# Patient Record
Sex: MALE | Race: WHITE | Employment: OTHER | URBAN - METROPOLITAN AREA
[De-identification: names, ages, dates, MRNs, and addresses within clinical notes are randomized per-mention and may not be internally consistent; named-entity substitution may affect disease eponyms.]

---

## 2017-10-17 ENCOUNTER — TELEPHONE (OUTPATIENT)
Dept: CARDIOLOGY CLINIC | Age: 82
End: 2017-10-17

## 2017-11-03 ENCOUNTER — TELEPHONE (OUTPATIENT)
Dept: CARDIOLOGY CLINIC | Age: 82
End: 2017-11-03

## 2017-11-03 NOTE — TELEPHONE ENCOUNTER
Pt daughter Jonh Pittman called and stated that Dr Delmy Elizondo office told her they faxed pt office notes and records on 11/2. Told pt daughter that I checked fax machine and [employer] and spoke with [facility] and we have not received anything at this time. She states she is calling their office again to see if they will re fax them. She is flying in from out of town and states if she comes to this appointment and we not have the records it is pointless. Told her that we will keep an eye out for them and another option they could try is to have the office print them out and bring to appointment with them, she stated that was a hassle and she would call us back.

## 2017-11-08 ENCOUNTER — NURSE ONLY (OUTPATIENT)
Dept: CARDIOLOGY CLINIC | Age: 82
End: 2017-11-08

## 2017-11-08 ENCOUNTER — INITIAL CONSULT (OUTPATIENT)
Dept: CARDIOLOGY CLINIC | Age: 82
End: 2017-11-08

## 2017-11-08 VITALS
BODY MASS INDEX: 24.77 KG/M2 | DIASTOLIC BLOOD PRESSURE: 72 MMHG | WEIGHT: 173 LBS | SYSTOLIC BLOOD PRESSURE: 172 MMHG | HEART RATE: 48 BPM | HEIGHT: 70 IN

## 2017-11-08 DIAGNOSIS — I48.0 PAF (PAROXYSMAL ATRIAL FIBRILLATION) (HCC): Primary | ICD-10-CM

## 2017-11-08 DIAGNOSIS — I48.91 ATRIAL FIBRILLATION, UNSPECIFIED TYPE (HCC): ICD-10-CM

## 2017-11-08 LAB
BASOPHILS ABSOLUTE: 0 /ΜL
BASOPHILS RELATIVE PERCENT: 0.2 %
BUN BLDV-MCNC: 21 MG/DL
CALCIUM SERPL-MCNC: 8.8 MG/DL
CHLORIDE BLD-SCNC: 104 MMOL/L
CO2: 33 MMOL/L
CREAT SERPL-MCNC: 0.84 MG/DL
EOSINOPHILS ABSOLUTE: 0.1 /ΜL
EOSINOPHILS RELATIVE PERCENT: 1.2 %
GFR CALCULATED: NORMAL
GLUCOSE BLD-MCNC: 96 MG/DL
HCT VFR BLD CALC: 44.6 % (ref 41–53)
HEMOGLOBIN: 14.7 G/DL (ref 13.5–17.5)
LYMPHOCYTES ABSOLUTE: 1.2 /ΜL
LYMPHOCYTES RELATIVE PERCENT: 13.3 %
MCH RBC QN AUTO: 28.4 PG
MCHC RBC AUTO-ENTMCNC: NORMAL G/DL
MCV RBC AUTO: 85.9 FL
MONOCYTES ABSOLUTE: 0.9 /ΜL
MONOCYTES RELATIVE PERCENT: 9.7 %
NEUTROPHILS ABSOLUTE: 6.9 /ΜL
NEUTROPHILS RELATIVE PERCENT: 75.6 %
PLATELET # BLD: 184 K/ΜL
PMV BLD AUTO: NORMAL FL
POTASSIUM SERPL-SCNC: 3.5 MMOL/L
RBC # BLD: 5.19 10^6/ΜL
SODIUM BLD-SCNC: 142 MMOL/L
TROPONIN: 0.04
WBC # BLD: 9.2 10^3/ML

## 2017-11-08 PROCEDURE — G8427 DOCREV CUR MEDS BY ELIG CLIN: HCPCS | Performed by: INTERNAL MEDICINE

## 2017-11-08 PROCEDURE — 93225 XTRNL ECG REC<48 HRS REC: CPT | Performed by: INTERNAL MEDICINE

## 2017-11-08 PROCEDURE — 93000 ELECTROCARDIOGRAM COMPLETE: CPT | Performed by: INTERNAL MEDICINE

## 2017-11-08 PROCEDURE — 99204 OFFICE O/P NEW MOD 45 MIN: CPT | Performed by: INTERNAL MEDICINE

## 2017-11-08 PROCEDURE — G8420 CALC BMI NORM PARAMETERS: HCPCS | Performed by: INTERNAL MEDICINE

## 2017-11-08 PROCEDURE — 4040F PNEUMOC VAC/ADMIN/RCVD: CPT | Performed by: INTERNAL MEDICINE

## 2017-11-08 PROCEDURE — G8484 FLU IMMUNIZE NO ADMIN: HCPCS | Performed by: INTERNAL MEDICINE

## 2017-11-08 PROCEDURE — 1036F TOBACCO NON-USER: CPT | Performed by: INTERNAL MEDICINE

## 2017-11-08 PROCEDURE — 1123F ACP DISCUSS/DSCN MKR DOCD: CPT | Performed by: INTERNAL MEDICINE

## 2017-11-08 RX ORDER — LANOLIN ALCOHOL/MO/W.PET/CERES
500 CREAM (GRAM) TOPICAL EVERY MORNING
COMMUNITY
End: 2019-05-02

## 2017-11-08 RX ORDER — BENZONATATE 100 MG/1
100 CAPSULE ORAL 3 TIMES DAILY PRN
Status: ON HOLD | COMMUNITY
End: 2018-03-29 | Stop reason: ALTCHOICE

## 2017-11-08 RX ORDER — M-VIT,TX,IRON,MINS/CALC/FOLIC 27MG-0.4MG
1 TABLET ORAL DAILY
COMMUNITY

## 2017-11-08 RX ORDER — VITAMIN E 268 MG
400 CAPSULE ORAL DAILY
COMMUNITY
End: 2018-04-18 | Stop reason: ALTCHOICE

## 2017-11-08 RX ORDER — CHLORAL HYDRATE 500 MG
1000 CAPSULE ORAL DAILY
COMMUNITY

## 2017-11-08 RX ORDER — POTASSIUM CHLORIDE 1500 MG/1
20 TABLET, FILM COATED, EXTENDED RELEASE ORAL 2 TIMES DAILY WITH MEALS
Status: ON HOLD | COMMUNITY
End: 2019-05-29 | Stop reason: HOSPADM

## 2017-11-08 NOTE — PROGRESS NOTES
Electrophysiology Consult Note      Reason for consultation:  Need for pacemaker    Chief complaint : Palpitations    Referring physician:  Self referral       Primary care physician: Clarice US      History of Present Illness:     Chief Complaint   Patient presents with    Atrial Fibrillation     Self referral, would like to get second opinion on a PPM. Patient denies CP, he does report palpitations along with SOB at times but states it has been better since stopping amlodipine in the beginning of October. PCP stopped amlodipine due to low HR and dizziness, which also has improved since stopping. He was treated in ED at [facility] in February and was told he had Afib and started on eliquis. Past medical history:   Past Medical History:   Diagnosis Date    ACTA2-related familial thoracic aortic aneurysm (HCC)     Arrhythmia          Bradycardia     Essential hypertension, malignant     History of chest x-ray 02/27/2017    Enlargement of the aorta knob which may represent a thoracic aortic aneurysm. Further evaluation w/ a contrast enhanced CT of the chest recommended. no evidence of acute pulmonary process. 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 Recommend thyroid US.  History of echocardiogram 03/27/2017    TTE EF 55%, LV Normal Size, borderline LVH concentric, Normal LV systolic function, transmittal doppler flow pttern suggest impaired LV relaxation Mild aortic stenosis, mild aortic regurgitation.      History of EKG 02/27/2017    Time 12:03 AM, HR 75, A fib, Axis normal, Intervals normal, P waves normal, St-T Segments: nonspecific ST segment changes to the anterior lateral leads, QRS RBBB,  No significant Q waves, no ectopy. A-fib w/RBBB w/nonspecific ST segment change EKG.  History of EKG 02/27/2017    Time 2:58AM: HR 59, NSR, Axis normal, Intervals normal, P waves normal, ST-T seg: nonspecific ST segment changes, QRS RBBB, no significant Q waves, no ectopy. Sinus bradycardia w/ RBBB nonspecific ST Segment changes EKG    History of stress test 03/27/2017    NM Stress test: EF 54%, Abnormal study, evidence of mild ischemia in mild inferior regions. post stress LV is enlarged in size. Post-stress LV function is normal.     Hyperlipemia     Hyperlipidemia     Hypertension     Nonspecific abnormal electrocardiogram (ECG) (EKG)     Persistent atrial fibrillation (HCC)     Sick sinus syndrome (HCC)     Vertigo        Surgical history : No past surgical history on file. Family history:no sudden cardiac death    Social history :  reports that he has never smoked. He does not have any smokeless tobacco history on file. He reports that he does not drink alcohol or use drugs. Allergies   Allergen Reactions    Biaxin [Clarithromycin]     Cephalexin     Viagra [Sildenafil Citrate] Nausea Only and Other (See Comments)     Dizziness and BP dropped       Current Outpatient Prescriptions on File Prior to Visit   Medication Sig Dispense Refill    atorvastatin (LIPITOR) 20 MG tablet Take 20 mg by mouth daily      ondansetron (ZOFRAN ODT) 4 MG disintegrating tablet Take 1 tablet by mouth every 8 hours as needed for Nausea 15 tablet 0     No current facility-administered medications on file prior to visit. Review of Systems:   Review of Systems   Constitutional: Positive for fatigue. Negative for activity change, chills and fever. HENT: Negative for congestion, ear pain and tinnitus. Eyes: Negative for photophobia, pain and visual disturbance. Respiratory: Positive for shortness of breath. Negative for cough, chest tightness and wheezing.

## 2017-11-19 VITALS
OXYGEN SATURATION: 96 % | BODY MASS INDEX: 25.24 KG/M2 | RESPIRATION RATE: 14 BRPM | DIASTOLIC BLOOD PRESSURE: 84 MMHG | HEART RATE: 56 BPM | HEIGHT: 69 IN | WEIGHT: 170.4 LBS | TEMPERATURE: 98 F | SYSTOLIC BLOOD PRESSURE: 142 MMHG

## 2017-11-19 ASSESSMENT — ENCOUNTER SYMPTOMS
BACK PAIN: 0
CHEST TIGHTNESS: 0
EYE PAIN: 0
VOMITING: 0
ABDOMINAL PAIN: 0
NAUSEA: 0
DIARRHEA: 0
CONSTIPATION: 0
PHOTOPHOBIA: 0
SHORTNESS OF BREATH: 1
BLOOD IN STOOL: 0
COLOR CHANGE: 0
COUGH: 0
WHEEZING: 0

## 2017-11-26 PROCEDURE — 93227 XTRNL ECG REC<48 HR R&I: CPT | Performed by: INTERNAL MEDICINE

## 2017-11-27 ENCOUNTER — TELEPHONE (OUTPATIENT)
Dept: CARDIOLOGY CLINIC | Age: 82
End: 2017-11-27

## 2017-11-27 NOTE — TELEPHONE ENCOUNTER
Patient daughter called states she wants results of patients holter monitor. I gave her results, she asked if patient can have copy mailed, I advised her that patient is coming in on Friday and I will give him a copy at that time. She voiced understanding, also wanted to make sure we knew that PCP for her father had tried him on a BP medication in the past and he has a lot of dizziness with it. She does not know that name of medication at this time.

## 2017-12-01 ENCOUNTER — OFFICE VISIT (OUTPATIENT)
Dept: CARDIOLOGY CLINIC | Age: 82
End: 2017-12-01

## 2017-12-01 VITALS
BODY MASS INDEX: 24.65 KG/M2 | WEIGHT: 166.4 LBS | SYSTOLIC BLOOD PRESSURE: 148 MMHG | DIASTOLIC BLOOD PRESSURE: 80 MMHG | HEART RATE: 62 BPM | HEIGHT: 69 IN

## 2017-12-01 DIAGNOSIS — I48.0 PAF (PAROXYSMAL ATRIAL FIBRILLATION) (HCC): Primary | ICD-10-CM

## 2017-12-01 PROCEDURE — G8427 DOCREV CUR MEDS BY ELIG CLIN: HCPCS | Performed by: INTERNAL MEDICINE

## 2017-12-01 PROCEDURE — G8420 CALC BMI NORM PARAMETERS: HCPCS | Performed by: INTERNAL MEDICINE

## 2017-12-01 PROCEDURE — 1036F TOBACCO NON-USER: CPT | Performed by: INTERNAL MEDICINE

## 2017-12-01 PROCEDURE — 4040F PNEUMOC VAC/ADMIN/RCVD: CPT | Performed by: INTERNAL MEDICINE

## 2017-12-01 PROCEDURE — 1123F ACP DISCUSS/DSCN MKR DOCD: CPT | Performed by: INTERNAL MEDICINE

## 2017-12-01 PROCEDURE — G8484 FLU IMMUNIZE NO ADMIN: HCPCS | Performed by: INTERNAL MEDICINE

## 2017-12-01 PROCEDURE — 99212 OFFICE O/P EST SF 10 MIN: CPT | Performed by: INTERNAL MEDICINE

## 2017-12-01 NOTE — PROGRESS NOTES
Electrophysiology FU Note      Reason for consultation:  Need for pacemaker    Chief complaint : Palpitations    Referring physician:  Self referral       Primary care physician: Suzi US      History of Present Illness: This visit (12/1/2017)      Chief Complaint   Patient presents with    Atrial Fibrillation     Pt is here for holter results. Pt states he does get short of breath sometimes. Pt denies chest pain, dizziness, palpitations, and edmea. Previous visit:    Chief Complaint   Patient presents with    Atrial Fibrillation     Self referral, would like to get second opinion on a PPM. Patient denies CP, he does report palpitations along with SOB at times but states it has been better since stopping amlodipine in the beginning of October. PCP stopped amlodipine due to low HR and dizziness, which also has improved since stopping. He was treated in ED at [facility] in February and was told he had Afib and started on eliquis. Past medical history:   Past Medical History:   Diagnosis Date    ACTA2-related familial thoracic aortic aneurysm (HCC)     Arrhythmia          Bradycardia     Essential hypertension, malignant     History of chest x-ray 02/27/2017    Enlargement of the aorta knob which may represent a thoracic aortic aneurysm. Further evaluation w/ a contrast enhanced CT of the chest recommended. no evidence of acute pulmonary process. 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 Recommend thyroid US.      History of echocardiogram 03/27/2017    TTE EF 55%, LV Normal Size, borderline LVH concentric, Normal LV systolic function, transmittal doppler flow pttern suggest impaired LV relaxation Mild aortic stenosis, mild aortic regurgitation.  History of EKG 02/27/2017    Time 12:03 AM, HR 75, A fib, Axis normal, Intervals normal, P waves normal, St-T Segments: nonspecific ST segment changes to the anterior lateral leads, QRS RBBB,  No significant Q waves, no ectopy. A-fib w/RBBB w/nonspecific ST segment change EKG.  History of EKG 02/27/2017    Time 2:58AM: HR 59, NSR, Axis normal, Intervals normal, P waves normal, ST-T seg: nonspecific ST segment changes, QRS RBBB, no significant Q waves, no ectopy. Sinus bradycardia w/ RBBB nonspecific ST Segment changes EKG    History of stress test 03/27/2017    NM Stress test: EF 54%, Abnormal study, evidence of mild ischemia in mild inferior regions. post stress LV is enlarged in size. Post-stress LV function is normal.     Hyperlipemia     Hyperlipidemia     Hypertension     Nonspecific abnormal electrocardiogram (ECG) (EKG)     Persistent atrial fibrillation (HCC)     Sick sinus syndrome (HCC)     Vertigo        Surgical history : History reviewed. No pertinent surgical history. Family history:no sudden cardiac death    Social history :  reports that he has never smoked. He has never used smokeless tobacco. He reports that he does not drink alcohol or use drugs.     Allergies   Allergen Reactions    Biaxin [Clarithromycin]     Cephalexin     Viagra [Sildenafil Citrate] Nausea Only and Other (See Comments)     Dizziness and BP dropped       Current Outpatient Prescriptions on File Prior to Visit   Medication Sig Dispense Refill    apixaban (ELIQUIS) 5 MG TABS tablet Take 2.5 mg by mouth 2 times daily       Omega-3 Fatty Acids (FISH OIL) 1000 MG CAPS Take 1,200 mg by mouth daily       Pseudoephedrine-DM-GG (CAPMIST DM PO) Take by mouth      benzonatate (TESSALON) 100 MG capsule Take 100 mg by mouth 3 times daily as needed for Cough      potassium chloride (KLOR-CON M) 20 MEQ TBCR extended release tablet Take 20 mEq by mouth 2 times daily (with meals)      niacin (SLO-NIACIN) 500 MG extended release tablet Take 500 mg by mouth every morning      Specialty Vitamins Products (PROSTATE PO) Take by mouth 2 times daily      CALCIUM-VITAMIN D PO Take by mouth      vitamin E 400 UNIT capsule Take 400 Units by mouth daily      Multiple Vitamins-Minerals (THERAPEUTIC MULTIVITAMIN-MINERALS) tablet Take 1 tablet by mouth daily      MECLIZINE HCL PO Take by mouth      atorvastatin (LIPITOR) 20 MG tablet Take 20 mg by mouth daily      ondansetron (ZOFRAN ODT) 4 MG disintegrating tablet Take 1 tablet by mouth every 8 hours as needed for Nausea 15 tablet 0     No current facility-administered medications on file prior to visit. Review of Systems:   Review of Systems   Constitutional: Positive for fatigue. Negative for activity change, chills and fever. HENT: Negative for congestion, ear pain and tinnitus. Eyes: Negative for photophobia, pain and visual disturbance. Respiratory: Positive for shortness of breath. Negative for cough, chest tightness and wheezing. Cardiovascular: Positive for palpitations. Negative for chest pain and leg swelling. Gastrointestinal: Negative for abdominal pain, blood in stool, constipation, diarrhea, nausea and vomiting. Endocrine: Negative for cold intolerance and heat intolerance. Genitourinary: Negative for dysuria, flank pain and hematuria. Musculoskeletal: Positive for arthralgias. Negative for back pain, myalgias and neck stiffness. Skin: Negative for color change and rash. Allergic/Immunologic: Negative for food allergies. Neurological: Negative for dizziness, light-headedness, numbness and headaches. Hematological: Does not bruise/bleed easily. Psychiatric/Behavioral: Negative for agitation, behavioral problems and confusion.            Physical Examination:    BP (!) 148/80   Pulse 62   Ht 5' 9\" (1.753 m)   Wt 166 lb 6.4 oz (75.5 kg)

## 2017-12-01 NOTE — PATIENT INSTRUCTIONS
Please remember to bring all medication bottles or a medication list with you to your appointment. If you have any questions, please call our office at [phone].

## 2017-12-04 ENCOUNTER — TELEPHONE (OUTPATIENT)
Dept: CARDIOLOGY CLINIC | Age: 82
End: 2017-12-04

## 2018-01-03 ENCOUNTER — TELEPHONE (OUTPATIENT)
Dept: CARDIOLOGY CLINIC | Age: 83
End: 2018-01-03

## 2018-02-12 ENCOUNTER — TELEPHONE (OUTPATIENT)
Dept: CARDIOLOGY CLINIC | Age: 83
End: 2018-02-12

## 2018-02-13 ENCOUNTER — TELEPHONE (OUTPATIENT)
Dept: CARDIOLOGY CLINIC | Age: 83
End: 2018-02-13

## 2018-02-13 DIAGNOSIS — I48.0 PAF (PAROXYSMAL ATRIAL FIBRILLATION) (HCC): Primary | ICD-10-CM

## 2018-02-13 NOTE — TELEPHONE ENCOUNTER
Pt daughter Ubaldo Arambula called and stated her father would like to come in and discuss his readings of his BP readings and his HR readings. BP monitor is registering arythmia. Pt states he is feeling okay but the family would like to get it checked out just in case. They would like to come in this week if possible.

## 2018-02-21 ENCOUNTER — OFFICE VISIT (OUTPATIENT)
Dept: CARDIOLOGY CLINIC | Age: 83
End: 2018-02-21

## 2018-02-21 VITALS
SYSTOLIC BLOOD PRESSURE: 120 MMHG | HEIGHT: 70 IN | OXYGEN SATURATION: 98 % | DIASTOLIC BLOOD PRESSURE: 70 MMHG | HEART RATE: 84 BPM | BODY MASS INDEX: 24.05 KG/M2 | WEIGHT: 168 LBS

## 2018-02-21 DIAGNOSIS — I48.91 ATRIAL FIBRILLATION, UNSPECIFIED TYPE (HCC): ICD-10-CM

## 2018-02-21 DIAGNOSIS — R00.1 SYMPTOMATIC BRADYCARDIA: Primary | ICD-10-CM

## 2018-02-21 PROCEDURE — 4040F PNEUMOC VAC/ADMIN/RCVD: CPT | Performed by: INTERNAL MEDICINE

## 2018-02-21 PROCEDURE — G8420 CALC BMI NORM PARAMETERS: HCPCS | Performed by: INTERNAL MEDICINE

## 2018-02-21 PROCEDURE — G8427 DOCREV CUR MEDS BY ELIG CLIN: HCPCS | Performed by: INTERNAL MEDICINE

## 2018-02-21 PROCEDURE — 99213 OFFICE O/P EST LOW 20 MIN: CPT | Performed by: INTERNAL MEDICINE

## 2018-02-21 PROCEDURE — 93000 ELECTROCARDIOGRAM COMPLETE: CPT | Performed by: INTERNAL MEDICINE

## 2018-02-21 PROCEDURE — 1123F ACP DISCUSS/DSCN MKR DOCD: CPT | Performed by: INTERNAL MEDICINE

## 2018-02-21 PROCEDURE — G8484 FLU IMMUNIZE NO ADMIN: HCPCS | Performed by: INTERNAL MEDICINE

## 2018-02-21 PROCEDURE — 1036F TOBACCO NON-USER: CPT | Performed by: INTERNAL MEDICINE

## 2018-02-21 RX ORDER — AMLODIPINE BESYLATE 2.5 MG/1
2.5 TABLET ORAL DAILY
Status: ON HOLD | COMMUNITY
End: 2018-03-30 | Stop reason: HOSPADM

## 2018-02-21 NOTE — PROGRESS NOTES
contrast enhanced CT of the chest recommended. no evidence of acute pulmonary process. 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 Recommend thyroid US.  History of echocardiogram 03/27/2017    TTE EF 55%, LV Normal Size, borderline LVH concentric, Normal LV systolic function, transmittal doppler flow pttern suggest impaired LV relaxation Mild aortic stenosis, mild aortic regurgitation.  History of EKG 02/27/2017    Time 12:03 AM, HR 75, A fib, Axis normal, Intervals normal, P waves normal, St-T Segments: nonspecific ST segment changes to the anterior lateral leads, QRS RBBB,  No significant Q waves, no ectopy. A-fib w/RBBB w/nonspecific ST segment change EKG.  History of EKG 02/27/2017    Time 2:58AM: HR 59, NSR, Axis normal, Intervals normal, P waves normal, ST-T seg: nonspecific ST segment changes, QRS RBBB, no significant Q waves, no ectopy. Sinus bradycardia w/ RBBB nonspecific ST Segment changes EKG    History of stress test 03/27/2017    NM Stress test: EF 54%, Abnormal study, evidence of mild ischemia in mild inferior regions. post stress LV is enlarged in size. Post-stress LV function is normal.     Hyperlipemia     Hyperlipidemia     Hypertension     Nonspecific abnormal electrocardiogram (ECG) (EKG)     Persistent atrial fibrillation (HCC)     Sick sinus syndrome (HCC)     Vertigo        Surgical history : No past surgical history on file. Family history:no sudden cardiac death    Social history :  reports that he has never smoked. He has never used smokeless tobacco. He reports that he does not drink alcohol or use drugs.     Allergies   Allergen Reactions    Biaxin [Clarithromycin]     Cephalexin     Viagra [Sildenafil Citrate] Nausea Only and Other (See Comments)     Dizziness and BP dropped       Current Outpatient Prescriptions on File Prior to Visit   Medication Sig Dispense Refill    metoprolol tartrate (LOPRESSOR) 25 MG tablet Take 1 tablet by mouth 2 times daily 60 tablet 3    apixaban (ELIQUIS) 5 MG TABS tablet Take 2.5 mg by mouth 2 times daily       Omega-3 Fatty Acids (FISH OIL) 1000 MG CAPS Take 1,200 mg by mouth daily       potassium chloride (KLOR-CON M) 20 MEQ TBCR extended release tablet Take 20 mEq by mouth 2 times daily (with meals)      niacin (SLO-NIACIN) 500 MG extended release tablet Take 500 mg by mouth every morning      Specialty Vitamins Products (PROSTATE PO) Take by mouth 2 times daily      vitamin E 400 UNIT capsule Take 400 Units by mouth daily      Multiple Vitamins-Minerals (THERAPEUTIC MULTIVITAMIN-MINERALS) tablet Take 1 tablet by mouth daily      atorvastatin (LIPITOR) 20 MG tablet Take 20 mg by mouth daily      Pseudoephedrine-DM-GG (CAPMIST DM PO) Take by mouth      benzonatate (TESSALON) 100 MG capsule Take 100 mg by mouth 3 times daily as needed for Cough      CALCIUM-VITAMIN D PO Take by mouth      MECLIZINE HCL PO Take by mouth      ondansetron (ZOFRAN ODT) 4 MG disintegrating tablet Take 1 tablet by mouth every 8 hours as needed for Nausea 15 tablet 0     No current facility-administered medications on file prior to visit. Review of Systems:   Review of Systems   Constitutional: Positive for fatigue. Negative for activity change, chills and fever. HENT: Negative for congestion, ear pain and tinnitus. Eyes: Negative for photophobia, pain and visual disturbance. Respiratory: Positive for shortness of breath. Negative for cough, chest tightness and wheezing. Cardiovascular: Positive for palpitations. Negative for chest pain and leg swelling.    Gastrointestinal: Negative for abdominal pain, blood in stool, constipation,

## 2018-02-22 ENCOUNTER — TELEPHONE (OUTPATIENT)
Dept: CARDIOLOGY CLINIC | Age: 83
End: 2018-02-22

## 2018-03-14 ENCOUNTER — TELEPHONE (OUTPATIENT)
Dept: CARDIOLOGY CLINIC | Age: 83
End: 2018-03-14

## 2018-03-16 ENCOUNTER — TELEPHONE (OUTPATIENT)
Dept: CARDIOLOGY CLINIC | Age: 83
End: 2018-03-16

## 2018-03-20 ENCOUNTER — TELEPHONE (OUTPATIENT)
Dept: CARDIOLOGY CLINIC | Age: 83
End: 2018-03-20

## 2018-03-20 NOTE — TELEPHONE ENCOUNTER
Spoke with patient and he was not able to discuss pre procedure instructions at this time but states he will call back in just a little while to discuss.

## 2018-03-20 NOTE — TELEPHONE ENCOUNTER
Patient here in office and educated on Dual Chamber Pacemaker Implant, schedule for 3/19/18 @ 1:00 pm, with arrival @ 11:00 am, @ [facility]; risk explained; and consents signed. Also copy of orders given for labs and CXR due 3/27/18 at [facility]. Instruction given to patient to :  NPO after midnight the night before procedure; call hospital at [phone] to pre-register. May take rest of morning meds of procedure except Eliquis, hold evening dose night prior to procedure and morning dose day of procedure. Patient voiced understanding. Copies of consent & info sheet given to [facility] for scanning.

## 2018-03-22 ENCOUNTER — TELEPHONE (OUTPATIENT)
Dept: CARDIOLOGY CLINIC | Age: 83
End: 2018-03-22

## 2018-03-23 ENCOUNTER — TELEPHONE (OUTPATIENT)
Dept: CARDIOLOGY CLINIC | Age: 83
End: 2018-03-23

## 2018-03-23 NOTE — TELEPHONE ENCOUNTER
Patients daughter calling to verify that patients procedure is still Thursday 3/29 and that patient needs to pre-register 2 days prior. Also to have labs and chest xray done 2 days prior. Advised yes, also returned her email this morning with same information.  She voiced understanding, and verified appointment on Wednesday @ 1 pm with Dr Rajwinder Raygoza

## 2018-03-27 ENCOUNTER — HOSPITAL ENCOUNTER (OUTPATIENT)
Dept: LAB | Age: 83
Discharge: HOME | End: 2018-03-27
Attending: INTERNAL MEDICINE | Admitting: INTERNAL MEDICINE

## 2018-03-27 DIAGNOSIS — Z01.818 PRE-OP EXAM: ICD-10-CM

## 2018-03-27 LAB
ANION GAP SERPL CALCULATED.3IONS-SCNC: 9 MMOL/L (ref 4–16)
APTT: 31.5 SECONDS (ref 21.2–33)
BUN BLDV-MCNC: 18 MG/DL (ref 6–23)
CALCIUM SERPL-MCNC: 9.6 MG/DL (ref 8.3–10.6)
CHLORIDE BLD-SCNC: 103 MMOL/L (ref 99–110)
CO2: 28 MMOL/L (ref 21–32)
CREAT SERPL-MCNC: 0.9 MG/DL (ref 0.9–1.3)
GFR AFRICAN AMERICAN: >60 ML/MIN/1.73M2
GFR NON-AFRICAN AMERICAN: >60 ML/MIN/1.73M2
GLUCOSE BLD-MCNC: 81 MG/DL (ref 70–99)
HCT VFR BLD CALC: 46.3 % (ref 42–52)
HEMOGLOBIN: 14.5 GM/DL (ref 13.5–18)
INR BLD: 1.26 INDEX
MAGNESIUM: 2.5 MG/DL (ref 1.8–2.4)
MCH RBC QN AUTO: 28.4 PG (ref 27–31)
MCHC RBC AUTO-ENTMCNC: 31.3 % (ref 32–36)
MCV RBC AUTO: 90.6 FL (ref 78–100)
PDW BLD-RTO: 13.9 % (ref 11.7–14.9)
PHOSPHORUS: 3.4 MG/DL (ref 2.5–4.9)
PLATELET # BLD: 194 K/CU MM (ref 140–440)
PMV BLD AUTO: 11.5 FL (ref 7.5–11.1)
POTASSIUM SERPL-SCNC: 4.5 MMOL/L (ref 3.5–5.1)
PROTHROMBIN TIME: 14.3 SECONDS (ref 9.12–12.5)
RBC # BLD: 5.11 M/CU MM (ref 4.6–6.2)
SODIUM BLD-SCNC: 140 MMOL/L (ref 135–145)
WBC # BLD: 8.6 K/CU MM (ref 4–10.5)

## 2018-03-28 ENCOUNTER — OFFICE VISIT (OUTPATIENT)
Dept: CARDIOLOGY CLINIC | Age: 83
End: 2018-03-28

## 2018-03-28 VITALS
OXYGEN SATURATION: 98 % | HEIGHT: 69 IN | HEART RATE: 71 BPM | DIASTOLIC BLOOD PRESSURE: 72 MMHG | SYSTOLIC BLOOD PRESSURE: 124 MMHG | WEIGHT: 171 LBS | BODY MASS INDEX: 25.33 KG/M2

## 2018-03-28 DIAGNOSIS — I49.5 SICK SINUS SYNDROME (HCC): Primary | ICD-10-CM

## 2018-03-28 PROCEDURE — 4040F PNEUMOC VAC/ADMIN/RCVD: CPT | Performed by: INTERNAL MEDICINE

## 2018-03-28 PROCEDURE — G8482 FLU IMMUNIZE ORDER/ADMIN: HCPCS | Performed by: INTERNAL MEDICINE

## 2018-03-28 PROCEDURE — 99212 OFFICE O/P EST SF 10 MIN: CPT | Performed by: INTERNAL MEDICINE

## 2018-03-28 PROCEDURE — G8427 DOCREV CUR MEDS BY ELIG CLIN: HCPCS | Performed by: INTERNAL MEDICINE

## 2018-03-28 PROCEDURE — 1123F ACP DISCUSS/DSCN MKR DOCD: CPT | Performed by: INTERNAL MEDICINE

## 2018-03-28 PROCEDURE — G8419 CALC BMI OUT NRM PARAM NOF/U: HCPCS | Performed by: INTERNAL MEDICINE

## 2018-03-28 PROCEDURE — 1036F TOBACCO NON-USER: CPT | Performed by: INTERNAL MEDICINE

## 2018-03-29 PROBLEM — Z95.0 S/P PLACEMENT OF CARDIAC PACEMAKER: Status: ACTIVE | Noted: 2018-03-29

## 2018-04-02 ENCOUNTER — TELEPHONE (OUTPATIENT)
Dept: CARDIOLOGY CLINIC | Age: 83
End: 2018-04-02

## 2018-04-03 ENCOUNTER — TELEPHONE (OUTPATIENT)
Dept: CARDIOLOGY CLINIC | Age: 83
End: 2018-04-03

## 2018-04-06 ENCOUNTER — TELEPHONE (OUTPATIENT)
Dept: CARDIOLOGY CLINIC | Age: 83
End: 2018-04-06

## 2018-04-06 ENCOUNTER — NURSE ONLY (OUTPATIENT)
Dept: CARDIOLOGY CLINIC | Age: 83
End: 2018-04-06

## 2018-04-06 VITALS — TEMPERATURE: 96.6 F

## 2018-04-06 DIAGNOSIS — Z95.0 STATUS POST PLACEMENT OF CARDIAC PACEMAKER: Primary | ICD-10-CM

## 2018-04-10 ENCOUNTER — TELEPHONE (OUTPATIENT)
Dept: CARDIOLOGY CLINIC | Age: 83
End: 2018-04-10

## 2018-04-17 ENCOUNTER — TELEPHONE (OUTPATIENT)
Dept: CARDIOLOGY CLINIC | Age: 83
End: 2018-04-17

## 2018-04-18 ENCOUNTER — OFFICE VISIT (OUTPATIENT)
Dept: CARDIOLOGY CLINIC | Age: 83
End: 2018-04-18

## 2018-04-18 ENCOUNTER — TELEPHONE (OUTPATIENT)
Dept: CARDIOLOGY CLINIC | Age: 83
End: 2018-04-18

## 2018-04-18 VITALS
HEART RATE: 80 BPM | DIASTOLIC BLOOD PRESSURE: 80 MMHG | BODY MASS INDEX: 25.46 KG/M2 | HEIGHT: 68 IN | WEIGHT: 168 LBS | SYSTOLIC BLOOD PRESSURE: 110 MMHG

## 2018-04-18 DIAGNOSIS — I71.21 ASCENDING AORTIC ANEURYSM: ICD-10-CM

## 2018-04-18 DIAGNOSIS — I48.19 PERSISTENT ATRIAL FIBRILLATION (HCC): Primary | ICD-10-CM

## 2018-04-18 PROCEDURE — 99204 OFFICE O/P NEW MOD 45 MIN: CPT | Performed by: INTERNAL MEDICINE

## 2018-04-18 PROCEDURE — G8427 DOCREV CUR MEDS BY ELIG CLIN: HCPCS | Performed by: INTERNAL MEDICINE

## 2018-04-18 PROCEDURE — 4040F PNEUMOC VAC/ADMIN/RCVD: CPT | Performed by: INTERNAL MEDICINE

## 2018-04-18 PROCEDURE — 1036F TOBACCO NON-USER: CPT | Performed by: INTERNAL MEDICINE

## 2018-04-18 PROCEDURE — 1123F ACP DISCUSS/DSCN MKR DOCD: CPT | Performed by: INTERNAL MEDICINE

## 2018-04-18 PROCEDURE — G8419 CALC BMI OUT NRM PARAM NOF/U: HCPCS | Performed by: INTERNAL MEDICINE

## 2018-04-18 RX ORDER — BENZONATATE 100 MG/1
100 CAPSULE ORAL PRN
COMMUNITY
End: 2019-02-04

## 2018-04-18 RX ORDER — MECLIZINE HYDROCHLORIDE 25 MG/1
25 TABLET ORAL 3 TIMES DAILY PRN
COMMUNITY
End: 2019-02-04

## 2018-04-18 RX ORDER — ONDANSETRON 4 MG/1
4 TABLET, FILM COATED ORAL EVERY 8 HOURS PRN
COMMUNITY
End: 2019-02-04

## 2018-04-18 NOTE — TELEPHONE ENCOUNTER
Called Central Scheduling spoke to [place] and patient was scheduled for 4/19/18 at 1:30 and needs to arrive at 1:15 pm. Patient was told to be NPO 4 hours prior. Patient stated he did not have an allergy to Iodine. Patient voiced understanding of instructions.

## 2018-04-19 ENCOUNTER — HOSPITAL ENCOUNTER (OUTPATIENT)
Dept: CT IMAGING | Age: 83
Discharge: HOME | End: 2018-04-19
Attending: INTERNAL MEDICINE | Admitting: INTERNAL MEDICINE

## 2018-04-19 ENCOUNTER — TELEPHONE (OUTPATIENT)
Dept: CARDIOLOGY CLINIC | Age: 83
End: 2018-04-19

## 2018-04-19 DIAGNOSIS — I71.20 THORACIC AORTIC ANEURYSM WITHOUT RUPTURE: ICD-10-CM

## 2018-04-19 DIAGNOSIS — I48.19 PERSISTENT ATRIAL FIBRILLATION (HCC): ICD-10-CM

## 2018-04-19 DIAGNOSIS — I71.21 ASCENDING AORTIC ANEURYSM: ICD-10-CM

## 2018-04-24 ENCOUNTER — TELEPHONE (OUTPATIENT)
Dept: CARDIOLOGY CLINIC | Age: 83
End: 2018-04-24

## 2018-04-25 ENCOUNTER — NURSE ONLY (OUTPATIENT)
Dept: CARDIOLOGY CLINIC | Age: 83
End: 2018-04-25

## 2018-04-25 VITALS
HEART RATE: 78 BPM | SYSTOLIC BLOOD PRESSURE: 132 MMHG | OXYGEN SATURATION: 97 % | BODY MASS INDEX: 25.74 KG/M2 | DIASTOLIC BLOOD PRESSURE: 76 MMHG | HEIGHT: 67 IN | WEIGHT: 164 LBS

## 2018-04-25 DIAGNOSIS — I49.5 SSS (SICK SINUS SYNDROME) (HCC): ICD-10-CM

## 2018-04-25 DIAGNOSIS — I49.5 BRADY-TACHY SYNDROME (HCC): ICD-10-CM

## 2018-04-25 DIAGNOSIS — I35.0 AORTIC VALVE STENOSIS, ETIOLOGY OF CARDIAC VALVE DISEASE UNSPECIFIED: ICD-10-CM

## 2018-04-25 DIAGNOSIS — Z95.0 STATUS POST PLACEMENT OF CARDIAC PACEMAKER: Primary | ICD-10-CM

## 2018-04-25 PROCEDURE — 93280 PM DEVICE PROGR EVAL DUAL: CPT | Performed by: INTERNAL MEDICINE

## 2018-04-25 PROCEDURE — 99999 PR OFFICE/OUTPT VISIT,PROCEDURE ONLY: CPT | Performed by: INTERNAL MEDICINE

## 2018-04-26 ENCOUNTER — TELEPHONE (OUTPATIENT)
Dept: CARDIOLOGY CLINIC | Age: 83
End: 2018-04-26

## 2018-04-27 ENCOUNTER — TELEPHONE (OUTPATIENT)
Dept: CARDIOLOGY CLINIC | Age: 83
End: 2018-04-27

## 2018-05-15 ENCOUNTER — TELEPHONE (OUTPATIENT)
Dept: CARDIOLOGY CLINIC | Age: 83
End: 2018-05-15

## 2018-05-17 ENCOUNTER — TELEPHONE (OUTPATIENT)
Dept: CARDIOLOGY CLINIC | Age: 83
End: 2018-05-17

## 2018-06-28 RX ORDER — APIXABAN 5 MG/1
TABLET, FILM COATED ORAL
Qty: 60 TABLET | Refills: 3 | Status: SHIPPED | OUTPATIENT
Start: 2018-06-28 | End: 2018-10-25 | Stop reason: SDUPTHER

## 2018-07-24 RX ORDER — DILTIAZEM HYDROCHLORIDE 120 MG/1
CAPSULE, EXTENDED RELEASE ORAL
Qty: 30 CAPSULE | Refills: 3 | Status: SHIPPED | OUTPATIENT
Start: 2018-07-24 | End: 2018-11-23 | Stop reason: SDUPTHER

## 2018-07-30 ENCOUNTER — PROCEDURE VISIT (OUTPATIENT)
Dept: CARDIOLOGY CLINIC | Age: 83
End: 2018-07-30

## 2018-07-30 DIAGNOSIS — I49.5 SINUS NODE DYSFUNCTION (HCC): ICD-10-CM

## 2018-07-30 DIAGNOSIS — Z95.0 CARDIAC PACEMAKER IN SITU: Primary | ICD-10-CM

## 2018-07-30 DIAGNOSIS — I44.0 AV BLOCK, 1ST DEGREE: ICD-10-CM

## 2018-07-30 PROCEDURE — 93296 REM INTERROG EVL PM/IDS: CPT | Performed by: INTERNAL MEDICINE

## 2018-07-30 PROCEDURE — 93294 REM INTERROG EVL PM/LDLS PM: CPT | Performed by: INTERNAL MEDICINE

## 2018-08-24 ENCOUNTER — TELEPHONE (OUTPATIENT)
Dept: CARDIOLOGY CLINIC | Age: 83
End: 2018-08-24

## 2018-08-24 NOTE — TELEPHONE ENCOUNTER
Spoke with patient and he wanted to know how to return old monitor. He has return box. I advised him to call the 800 number on the enclosed paper that came with monitor or [facility]. He voiced understanding.

## 2018-08-29 ENCOUNTER — TELEPHONE (OUTPATIENT)
Dept: CARDIOLOGY CLINIC | Age: 83
End: 2018-08-29

## 2018-08-29 NOTE — TELEPHONE ENCOUNTER
Patient started Eliquis about a month ago past few weeks has noticed that his urine is orange   He took decided to adjust his dose to 1/2 tab and stated urine is looking more normal   Please call daughter Alia Siegelme

## 2018-10-25 RX ORDER — APIXABAN 5 MG/1
TABLET, FILM COATED ORAL
Qty: 60 TABLET | Refills: 2 | Status: SHIPPED | OUTPATIENT
Start: 2018-10-25 | End: 2019-03-07 | Stop reason: SDUPTHER

## 2018-11-06 ENCOUNTER — PROCEDURE VISIT (OUTPATIENT)
Dept: CARDIOLOGY CLINIC | Age: 83
End: 2018-11-06
Payer: MEDICARE

## 2018-11-06 DIAGNOSIS — Z95.0 CARDIAC PACEMAKER IN SITU: Primary | ICD-10-CM

## 2018-11-06 DIAGNOSIS — I49.5 SINUS NODE DYSFUNCTION (HCC): ICD-10-CM

## 2018-11-06 DIAGNOSIS — I44.0 AV BLOCK, 1ST DEGREE: ICD-10-CM

## 2018-11-06 PROCEDURE — 93296 REM INTERROG EVL PM/IDS: CPT | Performed by: INTERNAL MEDICINE

## 2018-11-06 PROCEDURE — 93294 REM INTERROG EVL PM/LDLS PM: CPT | Performed by: INTERNAL MEDICINE

## 2018-11-27 RX ORDER — DILTIAZEM HYDROCHLORIDE 120 MG/1
CAPSULE, EXTENDED RELEASE ORAL
Qty: 30 CAPSULE | Refills: 2 | Status: SHIPPED | OUTPATIENT
Start: 2018-11-27 | End: 2019-05-02 | Stop reason: SDUPTHER

## 2019-01-07 ENCOUNTER — TELEPHONE (OUTPATIENT)
Dept: CARDIOLOGY CLINIC | Age: 84
End: 2019-01-07

## 2019-01-22 ENCOUNTER — TELEPHONE (OUTPATIENT)
Dept: CARDIOLOGY CLINIC | Age: 84
End: 2019-01-22

## 2019-01-28 ENCOUNTER — TELEPHONE (OUTPATIENT)
Dept: CARDIOLOGY CLINIC | Age: 84
End: 2019-01-28

## 2019-02-04 ENCOUNTER — OFFICE VISIT (OUTPATIENT)
Dept: CARDIOLOGY CLINIC | Age: 84
End: 2019-02-04
Payer: MEDICARE

## 2019-02-04 VITALS
DIASTOLIC BLOOD PRESSURE: 64 MMHG | HEART RATE: 62 BPM | WEIGHT: 173.4 LBS | HEIGHT: 67 IN | BODY MASS INDEX: 27.21 KG/M2 | SYSTOLIC BLOOD PRESSURE: 138 MMHG

## 2019-02-04 DIAGNOSIS — I48.19 PERSISTENT ATRIAL FIBRILLATION (HCC): Primary | ICD-10-CM

## 2019-02-04 DIAGNOSIS — I49.3 PVC (PREMATURE VENTRICULAR CONTRACTION): ICD-10-CM

## 2019-02-04 PROCEDURE — 99213 OFFICE O/P EST LOW 20 MIN: CPT | Performed by: NURSE PRACTITIONER

## 2019-02-04 PROCEDURE — 4040F PNEUMOC VAC/ADMIN/RCVD: CPT | Performed by: NURSE PRACTITIONER

## 2019-02-04 PROCEDURE — 93000 ELECTROCARDIOGRAM COMPLETE: CPT | Performed by: NURSE PRACTITIONER

## 2019-02-04 PROCEDURE — 1036F TOBACCO NON-USER: CPT | Performed by: NURSE PRACTITIONER

## 2019-02-04 PROCEDURE — 1101F PT FALLS ASSESS-DOCD LE1/YR: CPT | Performed by: NURSE PRACTITIONER

## 2019-02-04 PROCEDURE — G8484 FLU IMMUNIZE NO ADMIN: HCPCS | Performed by: NURSE PRACTITIONER

## 2019-02-04 PROCEDURE — G8419 CALC BMI OUT NRM PARAM NOF/U: HCPCS | Performed by: NURSE PRACTITIONER

## 2019-02-04 PROCEDURE — 1123F ACP DISCUSS/DSCN MKR DOCD: CPT | Performed by: NURSE PRACTITIONER

## 2019-02-04 PROCEDURE — G8427 DOCREV CUR MEDS BY ELIG CLIN: HCPCS | Performed by: NURSE PRACTITIONER

## 2019-02-04 RX ORDER — AMIODARONE HYDROCHLORIDE 200 MG/1
200 TABLET ORAL DAILY
Qty: 30 TABLET | Refills: 3 | Status: SHIPPED | OUTPATIENT
Start: 2019-02-04 | End: 2019-05-23 | Stop reason: SDUPTHER

## 2019-02-04 ASSESSMENT — ENCOUNTER SYMPTOMS
DIARRHEA: 0
BACK PAIN: 0
NAUSEA: 0
COUGH: 0
VOMITING: 0
EYE ITCHING: 0
SINUS PAIN: 0
COLOR CHANGE: 0
SINUS PRESSURE: 0
SHORTNESS OF BREATH: 0
CONSTIPATION: 0
EYE REDNESS: 0

## 2019-02-08 ENCOUNTER — TELEPHONE (OUTPATIENT)
Dept: CARDIOLOGY CLINIC | Age: 84
End: 2019-02-08

## 2019-02-08 NOTE — TELEPHONE ENCOUNTER
Daughter called Dad was prescribed Amiodarone    Dad read side effects to this medication and is scared   To take it please call Daughter would like to know why   This was prescribed

## 2019-02-11 ENCOUNTER — PROCEDURE VISIT (OUTPATIENT)
Dept: CARDIOLOGY CLINIC | Age: 84
End: 2019-02-11
Payer: MEDICARE

## 2019-02-11 DIAGNOSIS — I44.0 AV BLOCK, 1ST DEGREE: ICD-10-CM

## 2019-02-11 DIAGNOSIS — Z95.0 CARDIAC PACEMAKER IN SITU: Primary | ICD-10-CM

## 2019-02-11 DIAGNOSIS — I49.5 SINUS NODE DYSFUNCTION (HCC): ICD-10-CM

## 2019-02-11 PROCEDURE — 93294 REM INTERROG EVL PM/LDLS PM: CPT | Performed by: INTERNAL MEDICINE

## 2019-02-11 PROCEDURE — 93296 REM INTERROG EVL PM/IDS: CPT | Performed by: INTERNAL MEDICINE

## 2019-02-20 ENCOUNTER — OFFICE VISIT (OUTPATIENT)
Dept: CARDIOLOGY CLINIC | Age: 84
End: 2019-02-20
Payer: MEDICARE

## 2019-02-20 VITALS
DIASTOLIC BLOOD PRESSURE: 84 MMHG | OXYGEN SATURATION: 97 % | SYSTOLIC BLOOD PRESSURE: 156 MMHG | BODY MASS INDEX: 27.62 KG/M2 | HEIGHT: 67 IN | WEIGHT: 176 LBS | HEART RATE: 74 BPM

## 2019-02-20 DIAGNOSIS — I49.3 PVC (PREMATURE VENTRICULAR CONTRACTION): Primary | ICD-10-CM

## 2019-02-20 PROCEDURE — 1101F PT FALLS ASSESS-DOCD LE1/YR: CPT | Performed by: INTERNAL MEDICINE

## 2019-02-20 PROCEDURE — 99213 OFFICE O/P EST LOW 20 MIN: CPT | Performed by: INTERNAL MEDICINE

## 2019-02-20 PROCEDURE — 4040F PNEUMOC VAC/ADMIN/RCVD: CPT | Performed by: INTERNAL MEDICINE

## 2019-02-20 PROCEDURE — G8427 DOCREV CUR MEDS BY ELIG CLIN: HCPCS | Performed by: INTERNAL MEDICINE

## 2019-02-20 PROCEDURE — 1036F TOBACCO NON-USER: CPT | Performed by: INTERNAL MEDICINE

## 2019-02-20 PROCEDURE — 1123F ACP DISCUSS/DSCN MKR DOCD: CPT | Performed by: INTERNAL MEDICINE

## 2019-02-20 PROCEDURE — G8484 FLU IMMUNIZE NO ADMIN: HCPCS | Performed by: INTERNAL MEDICINE

## 2019-02-20 PROCEDURE — G8419 CALC BMI OUT NRM PARAM NOF/U: HCPCS | Performed by: INTERNAL MEDICINE

## 2019-03-08 RX ORDER — APIXABAN 5 MG/1
TABLET, FILM COATED ORAL
Qty: 60 TABLET | Refills: 1 | Status: SHIPPED | OUTPATIENT
Start: 2019-03-08 | End: 2019-04-10 | Stop reason: DRUGHIGH

## 2019-03-30 ENCOUNTER — HOSPITAL ENCOUNTER (EMERGENCY)
Age: 84
Discharge: HOME | End: 2019-03-31
Attending: EMERGENCY MEDICINE
Payer: MEDICARE

## 2019-03-30 PROCEDURE — 4500000027

## 2019-03-30 PROCEDURE — 99284 EMERGENCY DEPT VISIT MOD MDM: CPT

## 2019-03-31 VITALS
RESPIRATION RATE: 18 BRPM | HEIGHT: 67 IN | OXYGEN SATURATION: 97 % | WEIGHT: 176 LBS | DIASTOLIC BLOOD PRESSURE: 77 MMHG | SYSTOLIC BLOOD PRESSURE: 150 MMHG | TEMPERATURE: 98.2 F | HEART RATE: 60 BPM | BODY MASS INDEX: 27.62 KG/M2

## 2019-03-31 LAB
ALBUMIN SERPL-MCNC: 3.9 GM/DL (ref 3.4–5)
ALP BLD-CCNC: 77 IU/L (ref 40–129)
ALT SERPL-CCNC: 19 U/L (ref 10–40)
ANION GAP SERPL CALCULATED.3IONS-SCNC: 11 MMOL/L (ref 4–16)
APTT: 39.8 SECONDS (ref 21.2–33)
AST SERPL-CCNC: 27 IU/L (ref 15–37)
BACTERIA: NEGATIVE /HPF
BASOPHILS ABSOLUTE: 0 K/CU MM
BASOPHILS RELATIVE PERCENT: 0.2 % (ref 0–1)
BILIRUB SERPL-MCNC: 0.6 MG/DL (ref 0–1)
BILIRUBIN URINE: NEGATIVE MG/DL
BLOOD, URINE: ABNORMAL
BUN BLDV-MCNC: 23 MG/DL (ref 6–23)
CALCIUM SERPL-MCNC: 8.6 MG/DL (ref 8.3–10.6)
CHLORIDE BLD-SCNC: 101 MMOL/L (ref 99–110)
CLARITY: ABNORMAL
CO2: 28 MMOL/L (ref 21–32)
COLOR: ABNORMAL
CREAT SERPL-MCNC: 1.2 MG/DL (ref 0.9–1.3)
DIFFERENTIAL TYPE: ABNORMAL
EOSINOPHILS ABSOLUTE: 0.1 K/CU MM
EOSINOPHILS RELATIVE PERCENT: 0.8 % (ref 0–3)
GFR AFRICAN AMERICAN: >60 ML/MIN/1.73M2
GFR NON-AFRICAN AMERICAN: 58 ML/MIN/1.73M2
GLUCOSE BLD-MCNC: 108 MG/DL (ref 70–99)
GLUCOSE, URINE: NEGATIVE MG/DL
HCT VFR BLD CALC: 34.1 % (ref 42–52)
HEMOGLOBIN: 10.6 GM/DL (ref 13.5–18)
IMMATURE NEUTROPHIL %: 0.3 % (ref 0–0.43)
INR BLD: 1.35 INDEX
KETONES, URINE: NEGATIVE MG/DL
LEUKOCYTE ESTERASE, URINE: ABNORMAL
LYMPHOCYTES ABSOLUTE: 0.9 K/CU MM
LYMPHOCYTES RELATIVE PERCENT: 9.6 % (ref 24–44)
MCH RBC QN AUTO: 27.2 PG (ref 27–31)
MCHC RBC AUTO-ENTMCNC: 31.1 % (ref 32–36)
MCV RBC AUTO: 87.7 FL (ref 78–100)
MONOCYTES ABSOLUTE: 0.8 K/CU MM
MONOCYTES RELATIVE PERCENT: 8.8 % (ref 0–4)
MUCUS: ABNORMAL HPF
NITRITE URINE, QUANTITATIVE: NEGATIVE
NUCLEATED RBC %: 0 %
PDW BLD-RTO: 13.2 % (ref 11.7–14.9)
PH, URINE: 7 (ref 5–8)
PLATELET # BLD: 243 K/CU MM (ref 140–440)
PMV BLD AUTO: 11.1 FL (ref 7.5–11.1)
POTASSIUM SERPL-SCNC: 4.5 MMOL/L (ref 3.5–5.1)
PROTEIN UA: 100 MG/DL
PROTHROMBIN TIME: 15.3 SECONDS (ref 9.12–12.5)
RBC # BLD: 3.89 M/CU MM (ref 4.6–6.2)
RBC URINE: ABNORMAL /HPF (ref 0–3)
SEGMENTED NEUTROPHILS ABSOLUTE COUNT: 7.4 K/CU MM
SEGMENTED NEUTROPHILS RELATIVE PERCENT: 80.3 % (ref 36–66)
SODIUM BLD-SCNC: 140 MMOL/L (ref 135–145)
SPECIFIC GRAVITY UA: 1.01 (ref 1–1.03)
TOTAL IMMATURE NEUTOROPHIL: 0.03 K/CU MM
TOTAL NUCLEATED RBC: 0 K/CU MM
TOTAL PROTEIN: 6.6 GM/DL (ref 6.4–8.2)
TRICHOMONAS: ABNORMAL /HPF
UROBILINOGEN, URINE: NORMAL MG/DL (ref 0.2–1)
WBC # BLD: 9.2 K/CU MM (ref 4–10.5)
WBC UA: 92 /HPF (ref 0–2)

## 2019-03-31 PROCEDURE — 87086 URINE CULTURE/COLONY COUNT: CPT

## 2019-03-31 PROCEDURE — 85610 PROTHROMBIN TIME: CPT

## 2019-03-31 PROCEDURE — 6370000000 HC RX 637 (ALT 250 FOR IP): Performed by: EMERGENCY MEDICINE

## 2019-03-31 PROCEDURE — 80053 COMPREHEN METABOLIC PANEL: CPT

## 2019-03-31 PROCEDURE — 81001 URINALYSIS AUTO W/SCOPE: CPT

## 2019-03-31 PROCEDURE — 85025 COMPLETE CBC W/AUTO DIFF WBC: CPT

## 2019-03-31 PROCEDURE — 85730 THROMBOPLASTIN TIME PARTIAL: CPT

## 2019-03-31 RX ORDER — CIPROFLOXACIN 500 MG/1
500 TABLET, FILM COATED ORAL 2 TIMES DAILY
Qty: 14 TABLET | Refills: 0 | Status: SHIPPED | OUTPATIENT
Start: 2019-03-31 | End: 2019-04-07

## 2019-03-31 RX ORDER — CIPROFLOXACIN 500 MG/1
500 TABLET, FILM COATED ORAL ONCE
Status: COMPLETED | OUTPATIENT
Start: 2019-03-31 | End: 2019-03-31

## 2019-03-31 RX ADMIN — CIPROFLOXACIN HYDROCHLORIDE 500 MG: 500 TABLET, FILM COATED ORAL at 03:37

## 2019-03-31 NOTE — ED NOTES
Repeat bladder scan completed showing 186 ml. Dr. Alana Mensah notified, no new orders given.       Michelle Rene, RN  03/31/19 [id number]

## 2019-03-31 NOTE — ED PROVIDER NOTES
Triage Chief Complaint:   Hematuria (reports being incont when laying down but not standing. )      [place]:  Swapnil Claros is a 80 y.o. male that presents to the emergency department stating he's had some urinary incontinence. He denies any difficulty with bowel movements. Denies any pain. No pain in his back, legs. No fevers or chills. Does not have any known history of prostate issues. Does have a history of stones in his kidneys in the past but has not passed on. He is on eliquisfor history of A. fib. He states he noticed a little bit of hematuria and has had some issues with urinary incontinence when laying flat and inability to control his bladder. States this is new over the last 2 weeks. Denies any dysuria. No nausea, vomiting, abdominal pain, back pain, chest pain or shortness of breath. .  Patient denies any saddle anesthesia. Past Medical History:   Diagnosis Date    ACTA2-related familial thoracic aortic aneurysm     Arrhythmia     BBBB (bilateral bundle branch block)     Bradycardia     Essential hypertension, malignant     History of cardioversion 04/17/2018    History of chest x-ray 02/27/2017    Enlargement of the aorta knob which may represent a thoracic aortic aneurysm. Further evaluation w/ a contrast enhanced CT of the chest recommended. no evidence of acute pulmonary process. 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 Recommend thyroid US.  History of echocardiogram 03/27/2017    TTE EF 55%, LV Normal Size, borderline LVH concentric, Normal LV systolic function, transmittal doppler flow pttern suggest impaired LV relaxation Mild aortic stenosis, mild aortic regurgitation.      History of EKG 02/27/2017    Time 12:03 AM, HR 75, A fib, Axis normal, Intervals normal, P waves normal, St-T Segments: nonspecific ST segment changes to the anterior lateral leads, QRS RBBB,  No significant Q waves, no ectopy. A-fib w/RBBB w/nonspecific ST segment change EKG.  History of EKG 02/27/2017    Time 2:58AM: HR 59, NSR, Axis normal, Intervals normal, P waves normal, ST-T seg: nonspecific ST segment changes, QRS RBBB, no significant Q waves, no ectopy. Sinus bradycardia w/ RBBB nonspecific ST Segment changes EKG    History of Holter monitoring 11/08/2017    monitored 48 hours: Patient noted to have multiple PAC and PVCs. Risk of atrial fibrillation present given previous episode Recommend antiarrhythmic therapy.  History of stress test 03/27/2017    NM Stress test: EF 54%, Abnormal study, evidence of mild ischemia in mild inferior regions. post stress LV is enlarged in size. Post-stress LV function is normal.     Hx of transesophageal echocardiography (SIMONE) for monitoring 04/17/2018    EF45-50% mild TR, mild-mod MR, mod-severe AS    Hyperlipemia     Hyperlipidemia     Hypertension     Nonspecific abnormal electrocardiogram (ECG) (EKG)     Persistent atrial fibrillation (HCC)     Sick sinus syndrome (HCC)     Vertigo      Past Surgical History:   Procedure Laterality Date    PACEMAKER INSERTION Left 03/29/2018    Dual Chamber Medtronic Aura XT DR AMI Kincaid     History reviewed. No pertinent family history.   Social History     Socioeconomic History    Marital status:      Spouse name: Not on file    Number of children: Not on file    Years of education: Not on file    Highest education level: Not on file   Occupational History    Not on file   Social Needs    Financial resource strain: Not on file    Food insecurity:     Worry: Not on file     Inability: Not on file    Transportation needs:     Medical: Not on file     Non-medical: Not on file   Tobacco Use    Smoking status: Never Smoker    Smokeless tobacco: Never Used   Substance and Sexual Activity    Alcohol use:  Yes     Alcohol/week: 0.6 oz     Types: 1 Cans of beer per week     Comment: daily with dinner     Drug use: No    Sexual activity: Never   Lifestyle    Physical activity:     Days per week: Not on file     Minutes per session: Not on file    Stress: Not on file   Relationships    Social connections:     Talks on phone: Not on file     Gets together: Not on file     Attends Bahai service: Not on file     Active member of club or organization: Not on file     Attends meetings of clubs or organizations: Not on file     Relationship status: Not on file    Intimate partner violence:     Fear of current or ex partner: Not on file     Emotionally abused: Not on file     Physically abused: Not on file     Forced sexual activity: Not on file   Other Topics Concern    Not on file   Social History Narrative    Not on file     Current Facility-Administered Medications   Medication Dose Route Frequency Provider Last Rate Last Dose    ciprofloxacin (CIPRO) tablet 500 mg  500 mg Oral Once Vicki Batista MD         Current Outpatient Medications   Medication Sig Dispense Refill    ciprofloxacin (CIPRO) 500 MG tablet Take 1 tablet by mouth 2 times daily for 7 days 14 tablet 0    ELIQUIS 5 MG TABS tablet TAKE ONE TABLET BY MOUTH TWICE A DAY 60 tablet 1    metoprolol tartrate (LOPRESSOR) 25 MG tablet Take 25 mg by mouth 2 times daily      amiodarone (CORDARONE) 200 MG tablet Take 1 tablet by mouth daily 30 tablet 3    diltiazem (TIAZAC) 120 MG extended release capsule TAKE ONE CAPSULE BY MOUTH DAILY 30 capsule 2    VITAMIN E PO Take by mouth      Omega-3 Fatty Acids (FISH OIL) 1000 MG CAPS Take 1,200 mg by mouth daily       potassium chloride (KLOR-CON M) 20 MEQ TBCR extended release tablet Take 20 mEq by mouth 2 times daily (with meals)      niacin (SLO-NIACIN) 500 MG extended release tablet Take 500 mg by mouth every morning      Specialty Vitamins Products (PROSTATE PO) Take by mouth 2 times daily      CALCIUM-VITAMIN D PO Take by mouth      Multiple Vitamins-Minerals (THERAPEUTIC MULTIVITAMIN-MINERALS) tablet Take 1 tablet by mouth daily      atorvastatin (LIPITOR) 20 MG tablet Take 20 mg by mouth daily       Allergies   Allergen Reactions    Biaxin [Clarithromycin]     Cephalexin     Viagra [Sildenafil Citrate] Nausea Only and Other (See Comments)     Dizziness and BP dropped     Nursing Notes Reviewed    ROS:  At least 10 systems reviewed and otherwise negative except as in the [place]. Physical Exam:  ED Triage Vitals [03/30/19 2216]   Enc Vitals Group      BP (!) 148/86      Pulse 80      Resp 18      Temp 98.2 °F (36.8 °C)      Temp Source Oral      SpO2 98 %      Weight 176 lb (79.8 kg)      Height 5' 7\" (1.702 m)      Head Circumference       Peak Flow       Pain Score       Pain Loc       Pain Edu? Excl. in [address]? My pulse oximetry interpretation is which is within the normal range    GENERAL APPEARANCE: Awake and alert. Cooperative. No acute distress. HEAD: Normocephalic. Atraumatic. EYES: EOM's grossly intact. Sclera anicteric. ENT: Mucous membranes are moist. Tolerates saliva. No trismus. NECK: Supple. No meningismus. Trachea midline. HEART: RRR. Radial pulses 2+. LUNGS: Respirations unlabored. CTAB  ABDOMEN: Soft. Non-tender. No guarding or rebound. Normal bowel sounds. BACK: No midline back pain. No bruising. No step-offs. No CVA tenderness. EXTREMITIES: No acute deformities. No leg weakness. Strong pulses. Sensation intact. Normal reflexes. SKIN: Warm and dry. NEUROLOGICAL: No gross facial drooping. Moves all 4 extremities spontaneously. Normal gait. PSYCHIATRIC: Normal mood.     I have reviewed and interpreted all of the currently available lab results from this visit (if applicable):  Results for orders placed or performed during the hospital encounter of 03/30/19 NEGATIVE    RBC, UA 11,515 (H) 0 - 3 /HPF    WBC, UA 92 (H) 0 - 2 /HPF    Bacteria, UA NEGATIVE NEGATIVE /HPF    Mucus, UA MANY (A) NEGATIVE HPF    Trichomonas, UA NONE SEEN NONE SEEN /HPF   Protime/INR & PTT   Result Value Ref Range    Protime 15.3 (H) 9.12 - 12.5 SECONDS    INR 1.35 INDEX    aPTT 39.8 (H) 21.2 - 33.0 SECONDS        Radiographs:  [] Radiologist's Wet Read Report Reviewed:     [] Discussed with Radiologist:   We are on down time when imaging was received. Have not received the fax of this CAT scan despite multiple attempts. The [facility] radiology did read the report to me which included incidental 4.2 cm incidental infrarenal aneurysm without rupture. Cholelithiasis. Left renal pelvis stone with mild Hydro. Right renal pelvis stone 16 mm. No signs of obstruction bilaterally. Patient does have a normal appendix. Does have some layering of calcified bladder stones. [] The following radiograph was interpreted by myself in the absence of a radiologist:     EKG: (All EKG's are interpreted by myself in the absence of a cardiologist)      MDM:  Patient's vital signs are stable. He is denying any pain. CBC is within normal limits. No elevation in white count. Does have a mild left shift. INR is 1.35. CMP normal.. UA showed red bloods cells, leuk esterase, WBC. . CT abdomen shows bilateral renal pelvic stones without signs of obstruction. Some layering calcified bladder stones. .  Patient was up and able to urinate without difficulty after being here. Did discuss that he has some RBCs and the PVCs in his urine and do feel he needs to be placed on antibiotics. We'll start Cipro. Does have some incidental findings on CT including AAA and gallbladder stones. We'll refer to CT surgery. Patient's repeat bladder scan after post void was within normal limits. We'll refer him to urology locally for further evaluation and treatment. Return ED if worsens. Clinical Impression:  1. Acute cystitis with hematuria    2. Gallstones    3. Abdominal aortic aneurysm (AAA) without rupture ([facility] [address]    4. Calculus of renal pelvis        Disposition Vitals:  [unfilled], [unfilled], [unfilled], [unfilled]    Disposition referral (if applicable):  Vicenta Goode, [facility]  [phone]    Schedule an appointment as soon as possible for a visit   Make appt with urology. [address] [place] [address] [place] [address]  [phone]    Schedule an appointment as soon as possible for a visit   Follow up for incidental CT scan finding of 4.2cm AAA.       Disposition medications (if applicable):  New Prescriptions    CIPROFLOXACIN (CIPRO) 500 MG TABLET    Take 1 tablet by mouth 2 times daily for 7 days         (Please note that portions of this note may have been completed with a voice recognition program. Efforts were made to edit the dictations but occasionally words are mis-transcribed.)    MD Daniela Davidson MD  03/31/19 [id number]

## 2019-04-01 LAB
CULTURE: NORMAL
Lab: NORMAL
SPECIMEN: NORMAL

## 2019-04-09 ENCOUNTER — TELEPHONE (OUTPATIENT)
Dept: CARDIOLOGY CLINIC | Age: 84
End: 2019-04-09

## 2019-04-09 NOTE — TELEPHONE ENCOUNTER
Left message for patients daughter advised her that Megan John states it's ok to change Eliquis 5 mg to 2.5 mg daily. If they can be broken in half he may do that or if they would like a new Rx for the 2.5 mg of Eliquis. Please call the office and we send Rx into pharmacy.  (medication was changed per Patient and daughters request)

## 2019-04-09 NOTE — TELEPHONE ENCOUNTER
Daughter called has some concerns with his Eliquis   He has had some issues with hematuria  He bumped his leg and had a bleeding   Issue , Daughter is asking if we can   Lower the dose

## 2019-04-10 NOTE — TELEPHONE ENCOUNTER
Daughter called asking if we will call in 2.5 mg Eliquis unable to cut them in half   [facility] [address] asking if we can call in this morning

## 2019-04-11 ENCOUNTER — TELEPHONE (OUTPATIENT)
Dept: CARDIOLOGY CLINIC | Age: 84
End: 2019-04-11

## 2019-04-11 NOTE — TELEPHONE ENCOUNTER
Returned call to patients daughter advised her that Man Varela would prefer they discussed referral with patients PCP. However, I did give her name and number of [facility] .

## 2019-04-16 ENCOUNTER — TELEPHONE (OUTPATIENT)
Dept: CARDIOLOGY CLINIC | Age: 84
End: 2019-04-16

## 2019-04-16 NOTE — TELEPHONE ENCOUNTER
[facility] called asking if we have filled out form for   Patient to have MRI he has a pace maker Edwin Johnson stated that she is waiting for a  signature

## 2019-04-17 ENCOUNTER — TELEPHONE (OUTPATIENT)
Dept: CARDIOLOGY CLINIC | Age: 84
End: 2019-04-17

## 2019-04-17 NOTE — TELEPHONE ENCOUNTER
[facility] and stated that they need the MRI paper signed by Silvana Bass that was sent over two days ago. I spoke with TITO Fontanez MA for Silvana Bass and she stated as soon as Althea Gonzalez gets in office they will get the paper signed and faxed . I explained that to [employer] at [facility]. And she stated patient will be there at 130pm for the MRI.    [phone]

## 2019-05-02 ENCOUNTER — APPOINTMENT (OUTPATIENT)
Dept: CT IMAGING | Age: 84
DRG: 853 | End: 2019-05-02
Payer: MEDICARE

## 2019-05-02 ENCOUNTER — HOSPITAL ENCOUNTER (EMERGENCY)
Age: 84
Discharge: HOME | DRG: 853 | End: 2019-05-02
Attending: EMERGENCY MEDICINE
Payer: MEDICARE

## 2019-05-02 VITALS
DIASTOLIC BLOOD PRESSURE: 79 MMHG | HEART RATE: 64 BPM | SYSTOLIC BLOOD PRESSURE: 157 MMHG | OXYGEN SATURATION: 95 % | TEMPERATURE: 98.8 F
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
## 2019-05-02 DIAGNOSIS — R07.9 CHEST PAIN, UNSPECIFIED TYPE: ICD-10-CM

## 2019-05-02 DIAGNOSIS — N20.1 URETERIC STONE: ICD-10-CM

## 2019-05-02 LAB
ALBUMIN SERPL-MCNC: 3.7 GM/DL (ref 3.4–5)
ALP BLD-CCNC: 92 IU/L (ref 40–128)
ALT SERPL-CCNC: 48 U/L (ref 10–40)
ANION GAP SERPL CALCULATED.3IONS-SCNC: 11 MMOL/L (ref 4–16)
AST SERPL-CCNC: 44 IU/L (ref 15–37)
BASOPHILS ABSOLUTE: 0 K/CU MM
BASOPHILS RELATIVE PERCENT: 0.3 % (ref 0–1)
BILIRUB SERPL-MCNC: 0.6 MG/DL (ref 0–1)
BUN BLDV-MCNC: 27 MG/DL (ref 6–23)
CALCIUM SERPL-MCNC: 9.1 MG/DL (ref 8.3–10.6)
CHLORIDE BLD-SCNC: 99 MMOL/L (ref 99–110)
CO2: 29 MMOL/L (ref 21–32)
CREAT SERPL-MCNC: 1.1 MG/DL (ref 0.9–1.3)
DIFFERENTIAL TYPE: ABNORMAL
EOSINOPHILS ABSOLUTE: 0 K/CU MM
EOSINOPHILS RELATIVE PERCENT: 0.3 % (ref 0–3)
GFR AFRICAN AMERICAN: >60 ML/MIN/1.73M2
GFR NON-AFRICAN AMERICAN: >60 ML/MIN/1.73M2
GLUCOSE BLD-MCNC: 95 MG/DL (ref 70–99)
HCT VFR BLD CALC: 36.1 % (ref 42–52)
HEMOGLOBIN: 10.7 GM/DL (ref 13.5–18)
IMMATURE NEUTROPHIL %: 0.6 % (ref 0–0.43)
LIPASE: 19 IU/L (ref 13–60)
LYMPHOCYTES ABSOLUTE: 0.8 K/CU MM
LYMPHOCYTES RELATIVE PERCENT: 6.9 % (ref 24–44)
MCH RBC QN AUTO: 25.1 PG (ref 27–31)
MCHC RBC AUTO-ENTMCNC: 29.6 % (ref 32–36)
MCV RBC AUTO: 84.5 FL (ref 78–100)
MONOCYTES ABSOLUTE: 1.1 K/CU MM
MONOCYTES RELATIVE PERCENT: 10.2 % (ref 0–4)
NUCLEATED RBC %: 0 %
PDW BLD-RTO: 14.4 % (ref 11.7–14.9)
PLATELET # BLD: 269 K/CU MM (ref 140–440)
PMV BLD AUTO: 10.9 FL (ref 7.5–11.1)
POTASSIUM SERPL-SCNC: 4.2 MMOL/L (ref 3.5–5.1)
RBC # BLD: 4.27 M/CU MM (ref 4.6–6.2)
SEGMENTED NEUTROPHILS ABSOLUTE COUNT: 9 K/CU MM
SEGMENTED NEUTROPHILS RELATIVE PERCENT: 81.7 % (ref 36–66)
SODIUM BLD-SCNC: 139 MMOL/L (ref 135–145)
TOTAL IMMATURE NEUTOROPHIL: 0.07 K/CU MM
TOTAL NUCLEATED RBC: 0 K/CU MM
TOTAL PROTEIN: 6.3 GM/DL (ref 6.4–8.2)
TROPONIN T: <0.01 NG/ML
WBC # BLD: 11 K/CU MM (ref 4–10.5)

## 2019-05-02 PROCEDURE — 72125 CT NECK SPINE W/O DYE: CPT

## 2019-05-02 PROCEDURE — 70450 CT HEAD/BRAIN W/O DYE: CPT

## 2019-05-02 PROCEDURE — 99284 EMERGENCY DEPT VISIT MOD MDM: CPT

## 2019-05-02 PROCEDURE — 74176 CT ABD & PELVIS W/O CONTRAST: CPT

## 2019-05-02 PROCEDURE — 85025 COMPLETE CBC W/AUTO DIFF WBC: CPT

## 2019-05-02 PROCEDURE — 84484 ASSAY OF TROPONIN QUANT: CPT

## 2019-05-02 PROCEDURE — 71250 CT THORAX DX C-: CPT

## 2019-05-02 PROCEDURE — 83690 ASSAY OF LIPASE: CPT

## 2019-05-02 PROCEDURE — 93005 ELECTROCARDIOGRAM TRACING: CPT | Performed by: EMERGENCY MEDICINE

## 2019-05-02 PROCEDURE — 80053 COMPREHEN METABOLIC PANEL: CPT

## 2019-05-02 RX ORDER — RIVASTIGMINE 4.6 MG/24H
1 PATCH, EXTENDED RELEASE TRANSDERMAL DAILY
COMMUNITY
Start: 2019-03-28

## 2019-05-02 RX ORDER — DILTIAZEM HYDROCHLORIDE 120 MG/1
120 CAPSULE, EXTENDED RELEASE ORAL DAILY
Status: ON HOLD | COMMUNITY
Start: 2019-03-28 | End: 2019-05-29 | Stop reason: HOSPADM
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
## 2019-05-03 LAB
EKG ATRIAL RATE: 73 BPM
EKG DIAGNOSIS: NORMAL
EKG P AXIS: -11 DEGREES
EKG P-R INTERVAL: 344 MS
EKG Q-T INTERVAL: 472 MS
EKG QRS DURATION: 156 MS
EKG QTC CALCULATION (BAZETT): 519 MS
EKG R AXIS: 76 DEGREES
EKG T AXIS: -33 DEGREES
EKG VENTRICULAR RATE: 73 BPM

## 2019-05-03 PROCEDURE — 93010 ELECTROCARDIOGRAM REPORT: CPT | Performed by: INTERNAL MEDICINE

## 2019-05-04 ENCOUNTER — APPOINTMENT (OUTPATIENT)
Dept: CT IMAGING | Age: 84
DRG: 853 | End: 2019-05-04
Payer: MEDICARE

## 2019-05-04 ENCOUNTER — APPOINTMENT (OUTPATIENT)
Dept: GENERAL RADIOLOGY | Age: 84
DRG: 853 | End: 2019-05-04
Payer: MEDICARE

## 2019-05-04 ENCOUNTER — HOSPITAL ENCOUNTER (INPATIENT)
Age: 84
LOS: 3 days | Discharge: HOME HEALTH | DRG: 853 | End: 2019-05-07
Attending: EMERGENCY MEDICINE | Admitting: FAMILY MEDICINE
Payer: MEDICARE

## 2019-05-04 DIAGNOSIS — N17.9 AKI (ACUTE KIDNEY INJURY) (HCC): ICD-10-CM

## 2019-05-04 DIAGNOSIS — E87.5 HYPERKALEMIA: ICD-10-CM

## 2019-05-04 DIAGNOSIS — I50.9 ACUTE ON CHRONIC CONGESTIVE HEART FAILURE, UNSPECIFIED HEART FAILURE TYPE (HCC): Primary | ICD-10-CM

## 2019-05-04 DIAGNOSIS — J18.1 LUNG CONSOLIDATION (HCC): ICD-10-CM

## 2019-05-04 PROBLEM — E87.70 HYPERVOLEMIA: Status: ACTIVE | Noted: 2019-05-04

## 2019-05-04 LAB
ALBUMIN SERPL-MCNC: 3.9 GM/DL (ref 3.4–5)
ALP BLD-CCNC: 110 IU/L (ref 40–129)
ALT SERPL-CCNC: 60 U/L (ref 10–40)
AMMONIA: 28 UMOL/L (ref 16–60)
ANION GAP SERPL CALCULATED.3IONS-SCNC: 11 MMOL/L (ref 4–16)
ANION GAP SERPL CALCULATED.3IONS-SCNC: 12 MMOL/L (ref 4–16)
AST SERPL-CCNC: 50 IU/L (ref 15–37)
BACTERIA: ABNORMAL /HPF
BASOPHILS ABSOLUTE: 0 K/CU MM
BASOPHILS RELATIVE PERCENT: 0.2 % (ref 0–1)
BILIRUB SERPL-MCNC: 1 MG/DL (ref 0–1)
BILIRUBIN URINE: NEGATIVE MG/DL
BLOOD, URINE: ABNORMAL
BUN BLDV-MCNC: 46 MG/DL (ref 6–23)
BUN BLDV-MCNC: 48 MG/DL (ref 6–23)
CALCIUM SERPL-MCNC: 9.4 MG/DL (ref 8.3–10.6)
CALCIUM SERPL-MCNC: 9.9 MG/DL (ref 8.3–10.6)
CHLORIDE BLD-SCNC: 100 MMOL/L (ref 99–110)
CHLORIDE BLD-SCNC: 101 MMOL/L (ref 99–110)
CLARITY: ABNORMAL
CO2: 27 MMOL/L (ref 21–32)
CO2: 30 MMOL/L (ref 21–32)
COLOR: ABNORMAL
CREAT SERPL-MCNC: 1.6 MG/DL (ref 0.9–1.3)
CREAT SERPL-MCNC: 1.7 MG/DL (ref 0.9–1.3)
DIFFERENTIAL TYPE: ABNORMAL
EOSINOPHILS ABSOLUTE: 0 K/CU MM
EOSINOPHILS RELATIVE PERCENT: 0 % (ref 0–3)
GFR AFRICAN AMERICAN: 47 ML/MIN/1.73M2
GFR AFRICAN AMERICAN: 50 ML/MIN/1.73M2
GFR NON-AFRICAN AMERICAN: 39 ML/MIN/1.73M2
GFR NON-AFRICAN AMERICAN: 41 ML/MIN/1.73M2
GLUCOSE BLD-MCNC: 108 MG/DL (ref 70–99)
GLUCOSE BLD-MCNC: 115 MG/DL (ref 70–99)
GLUCOSE BLD-MCNC: 115 MG/DL (ref 70–99)
GLUCOSE BLD-MCNC: 126 MG/DL (ref 70–99)
GLUCOSE, URINE: NEGATIVE MG/DL
HCT VFR BLD CALC: 38.6 % (ref 42–52)
HEMOGLOBIN: 11.3 GM/DL (ref 13.5–18)
HYALINE CASTS: 10 /LPF
IMMATURE NEUTROPHIL %: 0.8 % (ref 0–0.43)
INR BLD: 2.25 INDEX
KETONES, URINE: NEGATIVE MG/DL
LACTATE: 1.5 MMOL/L (ref 0.4–2)
LACTATE: ABNORMAL MMOL/L (ref 0.4–2)
LEUKOCYTE ESTERASE, URINE: ABNORMAL
LYMPHOCYTES ABSOLUTE: 0.7 K/CU MM
LYMPHOCYTES RELATIVE PERCENT: 5.7 % (ref 24–44)
MCH RBC QN AUTO: 24.6 PG (ref 27–31)
MCHC RBC AUTO-ENTMCNC: 29.3 % (ref 32–36)
MCV RBC AUTO: 83.9 FL (ref 78–100)
MONOCYTES ABSOLUTE: 1.3 K/CU MM
MONOCYTES RELATIVE PERCENT: 11 % (ref 0–4)
MUCUS: ABNORMAL HPF
NITRITE URINE, QUANTITATIVE: NEGATIVE
NUCLEATED RBC %: 0 %
PDW BLD-RTO: 14.6 % (ref 11.7–14.9)
PH, URINE: 6 (ref 5–8)
PLATELET # BLD: 335 K/CU MM (ref 140–440)
PMV BLD AUTO: 11.4 FL (ref 7.5–11.1)
POTASSIUM SERPL-SCNC: 4.7 MMOL/L (ref 3.5–5.1)
POTASSIUM SERPL-SCNC: ABNORMAL MMOL/L (ref 3.5–5.1)
PRO-BNP: ABNORMAL PG/ML
PROCALCITONIN: 0.11
PROTEIN UA: 100 MG/DL
PROTHROMBIN TIME: 25.5 SECONDS (ref 9.12–12.5)
RBC # BLD: 4.6 M/CU MM (ref 4.6–6.2)
RBC URINE: 2022 /HPF (ref 0–3)
SEGMENTED NEUTROPHILS ABSOLUTE COUNT: 9.8 K/CU MM
SEGMENTED NEUTROPHILS RELATIVE PERCENT: 82.3 % (ref 36–66)
SODIUM BLD-SCNC: 138 MMOL/L (ref 135–145)
SODIUM BLD-SCNC: 143 MMOL/L (ref 135–145)
SPECIFIC GRAVITY UA: 1.01 (ref 1–1.03)
T4 FREE: 1.76 NG/DL (ref 0.9–1.8)
TOTAL IMMATURE NEUTOROPHIL: 0.09 K/CU MM
TOTAL NUCLEATED RBC: 0 K/CU MM
TOTAL PROTEIN: 7.1 GM/DL (ref 6.4–8.2)
TRANSITIONAL EPITHELIAL: 1 /HPF
TRICHOMONAS: ABNORMAL /HPF
TROPONIN T: 0.01 NG/ML
TROPONIN T: 0.02 NG/ML
TROPONIN T: <0.01 NG/ML
TSH HIGH SENSITIVITY: 3.63 UIU/ML (ref 0.27–4.2)
UROBILINOGEN, URINE: NORMAL MG/DL (ref 0.2–1)
WBC # BLD: 11.9 K/CU MM (ref 4–10.5)
WBC UA: 110 /HPF (ref 0–2)
YEAST: ABNORMAL /HPF

## 2019-05-04 PROCEDURE — 74176 CT ABD & PELVIS W/O CONTRAST: CPT

## 2019-05-04 PROCEDURE — 80053 COMPREHEN METABOLIC PANEL: CPT

## 2019-05-04 PROCEDURE — 83605 ASSAY OF LACTIC ACID: CPT

## 2019-05-04 PROCEDURE — 84145 PROCALCITONIN (PCT): CPT

## 2019-05-04 PROCEDURE — 96365 THER/PROPH/DIAG IV INF INIT: CPT

## 2019-05-04 PROCEDURE — 93005 ELECTROCARDIOGRAM TRACING: CPT | Performed by: EMERGENCY MEDICINE

## 2019-05-04 PROCEDURE — 71250 CT THORAX DX C-: CPT

## 2019-05-04 PROCEDURE — 85025 COMPLETE CBC W/AUTO DIFF WBC: CPT

## 2019-05-04 PROCEDURE — 84484 ASSAY OF TROPONIN QUANT: CPT

## 2019-05-04 PROCEDURE — 36415 COLL VENOUS BLD VENIPUNCTURE: CPT

## 2019-05-04 PROCEDURE — 2580000003 HC RX 258: Performed by: FAMILY MEDICINE

## 2019-05-04 PROCEDURE — 6370000000 HC RX 637 (ALT 250 FOR IP): Performed by: EMERGENCY MEDICINE

## 2019-05-04 PROCEDURE — 70450 CT HEAD/BRAIN W/O DYE: CPT

## 2019-05-04 PROCEDURE — 96366 THER/PROPH/DIAG IV INF ADDON: CPT

## 2019-05-04 PROCEDURE — 2580000003 HC RX 258: Performed by: NURSE PRACTITIONER

## 2019-05-04 PROCEDURE — 82140 ASSAY OF AMMONIA: CPT

## 2019-05-04 PROCEDURE — 2500000003 HC RX 250 WO HCPCS: Performed by: EMERGENCY MEDICINE

## 2019-05-04 PROCEDURE — 2060000000 HC ICU INTERMEDIATE R&B

## 2019-05-04 PROCEDURE — 84439 ASSAY OF FREE THYROXINE: CPT

## 2019-05-04 PROCEDURE — 71045 X-RAY EXAM CHEST 1 VIEW: CPT

## 2019-05-04 PROCEDURE — 6360000002 HC RX W HCPCS: Performed by: FAMILY MEDICINE

## 2019-05-04 PROCEDURE — 82962 GLUCOSE BLOOD TEST: CPT

## 2019-05-04 PROCEDURE — 99291 CRITICAL CARE FIRST HOUR: CPT

## 2019-05-04 PROCEDURE — 94640 AIRWAY INHALATION TREATMENT: CPT

## 2019-05-04 PROCEDURE — 80048 BASIC METABOLIC PNL TOTAL CA: CPT

## 2019-05-04 PROCEDURE — 87086 URINE CULTURE/COLONY COUNT: CPT

## 2019-05-04 PROCEDURE — 96375 TX/PRO/DX INJ NEW DRUG ADDON: CPT

## 2019-05-04 PROCEDURE — 6360000002 HC RX W HCPCS: Performed by: EMERGENCY MEDICINE

## 2019-05-04 PROCEDURE — 2580000003 HC RX 258: Performed by: EMERGENCY MEDICINE

## 2019-05-04 PROCEDURE — 81001 URINALYSIS AUTO W/SCOPE: CPT

## 2019-05-04 PROCEDURE — 87040 BLOOD CULTURE FOR BACTERIA: CPT

## 2019-05-04 PROCEDURE — 83880 ASSAY OF NATRIURETIC PEPTIDE: CPT

## 2019-05-04 PROCEDURE — 6370000000 HC RX 637 (ALT 250 FOR IP): Performed by: NURSE PRACTITIONER

## 2019-05-04 PROCEDURE — 85610 PROTHROMBIN TIME: CPT

## 2019-05-04 PROCEDURE — 6360000002 HC RX W HCPCS: Performed by: NURSE PRACTITIONER

## 2019-05-04 PROCEDURE — 84443 ASSAY THYROID STIM HORMONE: CPT

## 2019-05-04 RX ORDER — POTASSIUM CHLORIDE 1.5 G/1.77G
40 POWDER, FOR SOLUTION ORAL PRN
Status: DISCONTINUED | OUTPATIENT
Start: 2019-05-04 | End: 2019-05-07 | Stop reason: HOSPADM

## 2019-05-04 RX ORDER — ACETAMINOPHEN 325 MG/1
650 TABLET ORAL EVERY 4 HOURS PRN
Status: DISCONTINUED | OUTPATIENT
Start: 2019-05-04 | End: 2019-05-07 | Stop reason: HOSPADM

## 2019-05-04 RX ORDER — SODIUM CHLORIDE 0.9 % (FLUSH) 0.9 %
10 SYRINGE (ML) INJECTION PRN
Status: DISCONTINUED | OUTPATIENT
Start: 2019-05-04 | End: 2019-05-07 | Stop reason: HOSPADM

## 2019-05-04 RX ORDER — IPRATROPIUM BROMIDE AND ALBUTEROL SULFATE 2.5; .5 MG/3ML; MG/3ML
1 SOLUTION RESPIRATORY (INHALATION)
Status: DISCONTINUED | OUTPATIENT
Start: 2019-05-04 | End: 2019-05-07 | Stop reason: HOSPADM

## 2019-05-04 RX ORDER — POTASSIUM CHLORIDE 20 MEQ/1
40 TABLET, EXTENDED RELEASE ORAL PRN
Status: DISCONTINUED | OUTPATIENT
Start: 2019-05-04 | End: 2019-05-07 | Stop reason: HOSPADM

## 2019-05-04 RX ORDER — POTASSIUM CHLORIDE 750 MG/1
20 TABLET, FILM COATED, EXTENDED RELEASE ORAL 2 TIMES DAILY WITH MEALS
Status: DISCONTINUED | OUTPATIENT
Start: 2019-05-04 | End: 2019-05-07 | Stop reason: HOSPADM

## 2019-05-04 RX ORDER — DILTIAZEM HYDROCHLORIDE 120 MG/1
120 CAPSULE, COATED, EXTENDED RELEASE ORAL DAILY
Status: DISCONTINUED | OUTPATIENT
Start: 2019-05-04 | End: 2019-05-07 | Stop reason: HOSPADM

## 2019-05-04 RX ORDER — SODIUM CHLORIDE 0.9 % (FLUSH) 0.9 %
10 SYRINGE (ML) INJECTION EVERY 12 HOURS SCHEDULED
Status: DISCONTINUED | OUTPATIENT
Start: 2019-05-04 | End: 2019-05-07 | Stop reason: HOSPADM

## 2019-05-04 RX ORDER — SODIUM POLYSTYRENE SULFONATE 4.1 MEQ/G
15 POWDER, FOR SUSPENSION ORAL; RECTAL ONCE
Status: COMPLETED | OUTPATIENT
Start: 2019-05-04 | End: 2019-05-04

## 2019-05-04 RX ORDER — AMIODARONE HYDROCHLORIDE 200 MG/1
200 TABLET ORAL NIGHTLY
Status: DISCONTINUED | OUTPATIENT
Start: 2019-05-04 | End: 2019-05-07 | Stop reason: HOSPADM

## 2019-05-04 RX ORDER — FUROSEMIDE 10 MG/ML
40 INJECTION INTRAMUSCULAR; INTRAVENOUS ONCE
Status: COMPLETED | OUTPATIENT
Start: 2019-05-04 | End: 2019-05-04

## 2019-05-04 RX ORDER — ATORVASTATIN CALCIUM 20 MG/1
20 TABLET, FILM COATED ORAL NIGHTLY
Status: DISCONTINUED | OUTPATIENT
Start: 2019-05-04 | End: 2019-05-07 | Stop reason: HOSPADM

## 2019-05-04 RX ORDER — RIVASTIGMINE 4.6 MG/24H
1 PATCH, EXTENDED RELEASE TRANSDERMAL EVERY MORNING
Status: DISCONTINUED | OUTPATIENT
Start: 2019-05-05 | End: 2019-05-07 | Stop reason: HOSPADM

## 2019-05-04 RX ORDER — DEXTROSE MONOHYDRATE 25 G/50ML
12.5 INJECTION, SOLUTION INTRAVENOUS PRN
Status: DISCONTINUED | OUTPATIENT
Start: 2019-05-04 | End: 2019-05-07 | Stop reason: HOSPADM

## 2019-05-04 RX ORDER — FUROSEMIDE 10 MG/ML
40 INJECTION INTRAMUSCULAR; INTRAVENOUS 2 TIMES DAILY
Status: DISCONTINUED | OUTPATIENT
Start: 2019-05-04 | End: 2019-05-07

## 2019-05-04 RX ORDER — POTASSIUM CHLORIDE 7.45 MG/ML
10 INJECTION INTRAVENOUS PRN
Status: DISCONTINUED | OUTPATIENT
Start: 2019-05-04 | End: 2019-05-07 | Stop reason: HOSPADM

## 2019-05-04 RX ORDER — NICOTINE POLACRILEX 4 MG
15 LOZENGE BUCCAL PRN
Status: DISCONTINUED | OUTPATIENT
Start: 2019-05-04 | End: 2019-05-07 | Stop reason: HOSPADM

## 2019-05-04 RX ORDER — DEXTROSE MONOHYDRATE 50 MG/ML
100 INJECTION, SOLUTION INTRAVENOUS PRN
Status: DISCONTINUED | OUTPATIENT
Start: 2019-05-04 | End: 2019-05-07 | Stop reason: HOSPADM

## 2019-05-04 RX ORDER — ONDANSETRON 2 MG/ML
4 INJECTION INTRAMUSCULAR; INTRAVENOUS EVERY 6 HOURS PRN
Status: DISCONTINUED | OUTPATIENT
Start: 2019-05-04 | End: 2019-05-07 | Stop reason: HOSPADM

## 2019-05-04 RX ORDER — SODIUM CHLORIDE 9 MG/ML
INJECTION, SOLUTION INTRAVENOUS CONTINUOUS
Status: DISCONTINUED | OUTPATIENT
Start: 2019-05-04 | End: 2019-05-04

## 2019-05-04 RX ORDER — DEXTROSE MONOHYDRATE 25 G/50ML
25 INJECTION, SOLUTION INTRAVENOUS ONCE
Status: COMPLETED | OUTPATIENT
Start: 2019-05-04 | End: 2019-05-04

## 2019-05-04 RX ORDER — MAGNESIUM SULFATE 1 G/100ML
1 INJECTION INTRAVENOUS PRN
Status: DISCONTINUED | OUTPATIENT
Start: 2019-05-04 | End: 2019-05-07 | Stop reason: HOSPADM

## 2019-05-04 RX ADMIN — Medication 50 MEQ: at 16:04

## 2019-05-04 RX ADMIN — VANCOMYCIN 1500 MG: 1 INJECTION, SOLUTION INTRAVENOUS at 20:39

## 2019-05-04 RX ADMIN — DEXTROSE 50 % IN WATER (D50W) INTRAVENOUS SYRINGE 25 G: at 16:04

## 2019-05-04 RX ADMIN — FUROSEMIDE 40 MG: 10 INJECTION, SOLUTION INTRAVENOUS at 16:04

## 2019-05-04 RX ADMIN — TAZOBACTAM SODIUM AND PIPERACILLIN SODIUM 3.38 G: 375; 3 INJECTION, SOLUTION INTRAVENOUS at 16:59

## 2019-05-04 RX ADMIN — SODIUM POLYSTYRENE SULFONATE 15 G: 1 POWDER ORAL; RECTAL at 16:04

## 2019-05-04 RX ADMIN — IPRATROPIUM BROMIDE AND ALBUTEROL SULFATE 1 AMPULE: .5; 3 SOLUTION RESPIRATORY (INHALATION) at 19:13

## 2019-05-04 RX ADMIN — INSULIN HUMAN 10 UNITS: 100 INJECTION, SOLUTION PARENTERAL at 16:05

## 2019-05-04 RX ADMIN — ATORVASTATIN CALCIUM 20 MG: 20 TABLET, FILM COATED ORAL at 20:39

## 2019-05-04 RX ADMIN — AMIODARONE HYDROCHLORIDE 200 MG: 200 TABLET ORAL at 20:38

## 2019-05-04 RX ADMIN — SODIUM CHLORIDE, PRESERVATIVE FREE 10 ML: 5 INJECTION INTRAVENOUS at 20:39

## 2019-05-04 RX ADMIN — APIXABAN 2.5 MG: 2.5 TABLET, FILM COATED ORAL at 20:39

## 2019-05-04 RX ADMIN — FUROSEMIDE 40 MG: 10 INJECTION, SOLUTION INTRAMUSCULAR; INTRAVENOUS at 20:39

## 2019-05-04 ASSESSMENT — PAIN SCALES - GENERAL: PAINLEVEL_OUTOF10: 0
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
## 2019-05-04 NOTE — PROGRESS NOTES
DOSE ADJUSTMENT MADE PER PHARMACY DOSING PROTOCOL    PREVIOUS ORDER:  Vancomycin 2000 mg x 1 dose    CrCl cannot be calculated (Unknown ideal weight. ).   Recent Labs     05/02/19  1610 05/04/19  1436   BUN 27* 46*   CREATININE 1.1 1.7*    335   INR  --  2.25     NEW RENALLY ADJUSTED ORDER:  Vancomycin 1500 mg (20 mg/kg) x 1 dose    ARASH Saravia [facility]  5/4/2019 5:10 PM  __________________________________________________

## 2019-05-04 NOTE — ED PROVIDER NOTES
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 Medical/Surgical History: none FINDINGS: BONES/ALIGNMENT: There is no evidence of an acute cervical spine fracture. There is normal alignment of the cervical spine. There is question ankylosing spondylitis. DEGENERATIVE CHANGES: There is degenerative disc disease with endplate changes and endplate osteophyte formation. SOFT TISSUES: There is no prevertebral soft tissue swelling. There is a 1.6 cm partially calcified left thyroid nodule. There is mild pulmonary vascular congestion at the lung apices. No acute abnormality of the cervical spine. Degenerative changes. 1.6 cm partially calcified left thyroid nodule. Follow-up recommendation is listed below. Mild pulmonary vascular congestion. RECOMMENDATIONS: Managing Incidental Thyroid Nodule Detected at CT or MRI or US1. Further evaluation by thyroid Ultrasound recommended for these incidental nodules: ~Age 35 years or more - Nodule 1.5 cm in size or greater.  Follow up thyroid ultrasound also recommend in these scenarios: ~Solitary nodule with high risk imaging features (locally invasive nodule or suspicious lymph BUN and he does have some mild hyperkalemia at 6.0. I have ordered the hyperkalemia focused orders set for this. Possible gallbladder thickening on CT- he has no tenderness to palpation, no vomiting. Will continue to monitor. Plan for admission. Discussed with patient and family. Hospitalist team has accepted him. Total critical care time today provided was 41 minutes. This excludes seperately billable procedure. Critical care time provided for shortness of breath, acute heart failure, SABIHA, multiorgan involvement that required close evaluation and/or intervention with concern for patient decompensation. Clinical Impression:  1. Acute on chronic congestive heart failure, unspecified heart failure type ([facility] [address]    2. SABIHA (acute kidney injury) ([facility] [address]    3. Hyperkalemia    4. Lung consolidation [facility])      Disposition referral (if applicable):  Van Velasco  [address] [place] [address] Rd  [phone]          Disposition medications (if applicable):  Current Discharge Medication List          Comment: Please note this report has been produced using speech recognition software and may contain errors related to that system including errors in grammar, punctuation, and spelling, as well as words and phrases that may be inappropriate. If there are any questions or concerns please feel free to contact the dictating provider for clarification.         Choco Tena MD  05/04/19 [id number]

## 2019-05-04 NOTE — PROGRESS NOTES
PHARMACY TO DOSE VANCOMYCIN PER DR Raheel Ocampo    INFECTION BEING TREATED = r/o Pneumonia, r/o sepsis    V Tr goal = 15-20. CULTURES = Pending  OTHER ABT'S = Zosyn iv 1x today. AGE = 80 y.o.     SEX = male  HEIGHT = 5' 6.93\" (1.7 m)  Wt Readings from Last 3 Encounters:   05/04/19 175 lb (79.4 kg)   03/30/19 176 lb (79.8 kg)   02/20/19 176 lb (79.8 kg)     Estimated Creatinine Clearance: 32 mL/min (A) (based on SCr of 1.7 mg/dL (H)). Recent Labs     05/02/19  1610 05/04/19  1436   WBC 11.0* 11.9*   BUN 27* 46*   CREATININE 1.1 1.7*   LACTATE  --  2.5  LACTI CALLED TO DR HERNÁNDEZ @ [id number] [facility]  RESULTS READ BACK  *     DOSING PLAN COMMENTS:   -85yom, 79kg  -renal, scr= 1.7 [up from 2 days ago], crcl= 32. THUS:   Give [as ordered approx. 2 hrs ago] 1x VANCOMYCIN 1500mg iv, asap.    -note: a vanco \"placeholder is on the profile\".       VANCO Random SCHEDULED FOR 5/5 @ 14:00    Joy Salguero  5/4/2019   6:44 PM  _______________________________________

## 2019-05-04 NOTE — ED TRIAGE NOTES
Patient to ED with altered mental status. Loss of bowel and bladder control also reported. Slurred speech noted. Patient's son stated this began approx 4 hours ago.         ACCU Check 115 @0024

## 2019-05-04 NOTE — ED NOTES
Decherd,lactic acid,venous ph,ammonia,type & screen and blood cultures were drawn on patient     Lois Lakeem  05/04/19 [id number]

## 2019-05-04 NOTE — PROGRESS NOTES
Pt arrived to room 2011 at 1730 and placed in bed with call light in reach. Pt oriented to room. Skin assessment with Margaret Benton RN completed. Small abrasion noted to right knee and small scratch/abrasion in center of coccyx. No other skin issues noted. VSS.

## 2019-05-05 LAB
ALBUMIN SERPL-MCNC: 3.5 GM/DL (ref 3.4–5)
ALP BLD-CCNC: 97 IU/L (ref 40–128)
ALT SERPL-CCNC: 49 U/L (ref 10–40)
ANION GAP SERPL CALCULATED.3IONS-SCNC: 12 MMOL/L (ref 4–16)
AST SERPL-CCNC: 35 IU/L (ref 15–37)
BILIRUB SERPL-MCNC: 0.9 MG/DL (ref 0–1)
BUN BLDV-MCNC: 44 MG/DL (ref 6–23)
CALCIUM SERPL-MCNC: 9.1 MG/DL (ref 8.3–10.6)
CHLORIDE BLD-SCNC: 99 MMOL/L (ref 99–110)
CO2: 32 MMOL/L (ref 21–32)
CREAT SERPL-MCNC: 1.6 MG/DL (ref 0.9–1.3)
DOSE AMOUNT: NORMAL
DOSE TIME: NORMAL
GFR AFRICAN AMERICAN: 50 ML/MIN/1.73M2
GFR NON-AFRICAN AMERICAN: 41 ML/MIN/1.73M2
GLUCOSE BLD-MCNC: 95 MG/DL (ref 70–99)
LACTATE: 1.3 MMOL/L (ref 0.4–2)
MAGNESIUM: 2.2 MG/DL (ref 1.8–2.4)
POTASSIUM SERPL-SCNC: 4.2 MMOL/L (ref 3.5–5.1)
SODIUM BLD-SCNC: 143 MMOL/L (ref 135–145)
TOTAL PROTEIN: 5.6 GM/DL (ref 6.4–8.2)
VANCOMYCIN RANDOM: 14 UG/ML

## 2019-05-05 PROCEDURE — 99221 1ST HOSP IP/OBS SF/LOW 40: CPT | Performed by: INTERNAL MEDICINE

## 2019-05-05 PROCEDURE — 6370000000 HC RX 637 (ALT 250 FOR IP): Performed by: NURSE PRACTITIONER

## 2019-05-05 PROCEDURE — 2060000000 HC ICU INTERMEDIATE R&B

## 2019-05-05 PROCEDURE — 2700000000 HC OXYGEN THERAPY PER DAY

## 2019-05-05 PROCEDURE — 2580000003 HC RX 258: Performed by: NURSE PRACTITIONER

## 2019-05-05 PROCEDURE — 6370000000 HC RX 637 (ALT 250 FOR IP): Performed by: INTERNAL MEDICINE

## 2019-05-05 PROCEDURE — 2500000003 HC RX 250 WO HCPCS: Performed by: FAMILY MEDICINE

## 2019-05-05 PROCEDURE — 80202 ASSAY OF VANCOMYCIN: CPT

## 2019-05-05 PROCEDURE — 36415 COLL VENOUS BLD VENIPUNCTURE: CPT

## 2019-05-05 PROCEDURE — 6360000002 HC RX W HCPCS: Performed by: FAMILY MEDICINE

## 2019-05-05 PROCEDURE — 94761 N-INVAS EAR/PLS OXIMETRY MLT: CPT

## 2019-05-05 PROCEDURE — 83735 ASSAY OF MAGNESIUM: CPT

## 2019-05-05 PROCEDURE — 83605 ASSAY OF LACTIC ACID: CPT

## 2019-05-05 PROCEDURE — 6360000002 HC RX W HCPCS: Performed by: NURSE PRACTITIONER

## 2019-05-05 PROCEDURE — 94640 AIRWAY INHALATION TREATMENT: CPT

## 2019-05-05 PROCEDURE — 2580000003 HC RX 258: Performed by: FAMILY MEDICINE

## 2019-05-05 PROCEDURE — 80053 COMPREHEN METABOLIC PANEL: CPT

## 2019-05-05 RX ORDER — DIPHENHYDRAMINE HCL 25 MG
25 TABLET ORAL ONCE
Status: COMPLETED | OUTPATIENT
Start: 2019-05-05 | End: 2019-05-05

## 2019-05-05 RX ORDER — VANCOMYCIN HYDROCHLORIDE 1 G/200ML
1000 INJECTION, SOLUTION INTRAVENOUS EVERY 12 HOURS
Status: DISCONTINUED | OUTPATIENT
Start: 2019-05-05 | End: 2019-05-05

## 2019-05-05 RX ADMIN — VANCOMYCIN HYDROCHLORIDE 1500 MG: 5 INJECTION, POWDER, LYOPHILIZED, FOR SOLUTION INTRAVENOUS at 17:25

## 2019-05-05 RX ADMIN — SODIUM CHLORIDE, PRESERVATIVE FREE 10 ML: 5 INJECTION INTRAVENOUS at 10:43

## 2019-05-05 RX ADMIN — DIPHENHYDRAMINE HCL 25 MG: 25 TABLET ORAL at 04:32

## 2019-05-05 RX ADMIN — SODIUM CHLORIDE, PRESERVATIVE FREE 10 ML: 5 INJECTION INTRAVENOUS at 21:48

## 2019-05-05 RX ADMIN — METRONIDAZOLE 500 MG: 500 INJECTION, SOLUTION INTRAVENOUS at 21:47

## 2019-05-05 RX ADMIN — METRONIDAZOLE 500 MG: 500 INJECTION, SOLUTION INTRAVENOUS at 14:19

## 2019-05-05 RX ADMIN — FUROSEMIDE 40 MG: 10 INJECTION, SOLUTION INTRAMUSCULAR; INTRAVENOUS at 10:42

## 2019-05-05 RX ADMIN — CEFEPIME HYDROCHLORIDE 2 G: 2 INJECTION, POWDER, FOR SOLUTION INTRAVENOUS at 15:26

## 2019-05-05 RX ADMIN — IPRATROPIUM BROMIDE AND ALBUTEROL SULFATE 1 AMPULE: .5; 3 SOLUTION RESPIRATORY (INHALATION) at 07:09

## 2019-05-05 RX ADMIN — IPRATROPIUM BROMIDE AND ALBUTEROL SULFATE 1 AMPULE: .5; 3 SOLUTION RESPIRATORY (INHALATION) at 11:10

## 2019-05-05 RX ADMIN — IPRATROPIUM BROMIDE AND ALBUTEROL SULFATE 1 AMPULE: .5; 3 SOLUTION RESPIRATORY (INHALATION) at 16:42

## 2019-05-05 RX ADMIN — FUROSEMIDE 40 MG: 10 INJECTION, SOLUTION INTRAMUSCULAR; INTRAVENOUS at 21:47
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
## 2019-05-05 NOTE — PROGRESS NOTES
Pt had a restless night per pt's son. Pt resting with eyes closed, rambling incoherently. Son asked to let pt sleep for now.

## 2019-05-05 NOTE — PROGRESS NOTES
[address] [facility]  consulted by Dr. Margie Louis for monitoring and adjustment. Indication for treatment: Pneumonia and UTI  Goal trough: 15-20 mcg/mL     Pertinent Laboratory Values:   Temp Readings from Last 3 Encounters:   05/05/19 98.1 °F (36.7 °C) (Oral)   05/02/19 98.8 °F (37.1 °C) (Oral)   03/30/19 98.2 °F (36.8 °C) (Oral)     Recent Labs     05/02/19  1610 05/04/19  1436 05/04/19  1835 05/05/19  0209   WBC 11.0* 11.9*  --   --    LACTATE  --  2.5  LACTI CALLED TO DR HERNÁNDEZ @ [phone] [id number] [facility]  RESULTS READ BACK  * 1.5 1.3     Recent Labs     05/04/19  1436 05/04/19  2110 05/05/19  0200   BUN 46* 48* 44*   CREATININE 1.7* 1.6* 1.6*     Estimated Creatinine Clearance: 31 mL/min (A) (based on SCr of 1.6 mg/dL (H)). Intake/Output Summary (Last 24 hours) at 5/5/2019 1404  Last data filed at 5/5/2019 0630  Gross per 24 hour   Intake --   Output 3450 ml   Net -3450 ml     Vancomycin level:   TROUGH:  No results for input(s): VANCOTROUGH in the last 72 hours. RANDOM:    Recent Labs     05/05/19  0200   VANCORANDOM 14.0       Assessment:  · WBC and temperature: elevated  · SCr, BUN, and urine output: elevated  · Day(s) of therapy: 2   · Vancomycin level: ok to re-dose    Plan:  · Mr. Pearson received vancomycin 1500 mg IVPB x 1   · Random concentration, ok to re-dose   · Due to SABIHA, will continue pulse-dosing based on levels   · Will give an additional 1500 mg IVPB x 1 and repeat level tomorrow AM  · Pharmacy will continue to monitor patient and adjust therapy as indicated    Thank you for the consult.   [place] Aparicio, PharmD, BCPS   5/5/2019 2:04 PM
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
## 2019-05-06 ENCOUNTER — APPOINTMENT (OUTPATIENT)
Dept: GENERAL RADIOLOGY | Age: 84
DRG: 853 | End: 2019-05-06
Payer: MEDICARE

## 2019-05-06 ENCOUNTER — ANESTHESIA (OUTPATIENT)
Dept: OPERATING ROOM | Age: 84
DRG: 853 | End: 2019-05-06
Payer: MEDICARE

## 2019-05-06 ENCOUNTER — ANESTHESIA EVENT (OUTPATIENT)
Dept: OPERATING ROOM | Age: 84
DRG: 853 | End: 2019-05-06
Payer: MEDICARE

## 2019-05-06 VITALS
RESPIRATION RATE: 18 BRPM | DIASTOLIC BLOOD PRESSURE: 70 MMHG | SYSTOLIC BLOOD PRESSURE: 132 MMHG | OXYGEN SATURATION: 100 %

## 2019-05-06 LAB
ANION GAP SERPL CALCULATED.3IONS-SCNC: 10 MMOL/L (ref 4–16)
BUN BLDV-MCNC: 27 MG/DL (ref 6–23)
CALCIUM SERPL-MCNC: 8.5 MG/DL (ref 8.3–10.6)
CHLORIDE BLD-SCNC: 101 MMOL/L (ref 99–110)
CO2: 40 MMOL/L (ref 21–32)
CREAT SERPL-MCNC: 1.1 MG/DL (ref 0.9–1.3)
CULTURE: NORMAL
DOSE AMOUNT: NORMAL
DOSE TIME: NORMAL
GFR AFRICAN AMERICAN: >60 ML/MIN/1.73M2
GFR NON-AFRICAN AMERICAN: >60 ML/MIN/1.73M2
GLUCOSE BLD-MCNC: 89 MG/DL (ref 70–99)
GLUCOSE BLD-MCNC: 89 MG/DL (ref 70–99)
HCT VFR BLD CALC: 38.4 % (ref 42–52)
HEMOGLOBIN: 11.6 GM/DL (ref 13.5–18)
LV EF: 53 %
LVEF MODALITY: NORMAL
Lab: NORMAL
MAGNESIUM: 1.9 MG/DL (ref 1.8–2.4)
MCH RBC QN AUTO: 24.9 PG (ref 27–31)
MCHC RBC AUTO-ENTMCNC: 30.2 % (ref 32–36)
MCV RBC AUTO: 82.6 FL (ref 78–100)
PDW BLD-RTO: 14.8 % (ref 11.7–14.9)
PLATELET # BLD: 255 K/CU MM (ref 140–440)
PMV BLD AUTO: 11.2 FL (ref 7.5–11.1)
POTASSIUM SERPL-SCNC: 3.4 MMOL/L (ref 3.5–5.1)
POTASSIUM SERPL-SCNC: ABNORMAL MMOL/L (ref 3.5–5.1)
POTASSIUM SERPL-SCNC: ABNORMAL MMOL/L (ref 3.5–5.1)
PRO-BNP: ABNORMAL PG/ML
RBC # BLD: 4.65 M/CU MM (ref 4.6–6.2)
SODIUM BLD-SCNC: 151 MMOL/L (ref 135–145)
SPECIMEN: NORMAL
VANCOMYCIN RANDOM: 15.8 UG/ML
WBC # BLD: 11.6 K/CU MM (ref 4–10.5)

## 2019-05-06 PROCEDURE — 3700000000 HC ANESTHESIA ATTENDED CARE: Performed by: UROLOGY

## 2019-05-06 PROCEDURE — 2700000000 HC OXYGEN THERAPY PER DAY

## 2019-05-06 PROCEDURE — 6370000000 HC RX 637 (ALT 250 FOR IP): Performed by: NURSE PRACTITIONER

## 2019-05-06 PROCEDURE — 6360000002 HC RX W HCPCS: Performed by: FAMILY MEDICINE

## 2019-05-06 PROCEDURE — 99232 SBSQ HOSP IP/OBS MODERATE 35: CPT | Performed by: INTERNAL MEDICINE

## 2019-05-06 PROCEDURE — C2617 STENT, NON-COR, TEM W/O DEL: HCPCS | Performed by: UROLOGY

## 2019-05-06 PROCEDURE — 2500000003 HC RX 250 WO HCPCS: Performed by: UROLOGY

## 2019-05-06 PROCEDURE — 85027 COMPLETE CBC AUTOMATED: CPT

## 2019-05-06 PROCEDURE — 3600000003 HC SURGERY LEVEL 3 BASE: Performed by: UROLOGY

## 2019-05-06 PROCEDURE — 6360000002 HC RX W HCPCS: Performed by: NURSE PRACTITIONER

## 2019-05-06 PROCEDURE — 7100000000 HC PACU RECOVERY - FIRST 15 MIN: Performed by: UROLOGY

## 2019-05-06 PROCEDURE — C1769 GUIDE WIRE: HCPCS | Performed by: UROLOGY

## 2019-05-06 PROCEDURE — 6370000000 HC RX 637 (ALT 250 FOR IP): Performed by: UROLOGY

## 2019-05-06 PROCEDURE — 80048 BASIC METABOLIC PNL TOTAL CA: CPT

## 2019-05-06 PROCEDURE — 2580000003 HC RX 258: Performed by: UROLOGY

## 2019-05-06 PROCEDURE — 3700000001 HC ADD 15 MINUTES (ANESTHESIA): Performed by: UROLOGY

## 2019-05-06 PROCEDURE — 76000 FLUOROSCOPY <1 HR PHYS/QHP: CPT

## 2019-05-06 PROCEDURE — 83735 ASSAY OF MAGNESIUM: CPT

## 2019-05-06 PROCEDURE — 83880 ASSAY OF NATRIURETIC PEPTIDE: CPT

## 2019-05-06 PROCEDURE — 36415 COLL VENOUS BLD VENIPUNCTURE: CPT

## 2019-05-06 PROCEDURE — 94640 AIRWAY INHALATION TREATMENT: CPT

## 2019-05-06 PROCEDURE — 80202 ASSAY OF VANCOMYCIN: CPT

## 2019-05-06 PROCEDURE — 84132 ASSAY OF SERUM POTASSIUM: CPT

## 2019-05-06 PROCEDURE — 82962 GLUCOSE BLOOD TEST: CPT

## 2019-05-06 PROCEDURE — 94761 N-INVAS EAR/PLS OXIMETRY MLT: CPT

## 2019-05-06 PROCEDURE — 2580000003 HC RX 258

## 2019-05-06 PROCEDURE — 2060000000 HC ICU INTERMEDIATE R&B

## 2019-05-06 PROCEDURE — 6360000002 HC RX W HCPCS: Performed by: UROLOGY

## 2019-05-06 PROCEDURE — C1758 CATHETER, URETERAL: HCPCS | Performed by: UROLOGY

## 2019-05-06 PROCEDURE — 6360000002 HC RX W HCPCS: Performed by: NURSE ANESTHETIST, CERTIFIED REGISTERED

## 2019-05-06 PROCEDURE — 2500000003 HC RX 250 WO HCPCS: Performed by: FAMILY MEDICINE

## 2019-05-06 PROCEDURE — 93306 TTE W/DOPPLER COMPLETE: CPT

## 2019-05-06 PROCEDURE — 93010 ELECTROCARDIOGRAM REPORT: CPT | Performed by: INTERNAL MEDICINE

## 2019-05-06 PROCEDURE — 92610 EVALUATE SWALLOWING FUNCTION: CPT

## 2019-05-06 PROCEDURE — 7100000001 HC PACU RECOVERY - ADDTL 15 MIN: Performed by: UROLOGY

## 2019-05-06 PROCEDURE — 6360000004 HC RX CONTRAST MEDICATION: Performed by: UROLOGY

## 2019-05-06 PROCEDURE — 0T778DZ DILATION OF LEFT URETER WITH INTRALUMINAL DEVICE, VIA NATURAL OR ARTIFICIAL OPENING ENDOSCOPIC: ICD-10-PCS | Performed by: UROLOGY

## 2019-05-06 PROCEDURE — 2709999900 HC NON-CHARGEABLE SUPPLY: Performed by: UROLOGY

## 2019-05-06 PROCEDURE — 3600000013 HC SURGERY LEVEL 3 ADDTL 15MIN: Performed by: UROLOGY

## 2019-05-06 PROCEDURE — 2500000003 HC RX 250 WO HCPCS: Performed by: NURSE ANESTHETIST, CERTIFIED REGISTERED

## 2019-05-06 PROCEDURE — BT1F1ZZ FLUOROSCOPY OF LEFT KIDNEY, URETER AND BLADDER USING LOW OSMOLAR CONTRAST: ICD-10-PCS | Performed by: UROLOGY

## 2019-05-06 PROCEDURE — 2580000003 HC RX 258: Performed by: FAMILY MEDICINE

## 2019-05-06 DEVICE — VARIABLE LENGTH URETERAL STENT
Type: IMPLANTABLE DEVICE | Site: URETER | Status: FUNCTIONAL
Brand: CONTOUR VL™

## 2019-05-06 RX ORDER — METOPROLOL TARTRATE 5 MG/5ML
2.5 INJECTION INTRAVENOUS ONCE
Status: COMPLETED | OUTPATIENT
Start: 2019-05-06 | End: 2019-05-06

## 2019-05-06 RX ORDER — LIDOCAINE HYDROCHLORIDE 20 MG/ML
INJECTION, SOLUTION EPIDURAL; INFILTRATION; INTRACAUDAL; PERINEURAL PRN
Status: DISCONTINUED | OUTPATIENT
Start: 2019-05-06 | End: 2019-05-06 | Stop reason: SDUPTHER

## 2019-05-06 RX ORDER — SODIUM CHLORIDE, SODIUM LACTATE, POTASSIUM CHLORIDE, CALCIUM CHLORIDE 600; 310; 30; 20 MG/100ML; MG/100ML; MG/100ML; MG/100ML
1000 INJECTION, SOLUTION INTRAVENOUS CONTINUOUS
Status: DISCONTINUED | OUTPATIENT
Start: 2019-05-06 | End: 2019-05-06

## 2019-05-06 RX ORDER — SODIUM CHLORIDE, SODIUM LACTATE, POTASSIUM CHLORIDE, CALCIUM CHLORIDE 600; 310; 30; 20 MG/100ML; MG/100ML; MG/100ML; MG/100ML
INJECTION, SOLUTION INTRAVENOUS
Status: COMPLETED
Start: 2019-05-06 | End: 2019-05-06

## 2019-05-06 RX ORDER — DEXAMETHASONE SODIUM PHOSPHATE 4 MG/ML
INJECTION, SOLUTION INTRA-ARTICULAR; INTRALESIONAL; INTRAMUSCULAR; INTRAVENOUS; SOFT TISSUE PRN
Status: DISCONTINUED | OUTPATIENT
Start: 2019-05-06 | End: 2019-05-06 | Stop reason: SDUPTHER

## 2019-05-06 RX ORDER — PROPOFOL 10 MG/ML
INJECTION, EMULSION INTRAVENOUS PRN
Status: DISCONTINUED | OUTPATIENT
Start: 2019-05-06 | End: 2019-05-06 | Stop reason: SDUPTHER

## 2019-05-06 RX ORDER — ONDANSETRON 2 MG/ML
INJECTION INTRAMUSCULAR; INTRAVENOUS PRN
Status: DISCONTINUED | OUTPATIENT
Start: 2019-05-06 | End: 2019-05-06 | Stop reason: SDUPTHER

## 2019-05-06 RX ADMIN — AMIODARONE HYDROCHLORIDE 200 MG: 200 TABLET ORAL at 21:26

## 2019-05-06 RX ADMIN — DILTIAZEM HYDROCHLORIDE 120 MG: 120 CAPSULE, COATED, EXTENDED RELEASE ORAL at 12:46

## 2019-05-06 RX ADMIN — METRONIDAZOLE 500 MG: 500 INJECTION, SOLUTION INTRAVENOUS at 21:26

## 2019-05-06 RX ADMIN — IPRATROPIUM BROMIDE AND ALBUTEROL SULFATE 1 AMPULE: .5; 3 SOLUTION RESPIRATORY (INHALATION) at 20:25

## 2019-05-06 RX ADMIN — LIDOCAINE HYDROCHLORIDE 40 MG: 20 INJECTION, SOLUTION EPIDURAL; INFILTRATION; INTRACAUDAL; PERINEURAL at 11:10

## 2019-05-06 RX ADMIN — CEFEPIME HYDROCHLORIDE 2 G: 2 INJECTION, POWDER, FOR SOLUTION INTRAVENOUS at 14:20

## 2019-05-06 RX ADMIN — IPRATROPIUM BROMIDE AND ALBUTEROL SULFATE 1 AMPULE: .5; 3 SOLUTION RESPIRATORY (INHALATION) at 07:04

## 2019-05-06 RX ADMIN — POTASSIUM CHLORIDE 40 MEQ: 1.5 POWDER, FOR SOLUTION ORAL at 17:33

## 2019-05-06 RX ADMIN — FUROSEMIDE 40 MG: 10 INJECTION, SOLUTION INTRAMUSCULAR; INTRAVENOUS at 08:12

## 2019-05-06 RX ADMIN — DEXAMETHASONE SODIUM PHOSPHATE 8 MG: 4 INJECTION, SOLUTION INTRAMUSCULAR; INTRAVENOUS at 11:10

## 2019-05-06 RX ADMIN — IPRATROPIUM BROMIDE AND ALBUTEROL SULFATE 1 AMPULE: .5; 3 SOLUTION RESPIRATORY (INHALATION) at 12:11

## 2019-05-06 RX ADMIN — ATORVASTATIN CALCIUM 20 MG: 20 TABLET, FILM COATED ORAL at 21:26

## 2019-05-06 RX ADMIN — SODIUM CHLORIDE, PRESERVATIVE FREE 10 ML: 5 INJECTION INTRAVENOUS at 21:29

## 2019-05-06 RX ADMIN — POTASSIUM CHLORIDE 20 MEQ: 750 TABLET, FILM COATED, EXTENDED RELEASE ORAL at 17:33

## 2019-05-06 RX ADMIN — POTASSIUM CHLORIDE 10 MEQ: 10 INJECTION, SOLUTION INTRAVENOUS at 04:43

## 2019-05-06 RX ADMIN — POTASSIUM CHLORIDE 10 MEQ: 10 INJECTION, SOLUTION INTRAVENOUS at 09:13

## 2019-05-06 RX ADMIN — FUROSEMIDE 40 MG: 10 INJECTION, SOLUTION INTRAMUSCULAR; INTRAVENOUS at 22:48

## 2019-05-06 RX ADMIN — METOPROLOL TARTRATE 25 MG: 25 TABLET ORAL at 12:47

## 2019-05-06 RX ADMIN — SODIUM CHLORIDE, SODIUM LACTATE, POTASSIUM CHLORIDE, CALCIUM CHLORIDE 1000 ML: 600; 310; 30; 20 INJECTION, SOLUTION INTRAVENOUS at 10:35

## 2019-05-06 RX ADMIN — ONDANSETRON 4 MG: 2 INJECTION INTRAMUSCULAR; INTRAVENOUS at 11:10

## 2019-05-06 RX ADMIN — POTASSIUM CHLORIDE 10 MEQ: 10 INJECTION, SOLUTION INTRAVENOUS at 05:43

## 2019-05-06 RX ADMIN — POTASSIUM CHLORIDE 10 MEQ: 10 INJECTION, SOLUTION INTRAVENOUS at 10:33

## 2019-05-06 RX ADMIN — CEFEPIME HYDROCHLORIDE 2 G: 2 INJECTION, POWDER, FOR SOLUTION INTRAVENOUS at 00:59

## 2019-05-06 RX ADMIN — PROPOFOL 140 MG: 10 INJECTION, EMULSION INTRAVENOUS at 11:02

## 2019-05-06 RX ADMIN — SODIUM CHLORIDE, POTASSIUM CHLORIDE, SODIUM LACTATE AND CALCIUM CHLORIDE 1000 ML: 600; 310; 30; 20 INJECTION, SOLUTION INTRAVENOUS at 10:35

## 2019-05-06 RX ADMIN — METOPROLOL TARTRATE 2.5 MG: 5 INJECTION, SOLUTION INTRAVENOUS at 14:20

## 2019-05-06 RX ADMIN — POTASSIUM CHLORIDE 10 MEQ: 10 INJECTION, SOLUTION INTRAVENOUS at 06:53

## 2019-05-06 RX ADMIN — POTASSIUM CHLORIDE 20 MEQ: 750 TABLET, FILM COATED, EXTENDED RELEASE ORAL at 12:46

## 2019-05-06 RX ADMIN — METRONIDAZOLE 500 MG: 500 INJECTION, SOLUTION INTRAVENOUS at 05:43

## 2019-05-06 RX ADMIN — IPRATROPIUM BROMIDE AND ALBUTEROL SULFATE 1 AMPULE: .5; 3 SOLUTION RESPIRATORY (INHALATION) at 15:46

## 2019-05-06 RX ADMIN — VANCOMYCIN HYDROCHLORIDE 1250 MG: 5 INJECTION, POWDER, LYOPHILIZED, FOR SOLUTION INTRAVENOUS at 18:12

## 2019-05-06 RX ADMIN — POTASSIUM CHLORIDE 10 MEQ: 10 INJECTION, SOLUTION INTRAVENOUS at 08:12

## 2019-05-06 ASSESSMENT — PULMONARY FUNCTION TESTS
PIF_VALUE: 1
PIF_VALUE: 9
PIF_VALUE: 0
PIF_VALUE: 2
PIF_VALUE: 13
PIF_VALUE: 1
PIF_VALUE: 0
PIF_VALUE: 11
PIF_VALUE: 7
PIF_VALUE: 8
PIF_VALUE: 1
PIF_VALUE: 0
PIF_VALUE: 11
PIF_VALUE: 10
PIF_VALUE: 0
PIF_VALUE: 22
PIF_VALUE: 12
PIF_VALUE: 24
PIF_VALUE: 0
PIF_VALUE: 0
PIF_VALUE: 10
PIF_VALUE: 17
PIF_VALUE: 0
PIF_VALUE: 12
PIF_VALUE: 11
PIF_VALUE: 12
PIF_VALUE: 11
PIF_VALUE: 10
PIF_VALUE: 12
PIF_VALUE: 1
PIF_VALUE: 15
PIF_VALUE: 11
PIF_VALUE: 9
PIF_VALUE: 4
PIF_VALUE: 11
PIF_VALUE: 11
PIF_VALUE: 7
PIF_VALUE: 0
PIF_VALUE: 12

## 2019-05-06 ASSESSMENT — PAIN SCALES - GENERAL
PAINLEVEL_OUTOF10: 0

## 2019-05-06 NOTE — ANESTHESIA PRE PROCEDURE
Department of Anesthesiology  Preprocedure Note       Name:  Sony Power   Age:  80 y.o.  :  10/5/1933                                          MRN:  [id number]         Date:  2019      Surgeon: Jose Manuel Douglass):  Gali Eddy MD    Procedure: CYSTOSCOPY RETROGRADE PYELOGRAM STENT INSERTION (Left )    Medications prior to admission:   Prior to Admission medications    Medication Sig Start Date End Date Taking?  Authorizing Provider   rivastigmine (EXELON) 4.6 MG/24HR Place 1 patch onto the skin every morning 3/28/19   Historical Provider, MD   CARTIA  MG extended release capsule Take 120 mg by mouth daily 3/28/19   Historical Provider, MD   Cholecalciferol (VITAMIN D3) 5000 units TABS Take 5,000 Units by mouth every morning    Historical Provider, MD   apixaban (ELIQUIS) 2.5 MG TABS tablet Take 1 tablet by mouth 2 times daily 4/10/19   Asya Jules MD   metoprolol tartrate (LOPRESSOR) 25 MG tablet Take 25 mg by mouth 2 times daily    Historical Provider, MD   amiodarone (CORDARONE) 200 MG tablet Take 1 tablet by mouth daily  Patient taking differently: Take 200 mg by mouth nightly  19   Alvaro Thakur APRN - CNP   Omega-3 Fatty Acids (FISH OIL) 1200 MG CAPS Take 1,200 mg by mouth daily     Historical Provider, MD   potassium chloride (KLOR-CON M) 20 MEQ TBCR extended release tablet Take 20 mEq by mouth 2 times daily (with meals)    Historical Provider, MD   CALCIUM-VITAMIN D PO Take by mouth    Historical Provider, MD   Multiple Vitamins-Minerals (THERAPEUTIC MULTIVITAMIN-MINERALS) tablet Take 1 tablet by mouth daily    Historical Provider, MD   atorvastatin (LIPITOR) 20 MG tablet Take 20 mg by mouth nightly     Historical Provider, MD       Current medications:    Current Facility-Administered Medications   Medication Dose Route Frequency Provider Last Rate Last Dose    cefepime (MAXIPIME) 2 g in dextrose 5 % 50 mL IVPB  2 g Intravenous Q12H Freedom Morris MD   Stopped at 19 0129    metronidazole (FLAGYL) 500 mg in NaCl 100 mL IVPB premix  500 mg Intravenous Q8H Steven Vaughn MD   Stopped at 05/06/19 0643    glucose (GLUTOSE) 40 % oral gel 15 g  15 g Oral PRN Ofelia Mays MD        dextrose 50 % solution 12.5 g  12.5 g Intravenous PRN Ofelia Mays MD        glucagon (rDNA) injection 1 mg  1 mg Intramuscular PRN Ofelia Mays MD        dextrose 5 % solution  100 mL/hr Intravenous PRN Ofelia Mays MD        sodium chloride flush 0.9 % injection 10 mL  10 mL Intravenous 2 times per day CARI Walker CNP   10 mL at 05/05/19 2148    sodium chloride flush 0.9 % injection 10 mL  10 mL Intravenous PRN CARI Walker CNP        magnesium hydroxide (MILK OF MAGNESIA) 400 MG/5ML suspension 30 mL  30 mL Oral Daily PRN CARI Walker CNP        [facility]dansetron [facility]) injection 4 mg  4 mg Intravenous Q6H PRN CARI Walker CNP        amiodarone (CORDARONE) tablet 200 mg  200 mg Oral Nightly CARI Walker CNP   200 mg at 05/04/19 2038    atorvastatin (LIPITOR) tablet 20 mg  20 mg Oral Nightly CARI Walker CNP   20 mg at 05/04/19 2039    diltiazem (CARDIZEM CD) extended release capsule 120 mg  120 mg Oral Daily CARI Walker CNP   Stopped at 05/04/19 1837    metoprolol tartrate (LOPRESSOR) tablet 25 mg  25 mg Oral BID CARI Walker CNP        rivastigmine (EXELON) 4.6 MG/24HR 1 patch  1 patch Transdermal QAM CARI Walker CNP   1 patch at 05/06/19 3294    potassium chloride (KLOR-CON) extended release tablet 20 mEq  20 mEq Oral BID WC CARI Walker CNP   Stopped at 05/04/19 1836    potassium chloride (KLOR-CON M) extended release tablet 40 mEq  40 mEq Oral PRN CARI Walker CNP        Or    potassium chloride (KLOR-CON) packet 40 mEq  40 mEq Oral PRN Troy Adonis, APRN - CNP        Or    potassium chloride 10 mEq/100 mL IVPB (Peripheral Line)  10 mEq Intravenous PRN CARI Walker -  mL/hr at 05/06/19 3141 10 mEq at 05/06/19 0812    magnesium sulfate 1 g in dextrose 5% 100 mL IVPB  1 g Intravenous PRN Laura Glow, APRN - CNP        acetaminophen (TYLENOL) tablet 650 mg  650 mg Oral Q4H PRN Laura Glow, APRN - CNP        ipratropium-albuterol (DUONEB) nebulizer solution 1 ampule  1 ampule Inhalation Q4H WA Laura Glow, APRN - CNP   1 ampule at 05/06/19 0704    furosemide (LASIX) injection 40 mg  40 mg Intravenous BID Laura Glow, APRN - CNP   40 mg at 05/06/19 4450    vancomycin (VANCOCIN) intermittent dosing (placeholder)   Other RX Placeholder Shira Caicedo MD           Allergies: Allergies   Allergen Reactions    Biaxin [Clarithromycin]     Cephalexin     Viagra [Sildenafil Citrate] Nausea Only and Other (See Comments)     Dizziness and BP dropped       Problem List:    Patient Active Problem List   Diagnosis Code    S/P placement of cardiac pacemaker Z95.0    Persistent atrial fibrillation (HCC) I48.1    Ascending aortic aneurysm (HCC) I71.2    Hypervolemia E87.70       Past Medical History:        Diagnosis Date    ACTA2-related familial thoracic aortic aneurysm     Arrhythmia     BBBB (bilateral bundle branch block)     Bradycardia     Essential hypertension, malignant     History of cardioversion 04/17/2018    History of chest x-ray 02/27/2017    Enlargement of the aorta knob which may represent a thoracic aortic aneurysm. Further evaluation w/ a contrast enhanced CT of the chest recommended. no evidence of acute pulmonary process. 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 Recommend thyroid US.      History of echocardiogram 03/27/2017    TTE EF 55%, LV Normal Size, borderline LVH concentric, Normal LV systolic function, transmittal doppler flow pttern suggest impaired LV relaxation Mild aortic stenosis, mild aortic regurgitation.  History of EKG 02/27/2017    Time 12:03 AM, HR 75, A fib, Axis normal, Intervals normal, P waves normal, St-T Segments: nonspecific ST segment changes to the anterior lateral leads, QRS RBBB,  No significant Q waves, no ectopy. A-fib w/RBBB w/nonspecific ST segment change EKG.  History of EKG 02/27/2017    Time 2:58AM: HR 59, NSR, Axis normal, Intervals normal, P waves normal, ST-T seg: nonspecific ST segment changes, QRS RBBB, no significant Q waves, no ectopy. Sinus bradycardia w/ RBBB nonspecific ST Segment changes EKG    History of Holter monitoring 11/08/2017    monitored 48 hours: Patient noted to have multiple PAC and PVCs. Risk of atrial fibrillation present given previous episode Recommend antiarrhythmic therapy.  History of stress test 03/27/2017    NM Stress test: EF 54%, Abnormal study, evidence of mild ischemia in mild inferior regions. post stress LV is enlarged in size. Post-stress LV function is normal.     Hx of transesophageal echocardiography (SIMONE) for monitoring 04/17/2018    EF45-50% mild TR, mild-mod MR, mod-severe AS    Hyperlipemia     Hyperlipidemia     Hypertension     Nonspecific abnormal electrocardiogram (ECG) (EKG)     Persistent atrial fibrillation (HCC)     Sick sinus syndrome (HCC)     Vertigo        Past Surgical History:        Procedure Laterality Date    PACEMAKER INSERTION Left 03/29/2018    Dual Chamber [employer] Aura XT DR AMI Kincaid       Social History:    Social History     Tobacco Use    Smoking status: Never Smoker    Smokeless tobacco: Never Used   Substance Use Topics    Alcohol use:  Yes     Alcohol/week: 0.6 oz     Types: 1 Cans of beer per week     Comment: daily with dinner                                 Counseling given: Not Answered      Vital Signs (Current):   Vitals:    05/06/19 [address]    Anesthesia Evaluation  Patient summary reviewed and Nursing notes reviewed no history of anesthetic complications:   Airway: Mallampati: III  TM distance: >3 FB   Neck ROM: full  Mouth opening: > = 3 FB Dental: normal exam         Pulmonary:Negative Pulmonary ROS                              Cardiovascular:  Exercise tolerance: poor (<4 METS),   (+) hypertension:, pacemaker: pacemaker, dysrhythmias: atrial fibrillation, hyperlipidemia            Echocardiogram reviewed  Stress test reviewed       Beta Blocker:  Order written         Neuro/Psych:               GI/Hepatic/Renal:   (+) renal disease: kidney stones,           Endo/Other: Negative Endo/Other ROS                    Abdominal:           Vascular:   + PVD, aortic or cerebral, . Anesthesia Plan      MAC     ASA 3       Induction: intravenous. Anesthetic plan and risks discussed with patient. Plan discussed with CRNA.                 Yoselyn Medeiros DO   5/6/2019

## 2019-05-06 NOTE — PROGRESS NOTES
[address] [facility]  consulted by Dr. Josephine Alvares for monitoring and adjustment. Indication for treatment: Pneumonia and UTI  Goal trough: 15-20 mcg/mL     Pertinent Laboratory Values:   Temp Readings from Last 3 Encounters:   05/06/19 97.8 °F (36.6 °C) (Oral)   05/02/19 98.8 °F (37.1 °C) (Oral)   03/30/19 98.2 °F (36.8 °C) (Oral)     Recent Labs     05/04/19  1436 05/04/19  1835 05/05/19  0209 05/06/19  0248   WBC 11.9*  --   --  11.6*   LACTATE 2.5  LACTI CALLED TO DR HERNÁNDEZ @ 0126 [phone] [facility] MLT  RESULTS READ BACK  * 1.5 1.3  --      Recent Labs     05/04/19  2110 05/05/19  0200 05/06/19  0248   BUN 48* 44* 27*   CREATININE 1.6* 1.6* 1.1     Estimated Creatinine Clearance: 46 mL/min (based on SCr of 1.1 mg/dL). Intake/Output Summary (Last 24 hours) at 5/6/2019 1318  Last data filed at 5/6/2019 1147  Gross per 24 hour   Intake 900 ml   Output 5500 ml   Net -4600 ml     Vancomycin level:   TROUGH:  No results for input(s): VANCOTROUGH in the last 72 hours. RANDOM:    Recent Labs     05/05/19  0200 05/06/19  0248   VANCORANDOM 14.0 15.8       Assessment:  WBC and temperature: WBC stable. Patient is afebrile. SCr, BUN, and urine output: Significantly improved. Day(s) of therapy: #3  Vancomycin level: 15.8 mcg/mL, ~9.5 hours post-dose of vancomycin    Plan:  Renal labs are improving. Random level obtained this AM = 15.8 mcg/mL, drawn ~9.5 hours after previous vancomycin dose. It is appropriate to re-dose vancomycin based on level result. Given renal trends, will schedule patient on vancomycin 1250 mg IVPB every 18 hours. Plan to check next trough level: 05-08-19 @ 0130. Pharmacy will continue to monitor patient and adjust therapy as indicated.     Thank you for the consult,    Augie Pritchard, PharmD, [facility]

## 2019-05-06 NOTE — PROGRESS NOTES
D/w pt's daughter Fredy Zarate [facility] AND [facility]  [phone]  Verbal consent obtained to proceed with intervention from his daughter after discussion of the risks/benefits which include but are not limited to pain, infection, bleeding, MI/CVA/death, and need for further procedures.

## 2019-05-06 NOTE — PROGRESS NOTES
Nutrition Assessment    Type and Reason for Visit: Initial, Positive Nutrition Screen(wt loss, poor appetite)    Nutrition Recommendations:   · Continue current diet  · Begin standard high danette oral nutrition supplement bid, between meals as needed      Nutrition Assessment: Moderate nutrition risk with wt loss and poor intake PTA. Pt in OR for cysto/stent during room visit. Diet recently advanced post op. Malnutrition Assessment:  · Malnutrition Status: Insufficient data    Nutrition Risk Level: Moderate    Nutrient Needs:  · Estimated Daily Total Kcal: 6046-9184 (25-30 kcal/kg)  · Estimated Daily Protein (g):  (1-1.5 g/kg)  · Estimated Daily Total Fluid (ml/day): 2966-5665 (1 ml/kcal)    Nutrition Diagnosis:   · Problem: Unintended weight loss  · Etiology: related to Insufficient energy/nutrient consumption     Signs and symptoms:  as evidenced by Diet history of poor intake, Weight loss greater than or equal to 7.5% in 3 months    Objective Information:  · Wound Type: None(scratch)  · Current Nutrition Therapies:  · Oral Diet Orders: Cardiac, Fluid Restriction   · Oral Diet intake: Unable to assess  · Oral Nutrition Supplement (ONS) Orders: None  · Anthropometric Measures:  · Ht: 5' 6.93\" (170 cm)   · Current Body Wt: 158 lb 1.1 oz (71.7 kg)  · Admission Body Wt: 168 lb 14 oz (76.6 kg)  · Usual Body Wt: 173 lb 6.4 oz (78.7 kg)(2/4/19)  · % Weight Change:   -9% in 3 months  · Ideal Body Wt: 148 lb (67.1 kg), % Ideal Body 107  · BMI Classification: BMI 18.5 - 24.9 Normal Weight(24.9)    Nutrition Interventions:   Continue current diet, Start ONS  Continued Inpatient Monitoring, Education not appropriate at this time, Coordination of Care    Nutrition Evaluation:   · Evaluation: Goals set   · Goals:   Patient will meet at least 75% of estimated nutrient needs during los with meals and supplements provided.     · Monitoring: Meal Intake, Supplement Intake, Diet Tolerance, Skin Integrity, Mental Status/Confusion, Weight, Pertinent Labs      Electronically signed by Candida Penn RD, LD on 5/6/19 at 1:28 PM    Contact Number: [id number]

## 2019-05-06 NOTE — PROGRESS NOTES
vancomycin (VANCOCIN) intermittent dosing (placeholder)   Other RX Placeholder      Infusions:    dextrose       PRN Meds:   glucose 15 g PRN   dextrose 12.5 g PRN   glucagon (rDNA) 1 mg PRN   dextrose 100 mL/hr PRN   sodium chloride flush 10 mL PRN   magnesium hydroxide 30 mL Daily PRN   ondansetron 4 mg Q6H PRN   potassium chloride 40 mEq PRN   Or     potassium alternative oral replacement 40 mEq PRN   Or     potassium chloride 10 mEq PRN   magnesium sulfate 1 g PRN   acetaminophen 650 mg Q4H PRN     Subjective:     Feels better today  Objective: Intake/Output Summary (Last 24 hours) at 5/6/2019 1339  Last data filed at 5/6/2019 1147  Gross per 24 hour   Intake 900 ml   Output 5500 ml   Net -4600 ml      Vitals:   Vitals:    05/06/19 1239   BP: (!) 146/68   Pulse: 60   Resp: 20   Temp: 97.8 °F (36.6 °C)   SpO2: 100%     Physical Exam:   Gen:  awake, alert, cooperative, no apparent distress  Head/Eyes:  Normocephalic atraumatic, EOMI   NECK:   symmetrical, trachea midline  LUNGS: Normal Effort   CARDIOVASCULAR:  Normal rate  ABDOMEN: Non tender, non distended, no HSM noted. MUSCULOSKELETAL:  ROM limited  NEUROLOGIC: Alert and Oriented,  Cranial nerves II-XII are grossly intact.    SKIN:  no bruising or bleeding, normal skin color,  no redness      Data:       CBC   Recent Labs     05/04/19  1436 05/06/19  0248   WBC 11.9* 11.6*   HGB 11.3* 11.6*   HCT 38.6* 38.4*    255      BMP Recent Labs     05/04/19  2110 05/05/19  0200 05/06/19  0248 05/06/19  0948    143 151*  --    K 4.7 4.2 2.5  CALLED TO ICUSKASIA FERNÁNDEZ ON [id number] AT 0427 JSTOUT MLS  RESULTS READ BACK  * 3.0  K CALLED TO DR MEADE @ 1011 [id number]  RESULTS READ BACK  *    99 101  --    CO2 30 32 40*  --    BUN 48* 44* 27*  --    CREATININE 1.6* 1.6* 1.1  --          Electronically signed by Hattie Salamanca MD on 5/6/2019 at 1:39 PM

## 2019-05-06 NOTE — PLAN OF CARE
Nutrition Problem: Unintended weight loss  Intervention: Food and/or Nutrient Delivery: Continue current diet, Start ONS  Nutritional Goals: Patient will meet at least 75% of estimated nutrient needs during los with meals and supplements provided.
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
## 2019-05-06 NOTE — PROGRESS NOTES
Speech Language Pathology  Facility/Department: [facility] ICU STEPDOWN   BEDSIDE SWALLOW EVALUATION    NAME: Bryce Banuelos  : 10/5/1933  MRN: [id number]    IMPRESSIONS: Bryce Banuelos was referred for a bedside swallow evaluation after being admitted to [facility] with AMS, acute on chronic CHF, SABIHA, hyperkalemia, lung consolidation. No known history of dysphagia prior to admission. RN reports concerns for pt's tolerance of thin liquids. Pt's son present at time of evaluation and reports pt has occasional coughing episodes when eating/drinking but denies that this is a concern. He reports pt's speech has been inconsistently unintelligible for several weeks. Pt was seen for evaluation seated upright in bed, alert, cooperative given cues. He produced short rushes of mostly unintelligible speech, some of which were irrelevant to clinician questions and comments. Oral mechanism examination revealed generally decreased strength and coordination without focal deficit. He was presented with PO trials of thin liquids by cup/straw, soft solids, and regular solids. Pt was noted to be impulsive with intake and required clinician to directly intervene to prevent taking multiple bites prior to a single swallow. Oral stage is prolonged as a result with disorganized mastication and slow AP transit with adequate oral clearance. Possible premature posterior loss with thin liquids. Suspect mild-moderate pharyngeal dysphagia characterized by delayed swallow initiation, intact laryngeal elevation, no overt s/s aspiration across all trials. Impulsivity and large bolus sizes impact pt's safety with intake. Recommend dysphagia III solid/thin liquid diet with supervision/cuing during meals to reduce pt's impulsivity with intake. Deliver pills one a time. SLP will follow.  Results/recommendations communicated to pt/son and RN.    ADMISSION DATE: 2019  ADMITTING DIAGNOSIS: has S/P placement of cardiac pacemaker; Persistent atrial fibrillation ([address] Ascending aortic aneurysm ([address] and Hypervolemia on their problem list.  ONSET DATE: this admission    Recent Chest Xray/CT of Chest: see chart    Date of Eval: 5/6/2019  Evaluating Therapist: Georgie Jackson    Current Diet level:  Current Diet : Regular  Current Liquid Diet : Thin      Primary Complaint  Patient Complaint: occasional coughing when drinking    Pain:  Pain Assessment  Pain Assessment: 0-10  Pain Level: 0    Reason for Referral  Jae Resendiz was referred for a bedside swallow evaluation to assess the efficiency of his swallow function, identify signs and symptoms of aspiration and make recommendations regarding safe dietary consistencies, effective compensatory strategies, and safe eating environment. Impression  Dysphagia Diagnosis: Mild oral stage dysphagia;Mild to moderate pharyngeal stage dysphagia  Dysphagia Outcome Severity Scale: Level 4: Mild moderate dysphagia- Intermittent supervision/cueing. One - two diet consistencies restricted     Treatment Plan  Requires SLP Intervention: Yes  Duration/Frequency of Treatment: 2-3x/week for LOS  D/C Recommendations: To be determined       Recommended Diet and Intervention  Diet Solids Recommendation: Dysphagia III Advanced  Liquid Consistency Recommendation: Thin  Recommended Form of Meds: PO     Therapeutic Interventions: Diet tolerance monitoring;Patient/Family education; Therapeutic PO trials with SLP    Compensatory Swallowing Strategies  Compensatory Swallowing Strategies: Small bites/sips;Upright as possible for all oral intake;Eat/Feed slowly; Assist feed    Treatment/Goals  Short-term Goals  Timeframe for Short-term Goals: length of admission  Goal 1: Pt will tolerate dysphagia III/thin liquid diet with adequate oral manipulation and no s/s aspiration. Goal 2: Pt will implement compensatory strategies 9/10 trial given moderate verbal cues.   Goal 3: Pt/caregivers will indicate understanding of all recommendations. General  Chart Reviewed: Yes  Behavior/Cognition: Alert; Cooperative; Requires cueing  Communication Observation: (confused, rushed speech)  Follows Directions: Simple(with cues)  Dentition: Adequate  Patient Positioning: Upright in bed  Baseline Vocal Quality: Weak  Prior Dysphagia History: none known  Consistencies Administered: Reg solid; Soft solid; Thin - cup; Thin - straw           Vision/Hearing  Vision  Vision: Within Functional Limits  Hearing  Hearing: Within functional limits    Oral Motor Deficits  Oral/Motor  Oral Motor: Within functional limits    Oral Phase Dysfunction  Oral Phase  Oral Phase: Exceptions     Indicators of Pharyngeal Phase Dysfunction   Pharyngeal Phase  Pharyngeal Phase: Exceptions    Prognosis  Prognosis  Prognosis for safe diet advancement: fair  Individuals consulted  Consulted and agree with results and recommendations: Patient; Family member  Family member consulted: son    Education  Patient Education: recommendations/plan  Patient Education Response: Needs reinforcement  Safety Devices in place: Yes  Type of devices:  All fall risk precautions in place           Therapy Time  SLP Individual Minutes  Time In: 1320  Time Out: [phone]  Minutes: 703 [address]  5/6/2019 3:35 PM

## 2019-05-06 NOTE — ANESTHESIA POSTPROCEDURE EVALUATION
Department of Anesthesiology  Postprocedure Note    Patient: Linda Shown  MRN: [id number]  YOB: 1933  Date of evaluation: 5/6/2019  Time:  11:56 AM     Procedure Summary     Date:  05/06/19 Room / Location:  [place] [address] 01 / [place] [address]    Anesthesia Start:  9720 Anesthesia Stop:      Procedure:  [address], DIAGNOSTIC CYSTOSCOPY (Left ) Diagnosis:  (left ureteral calculi)    Surgeon:  Marti Ware MD Responsible Provider:  CARI Andres CRNA    Anesthesia Type:  MAC ASA Status:  3          Anesthesia Type: MAC    Mir Phase I: Mir Score: 7    Mir Phase II:      Last vitals: Reviewed and per EMR flowsheets.        Anesthesia Post Evaluation    Patient location during evaluation: PACU  Pain score: 0  Airway patency: patent  Nausea & Vomiting: no nausea and no vomiting  Complications: no  Cardiovascular status: blood pressure returned to baseline and hemodynamically stable  Respiratory status: acceptable and spontaneous ventilation  Hydration status: euvolemic

## 2019-05-06 NOTE — PROGRESS NOTES
[facility]   [facility]  Progress Note 0 1 2      Date: 2019   Patient: Taylor Hernandez   : 10/5/1933   DOA: 2019   MRN: [id number]   ROOM#: -A     Subjective     I feel well     Objective     AF  164/65    CBC:   Lab Results   Component Value Date    WBC 11.6 2019    RBC 4.65 2019    HGB 11.6 2019    HCT 38.4 2019    MCV 82.6 2019    MCH 24.9 2019    MCHC 30.2 2019    RDW 14.8 2019     2019    MPV 11.2 2019     BMP:    Lab Results   Component Value Date     2019    K  2019     3.0  K CALLED TO DR MEADE @ 1011 [id number]  RESULTS READ BACK       2019    CO2 40 2019    BUN 27 2019    LABALBU 3.5 2019    CREATININE 1.1 2019    CALCIUM 8.5 2019    GFRAA >60 2019    LABGLOM >60 2019    GLUCOSE 89 2019     Potassium:    Lab Results   Component Value Date    K  2019     3.0  K CALLED TO DR MEADE @ 1011 [id number] [id number]  RESULTS READ BACK         Imaging Studies:     CT a/p w/o IV contrast 19    1. Findings are consistent with mild interstitial pulmonary edema and small   to moderate bilateral pleural effusions, slightly increased since the recent   prior exam.   2. Several new small areas of ground-glass consolidation in the left lung,   most consistent with an infectious or inflammatory process. 3. New subcutaneous stranding within the left axilla of uncertain etiology. Left upper extremity venous Doppler may be useful to exclude DVT as a source   of the stranding. 4. A left proximal ureteral calcification is re-identified with   mild-to-moderate obstruction. 5. Mild gallbladder distention and gallbladder wall thickening. Cholelithiasis.  Consider right upper quadrant ultrasound if there is concern   for acute cholecystitis.    6. Extensive atherosclerosis and coronary arterial calcification. Haylee Harris is a   4.2 cm fusiform abdominal aortic aneurysm.  Please see below for management   recommendations allowing for life expectancy and other risk factors. 7. A large urinary bladder diverticulum is re-identified with dependent   calcifications. 8. Thick-walled urinary bladder, consider cystitis. Physical Exam:     NAD  Nt/nd  No CVAT    Assessment/Plan    Taylor Hernandez is a 80y.o. year old male admitted 5/4/2019 for AMS with CHF exacerbation, chronically on Eliquis with a 6 mm left proximal ureteral stone with mild associated left HN.    NPO for cystoscopy, left retrograde pyelogram/stent insertion, possible left ureteroscopy/stone manipulation    D/w pt's daughter Dora Segovia  [phone]  Verbal consent obtained to proceed with intervention from his daughter after discussion of the risks/benefits which include but are not limited to pain, infection, bleeding, MI/CVA/death, and need for further procedures. His repeat K+ was 3 with which anesthesia is ok to proceed with. Contact the pt's daughter, Ursula Durant, regarding the K+ level as well. She gives permission to proceed. Will proceed. Patient seen and examined, chart reviewed.      Carie Gordon MD     Electronically signed at 5/6/2019
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
## 2019-05-07 ENCOUNTER — APPOINTMENT (OUTPATIENT)
Dept: INTERVENTIONAL RADIOLOGY/VASCULAR | Age: 84
DRG: 853 | End: 2019-05-07
Payer: MEDICARE

## 2019-05-07 ENCOUNTER — APPOINTMENT (OUTPATIENT)
Dept: GENERAL RADIOLOGY | Age: 84
DRG: 853 | End: 2019-05-07
Payer: MEDICARE

## 2019-05-07 VITALS
DIASTOLIC BLOOD PRESSURE: 74 MMHG | SYSTOLIC BLOOD PRESSURE: 129 MMHG | BODY MASS INDEX: 24.6 KG/M2 | RESPIRATION RATE: 18 BRPM | OXYGEN SATURATION: 100 % | HEART RATE: 58 BPM | HEIGHT: 67 IN | WEIGHT: 156.75 LBS | TEMPERATURE: 98.1 F

## 2019-05-07 LAB
ANION GAP SERPL CALCULATED.3IONS-SCNC: 8 MMOL/L (ref 4–16)
APTT: 35.3 SECONDS (ref 21.2–33)
BUN BLDV-MCNC: 39 MG/DL (ref 6–23)
CALCIUM SERPL-MCNC: 8.7 MG/DL (ref 8.3–10.6)
CHLORIDE BLD-SCNC: 100 MMOL/L (ref 99–110)
CO2: 40 MMOL/L (ref 21–32)
CREAT SERPL-MCNC: 1.2 MG/DL (ref 0.9–1.3)
FLUID TYPE: NORMAL INDEX
GFR AFRICAN AMERICAN: >60 ML/MIN/1.73M2
GFR NON-AFRICAN AMERICAN: 58 ML/MIN/1.73M2
GLUCOSE BLD-MCNC: 120 MG/DL (ref 70–99)
GLUCOSE, FLUID: 131 MG/DL
HCT VFR BLD CALC: 40.9 % (ref 42–52)
HEMOGLOBIN: 11.8 GM/DL (ref 13.5–18)
INR BLD: 1.41 INDEX
LACTATE DEHYDROGENASE, FLUID: 160 IU/L
LYMPHOCYTES, BODY FLUID: 35 %
MAGNESIUM: 2 MG/DL (ref 1.8–2.4)
MCH RBC QN AUTO: 24.7 PG (ref 27–31)
MCHC RBC AUTO-ENTMCNC: 28.9 % (ref 32–36)
MCV RBC AUTO: 85.7 FL (ref 78–100)
MESOTHELIAL FLUID: 8 /100 WBC
MONOCYTE, FLUID: NORMAL %
NEUTROPHIL, FLUID: 19 %
PDW BLD-RTO: 14.6 % (ref 11.7–14.9)
PH FLUID: 8.5
PLATELET # BLD: 279 K/CU MM (ref 140–440)
PMV BLD AUTO: 11.3 FL (ref 7.5–11.1)
POTASSIUM SERPL-SCNC: 3.6 MMOL/L (ref 3.5–5.1)
PROTEIN FLUID: 2.5 GM/DL
PROTHROMBIN TIME: 16.3 SECONDS (ref 9.12–12.5)
RBC # BLD: 4.77 M/CU MM (ref 4.6–6.2)
RBC FLUID: NORMAL /CU MM
SODIUM BLD-SCNC: 148 MMOL/L (ref 135–145)
WBC # BLD: 8.9 K/CU MM (ref 4–10.5)
WBC FLUID: 940 /CU MM

## 2019-05-07 PROCEDURE — 88342 IMHCHEM/IMCYTCHM 1ST ANTB: CPT

## 2019-05-07 PROCEDURE — 97116 GAIT TRAINING THERAPY: CPT

## 2019-05-07 PROCEDURE — 2709999900 HC NON-CHARGEABLE SUPPLY

## 2019-05-07 PROCEDURE — 6360000002 HC RX W HCPCS: Performed by: FAMILY MEDICINE

## 2019-05-07 PROCEDURE — 88305 TISSUE EXAM BY PATHOLOGIST: CPT

## 2019-05-07 PROCEDURE — 32555 ASPIRATE PLEURA W/ IMAGING: CPT

## 2019-05-07 PROCEDURE — 6370000000 HC RX 637 (ALT 250 FOR IP): Performed by: UROLOGY

## 2019-05-07 PROCEDURE — 83735 ASSAY OF MAGNESIUM: CPT

## 2019-05-07 PROCEDURE — 97162 PT EVAL MOD COMPLEX 30 MIN: CPT

## 2019-05-07 PROCEDURE — 6370000000 HC RX 637 (ALT 250 FOR IP): Performed by: FAMILY MEDICINE

## 2019-05-07 PROCEDURE — 87073 CULTURE BACTERIA ANAEROBIC: CPT

## 2019-05-07 PROCEDURE — 85730 THROMBOPLASTIN TIME PARTIAL: CPT

## 2019-05-07 PROCEDURE — 2580000003 HC RX 258: Performed by: FAMILY MEDICINE

## 2019-05-07 PROCEDURE — 36415 COLL VENOUS BLD VENIPUNCTURE: CPT

## 2019-05-07 PROCEDURE — 97166 OT EVAL MOD COMPLEX 45 MIN: CPT

## 2019-05-07 PROCEDURE — 80048 BASIC METABOLIC PNL TOTAL CA: CPT

## 2019-05-07 PROCEDURE — 97530 THERAPEUTIC ACTIVITIES: CPT

## 2019-05-07 PROCEDURE — 89051 BODY FLUID CELL COUNT: CPT

## 2019-05-07 PROCEDURE — 71045 X-RAY EXAM CHEST 1 VIEW: CPT

## 2019-05-07 PROCEDURE — 99232 SBSQ HOSP IP/OBS MODERATE 35: CPT | Performed by: INTERNAL MEDICINE

## 2019-05-07 PROCEDURE — 83615 LACTATE (LD) (LDH) ENZYME: CPT

## 2019-05-07 PROCEDURE — 85027 COMPLETE CBC AUTOMATED: CPT

## 2019-05-07 PROCEDURE — 88341 IMHCHEM/IMCYTCHM EA ADD ANTB: CPT

## 2019-05-07 PROCEDURE — 87071 CULTURE AEROBIC QUANT OTHER: CPT

## 2019-05-07 PROCEDURE — 6360000002 HC RX W HCPCS: Performed by: UROLOGY

## 2019-05-07 PROCEDURE — 2500000003 HC RX 250 WO HCPCS: Performed by: UROLOGY

## 2019-05-07 PROCEDURE — 88108 CYTOPATH CONCENTRATE TECH: CPT

## 2019-05-07 PROCEDURE — 0W993ZZ DRAINAGE OF RIGHT PLEURAL CAVITY, PERCUTANEOUS APPROACH: ICD-10-PCS | Performed by: RADIOLOGY

## 2019-05-07 PROCEDURE — 87205 SMEAR GRAM STAIN: CPT

## 2019-05-07 PROCEDURE — C1729 CATH, DRAINAGE: HCPCS

## 2019-05-07 PROCEDURE — 87116 MYCOBACTERIA CULTURE: CPT

## 2019-05-07 PROCEDURE — 87102 FUNGUS ISOLATION CULTURE: CPT

## 2019-05-07 PROCEDURE — 82945 GLUCOSE OTHER FLUID: CPT

## 2019-05-07 PROCEDURE — 2580000003 HC RX 258: Performed by: UROLOGY

## 2019-05-07 PROCEDURE — 85610 PROTHROMBIN TIME: CPT

## 2019-05-07 PROCEDURE — 83986 ASSAY PH BODY FLUID NOS: CPT

## 2019-05-07 PROCEDURE — 84157 ASSAY OF PROTEIN OTHER: CPT

## 2019-05-07 RX ORDER — AMOXICILLIN AND CLAVULANATE POTASSIUM 875; 125 MG/1; MG/1
1 TABLET, FILM COATED ORAL EVERY 12 HOURS SCHEDULED
Status: DISCONTINUED | OUTPATIENT
Start: 2019-05-07 | End: 2019-05-07 | Stop reason: HOSPADM

## 2019-05-07 RX ORDER — FUROSEMIDE 40 MG/1
40 TABLET ORAL DAILY
Qty: 60 TABLET | Refills: 3 | Status: ON HOLD | OUTPATIENT
Start: 2019-05-08 | End: 2019-05-29 | Stop reason: HOSPADM

## 2019-05-07 RX ORDER — AMOXICILLIN AND CLAVULANATE POTASSIUM 875; 125 MG/1; MG/1
1 TABLET, FILM COATED ORAL EVERY 12 HOURS SCHEDULED
Qty: 14 TABLET | Refills: 0 | Status: SHIPPED | OUTPATIENT
Start: 2019-05-07 | End: 2019-05-14

## 2019-05-07 RX ORDER — FUROSEMIDE 40 MG/1
40 TABLET ORAL DAILY
Status: DISCONTINUED | OUTPATIENT
Start: 2019-05-08 | End: 2019-05-07 | Stop reason: HOSPADM

## 2019-05-07 RX ORDER — FUROSEMIDE 10 MG/ML
40 INJECTION INTRAMUSCULAR; INTRAVENOUS DAILY
Status: DISCONTINUED | OUTPATIENT
Start: 2019-05-08 | End: 2019-05-07

## 2019-05-07 RX ADMIN — METRONIDAZOLE 500 MG: 500 INJECTION, SOLUTION INTRAVENOUS at 05:45

## 2019-05-07 RX ADMIN — METOPROLOL TARTRATE 25 MG: 25 TABLET ORAL at 10:12

## 2019-05-07 RX ADMIN — DILTIAZEM HYDROCHLORIDE 120 MG: 120 CAPSULE, COATED, EXTENDED RELEASE ORAL at 10:12

## 2019-05-07 RX ADMIN — SODIUM CHLORIDE, PRESERVATIVE FREE 10 ML: 5 INJECTION INTRAVENOUS at 10:25

## 2019-05-07 RX ADMIN — MAGNESIUM HYDROXIDE 30 ML: 400 SUSPENSION ORAL at 05:41

## 2019-05-07 RX ADMIN — POTASSIUM CHLORIDE 20 MEQ: 750 TABLET, FILM COATED, EXTENDED RELEASE ORAL at 10:12

## 2019-05-07 RX ADMIN — FUROSEMIDE 40 MG: 10 INJECTION, SOLUTION INTRAMUSCULAR; INTRAVENOUS at 10:12

## 2019-05-07 RX ADMIN — APIXABAN 2.5 MG: 2.5 TABLET, FILM COATED ORAL at 13:17

## 2019-05-07 RX ADMIN — VANCOMYCIN HYDROCHLORIDE 1250 MG: 5 INJECTION, POWDER, LYOPHILIZED, FOR SOLUTION INTRAVENOUS at 10:12

## 2019-05-07 ASSESSMENT — PAIN SCALES - GENERAL: PAINLEVEL_OUTOF10: 0
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
## 2019-05-07 NOTE — DISCHARGE SUMMARY
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 05/07/2019    RBC 4.77 05/07/2019    HGB 11.8 05/07/2019    HCT 40.9 05/07/2019    MCV 85.7 05/07/2019    MCH 24.7 05/07/2019    MCHC 28.9 05/07/2019    RDW 14.6 05/07/2019     05/07/2019    MPV 11.3 05/07/2019       Patient Instructions:     Medication List      START taking these medications    amoxicillin-clavulanate 875-125 MG per tablet  Commonly known as:  AUGMENTIN  Take 1 tablet by mouth every 12 hours for 7 days     furosemide 40 MG tablet  Commonly known as:  LASIX  Take 1 tablet by mouth daily  Start taking on:  5/8/2019        CHANGE how you take these medications    amiodarone 200 MG tablet  Commonly known as:  CORDARONE  Take 1 tablet by mouth daily  What changed:  when to take this        CONTINUE taking these medications    apixaban 2.5 MG Tabs tablet  Commonly known as:  ELIQUIS  Take 1 tablet by mouth 2 times daily     atorvastatin 20 MG tablet  Commonly known as:  LIPITOR     CALCIUM-VITAMIN D PO     CARTIA  MG extended release capsule  Generic drug:  diltiazem     Fish Oil 1200 MG Caps     metoprolol tartrate 25 MG tablet  Commonly known as:  LOPRESSOR     potassium chloride 20 MEQ Tbcr extended release tablet  Commonly known as: KLOR-CON M     rivastigmine 4.6 MG/24HR  Commonly known as:  EXELON     therapeutic multivitamin-minerals tablet     Vitamin D3 5000 units Tabs           Where to Get Your Medications      You can get these medications from any pharmacy    Bring a paper prescription for each of these medications  · amoxicillin-clavulanate 875-125 MG per tablet  · furosemide 40 MG tablet            Code Status: Full Code     Consults:   IP CONSULT TO HOSPITALIST  IP CONSULT TO CARDIOLOGY  IP CONSULT TO UROLOGY  IP CONSULT TO INTERVENTIONAL RADIOLOGY    Diet: low sodium    Activity: activity as tolerated   Work:    Discharged Condition: good    Prognosis: Fair - Good    Disposition: home      Follow-up with   1. PCP within   5-7    Days    Follow up labs: BMP in 1 week       Discharge Physician Signed: Kg Ambrose M.D. The patient was seen and examined on day of discharge and this discharge summary is in conjunction with any daily progress note from day of discharge.   Time spent on discharge in the examination, evaluation, counseling and review of medications and discharge plan: 34 minutes

## 2019-05-07 NOTE — PROGRESS NOTES
Occupational Therapy  [facility] OCCUPATIONAL THERAPY EVALUATION    History  [place]:  The primary encounter diagnosis was Acute on chronic congestive heart failure, unspecified heart failure type ([address] Diagnoses of SABIHA (acute kidney injury) ([address] Hyperkalemia, and Lung consolidation ([address] were also pertinent to this visit. Patient  has a past medical history of ACTA2-related familial thoracic aortic aneurysm, Arrhythmia, BBBB (bilateral bundle branch block), Bradycardia, Essential hypertension, malignant, History of cardioversion, History of chest x-ray, History of CT scan, History of echocardiogram, History of EKG, History of EKG, History of Holter monitoring, History of stress test, Hx of transesophageal echocardiography (SIMONE) for monitoring, Hyperlipemia, Hyperlipidemia, Hypertension, Nonspecific abnormal electrocardiogram (ECG) (EKG), Persistent atrial fibrillation (HCC), Sick sinus syndrome ([address] and Vertigo. Patient  has a past surgical history that includes Pacemaker insertion (Left, 03/29/2018) and CYSTOSCOPY INSERTION / REMOVAL STENT / STONE (Left, 5/6/2019).       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\"  Pain:  none  Patient goal:  Home asap    Occupational profile (relevant social history and personal factors):    Social/Functional History  Lives With: Spouse  Type of Home: House  Home Layout: One level, Laundry in basement(12 steps with a handrail to access basement)  Home Access: Stairs to enter with rails  Entrance Stairs - Number of Steps: 2  ADL Assistance: Independent  Homemaking Assistance: Independent  Ambulation Assistance: Independent  Transfer Assistance: Independent    Examination of body systems (includes body structures/functions, activity/participation limitations):  · Orientation: impaired except to self  · Cognition: Impaired safety and judgement. Follows basic commands appropriately. · Observation:  Received pt in bed. Alert and cooperative. Frail. · Vision:  [facility]   · Hearing:  Hard of hearing  · ROM:  WFL BUE  · Strength: WFL BUE  · Sensation: not tested    AM-PAC 6 click short form for inpatient daily activity:   How much help from another person does the patient currently need. .. Unable  Dep A Lot  Max A A Lot   Mod A A Little  Min A A Little   CGA  Sup None   Mod I  Indep   1. Putting on and taking off regular lower body clothing? [] 1    [] 2   [] 2   [x] 3   [] 3   [] 4      2. Bathing (including washing, rinsing, drying)? [] 1   [] 2   [] 2 [x] 3 [] 3 [] 4   3. Toileting, which includes using toilet, bedpan, or urinal? [] 1    [] 2   [] 2   [x] 3   [] 3   [] 4     4. Putting on and taking off regular upper body clothing? [] 1   [] 2   [] 2   [] 3   [x] 3    [] 4      5. Taking care of personal grooming such as brushing teeth? [] 1   [] 2    [] 2 [] 3    [] 3   [x] 4      6. Eating meals? [] 1   [] 2   [] 2   [] 3   [] 3   [x] 4      Raw Score:  20   24/24 = unimpaired  23/24 = 1-20% impaired   20/24-22/24 = 21-40% impaired  15/24-19/24 = 41-59% impaired   10/24-14/24 = 60%-79% impaired  7/24-9/24 = 80%-99% impaired  6/24 = 100% impaired    ADLs  Feeding: indep     Grooming: indep     Dressing: UB SBA in seated LB predict Laura    Bathing: UB SBA in seated LB predict Laura    Toileting: predict CGA to Laura    *Some ADL determined per observation of actual ADL performance, functional mobility, balance, activity tolerance, and cognition.      Functional Mobility  Bed mobility:   Supine to sit: modified indep     Transfers:   Sit to stand: CGA  Stand to sit: CGA but c safety cues when approaching seat with walker    Ambulation:  CGA- Laura s AD, SBA c RW    Activity tolerance  Good    Assessment:  Assessment  Performance deficits / Impairments: Decreased safe awareness, Decreased balance, Decreased high-level IADLs  Treatment Diagnosis: CAP, SABIHA  Prognosis: Good  Decision Making: Medium Complexity  REQUIRES OT FOLLOW UP: Yes  Discharge Recommendations: S Level 3, 24 hour supervision or assist, Home with Home health OT    Pt is an  year old 80 admitted from home for treatment of confusion, SABIHA, and CAP. He lives with his with his wife in a multi story home but could live exclusively on the main level if needed. He typically uses no DME but now is requiring a walker and steadying assistance with high level ADL. He would benefit from OT at home, assistance from children with IADLs, and increased overall supervision due to suspected dementia. Goals:  By d/c or goals met:      Pt will perform all functional transfers with SBA and appropriate use of LRD to bed, toilet, chair in prep for increased functional independence. Pt will perform UB ADLs with SBA to increase functional independence. Pt will perform LB ADLs with SBA to increase functional independence. Pt will perform all aspects of toileting with SBA to increase functional independence. Pt will participate in therex/therax c emphasis on strength, activity tolerance,  safety, OSVALDO tasks. Plan:  Plan  Times per week: 2x    Treatment today:    Therapeutic Activity Training:   Therapeutic activity training was instructed today. Cues were given for safety, sequence, UE/LE placement, visual cues, and balance. Activities performed today included bed mobility training, sup-sit, sit-stand, SPT. Education: Role of OT, OT POC, d/c needs, home safety    Safety: Left in chair with all needs in reach. Gait belt used for transfer and mobility. Time in:  1141  Time out:  1215  Timed treatment minutes:  15  Total treatment time:  34    Electronically signed by:    410Kaushik Saunders, OTR/L, [facility]   [id number]   2:17 PM, 5/7/2019

## 2019-05-07 NOTE — PROGRESS NOTES
Physical Therapy    Facility/Department: [facility] ICU STEPDOWN  Initial Assessment    NAME: Ab Hernandez  : 10/5/1933  MRN: [id number]    Date of Service: 2019    Discharge Recommendations:  24 hour supervision or assist, Home with Home health PT, S Level 1       Functional Outcome Measure:    Acute Care Index of Function (ACIF):    Score: 0.82 (0.71 or greater = appropriate for home with home PT and 24 hour assist)      Assessment   Assessment: Pt is an 80 y.o. Male with medical history, surgical history, co-morbidities, and personal factors including ACTA2-related familial thoracic aortic aneurysm, Arrhythmia, BBBB (bilateral bundle branch block), Bradycardia, Essential hypertension, malignant, History of cardioversion, History of chest x-ray, History of CT scan, History of echocardiogram, History of EKG, History of EKG, History of Holter monitoring, History of stress test, Hx of transesophageal echocardiography (SIMONE) for monitoring, Hyperlipemia, Hyperlipidemia, Hypertension, Nonspecific abnormal electrocardiogram (ECG) (EKG), Persistent atrial fibrillation, Sick sinus syndrome, Vertigo, and Pacemaker insertion (Left, 2018) with admission for Acute on chronic congestive heart failure, SABIHA (acute kidney injury), Hyperkalemia, and Lung consolidation and s/p CYSTOSCOPY INSERTION / REMOVAL STENT / STONE (Left, 2019). Prior to admission, pt was independent with functional mobility and ADLs. Examination of body systems reveals decreased endurance, decreased balance, and impaired cognition which all limit his overall functional mobility.         Prognosis: Good  Decision Making: Medium Complexity  Clinical Presentation: evolving  REQUIRES PT FOLLOW UP: Yes  Activity Tolerance  Activity Tolerance: Patient Tolerated treatment well       Restrictions  Restrictions/Precautions  Restrictions/Precautions: General Precautions  Vision/Hearing  Vision: Within Functional Limits  Hearing: Exceptions to

## 2019-05-07 NOTE — PROGRESS NOTES
[facility] Liaison aware of discharge & will initiate [facility] as pt has already dc'd.
added or changed (ex. new medication, dosage change, interval change, formulation change):  Exelon patch (added)  Vitamin D (added)  Diltiazem ER medication clarified    Medications removed from list (include reason, ex. noncompliance, medication cost, therapy complete etc.):   Niacin Not on list provided by family  Prostate PO Not on list provided by family  Vitamin E Not on list provided by family    Other Comments:  Medication list provided by family and insurance claims verified. Patient states he took medications today.     To my knowledge the above medication history is accurate as of 5/2/2019 5:41 PM.   Cortez Mcgee CPhT   5/2/2019 5:41 PM

## 2019-05-07 NOTE — CARE COORDINATION
Luis Daniel Guillaume requires the assistance of a wheeled walker to successfully complete daily living tasks such as: bathing, toileting, dressing and grooming. A wheeled walker is necessary due to the patient's unsteady gait, upper body weakness, inability to  an ambulation device, and can ambulate only by pushing a walker instead of a lesser assistive device such as a cane, crutch, or standard walker.
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
## 2019-05-07 NOTE — PROGRESS NOTES
Hospitalist Progress Note      Name:  Eri Nicole /Age/Sex: 10/5/1933  (80 y.o. male)   MRN & CSN:  [id number] & [id number] Admission Date/Time: 2019  2:30 PM   Location:  -A PCP: Vic Duane is a 80 y.o.  male  who presents with Altered Mental Status; Other (loss of bowel and bladder control); Hallucinations; and Shortness of Breath      Assessment and Plan:   Acute on Chronic CHF  - neg 8.5L  - check echo: ef 50-55%  - IV lasix, change to oral  - strict I and O  - cardio consulted  - Consult IR for thoracentesis: 400 cc drained     Sepsis likely from CAP possible Aspiration, possible UTI  - improved clinically  - IV Vanc + Cefepime + Flagyl  Day #2, de-escalate to oral augmentin  - blood cx NTD  - urine cx NTD  - SLP eval: dysphagia 3 recommended     SABIHA  - resolved  - monitor for now  - avoid nephrotoxic agents     Hypokalemia  - replace per protocol     Acute Metabolic Encephalopathy  - resolved  - likely from above combination  - CT head: non acute     PAF  - eliquis  - metoprolol     Hematuria, left ureteral mild-mod obstruction noted  - resolved, eliquis resumed now  - s/p cysto, left RGPG, left stent insertion  - consult Urology     4.2 cm AAA  - outpt CTS f/u in 1 year    Hypernatremia  - improved and stable     DVT ppx: SCD's     Pt/ot                Diet Dietary Nutrition Supplements: Standard High Calorie Oral Supplement  DIET CARDIAC; Low Sodium (2 GM);  Dysphagia III Advanced; Daily Fluid Restriction: 1800 ml   Code Status Full Code     Medications:   Medications:    [START ON 2019] furosemide  40 mg Intravenous Daily    amoxicillin-clavulanate  1 tablet Oral 2 times per day    sodium chloride flush  10 mL Intravenous 2 times per day    amiodarone  200 mg Oral Nightly    atorvastatin  20 mg Oral Nightly    diltiazem  120 mg Oral Daily    metoprolol tartrate  25 mg Oral BID    rivastigmine  1 patch Transdermal QAM    potassium chloride  20 mEq Oral BID WC    ipratropium-albuterol  1 ampule Inhalation Q4H WA      Infusions:    dextrose       PRN Meds:   glucose 15 g PRN   dextrose 12.5 g PRN   glucagon (rDNA) 1 mg PRN   dextrose 100 mL/hr PRN   sodium chloride flush 10 mL PRN   magnesium hydroxide 30 mL Daily PRN   ondansetron 4 mg Q6H PRN   potassium chloride 40 mEq PRN   Or     potassium alternative oral replacement 40 mEq PRN   Or     potassium chloride 10 mEq PRN   magnesium sulfate 1 g PRN   acetaminophen 650 mg Q4H PRN     Subjective:   Doing well    Objective: Intake/Output Summary (Last 24 hours) at 5/7/2019 1141  Last data filed at 5/7/2019 0826  Gross per 24 hour   Intake 190 ml   Output 700 ml   Net -510 ml      Vitals:   Vitals:    05/07/19 1058   BP: (!) 136/50   Pulse:    Resp:    Temp:    SpO2:      Physical Exam:   Gen:  awake, alert, cooperative, no apparent distress  Head/Eyes:  Normocephalic atraumatic, EOMI   NECK:   symmetrical, trachea midline  LUNGS: Normal Effort   CARDIOVASCULAR:  Normal rate  ABDOMEN: Non tender, non distended, no HSM noted. MUSCULOSKELETAL:  ROM WNL  NEUROLOGIC: Alert and Oriented,  Cranial nerves II-XII are grossly intact.    SKIN:  no bruising or bleeding, normal skin color,  no redness      Data:       CBC   Recent Labs     05/04/19  1436 05/06/19  0248 05/07/19  0243   WBC 11.9* 11.6* 8.9   HGB 11.3* 11.6* 11.8*   HCT 38.6* 38.4* 40.9*    255 279      BMP Recent Labs     05/05/19  0200 05/06/19  0248 05/06/19  0948 05/06/19  1418 05/07/19  0243    151*  --   --  148*   K 4.2 2.5  CALLED TO ICUSKASIA FERNÁNDEZ ON [id number] AT 0427 JSTOUT MLS  RESULTS READ BACK  * 3.0  K CALLED TO DR MEADE @ 1011 [id number] [id number]  RESULTS READ BACK  * 3.4* 3.6   CL 99 101  --   --  100   CO2 32 40*  --   --  40*   BUN 44* 27*  --   --  39*   CREATININE 1.6* 1.1  --   --  1.2         Electronically signed by Allie Jhaveri MD on 5/7/2019 at 11:41 AM

## 2019-05-08 ENCOUNTER — APPOINTMENT (OUTPATIENT)
Dept: CT IMAGING | Age: 84
End: 2019-05-08
Payer: MEDICARE

## 2019-05-08 ENCOUNTER — TELEPHONE (OUTPATIENT)
Dept: CARDIOLOGY CLINIC | Age: 84
End: 2019-05-08

## 2019-05-08 ENCOUNTER — HOSPITAL ENCOUNTER (EMERGENCY)
Age: 84
Discharge: HOME | End: 2019-05-08
Attending: EMERGENCY MEDICINE
Payer: MEDICARE

## 2019-05-08 VITALS
DIASTOLIC BLOOD PRESSURE: 60 MMHG | OXYGEN SATURATION: 97 % | RESPIRATION RATE: 17 BRPM | TEMPERATURE: 97.5 F | SYSTOLIC BLOOD PRESSURE: 137 MMHG | HEIGHT: 65 IN | BODY MASS INDEX: 25.99 KG/M2 | WEIGHT: 156 LBS | HEART RATE: 62 BPM

## 2019-05-08 DIAGNOSIS — R31.9 HEMATURIA, UNSPECIFIED TYPE: Primary | ICD-10-CM

## 2019-05-08 DIAGNOSIS — N12 PYELONEPHRITIS: ICD-10-CM

## 2019-05-08 LAB
ALBUMIN SERPL-MCNC: 3.5 GM/DL (ref 3.4–5)
ALP BLD-CCNC: 82 IU/L (ref 40–129)
ALT SERPL-CCNC: 46 U/L (ref 10–40)
ANION GAP SERPL CALCULATED.3IONS-SCNC: 7 MMOL/L (ref 4–16)
APTT: 33.5 SECONDS (ref 21.2–33)
AST SERPL-CCNC: 32 IU/L (ref 15–37)
BACTERIA: NEGATIVE /HPF
BASOPHILS ABSOLUTE: 0 K/CU MM
BASOPHILS RELATIVE PERCENT: 0.1 % (ref 0–1)
BILIRUB SERPL-MCNC: 0.7 MG/DL (ref 0–1)
BILIRUBIN URINE: NEGATIVE MG/DL
BLOOD, URINE: ABNORMAL
BUN BLDV-MCNC: 50 MG/DL (ref 6–23)
CALCIUM SERPL-MCNC: 9.4 MG/DL (ref 8.3–10.6)
CHLORIDE BLD-SCNC: 94 MMOL/L (ref 99–110)
CLARITY: ABNORMAL
CO2: 38 MMOL/L (ref 21–32)
COLOR: ABNORMAL
CREAT SERPL-MCNC: 1.3 MG/DL (ref 0.9–1.3)
DIFFERENTIAL TYPE: ABNORMAL
EKG ATRIAL RATE: 61 BPM
EKG DIAGNOSIS: NORMAL
EKG P AXIS: -18 DEGREES
EKG P-R INTERVAL: 174 MS
EKG Q-T INTERVAL: 602 MS
EKG QRS DURATION: 248 MS
EKG QTC CALCULATION (BAZETT): 606 MS
EKG R AXIS: -57 DEGREES
EKG T AXIS: 116 DEGREES
EKG VENTRICULAR RATE: 61 BPM
EOSINOPHILS ABSOLUTE: 0.1 K/CU MM
EOSINOPHILS RELATIVE PERCENT: 0.4 % (ref 0–3)
GFR AFRICAN AMERICAN: >60 ML/MIN/1.73M2
GFR NON-AFRICAN AMERICAN: 52 ML/MIN/1.73M2
GLUCOSE BLD-MCNC: 87 MG/DL (ref 70–99)
GLUCOSE, URINE: 50 MG/DL
HCT VFR BLD CALC: 41.3 % (ref 42–52)
HEMOGLOBIN: 11.9 GM/DL (ref 13.5–18)
IMMATURE NEUTROPHIL %: 0.8 % (ref 0–0.43)
INR BLD: 1.43 INDEX
KETONES, URINE: NEGATIVE MG/DL
LEUKOCYTE ESTERASE, URINE: ABNORMAL
LYMPHOCYTES ABSOLUTE: 0.8 K/CU MM
LYMPHOCYTES RELATIVE PERCENT: 5.6 % (ref 24–44)
MCH RBC QN AUTO: 24.8 PG (ref 27–31)
MCHC RBC AUTO-ENTMCNC: 28.8 % (ref 32–36)
MCV RBC AUTO: 86 FL (ref 78–100)
MONOCYTES ABSOLUTE: 1.2 K/CU MM
MONOCYTES RELATIVE PERCENT: 8.6 % (ref 0–4)
NITRITE URINE, QUANTITATIVE: NEGATIVE
NUCLEATED RBC %: 0 %
PDW BLD-RTO: 14.9 % (ref 11.7–14.9)
PH, URINE: 6 (ref 5–8)
PLATELET # BLD: 318 K/CU MM (ref 140–440)
PMV BLD AUTO: 11.1 FL (ref 7.5–11.1)
POTASSIUM SERPL-SCNC: 4.5 MMOL/L (ref 3.5–5.1)
PROTEIN UA: 100 MG/DL
PROTHROMBIN TIME: 16.2 SECONDS (ref 9.12–12.5)
RBC # BLD: 4.8 M/CU MM (ref 4.6–6.2)
RBC URINE: 1433 /HPF (ref 0–3)
SEGMENTED NEUTROPHILS ABSOLUTE COUNT: 12.1 K/CU MM
SEGMENTED NEUTROPHILS RELATIVE PERCENT: 84.5 % (ref 36–66)
SODIUM BLD-SCNC: 139 MMOL/L (ref 135–145)
SPECIFIC GRAVITY UA: 1.01 (ref 1–1.03)
TOTAL IMMATURE NEUTOROPHIL: 0.12 K/CU MM
TOTAL NUCLEATED RBC: 0 K/CU MM
TOTAL PROTEIN: 6.5 GM/DL (ref 6.4–8.2)
TRICHOMONAS: ABNORMAL /HPF
UROBILINOGEN, URINE: NORMAL MG/DL (ref 0.2–1)
WBC # BLD: 14.3 K/CU MM (ref 4–10.5)
WBC UA: 712 /HPF (ref 0–2)

## 2019-05-08 PROCEDURE — 85730 THROMBOPLASTIN TIME PARTIAL: CPT

## 2019-05-08 PROCEDURE — 96365 THER/PROPH/DIAG IV INF INIT: CPT

## 2019-05-08 PROCEDURE — 74176 CT ABD & PELVIS W/O CONTRAST: CPT

## 2019-05-08 PROCEDURE — 81001 URINALYSIS AUTO W/SCOPE: CPT

## 2019-05-08 PROCEDURE — 99284 EMERGENCY DEPT VISIT MOD MDM: CPT

## 2019-05-08 PROCEDURE — 85025 COMPLETE CBC W/AUTO DIFF WBC: CPT

## 2019-05-08 PROCEDURE — 80053 COMPREHEN METABOLIC PANEL: CPT

## 2019-05-08 PROCEDURE — 6360000002 HC RX W HCPCS: Performed by: EMERGENCY MEDICINE

## 2019-05-08 PROCEDURE — 85610 PROTHROMBIN TIME: CPT

## 2019-05-08 PROCEDURE — 87086 URINE CULTURE/COLONY COUNT: CPT

## 2019-05-08 RX ORDER — CIPROFLOXACIN 500 MG/1
500 TABLET, FILM COATED ORAL 2 TIMES DAILY
Qty: 20 TABLET | Refills: 0 | Status: SHIPPED | OUTPATIENT
Start: 2019-05-08 | End: 2019-05-18

## 2019-05-08 RX ORDER — ONDANSETRON 4 MG/1
4 TABLET, ORALLY DISINTEGRATING ORAL ONCE
Status: DISCONTINUED | OUTPATIENT
Start: 2019-05-08 | End: 2019-05-09 | Stop reason: HOSPADM

## 2019-05-08 RX ORDER — CIPROFLOXACIN 2 MG/ML
400 INJECTION, SOLUTION INTRAVENOUS EVERY 12 HOURS
Status: DISCONTINUED | OUTPATIENT
Start: 2019-05-08 | End: 2019-05-09 | Stop reason: HOSPADM

## 2019-05-08 RX ORDER — ACETAMINOPHEN 500 MG
1000 TABLET ORAL ONCE
Status: DISCONTINUED | OUTPATIENT
Start: 2019-05-08 | End: 2019-05-09 | Stop reason: HOSPADM

## 2019-05-08 RX ADMIN — CIPROFLOXACIN 400 MG: 2 INJECTION, SOLUTION INTRAVENOUS at 18:28

## 2019-05-08 ASSESSMENT — ENCOUNTER SYMPTOMS
RESPIRATORY NEGATIVE: 1
GASTROINTESTINAL NEGATIVE: 1
ALLERGIC/IMMUNOLOGIC NEGATIVE: 1
EYES NEGATIVE: 1

## 2019-05-08 NOTE — ED NOTES
Son leaving to care for Mother. He requests to be contacted with news of admission or discharge.  Name Jeanne Florence [phone]     Johnny Kuo  05/08/19 2293

## 2019-05-08 NOTE — ED NOTES
Urine specimen collected by patient and sent to lab     [facility] ([place], [place]).  Adolfo  05/08/19 8188

## 2019-05-08 NOTE — ED NOTES
Sapello, type & screen and lactic acid was drawn on patient in triage     Cristhian Molina.  [facility]  05/08/19 8513

## 2019-05-08 NOTE — ED NOTES
Ct called concerning pt ordered CT, stated he is on their list and will get him as soon as they can.         Jay Schaffer RN  05/08/19 9685

## 2019-05-08 NOTE — TELEPHONE ENCOUNTER
Returned call to patients daughter advised her that per Roxanne Frias stop Eliquis for 2 days and reassess. If bleeding continues then patient needs to get back in to see Urology. She voiced understanding and thanked this CMA for me help.

## 2019-05-08 NOTE — ED NOTES
CT called again and stated they have sent someone to get him for his exam at this time.        Tamia Zaman RN  05/08/19 4339
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
## 2019-05-08 NOTE — TELEPHONE ENCOUNTER
The patient Daughter called about the patient having a procedure done to remove kidney stones and there was concerns about the patient being on his blood thinners. I spoke with TITO Fontanez about the blood thinners and she stated they weren't notified about the procedure and the daughter would have to call the urlogoist and speak with them about the blood thinner.

## 2019-05-08 NOTE — PROGRESS NOTES
[facility][1] Liaison aware of discharge on 5/7/19 & will initiated Milad Villarreal. Pt dc'd prior to Liaison speaking with pt.

## 2019-05-09 LAB
CULTURE: NORMAL
CULTURE: NORMAL
Lab: NORMAL
Lab: NORMAL
SPECIMEN: NORMAL
SPECIMEN: NORMAL

## 2019-05-09 NOTE — ED NOTES
Son Erica Davis notified of pt being discharged and will come and get pt. Dr. Branden Lynch updating Daughter Rogelio Ceja via phone after approval by pt to discuss with daughter results and information.       Kristine Estrada RN  05/08/19 5718

## 2019-05-09 NOTE — ED PROVIDER NOTES
Was called by [employer] with question about patient's prescription. Pratima Bach concerned about giving patient Cipro as he is on amiodarone and were concerned about the risk of QT prolongation. Upon review of patient's records patient was noted to have a prolonged QT on last EKG. Due to this I switched his outpatient antibiotics to Bactrim as opposed to the ciprofloxacin.       Ming Alexander MD  05/09/19 3814

## 2019-05-10 LAB
CULTURE: ABNORMAL
CULTURE: NORMAL
GRAM SMEAR: ABNORMAL
Lab: ABNORMAL
Lab: NORMAL
SPECIMEN: ABNORMAL
SPECIMEN: NORMAL

## 2019-05-11 ENCOUNTER — APPOINTMENT (OUTPATIENT)
Dept: CT IMAGING | Age: 84
End: 2019-05-11
Payer: MEDICARE

## 2019-05-11 ENCOUNTER — HOSPITAL ENCOUNTER (EMERGENCY)
Age: 84
Discharge: HOME | End: 2019-05-11
Attending: EMERGENCY MEDICINE
Payer: MEDICARE

## 2019-05-11 ENCOUNTER — APPOINTMENT (OUTPATIENT)
Dept: GENERAL RADIOLOGY | Age: 84
End: 2019-05-11
Payer: MEDICARE

## 2019-05-11 VITALS
OXYGEN SATURATION: 100 % | HEIGHT: 67 IN | BODY MASS INDEX: 24.48 KG/M2 | DIASTOLIC BLOOD PRESSURE: 78 MMHG | RESPIRATION RATE: 14 BRPM | WEIGHT: 156 LBS | HEART RATE: 61 BPM | TEMPERATURE: 97.7 F | SYSTOLIC BLOOD PRESSURE: 126 MMHG

## 2019-05-11 DIAGNOSIS — R53.1 GENERALIZED WEAKNESS: ICD-10-CM

## 2019-05-11 DIAGNOSIS — R53.83 FATIGUE, UNSPECIFIED TYPE: Primary | ICD-10-CM

## 2019-05-11 LAB
ALBUMIN SERPL-MCNC: 3.6 GM/DL (ref 3.4–5)
ALP BLD-CCNC: 93 IU/L (ref 40–129)
ALT SERPL-CCNC: 69 U/L (ref 10–40)
ANION GAP SERPL CALCULATED.3IONS-SCNC: 9 MMOL/L (ref 4–16)
AST SERPL-CCNC: 65 IU/L (ref 15–37)
BACTERIA: ABNORMAL /HPF
BASOPHILS ABSOLUTE: 0 K/CU MM
BASOPHILS RELATIVE PERCENT: 0 % (ref 0–1)
BILIRUB SERPL-MCNC: 0.6 MG/DL (ref 0–1)
BILIRUBIN URINE: NEGATIVE MG/DL
BLOOD, URINE: ABNORMAL
BUN BLDV-MCNC: 30 MG/DL (ref 6–23)
CALCIUM SERPL-MCNC: 9.1 MG/DL (ref 8.3–10.6)
CHLORIDE BLD-SCNC: 93 MMOL/L (ref 99–110)
CLARITY: CLEAR
CO2: 36 MMOL/L (ref 21–32)
COLOR: YELLOW
CREAT SERPL-MCNC: 1.3 MG/DL (ref 0.9–1.3)
DIFFERENTIAL TYPE: ABNORMAL
EOSINOPHILS ABSOLUTE: 0.1 K/CU MM
EOSINOPHILS RELATIVE PERCENT: 0.6 % (ref 0–3)
GFR AFRICAN AMERICAN: >60 ML/MIN/1.73M2
GFR NON-AFRICAN AMERICAN: 52 ML/MIN/1.73M2
GLUCOSE BLD-MCNC: 82 MG/DL (ref 70–99)
GLUCOSE, URINE: NEGATIVE MG/DL
HCT VFR BLD CALC: 40.2 % (ref 42–52)
HEMOGLOBIN: 12 GM/DL (ref 13.5–18)
IMMATURE NEUTROPHIL %: 0.5 % (ref 0–0.43)
KETONES, URINE: NEGATIVE MG/DL
LACTIC ACID, SEPSIS: 0.9 MMOL/L (ref 0.5–1.9)
LEUKOCYTE ESTERASE, URINE: ABNORMAL
LYMPHOCYTES ABSOLUTE: 0.5 K/CU MM
LYMPHOCYTES RELATIVE PERCENT: 5.8 % (ref 24–44)
MCH RBC QN AUTO: 24.5 PG (ref 27–31)
MCHC RBC AUTO-ENTMCNC: 29.9 % (ref 32–36)
MCV RBC AUTO: 82.2 FL (ref 78–100)
MONOCYTES ABSOLUTE: 0.7 K/CU MM
MONOCYTES RELATIVE PERCENT: 8.7 % (ref 0–4)
MUCUS: ABNORMAL HPF
NITRITE URINE, QUANTITATIVE: NEGATIVE
NUCLEATED RBC %: 0 %
PDW BLD-RTO: 15 % (ref 11.7–14.9)
PH, URINE: 8 (ref 5–8)
PLATELET # BLD: 272 K/CU MM (ref 140–440)
PMV BLD AUTO: 10.3 FL (ref 7.5–11.1)
POTASSIUM SERPL-SCNC: 3.8 MMOL/L (ref 3.5–5.1)
PRO-BNP: ABNORMAL PG/ML
PROTEIN UA: 30 MG/DL
RBC # BLD: 4.89 M/CU MM (ref 4.6–6.2)
RBC URINE: 58 /HPF (ref 0–3)
SEGMENTED NEUTROPHILS ABSOLUTE COUNT: 6.7 K/CU MM
SEGMENTED NEUTROPHILS RELATIVE PERCENT: 84.4 % (ref 36–66)
SODIUM BLD-SCNC: 138 MMOL/L (ref 135–145)
SPECIFIC GRAVITY UA: 1.01 (ref 1–1.03)
SQUAMOUS EPITHELIAL: <1 /HPF
TOTAL IMMATURE NEUTOROPHIL: 0.04 K/CU MM
TOTAL NUCLEATED RBC: 0 K/CU MM
TOTAL PROTEIN: 6.6 GM/DL (ref 6.4–8.2)
TRICHOMONAS: ABNORMAL /HPF
TROPONIN T: <0.01 NG/ML
UROBILINOGEN, URINE: NORMAL MG/DL (ref 0.2–1)
WBC # BLD: 7.9 K/CU MM (ref 4–10.5)
WBC UA: 3 /HPF (ref 0–2)

## 2019-05-11 PROCEDURE — 74176 CT ABD & PELVIS W/O CONTRAST: CPT

## 2019-05-11 PROCEDURE — 99284 EMERGENCY DEPT VISIT MOD MDM: CPT

## 2019-05-11 PROCEDURE — 84484 ASSAY OF TROPONIN QUANT: CPT

## 2019-05-11 PROCEDURE — 83880 ASSAY OF NATRIURETIC PEPTIDE: CPT

## 2019-05-11 PROCEDURE — 81001 URINALYSIS AUTO W/SCOPE: CPT

## 2019-05-11 PROCEDURE — 85025 COMPLETE CBC W/AUTO DIFF WBC: CPT

## 2019-05-11 PROCEDURE — 71045 X-RAY EXAM CHEST 1 VIEW: CPT

## 2019-05-11 PROCEDURE — 80053 COMPREHEN METABOLIC PANEL: CPT

## 2019-05-11 PROCEDURE — 83605 ASSAY OF LACTIC ACID: CPT

## 2019-05-11 PROCEDURE — 87040 BLOOD CULTURE FOR BACTERIA: CPT

## 2019-05-11 PROCEDURE — 93010 ELECTROCARDIOGRAM REPORT: CPT | Performed by: INTERNAL MEDICINE

## 2019-05-11 PROCEDURE — 93005 ELECTROCARDIOGRAM TRACING: CPT | Performed by: EMERGENCY MEDICINE

## 2019-05-11 NOTE — ED NOTES
Joseph Thomas is an alert and oriented 80 y.o male that presents to ED from home with his daughter due to increased fatigue, swelling and edgar appearance to both hands. Pt has bilateral radial pulses noted. Cap refill greater than 3 seconds. Pt's daughter reports he \"got down onto the floor yesterday and could not get back up due to feeling \"weak and fatigued. \" Pt denying pain currently. Pt seen here recently and admitted on 5/4/19 due to CHF and acute kidney injury. Pt has bilateral +4 pitting edema noted to both legs. Pedal pulses palpated and equal bilateral. Cap refill greater than 3 seconds. Pt denies chest pain or shortness of breath currently but does report shortness of breath on exertion. Pt currently in AV dual paced rhythm. Pt history of thoracic aortic aneurysm, arrhythmia, Bilateral bundle branch block, essential hypertension, hyperlipidemia, persistent a.fib, vertigo, and sick sinus syndrome. Pt's daughter also reports patient has history of kidney stone and ureter stent.       Percy Acuña RN  05/11/19 228 Poonam Montoya RN  05/11/19 6219

## 2019-05-11 NOTE — ED NOTES
Report received from [place], RN as this nurse was at [address] [place] [address]  05/11/19 1221
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
## 2019-05-11 NOTE — ED NOTES
Pt did not need straight cathed. Pt was able to urinate into urinal. CT tech informed this nurse that patient appeared unsteady on his feet/weak when moving from bed to CT bed. NIHSS negative.       Herbert Mack RN  05/11/19 9632

## 2019-05-11 NOTE — ED NOTES
Discharge instructions discussed along with follow up information with patient and his daughter. No further needs or questions were identified. Pt wheeled to patient's daughter's care per Doc DIEGO Herrera.       Addie Caba RN  05/11/19 9250

## 2019-05-13 ENCOUNTER — HOSPITAL ENCOUNTER (OUTPATIENT)
Age: 84
Setting detail: SPECIMEN
Discharge: HOME | End: 2019-05-13
Payer: MEDICARE

## 2019-05-13 LAB — HEMOCCULT SP1 STL QL: NEGATIVE

## 2019-05-13 PROCEDURE — G0328 FECAL BLOOD SCRN IMMUNOASSAY: HCPCS

## 2019-05-14 ENCOUNTER — TELEPHONE (OUTPATIENT)
Dept: CARDIOLOGY CLINIC | Age: 84
End: 2019-05-14

## 2019-05-14 NOTE — TELEPHONE ENCOUNTER
Attempted to call patients daughter back. No answer left message stating I was returning their call please call when available.

## 2019-05-14 NOTE — TELEPHONE ENCOUNTER
Daughter Kathy Gifford) called back states that they took patient to ED on Saturday for blood in urine again after patient restarted Eliquis. They did all sorts of tests, CT , and labs, ED doctor advised them that patient was anemic. Daughter stopped Eliquis again Monday due to patient urinated more blood. They are asking if patient can stop Eliquis until he passes stones, patient has multiple stones in gallbladder, bladder and kidneys. They would like for him to just take aspirin 81 mg daily until this passes. Advised her that we will discuss with Yue Yao and will get back with her. She voiced understanding and thanked this CMA for returning the call and helping them. Yue Yao advised.

## 2019-05-14 NOTE — TELEPHONE ENCOUNTER
Returned call to patients daughter Ursula Durant. Left message Advising her per Barbara Love that would be fine to take Aspirin and stop the Eliquis until patient clears stones. Also advised her of risk of stroke with Eliquis being stopped but patient is also having bleeding issues so just want to make them aware. Again advised that they were aware of stroke risk.  If any concerns they may call me back
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
## 2019-05-15 LAB
EKG ATRIAL RATE: 60 BPM
EKG DIAGNOSIS: NORMAL
EKG P AXIS: 91 DEGREES
EKG P-R INTERVAL: 396 MS
EKG Q-T INTERVAL: 522 MS
EKG QRS DURATION: 162 MS
EKG QTC CALCULATION (BAZETT): 522 MS
EKG R AXIS: 70 DEGREES
EKG T AXIS: -33 DEGREES
EKG VENTRICULAR RATE: 60 BPM

## 2019-05-16 ENCOUNTER — HOSPITAL ENCOUNTER (OUTPATIENT)
Age: 84
Setting detail: SPECIMEN
Discharge: HOME | End: 2019-05-16
Payer: MEDICARE

## 2019-05-16 LAB
ANION GAP SERPL CALCULATED.3IONS-SCNC: 12 MMOL/L (ref 4–16)
BASOPHILS ABSOLUTE: 0 K/CU MM
BASOPHILS RELATIVE PERCENT: 0.1 % (ref 0–1)
BUN BLDV-MCNC: 27 MG/DL (ref 6–23)
CALCIUM SERPL-MCNC: 8.3 MG/DL (ref 8.3–10.6)
CHLORIDE BLD-SCNC: 99 MMOL/L (ref 99–110)
CO2: 26 MMOL/L (ref 21–32)
CREAT SERPL-MCNC: 1.3 MG/DL (ref 0.9–1.3)
CULTURE: NORMAL
CULTURE: NORMAL
DIFFERENTIAL TYPE: ABNORMAL
EOSINOPHILS ABSOLUTE: 0.2 K/CU MM
EOSINOPHILS RELATIVE PERCENT: 2 % (ref 0–3)
GFR AFRICAN AMERICAN: >60 ML/MIN/1.73M2
GFR NON-AFRICAN AMERICAN: 52 ML/MIN/1.73M2
GLUCOSE BLD-MCNC: 77 MG/DL (ref 70–99)
HCT VFR BLD CALC: 31.9 % (ref 42–52)
HEMOGLOBIN: 9.7 GM/DL (ref 13.5–18)
IMMATURE NEUTROPHIL %: 0.5 % (ref 0–0.43)
LYMPHOCYTES ABSOLUTE: 0.7 K/CU MM
LYMPHOCYTES RELATIVE PERCENT: 9.2 % (ref 24–44)
Lab: NORMAL
Lab: NORMAL
MCH RBC QN AUTO: 25.1 PG (ref 27–31)
MCHC RBC AUTO-ENTMCNC: 30.4 % (ref 32–36)
MCV RBC AUTO: 82.6 FL (ref 78–100)
MONOCYTES ABSOLUTE: 1 K/CU MM
MONOCYTES RELATIVE PERCENT: 12.3 % (ref 0–4)
NUCLEATED RBC %: 0 %
PDW BLD-RTO: 15.9 % (ref 11.7–14.9)
PLATELET # BLD: 265 K/CU MM (ref 140–440)
PMV BLD AUTO: 11.3 FL (ref 7.5–11.1)
POTASSIUM SERPL-SCNC: 4.4 MMOL/L (ref 3.5–5.1)
RBC # BLD: 3.86 M/CU MM (ref 4.6–6.2)
SEGMENTED NEUTROPHILS ABSOLUTE COUNT: 5.9 K/CU MM
SEGMENTED NEUTROPHILS RELATIVE PERCENT: 75.9 % (ref 36–66)
SODIUM BLD-SCNC: 137 MMOL/L (ref 135–145)
SPECIMEN: NORMAL
SPECIMEN: NORMAL
TOTAL IMMATURE NEUTOROPHIL: 0.04 K/CU MM
TOTAL NUCLEATED RBC: 0 K/CU MM
WBC # BLD: 7.8 K/CU MM (ref 4–10.5)

## 2019-05-16 PROCEDURE — 80048 BASIC METABOLIC PNL TOTAL CA: CPT

## 2019-05-16 PROCEDURE — 85025 COMPLETE CBC W/AUTO DIFF WBC: CPT

## 2019-05-20 ENCOUNTER — TELEPHONE (OUTPATIENT)
Dept: CARDIOLOGY CLINIC | Age: 84
End: 2019-05-20

## 2019-05-20 ENCOUNTER — PROCEDURE VISIT (OUTPATIENT)
Dept: CARDIOLOGY CLINIC | Age: 84
End: 2019-05-20
Payer: MEDICARE

## 2019-05-20 DIAGNOSIS — I49.5 SINUS NODE DYSFUNCTION (HCC): ICD-10-CM

## 2019-05-20 DIAGNOSIS — I44.0 AV BLOCK, 1ST DEGREE: ICD-10-CM

## 2019-05-20 DIAGNOSIS — Z95.0 CARDIAC PACEMAKER IN SITU: Primary | ICD-10-CM

## 2019-05-20 PROCEDURE — 93294 REM INTERROG EVL PM/LDLS PM: CPT | Performed by: INTERNAL MEDICINE

## 2019-05-20 PROCEDURE — 93296 REM INTERROG EVL PM/IDS: CPT | Performed by: INTERNAL MEDICINE

## 2019-05-20 NOTE — LETTER
[address]  [address] [address]  Phone: [phone]  Fax: [phone]            May 20, 2019    Tama Goldmann Petrohilos  [address] [place] [id number]      Dear jT Del Valle: This is your CARELINK schedule. Please gregoria your calendar with these dates. You can do your checks anytime during the scheduled day. Since we do not do reminder calls, it will be your responsibility to perform the checks on the day it is scheduled. If you have any questions or concerns, please call and ask for Porfirio Stevens at [phone].

## 2019-05-23 ENCOUNTER — TELEPHONE (OUTPATIENT)
Dept: CARDIOLOGY CLINIC | Age: 84
End: 2019-05-23

## 2019-05-23 ENCOUNTER — APPOINTMENT (OUTPATIENT)
Dept: GENERAL RADIOLOGY | Age: 84
DRG: 292 | End: 2019-05-23
Payer: MEDICARE

## 2019-05-23 ENCOUNTER — HOSPITAL ENCOUNTER (INPATIENT)
Age: 84
LOS: 6 days | Discharge: HOME HEALTH | DRG: 292 | End: 2019-05-29
Attending: EMERGENCY MEDICINE | Admitting: INTERNAL MEDICINE
Payer: MEDICARE

## 2019-05-23 DIAGNOSIS — R79.89 ELEVATED BRAIN NATRIURETIC PEPTIDE (BNP) LEVEL: ICD-10-CM

## 2019-05-23 DIAGNOSIS — D64.9 ANEMIA, UNSPECIFIED TYPE: ICD-10-CM

## 2019-05-23 DIAGNOSIS — R06.00 DYSPNEA, UNSPECIFIED TYPE: Primary | ICD-10-CM

## 2019-05-23 DIAGNOSIS — R31.9 HEMATURIA, UNSPECIFIED TYPE: ICD-10-CM

## 2019-05-23 DIAGNOSIS — R60.9 PERIPHERAL EDEMA: ICD-10-CM

## 2019-05-23 PROBLEM — I50.43 CHF (CONGESTIVE HEART FAILURE), NYHA CLASS I, ACUTE ON CHRONIC, COMBINED (HCC): Status: ACTIVE | Noted: 2019-05-23

## 2019-05-23 LAB
ALBUMIN SERPL-MCNC: 3.7 GM/DL (ref 3.4–5)
ALP BLD-CCNC: 124 IU/L (ref 40–129)
ALT SERPL-CCNC: 69 U/L (ref 10–40)
ANION GAP SERPL CALCULATED.3IONS-SCNC: 5 MMOL/L (ref 4–16)
AST SERPL-CCNC: 54 IU/L (ref 15–37)
BACTERIA: NEGATIVE /HPF
BASOPHILS ABSOLUTE: 0 K/CU MM
BASOPHILS RELATIVE PERCENT: 0.6 % (ref 0–1)
BILIRUB SERPL-MCNC: 0.3 MG/DL (ref 0–1)
BILIRUBIN URINE: NEGATIVE MG/DL
BLOOD, URINE: ABNORMAL
BUN BLDV-MCNC: 23 MG/DL (ref 6–23)
CALCIUM SERPL-MCNC: 8.7 MG/DL (ref 8.3–10.6)
CHLORIDE BLD-SCNC: 95 MMOL/L (ref 99–110)
CLARITY: CLEAR
CO2: 36 MMOL/L (ref 21–32)
COLOR: YELLOW
CREAT SERPL-MCNC: 1 MG/DL (ref 0.9–1.3)
DIFFERENTIAL TYPE: ABNORMAL
EOSINOPHILS ABSOLUTE: 0.2 K/CU MM
EOSINOPHILS RELATIVE PERCENT: 3.9 % (ref 0–3)
GFR AFRICAN AMERICAN: >60 ML/MIN/1.73M2
GFR NON-AFRICAN AMERICAN: >60 ML/MIN/1.73M2
GLUCOSE BLD-MCNC: 93 MG/DL (ref 70–99)
GLUCOSE, URINE: NEGATIVE MG/DL
HCT VFR BLD CALC: 34.8 % (ref 42–52)
HEMOGLOBIN: 10.4 GM/DL (ref 13.5–18)
HYALINE CASTS: 5 /LPF
IMMATURE NEUTROPHIL %: 0.4 % (ref 0–0.43)
INR BLD: 1.04 INDEX
KETONES, URINE: NEGATIVE MG/DL
LEUKOCYTE ESTERASE, URINE: ABNORMAL
LYMPHOCYTES ABSOLUTE: 0.9 K/CU MM
LYMPHOCYTES RELATIVE PERCENT: 17.3 % (ref 24–44)
MCH RBC QN AUTO: 24.9 PG (ref 27–31)
MCHC RBC AUTO-ENTMCNC: 29.9 % (ref 32–36)
MCV RBC AUTO: 83.5 FL (ref 78–100)
MONOCYTES ABSOLUTE: 0.8 K/CU MM
MONOCYTES RELATIVE PERCENT: 14 % (ref 0–4)
MUCUS: ABNORMAL HPF
NITRITE URINE, QUANTITATIVE: NEGATIVE
NUCLEATED RBC %: 0 %
PDW BLD-RTO: 17.3 % (ref 11.7–14.9)
PH, URINE: 7 (ref 5–8)
PLATELET # BLD: 267 K/CU MM (ref 140–440)
PMV BLD AUTO: 9.7 FL (ref 7.5–11.1)
POTASSIUM SERPL-SCNC: 3.6 MMOL/L (ref 3.5–5.1)
PRO-BNP: ABNORMAL PG/ML
PROTEIN UA: 30 MG/DL
PROTHROMBIN TIME: 11.8 SECONDS (ref 9.12–12.5)
RBC # BLD: 4.17 M/CU MM (ref 4.6–6.2)
RBC URINE: 471 /HPF (ref 0–3)
SEGMENTED NEUTROPHILS ABSOLUTE COUNT: 3.5 K/CU MM
SEGMENTED NEUTROPHILS RELATIVE PERCENT: 63.8 % (ref 36–66)
SODIUM BLD-SCNC: 136 MMOL/L (ref 135–145)
SPECIFIC GRAVITY UA: 1 (ref 1–1.03)
TOTAL IMMATURE NEUTOROPHIL: 0.02 K/CU MM
TOTAL NUCLEATED RBC: 0 K/CU MM
TOTAL PROTEIN: 6.4 GM/DL (ref 6.4–8.2)
TRICHOMONAS: ABNORMAL /HPF
TROPONIN T: <0.01 NG/ML
UROBILINOGEN, URINE: NORMAL MG/DL (ref 0.2–1)
WBC # BLD: 5.4 K/CU MM (ref 4–10.5)
WBC UA: 17 /HPF (ref 0–2)

## 2019-05-23 PROCEDURE — 96374 THER/PROPH/DIAG INJ IV PUSH: CPT

## 2019-05-23 PROCEDURE — 87186 SC STD MICRODIL/AGAR DIL: CPT

## 2019-05-23 PROCEDURE — 85025 COMPLETE CBC W/AUTO DIFF WBC: CPT

## 2019-05-23 PROCEDURE — 99285 EMERGENCY DEPT VISIT HI MDM: CPT

## 2019-05-23 PROCEDURE — 85610 PROTHROMBIN TIME: CPT

## 2019-05-23 PROCEDURE — 83880 ASSAY OF NATRIURETIC PEPTIDE: CPT

## 2019-05-23 PROCEDURE — 81001 URINALYSIS AUTO W/SCOPE: CPT

## 2019-05-23 PROCEDURE — 87086 URINE CULTURE/COLONY COUNT: CPT

## 2019-05-23 PROCEDURE — 6360000002 HC RX W HCPCS: Performed by: PHYSICIAN ASSISTANT

## 2019-05-23 PROCEDURE — 1200000000 HC SEMI PRIVATE

## 2019-05-23 PROCEDURE — 80053 COMPREHEN METABOLIC PANEL: CPT

## 2019-05-23 PROCEDURE — 84484 ASSAY OF TROPONIN QUANT: CPT

## 2019-05-23 PROCEDURE — 87088 URINE BACTERIA CULTURE: CPT

## 2019-05-23 PROCEDURE — 93005 ELECTROCARDIOGRAM TRACING: CPT | Performed by: EMERGENCY MEDICINE

## 2019-05-23 PROCEDURE — 71045 X-RAY EXAM CHEST 1 VIEW: CPT

## 2019-05-23 RX ORDER — FUROSEMIDE 10 MG/ML
40 INJECTION INTRAMUSCULAR; INTRAVENOUS 2 TIMES DAILY
Status: DISCONTINUED | OUTPATIENT
Start: 2019-05-24 | End: 2019-05-27

## 2019-05-23 RX ORDER — OMEGA-3-ACID ETHYL ESTERS 1 G/1
1000 CAPSULE, LIQUID FILLED ORAL DAILY
Status: DISCONTINUED | OUTPATIENT
Start: 2019-05-24 | End: 2019-05-29 | Stop reason: HOSPADM

## 2019-05-23 RX ORDER — ONDANSETRON 2 MG/ML
4 INJECTION INTRAMUSCULAR; INTRAVENOUS EVERY 6 HOURS PRN
Status: DISCONTINUED | OUTPATIENT
Start: 2019-05-23 | End: 2019-05-29 | Stop reason: HOSPADM

## 2019-05-23 RX ORDER — SODIUM CHLORIDE 0.9 % (FLUSH) 0.9 %
10 SYRINGE (ML) INJECTION PRN
Status: DISCONTINUED | OUTPATIENT
Start: 2019-05-23 | End: 2019-05-29 | Stop reason: HOSPADM

## 2019-05-23 RX ORDER — POTASSIUM CHLORIDE 750 MG/1
20 TABLET, FILM COATED, EXTENDED RELEASE ORAL 2 TIMES DAILY WITH MEALS
Status: DISCONTINUED | OUTPATIENT
Start: 2019-05-24 | End: 2019-05-26 | Stop reason: SDUPTHER

## 2019-05-23 RX ORDER — FUROSEMIDE 10 MG/ML
60 INJECTION INTRAMUSCULAR; INTRAVENOUS ONCE
Status: COMPLETED | OUTPATIENT
Start: 2019-05-23 | End: 2019-05-23

## 2019-05-23 RX ORDER — SODIUM CHLORIDE 0.9 % (FLUSH) 0.9 %
10 SYRINGE (ML) INJECTION EVERY 12 HOURS SCHEDULED
Status: DISCONTINUED | OUTPATIENT
Start: 2019-05-24 | End: 2019-05-29 | Stop reason: HOSPADM

## 2019-05-23 RX ORDER — M-VIT,TX,IRON,MINS/CALC/FOLIC 27MG-0.4MG
1 TABLET ORAL DAILY
Status: DISCONTINUED | OUTPATIENT
Start: 2019-05-24 | End: 2019-05-29 | Stop reason: HOSPADM

## 2019-05-23 RX ORDER — AMIODARONE HYDROCHLORIDE 200 MG/1
200 TABLET ORAL DAILY
Status: DISCONTINUED | OUTPATIENT
Start: 2019-05-24 | End: 2019-05-29 | Stop reason: HOSPADM

## 2019-05-23 RX ORDER — RIVASTIGMINE 4.6 MG/24H
1 PATCH, EXTENDED RELEASE TRANSDERMAL EVERY MORNING
Status: DISCONTINUED | OUTPATIENT
Start: 2019-05-24 | End: 2019-05-29 | Stop reason: HOSPADM

## 2019-05-23 RX ORDER — AMIODARONE HYDROCHLORIDE 200 MG/1
200 TABLET ORAL DAILY
Qty: 30 TABLET | Refills: 3 | Status: SHIPPED | OUTPATIENT
Start: 2019-05-23

## 2019-05-23 RX ORDER — DILTIAZEM HYDROCHLORIDE 120 MG/1
120 CAPSULE, COATED, EXTENDED RELEASE ORAL DAILY
Status: DISCONTINUED | OUTPATIENT
Start: 2019-05-24 | End: 2019-05-25

## 2019-05-23 RX ORDER — ATORVASTATIN CALCIUM 20 MG/1
20 TABLET, FILM COATED ORAL NIGHTLY
Status: DISCONTINUED | OUTPATIENT
Start: 2019-05-24 | End: 2019-05-29 | Stop reason: HOSPADM

## 2019-05-23 RX ADMIN — FUROSEMIDE 60 MG: 10 INJECTION, SOLUTION INTRAVENOUS at 20:00

## 2019-05-23 NOTE — TELEPHONE ENCOUNTER
Cardiac clearance request received from Dr. Julia Nevarez for Cystoscopy, Ureteroscopy,  Retrograde pyelogram, stone manipulation with Holium Laser, Stent, Litholapaxy Pending.  FAX # [id number]

## 2019-05-23 NOTE — ED PROVIDER NOTES
I independently examined and evaluated Luis Daniel Guillaume. In brief their history revealed  a 80 y.o. male who presents with shortness of breath. Onset was several days. The duration has been fluctuating, worse with exertion. Patient was recently admitted 5/4/2019 for CHF exacerbation, SABIHA, UTI. Patient known to have multiple stones status post to have cystoscopy after cardiac clearance however this has yet to be done. Patient has been taking Lasix however followed up in cardiology office and outpatient basis this past week and did have his dosing increased and has been taking 40 mg twice daily for several days. Reports 5-6 pound weight gain over the last 24 hours has noticed increased swelling to bilateral lower extremities and the left hand over the last 24 hours. He denies any chest pain. Their focused exam revealed alert oriented male resting in bed in no distress normocephalic atraumatic sclerae clear airway normal lungs have mild rales no distress heart regular rate rhythm 2+ pulses throughout abdomen soft nontender bowel sounds present. 5 out of 5 strength throughout skin has rashes chronic. Edema to lower legs. Cranial nerves intact    ED course: Patient seen with PA please see his note. Patient here with shortness of breath has known CHF. I seen patient briefly for urinary symptoms. May have a urinary tract infection. Seen by urology recently. BNP is elevated has pleural effusions will admit to the hospital.  Otherwise patient stable no distress. 12 lead EKG per my interpretation:  Paced 60  Axis is   Normal  QTc is  504  There is specific T wave changes appreciated. T wave inversion V2 V3, V4  There is no specific ST wave changes appreciated. Prior EKG to compare with was not available       All diagnostic, treatment, and disposition decisions were made by myself in conjunction with the Advanced Practice Provider.     For all further details of the patient's emergency department visit, please see the Advanced Practice Provider's documentation.         Divya Bernardochsarkis, DO  05/23/19 2018

## 2019-05-23 NOTE — LETTER
[facility]  [facility] [place] and [address] [place] [address]. [address] [facility], [address] [facility],[address]  Phone: [phone]    Fax [phone]                  Shannon Rivera MD, Demi Sarmiento MD, Kayla Rey MD, MD Daniel Luo MD Fritz Bevels, MD Dalbert Furbish, APRN-CNP          Satya Morales, APRN-CNP  Lala Gaitan, APRN-CNP    Cardiac Risk Assessment for Surgery    5/24/2019    Surgery Date: Pending  Surgery: Cystoscopy, ureteroscopy, retrograde pyelogram, stone manipulation with holium laser, stent litholapaxy  Anesthesia Type: General  Fax Number: [phone]    To: Dr. Luna Erickson  From : Tana Casanova    Patient Name: Gricelda Meier     YOB: 1933    Gricelda Meier was evaluated from a cardiac standpoint for his procedure surgery and based on his history, diagnosis, and recent cardiac testing, he is considered a moderate risk candidate for any fela-operative cardiac complications. Antiplatelet therapy can be held prior to the surgery at the discretion of the surgeon to be resumed as soon as possible if held. Please call with any further questions.     Respectfully,     Keshav Bucio MD

## 2019-05-23 NOTE — ED PROVIDER NOTES
History of cardioversion 04/17/2018    History of chest x-ray 02/27/2017    Enlargement of the aorta knob which may represent a thoracic aortic aneurysm. Further evaluation w/ a contrast enhanced CT of the chest recommended. no evidence of acute pulmonary process. 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 Recommend thyroid US.  History of echocardiogram 03/27/2017    TTE EF 55%, LV Normal Size, borderline LVH concentric, Normal LV systolic function, transmittal doppler flow pttern suggest impaired LV relaxation Mild aortic stenosis, mild aortic regurgitation.  History of EKG 02/27/2017    Time 12:03 AM, HR 75, A fib, Axis normal, Intervals normal, P waves normal, St-T Segments: nonspecific ST segment changes to the anterior lateral leads, QRS RBBB,  No significant Q waves, no ectopy. A-fib w/RBBB w/nonspecific ST segment change EKG.  History of EKG 02/27/2017    Time 2:58AM: HR 59, NSR, Axis normal, Intervals normal, P waves normal, ST-T seg: nonspecific ST segment changes, QRS RBBB, no significant Q waves, no ectopy. Sinus bradycardia w/ RBBB nonspecific ST Segment changes EKG    History of Holter monitoring 11/08/2017    monitored 48 hours: Patient noted to have multiple PAC and PVCs. Risk of atrial fibrillation present given previous episode Recommend antiarrhythmic therapy.  History of stress test 03/27/2017    NM Stress test: EF 54%, Abnormal study, evidence of mild ischemia in mild inferior regions. post stress LV is enlarged in size.  Post-stress LV function is normal.     Hx of transesophageal echocardiography (SIMONE) for monitoring 04/17/2018    EF45-50% mild TR, mild-mod MR, mod-severe AS    Hyperlipemia     Hyperlipidemia     Hypertension     Nonspecific abnormal electrocardiogram (ECG) (EKG)     Persistent atrial fibrillation (HCC)     Sick sinus syndrome (HCC)     Vertigo      Past Surgical History:   Procedure Laterality Date    CYSTOSCOPY INSERTION / REMOVAL STENT / STONE Left 5/6/2019    CYSTOSCOPY RETROGRADE PYELOGRAM STENT INSERTION, DIAGNOSTIC CYSTOSCOPY performed by Alexis Lopez MD at [facility] Left 03/29/2018    Dual Chamber [employer]onic Aura XT DR AMI Kincaid       CURRENT MEDICATIONS    Current Outpatient Rx   Medication Sig Dispense Refill    amiodarone (CORDARONE) 200 MG tablet Take 1 tablet by mouth daily 30 tablet 3    furosemide (LASIX) 40 MG tablet Take 1 tablet by mouth daily 60 tablet 3    rivastigmine (EXELON) 4.6 MG/24HR Place 1 patch onto the skin every morning      CARTIA  MG extended release capsule Take 120 mg by mouth daily      Cholecalciferol (VITAMIN D3) 5000 units TABS Take 5,000 Units by mouth every morning      apixaban (ELIQUIS) 2.5 MG TABS tablet Take 1 tablet by mouth 2 times daily 60 tablet 3    metoprolol tartrate (LOPRESSOR) 25 MG tablet Take 25 mg by mouth 2 times daily      Omega-3 Fatty Acids (FISH OIL) 1200 MG CAPS Take 1,200 mg by mouth daily       potassium chloride (KLOR-CON M) 20 MEQ TBCR extended release tablet Take 20 mEq by mouth 2 times daily (with meals)      CALCIUM-VITAMIN D PO Take by mouth      Multiple Vitamins-Minerals (THERAPEUTIC MULTIVITAMIN-MINERALS) tablet Take 1 tablet by mouth daily      atorvastatin (LIPITOR) 20 MG tablet Take 20 mg by mouth nightly          ALLERGIES    Allergies   Allergen Reactions    Biaxin [Clarithromycin]     Cephalexin     Viagra [Sildenafil Citrate] Nausea Only and Other (See Comments)     Dizziness and BP dropped       SOCIAL & FAMILY HISTORY    Social History     Socioeconomic History    Marital status:      Spouse name: None    Number of children: None    Years of education: None    Highest education level: None   Occupational History    None   Social Needs    Financial resource strain: None    Food insecurity:     Worry: None     Inability: None    Transportation needs:     Medical: None     Non-medical: None   Tobacco Use    Smoking status: Never Smoker    Smokeless tobacco: Never Used   Substance and Sexual Activity    Alcohol use: Yes     Alcohol/week: 0.6 oz     Types: 1 Cans of beer per week     Comment: daily with dinner     Drug use: No    Sexual activity: Never   Lifestyle    Physical activity:     Days per week: None     Minutes per session: None    Stress: None   Relationships    Social connections:     Talks on phone: None     Gets together: None     Attends Alevism service: None     Active member of club or organization: None     Attends meetings of clubs or organizations: None     Relationship status: None    Intimate partner violence:     Fear of current or ex partner: None     Emotionally abused: None     Physically abused: None     Forced sexual activity: None   Other Topics Concern    None   Social History Narrative    None     History reviewed. No pertinent family history. PHYSICAL EXAM    VITAL SIGNS: BP (!) 137/56   Pulse 62   Temp 98.4 °F (36.9 °C) (Oral)   Resp 18   Ht 5' 7\" (1.702 m)   Wt 171 lb (77.6 kg)   SpO2 99%   BMI 26.78 kg/m²    Constitutional:  Well developed, well nourished, no acute distress   HENT:  Atraumatic, moist mucus membranes  Neck/Lymphatics: supple, no JVD, no swollen nodes  Respiratory:   Nonlabored breathing. No retractions and speaking full sentences. There are Rales bilateral lower lung fields  Cardiovascular:  Regular rate, 2/6 systolic murmur  GI:  Soft, nontender, normal bowel sounds  Musculoskeletal:   no acute deformities  3+ pitting bilateral pedal ankle and lower extremity edema.   1+ pitting edema to the left hand  Integument:  Skin is warm and dry, no petechiae Neurologic:  Alert & oriented, no slurred speech  Psych: Pleasant affect, no hallucinations      EKG    See attending note    LABS:     Urinalysis (Final result)   Component (Lab Inquiry)   Collection Time Result Time COLOR U CLARITY U Glucose, Urine BILI UR KETONES U SPEC GRAV BLOOD U pH, Urine Protein, UA Urobilinogen, Urine   05/23/19 19:25:00 05/23/19 19:39:13 YELLOW CLEAR NEGATIVE NEGATIVE NEGATIVE 1.005 LARGE 7.0 30 NORMAL   Previous Results   05/11/19 13:15:00 05/11/19 13:48:15 YELLOW CLEAR NEGATIVE NEGATIVE NEGATIVE 1.006 LARGE 8.0 30 NORMAL   05/08/19 14:40:00 05/08/19 15:21:04 RED HAZY 50 NEGATIVE NEGATIVE 1.014 LARGE 6.0 100 NORMAL   05/04/19 15:45:00 05/04/19 16:07:50 KAREN SLIGHTLY CLOUDY NEGATIVE NEGATIVE NEGATIVE 1.015 LARGE 6.0 100 NORMAL   03/31/19 00:46:00 03/31/19 01:05:21 RED HAZY NEGATIVE NEGATIVE NEGATIVE 1.012 LARGE 7.0 100 NORMAL          Collection Time Result Time Nitrite Urine, Quantitative LEUKOCYTES, UR RBC U WBC UA BACTERIA MUCUS Trichomonas, UA Hyaline Casts, UA Squam Epithel, UA YEAST   05/23/19 19:25:00 05/23/19 19:39:13 NEGATIVE LARGE 471 17 NEGATIVE RARE NONE SEEN 5     Previous Results   05/11/19 13:15:00 05/11/19 13:48:15 NEGATIVE MODERATE 58 3 RARE RARE NONE SEEN  <1    05/08/19 14:40:00 05/08/19 15:21:04 NEGATIVE SMALL 1,433 712 NEGATIVE  NONE SEEN      05/04/19 15:45:00 05/04/19 16:07:50 NEGATIVE MODERATE 2,022 110 FEW FEW NONE SEEN 10  MANY   03/31/19 00:46:00 03/31/19 01:05:21 NEGATIVE SMALL 11,515 92 NEGATIVE MANY NONE SEEN             Collection Time Result Time Trans Epithel, UA   05/23/19 19:25:00 05/23/19 19:39:13    Previous Results   05/11/19 13:15:00 05/11/19 13:48:15    05/08/19 14:40:00 05/08/19 15:21:04    05/04/19 15:45:00 05/04/19 16:07:50 1   03/31/19 00:46:00 03/31/19 01:05:21           Final result                 CBC auto diff (Final result)   Component (Lab Inquiry)   Collection Time Result Time WBC RBC HGB HCT MCV MCH MCHC RDW PLT MPV   05/23/19 18:16:00 05/23/19 18:31:52 5.4 4.17 10.4 34.8 83.5 24.9 29.9 17.3 267 9.7   Previous Results   05/16/19 15:25:00 05/16/19 18:13:07 7.8 3.86 9.7 31.9 82.6 25.1 30.4 15.9 265 11.3   05/11/19 12:08:00 05/11/19 12:21:07 7.9 4.89 12.0 40.2 82.2 24.5 29.9 15.0 272 10.3   05/08/19 14:50:00 05/08/19 15:11:03 14.3 4.80 11.9 41.3 86.0 24.8 28.8 14.9 318 11.1   05/07/19 02:43:00 05/07/19 03:41:12 8.9 4.77 11.8 40.9 85.7 24.7 28.9 14.6 279 11.3   05/06/19 02:48:00 05/06/19 03:50:15 11.6 4.65 11.6 38.4 82.6 24.9 30.2 14.8 255 11.2          Collection Time Result Time DIFF TYPE SEGS PCT LYMPHO PCT MONO PCT EOS BASOS PCT SEGS ABS LYMPHS ABS MONOS ABS EOS ABS   05/23/19 18:16:00 05/23/19 18:31:52 AUTOMATED DIFFERENTIAL 63.8 17.3 14.0 3.9 0.6 3.5 0.9 0.8 0.2   Previous Results   05/16/19 15:25:00 05/16/19 18:13:07 AUTOMATED DIFFERENTIAL 75.9 9.2 12.3 2.0 0.1 5.9 0.7 1.0 0.2   05/11/19 12:08:00 05/11/19 12:21:07 AUTOMATED DIFFERENTIAL 84.4 5.8 8.7 0.6 0.0 6.7 0.5 0.7 0.1   05/08/19 14:50:00 05/08/19 15:11:03 AUTOMATED DIFFERENTIAL 84.5 5.6 8.6 0.4 0.1 12.1 0.8 1.2 0.1   05/07/19 02:43:00 05/07/19 03:41:12             05/06/19 02:48:00 05/06/19 03:50:15                    Collection Time Result Time BASOS ABS Nucleated RBC % Total Nucleated RBC Total Immature Neutrophil Immature Neutrophil %   05/23/19 18:16:00 05/23/19 18:31:52 0.0 0.0 0.0 0.02 0.4   Previous Results   05/16/19 15:25:00 05/16/19 18:13:07 0.0 0.0 0.0 0.04 0.5   05/11/19 12:08:00 05/11/19 12:21:07 0.0 0.0 0.0 0.04 0.5   05/08/19 14:50:00 05/08/19 15:11:03 0.0 0.0 0.0 0.12 0.8   05/07/19 02:43:00 05/07/19 03:41:12        05/06/19 02:48:00 05/06/19 03:50:15               Final result                 CMP (Final result)   Component (Lab Inquiry)   Collection Time Result Time NA K CL CO2 BUN CREATININE GLUCOSE CALCIUM Alb PROTEIN   05/23/19 18:16:00 05/23/19 18:47:27 136 3.6 95 36 23 1.0 93 8.7 3.7 6.4   Previous Results   05/16/19 15:25:00 05/16/19 18:32:13 137 4.4 99 26 27 1.3 77 05/23/19 18:16:00 05/23/19 18:31:38 11.8  Protime seconds can va. .. 1.04  (NOTE)   Therapeutic Ra. .. Previous Results   05/08/19 14:50:00 05/08/19 15:20:50 16.2Protime seconds can va. .. 1.43(NOTE)   Therapeutic Ra. ..    05/07/19 02:43:00 05/07/19 03:55:07 16.3Protime seconds can va. .. 1.41(NOTE)   Therapeutic Ra. .. 35.3EFFECTIVE 07/07/2017   . ..   05/04/19 14:36:00 05/04/19 15:11:17 25.5Protime seconds can va. .. 2.25(NOTE)   Therapeutic Ra. ..    03/31/19 00:09:00 03/31/19 00:25:01 15.3Protime seconds can va. .. 1.35(NOTE)   Therapeutic Ra. .. 39.8EFFECTIVE 07/07/2017   . ..   04/17/18 10:49:00 04/17/18 11:30:00 19.1 1.68 38.4          Final result             RADIOLOGY/PROCEDURES    CXR:         XR CHEST PORTABLE (Final result)   Result time 05/23/19 17:53:20   Final result by Demetri Teran MD (05/23/19 17:53:20)                Impression:    No acute cardiopulmonary disease. Stable ectatic, elongated and tortuous thoracic aorta. Narrative:    EXAMINATION:  ONE XRAY VIEW OF THE CHEST    5/23/2019 5:44 pm    COMPARISON:  05/11/2019    HISTORY:  ORDERING SYSTEM PROVIDED HISTORY: shortness of breath  TECHNOLOGIST PROVIDED HISTORY:  Reason for exam:->shortness of breath  Ordering Physician Provided Reason for Exam: shortness of breath  Acuity: Unknown  Type of Exam: Initial  Additional signs and symptoms: none  Relevant Medical/Surgical History: arrythmia, bradycardia, SOB    FINDINGS:  The thoracic aorta is diffusely ectatic, elongated and markedly tortuous,  unchanged.  The heart is mildly enlarged and unchanged.  There is a stable  dual lead left subclavian cardiac pacemaker.  The lungs are clear.  No  pneumothorax or pleural fluid.  No acute bone finding.                    ED COURSE & MEDICAL DECISION MAKING       Vital signs and nursing notes reviewed during ED course.   Patient care and presentation staffed with supervising MD.  Patient seen by supervising MD today- see his/her note for details of the encounter. All pertinent Lab data and radiographic results reviewed with patient at bedside. The patient and/or the family were informed of the results of any tests/labs/imaging, the treatment plan, and time was allotted to answer questions. Vitals:    05/23/19 1711   BP: (!) 137/56   Pulse: 62   Resp: 18   Temp: 98.4 °F (36.9 °C)   TempSrc: Oral   SpO2: 99%   Weight: 171 lb (77.6 kg)   Height: 5' 7\" (1.702 m)       Differential Diagnosis: Acute Coronary Syndrome, Congestive Heart Failure, Myocardial Infarction, Pulmonary Embolus, Pneumonia, Pneumothorax, other      Clinical  IMPRESSION    1. Dyspnea, unspecified type    2. Peripheral edema    3. Elevated brain natriuretic peptide (BNP) level    4. Hematuria, unspecified type    5. Anemia, unspecified type        Patient presents as above. Workup was initiated as above out of triage. Patient's oxygen saturation holding on room air at this time however he does have Rales to lower lung fields and does have peripheral edema. BNP above 93000. Pt following with urology for known stones hematuria. Awaiting cardiac clearance which has yet to be obtained for cystoscopy. Not currently on ABX for urine and will be sent to [facility]. Pt denying UA symptoms. Discussed with pt plan to admit for fluid control and he is in agreement. Spoke to hospitalist service who graciously agreed to admit. Comment: Please note this report has been produced using speech recognition software and may contain errors related to that system including errors in grammar, punctuation, and spelling, as well as words and phrases that may be inappropriate. If there are any questions or concerns please feel free to contact the dictating provider for clarification.                         Luís Boyle PA-C  05/24/19 [id number]

## 2019-05-23 NOTE — ED TRIAGE NOTES
Pt presents to the ED today with c/o 5-6 pound weight gain, shortness of breath, and bilateral lower extremity edema. Pt was recently an inpatient for CHF.

## 2019-05-23 NOTE — TELEPHONE ENCOUNTER
The pt daughter called to have his   Corarone 200 mg refilled     [facility] in [facility] .

## 2019-05-24 LAB
ALBUMIN SERPL-MCNC: 3.2 GM/DL (ref 3.4–5)
ALP BLD-CCNC: 110 IU/L (ref 40–128)
ALT SERPL-CCNC: 58 U/L (ref 10–40)
ANION GAP SERPL CALCULATED.3IONS-SCNC: 10 MMOL/L (ref 4–16)
AST SERPL-CCNC: 43 IU/L (ref 15–37)
BILIRUB SERPL-MCNC: 0.4 MG/DL (ref 0–1)
BUN BLDV-MCNC: 19 MG/DL (ref 6–23)
CALCIUM SERPL-MCNC: 8.3 MG/DL (ref 8.3–10.6)
CHLORIDE BLD-SCNC: 100 MMOL/L (ref 99–110)
CHOLESTEROL: 137 MG/DL
CO2: 33 MMOL/L (ref 21–32)
CREAT SERPL-MCNC: 0.9 MG/DL (ref 0.9–1.3)
FERRITIN: 55 NG/ML (ref 30–400)
GFR AFRICAN AMERICAN: >60 ML/MIN/1.73M2
GFR NON-AFRICAN AMERICAN: >60 ML/MIN/1.73M2
GLUCOSE BLD-MCNC: 81 MG/DL (ref 70–99)
HCT VFR BLD CALC: 33.3 % (ref 42–52)
HDLC SERPL-MCNC: 47 MG/DL
HEMOGLOBIN: 9.9 GM/DL (ref 13.5–18)
IRON: 27 UG/DL (ref 59–158)
LDL CHOLESTEROL DIRECT: 86 MG/DL
MAGNESIUM: 2 MG/DL (ref 1.8–2.4)
MCH RBC QN AUTO: 24.3 PG (ref 27–31)
MCHC RBC AUTO-ENTMCNC: 29.7 % (ref 32–36)
MCV RBC AUTO: 81.8 FL (ref 78–100)
PCT TRANSFERRIN: 9 % (ref 10–44)
PDW BLD-RTO: 17.2 % (ref 11.7–14.9)
PHOSPHORUS: 3.3 MG/DL (ref 2.5–4.9)
PLATELET # BLD: 262 K/CU MM (ref 140–440)
PMV BLD AUTO: 9.4 FL (ref 7.5–11.1)
POTASSIUM SERPL-SCNC: 3.5 MMOL/L (ref 3.5–5.1)
POTASSIUM SERPL-SCNC: ABNORMAL MMOL/L (ref 3.5–5.1)
PROCALCITONIN: 0.13
RBC # BLD: 4.07 M/CU MM (ref 4.6–6.2)
SODIUM BLD-SCNC: 143 MMOL/L (ref 135–145)
T4 FREE: 1.22 NG/DL (ref 0.9–1.8)
TOTAL IRON BINDING CAPACITY: 301 UG/DL (ref 250–450)
TOTAL PROTEIN: 4.9 GM/DL (ref 6.4–8.2)
TRIGL SERPL-MCNC: 72 MG/DL
TROPONIN T: 0.01 NG/ML
TROPONIN T: <0.01 NG/ML
TSH HIGH SENSITIVITY: 3.61 UIU/ML (ref 0.27–4.2)
UNSATURATED IRON BINDING CAPACITY: 274 UG/DL (ref 110–370)
WBC # BLD: 5.3 K/CU MM (ref 4–10.5)

## 2019-05-24 PROCEDURE — 82728 ASSAY OF FERRITIN: CPT

## 2019-05-24 PROCEDURE — 87086 URINE CULTURE/COLONY COUNT: CPT

## 2019-05-24 PROCEDURE — 83735 ASSAY OF MAGNESIUM: CPT

## 2019-05-24 PROCEDURE — 2580000003 HC RX 258: Performed by: INTERNAL MEDICINE

## 2019-05-24 PROCEDURE — 1200000000 HC SEMI PRIVATE

## 2019-05-24 PROCEDURE — 84145 PROCALCITONIN (PCT): CPT

## 2019-05-24 PROCEDURE — 80053 COMPREHEN METABOLIC PANEL: CPT

## 2019-05-24 PROCEDURE — 6370000000 HC RX 637 (ALT 250 FOR IP): Performed by: INTERNAL MEDICINE

## 2019-05-24 PROCEDURE — 85027 COMPLETE CBC AUTOMATED: CPT

## 2019-05-24 PROCEDURE — 6360000002 HC RX W HCPCS: Performed by: INTERNAL MEDICINE

## 2019-05-24 PROCEDURE — 84484 ASSAY OF TROPONIN QUANT: CPT

## 2019-05-24 PROCEDURE — 84443 ASSAY THYROID STIM HORMONE: CPT

## 2019-05-24 PROCEDURE — 83540 ASSAY OF IRON: CPT

## 2019-05-24 PROCEDURE — 84439 ASSAY OF FREE THYROXINE: CPT

## 2019-05-24 PROCEDURE — 83721 ASSAY OF BLOOD LIPOPROTEIN: CPT

## 2019-05-24 PROCEDURE — 93010 ELECTROCARDIOGRAM REPORT: CPT | Performed by: INTERNAL MEDICINE

## 2019-05-24 PROCEDURE — 36415 COLL VENOUS BLD VENIPUNCTURE: CPT

## 2019-05-24 PROCEDURE — 99221 1ST HOSP IP/OBS SF/LOW 40: CPT | Performed by: INTERNAL MEDICINE

## 2019-05-24 PROCEDURE — 6370000000 HC RX 637 (ALT 250 FOR IP): Performed by: HOSPITALIST

## 2019-05-24 PROCEDURE — 84100 ASSAY OF PHOSPHORUS: CPT

## 2019-05-24 PROCEDURE — 84132 ASSAY OF SERUM POTASSIUM: CPT

## 2019-05-24 PROCEDURE — 80061 LIPID PANEL: CPT

## 2019-05-24 PROCEDURE — 80048 BASIC METABOLIC PNL TOTAL CA: CPT

## 2019-05-24 PROCEDURE — 83550 IRON BINDING TEST: CPT

## 2019-05-24 RX ORDER — POTASSIUM CHLORIDE 29.8 MG/ML
20 INJECTION INTRAVENOUS ONCE
Status: DISCONTINUED | OUTPATIENT
Start: 2019-05-24 | End: 2019-05-24 | Stop reason: CLARIF

## 2019-05-24 RX ORDER — POTASSIUM CHLORIDE 7.45 MG/ML
10 INJECTION INTRAVENOUS
Status: DISPENSED | OUTPATIENT
Start: 2019-05-24 | End: 2019-05-24

## 2019-05-24 RX ADMIN — POTASSIUM CHLORIDE 20 MEQ: 750 TABLET, FILM COATED, EXTENDED RELEASE ORAL at 01:45

## 2019-05-24 RX ADMIN — AMIODARONE HYDROCHLORIDE 200 MG: 200 TABLET ORAL at 01:45

## 2019-05-24 RX ADMIN — POTASSIUM CHLORIDE 20 MEQ: 750 TABLET, FILM COATED, EXTENDED RELEASE ORAL at 11:00

## 2019-05-24 RX ADMIN — FUROSEMIDE 40 MG: 10 INJECTION, SOLUTION INTRAMUSCULAR; INTRAVENOUS at 10:59

## 2019-05-24 RX ADMIN — METOPROLOL TARTRATE 25 MG: 25 TABLET ORAL at 01:45

## 2019-05-24 RX ADMIN — POTASSIUM CHLORIDE 20 MEQ: 750 TABLET, FILM COATED, EXTENDED RELEASE ORAL at 18:33

## 2019-05-24 RX ADMIN — MULTIPLE VITAMINS W/ MINERALS TAB 1 TABLET: TAB at 11:00

## 2019-05-24 RX ADMIN — FUROSEMIDE 40 MG: 10 INJECTION, SOLUTION INTRAMUSCULAR; INTRAVENOUS at 18:33

## 2019-05-24 RX ADMIN — APIXABAN 2.5 MG: 2.5 TABLET, FILM COATED ORAL at 11:00

## 2019-05-24 RX ADMIN — SODIUM CHLORIDE, PRESERVATIVE FREE 10 ML: 5 INJECTION INTRAVENOUS at 11:01

## 2019-05-24 RX ADMIN — OMEGA-3-ACID ETHYL ESTERS 1000 MG: 1 CAPSULE, LIQUID FILLED ORAL at 11:00

## 2019-05-24 RX ADMIN — METOPROLOL TARTRATE 25 MG: 25 TABLET ORAL at 11:00

## 2019-05-24 RX ADMIN — DILTIAZEM HYDROCHLORIDE 120 MG: 120 CAPSULE, COATED, EXTENDED RELEASE ORAL at 11:00

## 2019-05-24 RX ADMIN — METOPROLOL TARTRATE 25 MG: 25 TABLET ORAL at 21:26

## 2019-05-24 RX ADMIN — APIXABAN 2.5 MG: 2.5 TABLET, FILM COATED ORAL at 21:27

## 2019-05-24 RX ADMIN — ATORVASTATIN CALCIUM 20 MG: 20 TABLET, FILM COATED ORAL at 01:45

## 2019-05-24 RX ADMIN — ATORVASTATIN CALCIUM 20 MG: 20 TABLET, FILM COATED ORAL at 21:26

## 2019-05-24 RX ADMIN — Medication 5000 UNITS: at 10:59

## 2019-05-24 RX ADMIN — SODIUM CHLORIDE, PRESERVATIVE FREE 10 ML: 5 INJECTION INTRAVENOUS at 21:28

## 2019-05-24 ASSESSMENT — PAIN SCALES - GENERAL
PAINLEVEL_OUTOF10: 0
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
## 2019-05-24 NOTE — PROGRESS NOTES
Acute heart failure  -Heart failure could be due to aortic valve insufficiency  -Patient with moderate LVH and probable chronic diastolic heart failure   -Patient currently with 6 pound weight gain in 24 hours as well as significant lower extremity edema  -We'll start IV Lasix  -Consult cardiology  -He has been awaiting intervention on kidney stones. Question if possible urologic issue precipitated this episode of CHF    Aortic stenosis/aortic insufficiency  -Is currently being evaluated by Dr. Neha Saini in [place] for possible intervention    Kidney stones status post recent left ureteral stent  -He also has bladder stones  -He needs further intervention per urology and is awaiting clearance  -UA with 17 WBC put this could be the results of presence of stent  -Urine culture pending  -Noted procalcitonin level is not very impressive, will watch him off abx for now    History of urinary retention due to bladder stones per daughter - will check a bladder scans    Thoracic aortic aneurysm  -This involves the ascending thoracic aorta hour at measured 4.8 cm in April 2018.   It also involves the descending thoracic aorta  -No reports of chest pain at this time    Paroxysmal atrial fibrillation status post dual-chamber pacemaker  -He has been off Eliquis    Hypertension    Hyperlipidemia

## 2019-05-24 NOTE — ED NOTES
Pt ambulate in hallway with assistance of daughter. Pt has slow steady gate.      Maximilian Chiu RN  05/23/19 2036

## 2019-05-24 NOTE — ED NOTES
Phone report called to Antonio Jauregui RN for transfer to unit and assumption of care.      Malreni Lewis RN  05/23/19 0179

## 2019-05-24 NOTE — H&P
presents with swelling    Daughter is present at the bedside giving history. The patient is seeing a cardiologist in [place]. She called his office at the patient has been getting edema in his leg. He is also had a weight pain. The cardiology clinic was concerned about CHF and recommended an ER visit. While in the ER he had some more bradycardia  when I was seeing him. It was not severe enough to cause symptoms. The daughter indicates that the patient had 3+ edema in feet, legs, left hand. He has also    He was recently in the hospital and had a thoracentesis and saw urology. He has a history of recent sepsis was treated with IV antibiotics here. He was dismissed 5/7. His daughter indicates that he has kidney stones and they've been a major issue sometimes they called he was also in the infection). Advance Care Planning:  full code     Surrogate Decision Maker:  the daughter was here at the bedside     No fever or chills    Pain level:  0 out of 10 while I was seeing him    10-14 point ROS reviewed negative, unless as noted above  -no bleeding or vomiting reported    Objective: Intake/Output Summary (Last 24 hours) at 5/23/2019 2000  Last data filed at 5/23/2019 1900  Gross per 24 hour   Intake --   Output 650 ml   Net -650 ml      Vitals:   Vitals:    05/23/19 1900   BP: 138/68   Pulse: 60   Resp: 18   Temp:    SpO2: 100%     Physical Exam:      Physical Exam   Constitutional: He is oriented to person, place, and time. He appears well-developed. Body mass index is 24.33 kg/m². HENT:   Head: Normocephalic. Eyes: Pupils are equal, round, and reactive to light. EOM are normal.   Cardiovascular: Normal rate. Exam reveals no friction rub. Pulmonary/Chest: Effort normal. No respiratory distress. Abdominal: Soft. He exhibits no distension. There is no tenderness. Musculoskeletal: He exhibits edema. Neurological: He is alert and oriented to person, place, and time.  No cranial fibrillation present given previous episode Recommend antiarrhythmic therapy.  History of stress test 03/27/2017    NM Stress test: EF 54%, Abnormal study, evidence of mild ischemia in mild inferior regions. post stress LV is enlarged in size. Post-stress LV function is normal.     Hx of transesophageal echocardiography (SIMONE) for monitoring 04/17/2018    EF45-50% mild TR, mild-mod MR, mod-severe AS    Hyperlipemia     Hyperlipidemia     Hypertension     Nonspecific abnormal electrocardiogram (ECG) (EKG)     Persistent atrial fibrillation (HCC)     Sick sinus syndrome (HCC)     Vertigo      Patient Active Problem List    Diagnosis Date Noted    Persistent atrial fibrillation (HCC)      Priority: High    S/P placement of cardiac pacemaker 03/29/2018     Priority: High    Hypervolemia 05/04/2019    Ascending aortic aneurysm ([address] 04/18/2018       PSHx:  has a past surgical history that includes Pacemaker insertion (Left, 03/29/2018) and CYSTOSCOPY INSERTION / REMOVAL STENT / STONE (Left, 5/6/2019). Allergies:    Allergies   Allergen Reactions    Biaxin [Clarithromycin]     Cephalexin     Viagra [Sildenafil Citrate] Nausea Only and Other (See Comments)     Dizziness and BP dropped     Home Medications:      [facility]   Home Medication Instructions ERU:[id number]    Printed on:05/23/19 2000   Medication Information                      amiodarone (CORDARONE) 200 MG tablet  Take 1 tablet by mouth daily             apixaban (ELIQUIS) 2.5 MG TABS tablet  Take 1 tablet by mouth 2 times daily             atorvastatin (LIPITOR) 20 MG tablet  Take 20 mg by mouth nightly              CALCIUM-VITAMIN D PO  Take by mouth             CARTIA  MG extended release capsule  Take 120 mg by mouth daily             Cholecalciferol (VITAMIN D3) 5000 units TABS  Take 5,000 Units by mouth every morning             furosemide (LASIX) 40 MG tablet  Take 1 tablet by mouth daily             metoprolol tartrate (LOPRESSOR) 25 MG tablet  Take 25 mg by mouth 2 times daily             Multiple Vitamins-Minerals (THERAPEUTIC MULTIVITAMIN-MINERALS) tablet  Take 1 tablet by mouth daily             Omega-3 Fatty Acids (FISH OIL) 1200 MG CAPS  Take 1,200 mg by mouth daily              potassium chloride (KLOR-CON M) 20 MEQ TBCR extended release tablet  Take 20 mEq by mouth 2 times daily (with meals)             rivastigmine (EXELON) 4.6 MG/24HR  Place 1 patch onto the skin every morning               FHx: lung cancer  SHx:   Social History     Socioeconomic History    Marital status:      Spouse name: None    Number of children: None    Years of education: None    Highest education level: None   Occupational History    None   Social Needs    Financial resource strain: None    Food insecurity:     Worry: None     Inability: None    Transportation needs:     Medical: None     Non-medical: None   Tobacco Use    Smoking status: Never Smoker    Smokeless tobacco: Never Used   Substance and Sexual Activity    Alcohol use:  Yes     Alcohol/week: 0.6 oz     Types: 1 Cans of beer per week     Comment: daily with dinner     Drug use: No    Sexual activity: Never   Lifestyle    Physical activity:     Days per week: None     Minutes per session: None    Stress: None   Relationships    Social connections:     Talks on phone: None     Gets together: None     Attends Protestant service: None     Active member of club or organization: None     Attends meetings of clubs or organizations: None     Relationship status: None    Intimate partner violence:     Fear of current or ex partner: None     Emotionally abused: None     Physically abused: None     Forced sexual activity: None   Other Topics Concern    None   Social History Narrative    None       I spoke with the ER provider, and we decided to admit the patient    Hemoglobin is 10.4    Electronically signed by Yocasta Upton MD on 5/23/2019 at 8:00 PM

## 2019-05-24 NOTE — TELEPHONE ENCOUNTER
Patient is cleared for surgery/procedure at a Moderate risk per Dr. Alena Bullock.   Letter completed and faxed

## 2019-05-24 NOTE — ED NOTES
[id number] assigned/clean @ [facility] notified.      Fransisca Edward  05/23/19 4255

## 2019-05-25 LAB
ALBUMIN SERPL-MCNC: 3.3 GM/DL (ref 3.4–5)
ALP BLD-CCNC: 116 IU/L (ref 40–128)
ALT SERPL-CCNC: 56 U/L (ref 10–40)
ANION GAP SERPL CALCULATED.3IONS-SCNC: 9 MMOL/L (ref 4–16)
AST SERPL-CCNC: 43 IU/L (ref 15–37)
BILIRUB SERPL-MCNC: 0.4 MG/DL (ref 0–1)
BUN BLDV-MCNC: 17 MG/DL (ref 6–23)
CALCIUM SERPL-MCNC: 8.6 MG/DL (ref 8.3–10.6)
CHLORIDE BLD-SCNC: 97 MMOL/L (ref 99–110)
CO2: 36 MMOL/L (ref 21–32)
CREAT SERPL-MCNC: 0.9 MG/DL (ref 0.9–1.3)
GFR AFRICAN AMERICAN: >60 ML/MIN/1.73M2
GFR NON-AFRICAN AMERICAN: >60 ML/MIN/1.73M2
GLUCOSE BLD-MCNC: 85 MG/DL (ref 70–99)
HCT VFR BLD CALC: 34.8 % (ref 42–52)
HEMOGLOBIN: 10.3 GM/DL (ref 13.5–18)
MAGNESIUM: 1.9 MG/DL (ref 1.8–2.4)
MCH RBC QN AUTO: 24.4 PG (ref 27–31)
MCHC RBC AUTO-ENTMCNC: 29.6 % (ref 32–36)
MCV RBC AUTO: 82.5 FL (ref 78–100)
PDW BLD-RTO: 17.2 % (ref 11.7–14.9)
PHOSPHORUS: 3 MG/DL (ref 2.5–4.9)
PLATELET # BLD: 291 K/CU MM (ref 140–440)
PMV BLD AUTO: 9.7 FL (ref 7.5–11.1)
POTASSIUM SERPL-SCNC: 3.3 MMOL/L (ref 3.5–5.1)
PRO-BNP: 9818 PG/ML
RBC # BLD: 4.22 M/CU MM (ref 4.6–6.2)
SODIUM BLD-SCNC: 142 MMOL/L (ref 135–145)
TOTAL PROTEIN: 5.3 GM/DL (ref 6.4–8.2)
TROPONIN T: 0.01 NG/ML
WBC # BLD: 5.8 K/CU MM (ref 4–10.5)

## 2019-05-25 PROCEDURE — 6370000000 HC RX 637 (ALT 250 FOR IP): Performed by: INTERNAL MEDICINE

## 2019-05-25 PROCEDURE — 83880 ASSAY OF NATRIURETIC PEPTIDE: CPT

## 2019-05-25 PROCEDURE — 97116 GAIT TRAINING THERAPY: CPT

## 2019-05-25 PROCEDURE — 97161 PT EVAL LOW COMPLEX 20 MIN: CPT

## 2019-05-25 PROCEDURE — 2580000003 HC RX 258: Performed by: INTERNAL MEDICINE

## 2019-05-25 PROCEDURE — 85027 COMPLETE CBC AUTOMATED: CPT

## 2019-05-25 PROCEDURE — 6360000002 HC RX W HCPCS: Performed by: INTERNAL MEDICINE

## 2019-05-25 PROCEDURE — 36415 COLL VENOUS BLD VENIPUNCTURE: CPT

## 2019-05-25 PROCEDURE — 84484 ASSAY OF TROPONIN QUANT: CPT

## 2019-05-25 PROCEDURE — 83735 ASSAY OF MAGNESIUM: CPT

## 2019-05-25 PROCEDURE — 80053 COMPREHEN METABOLIC PANEL: CPT

## 2019-05-25 PROCEDURE — 99233 SBSQ HOSP IP/OBS HIGH 50: CPT | Performed by: INTERNAL MEDICINE

## 2019-05-25 PROCEDURE — 1200000000 HC SEMI PRIVATE

## 2019-05-25 PROCEDURE — 80048 BASIC METABOLIC PNL TOTAL CA: CPT

## 2019-05-25 PROCEDURE — 84100 ASSAY OF PHOSPHORUS: CPT

## 2019-05-25 PROCEDURE — 51798 US URINE CAPACITY MEASURE: CPT

## 2019-05-25 RX ORDER — METOLAZONE 2.5 MG/1
2.5 TABLET ORAL DAILY
Status: DISCONTINUED | OUTPATIENT
Start: 2019-05-25 | End: 2019-05-28

## 2019-05-25 RX ORDER — METOPROLOL TARTRATE 50 MG/1
50 TABLET, FILM COATED ORAL 2 TIMES DAILY
Status: DISCONTINUED | OUTPATIENT
Start: 2019-05-25 | End: 2019-05-29 | Stop reason: HOSPADM

## 2019-05-25 RX ORDER — POTASSIUM CHLORIDE 20 MEQ/1
40 TABLET, EXTENDED RELEASE ORAL ONCE
Status: COMPLETED | OUTPATIENT
Start: 2019-05-25 | End: 2019-05-25

## 2019-05-25 RX ADMIN — MULTIPLE VITAMINS W/ MINERALS TAB 1 TABLET: TAB at 09:16

## 2019-05-25 RX ADMIN — POTASSIUM CHLORIDE 20 MEQ: 750 TABLET, FILM COATED, EXTENDED RELEASE ORAL at 09:16

## 2019-05-25 RX ADMIN — SODIUM CHLORIDE, PRESERVATIVE FREE 10 ML: 5 INJECTION INTRAVENOUS at 18:09

## 2019-05-25 RX ADMIN — FUROSEMIDE 40 MG: 10 INJECTION, SOLUTION INTRAMUSCULAR; INTRAVENOUS at 09:35

## 2019-05-25 RX ADMIN — FUROSEMIDE 40 MG: 10 INJECTION, SOLUTION INTRAMUSCULAR; INTRAVENOUS at 18:09

## 2019-05-25 RX ADMIN — SODIUM CHLORIDE, PRESERVATIVE FREE 10 ML: 5 INJECTION INTRAVENOUS at 09:35

## 2019-05-25 RX ADMIN — AMIODARONE HYDROCHLORIDE 200 MG: 200 TABLET ORAL at 09:16

## 2019-05-25 RX ADMIN — POTASSIUM CHLORIDE 40 MEQ: 20 TABLET, EXTENDED RELEASE ORAL at 16:44

## 2019-05-25 RX ADMIN — SODIUM CHLORIDE, PRESERVATIVE FREE 10 ML: 5 INJECTION INTRAVENOUS at 21:08

## 2019-05-25 RX ADMIN — ATORVASTATIN CALCIUM 20 MG: 20 TABLET, FILM COATED ORAL at 21:05

## 2019-05-25 RX ADMIN — DILTIAZEM HYDROCHLORIDE 120 MG: 120 CAPSULE, COATED, EXTENDED RELEASE ORAL at 09:16

## 2019-05-25 RX ADMIN — METOPROLOL TARTRATE 25 MG: 25 TABLET ORAL at 09:16

## 2019-05-25 RX ADMIN — POTASSIUM CHLORIDE 20 MEQ: 750 TABLET, FILM COATED, EXTENDED RELEASE ORAL at 18:08

## 2019-05-25 RX ADMIN — Medication 5000 UNITS: at 09:16

## 2019-05-25 RX ADMIN — OMEGA-3-ACID ETHYL ESTERS 1000 MG: 1 CAPSULE, LIQUID FILLED ORAL at 09:16

## 2019-05-25 RX ADMIN — METOLAZONE 2.5 MG: 2.5 TABLET ORAL at 16:44

## 2019-05-25 RX ADMIN — METOPROLOL TARTRATE 50 MG: 50 TABLET ORAL at 21:05

## 2019-05-25 ASSESSMENT — PAIN SCALES - GENERAL
PAINLEVEL_OUTOF10: 0
PAINLEVEL_OUTOF10: 0

## 2019-05-25 NOTE — PROGRESS NOTES
Pt's daughter alerted me to increased hematuria. She mentioned that the hematuria seems to increase when pt is given Eliquis and that he had previously been taken off Eliquis.

## 2019-05-25 NOTE — PLAN OF CARE
Problem: Falls - Risk of:  Goal: Will remain free from falls  Description  Will remain free from falls  5/25/2019 0039 by Ijeoma Montano RN  Outcome: Ongoing  5/24/2019 1628 by Juan Miner RN  Outcome: Ongoing  Goal: Absence of physical injury  Description  Absence of physical injury  5/25/2019 0039 by Ijeoma Montano RN  Outcome: Ongoing  5/24/2019 1628 by Juan Miner RN  Outcome: Ongoing     Problem: Risk for Impaired Skin Integrity  Goal: Tissue integrity - skin and mucous membranes  Description  Structural intactness and normal physiological function of skin and  mucous membranes.   5/25/2019 0039 by Ijeoma Montano RN  Outcome: Ongoing  5/24/2019 1628 by Juan Miner RN  Outcome: Ongoing

## 2019-05-25 NOTE — PROGRESS NOTES
diltiazem  120 mg Oral Daily    vitamin D  5,000 Units Oral QAM    metoprolol tartrate  25 mg Oral BID    therapeutic multivitamin-minerals  1 tablet Oral Daily    omega-3 acid ethyl esters  1,000 mg Oral Daily    potassium chloride  20 mEq Oral BID WC    rivastigmine  1 patch Transdermal QAM    sodium chloride flush  10 mL Intravenous 2 times per day    furosemide  40 mg Intravenous BID       sodium chloride flush, magnesium hydroxide, ondansetron    Lab Data:  CBC:   Recent Labs     05/23/19 1816 05/24/19  0632 05/25/19  0345   WBC 5.4 5.3 5.8   HGB 10.4* 9.9* 10.3*   HCT 34.8* 33.3* 34.8*   MCV 83.5 81.8 82.5    262 291     BMP:   Recent Labs     05/23/19 1816 05/24/19 0632 05/24/19 1424 05/25/19  0345    143  --  142   K 3.6 2.9  K CALLED TO TENNILLE LOPEZ @ [phone] [facility] MLT  RESULTS READ BACK  * 3.5 3.3*   CL 95* 100  --  97*   CO2 36* 33*  --  36*   PHOS  --  3.3  --  3.0   BUN 23 19  --  17   CREATININE 1.0 0.9  --  0.9     PT/INR:   Recent Labs     05/23/19 1816   PROTIME 11.8   INR 1.04     BNP:    Recent Labs     05/23/19 1816 05/25/19  0345   PROBNP 12,385* 9,818*     TROPONIN:   Recent Labs     05/24/19 0632 05/24/19 1424 05/25/19  0345   TROPONINT 0.013* <0.010 0.011*        ECHO :   echocardiogram    Assessment:  80 y. o.year old who is admitted for  Chief Complaint   Patient presents with    Shortness of Breath     recently an inpatient for CHF; 5-6 pound weight gain since yesterday    Leg Swelling     bilateral    , active issues as noted below:  Impression:  Active Problems:    CHF (congestive heart failure), NYHA class I, acute on chronic, combined ([facility] [address]    Dyspnea  Resolved Problems:    * No resolved hospital problems. *            All labs, medications and tests reviewed by myself , continue all other medications of all above medical condition listed as is except for changes mentioned above.     Thank you very much for consult , please call with questions.     Gómez Bryson MD 5/25/2019 2:09 PM

## 2019-05-25 NOTE — PROGRESS NOTES
Physical Therapy    Facility/Department: [address] [place] [address]  Initial Assessment    NAME: He Humphreys  : 10/5/1933  MRN: [id number]    Date of Service: 2019    Discharge Recommendations:  Home with Outpatient PT. PT Equipment Recommendations  Equipment Needed: No    Assessment   Body structures, Functions, Activity limitations: Decreased balance  Prognosis: Good  Decision Making: Low Complexity  No Skilled PT: At baseline function  REQUIRES PT FOLLOW UP: No  Activity Tolerance  Activity Tolerance: Patient Tolerated treatment well       Patient Diagnosis(es): The primary encounter diagnosis was Dyspnea, unspecified type. Diagnoses of Peripheral edema, Elevated brain natriuretic peptide (BNP) level, Hematuria, unspecified type, and Anemia, unspecified type were also pertinent to this visit. has a past medical history of ACTA2-related familial thoracic aortic aneurysm, Arrhythmia, Atrial fibrillation (HCC), BBBB (bilateral bundle branch block), Bradycardia, CHF (congestive heart failure) ([address] Essential hypertension, malignant, History of cardioversion, History of chest x-ray, History of CT scan, History of echocardiogram, History of EKG, History of EKG, History of Holter monitoring, History of stress test, Hx of transesophageal echocardiography (SIMONE) for monitoring, Hyperlipemia, Hyperlipidemia, Hypertension, Nonspecific abnormal electrocardiogram (ECG) (EKG), Persistent atrial fibrillation (HCC), Sick sinus syndrome ([address] and Vertigo. has a past surgical history that includes Pacemaker insertion (Left, 2018) and CYSTOSCOPY INSERTION / REMOVAL STENT / STONE (Left, 2019). Restrictions   None.   Vision/Hearing  Vision: Impaired  Vision Exceptions: Wears glasses at all times  Hearing: Within functional limits     Subjective  General  Chart Reviewed: Yes  Patient assessed for rehabilitation services?: Yes  Family / Caregiver Present: No  Diagnosis: falls  Follows Commands: Within Functional Limits  Pain Screening  Patient Currently in Pain: No  Pain Assessment  Pain Level: 0  Vital Signs  Patient Currently in Pain: No       Orientation  Orientation  Overall Orientation Status: Within Normal Limits  Social/Functional History  Social/Functional History  Lives With: Spouse  Type of Home: House  Home Layout: One level  Home Access: Stairs to enter without rails(single step to enter home)  Bathroom Shower/Tub: Walk-in shower  Bathroom Toilet: Standard  Bathroom Equipment: Grab bars in shower, Built-in shower seat  Bathroom Accessibility: Accessible  Home Equipment: [employer] From: Family  ADL Assistance: Independent  Homemaking Assistance: Needs assistance  Homemaking Responsibilities: No  Ambulation Assistance: Independent  Transfer Assistance: Independent  Active : Yes  Mode of Transportation: Car  Occupation: Retired  Objective     Observation/Palpation  Posture: Good    AROM RLE (degrees)  RLE AROM: WNL  AROM LLE (degrees)  LLE AROM : WNL  Strength RLE  Strength RLE: WFL  Comment: 5/5 for all major muscles, no pain indicated  Strength LLE  Strength LLE: WFL  Comment: 5/5 for all major muscles, no pain indicated  Motor Control  Gross Motor?: WNL  Sensation  Overall Sensation Status: WNL  Bed mobility  Supine to Sit: Independent  Sit to Supine: Independent  Transfers  Sit to Stand: Independent  Stand to sit:  Independent  Ambulation  Ambulation?: Yes  WB Status: full  Ambulation 1  Surface: level tile  Device: No Device  Assistance: Independent  Gait Deviations: None     Balance  Posture: Good  Sitting - Static: Good  Sitting - Dynamic: Good  Standing - Static: Good  Standing - Dynamic: Good        Plan   Safety Devices  Type of devices: Call light within reach    Therapy Time   Individual Concurrent Group Co-treatment   Time In 0955         Time Out 1014         Minutes [address] [facility],  [id number], PT   [place], [place]

## 2019-05-25 NOTE — PROGRESS NOTES
05/25/2019    GFRAA >60 05/25/2019    LABGLOM >60 05/25/2019    GLUCOSE 85 05/25/2019    PROT 5.3 05/25/2019    LABALBU 3.3 05/25/2019    CALCIUM 8.6 05/25/2019    BILITOT 0.4 05/25/2019    ALKPHOS 116 05/25/2019    AST 43 05/25/2019    ALT 56 05/25/2019     Recent Labs     05/24/19  0632 05/24/19  1424 05/25/19  0345   TROPONINT 0.013* <0.010 0.011*     Lab Results   Component Value Date    TSHHS 3.610 05/24/2019           metoprolol tartrate  50 mg Oral BID    metolazone  2.5 mg Oral Daily    apixaban  2.5 mg Oral BID    amiodarone  200 mg Oral Daily    atorvastatin  20 mg Oral Nightly    vitamin D  5,000 Units Oral QAM    therapeutic multivitamin-minerals  1 tablet Oral Daily    omega-3 acid ethyl esters  1,000 mg Oral Daily    potassium chloride  20 mEq Oral BID WC    rivastigmine  1 patch Transdermal QAM    sodium chloride flush  10 mL Intravenous 2 times per day    furosemide  40 mg Intravenous BID         Assessment:       Patient Active Problem List    Diagnosis Date Noted    Persistent atrial fibrillation (HCC)      Priority: High    S/P placement of cardiac pacemaker 03/29/2018     Priority: High    Dyspnea     CHF (congestive heart failure), NYHA class I, acute on chronic, combined ([facility] [address] 05/23/2019    Hypervolemia 05/04/2019    Ascending aortic aneurysm ([facility] [address] 04/18/2018       Plan:     Problems being addressed this admission:     Acute HFpEF / AI  Kidney stone with recent ureteral stent  PAF on ac  HTN  Hypokalemia    Is being seen by cardiology and on iv lasix bid. Now in sinus and on DOAC. B/p is 105/51 and continue to monitor. Replace potassium. Needs cardiac clearance for his urology procedure. Consultants:  Cardiologist    General Orders:  Repeat basic labs again in am.  I have explained to the patient and discussed with him/her the treatment plan.   Christa Galdamez MD, [address]

## 2019-05-26 LAB
ANION GAP SERPL CALCULATED.3IONS-SCNC: 6 MMOL/L (ref 4–16)
BASE EXCESS MIXED: ABNORMAL (ref 0–1.2)
BUN BLDV-MCNC: 19 MG/DL (ref 6–23)
CALCIUM SERPL-MCNC: 8.5 MG/DL (ref 8.3–10.6)
CARBON MONOXIDE, BLOOD: 2.3 % (ref 0–5)
CHLORIDE BLD-SCNC: 92 MMOL/L (ref 99–110)
CO2 CONTENT: 48.5 MMOL/L (ref 19–24)
CO2: 43 MMOL/L (ref 21–32)
COMMENT: ABNORMAL
CREAT SERPL-MCNC: 1 MG/DL (ref 0.9–1.3)
GFR AFRICAN AMERICAN: >60 ML/MIN/1.73M2
GFR NON-AFRICAN AMERICAN: >60 ML/MIN/1.73M2
GLUCOSE BLD-MCNC: 91 MG/DL (ref 70–99)
HCO3 ARTERIAL: 46.8 MMOL/L (ref 18–23)
HCT VFR BLD CALC: 33.1 % (ref 42–52)
HEMOGLOBIN: 10 GM/DL (ref 13.5–18)
MAGNESIUM: 1.9 MG/DL (ref 1.8–2.4)
MAGNESIUM: 2 MG/DL (ref 1.8–2.4)
MCH RBC QN AUTO: 24.9 PG (ref 27–31)
MCHC RBC AUTO-ENTMCNC: 30.2 % (ref 32–36)
MCV RBC AUTO: 82.3 FL (ref 78–100)
METHEMOGLOBIN ARTERIAL: 1.3 %
O2 SATURATION: 94.2 % (ref 96–97)
PCO2 ARTERIAL: 56 MMHG (ref 32–45)
PDW BLD-RTO: 17.1 % (ref 11.7–14.9)
PH BLOOD: 7.53 (ref 7.34–7.45)
PLATELET # BLD: 274 K/CU MM (ref 140–440)
PMV BLD AUTO: 9.5 FL (ref 7.5–11.1)
PO2 ARTERIAL: 70 MMHG (ref 75–100)
POTASSIUM SERPL-SCNC: ABNORMAL MMOL/L (ref 3.5–5.1)
RBC # BLD: 4.02 M/CU MM (ref 4.6–6.2)
SODIUM BLD-SCNC: 141 MMOL/L (ref 135–145)
WBC # BLD: 5.1 K/CU MM (ref 4–10.5)

## 2019-05-26 PROCEDURE — 6370000000 HC RX 637 (ALT 250 FOR IP): Performed by: INTERNAL MEDICINE

## 2019-05-26 PROCEDURE — 94761 N-INVAS EAR/PLS OXIMETRY MLT: CPT

## 2019-05-26 PROCEDURE — 2580000003 HC RX 258: Performed by: INTERNAL MEDICINE

## 2019-05-26 PROCEDURE — 99232 SBSQ HOSP IP/OBS MODERATE 35: CPT | Performed by: INTERNAL MEDICINE

## 2019-05-26 PROCEDURE — 1200000000 HC SEMI PRIVATE

## 2019-05-26 PROCEDURE — 36415 COLL VENOUS BLD VENIPUNCTURE: CPT

## 2019-05-26 PROCEDURE — 80048 BASIC METABOLIC PNL TOTAL CA: CPT

## 2019-05-26 PROCEDURE — 6360000002 HC RX W HCPCS: Performed by: INTERNAL MEDICINE

## 2019-05-26 PROCEDURE — 82803 BLOOD GASES ANY COMBINATION: CPT

## 2019-05-26 PROCEDURE — 85027 COMPLETE CBC AUTOMATED: CPT

## 2019-05-26 PROCEDURE — 36600 WITHDRAWAL OF ARTERIAL BLOOD: CPT

## 2019-05-26 PROCEDURE — 83735 ASSAY OF MAGNESIUM: CPT

## 2019-05-26 RX ORDER — POTASSIUM CHLORIDE 7.45 MG/ML
10 INJECTION INTRAVENOUS PRN
Status: DISCONTINUED | OUTPATIENT
Start: 2019-05-26 | End: 2019-05-29 | Stop reason: HOSPADM

## 2019-05-26 RX ORDER — POTASSIUM CHLORIDE 20 MEQ/1
20 TABLET, EXTENDED RELEASE ORAL 2 TIMES DAILY WITH MEALS
Status: DISCONTINUED | OUTPATIENT
Start: 2019-05-26 | End: 2019-05-28

## 2019-05-26 RX ORDER — LIDOCAINE HYDROCHLORIDE 20 MG/ML
JELLY TOPICAL PRN
Status: DISCONTINUED | OUTPATIENT
Start: 2019-05-26 | End: 2019-05-29 | Stop reason: HOSPADM

## 2019-05-26 RX ORDER — LIDOCAINE HYDROCHLORIDE 20 MG/ML
JELLY TOPICAL PRN
Status: DISCONTINUED | OUTPATIENT
Start: 2019-05-26 | End: 2019-05-26

## 2019-05-26 RX ORDER — LIDOCAINE HYDROCHLORIDE 20 MG/ML
JELLY TOPICAL PRN
Status: DISCONTINUED | OUTPATIENT
Start: 2019-05-26 | End: 2019-05-26 | Stop reason: CLARIF

## 2019-05-26 RX ORDER — POLYETHYLENE GLYCOL 3350 17 G/17G
17 POWDER, FOR SOLUTION ORAL DAILY
Status: DISCONTINUED | OUTPATIENT
Start: 2019-05-26 | End: 2019-05-26

## 2019-05-26 RX ORDER — POLYETHYLENE GLYCOL 3350 17 G/17G
17 POWDER, FOR SOLUTION ORAL 2 TIMES DAILY
Status: DISCONTINUED | OUTPATIENT
Start: 2019-05-26 | End: 2019-05-29 | Stop reason: HOSPADM

## 2019-05-26 RX ADMIN — SODIUM CHLORIDE, PRESERVATIVE FREE 10 ML: 5 INJECTION INTRAVENOUS at 20:31

## 2019-05-26 RX ADMIN — OMEGA-3-ACID ETHYL ESTERS 1000 MG: 1 CAPSULE, LIQUID FILLED ORAL at 09:22

## 2019-05-26 RX ADMIN — METOLAZONE 2.5 MG: 2.5 TABLET ORAL at 09:22

## 2019-05-26 RX ADMIN — SODIUM CHLORIDE, PRESERVATIVE FREE 10 ML: 5 INJECTION INTRAVENOUS at 17:38

## 2019-05-26 RX ADMIN — MULTIPLE VITAMINS W/ MINERALS TAB 1 TABLET: TAB at 09:22

## 2019-05-26 RX ADMIN — FUROSEMIDE 40 MG: 10 INJECTION, SOLUTION INTRAMUSCULAR; INTRAVENOUS at 09:21

## 2019-05-26 RX ADMIN — POTASSIUM CHLORIDE 10 MEQ: 10 INJECTION, SOLUTION INTRAVENOUS at 18:45

## 2019-05-26 RX ADMIN — POTASSIUM CHLORIDE 10 MEQ: 10 INJECTION, SOLUTION INTRAVENOUS at 15:23

## 2019-05-26 RX ADMIN — POTASSIUM CHLORIDE 20 MEQ: 750 TABLET, FILM COATED, EXTENDED RELEASE ORAL at 09:22

## 2019-05-26 RX ADMIN — FUROSEMIDE 40 MG: 10 INJECTION, SOLUTION INTRAMUSCULAR; INTRAVENOUS at 17:38

## 2019-05-26 RX ADMIN — POLYETHYLENE GLYCOL (3350) 17 G: 17 POWDER, FOR SOLUTION ORAL at 22:00

## 2019-05-26 RX ADMIN — METOPROLOL TARTRATE 50 MG: 50 TABLET ORAL at 09:22

## 2019-05-26 RX ADMIN — METOPROLOL TARTRATE 50 MG: 50 TABLET ORAL at 20:29

## 2019-05-26 RX ADMIN — SODIUM CHLORIDE, PRESERVATIVE FREE 10 ML: 5 INJECTION INTRAVENOUS at 09:21

## 2019-05-26 RX ADMIN — POLYETHYLENE GLYCOL (3350) 17 G: 17 POWDER, FOR SOLUTION ORAL at 14:49

## 2019-05-26 RX ADMIN — ATORVASTATIN CALCIUM 20 MG: 20 TABLET, FILM COATED ORAL at 20:29

## 2019-05-26 RX ADMIN — AMIODARONE HYDROCHLORIDE 200 MG: 200 TABLET ORAL at 20:29

## 2019-05-26 RX ADMIN — POTASSIUM CHLORIDE 20 MEQ: 20 TABLET, EXTENDED RELEASE ORAL at 17:35

## 2019-05-26 NOTE — CONSULTS
[facility] [address]   Consult Note  [facility] 1 2 3 4 5    Date: 2019   Patient: Sima Rosales   : 10/5/1933   DOA: 2019   MRN: [id number]   ROOM#: [id number]     Reason for Consult:  Urinary residual  Requesting Physician:  Dr. Melania Mckinney:  Alyson medley    History Obtained From:  patient, electronic medical record    HISTORY OF PRESENT ILLNESS:                The patient is a 80 y.o. male with significant past medical history of left ureteral stent insertion for ureteral calculus who presented with acute heart failure. Pt was noted last night to be having bothersome frequency upon initiation of IV lasix. PVR scanned over 400 ml. Nurses were unable to place monet. Pt notes chronic slow stream, hesitancy, straining, and nocturia. Past Medical History:        Diagnosis Date    ACTA2-related familial thoracic aortic aneurysm     Arrhythmia     Atrial fibrillation (HCC)     BBBB (bilateral bundle branch block)     Bradycardia     CHF (congestive heart failure) (HCC)     Essential hypertension, malignant     History of cardioversion 2018    History of chest x-ray 2017    Enlargement of the aorta knob which may represent a thoracic aortic aneurysm. Further evaluation w/ a contrast enhanced CT of the chest recommended. no evidence of acute pulmonary process.  History of CT scan 2017    CT Angio Chest: no evidence of pulmonary embolism, ascending thoracic aorta aneurysm measuring 4.8 cm, descending thoracic aoric aneurysm 4.1 cm, bilateral nephrllthiasis, R renal cyst, cholelithiasis, cardiomegaly, low attenuated lesion is noted w/in L lobe of thyroid gland containng Calcification. Recommend thyroid US.      History of echocardiogram 2017    TTE EF 55%, LV Normal Size, borderline LVH concentric, Normal LV systolic function, transmittal doppler flow pttern suggest impaired LV relaxation Mild aortic stenosis, mild aortic regurgitation.  History of EKG 02/27/2017    Time 12:03 AM, HR 75, A fib, Axis normal, Intervals normal, P waves normal, St-T Segments: nonspecific ST segment changes to the anterior lateral leads, QRS RBBB,  No significant Q waves, no ectopy. A-fib w/RBBB w/nonspecific ST segment change EKG.  History of EKG 02/27/2017    Time 2:58AM: HR 59, NSR, Axis normal, Intervals normal, P waves normal, ST-T seg: nonspecific ST segment changes, QRS RBBB, no significant Q waves, no ectopy. Sinus bradycardia w/ RBBB nonspecific ST Segment changes EKG    History of Holter monitoring 11/08/2017    monitored 48 hours: Patient noted to have multiple PAC and PVCs. Risk of atrial fibrillation present given previous episode Recommend antiarrhythmic therapy.  History of stress test 03/27/2017    NM Stress test: EF 54%, Abnormal study, evidence of mild ischemia in mild inferior regions. post stress LV is enlarged in size.  Post-stress LV function is normal.     Hx of transesophageal echocardiography (SIMONE) for monitoring 04/17/2018    EF45-50% mild TR, mild-mod MR, mod-severe AS    Hyperlipemia     Hyperlipidemia     Hypertension     Nonspecific abnormal electrocardiogram (ECG) (EKG)     Persistent atrial fibrillation (HCC)     Sick sinus syndrome (HCC)     Vertigo      Past Surgical History:        Procedure Laterality Date    CYSTOSCOPY INSERTION / REMOVAL STENT / STONE Left 5/6/2019    CYSTOSCOPY RETROGRADE PYELOGRAM STENT INSERTION, DIAGNOSTIC CYSTOSCOPY performed by Ilia Livingston MD at [address] [facility] Left 03/29/2018    Dual Chamber [employer] Aura XT DR AMI Kincaid     Current Medications:   Current Facility-Administered Medications: potassium chloride 10 mEq/100 mL IVPB (Peripheral Line), 10 mEq, Intravenous, PRN  lidocaine (XYLOCAINE) 2 % jelly, , Topical, PRN  polyethylene glycol (GLYCOLAX) packet 17 g, 17 g, Oral, Daily  metoprolol tartrate (LOPRESSOR) tablet 50 mg, 50 mg, Oral, BID  metolazone (ZAROXOLYN) tablet 2.5 mg, 2.5 mg, Oral, Daily  apixaban (ELIQUIS) tablet 2.5 mg, 2.5 mg, Oral, BID  amiodarone (CORDARONE) tablet 200 mg, 200 mg, Oral, Daily  atorvastatin (LIPITOR) tablet 20 mg, 20 mg, Oral, Nightly  vitamin D CAPS 5,000 Units, 5,000 Units, Oral, QAM  therapeutic multivitamin-minerals 1 tablet, 1 tablet, Oral, Daily  omega-3 acid ethyl esters (LOVAZA) capsule 1,000 mg, 1,000 mg, Oral, Daily  potassium chloride (KLOR-CON) extended release tablet 20 mEq, 20 mEq, Oral, BID WC  rivastigmine (EXELON) 4.6 MG/24HR 1 patch, 1 patch, Transdermal, QAM  sodium chloride flush 0.9 % injection 10 mL, 10 mL, Intravenous, 2 times per day  sodium chloride flush 0.9 % injection 10 mL, 10 mL, Intravenous, PRN  magnesium hydroxide (MILK OF MAGNESIA) 400 MG/5ML suspension 30 mL, 30 mL, Oral, Daily PRN  ondansetron (ZOFRAN) injection 4 mg, 4 mg, Intravenous, Q6H PRN  furosemide (LASIX) injection 40 mg, 40 mg, Intravenous, BID    Allergies:  Biaxin [clarithromycin]; Cephalexin; and Viagra [sildenafil citrate]    Social History:   TOBACCO:   reports that he has never smoked. He has never used smokeless tobacco.  ETOH:   reports that he drinks about 0.6 oz of alcohol per week. DRUGS:   reports that he does not use drugs. Family History:   History reviewed. No pertinent family history. REVIEW OF SYSTEMS:     CONSTITUTIONAL:  negative  EYES:  negative  HEENT:  negative  RESPIRATORY:  negative  CARDIOVASCULAR:  negative  GASTROINTESTINAL:  negative  GENITOURINARY:  positive for frequency and nocturia  INTEGUMENT/BREAST:  negative  HEMATOLOGIC/LYMPHATIC:  negative  ALLERGIC/IMMUNOLOGIC:  negative  ENDOCRINE:  negative    PHYSICAL EXAM:      VITALS:  BP (!) 111/54   Pulse 60   Temp 98.3 °F (36.8 °C)   Resp 18   Ht 5' 8\" (1.727 m)   Wt 162 lb 8 oz (73.7 kg)   SpO2 97%   BMI 24.71 kg/m²      TEMPERATURE:  Current - Temp: 98.3 °F (36.8 °C);  Max - Temp  Av.2 °F (36.8 °C)  Min: 98.1 °F (36.7 °C) Max: 98.3 °F (36.8 °C)  24HR BLOOD PRESSURE RANGE:  Systolic (23SFA), IFK:188 , Min:111 , SXX:384   ; Diastolic (32RCX), YQS:76, Min:54, Max:65    8HR INTAKE OUTPUT:  In: -   Out: 700 [Urine:700]  URINARY CATHETER OUTPUT ([employer]):     DRAIN/TUBE OUTPUT:        BP (!) 111/54   Pulse 60   Temp 98.3 °F (36.8 °C)   Resp 18   Ht 5' 8\" (1.727 m)   Wt 162 lb 8 oz (73.7 kg)   SpO2 97%   BMI 24.71 kg/m²   General appearance: alert, appears stated age and cooperative  Head: Normocephalic, without obvious abnormality, atraumatic  Neck: no adenopathy, no carotid bruit, no JVD, supple, symmetrical, trachea midline and thyroid not enlarged, symmetric, no tenderness/mass/nodules  Lungs: clear to auscultation bilaterally  Chest wall: no tenderness  Heart: regular rate and rhythm, S1, S2 normal, no murmur, click, rub or gallop  Abdomen: soft, non-tender; bowel sounds normal; no masses,  no organomegaly  Male genitalia: normal  Rectal: normal tone, normal prostate, no masses or tenderness  Extremities: edema 2+ and extremities normal, atraumatic, no cyanosis or edema    DATA:    WBC:    Lab Results   Component Value Date    WBC 5.1 05/26/2019     Hemoglobin/Hematocrit:    Lab Results   Component Value Date    HGB 10.0 05/26/2019    HCT 33.1 05/26/2019     BMP:    Lab Results   Component Value Date     05/26/2019    K  05/26/2019     2.6  K CALLED TO BRENNON FRANCO RN ON 05/25/2019 @ 0819 BY BRAYAN MLS   RESULTS READ BACK      CL 92 05/26/2019    CO2 43 05/26/2019    BUN 19 05/26/2019    LABALBU 3.3 05/25/2019    CREATININE 1.0 05/26/2019    CALCIUM 8.5 05/26/2019    GFRAA >60 05/26/2019    LABGLOM >60 05/26/2019     PT/INR:    Lab Results   Component Value Date    PROTIME 11.8 05/23/2019    INR 1.04 05/23/2019       CBC:   Lab Results   Component Value Date    WBC 5.1 05/26/2019    RBC 4.02 05/26/2019    HGB 10.0 05/26/2019    HCT 33.1 05/26/2019    MCV 82.3 05/26/2019    MCH 24.9 05/26/2019    MCHC 30.2 05/26/2019    RDW 17.1 05/26/2019     05/26/2019    MPV 9.5 05/26/2019         Urine Culture: 50k staph species    Imaging:  Ct Abdomen Pelvis Wo Contrast Additional Contrast? None    Result Date: 5/11/2019  EXAMINATION: CT OF THE ABDOMEN AND PELVIS WITHOUT CONTRAST 5/11/2019 12:28 pm TECHNIQUE: CT of the abdomen and pelvis was performed without the administration of intravenous contrast. Multiplanar reformatted images are provided for review. Dose modulation, iterative reconstruction, and/or weight based adjustment of the mA/kV was utilized to reduce the radiation dose to as low as reasonably achievable. COMPARISON: 05/08/2019 HISTORY: ORDERING SYSTEM PROVIDED HISTORY: difficulty urinating/BM, edema, fatigue, recent admission TECHNOLOGIST PROVIDED HISTORY: Additional Contrast?->None Ordering Physician Provided Reason for Exam: difficulty urinating/BM, edema, fatigue, recent admission Acuity: Unknown Type of Exam: Unknown Additional signs and symptoms: difficulty urinating/BM, edema, fatigue, recent admission FINDINGS: Lower Chest: Tiny pleural effusions are present right greater than left. Atherosclerotic vascular calcifications are present. Pacer wires are in place. Organs: The liver, spleen, pancreas, adrenal glands and kidneys are without acute finding. There is a prominent cyst with dependent calcifications upper pole right kidney unchanged from 2 days earlier. There is also a stone in the mid pole calyx right kidney. Stones are also associated with the renal pelvis on the left. Left renal cysts are present. Left-sided ureteral stent is in place. Gallstones are present. There is likely some sludge in the gallbladder as well. GI/Bowel: No bowel obstruction is seen. Pelvis: Bladder diverticulum on the right containing calculi is redemonstrated. The bladder is mildly distended. The prostate gland is not significantly enlarged. Peritoneum/Retroperitoneum: Atherosclerotic vascular calcifications are present.   There is an infrarenal abdominal aortic aneurysm that has a diameter of about 4.3 cm. This is similar to two days earlier. No enlarged lymph nodes are seen. Bones/Soft Tissues: No bony destructive process or acute osseous injury is seen. Left-sided ureteral stent remains in place in a patient with urinary tract calculi. The bladder is mildly distended and there is a right-sided bladder diverticulum containing stones. Gallstones. A 4.3 cm infrarenal abdominal aortic aneurysm. RECOMMENDATIONS: Managing Abdominal Aortic Aneurysms 4.0-4.4 cm: Every 1 year. Recommend vascular consultation. Reference: J Vasc Surg. 2009 Oct;50(4 Suppl):S2-49       Assessment & Plan:      Aileen Yun is a 80y.o. year old male admitted 5/23/2019 for CHF and large post void residuals    1)BPH:  PVR over 4-500 ml and nursing was unable to place a monet. I was able to place a 18f coude tip catheter w/o difficulty. 200 ml urine drained. Recommend leaving monet in while diuretics are being administered. 2) Left ureteral calculus and stent: follow up with Dr. Rachel Sarabia for further treatment. 3) UTI: Staph species noted on c/s. Start Bactrim if ok with primary team.     Patient seen and examined, chart reviewed.      Electronically signed by Katharina Valdez MD on 5/26/2019 at 12:44 PM

## 2019-05-26 NOTE — PROGRESS NOTES
05/26/2019    GFRAA >60 05/26/2019    LABGLOM >60 05/26/2019    GLUCOSE 91 05/26/2019    PROT 5.3 05/25/2019    LABALBU 3.3 05/25/2019    CALCIUM 8.5 05/26/2019    BILITOT 0.4 05/25/2019    ALKPHOS 116 05/25/2019    AST 43 05/25/2019    ALT 56 05/25/2019     Recent Labs     05/24/19  0632 05/24/19  1424 05/25/19  0345   TROPONINT 0.013* <0.010 0.011*     Lab Results   Component Value Date    TSH 3.610 05/24/2019           metoprolol tartrate  50 mg Oral BID    metolazone  2.5 mg Oral Daily    apixaban  2.5 mg Oral BID    amiodarone  200 mg Oral Daily    atorvastatin  20 mg Oral Nightly    vitamin D  5,000 Units Oral QAM    therapeutic multivitamin-minerals  1 tablet Oral Daily    omega-3 acid ethyl esters  1,000 mg Oral Daily    potassium chloride  20 mEq Oral BID WC    rivastigmine  1 patch Transdermal QAM    sodium chloride flush  10 mL Intravenous 2 times per day    furosemide  40 mg Intravenous BID         Assessment:       Patient Active Problem List    Diagnosis Date Noted    Persistent atrial fibrillation (HCC)      Priority: High    S/P placement of cardiac pacemaker 03/29/2018     Priority: High    Dyspnea     CHF (congestive heart failure), NYHA class I, acute on chronic, combined ([facility] [address] 05/23/2019    Hypervolemia 05/04/2019    Ascending aortic aneurysm ([facility] 04/18/2018       Plan:     Problems being addressed this admission:     Acute HFpEF / moderate AR  Kidney stone with recent ureteral stent  PAF on ac  HTN  Hypokalemia     His BNP was high on admission and it has since come down. His CXR was not suggestive for CHF. He is on lasix BID and his potassium is low and being replaced. Further recommendations as per cardiology. His CO2 his high ? Cause, does he need a ABG. Is he at risk for APE. On AC already. He is to have a monet put in by urology. No change in his [facility] for his PAF.   B/p is 147/65 today.       Consultants:  Cardiologist    General Orders:  Repeat basic labs again in

## 2019-05-26 NOTE — PROGRESS NOTES
Bladder scans during the night revealed significant urinary retention (400-600mL), even immediately after a double-void. Attempted to straight cath, but resistance was met and pt appeared painful. Notified Dr. Yue Tracy (urologist).

## 2019-05-26 NOTE — PROGRESS NOTES
never smoked. He has never used smokeless tobacco. He reports that he drinks about 0.6 oz of alcohol per week. He reports that he does not use drugs. Family history:  family history is not on file. Allergies   Allergen Reactions    Biaxin [Clarithromycin]     Cephalexin     Viagra [Sildenafil Citrate] Nausea Only and Other (See Comments)     Dizziness and BP dropped       Review of Systems:    All 14 systems were reviewed and are negative  Except for the positive findings  which as documented     BP (!) 111/54   Pulse 60   Temp 98.3 °F (36.8 °C)   Resp 18   Ht 5' 8\" (1.727 m)   Wt 162 lb 8 oz (73.7 kg)   SpO2 97%   BMI 24.71 kg/m²       Intake/Output Summary (Last 24 hours) at 5/26/2019 1540  Last data filed at 5/26/2019 1446  Gross per 24 hour   Intake --   Output 2900 ml   Net -2900 ml     Physical Exam:  Constitutional:  Well developed, Well nourished, No acute distress, Non-toxic appearance. HENT:  Normocephalic, Atraumatic, Bilateral external ears normal, Oropharynx moist, No oral exudates, Nose normal. Neck- Normal range of motion, No tenderness, Supple, No stridor. Eyes:  PERRL, EOMI, Conjunctiva normal, No discharge. Respiratory:  Normal breath sounds, No respiratory distress, No wheezing, No chest tenderness. Cardiovascular:  Normal heart rate, Normal rhythm, No murmurs, No rubs, No gallops, JVP not elevated  Abdomen/GI:  Bowel sounds normal, Soft, No tenderness, No masses, No pulsatile masses. Musculoskeletal:  Intact distal pulses, No edema, No tenderness, No cyanosis, No clubbing. Good range of motion in all major joints. No tenderness to palpation or major deformities noted. Back- No tenderness. Integument:  Warm, Dry, No erythema, No rash. Lymphatic:  No lymphadenopathy noted. Neurologic:  Alert & oriented x 3, Normal motor function, Normal sensory function, No focal deficits noted.    Psychiatric:  Affect  and  Mood :no change    Medications:    polyethylene glycol  17 g Oral Daily    metoprolol tartrate  50 mg Oral BID    metolazone  2.5 mg Oral Daily    apixaban  2.5 mg Oral BID    amiodarone  200 mg Oral Daily    atorvastatin  20 mg Oral Nightly    vitamin D  5,000 Units Oral QAM    therapeutic multivitamin-minerals  1 tablet Oral Daily    omega-3 acid ethyl esters  1,000 mg Oral Daily    potassium chloride  20 mEq Oral BID WC    rivastigmine  1 patch Transdermal QAM    sodium chloride flush  10 mL Intravenous 2 times per day    furosemide  40 mg Intravenous BID       potassium chloride, lidocaine, sodium chloride flush, magnesium hydroxide, ondansetron    Lab Data:  CBC:   Recent Labs     05/24/19  0632 05/25/19  0345 05/26/19  0652   WBC 5.3 5.8 5.1   HGB 9.9* 10.3* 10.0*   HCT 33.3* 34.8* 33.1*   MCV 81.8 82.5 82.3    291 274     BMP:   Recent Labs     05/24/19  0632 05/24/19  1424 05/25/19  0345 05/26/19  0652     --  142 141   K 2.9  K CALLED TO TENNILLE RN @ 0727 [id number] MARCIE MLT  RESULTS READ BACK  * 3.5 3.3* 2.6  K CALLED TO BRENNON FRANCO RN ON 05/25/2019 @ 0819 BY BRAYAN MLS   RESULTS READ BACK  *     --  97* 92*   CO2 33*  --  36* 43*   PHOS 3.3  --  3.0  --    BUN 19  --  17 19   CREATININE 0.9  --  0.9 1.0     PT/INR:   Recent Labs     05/23/19 1816   PROTIME 11.8   INR 1.04     BNP:    Recent Labs     05/23/19  1816 05/25/19  0345   PROBNP 12,385* 9,818*     TROPONIN:   Recent Labs     05/24/19  0632 05/24/19  1424 05/25/19  0345   TROPONINT 0.013* <0.010 0.011*        ECHO :   echocardiogram    Assessment:  80 y. o.year old who is admitted for  Chief Complaint   Patient presents with    Shortness of Breath     recently an inpatient for CHF; 5-6 pound weight gain since yesterday    Leg Swelling     bilateral    , active issues as noted below:  Impression:  Active Problems:    CHF (congestive heart failure), NYHA class I, acute on chronic, combined ([facility] [address]    Dyspnea  Resolved Problems:    * No resolved hospital problems.  * All labs, medications and tests reviewed by myself , continue all other medications of all above medical condition listed as is except for changes mentioned above. Thank you very much for consult , please call with questions.     Suleman Mcfadden MD 5/26/2019 3:40 PM

## 2019-05-27 LAB
ANION GAP SERPL CALCULATED.3IONS-SCNC: 8 MMOL/L (ref 4–16)
BUN BLDV-MCNC: 26 MG/DL (ref 6–23)
CALCIUM SERPL-MCNC: 8.7 MG/DL (ref 8.3–10.6)
CHLORIDE BLD-SCNC: 88 MMOL/L (ref 99–110)
CO2: 46 MMOL/L (ref 21–32)
CREAT SERPL-MCNC: 1.3 MG/DL (ref 0.9–1.3)
CULTURE: ABNORMAL
GFR AFRICAN AMERICAN: >60 ML/MIN/1.73M2
GFR NON-AFRICAN AMERICAN: 52 ML/MIN/1.73M2
GLUCOSE BLD-MCNC: 101 MG/DL (ref 70–99)
HCT VFR BLD CALC: 33.9 % (ref 42–52)
HEMOGLOBIN: 10.2 GM/DL (ref 13.5–18)
Lab: ABNORMAL
Lab: ABNORMAL
MAGNESIUM: 1.9 MG/DL (ref 1.8–2.4)
MCH RBC QN AUTO: 24.6 PG (ref 27–31)
MCHC RBC AUTO-ENTMCNC: 30.1 % (ref 32–36)
MCV RBC AUTO: 81.9 FL (ref 78–100)
PDW BLD-RTO: 17.2 % (ref 11.7–14.9)
PLATELET # BLD: 263 K/CU MM (ref 140–440)
PMV BLD AUTO: 9.3 FL (ref 7.5–11.1)
POTASSIUM SERPL-SCNC: ABNORMAL MMOL/L (ref 3.5–5.1)
PRO-BNP: 5631 PG/ML
PRO-BNP: 6337 PG/ML
RBC # BLD: 4.14 M/CU MM (ref 4.6–6.2)
SODIUM BLD-SCNC: 142 MMOL/L (ref 135–145)
SPECIMEN: ABNORMAL
SPECIMEN: ABNORMAL
TOTAL COLONY COUNT: ABNORMAL
TOTAL COLONY COUNT: ABNORMAL
WBC # BLD: 5.7 K/CU MM (ref 4–10.5)

## 2019-05-27 PROCEDURE — 6370000000 HC RX 637 (ALT 250 FOR IP): Performed by: INTERNAL MEDICINE

## 2019-05-27 PROCEDURE — 83735 ASSAY OF MAGNESIUM: CPT

## 2019-05-27 PROCEDURE — 36415 COLL VENOUS BLD VENIPUNCTURE: CPT

## 2019-05-27 PROCEDURE — APPSS30 APP SPLIT SHARED TIME 16-30 MINUTES: Performed by: NURSE PRACTITIONER

## 2019-05-27 PROCEDURE — 80048 BASIC METABOLIC PNL TOTAL CA: CPT

## 2019-05-27 PROCEDURE — 1200000000 HC SEMI PRIVATE

## 2019-05-27 PROCEDURE — 99232 SBSQ HOSP IP/OBS MODERATE 35: CPT | Performed by: INTERNAL MEDICINE

## 2019-05-27 PROCEDURE — 6370000000 HC RX 637 (ALT 250 FOR IP): Performed by: NURSE PRACTITIONER

## 2019-05-27 PROCEDURE — 6360000002 HC RX W HCPCS: Performed by: INTERNAL MEDICINE

## 2019-05-27 PROCEDURE — 83880 ASSAY OF NATRIURETIC PEPTIDE: CPT

## 2019-05-27 PROCEDURE — 85027 COMPLETE CBC AUTOMATED: CPT

## 2019-05-27 RX ORDER — SULFAMETHOXAZOLE AND TRIMETHOPRIM 800; 160 MG/1; MG/1
1 TABLET ORAL EVERY 12 HOURS SCHEDULED
Status: DISCONTINUED | OUTPATIENT
Start: 2019-05-27 | End: 2019-05-29

## 2019-05-27 RX ORDER — FUROSEMIDE 40 MG/1
40 TABLET ORAL 2 TIMES DAILY
Status: DISCONTINUED | OUTPATIENT
Start: 2019-05-27 | End: 2019-05-29 | Stop reason: HOSPADM

## 2019-05-27 RX ORDER — MAGNESIUM SULFATE IN WATER 40 MG/ML
2 INJECTION, SOLUTION INTRAVENOUS ONCE
Status: COMPLETED | OUTPATIENT
Start: 2019-05-27 | End: 2019-05-27

## 2019-05-27 RX ADMIN — POTASSIUM CHLORIDE 10 MEQ: 10 INJECTION, SOLUTION INTRAVENOUS at 09:40

## 2019-05-27 RX ADMIN — ATORVASTATIN CALCIUM 20 MG: 20 TABLET, FILM COATED ORAL at 20:26

## 2019-05-27 RX ADMIN — Medication 5000 UNITS: at 09:41

## 2019-05-27 RX ADMIN — POTASSIUM CHLORIDE 10 MEQ: 10 INJECTION, SOLUTION INTRAVENOUS at 06:30

## 2019-05-27 RX ADMIN — POTASSIUM CHLORIDE 10 MEQ: 10 INJECTION, SOLUTION INTRAVENOUS at 18:10

## 2019-05-27 RX ADMIN — POTASSIUM CHLORIDE 20 MEQ: 20 TABLET, EXTENDED RELEASE ORAL at 18:41

## 2019-05-27 RX ADMIN — POLYETHYLENE GLYCOL (3350) 17 G: 17 POWDER, FOR SOLUTION ORAL at 09:41

## 2019-05-27 RX ADMIN — Medication 1 PACKET: at 18:42

## 2019-05-27 RX ADMIN — FUROSEMIDE 40 MG: 40 TABLET ORAL at 18:41

## 2019-05-27 RX ADMIN — METOPROLOL TARTRATE 50 MG: 50 TABLET ORAL at 20:26

## 2019-05-27 RX ADMIN — METOLAZONE 2.5 MG: 2.5 TABLET ORAL at 09:40

## 2019-05-27 RX ADMIN — AMIODARONE HYDROCHLORIDE 200 MG: 200 TABLET ORAL at 20:26

## 2019-05-27 RX ADMIN — POTASSIUM CHLORIDE 10 MEQ: 10 INJECTION, SOLUTION INTRAVENOUS at 10:48

## 2019-05-27 RX ADMIN — OMEGA-3-ACID ETHYL ESTERS 1000 MG: 1 CAPSULE, LIQUID FILLED ORAL at 09:40

## 2019-05-27 RX ADMIN — POTASSIUM CHLORIDE 10 MEQ: 10 INJECTION, SOLUTION INTRAVENOUS at 00:01

## 2019-05-27 RX ADMIN — POTASSIUM CHLORIDE 20 MEQ: 20 TABLET, EXTENDED RELEASE ORAL at 09:40

## 2019-05-27 RX ADMIN — SULFAMETHOXAZOLE AND TRIMETHOPRIM 1 TABLET: 800; 160 TABLET ORAL at 20:26

## 2019-05-27 RX ADMIN — POLYETHYLENE GLYCOL (3350) 17 G: 17 POWDER, FOR SOLUTION ORAL at 20:26

## 2019-05-27 RX ADMIN — POTASSIUM CHLORIDE 10 MEQ: 10 INJECTION, SOLUTION INTRAVENOUS at 02:05

## 2019-05-27 RX ADMIN — POTASSIUM CHLORIDE 10 MEQ: 10 INJECTION, SOLUTION INTRAVENOUS at 17:10

## 2019-05-27 RX ADMIN — POTASSIUM CHLORIDE 10 MEQ: 10 INJECTION, SOLUTION INTRAVENOUS at 11:50

## 2019-05-27 RX ADMIN — METOPROLOL TARTRATE 50 MG: 50 TABLET ORAL at 09:40

## 2019-05-27 RX ADMIN — MAGNESIUM SULFATE HEPTAHYDRATE 2 G: 40 INJECTION, SOLUTION INTRAVENOUS at 18:41

## 2019-05-27 RX ADMIN — POTASSIUM CHLORIDE 10 MEQ: 10 INJECTION, SOLUTION INTRAVENOUS at 12:00

## 2019-05-27 RX ADMIN — MULTIPLE VITAMINS W/ MINERALS TAB 1 TABLET: TAB at 09:40

## 2019-05-27 RX ADMIN — POTASSIUM CHLORIDE 10 MEQ: 10 INJECTION, SOLUTION INTRAVENOUS at 04:27

## 2019-05-27 RX ADMIN — SULFAMETHOXAZOLE AND TRIMETHOPRIM 1 TABLET: 800; 160 TABLET ORAL at 18:41

## 2019-05-27 ASSESSMENT — PAIN SCALES - GENERAL: PAINLEVEL_OUTOF10: 0

## 2019-05-27 NOTE — PROGRESS NOTES
Cardiology Progress Note     Today's Plan: change IV lasix to po    Admit Date:  5/23/2019    Consult reason/ Seen today for: CHF / AR    Subjective and  Overnight Events:  Up sitting in chair- reports that he is feeling better. His edema has improved. Potassium and mag are low   Telemetry SR     Assessment / Plan / Recommendation:     HFpEF: EF 50-55%   Currently he is na negative fluid balance- clinically he is improving:  Continue with BB and diuresis- will change lasix to po- continue with I/O and daily weights-   PAF-h/o cardioversion -GLORIA VASc 3:  rate is controlled -Rhythm is controlled-continue with metoprolol, amiodarone and eliquis    HTN- controlled - continue present management   VHD- moderate AI per echo   Hypokalemia- being replaced  Check bnp, replace lytes      History of Presenting Illness:    Chief complain on admission : 80 y. o.year old who is admitted for  Chief Complaint   Patient presents with    Shortness of Breath     recently an inpatient for CHF; 5-6 pound weight gain since yesterday    Leg Swelling     bilateral        Past medical history:    has a past medical history of ACTA2-related familial thoracic aortic aneurysm, Arrhythmia, Atrial fibrillation (HCC), BBBB (bilateral bundle branch block), Bradycardia, CHF (congestive heart failure) ([address] Essential hypertension, malignant, History of cardioversion, History of chest x-ray, History of CT scan, History of echocardiogram, History of EKG, History of EKG, History of Holter monitoring, History of stress test, Hx of transesophageal echocardiography (SIMONE) for monitoring, Hyperlipemia, Hyperlipidemia, Hypertension, Nonspecific abnormal electrocardiogram (ECG) (EKG), Persistent atrial fibrillation ([address] Sick sinus syndrome ([address] and Vertigo.   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) and  atorvastatin  20 mg Oral Nightly    vitamin D  5,000 Units Oral QAM    therapeutic multivitamin-minerals  1 tablet Oral Daily    omega-3 acid ethyl esters  1,000 mg Oral Daily    rivastigmine  1 patch Transdermal QAM    sodium chloride flush  10 mL Intravenous 2 times per day    furosemide  40 mg Intravenous BID       potassium chloride, lidocaine, sodium chloride flush, magnesium hydroxide, ondansetron    Lab Data:  CBC:   Recent Labs     05/25/19  0345 05/26/19  0652 05/27/19  0801   WBC 5.8 5.1 5.7   HGB 10.3* 10.0* 10.2*   HCT 34.8* 33.1* 33.9*   MCV 82.5 82.3 81.9    274 263     BMP:   Recent Labs     05/25/19  0345 05/26/19  0652 05/27/19  0801    141 142   K 3.3* 2.6  K CALLED TO BRENNON FRANCO RN ON 05/25/2019 @ 0819 BY [employer]   RESULTS READ BACK  * 2.6  K CALLED TO RAUL TORRES RN ON 05/27/2019 @ 0842 BY [employer]   RESULTS READ BACK  *   CL 97* 92* 88*   CO2 36* 43* 46*   PHOS 3.0  --   --    BUN 17 19 26*   CREATININE 0.9 1.0 1.3     PT/INR: No results for input(s): PROTIME, INR in the last 72 hours. BNP:    Recent Labs     05/25/19  0345 05/27/19  0801   PROBNP 9,818* 6,337*     TROPONIN:   Recent Labs     05/24/19  1424 05/25/19  0345   TROPONINT <0.010 0.011*        ECHO :       NM Myocardial Spect      Impression:  Active Problems:    CHF (congestive heart failure), NYHA class I, acute on chronic, combined ([facility] [address]    Dyspnea  Resolved Problems:    * No resolved hospital problems. *       All labs, medications and tests reviewed by myself, continue all other medications of all above medical condition listed as is except for changes mentioned above. Thank you   Please call with questions. Electronically signed by CARI Hu CNP on 5/27/2019 at 9:00 AM   I have seen ,spoken to  and examined this patient personally, independently of the nurse practitioner. I have reviewed the hospital care given to date and reviewed all pertinent labs and imaging. The plan was developed mutually at the time of the visit with the patient,  Beverly Lopez  and myself. I have spoken with patient, nursing staff and provided written and verbal instructions . The above note has been reviewed and I agree with the assessment, diagnosis, and treatment plan with changes made by me as follows     CARDIOLOGY ATTENDING ADDENDUM    HPI:  I have reviewed the above HPI  And agree with above   Sydney Waddell is a 80 y. o.year old who and presents with had concerns including Shortness of Breath (recently an inpatient for CHF; 5-6 pound weight gain since yesterday) and Leg Swelling (bilateral).   Chief Complaint   Patient presents with    Shortness of Breath     recently an inpatient for CHF; 5-6 pound weight gain since yesterday    Leg Swelling     bilateral     Interval history:  Lost  5.4 litres    Physical Exam:  General:  Awake, alert, NAD  Head:normal  Eye:normal  Neck:  No JVD   Chest:  Clear to auscultation, respiration easy  Cardiovascular:  RRR S1S2  Abdomen:   nontender  Extremities:  2 +edema  Pulses; palpable  Neuro: grossly normal      MEDICAL DECISION MAKING;    I agree with the above plan, which was planned by myself and discussed with NP.  replace kcl and mag  Check bnp        Cindy Palomino MD [facility] [place]

## 2019-05-27 NOTE — PROGRESS NOTES
Kaleb Amador MD, [address]                Internal Medicine Hospitalist             Daily Progress  Note   Subjective:     Chief Complaint   Patient presents with    Shortness of Breath     recently an inpatient for CHF; 5-6 pound weight gain since yesterday    Leg Swelling     bilateral     Mr. Pearson Complains of nil, but has bene weak and has had falls and so the daughter is concerned that he maybe high risk to bleed. Objective:    BP (!) 121/58   Pulse 60   Temp 98 °F (36.7 °C) (Oral)   Resp 16   Ht 5' 8\" (1.727 m)   Wt 158 lb 1.6 oz (71.7 kg)   SpO2 90%   BMI 24.04 kg/m²      Intake/Output Summary (Last 24 hours) at 5/27/2019 1018  Last data filed at 5/27/2019 0547  Gross per 24 hour   Intake 670 ml   Output 3550 ml   Net -2880 ml      Physical Exam:  Heart:  Distant heart sounds. Lungs: Mostly clear to auscultation, decreased breath sounds at bases. No wheezes appreciated no crackles heard. Abdomen: Soft, non distended. Bowel sounds appreciated. No obvious liver or spleen enlargement. Non tender, no rebound noted. Extremities: Non tender, +1 swelling noted, strength 5/5 both legs. CNS: Grossly intact.     Labs:  CBC with Differential:    Lab Results   Component Value Date    WBC 5.7 05/27/2019    RBC 4.14 05/27/2019    HGB 10.2 05/27/2019    HCT 33.9 05/27/2019     05/27/2019    MCV 81.9 05/27/2019    MCH 24.6 05/27/2019    MCHC 30.1 05/27/2019    RDW 17.2 05/27/2019    SEGSPCT 63.8 05/23/2019    BANDSPCT 2 01/29/2017    LYMPHOPCT 17.3 05/23/2019    MONOPCT 14.0 05/23/2019    EOSPCT 1.2 02/28/2017    BASOPCT 0.6 05/23/2019    MONOSABS 0.8 05/23/2019    LYMPHSABS 0.9 05/23/2019    EOSABS 0.2 05/23/2019    BASOSABS 0.0 05/23/2019    DIFFTYPE AUTOMATED DIFFERENTIAL 05/23/2019     CMP:    Lab Results   Component Value Date     05/27/2019    K  05/27/2019     2.6  K CALLED TO RAUL TORRES RN ON 05/27/2019 @ 0842 BY BRAYAN HUERTA   RESULTS READ BACK      CL 88 05/27/2019    CO2 46 DOAC.    Kidney stone with recent ureteral stent / Urinary retention  5/26/19-He is to have a monet put in by urology. 5/27/19- now has a monet in and urology wrote \" BPH:  PVR over 4-500 ml and nursing was unable to place a monet. I was able to place a 18f coude tip catheter w/o difficulty. 200 ml urine drained. Recommend leaving monet in while diuretics are being administered. Left ureteral calculus and stent: follow up with Dr. Karen Gallagher for further treatment. UTI: Staph species noted on c/s. Start Bactrim if ok with primary team.'. PAF on ac  5/26/19-No change in his [facility] for his PAF.  5/27/19- daughter is unsure about his Eliquis usage but will go along with recommendations. HTN  5/26/19-B/p is 147/65 today. Hypokalemia   5/27/19-replace potassium per protocol. Consultants:  Cardiologist  urology    General Orders:  Repeat basic labs again in am.  I have explained to the patient and discussed with him/her the treatment plan.   Peewee Espinoza MD, [address]

## 2019-05-28 LAB
ANION GAP SERPL CALCULATED.3IONS-SCNC: 8 MMOL/L (ref 4–16)
BACTERIA: NEGATIVE /HPF
BILIRUBIN URINE: NEGATIVE MG/DL
BLOOD, URINE: ABNORMAL
BUN BLDV-MCNC: 30 MG/DL (ref 6–23)
CALCIUM SERPL-MCNC: 8.5 MG/DL (ref 8.3–10.6)
CHLORIDE BLD-SCNC: 89 MMOL/L (ref 99–110)
CHLORIDE URINE RANDOM: 71 MMOL/L (ref 43–210)
CLARITY: CLEAR
CO2: 42 MMOL/L (ref 21–32)
COLOR: YELLOW
CREAT SERPL-MCNC: 1.4 MG/DL (ref 0.9–1.3)
CREATININE URINE: 15.5 MG/DL (ref 39–259)
GFR AFRICAN AMERICAN: 58 ML/MIN/1.73M2
GFR NON-AFRICAN AMERICAN: 48 ML/MIN/1.73M2
GLUCOSE BLD-MCNC: 90 MG/DL (ref 70–99)
GLUCOSE, URINE: NEGATIVE MG/DL
HCT VFR BLD CALC: 32.2 % (ref 42–52)
HEMOGLOBIN: 9.7 GM/DL (ref 13.5–18)
KETONES, URINE: NEGATIVE MG/DL
LEUKOCYTE ESTERASE, URINE: ABNORMAL
MAGNESIUM: 2.2 MG/DL (ref 1.8–2.4)
MCH RBC QN AUTO: 24.6 PG (ref 27–31)
MCHC RBC AUTO-ENTMCNC: 30.1 % (ref 32–36)
MCV RBC AUTO: 81.7 FL (ref 78–100)
MUCUS: ABNORMAL HPF
NITRITE URINE, QUANTITATIVE: NEGATIVE
PDW BLD-RTO: 17.2 % (ref 11.7–14.9)
PH, URINE: 7 (ref 5–8)
PLATELET # BLD: 237 K/CU MM (ref 140–440)
PMV BLD AUTO: 9.4 FL (ref 7.5–11.1)
POTASSIUM SERPL-SCNC: ABNORMAL MMOL/L (ref 3.5–5.1)
POTASSIUM, UR: 41.3 MMOL/L (ref 22–119)
PROT/CREAT RATIO, UR: ABNORMAL
PROTEIN UA: NEGATIVE MG/DL
RBC # BLD: 3.94 M/CU MM (ref 4.6–6.2)
RBC URINE: 345 /HPF (ref 0–3)
SODIUM BLD-SCNC: 139 MMOL/L (ref 135–145)
SODIUM URINE: 49 MMOL/L (ref 35–167)
SPECIFIC GRAVITY UA: 1 (ref 1–1.03)
SQUAMOUS EPITHELIAL: <1 /HPF
TRICHOMONAS: ABNORMAL /HPF
URINE TOTAL PROTEIN: 10.1 MG/DL
UROBILINOGEN, URINE: NORMAL MG/DL (ref 0.2–1)
WBC # BLD: 6.5 K/CU MM (ref 4–10.5)
WBC UA: 2 /HPF (ref 0–2)

## 2019-05-28 PROCEDURE — 84133 ASSAY OF URINE POTASSIUM: CPT

## 2019-05-28 PROCEDURE — 81001 URINALYSIS AUTO W/SCOPE: CPT

## 2019-05-28 PROCEDURE — 2580000003 HC RX 258: Performed by: INTERNAL MEDICINE

## 2019-05-28 PROCEDURE — 6360000002 HC RX W HCPCS: Performed by: INTERNAL MEDICINE

## 2019-05-28 PROCEDURE — 80048 BASIC METABOLIC PNL TOTAL CA: CPT

## 2019-05-28 PROCEDURE — 36415 COLL VENOUS BLD VENIPUNCTURE: CPT

## 2019-05-28 PROCEDURE — 99233 SBSQ HOSP IP/OBS HIGH 50: CPT | Performed by: INTERNAL MEDICINE

## 2019-05-28 PROCEDURE — APPSS30 APP SPLIT SHARED TIME 16-30 MINUTES: Performed by: NURSE PRACTITIONER

## 2019-05-28 PROCEDURE — 6370000000 HC RX 637 (ALT 250 FOR IP): Performed by: HOSPITALIST

## 2019-05-28 PROCEDURE — 84156 ASSAY OF PROTEIN URINE: CPT

## 2019-05-28 PROCEDURE — 85027 COMPLETE CBC AUTOMATED: CPT

## 2019-05-28 PROCEDURE — 6370000000 HC RX 637 (ALT 250 FOR IP): Performed by: INTERNAL MEDICINE

## 2019-05-28 PROCEDURE — 84300 ASSAY OF URINE SODIUM: CPT

## 2019-05-28 PROCEDURE — 6370000000 HC RX 637 (ALT 250 FOR IP): Performed by: NURSE PRACTITIONER

## 2019-05-28 PROCEDURE — 83735 ASSAY OF MAGNESIUM: CPT

## 2019-05-28 PROCEDURE — 82436 ASSAY OF URINE CHLORIDE: CPT

## 2019-05-28 PROCEDURE — 51798 US URINE CAPACITY MEASURE: CPT

## 2019-05-28 PROCEDURE — 82570 ASSAY OF URINE CREATININE: CPT

## 2019-05-28 PROCEDURE — 1200000000 HC SEMI PRIVATE

## 2019-05-28 RX ORDER — POTASSIUM CHLORIDE 20 MEQ/1
40 TABLET, EXTENDED RELEASE ORAL
Status: COMPLETED | OUTPATIENT
Start: 2019-05-28 | End: 2019-05-29

## 2019-05-28 RX ORDER — SPIRONOLACTONE 50 MG/1
100 TABLET, FILM COATED ORAL 2 TIMES DAILY
Status: DISCONTINUED | OUTPATIENT
Start: 2019-05-28 | End: 2019-05-29 | Stop reason: HOSPADM

## 2019-05-28 RX ORDER — POTASSIUM CHLORIDE 20 MEQ/1
20 TABLET, EXTENDED RELEASE ORAL 2 TIMES DAILY WITH MEALS
Status: DISCONTINUED | OUTPATIENT
Start: 2019-05-29 | End: 2019-05-29 | Stop reason: HOSPADM

## 2019-05-28 RX ADMIN — POTASSIUM CHLORIDE 20 MEQ: 20 TABLET, EXTENDED RELEASE ORAL at 08:44

## 2019-05-28 RX ADMIN — METOPROLOL TARTRATE 50 MG: 50 TABLET ORAL at 08:44

## 2019-05-28 RX ADMIN — POTASSIUM CHLORIDE 10 MEQ: 10 INJECTION, SOLUTION INTRAVENOUS at 19:04

## 2019-05-28 RX ADMIN — Medication 1 PACKET: at 08:44

## 2019-05-28 RX ADMIN — SPIRONOLACTONE 100 MG: 50 TABLET ORAL at 16:50

## 2019-05-28 RX ADMIN — POTASSIUM CHLORIDE 40 MEQ: 20 TABLET, EXTENDED RELEASE ORAL at 16:51

## 2019-05-28 RX ADMIN — POTASSIUM CHLORIDE 10 MEQ: 10 INJECTION, SOLUTION INTRAVENOUS at 11:12

## 2019-05-28 RX ADMIN — FUROSEMIDE 40 MG: 40 TABLET ORAL at 08:44

## 2019-05-28 RX ADMIN — AMIODARONE HYDROCHLORIDE 200 MG: 200 TABLET ORAL at 19:56

## 2019-05-28 RX ADMIN — SULFAMETHOXAZOLE AND TRIMETHOPRIM 1 TABLET: 800; 160 TABLET ORAL at 08:44

## 2019-05-28 RX ADMIN — FUROSEMIDE 40 MG: 40 TABLET ORAL at 16:51

## 2019-05-28 RX ADMIN — MULTIPLE VITAMINS W/ MINERALS TAB 1 TABLET: TAB at 08:44

## 2019-05-28 RX ADMIN — Medication 5000 UNITS: at 08:44

## 2019-05-28 RX ADMIN — SULFAMETHOXAZOLE AND TRIMETHOPRIM 1 TABLET: 800; 160 TABLET ORAL at 19:56

## 2019-05-28 RX ADMIN — ATORVASTATIN CALCIUM 20 MG: 20 TABLET, FILM COATED ORAL at 19:56

## 2019-05-28 RX ADMIN — POTASSIUM CHLORIDE 10 MEQ: 10 INJECTION, SOLUTION INTRAVENOUS at 08:43

## 2019-05-28 RX ADMIN — POLYETHYLENE GLYCOL (3350) 17 G: 17 POWDER, FOR SOLUTION ORAL at 08:44

## 2019-05-28 RX ADMIN — POTASSIUM CHLORIDE 10 MEQ: 10 INJECTION, SOLUTION INTRAVENOUS at 13:41

## 2019-05-28 RX ADMIN — SODIUM CHLORIDE, PRESERVATIVE FREE 10 ML: 5 INJECTION INTRAVENOUS at 09:08

## 2019-05-28 RX ADMIN — POLYETHYLENE GLYCOL (3350) 17 G: 17 POWDER, FOR SOLUTION ORAL at 19:56

## 2019-05-28 RX ADMIN — METOLAZONE 2.5 MG: 2.5 TABLET ORAL at 08:44

## 2019-05-28 RX ADMIN — POTASSIUM CHLORIDE 40 MEQ: 20 TABLET, EXTENDED RELEASE ORAL at 13:20

## 2019-05-28 RX ADMIN — OMEGA-3-ACID ETHYL ESTERS 1000 MG: 1 CAPSULE, LIQUID FILLED ORAL at 08:44

## 2019-05-28 RX ADMIN — POTASSIUM CHLORIDE 10 MEQ: 10 INJECTION, SOLUTION INTRAVENOUS at 21:02

## 2019-05-28 ASSESSMENT — PAIN SCALES - GENERAL
PAINLEVEL_OUTOF10: 0

## 2019-05-28 NOTE — PROGRESS NOTES
[address] [place] [address]  HOSPITALIST PROGRESS NOTE                       Name:  Katey Brown /Age/Sex: 10/5/1933  (80 y.o. male)   MRN & CSN:  [id number] & [id number] Admission Date/Time: 2019  5:50 PM   Location:  [address] [place] [address] Attending:  Barbara Garcia MD                                                  HPI  Katey Brown is a 80 y.o. male who was admitted on  with acute CHF    SUBJECTIVE  -denies any shortness of breath/chest pain, no fever/chills. No nausea/vomiting    10 point review of systems reviewed and negative unless noted above. ALLERGIES:   Allergies   Allergen Reactions    Biaxin [Clarithromycin]     Cephalexin     Viagra [Sildenafil Citrate] Nausea Only and Other (See Comments)     Dizziness and BP dropped       PCP: [facility] [address] [place] [address], SURGICAL HISTORY, SOCIAL HISTORY and  HOME MEDICATIONS all reviewed. OBJECTIVE  Vitals:    19 2244 19 0530 19 0843 19 0847   BP: (!) 119/58 (!) 118/56 (!) 127/59 (!) 127/59   Pulse: 60 65 60 80   Resp: 16 16  16   Temp: 97.9 °F (36.6 °C) 98 °F (36.7 °C)  98.2 °F (36.8 °C)   TempSrc: Oral Oral  Oral   SpO2: 92% 93%  95%   Weight: 161 lb (73 kg) 158 lb (71.7 kg)     Height:           PHYSICAL EXAM   GEN Awake male, sitting upright in bed in no apparent distress. EYES Pupils are equally round. No scleral erythema, discharge, or conjunctivitis. HENT Mucous membranes are moist. Oral pharynx without exudates, no evidence of thrush. NECK Supple, no apparent thyromegaly or masses. RESP Clear to auscultation, no wheezes, rales or rhonchi. Symmetric chest movement while on room air. CARDIO/VASC S1/S2 auscultated. Regular rate without appreciable murmurs, rubs, or gallops. No JVD or carotid bruits. Peripheral pulses equal bilaterally and palpable. Positive peripheral edema. GI Abdomen is soft without significant tenderness, masses, or guarding. Bowel sounds are normoactive.  Rectal exam deferred.  No costovertebral angle tenderness. Normal appearing external genitalia. HEME/LYMPH No palpable cervical lymphadenopathy and no hepatosplenomegaly. No petechiae or ecchymoses. MSK Spontaneous movement of all extremities. No gross joint deformities. SKIN Normal coloration, warm, dry. NEURO Cranial nerves appear grossly intact, normal speech, no lateralizing weakness. PSYCH Awake, alert, oriented x 4. Affect appropriate. INTAKE: In: -   Out: 2425   OUTPUT: In: -   Out: 2425 [Urine:2425]    LABS  Recent Labs     05/26/19  0652 05/27/19  0801 05/28/19  0610   WBC 5.1 5.7 6.5   HGB 10.0* 10.2* 9.7*   HCT 33.1* 33.9* 32.2*    263 237      Recent Labs     05/26/19  0652 05/27/19  0801 05/28/19  0610    142 139   K 2.6  K CALLED TO BRENNON FRANCO RN ON 05/25/2019 @ 0819 BY [employer]   RESULTS READ BACK  * 2.6  K CALLED TO Beatriz Patel RN ON 05/27/2019 @ 0842 BY [employer]   RESULTS READ BACK  * 2.5  K CALLED TO ADAN EUCEDA RN ON [id number] AT 0732 BY [facility]  RESULTS READ BACK  *   CL 92* 88* 89*   CO2 43* 46* 42*   BUN 19 26* 30*   CREATININE 1.0 1.3 1.4*     No results for input(s): AST, ALT, ALB, BILIDIR, BILITOT, ALKPHOS in the last 72 hours. No results for input(s): INR in the last 72 hours. No results for input(s): CKTOTAL, CKMB, CKMBINDEX, TROPONINT in the last 72 hours.        Abnormal labs for today noted      Imaging:     ECHO: 5/4/19-Summary   Left ventricular function is normal, EF is estimated at 50-55%.   Moderate left ventricular hypertrophy.   Infero-posterior wall segments are hypokinetic.   Moderate aortic regurgitation is noted ( ms).   No evidence of pericardial effusion.       Microbiology:  Blood culture:    Urine culture:    Sputum culture:    Procedures done this admission:    MEDS  Scheduled Meds:   potassium chloride  40 mEq Oral TID WC    [START ON 5/29/2019] potassium chloride  20 mEq Oral BID WC    psyllium  1 Package Oral Daily    furosemide 40 mg Oral BID    sulfamethoxazole-trimethoprim  1 tablet Oral 2 times per day    polyethylene glycol  17 g Oral BID    metoprolol tartrate  50 mg Oral BID    metolazone  2.5 mg Oral Daily    apixaban  2.5 mg Oral BID    amiodarone  200 mg Oral Daily    atorvastatin  20 mg Oral Nightly    vitamin D  5,000 Units Oral QAM    therapeutic multivitamin-minerals  1 tablet Oral Daily    omega-3 acid ethyl esters  1,000 mg Oral Daily    rivastigmine  1 patch Transdermal QAM    sodium chloride flush  10 mL Intravenous 2 times per day     Continuous Infusions:  PRN Meds:potassium chloride, lidocaine, sodium chloride flush, magnesium hydroxide, ondansetron        ASSESSMENT and [facility]: 6    1-Probable acute diastolic HF with underling VHD- moderate AR- has been switched to oral lasix- respiratory status okay abut with bilateral lower extremity edema- continue oral lasix for now  2-PAF- rate controlled, on eliquis  3-Renal stones with recent ureteral stent/urinary retention- urology placed monet- also noted urine cx growing staph spp-stap epi- resistant to bactrim- ?colonization- start doxy  4-Hypokalemia- persistent last two days in 2s- also on lasix- ordered scheduled supplements- consulted nephro given severity      -will discontinue monet, replace potassium- hold discharge for now.  Reassess in am        :     Disp:     Diet DIET CARDIAC;   DVT Prophylaxis [] Lovenox, []  Heparin, [] SCDs, [] Ambulation- eliquis   GI Prophylaxis [] PPI,  [] H2 Blocker,  [] Carafate,  [] Diet/Tube Feeds   Code Status Full Code   Disposition Patient requires continued admission due to hypokalemia   CMS Level of Risk [] Low, [] Moderate,[x]  High  Patient's risk as above due to hypokalemia     VICKY JAIN MD 5/28/2019 12:30 PM

## 2019-05-28 NOTE — CONSULTS
Nephrology Service Consultation    Patient:  Briana Beckford  MRN: [id number]  Consulting physician:  Natanael Banerjee MD  Reason for Consult:  SABIHA / hypokalemia     History Obtained From:  patient  PCP: Michele US    HISTORY OF PRESENT ILLNESS:   The patient is a 80 y.o. male who presented to the ED on 5/23  With SOB which had emerged a couple days prior to his ED visit which   Was worse with exertion. Patient states that he has also   Recently been been contending with dizziness before   Presenting to the ED. Patient had observed a 5 - 6 lb weight gain   During the 24 hours preceding his visit to the ED and had  Observed increased edema to the lower extremities     ED course: Patient did not demonstrate hypotension   Upon presentation to the ED. He was given a single dose  Of IV Lasix 60 mg when he presented to the ED. His initial creatinine was 0.9 and K: 3.3  Portable CXR demonstrated no acute cardiopulmonary process. In regard to patient's hypokalemia; patient demonstrates a pattern   Of chronic episodic hypokalemia based on serum K results within  Epic. He denies any history of bowel removal or chronic diarrhea. CO2 results do not demonstrate a pattern of chronic metabolic acidosis    Patient denies any recent chest pain / abdominal pain or nausea. He denies any recent diarrhea. REVIEW OF SYSTEMS:  The ten point review of systems   are negative except as mentioned above. Medical History:  HTN (circa 1980) / Atrial Fib / HFpEF  Dementia (circa 2017) /  Nephrolithiasis    Surgical History: Cardioversion (circa 2018) / pacemaker placement  Left ureteral stent placement. Renal History:  Baseline creatinine: 0.9 - 1.0.  / no history of RRT. -history of nephrolithiasis with recent left ureteral calculus with stent  Placement and patient has been followed by Dr. Zach Rodriguez.             Review of pertinent lab work and diagnostics available thus far:   Echo from 5/6/2019 demonstrated: EF: 50 - 55%  Moderate LVH  / Moderate aortic regurgitation      Medications:   Scheduled Meds:   potassium chloride  40 mEq Oral TID WC    [START ON 5/29/2019] potassium chloride  20 mEq Oral BID WC    psyllium  1 Package Oral Daily    furosemide  40 mg Oral BID    sulfamethoxazole-trimethoprim  1 tablet Oral 2 times per day    polyethylene glycol  17 g Oral BID    metoprolol tartrate  50 mg Oral BID    metolazone  2.5 mg Oral Daily    apixaban  2.5 mg Oral BID    amiodarone  200 mg Oral Daily    atorvastatin  20 mg Oral Nightly    vitamin D  5,000 Units Oral QAM    therapeutic multivitamin-minerals  1 tablet Oral Daily    omega-3 acid ethyl esters  1,000 mg Oral Daily    rivastigmine  1 patch Transdermal QAM    sodium chloride flush  10 mL Intravenous 2 times per day     Continuous Infusions:  PRN Meds:.potassium chloride, lidocaine, sodium chloride flush, magnesium hydroxide, ondansetron    Allergies:  Biaxin [clarithromycin]; Cephalexin; and Viagra [sildenafil citrate]    SOCIAL HISTORY:   Tobacco: denies use     Alcohol: denies use     Recreational Drug Use: denies use     Demographic History: resides at home with wife     Family History:   History reviewed. No pertinent family history. Physical Exam:    Vitals: BP (!) 127/59   Pulse 80   Temp 98.2 °F (36.8 °C) (Oral)   Resp 16   Ht 5' 8\" (1.727 m)   Wt 158 lb (71.7 kg)   SpO2 95%   BMI 24.02 kg/m²     General appearance:  awake and answering questions appropriately,   But does have trouble with memory recall during our visit. HEENT: Head: Normal, normocephalic, atraumatic.   Neck: supple, symmetrical, trachea midline  Cardiovascular: S1 and S2: normal / no rub  Pulmonary: diminished lung sounds bilaterally  Abdomen:  soft / non-tender   Extremities:  Trace edema to bilateral lower legs     CBC:   Recent Labs     05/26/19  0652 05/27/19  0801 05/28/19  0610   WBC 5.1 5.7 6.5   HGB 10.0* 10.2* 9.7*   PLT 274 263 237     BMP:    Recent Labs     05/26/19  0652 05/27/19  0801 05/28/19  0610    142 139   K 2.6  K CALLED TO BRENNON FRANCO RN ON 05/25/2019 @ 0819 BY [x+1] MLS   RESULTS READ BACK  * 2.6  K CALLED TO Hilda Olea RN ON 05/27/2019 @ 0842 BY [x+1] MLS   RESULTS READ BACK  * 2.5  K CALLED TO ADAN EUCEDA RN ON [id number] AT 0732 BY [facility]  RESULTS READ BACK  *   CL 92* 88* 89*   CO2 43* 46* 42*   BUN 19 26* 30*   CREATININE 1.0 1.3 1.4*   GLUCOSE 91 101* 90     Hepatic: No results for input(s): AST, ALT, ALB, BILITOT, ALKPHOS in the last 72 hours. Troponin: No results for input(s): TROPONINI in the last 72 hours. Mg, Phos:   Recent Labs     05/26/19  1618 05/27/19  0801 05/28/19  0610   MG 2.0 1.9 2.2       ABGs:   Lab Results   Component Value Date    PO2ART 70 05/26/2019    UWO1YZW 56.0 05/26/2019     INR: No results for input(s): INR in the last 72 hours. -----------------------------------------------------------------      Assessment and Recommendations     Impression   1. SABIHA   -likely etiology: ADHF-SABIHA / CRS and Bactrim may also   Be contributing to elevated creatinine as well     2. Left ureteral calculus and stent / Urinary Retention   3. HFpEF  4. Hypokalemia   5. Atrial Fib   6. UTI   7. Dementia   8.   HTN     PLAN   1.   -labs ordered: UA with micro / UPC / Urine electrolytes / BMP q am   -monet in place with art urine: uop: 2.22 liters / 24 hours   2.   -followed by Urology and monet placed by urology for urinary retention   -follow-up with Dr. Deb Renteria to address Nephrolithiasis and stent  3.   -maintain negative fluid balance   -currently on Lasix and Metolazone   4.   -latest serum K: 2.5; receiving IV repletion and PO repletion   -recheck serum K this afternoon and in am.    -on potassium replacement protocol    5.    -recent pulse rate: 65 - 80 bpm   -on Amiodarone / Metoprolol / Eliquis   6.   -currently on Bactrim DS BID   -urine culture demonstrated staph coagulase negative: moderate growth   7.   -on Exelon: monitor mood and affect   8.   -recent systolics: 872 - 426  -currently on Metoprolol     Electronically signed by CARI Carson - CNP    Patient Active Problem List   Diagnosis Code    S/P placement of cardiac pacemaker Z95.0    Persistent atrial fibrillation (HCC) I48.1    Ascending aortic aneurysm (HCC) I71.2    Hypervolemia E87.70    CHF (congestive heart failure), NYHA class I, acute on chronic, combined ([facility] [address] I50.43    Dyspnea R06.00     Pt seen , examined and chart reviewed    SABIHA from CRS type 1 / and perhaps bactrim raise cr a biot   He lost wt and diuresis  pretty well  He does have indwelling Lt ureteral stent for stone and will need cystoscopy- also had ac bladder obstruction necessisating monet per urology   Low k from diuretics and poor total body store   He is on loop   Add MRA for now  and replete K  daily wt  Low salt diet     His etiology of edema - likely RHF- but r/o other process such as significant proteinuria / clott/ venous obstruction / liver dz / varicose vein etc  ( if not done yet )

## 2019-05-28 NOTE — CARE COORDINATION
Simulation Cart Utilized:    CHF Book provided (Learning to Live with Heart failure):  [x] Yes  [] No     CHF zones/weighing daily:  [x] Yes  [] No     Type of Heart Failure/EF review:  [x] Yes  [] No     Contributing factor review:  [x] Yes  [] No     Medication review:  [] ACE/ARB/ARNI  [x] BB  [x] Diuretic  [] Vasodilators  [x] Aldosterone blockers  [] Hydralazine  [x] Anticougulant  [x] Other: Amiodarone    Low sodium diet:  [x] Why should I limit sodium  [x] Low sodium food list:  [x] Foods to avoid  [x] Hidden sources of sodium  [x] Reading a food label   [x] Low sodium cookbook provided    Proper use of digital scale:  [x] Yes  [] No  I provided the patient with a scale:  [x] Yes  [] No (Patient has a functioning scale at home)    Exercise/Cardiac Rehab review:  [x] Yes  [] No     Interactive Notebook review: (www. [email or web address].org)  [x] Yes  [] No     Day 1 Initial Assessment:  Date: 5/25/19            Time: 4:15  CN visit done. Introduced myself and explained my role as CN. Educational information and my contact information provided. 1. Why did you come to the hospital:  Staying up all night. My numbers were low on bloodwork. 2. History:    Who is your PCP? [facility]    Who is your Cardiologist?  [] Dr Kam Galicia  [x] Dr Melva Tovar  [] Dr Rubens Ospina  [] Dr Deuce Sahu  [] Dr Meka Aparicio  [] Dr Ajith De La Cruz  [] Dr Karla Evangelista  [] Dr Izzy Borges do you use? [facility]    Are you interested in Meds to bed? [x] Yes  [] No     Do you have all medications as prescribed at home? [x] Yes  [] No     How do you care for yourself at home? Independent  Immunizations:  [x] Influenza vaccine   [x] Prevnar 13 vaccine  [x] Pneumococcal 23 vaccine    Do you have home care? [x] Yes                                                [] No     Patient current with [facility] for RN, PT/OT services. Skilled facility? [] Yes  [x] No     LTC?   [] Yes  [x] No also made with Dr Myke Mckeon. AVS complete.     Obi Pierce RN, CHF Care Navigator

## 2019-05-28 NOTE — PROGRESS NOTES
Cardiology Progress Note     Today's Plan: stop metolazone     Admit Date:  5/23/2019    Consult reason/ Seen today for: CHF / AR    Subjective and  Overnight Events:  Resting in bed- notes his SOB has improved. Notes edema to the lower legs has improved. Telemetry SR     Assessment / Plan / Recommendation:     HFpEF: EF 50-55%   Currently he is in a negative fluid balance- clinically he is improving:  Continue with BB and diuresis- lasix is po- will stop metolazone  continue with I/O and daily weights-   PAF-h/o cardioversion -GLORIA VASc 3:  rate is controlled -Rhythm is controlled-continue with metoprolol, amiodarone and eliquis - Cardizem stopped as it may be contributing to edema    HTN- controlled - continue present management   VHD- moderate AI per echo   Hypokalemia- being replaced- will stop metolazone as this may be cause for ongoing hypokalemia         History of Presenting Illness:    Chief complain on admission : 80 y. o.year old who is admitted for  Chief Complaint   Patient presents with    Shortness of Breath     recently an inpatient for CHF; 5-6 pound weight gain since yesterday    Leg Swelling     bilateral        Past medical history:    has a past medical history of ACTA2-related familial thoracic aortic aneurysm, Arrhythmia, Atrial fibrillation (HCC), BBBB (bilateral bundle branch block), Bradycardia, CHF (congestive heart failure) ([address] Essential hypertension, malignant, History of cardioversion, History of chest x-ray, History of CT scan, History of echocardiogram, History of EKG, History of EKG, History of Holter monitoring, History of stress test, Hx of transesophageal echocardiography (SIMONE) for monitoring, Hyperlipemia, Hyperlipidemia, Hypertension, Nonspecific abnormal electrocardiogram (ECG) (EKG), Persistent atrial fibrillation ([address] Sick sinus syndrome ([address] and Vertigo.   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) and CYSTOSCOPY INSERTION / REMOVAL STENT / STONE (Left, 5/6/2019). Social History:   reports that he has never smoked. He has never used smokeless tobacco. He reports that he drinks about 0.6 oz of alcohol per week. He reports that he does not use drugs. Family history:  family history is not on file. Allergies   Allergen Reactions    Biaxin [Clarithromycin]     Cephalexin     Viagra [Sildenafil Citrate] Nausea Only and Other (See Comments)     Dizziness and BP dropped       Review of Systems:   All 14 systems were reviewed and are negative  Except for the positive findings  which as documented     BP (!) 127/59   Pulse 80   Temp 98.2 °F (36.8 °C) (Oral)   Resp 16   Ht 5' 8\" (1.727 m)   Wt 158 lb (71.7 kg)   SpO2 95%   BMI 24.02 kg/m²       Intake/Output Summary (Last 24 hours) at 5/28/2019 1336  Last data filed at 5/28/2019 2282  Gross per 24 hour   Intake --   Output 2425 ml   Net -2425 ml       Physical Exam:  Constitutional:  Well developed, Well nourished, No acute distress  HENT:  Normocephalic, Atraumatic, Bilateral external ears normal, Oropharynx moist, Nose normal.   Neck- No tenderness, Supple, No stridor. Eyes:  PERRL, Conjunctiva normal, No discharge. Respiratory:  Normal breath sounds, No respiratory distress  Cardiovascular:  Normal heart rate, Normal rhythm, 2/6 murmur appreciated, No rubs appreciated, No gallops appreciated, JVP not elevated  Abdomen/GI:  Bowel sounds normal, Soft, No tenderness Musculoskeletal:  Intact distal pulses, +1 edema to lower legs, No tenderness,   Integument:  Warm, Dry  Lymphatic:  No lymphadenopathy noted.    Neurologic:  Alert & oriented x 3  Psychiatric:  Affect and Mood :pleasant     Medications:    potassium chloride  40 mEq Oral TID WC    [START ON 5/29/2019] potassium chloride  20 mEq Oral BID WC    psyllium  1 Package Oral Daily    furosemide  40 mg Oral BID    examined this patient personally, independently of the nurse practitioner. I have reviewed the hospital care given to date and reviewed all pertinent labs and imaging. The plan was developed mutually at the time of the visit with the patient,  Jasiel Rapp  and myself. I have spoken with patient, nursing staff and provided written and verbal instructions . The above note has been reviewed and I agree with the assessment, diagnosis, and treatment plan with changes made by me as follows     CARDIOLOGY ATTENDING ADDENDUM    HPI:  I have reviewed the above HPI  And agree with above   Salazar Blue is a 80 y. o.year old who and presents with had concerns including Shortness of Breath (recently an inpatient for CHF; 5-6 pound weight gain since yesterday) and Leg Swelling (bilateral). Chief Complaint   Patient presents with    Shortness of Breath     recently an inpatient for CHF; 5-6 pound weight gain since yesterday    Leg Swelling     bilateral     Interval history: edema improved    Physical Exam:  General:  Awake, alert, NAD  Head:normal  Eye:normal  Neck:  No JVD   Chest:  Clear to auscultation, respiration easy  Cardiovascular:  RRR S1S2  Abdomen:   nontender  Extremities:  1 +edema  Pulses; palpable  Neuro: grossly normal      MEDICAL DECISION MAKING;    I agree with the above plan, which was planned by myself and discussed with NP. Continue diuresis  D/w daughter in detail , she is very worried about k being low  Volume status is definitely improved  Cardiology point.  I think he can proceed with urology procedure        Suze Spivey MD [facility] [place]

## 2019-05-28 NOTE — PROGRESS NOTES
Removed patient's catheter as ordered. Patient tolerated well, no c/o pain or S/Sx of distress. Blood tinge urine noted in Novak tubing and collection bag.

## 2019-05-28 NOTE — PLAN OF CARE
Problem: Falls - Risk of:  Goal: Will remain free from falls  Description  Will remain free from falls  5/27/2019 2354 by Brandie Connor RN  Outcome: Ongoing  5/27/2019 1747 by Clarita Gitelman, RN  Outcome: Ongoing  Goal: Absence of physical injury  Description  Absence of physical injury  5/27/2019 2354 by Brandie Connor RN  Outcome: Ongoing  5/27/2019 1747 by Clarita Gitelman, RN  Outcome: Ongoing

## 2019-05-28 NOTE — PROGRESS NOTES
Received N.O. Per Dr. Carrie Altman to remove patient's Novak catheter and begin bladder training. Hugo's daughter wanted this nurse to confirm with Dr. Mary Borden. She then stated that since the patient was in a lot of pain with insertion that she wanted Dr. Heidi Baez to remove it. Contacted Dr. Mary Borden and confirmed catheter removal, he stated that he would not be able to get to facility until after 5:00PM and to ask the hospitalist. After speaking again with the patient's daughter, she agreed that this nurse could remove Novak as long as I felt comfortable doing so. Informed her that removal was much easier than insertion and that I had done so many times. She agreed to let the catheter be removed after lunch.

## 2019-05-29 VITALS
OXYGEN SATURATION: 96 % | TEMPERATURE: 97.8 F | DIASTOLIC BLOOD PRESSURE: 56 MMHG | SYSTOLIC BLOOD PRESSURE: 113 MMHG | WEIGHT: 158 LBS | BODY MASS INDEX: 23.95 KG/M2 | HEIGHT: 68 IN | HEART RATE: 61 BPM | RESPIRATION RATE: 16 BRPM

## 2019-05-29 LAB
ANION GAP SERPL CALCULATED.3IONS-SCNC: 7 MMOL/L (ref 4–16)
BUN BLDV-MCNC: 25 MG/DL (ref 6–23)
CALCIUM SERPL-MCNC: 8.8 MG/DL (ref 8.3–10.6)
CHLORIDE BLD-SCNC: 93 MMOL/L (ref 99–110)
CO2: 40 MMOL/L (ref 21–32)
CREAT SERPL-MCNC: 1.5 MG/DL (ref 0.9–1.3)
GFR AFRICAN AMERICAN: 54 ML/MIN/1.73M2
GFR NON-AFRICAN AMERICAN: 44 ML/MIN/1.73M2
GLUCOSE BLD-MCNC: 89 MG/DL (ref 70–99)
HCT VFR BLD CALC: 32.9 % (ref 42–52)
HEMOGLOBIN: 10 GM/DL (ref 13.5–18)
MCH RBC QN AUTO: 24.8 PG (ref 27–31)
MCHC RBC AUTO-ENTMCNC: 30.4 % (ref 32–36)
MCV RBC AUTO: 81.6 FL (ref 78–100)
PDW BLD-RTO: 17.4 % (ref 11.7–14.9)
PLATELET # BLD: 240 K/CU MM (ref 140–440)
PMV BLD AUTO: 9.6 FL (ref 7.5–11.1)
POTASSIUM SERPL-SCNC: 3.7 MMOL/L (ref 3.5–5.1)
POTASSIUM SERPL-SCNC: 3.7 MMOL/L (ref 3.5–5.1)
PRO-BNP: 4896 PG/ML
RBC # BLD: 4.03 M/CU MM (ref 4.6–6.2)
SODIUM BLD-SCNC: 140 MMOL/L (ref 135–145)
WBC # BLD: 4.9 K/CU MM (ref 4–10.5)

## 2019-05-29 PROCEDURE — 6370000000 HC RX 637 (ALT 250 FOR IP): Performed by: INTERNAL MEDICINE

## 2019-05-29 PROCEDURE — 6370000000 HC RX 637 (ALT 250 FOR IP): Performed by: NURSE PRACTITIONER

## 2019-05-29 PROCEDURE — 51798 US URINE CAPACITY MEASURE: CPT

## 2019-05-29 PROCEDURE — 6370000000 HC RX 637 (ALT 250 FOR IP): Performed by: HOSPITALIST

## 2019-05-29 PROCEDURE — 36415 COLL VENOUS BLD VENIPUNCTURE: CPT

## 2019-05-29 PROCEDURE — 85027 COMPLETE CBC AUTOMATED: CPT

## 2019-05-29 PROCEDURE — 2580000003 HC RX 258: Performed by: INTERNAL MEDICINE

## 2019-05-29 PROCEDURE — 84132 ASSAY OF SERUM POTASSIUM: CPT

## 2019-05-29 PROCEDURE — 80048 BASIC METABOLIC PNL TOTAL CA: CPT

## 2019-05-29 PROCEDURE — 83880 ASSAY OF NATRIURETIC PEPTIDE: CPT

## 2019-05-29 RX ORDER — SPIRONOLACTONE 50 MG/1
TABLET, FILM COATED ORAL
Qty: 90 TABLET | Refills: 0 | Status: SHIPPED | OUTPATIENT
Start: 2019-05-29 | End: 2019-06-03 | Stop reason: ALTCHOICE

## 2019-05-29 RX ORDER — POLYETHYLENE GLYCOL 3350 17 G/17G
17 POWDER, FOR SOLUTION ORAL 2 TIMES DAILY
Qty: 527 G | Refills: 1 | Status: SHIPPED | OUTPATIENT
Start: 2019-05-29 | End: 2019-06-28

## 2019-05-29 RX ORDER — DOXYCYCLINE HYCLATE 100 MG
100 TABLET ORAL EVERY 12 HOURS SCHEDULED
Status: DISCONTINUED | OUTPATIENT
Start: 2019-05-29 | End: 2019-05-29 | Stop reason: HOSPADM

## 2019-05-29 RX ORDER — METOPROLOL TARTRATE 50 MG/1
50 TABLET, FILM COATED ORAL 2 TIMES DAILY
Qty: 60 TABLET | Refills: 3 | Status: ON HOLD | OUTPATIENT
Start: 2019-05-29 | End: 2019-08-28 | Stop reason: SDUPTHER

## 2019-05-29 RX ORDER — FUROSEMIDE 40 MG/1
40 TABLET ORAL 2 TIMES DAILY
Qty: 60 TABLET | Refills: 3 | Status: SHIPPED | OUTPATIENT
Start: 2019-05-29 | End: 2019-08-13 | Stop reason: SDUPTHER

## 2019-05-29 RX ORDER — DOXYCYCLINE HYCLATE 100 MG/1
100 CAPSULE ORAL EVERY 12 HOURS SCHEDULED
Qty: 14 CAPSULE | Refills: 0 | Status: SHIPPED | OUTPATIENT
Start: 2019-05-29 | End: 2019-06-05

## 2019-05-29 RX ADMIN — Medication 5000 UNITS: at 09:12

## 2019-05-29 RX ADMIN — Medication 1 PACKET: at 09:12

## 2019-05-29 RX ADMIN — SULFAMETHOXAZOLE AND TRIMETHOPRIM 1 TABLET: 800; 160 TABLET ORAL at 09:13

## 2019-05-29 RX ADMIN — POTASSIUM CHLORIDE 40 MEQ: 20 TABLET, EXTENDED RELEASE ORAL at 09:12

## 2019-05-29 RX ADMIN — FUROSEMIDE 40 MG: 40 TABLET ORAL at 09:13

## 2019-05-29 RX ADMIN — SODIUM CHLORIDE, PRESERVATIVE FREE 10 ML: 5 INJECTION INTRAVENOUS at 09:12

## 2019-05-29 RX ADMIN — SPIRONOLACTONE 100 MG: 50 TABLET ORAL at 09:13

## 2019-05-29 RX ADMIN — APIXABAN 2.5 MG: 2.5 TABLET, FILM COATED ORAL at 09:13

## 2019-05-29 RX ADMIN — OMEGA-3-ACID ETHYL ESTERS 1000 MG: 1 CAPSULE, LIQUID FILLED ORAL at 09:13

## 2019-05-29 RX ADMIN — POLYETHYLENE GLYCOL (3350) 17 G: 17 POWDER, FOR SOLUTION ORAL at 09:12

## 2019-05-29 RX ADMIN — MULTIPLE VITAMINS W/ MINERALS TAB 1 TABLET: TAB at 09:13

## 2019-05-29 RX ADMIN — METOPROLOL TARTRATE 50 MG: 50 TABLET ORAL at 09:13

## 2019-05-29 ASSESSMENT — PAIN SCALES - GENERAL: PAINLEVEL_OUTOF10: 0

## 2019-05-29 NOTE — DISCHARGE SUMMARY
Discharge Summary    Name:  Eri Nicole /Age/Sex: 10/5/1933  (80 y.o. male)   MRN & CSN:  [id number] & [id number] Admission Date/Time: 2019  5:50 PM   Attending:  Blaine Valerio MD Discharging Physician: Juliette Hanna MD     HPI and Hospital Course:   Eri Nicole is a 80 y.o.  male  who presents with Shortness of Breath (recently an inpatient for CHF; 5-6 pound weight gain since yesterday) and Leg Swelling (bilateral)    HPI- as per H and [address]  1-Probable acute diastolic HF with underling VHD- moderate AR- has been switched to oral lasix- respiratory status okay on room air abut with bilateral lower extremity edema- continue oral lasix for now and aldactone added  2-PAF- rate controlled, on eliquis  3-Renal stones with recent ureteral stent/urinary retention- urology placed monet- also noted urine cx growing staph spp-stap epi- resistant to bactrim- discussed with urology and switching to doxy  4-Hypokalemia- resolved with replacement. 5-Urinary retention- monet discontinued yesterday, had intermittent episodes of retention but appears much improved with last bladder scan recording 75ml, family do not want monet re-inserted, does well when he ambulates and then tries to empty bladder.    -noted 's recommendation, orders placed in [place] [id number] and discussed with daughter              The patient expressed appropriate understanding of and agreement with the discharge recommendations, medications, and plan.      Consults this admission:  IP CONSULT TO HOSPITALIST  IP CONSULT TO HEART FAILURE NURSE/COORDINATOR  IP CONSULT TO DIETITIAN  IP CONSULT TO CARDIOLOGY  IP CONSULT TO HEART FAILURE NURSE/COORDINATOR  IP CONSULT TO NEPHROLOGY  IP CONSULT TO Aiden Simeon Dr    Discharge Instruction:   Follow up appointments:   Primary care physician:  within 2 weeks    Diet:  General/cardiac/ADA/as tolerated  Activity: {discharge activity: as tolerated  Disposition: Discharged to: distended. Rectal exam deferred.  Novak catheter is not present. HEME/LYMPH No petechiae or ecchymoses. MSK Spontaneous movement of BL upper extremities  SKIN Normal coloration, warm, dry. NEURO Cranial nerves appear grossly intact  PSYCH Awake, alert.     BMP/CBC  Recent Labs     05/27/19  0801 05/28/19  0610 05/29/19  0118 05/29/19  0729    139  --  140   K 2.6  K CALLED TO RAUL TORRES RN ON 05/27/2019 @ 0842 BY [facility]   RESULTS READ BACK  * 2.5  K CALLED TO ADAN EUCEDA RN ON [id number] AT 0732 BY [facility]  RESULTS READ BACK  * 3.7 3.7   CL 88* 89*  --  93*   CO2 46* 42*  --  40*   BUN 26* 30*  --  25*   CREATININE 1.3 1.4*  --  1.5*   WBC 5.7 6.5  --  4.9   HCT 33.9* 32.2*  --  32.9*    237  --  240     SIGNIFICANT IMAGING AND LABS:      Discharge Time of 32 minutes    Electronically signed by Linn Bunch MD on 5/29/2019 at 12:02 PM

## 2019-05-29 NOTE — DISCHARGE INSTR - COC
Continuity of Care Form    Patient Name: Andi Salazar   :  10/5/1933  MRN:  [id number]    Admit date:  2019  Discharge date:  ***    Code Status Order: Full Code   Advance Directives:   Advance Care Flowsheet Documentation     Date/Time Healthcare Directive Type of Healthcare Directive Copy in [address] Agent's Name Healthcare Agent's Phone Number    [phone]  Yes, patient has an advance directive for healthcare treatment  Living will;Durable power of  for health care  Yes, copy in chart poa guillaume   --  --  --          Admitting Physician:  Aixa Galindo MD  PCP: Jorge US    Discharging Nurse: [facility] Unit/Room#: 4108/4108-A  Discharging Unit Phone Number: ***    Emergency Contact:   Extended Emergency Contact Information  Primary Emergency Contact: [address] Phone: [phone]  Mobile Phone: [phone]  Relation: Child  Secondary Emergency Contact: [address] [place] [address] Phone: [phone]  Relation: Other    Past Surgical History:  Past Surgical History:   Procedure Laterality Date    CYSTOSCOPY INSERTION / REMOVAL STENT / STONE Left 2019    CYSTOSCOPY RETROGRADE PYELOGRAM STENT INSERTION, DIAGNOSTIC CYSTOSCOPY performed by Grazyna Melara MD at [facility] Left 2018    Dual Chamber [employer] [place] XT DR AMI Kincaid       Immunization History:   Immunization History   Administered Date(s) Administered    Influenza, High Dose (Fluzone 65 yrs and older) 2018    Influenza, Kermitt Plaster, 3 yrs and older, IM, PF (Fluzone 3 yrs and older or Afluria 5 yrs and older) 2018    Pneumococcal 13-valent Conjugate ([id number]) 2015    Pneumococcal Polysaccharide ([id number]) 2011       Active Problems:  Patient Active Problem List   Diagnosis Code    S/P placement of cardiac pacemaker Z95.0    Persistent atrial fibrillation ([address] I48.1    Ascending aortic aneurysm (HCC) I71.2    Hypervolemia E87.70    CHF (congestive heart failure), NYHA class I, acute on chronic, combined ([facility]) I50.43    Dyspnea R06.00       Isolation/Infection:   Isolation          No Isolation            Nurse Assessment:  Last Vital Signs: BP (!) 113/56   Pulse 61   Temp 97.8 °F (36.6 °C) (Oral)   Resp 16   Ht 5' 8\" (1.727 m)   Wt 158 lb (71.7 kg)   SpO2 96%   BMI 24.02 kg/m²     Last documented pain score (0-10 scale): Pain Level: 0  Last Weight:   Wt Readings from Last 1 Encounters:   19 158 lb (71.7 kg)     Mental Status:  {IP PT MENTAL STATUS:}    IV Access:  { ALEXANDRA IV ACCESS:782631567}    Nursing Mobility/ADLs:  Walking   {CHP DME ANJK:539666654}  Transfer  {CHP DME MLU}  Bathing  {CHP DME PXCZ:364562606}  Dressing  {CHP DME NMZW:256059333}  Toileting  {CHP DME GBIT:500812502}  Feeding  {P DME DDDC:752832562}  Med Admin  {P DME YBNT:700872893}  Med Delivery   { ALEXANDRA MED Delivery:192948724}    Wound Care Documentation and Therapy:        Elimination:  Continence:   · Bowel: {YES / KV:55704}  · Bladder: {YES / HQ:88715}  Urinary Catheter: {Urinary Catheter:359793717}   Colostomy/Ileostomy/Ileal Conduit: {YES / CK:17285}       Date of Last BM: ***    Intake/Output Summary (Last 24 hours) at 2019 1208  Last data filed at 2019 0911  Gross per 24 hour   Intake 370 ml   Output 1650 ml   Net -1280 ml     I/O last 3 completed shifts:   In: 5 [P.O.:720]  Out: 1950 [Urine:1950]    Safety Concerns:     [address] Concerns:737718799}    Impairments/Disabilities:      [address] Impairments/Disabilities:571313037}    Nutrition Therapy:  Current Nutrition Therapy:   [address] Diet List:504119320}    Routes of Feeding: {CHP DME Other Feedings:536694491}  Liquids: {Slp liquid thickness:83620}  Daily Fluid Restriction: {CHP DME Yes amt example:373918137}  Last Modified Barium Swallow with Video (Video Swallowing Test): {Done Not Done YBQC:797022354}    Treatments at the Time of Hospital Discharge:   Respiratory Treatments: ***  Oxygen Therapy:  {Therapy; copd oxygen:63501}  Ventilator:    {[facility] Vent ABRR:354121499}    Rehab Therapies: {THERAPEUTIC INTERVENTION:1343469395}  Weight Bearing Status/Restrictions: {[facility] Weight Bearin}  Other Medical Equipment (for information only, NOT a DME order):  {EQUIPMENT:060775814}  Other Treatments: ***    Patient's personal belongings (please select all that are sent with patient):  {[facility] DME Belongings:445875338}    RN SIGNATURE:  {Esignature:455621394}    CASE MANAGEMENT/SOCIAL WORK SECTION    Inpatient Status Date: ***    Readmission Risk Assessment Score:  Readmission Risk              Risk of Unplanned Readmission:        29           Discharging to Facility/ Agency   · Name:   · Address:  · Phone:  · Fax:    Dialysis Facility (if applicable)   · Name:  · Address:  · Dialysis Schedule:  · Phone:  · Fax:    / signature: {Esignature:977373159}    PHYSICIAN SECTION    Prognosis: {Prognosis:5389412321}    Condition at Discharge: [address] [place] [address] Patient Condition:599270125}    Rehab Potential (if transferring to Rehab): {Prognosis:019347}    Recommended Labs or Other Treatments After Discharge: ***    Physician Certification: I certify the above information and transfer of Adela Kern  is necessary for the continuing treatment of the diagnosis listed and that he requires {Admit to Appropriate Level of Care:03167} for {GREATER/LESS:240354323} 30 days.      Update Admission H&P: {[facility]ME [facility]670[facility]7801}    PHYSICIAN SIGNATURE:  {Esignature:283355640}

## 2019-05-29 NOTE — PROGRESS NOTES
Spoke with pt's daughter. She stated she spoke with Dr. Linh Lara  regarding cystoscopy, and was told it could not be scheduled until pt's urine was cleared from UTI. She stated that she does not want another catheter placed in patient.

## 2019-05-29 NOTE — PROGRESS NOTES
Pt voided, bladder scan showed 220ml still in bladder. Pt ambulated around room and voided again. Bladder scanned again, 75ml left in bladder.

## 2019-05-29 NOTE — PROGRESS NOTES
2330 - bladder scanned patient showed 262 ml - Xochitl Garcia NP notified. New orders for one time straight cath received. Patient and daughter refusing straight cath stating nursing staff had \"too much trouble\" last time and would like for his urologist to decide next actions in morning. Patients daughter states that she is hopeful that patient will be able to have cystoscopy procedure while inpatient. Patient instructed to try and void, patient voided 100 ml clear yellow urine. Bladder scanned patient and showed >392 ml. Patient then instructed to ambulate around room and try to void a second time. 200 ml clear yellow urine voided a second time. Bladder scanned patient and showed 200 ml. Xochitl Garcia NP notified of above events. Will cont to monitor.

## 2019-05-29 NOTE — DISCHARGE INSTR - OTHER ORDERS
Follow up at the [facility] Failure Clinic on Monday Danelle 3 at 3:00    Follow up with Dr Rose Rodriguez on Monday June 10th at 1:45. They are faxing orders for home care to draw blood work in 5 days.

## 2019-05-30 LAB
EKG ATRIAL RATE: 60 BPM
EKG DIAGNOSIS: NORMAL
EKG P AXIS: 17 DEGREES
EKG P-R INTERVAL: 354 MS
EKG Q-T INTERVAL: 504 MS
EKG QRS DURATION: 166 MS
EKG QTC CALCULATION (BAZETT): 504 MS
EKG R AXIS: -64 DEGREES
EKG T AXIS: 79 DEGREES
EKG VENTRICULAR RATE: 60 BPM

## 2019-06-03 ENCOUNTER — INITIAL CONSULT (OUTPATIENT)
Dept: CARDIOLOGY CLINIC | Age: 84
End: 2019-06-03
Payer: MEDICARE

## 2019-06-03 ENCOUNTER — HOSPITAL ENCOUNTER (OUTPATIENT)
Age: 84
Setting detail: SPECIMEN
Discharge: HOME | End: 2019-06-03
Payer: MEDICARE

## 2019-06-03 VITALS
HEIGHT: 67 IN | RESPIRATION RATE: 12 BRPM | SYSTOLIC BLOOD PRESSURE: 112 MMHG | OXYGEN SATURATION: 98 % | HEART RATE: 61 BPM | DIASTOLIC BLOOD PRESSURE: 64 MMHG | BODY MASS INDEX: 24.48 KG/M2 | WEIGHT: 156 LBS

## 2019-06-03 DIAGNOSIS — I50.32 CHRONIC DIASTOLIC CONGESTIVE HEART FAILURE (HCC): ICD-10-CM

## 2019-06-03 LAB
ANION GAP SERPL CALCULATED.3IONS-SCNC: 9 MMOL/L (ref 4–16)
BUN BLDV-MCNC: 53 MG/DL (ref 6–23)
CALCIUM SERPL-MCNC: 9.6 MG/DL (ref 8.3–10.6)
CHLORIDE BLD-SCNC: 94 MMOL/L (ref 99–110)
CO2: 33 MMOL/L (ref 21–32)
CREAT SERPL-MCNC: 2 MG/DL (ref 0.9–1.3)
GFR AFRICAN AMERICAN: 39 ML/MIN/1.73M2
GFR NON-AFRICAN AMERICAN: 32 ML/MIN/1.73M2
GLUCOSE BLD-MCNC: 101 MG/DL (ref 70–99)
MAGNESIUM: 2.3 MG/DL (ref 1.8–2.4)
POTASSIUM SERPL-SCNC: 4.8 MMOL/L (ref 3.5–5.1)
SODIUM BLD-SCNC: 136 MMOL/L (ref 135–145)

## 2019-06-03 PROCEDURE — 99202 OFFICE O/P NEW SF 15 MIN: CPT | Performed by: NURSE PRACTITIONER

## 2019-06-03 PROCEDURE — G8427 DOCREV CUR MEDS BY ELIG CLIN: HCPCS | Performed by: NURSE PRACTITIONER

## 2019-06-03 PROCEDURE — 83735 ASSAY OF MAGNESIUM: CPT

## 2019-06-03 PROCEDURE — 80048 BASIC METABOLIC PNL TOTAL CA: CPT

## 2019-06-03 PROCEDURE — G8420 CALC BMI NORM PARAMETERS: HCPCS | Performed by: NURSE PRACTITIONER

## 2019-06-03 RX ORDER — SPIRONOLACTONE 25 MG/1
25 TABLET ORAL 2 TIMES DAILY
Qty: 60 TABLET | Refills: 5 | Status: SHIPPED | OUTPATIENT
Start: 2019-06-03 | End: 2020-01-31 | Stop reason: SDUPTHER

## 2019-06-03 NOTE — PROGRESS NOTES
[address]      Visit Date: 6/3/2019  Cardiologist:  Dr. Rolo Oneill  Nephrologist: Dr. Rodriguez Parent   Primary Care Physician: Dr. Miguel Prabhakar is a 80 y.o. male who presents today for:  Chief Complaint   Patient presents with    Congestive Heart Failure       HPI:   Katey Brown is a 80 y.o. male who presents to the office for a new patient visit in the heart failure clinic. He has a history of persistent atrial fib, HTN, HLD, HFpEF, VHD with moderate AS, dementia, pacemaker, left ureteral stent and a left ureteral calculus. He was recently admitted to [facility] for increase in SOB iwt exertion, a weight gain of 5-6 lbs over 24 hour period and increased edema to the lower legs. His daughter Destiny Harrell is with him today. He does note some fatigue and dizziness today with getting out of the car. Patient has: HFpEF  Last hospital admission related to Heart Failure:  05/23/2019  Chest Pain: no  Worsening SOB/orthopnea/PND: no  Edema: +ankle edema- has improved since hospitalization  Any extra diuretic use: no  Weight gain: no  Compliant checking home weight: yes  Fatigue: yes  Abdominal bloating: no  Appetite: good  Difficulty sleeping: no  CARMEN: no  Cough: no  Compliant checking blood pressure: yes  Compliant with medication regimen: yes  Any refills on CHF medications needed at this time: no  Vaccinations:  flu Yes  Vaccinations : pneumonia Yes    Past Medical History:   Diagnosis Date    ACTA2-related familial thoracic aortic aneurysm     Arrhythmia     Atrial fibrillation (HCC)     BBBB (bilateral bundle branch block)     Bradycardia     [facility] (congestive [facility] failure) [facility]     Essential hypertension, malignant     History of cardioversion 04/17/2018    History of chest x-ray 02/27/2017    Enlargement of the aorta knob which may represent a thoracic aortic aneurysm. Further evaluation w/ a contrast enhanced CT of the chest recommended.  no evidence of acute pulmonary process. 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 Recommend thyroid US.  History of echocardiogram 03/27/2017    TTE EF 55%, LV Normal Size, borderline LVH concentric, Normal LV systolic function, transmittal doppler flow pttern suggest impaired LV relaxation Mild aortic stenosis, mild aortic regurgitation.  History of EKG 02/27/2017    Time 12:03 AM, HR 75, A fib, Axis normal, Intervals normal, P waves normal, St-T Segments: nonspecific ST segment changes to the anterior lateral leads, QRS RBBB,  No significant Q waves, no ectopy. A-fib w/RBBB w/nonspecific ST segment change EKG.  History of EKG 02/27/2017    Time 2:58AM: HR 59, NSR, Axis normal, Intervals normal, P waves normal, ST-T seg: nonspecific ST segment changes, QRS RBBB, no significant Q waves, no ectopy. Sinus bradycardia w/ RBBB nonspecific ST Segment changes EKG    History of Holter monitoring 11/08/2017    monitored 48 hours: Patient noted to have multiple PAC and PVCs. Risk of atrial fibrillation present given previous episode Recommend antiarrhythmic therapy.  History of stress test 03/27/2017    NM Stress test: EF 54%, Abnormal study, evidence of mild ischemia in mild inferior regions. post stress LV is enlarged in size.  Post-stress LV function is normal.     Hx of transesophageal echocardiography (SIMONE) for monitoring 04/17/2018    EF45-50% mild TR, mild-mod MR, mod-severe AS    Hyperlipemia     Hyperlipidemia     Hypertension     Nonspecific abnormal electrocardiogram (ECG) (EKG)     Persistent atrial fibrillation (HCC)     Sick sinus syndrome (HCC)     Vertigo      Past Surgical History:   Procedure Laterality Date    CYSTOSCOPY INSERTION / REMOVAL STENT / STONE Left 5/6/2019    CYSTOSCOPY RETROGRADE PYELOGRAM STENT INSERTION, DIAGNOSTIC CYSTOSCOPY performed by Vincent Manzanares MD at [address] [facility] Left 03/29/2018    Dual Chamber [employer]onic [place] XT DR AMI Kincaid     History reviewed. No pertinent family history. Social History     Tobacco Use    Smoking status: Never Smoker    Smokeless tobacco: Never Used   Substance Use Topics    Alcohol use: Yes     Alcohol/week: 0.6 oz     Types: 1 Cans of beer per week     Comment: daily with dinner      Current Outpatient Medications   Medication Sig Dispense Refill    spironolactone (ALDACTONE) 25 MG tablet Take 1 tablet by mouth 2 times daily 60 tablet 5    furosemide (LASIX) 40 MG tablet Take 1 tablet by mouth 2 times daily 60 tablet 3    metoprolol tartrate (LOPRESSOR) 50 MG tablet Take 1 tablet by mouth 2 times daily 60 tablet 3    doxycycline hyclate (VIBRAMYCIN) 100 MG capsule Take 1 capsule by mouth every 12 hours for 7 days 14 capsule 0    polyethylene glycol (GLYCOLAX) packet Take 17 g by mouth 2 times daily 527 g 1    psyllium (KONSYL) 28.3 % PACK Take 1 packet by mouth daily      amiodarone (CORDARONE) 200 MG tablet Take 1 tablet by mouth daily 30 tablet 3    rivastigmine (EXELON) 4.6 MG/24HR Place 1 patch onto the skin every morning      Cholecalciferol (VITAMIN D3) 5000 units TABS Take 5,000 Units by mouth every morning      apixaban (ELIQUIS) 2.5 MG TABS tablet Take 1 tablet by mouth 2 times daily 60 tablet 3    Omega-3 Fatty Acids (FISH OIL) 1200 MG CAPS Take 1,200 mg by mouth daily       Multiple Vitamins-Minerals (THERAPEUTIC MULTIVITAMIN-MINERALS) tablet Take 1 tablet by mouth daily      atorvastatin (LIPITOR) 20 MG tablet Take 20 mg by mouth nightly        No current facility-administered medications for this visit.       Allergies   Allergen Reactions    Biaxin [Clarithromycin]     Cephalexin     Viagra [Sildenafil Citrate] Nausea Only and Other (See Comments)     Dizziness and BP dropped       SUBJECTIVE:   Review of Systems:   · Constitutional: No Fever,no unintentional weight Loss   · Eyes: No change in Vision:     · ENT: No Headaches, Hearing Loss or Vertigo. No tinnitus   · Cardiovascular: as per note above   · Respiratory: No cough or wheezing and as per note above. · Gastrointestinal: no abdominal pain, no appetite loss, no blood in stools, constipation, or diarrhea, No heartburn  · Genitourinary: No dysuria, trouble voiding, or hematuria  · Musculoskeletal: back pain- No: myalgia No,  Arthralgia- Yes  · Integumentary: No rash or pruritis  · Neurological: No TIA or stroke symptoms  · Psychiatric: Anxiety- No: depression-  No   · Endocrine: No malaise-No, fatigue Yes,- no temperature intolerance  · Hematologic/Lymphatic: No bleeding problems, blood clots or swollen lymph nodes  · Allergic/Immunologic: No nasal congestion or hives    OBJECTIVE:   Today's Vitals:  /64   Pulse 61   Resp 12   Ht 5' 7\" (1.702 m)   Wt 156 lb (70.8 kg)   SpO2 98%   BMI 24.43 kg/m²     Wt Readings from Last 3 Encounters:   06/03/19 156 lb (70.8 kg)   05/28/19 158 lb (71.7 kg)   05/11/19 156 lb (70.8 kg)     BP Readings from Last 3 Encounters:   06/03/19 112/64   05/29/19 (!) 113/56   05/11/19 126/78     Pulse Readings from Last 3 Encounters:   06/03/19 61   05/29/19 61   05/11/19 61     Body mass index is 24.43 kg/m². GENERAL - Alert, oriented, pleasant, in no apparent distress. Head -unremarkable  Eyes - pupils equal and reactive to light - bilateral conjunctiva are pink: sclera are white   ENT - external ears intact, nose is intact:  tongue is midline pink and moist  Neck - Supple. Jugular venous distention noted No: carotid bruits appreciated No.   Cardiovascular - Normal S1 and S2: systolic murmur appreciated Yes, No gallop. Regular rate- Yes,  rhythm regular-Yes.    Extremities - No cyanosis, clubbing, + edema to ankles    Pulmonary - No respiratory distress. No wheezes or rales. Chest is clear  Pulses: Bilateral radial and pedal pulses normal  Abdomen -  Soft no tenderness, non distended   Musculoskeletal - Normal movement of all extremities   Neurologic - alert and oriented: There are no gross focal neurologic abnormalities. Skin-  No rash: No echymosis   Affect- normal mood and pleasant     6 minute walk test:  Time walked unable to walk today- did not bring walker      Most recent ECHO: 05/06/2019  Left ventricular function is normal, EF is estimated at 50-55%.   Moderate left ventricular hypertrophy.   Infero-posterior wall segments are hypokinetic.   Moderate aortic regurgitation is noted ( ms).   No evidence of pericardial effusion. Results reviewed:  BNP:   Lab Results   Component Value Date    PROBNP 4,896 (H) 05/29/2019     CBC:   Lab Results   Component Value Date    WBC 4.9 05/29/2019    RBC 4.03 05/29/2019    HGB 10.0 05/29/2019    HCT 32.9 05/29/2019     05/29/2019     CMP:    Lab Results   Component Value Date     06/03/2019    K 4.8 06/03/2019    CL 94 06/03/2019    CO2 33 06/03/2019    BUN 53 06/03/2019    CREATININE 2.0 06/03/2019    GFRAA 39 06/03/2019    LABGLOM 32 06/03/2019    GLUCOSE 101 06/03/2019    CALCIUM 9.6 06/03/2019     Hepatic Function Panel:    Lab Results   Component Value Date    ALKPHOS 116 05/25/2019    ALT 56 05/25/2019    AST 43 05/25/2019    PROT 5.3 05/25/2019    BILITOT 0.4 05/25/2019    LABALBU 3.3 05/25/2019     Magnesium:    Lab Results   Component Value Date    MG 2.3 06/03/2019     PT/INR:    Lab Results   Component Value Date    PROTIME 11.8 05/23/2019    INR 1.04 05/23/2019     Lipids:    Lab Results   Component Value Date    TRIG 72 05/24/2019    HDL 47 05/24/2019    LDLDIRECT 86 05/24/2019       ASSESSMENT AND PLAN:   The patient's condition/symptoms are Stable: No clinical evidence of fluid overload today.  Continue current medical regimen without changes at present time.    ACE/ ARB No - SABIHA-      Persistently symptomatic AA with NYHA class III-IV  NA  In addition to ACE/ ARB/ARNI:   hydralazine + Nitrate _________    Loop Diuretic Yes  Lasix 40 mg bid     eGFR >30/mil/min/173.m2 and K <5.0 mEq/l   mineralcorctcoid receptor antagonist (spiroalacotne ( aldactone)/ eplerone)  Blocker :  Aldactone 50 mg bid       B-blocker Yes  metoprolol 50 mg bid    HR greater than 70 with patient with LVEF  < 35 NA  Able to use Ivabradine NA      Diagnosis Orders   1. Chronic diastolic congestive heart failure ([facility] [address]         Plan:  · all  HF Zones reviewed. · Daily weights  · Fluid restriction of 2 Liters per day  · Limit sodium in diet to around 1445-9987 mg/day  · Monitor BP  · Activity as tolerated   · Continue present medical management:   · Decreased aldactone to 25 mg bid   · To track daily weight/BP and HR and bring in list on next visit. Patient was instructed to call the [employer] for changes in the following symptoms:    Weight gain of 3 pounds in 1 day or 5 pounds in 1 week   Increased shortness of breath   Shortness of breath while laying down   Cough   Chest pain   Swelling in feet, ankles or legs   Tenderness or bloating in the abdomen   Fatigue    Decreased appetite or nausea    Confusion      follow up in 1 month or sooner if needed    Patient given educational materials - see patient instructions. We discussed the importance of weighing oneself and recording daily. We also discussed the importance of a lowsodium diet, higher sodium foods to avoid and better low sodium food options. Discussed use, benefit, and side effects of prescribed medications. All patient questions answered. Patient verbalizes understanding of plan of care using teach back method, and is agreeable to the treatment plan.        Electronicallysigned by CARI VAUGHN CNP on 6/3/2019 at 4:15 PM

## 2019-06-08 LAB
FINAL RESULT: NORMAL
FINAL RESULT: NORMAL
PRELIMINARY: NORMAL
PRELIMINARY: NORMAL

## 2019-06-14 PROBLEM — N20.0 KIDNEY STONE: Status: ACTIVE | Noted: 2019-06-14

## 2019-06-14 PROBLEM — I95.9 HYPOTENSION: Status: ACTIVE | Noted: 2019-06-14

## 2019-06-14 PROBLEM — N17.9 ACUTE KIDNEY INJURY SUPERIMPOSED ON CHRONIC KIDNEY DISEASE (HCC): Status: ACTIVE | Noted: 2019-06-14

## 2019-06-14 PROBLEM — N18.9 ACUTE KIDNEY INJURY SUPERIMPOSED ON CHRONIC KIDNEY DISEASE (HCC): Status: ACTIVE | Noted: 2019-06-14

## 2019-06-14 PROBLEM — N18.31 CHRONIC KIDNEY DISEASE (CKD) STAGE G3A/A3, MODERATELY DECREASED GLOMERULAR FILTRATION RATE (GFR) BETWEEN 45-59 ML/MIN/1.73 SQUARE METER AND ALBUMINURIA CREATININE RATIO GREATER THAN 300 MG/G (HCC): Status: ACTIVE | Noted: 2019-06-14

## 2019-06-14 PROBLEM — I48.91 ATRIAL FIBRILLATION (HCC): Status: ACTIVE | Noted: 2019-06-14

## 2019-06-28 ENCOUNTER — HOSPITAL ENCOUNTER (OUTPATIENT)
Age: 84
Setting detail: SPECIMEN
Discharge: HOME | End: 2019-06-28
Payer: MEDICARE

## 2019-06-28 LAB
ANION GAP SERPL CALCULATED.3IONS-SCNC: 10 MMOL/L (ref 4–16)
BACTERIA: ABNORMAL /HPF
BILIRUBIN URINE: NEGATIVE MG/DL
BLOOD, URINE: ABNORMAL
BUN BLDV-MCNC: 39 MG/DL (ref 6–23)
CALCIUM SERPL-MCNC: 9.3 MG/DL (ref 8.3–10.6)
CHLORIDE BLD-SCNC: 98 MMOL/L (ref 99–110)
CLARITY: ABNORMAL
CO2: 32 MMOL/L (ref 21–32)
COLOR: YELLOW
CREAT SERPL-MCNC: 1.7 MG/DL (ref 0.9–1.3)
GFR AFRICAN AMERICAN: 47 ML/MIN/1.73M2
GFR NON-AFRICAN AMERICAN: 39 ML/MIN/1.73M2
GLUCOSE BLD-MCNC: 68 MG/DL (ref 70–99)
GLUCOSE, URINE: NEGATIVE MG/DL
KETONES, URINE: NEGATIVE MG/DL
LEUKOCYTE ESTERASE, URINE: ABNORMAL
MAGNESIUM: 2.5 MG/DL (ref 1.8–2.4)
MUCUS: ABNORMAL HPF
NITRITE URINE, QUANTITATIVE: NEGATIVE
PH, URINE: 6 (ref 5–8)
POTASSIUM SERPL-SCNC: 4.5 MMOL/L (ref 3.5–5.1)
PROTEIN UA: 30 MG/DL
RBC URINE: 1304 /HPF (ref 0–3)
SODIUM BLD-SCNC: 140 MMOL/L (ref 135–145)
SPECIFIC GRAVITY UA: 1.01 (ref 1–1.03)
SQUAMOUS EPITHELIAL: <1 /HPF
TRICHOMONAS: ABNORMAL /HPF
UROBILINOGEN, URINE: NORMAL MG/DL (ref 0.2–1)
WBC UA: 9 /HPF (ref 0–2)

## 2019-06-28 PROCEDURE — 81001 URINALYSIS AUTO W/SCOPE: CPT

## 2019-06-28 PROCEDURE — 87186 SC STD MICRODIL/AGAR DIL: CPT

## 2019-06-28 PROCEDURE — 83735 ASSAY OF MAGNESIUM: CPT

## 2019-06-28 PROCEDURE — 87088 URINE BACTERIA CULTURE: CPT

## 2019-06-28 PROCEDURE — 87086 URINE CULTURE/COLONY COUNT: CPT

## 2019-06-28 PROCEDURE — 80048 BASIC METABOLIC PNL TOTAL CA: CPT

## 2019-06-30 LAB
CULTURE: ABNORMAL
CULTURE: ABNORMAL
Lab: ABNORMAL
SPECIMEN: ABNORMAL

## 2019-07-01 ENCOUNTER — OFFICE VISIT (OUTPATIENT)
Dept: CARDIOLOGY CLINIC | Age: 84
End: 2019-07-01

## 2019-07-01 VITALS
WEIGHT: 157.4 LBS | HEIGHT: 67 IN | SYSTOLIC BLOOD PRESSURE: 114 MMHG | BODY MASS INDEX: 24.71 KG/M2 | OXYGEN SATURATION: 97 % | DIASTOLIC BLOOD PRESSURE: 62 MMHG | RESPIRATION RATE: 16 BRPM | HEART RATE: 102 BPM

## 2019-07-01 DIAGNOSIS — I50.43 CHF (CONGESTIVE HEART FAILURE), NYHA CLASS I, ACUTE ON CHRONIC, COMBINED (HCC): Primary | ICD-10-CM

## 2019-07-01 PROCEDURE — 99999 PR OFFICE/OUTPT VISIT,PROCEDURE ONLY: CPT | Performed by: NURSE PRACTITIONER

## 2019-07-03 LAB
ACID FAST SMEAR: NORMAL
ACID FAST SMEAR: NORMAL
FINAL RESULT: NORMAL
FINAL RESULT: NORMAL
PRELIMINARY: NORMAL
PRELIMINARY: NORMAL

## 2019-07-11 ENCOUNTER — TELEPHONE (OUTPATIENT)
Dept: CARDIOLOGY CLINIC | Age: 84
End: 2019-07-11

## 2019-07-11 NOTE — TELEPHONE ENCOUNTER
Cardiac clearance request received from Dr. Christiano Dover for Cystosclpy, uteroscopy, retrograde pyelogram, stone manipulation with Holium laser, litholapaxy scheduled  ASAP.  FAX # [id number]

## 2019-07-18 ENCOUNTER — HOSPITAL ENCOUNTER (OUTPATIENT)
Age: 84
Setting detail: SPECIMEN
Discharge: HOME | End: 2019-07-18
Payer: MEDICARE

## 2019-07-18 PROCEDURE — 87086 URINE CULTURE/COLONY COUNT: CPT

## 2019-07-19 LAB
CULTURE: NORMAL
Lab: NORMAL
SPECIMEN: NORMAL

## 2019-07-24 ENCOUNTER — TELEPHONE (OUTPATIENT)
Dept: CARDIOLOGY CLINIC | Age: 84
End: 2019-07-24

## 2019-08-12 ENCOUNTER — TELEPHONE (OUTPATIENT)
Dept: CARDIOLOGY CLINIC | Age: 84
End: 2019-08-12

## 2019-08-13 ENCOUNTER — OFFICE VISIT (OUTPATIENT)
Dept: CARDIOLOGY CLINIC | Age: 84
End: 2019-08-13
Payer: MEDICARE

## 2019-08-13 ENCOUNTER — TELEPHONE (OUTPATIENT)
Dept: CARDIOLOGY CLINIC | Age: 84
End: 2019-08-13

## 2019-08-13 VITALS
DIASTOLIC BLOOD PRESSURE: 60 MMHG | WEIGHT: 150.6 LBS | BODY MASS INDEX: 21.56 KG/M2 | HEART RATE: 65 BPM | SYSTOLIC BLOOD PRESSURE: 110 MMHG | HEIGHT: 70 IN

## 2019-08-13 DIAGNOSIS — I48.19 PERSISTENT ATRIAL FIBRILLATION (HCC): Primary | ICD-10-CM

## 2019-08-13 DIAGNOSIS — I95.1 ORTHOSTATIC HYPOTENSION: ICD-10-CM

## 2019-08-13 DIAGNOSIS — I50.43 CHF (CONGESTIVE HEART FAILURE), NYHA CLASS I, ACUTE ON CHRONIC, COMBINED (HCC): ICD-10-CM

## 2019-08-13 PROCEDURE — 4040F PNEUMOC VAC/ADMIN/RCVD: CPT | Performed by: NURSE PRACTITIONER

## 2019-08-13 PROCEDURE — 1036F TOBACCO NON-USER: CPT | Performed by: NURSE PRACTITIONER

## 2019-08-13 PROCEDURE — 1123F ACP DISCUSS/DSCN MKR DOCD: CPT | Performed by: NURSE PRACTITIONER

## 2019-08-13 PROCEDURE — G8427 DOCREV CUR MEDS BY ELIG CLIN: HCPCS | Performed by: NURSE PRACTITIONER

## 2019-08-13 PROCEDURE — G8420 CALC BMI NORM PARAMETERS: HCPCS | Performed by: NURSE PRACTITIONER

## 2019-08-13 PROCEDURE — 99213 OFFICE O/P EST LOW 20 MIN: CPT | Performed by: NURSE PRACTITIONER

## 2019-08-13 RX ORDER — FUROSEMIDE 40 MG/1
20 TABLET ORAL 2 TIMES DAILY
Qty: 60 TABLET | Refills: 3
Start: 2019-08-13 | End: 2019-08-15

## 2019-08-13 NOTE — PROGRESS NOTES
Marty, APRN - NP        acetaminophen (TYLENOL) tablet 650 mg  650 mg Oral Q4H PRN Mozell Pitch, APRN - NP        amiodarone (CORDARONE) tablet 200 mg  200 mg Oral Nightly Mozell Pitch, APRN - NP   200 mg at 08/18/19 2249    apixaban (ELIQUIS) tablet 2.5 mg  2.5 mg Oral BID Mozell Pitch, APRN - NP   2.5 mg at 08/19/19 1507    atorvastatin (LIPITOR) tablet 20 mg  20 mg Oral Nightly Mozell Pitch, APRN - NP   20 mg at 08/18/19 2249    metoprolol tartrate (LOPRESSOR) tablet 50 mg  50 mg Oral BID Mozell Pitch, APRN - NP   Stopped at 08/19/19 0919    rivastigmine (EXELON) 4.6 MG/24HR 1 patch  1 patch Transdermal QAM [facility] Pitch, APRN - NP   1 patch at 08/19/19 5325     Allergies: Biaxin [clarithromycin]; Cephalexin; and Viagra [sildenafil citrate]  Past Medical History:   Diagnosis Date    ACTA2-related familial thoracic aortic aneurysm     Arrhythmia     Atrial fibrillation (HCC)     BBBB (bilateral bundle branch block)     Bradycardia     CHF (congestive heart failure) (HCC)     Essential hypertension, malignant     History of cardioversion 04/17/2018    History of chest x-ray 02/27/2017    Enlargement of the aorta knob which may represent a thoracic aortic aneurysm. Further evaluation w/ a contrast enhanced CT of the chest recommended. no evidence of acute pulmonary process. 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 Recommend thyroid US.  History of echocardiogram 03/27/2017    TTE EF 55%, LV Normal Size, borderline LVH concentric, Normal LV systolic function, transmittal doppler flow pttern suggest impaired LV relaxation Mild aortic stenosis, mild aortic regurgitation. Testing was reviewed. Patient is encouraged to exercise even a brisk walk for 30 minutes at least 3 to 4 times a week. Lifestyle and risk factor modificatons discussed. Various goals are discussed and questions answered. Continue current medications. Appropriate prescriptions are addressed. Questions answered and patient verbalizes understanding. Call for any problems, questions, or concerns.     Pt is to follow up in 2 weeks for Cardiac management    Electronically signed by CARI Raya CNP on 8/13/2019 at 12:35 PM

## 2019-08-13 NOTE — TELEPHONE ENCOUNTER
Daughter called patient was written for Compression stockings , He has a catheter in for a bladder infection , they would like to wait till the infection has cleared up asking [facility]   Fax [phone]

## 2019-08-15 RX ORDER — FUROSEMIDE 20 MG/1
20 TABLET ORAL 2 TIMES DAILY
Qty: 90 TABLET | Refills: 1 | Status: SHIPPED | OUTPATIENT
Start: 2019-08-15 | End: 2019-10-04 | Stop reason: SDUPTHER

## 2019-08-18 ENCOUNTER — APPOINTMENT (OUTPATIENT)
Dept: GENERAL RADIOLOGY | Age: 84
DRG: 682 | End: 2019-08-18
Payer: MEDICARE

## 2019-08-18 ENCOUNTER — APPOINTMENT (OUTPATIENT)
Dept: CT IMAGING | Age: 84
DRG: 682 | End: 2019-08-18
Payer: MEDICARE

## 2019-08-18 ENCOUNTER — HOSPITAL ENCOUNTER (INPATIENT)
Age: 84
LOS: 2 days | Discharge: HOME | DRG: 682 | End: 2019-08-20
Attending: EMERGENCY MEDICINE | Admitting: INTERNAL MEDICINE
Payer: MEDICARE

## 2019-08-18 PROBLEM — N17.9 AKI (ACUTE KIDNEY INJURY) (HCC): Status: ACTIVE | Noted: 2019-08-18

## 2019-08-18 LAB
ALBUMIN SERPL-MCNC: 3.3 GM/DL (ref 3.4–5)
ALP BLD-CCNC: 84 IU/L (ref 40–129)
ALT SERPL-CCNC: 214 U/L (ref 10–40)
AMMONIA: 19 UMOL/L (ref 16–60)
ANION GAP SERPL CALCULATED.3IONS-SCNC: 11 MMOL/L (ref 4–16)
APTT: 42.9 SECONDS (ref 21.2–33)
AST SERPL-CCNC: 116 IU/L (ref 15–37)
BACTERIA: ABNORMAL /HPF
BACTERIA: ABNORMAL /HPF
BASOPHILS ABSOLUTE: 0 K/CU MM
BASOPHILS RELATIVE PERCENT: 0.1 % (ref 0–1)
BILIRUB SERPL-MCNC: 0.4 MG/DL (ref 0–1)
BILIRUBIN URINE: NEGATIVE MG/DL
BILIRUBIN URINE: NEGATIVE MG/DL
BLOOD, URINE: ABNORMAL
BLOOD, URINE: ABNORMAL
BUN BLDV-MCNC: 47 MG/DL (ref 6–23)
CALCIUM SERPL-MCNC: 9.1 MG/DL (ref 8.3–10.6)
CHLORIDE BLD-SCNC: 88 MMOL/L (ref 99–110)
CHP ED QC CHECK: YES
CLARITY: CLEAR
CLARITY: CLEAR
CO2: 24 MMOL/L (ref 21–32)
COLOR: ABNORMAL
COLOR: YELLOW
CREAT SERPL-MCNC: 2.9 MG/DL (ref 0.9–1.3)
DIFFERENTIAL TYPE: ABNORMAL
EOSINOPHILS ABSOLUTE: 0 K/CU MM
EOSINOPHILS RELATIVE PERCENT: 0.1 % (ref 0–3)
GFR AFRICAN AMERICAN: 25 ML/MIN/1.73M2
GFR NON-AFRICAN AMERICAN: 21 ML/MIN/1.73M2
GLUCOSE BLD-MCNC: 102 MG/DL (ref 70–99)
GLUCOSE BLD-MCNC: 123 MG/DL
GLUCOSE, URINE: NEGATIVE MG/DL
GLUCOSE, URINE: NEGATIVE MG/DL
HCT VFR BLD CALC: 30.9 % (ref 42–52)
HEMOGLOBIN: 9.7 GM/DL (ref 13.5–18)
HYALINE CASTS: 2 /LPF
IMMATURE NEUTROPHIL %: 0.5 % (ref 0–0.43)
INR BLD: 1.47 INDEX
KETONES, URINE: NEGATIVE MG/DL
KETONES, URINE: NEGATIVE MG/DL
LEUKOCYTE ESTERASE, URINE: ABNORMAL
LEUKOCYTE ESTERASE, URINE: ABNORMAL
LYMPHOCYTES ABSOLUTE: 1 K/CU MM
LYMPHOCYTES RELATIVE PERCENT: 12.2 % (ref 24–44)
MAGNESIUM: 2.1 MG/DL (ref 1.8–2.4)
MCH RBC QN AUTO: 26.2 PG (ref 27–31)
MCHC RBC AUTO-ENTMCNC: 31.4 % (ref 32–36)
MCV RBC AUTO: 83.5 FL (ref 78–100)
MONOCYTES ABSOLUTE: 0.6 K/CU MM
MONOCYTES RELATIVE PERCENT: 7.1 % (ref 0–4)
MUCUS: ABNORMAL HPF
NITRITE URINE, QUANTITATIVE: NEGATIVE
NITRITE URINE, QUANTITATIVE: NEGATIVE
NUCLEATED RBC %: 0 %
PDW BLD-RTO: 15.9 % (ref 11.7–14.9)
PH, URINE: 6 (ref 5–8)
PH, URINE: 7 (ref 5–8)
PLATELET # BLD: 358 K/CU MM (ref 140–440)
PMV BLD AUTO: 9.5 FL (ref 7.5–11.1)
POTASSIUM SERPL-SCNC: 4.9 MMOL/L (ref 3.5–5.1)
PRO-BNP: 7638 PG/ML
PROTEIN UA: NEGATIVE MG/DL
PROTEIN UA: NEGATIVE MG/DL
PROTHROMBIN TIME: 16.7 SECONDS (ref 9.12–12.5)
RBC # BLD: 3.7 M/CU MM (ref 4.6–6.2)
RBC URINE: 146 /HPF (ref 0–3)
RBC URINE: 7 /HPF (ref 0–3)
SEGMENTED NEUTROPHILS ABSOLUTE COUNT: 6.4 K/CU MM
SEGMENTED NEUTROPHILS RELATIVE PERCENT: 80 % (ref 36–66)
SODIUM BLD-SCNC: 123 MMOL/L (ref 135–145)
SODIUM BLD-SCNC: 124 MMOL/L (ref 135–145)
SODIUM URINE: 34 MMOL/L (ref 35–167)
SPECIFIC GRAVITY UA: 1 (ref 1–1.03)
SPECIFIC GRAVITY UA: 1 (ref 1–1.03)
TOTAL CK: 35 IU/L (ref 38–174)
TOTAL IMMATURE NEUTOROPHIL: 0.04 K/CU MM
TOTAL NUCLEATED RBC: 0 K/CU MM
TOTAL PROTEIN: 6.3 GM/DL (ref 6.4–8.2)
TRICHOMONAS: ABNORMAL /HPF
TRICHOMONAS: ABNORMAL /HPF
TROPONIN T: <0.01 NG/ML
TSH HIGH SENSITIVITY: 1.77 UIU/ML (ref 0.27–4.2)
UROBILINOGEN, URINE: NORMAL MG/DL (ref 0.2–1)
UROBILINOGEN, URINE: NORMAL MG/DL (ref 0.2–1)
WBC # BLD: 8 K/CU MM (ref 4–10.5)
WBC UA: 4 /HPF (ref 0–2)
WBC UA: 7 /HPF (ref 0–2)

## 2019-08-18 PROCEDURE — 96376 TX/PRO/DX INJ SAME DRUG ADON: CPT

## 2019-08-18 PROCEDURE — 82550 ASSAY OF CK (CPK): CPT

## 2019-08-18 PROCEDURE — 87086 URINE CULTURE/COLONY COUNT: CPT

## 2019-08-18 PROCEDURE — 81001 URINALYSIS AUTO W/SCOPE: CPT

## 2019-08-18 PROCEDURE — 85610 PROTHROMBIN TIME: CPT

## 2019-08-18 PROCEDURE — 2580000003 HC RX 258: Performed by: NURSE PRACTITIONER

## 2019-08-18 PROCEDURE — 99285 EMERGENCY DEPT VISIT HI MDM: CPT

## 2019-08-18 PROCEDURE — 83935 ASSAY OF URINE OSMOLALITY: CPT

## 2019-08-18 PROCEDURE — 83735 ASSAY OF MAGNESIUM: CPT

## 2019-08-18 PROCEDURE — 87186 SC STD MICRODIL/AGAR DIL: CPT

## 2019-08-18 PROCEDURE — 83930 ASSAY OF BLOOD OSMOLALITY: CPT

## 2019-08-18 PROCEDURE — 93005 ELECTROCARDIOGRAM TRACING: CPT | Performed by: EMERGENCY MEDICINE

## 2019-08-18 PROCEDURE — 96374 THER/PROPH/DIAG INJ IV PUSH: CPT

## 2019-08-18 PROCEDURE — 94761 N-INVAS EAR/PLS OXIMETRY MLT: CPT

## 2019-08-18 PROCEDURE — 71046 X-RAY EXAM CHEST 2 VIEWS: CPT

## 2019-08-18 PROCEDURE — 87077 CULTURE AEROBIC IDENTIFY: CPT

## 2019-08-18 PROCEDURE — 85730 THROMBOPLASTIN TIME PARTIAL: CPT

## 2019-08-18 PROCEDURE — 83880 ASSAY OF NATRIURETIC PEPTIDE: CPT

## 2019-08-18 PROCEDURE — 6370000000 HC RX 637 (ALT 250 FOR IP): Performed by: NURSE PRACTITIONER

## 2019-08-18 PROCEDURE — 84484 ASSAY OF TROPONIN QUANT: CPT

## 2019-08-18 PROCEDURE — 80053 COMPREHEN METABOLIC PANEL: CPT

## 2019-08-18 PROCEDURE — 82140 ASSAY OF AMMONIA: CPT

## 2019-08-18 PROCEDURE — 2580000003 HC RX 258: Performed by: EMERGENCY MEDICINE

## 2019-08-18 PROCEDURE — 4500000027

## 2019-08-18 PROCEDURE — 84443 ASSAY THYROID STIM HORMONE: CPT

## 2019-08-18 PROCEDURE — 84300 ASSAY OF URINE SODIUM: CPT

## 2019-08-18 PROCEDURE — 85025 COMPLETE CBC W/AUTO DIFF WBC: CPT

## 2019-08-18 PROCEDURE — 6370000000 HC RX 637 (ALT 250 FOR IP): Performed by: EMERGENCY MEDICINE

## 2019-08-18 PROCEDURE — 84295 ASSAY OF SERUM SODIUM: CPT

## 2019-08-18 PROCEDURE — G0378 HOSPITAL OBSERVATION PER HR: HCPCS

## 2019-08-18 PROCEDURE — 96361 HYDRATE IV INFUSION ADD-ON: CPT

## 2019-08-18 PROCEDURE — 70450 CT HEAD/BRAIN W/O DYE: CPT

## 2019-08-18 PROCEDURE — 1200000000 HC SEMI PRIVATE

## 2019-08-18 PROCEDURE — 36415 COLL VENOUS BLD VENIPUNCTURE: CPT

## 2019-08-18 RX ORDER — SODIUM CHLORIDE 0.9 % (FLUSH) 0.9 %
10 SYRINGE (ML) INJECTION PRN
Status: DISCONTINUED | OUTPATIENT
Start: 2019-08-18 | End: 2019-08-20 | Stop reason: HOSPADM

## 2019-08-18 RX ORDER — NITROFURANTOIN 25; 75 MG/1; MG/1
100 CAPSULE ORAL EVERY 12 HOURS SCHEDULED
Status: DISCONTINUED | OUTPATIENT
Start: 2019-08-18 | End: 2019-08-18

## 2019-08-18 RX ORDER — 0.9 % SODIUM CHLORIDE 0.9 %
1000 INTRAVENOUS SOLUTION INTRAVENOUS ONCE
Status: COMPLETED | OUTPATIENT
Start: 2019-08-18 | End: 2019-08-18

## 2019-08-18 RX ORDER — RIVASTIGMINE 4.6 MG/24H
1 PATCH, EXTENDED RELEASE TRANSDERMAL EVERY MORNING
Status: DISCONTINUED | OUTPATIENT
Start: 2019-08-19 | End: 2019-08-20 | Stop reason: HOSPADM

## 2019-08-18 RX ORDER — ATORVASTATIN CALCIUM 20 MG/1
20 TABLET, FILM COATED ORAL NIGHTLY
Status: DISCONTINUED | OUTPATIENT
Start: 2019-08-18 | End: 2019-08-20 | Stop reason: HOSPADM

## 2019-08-18 RX ORDER — SULFAMETHOXAZOLE AND TRIMETHOPRIM 800; 160 MG/1; MG/1
1 TABLET ORAL 2 TIMES DAILY
Status: ON HOLD | COMMUNITY
End: 2019-08-28 | Stop reason: HOSPADM

## 2019-08-18 RX ORDER — METOPROLOL TARTRATE 50 MG/1
50 TABLET, FILM COATED ORAL 2 TIMES DAILY
Status: DISCONTINUED | OUTPATIENT
Start: 2019-08-18 | End: 2019-08-20 | Stop reason: HOSPADM

## 2019-08-18 RX ORDER — MECLIZINE HYDROCHLORIDE 25 MG/1
25 TABLET ORAL ONCE
Status: COMPLETED | OUTPATIENT
Start: 2019-08-18 | End: 2019-08-18

## 2019-08-18 RX ORDER — AMOXICILLIN 500 MG/1
500 CAPSULE ORAL 2 TIMES DAILY
Status: DISCONTINUED | OUTPATIENT
Start: 2019-08-19 | End: 2019-08-19

## 2019-08-18 RX ORDER — SODIUM CHLORIDE 9 MG/ML
INJECTION, SOLUTION INTRAVENOUS CONTINUOUS
Status: DISCONTINUED | OUTPATIENT
Start: 2019-08-18 | End: 2019-08-19

## 2019-08-18 RX ORDER — SODIUM CHLORIDE 0.9 % (FLUSH) 0.9 %
10 SYRINGE (ML) INJECTION EVERY 12 HOURS SCHEDULED
Status: DISCONTINUED | OUTPATIENT
Start: 2019-08-18 | End: 2019-08-20 | Stop reason: HOSPADM

## 2019-08-18 RX ORDER — ACETAMINOPHEN 325 MG/1
650 TABLET ORAL EVERY 4 HOURS PRN
Status: DISCONTINUED | OUTPATIENT
Start: 2019-08-18 | End: 2019-08-20 | Stop reason: HOSPADM

## 2019-08-18 RX ORDER — ONDANSETRON 2 MG/ML
4 INJECTION INTRAMUSCULAR; INTRAVENOUS EVERY 6 HOURS PRN
Status: DISCONTINUED | OUTPATIENT
Start: 2019-08-18 | End: 2019-08-20 | Stop reason: HOSPADM

## 2019-08-18 RX ORDER — AMIODARONE HYDROCHLORIDE 200 MG/1
200 TABLET ORAL NIGHTLY
Status: DISCONTINUED | OUTPATIENT
Start: 2019-08-18 | End: 2019-08-20 | Stop reason: HOSPADM

## 2019-08-18 RX ADMIN — AMIODARONE HYDROCHLORIDE 200 MG: 200 TABLET ORAL at 22:49

## 2019-08-18 RX ADMIN — NITROFURANTOIN MONOHYDRATE/MACROCRYSTALLINE 100 MG: 25; 75 CAPSULE ORAL at 22:49

## 2019-08-18 RX ADMIN — SODIUM CHLORIDE: 9 INJECTION, SOLUTION INTRAVENOUS at 22:49

## 2019-08-18 RX ADMIN — MECLIZINE HYDROCHLORIDE 25 MG: 25 TABLET ORAL at 18:37

## 2019-08-18 RX ADMIN — ATORVASTATIN CALCIUM 20 MG: 20 TABLET, FILM COATED ORAL at 22:49

## 2019-08-18 RX ADMIN — SODIUM CHLORIDE 1000 ML: 9 INJECTION, SOLUTION INTRAVENOUS at 18:36

## 2019-08-18 ASSESSMENT — ENCOUNTER SYMPTOMS
CONSTIPATION: 1
EYES NEGATIVE: 1
ALLERGIC/IMMUNOLOGIC NEGATIVE: 1
RESPIRATORY NEGATIVE: 1

## 2019-08-18 ASSESSMENT — PAIN SCALES - GENERAL: PAINLEVEL_OUTOF10: 0

## 2019-08-18 NOTE — ED NOTES
Bed: ED-26  Expected date:   Expected time:   Means of arrival:   Comments:  [facility]     Whitney Patel RN  08/18/19 6198

## 2019-08-18 NOTE — PROGRESS NOTES
mouth nightly    Yes Historical Provider, MD       Medications added or changed (ex. new medication, dosage change, interval change, formulation change):   Bactrim DS new therapy    Medications removed from list (include reason, ex. noncompliance, medication cost, therapy complete etc.):   Psyllium not on list provided by family   Compression stocking list clean up    Other Comments:  Reviewed and updated med list per patient family and verified with claims    To my knowledge the above medication history is accurate as of 8/18/2019 7:28 PM.   Erin Dancer, CPhT   8/18/2019 7:28 PM

## 2019-08-18 NOTE — ED TRIAGE NOTES
Pt presents to ED via EMS from home for dizziness that started a couple days ago. Pt also c/o generalized weakness. Pt's son is on his way in. Pt recently diagnosed with dementia.

## 2019-08-18 NOTE — ED PROVIDER NOTES
thoracic aoric aneurysm 4.1 cm, bilateral nephrllthiasis, R renal cyst, cholelithiasis, cardiomegaly, low attenuated lesion is noted w/in L lobe of thyroid gland containng Calcification. Recommend thyroid US.  History of echocardiogram 03/27/2017    TTE EF 55%, LV Normal Size, borderline LVH concentric, Normal LV systolic function, transmittal doppler flow pttern suggest impaired LV relaxation Mild aortic stenosis, mild aortic regurgitation.  History of EKG 02/27/2017    Time 12:03 AM, HR 75, A fib, Axis normal, Intervals normal, P waves normal, St-T Segments: nonspecific ST segment changes to the anterior lateral leads, QRS RBBB,  No significant Q waves, no ectopy. A-fib w/RBBB w/nonspecific ST segment change EKG.  History of EKG 02/27/2017    Time 2:58AM: HR 59, NSR, Axis normal, Intervals normal, P waves normal, ST-T seg: nonspecific ST segment changes, QRS RBBB, no significant Q waves, no ectopy. Sinus bradycardia w/ RBBB nonspecific ST Segment changes EKG    History of Holter monitoring 11/08/2017    monitored 48 hours: Patient noted to have multiple PAC and PVCs. Risk of atrial fibrillation present given previous episode Recommend antiarrhythmic therapy.  History of stress test 03/27/2017    NM Stress test: EF 54%, Abnormal study, evidence of mild ischemia in mild inferior regions. post stress LV is enlarged in size.  Post-stress LV function is normal.     Hx of transesophageal echocardiography (SIMONE) for monitoring 04/17/2018    EF45-50% mild TR, mild-mod MR, mod-severe AS    Hyperlipemia     Hyperlipidemia     Hypertension     Nonspecific abnormal electrocardiogram (ECG) (EKG)     Persistent atrial fibrillation (HCC)     Sick sinus syndrome (HCC)     Vertigo      Past Surgical History:   Procedure Laterality Date    CYSTOSCOPY INSERTION / REMOVAL STENT / STONE Left 5/6/2019    CYSTOSCOPY RETROGRADE PYELOGRAM STENT INSERTION, DIAGNOSTIC CYSTOSCOPY performed by Adonis Courtney [facility] at [facility] OR    PACEMAKER INSERTION Left 03/29/2018    Dual Chamber [employer]onic Aura XT DR AMI Kincaid     History reviewed. No pertinent family history. Social History     Socioeconomic History    Marital status:      Spouse name: Not on file    Number of children: Not on file    Years of education: Not on file    Highest education level: Not on file   Occupational History    Not on file   Social Needs    Financial resource strain: Not on file    Food insecurity:     Worry: Not on file     Inability: Not on file    Transportation needs:     Medical: Not on file     Non-medical: Not on file   Tobacco Use    Smoking status: Never Smoker    Smokeless tobacco: Never Used   Substance and Sexual Activity    Alcohol use:  Yes     Alcohol/week: 1.0 standard drinks     Types: 1 Cans of beer per week     Comment: occasional beer    Drug use: No    Sexual activity: Never   Lifestyle    Physical activity:     Days per week: Not on file     Minutes per session: Not on file    Stress: Not on file   Relationships    Social connections:     Talks on phone: Not on file     Gets together: Not on file     Attends Christianity service: Not on file     Active member of club or organization: Not on file     Attends meetings of clubs or organizations: Not on file     Relationship status: Not on file    Intimate partner violence:     Fear of current or ex partner: Not on file     Emotionally abused: Not on file     Physically abused: Not on file     Forced sexual activity: Not on file   Other Topics Concern    Not on file   Social History Narrative    Not on file     Current Facility-Administered Medications   Medication Dose Route Frequency Provider Last Rate Last Dose    0.9 % sodium chloride bolus  1,000 mL Intravenous Once Alek Howard DO 1,000 mL/hr at 08/18/19 1836 1,000 mL at 08/18/19 1836     Current Outpatient Medications   Medication Sig Dispense Refill    furosemide (LASIX) 20 MG tablet Take 1 tablet by mouth 2 times daily 90 tablet 1    Compression Stockings MISC by Does not apply route 20 - 30 mmh wear daily and take off at night  Thigh High 1 each 1    spironolactone (ALDACTONE) 25 MG tablet Take 1 tablet by mouth 2 times daily 60 tablet 5    metoprolol tartrate (LOPRESSOR) 50 MG tablet Take 1 tablet by mouth 2 times daily 60 tablet 3    psyllium (KONSYL) 28.3 % PACK Take 1 packet by mouth daily      amiodarone (CORDARONE) 200 MG tablet Take 1 tablet by mouth daily 30 tablet 3    rivastigmine (EXELON) 4.6 MG/24HR Place 1 patch onto the skin every morning      Cholecalciferol (VITAMIN D3) 5000 units TABS Take 5,000 Units by mouth every morning      apixaban (ELIQUIS) 2.5 MG TABS tablet Take 1 tablet by mouth 2 times daily 60 tablet 3    Omega-3 Fatty Acids (FISH OIL) 1200 MG CAPS Take 1,200 mg by mouth daily       Multiple Vitamins-Minerals (THERAPEUTIC MULTIVITAMIN-MINERALS) tablet Take 1 tablet by mouth daily      atorvastatin (LIPITOR) 20 MG tablet Take 20 mg by mouth nightly         Allergies   Allergen Reactions    Biaxin [Clarithromycin]     Cephalexin     Viagra [Sildenafil Citrate] Nausea Only and Other (See Comments)     Dizziness and BP dropped     Current Facility-Administered Medications   Medication Dose Route Frequency Provider Last Rate Last Dose    0.9 % sodium chloride bolus  1,000 mL Intravenous Once Sensing Elect[employer] Plus, DO 1,000 mL/hr at 08/18/19 1836 1,000 mL at 08/18/19 1836     Current Outpatient Medications   Medication Sig Dispense Refill    furosemide (LASIX) 20 MG tablet Take 1 tablet by mouth 2 times daily 90 tablet 1    Compression Stockings MISC by Does not apply route 20 - 30 mmh wear daily and take off at night  Thigh High 1 each 1    spironolactone (ALDACTONE) 25 MG tablet Take 1 tablet by mouth 2 times daily 60 tablet 5    metoprolol tartrate (LOPRESSOR) 50 MG tablet Take 1 tablet by mouth 2 times daily 60 tablet 3    psyllium (KONSYL) 28.3 % PACK Take 1 packet by mouth daily      amiodarone (CORDARONE) 200 MG tablet Take 1 tablet by mouth daily 30 tablet 3    rivastigmine (EXELON) 4.6 MG/24HR Place 1 patch onto the skin every morning      Cholecalciferol (VITAMIN D3) 5000 units TABS Take 5,000 Units by mouth every morning      apixaban (ELIQUIS) 2.5 MG TABS tablet Take 1 tablet by mouth 2 times daily 60 tablet 3    Omega-3 Fatty Acids (FISH OIL) 1200 MG CAPS Take 1,200 mg by mouth daily       Multiple Vitamins-Minerals (THERAPEUTIC MULTIVITAMIN-MINERALS) tablet Take 1 tablet by mouth daily      atorvastatin (LIPITOR) 20 MG tablet Take 20 mg by mouth nightly          Nursing Notes Reviewed    VITAL SIGNS:  ED Triage Vitals   Enc Vitals Group      BP 08/18/19 1751 124/87      Pulse 08/18/19 1751 62      Resp 08/18/19 1751 15      Temp 08/18/19 1751 98.7 °F (37.1 °C)      Temp Source 08/18/19 1751 Oral      SpO2 08/18/19 1751 97 %      Weight 08/18/19 1744 150 lb (68 kg)      Height 08/18/19 1744 5' 10\" (1.778 m)      Head Circumference --       Peak Flow --       Pain Score --       Pain Loc --       Pain Edu? --       Excl. in GC? --        PHYSICAL EXAM:  Physical Exam   Constitutional: He is oriented to person, place, and time. He appears well-developed and well-nourished. He is active and cooperative. Non-toxic appearance. He does not have a sickly appearance. He does not appear ill. No distress. HENT:   Head: Normocephalic and atraumatic. Right Ear: External ear normal.   Left Ear: External ear normal.   Mouth/Throat: No oropharyngeal exudate. Eyes: Pupils are equal, round, and reactive to light. Conjunctivae and EOM are normal. Right eye exhibits no discharge. Left eye exhibits no discharge. No scleral icterus. Neck: Normal range of motion, full passive range of motion without pain and phonation normal. No JVD present. No neck rigidity. No edema, no erythema and normal range of motion present.    Cardiovascular: Normal rate, regular rhythm, normal heart # 0.6 K/CU MM    Eosinophils # 0.0 K/CU MM    Basophils # 0.0 K/CU MM    Nucleated RBC % 0.0 %    Total Nucleated RBC 0.0 K/CU MM    Total Immature Neutrophil 0.04 K/CU MM    Immature Neutrophil % 0.5 (H) 0 - 0.43 %   CMP   Result Value Ref Range    Sodium 123 (L) 135 - 145 MMOL/L    Potassium 4.9 3.5 - 5.1 MMOL/L    Chloride 88 (L) 99 - 110 mMol/L    CO2 24 21 - 32 MMOL/L    BUN 47 (H) 6 - 23 MG/DL    CREATININE 2.9 (H) 0.9 - 1.3 MG/DL    Glucose 102 (H) 70 - 99 MG/DL    Calcium 9.1 8.3 - 10.6 MG/DL    Alb 3.3 (L) 3.4 - 5.0 GM/DL    Total Protein 6.3 (L) 6.4 - 8.2 GM/DL    Total Bilirubin 0.4 0.0 - 1.0 MG/DL     (H) 10 - 40 U/L     (H) 15 - 37 IU/L    Alkaline Phosphatase 84 40 - 129 IU/L    GFR Non- 21 (L) >60 mL/min/1.73m2    GFR  25 (L) >60 mL/min/1.73m2    Anion Gap 11 4 - 16   Troponin   Result Value Ref Range    Troponin T <0.010 <0.01 NG/ML   CK   Result Value Ref Range    Total CK 35 (L) 38 - 174 IU/L   Brain Natriuretic Peptide   Result Value Ref Range    Pro-BNP 7,638 (H) <300 PG/ML   Magnesium   Result Value Ref Range    Magnesium 2.1 1.8 - 2.4 mg/dl   PT - INR   Result Value Ref Range    Protime 16.7 (H) 9.12 - 12.5 SECONDS    INR 1.47 INDEX   PTT   Result Value Ref Range    aPTT 42.9 (H) 21.2 - 33.0 SECONDS   POC Blood Glucose   Result Value Ref Range    Glucose 123 mg/dL    QC OK?  yes    EKG 12 Lead   Result Value Ref Range    Ventricular Rate 63 BPM    Atrial Rate 61 BPM    QRS Duration 212 ms    Q-T Interval 556 ms    QTc Calculation (Bazett) 568 ms    P Axis 9 degrees    R Axis -57 degrees    T Axis 108 degrees    Diagnosis       AV dual-paced rhythm  Abnormal ECG  When compared with ECG of 23-MAY-2019 17:20,  Electronic ventricular pacemaker has replaced Electronic atrial pacemaker          Radiographs (if obtained):  [] The following radiograph was interpreted by myself in the absence of a radiologist:  [x] Radiologist's Report Reviewed:    CT Brain, CXR    EKG (if obtained): (All EKG's are interpreted by myself in the absence of a cardiologist)    12 lead EKG per my interpretation:  Paced 63  Axis is   Left axis deviation  QTc is  568  There is specific T wave changes appreciated. Inverted T wave AVL  There is specific ST wave changes appreciated. Paced, ST elevation V3, V4    Prior EKG to compare with was not available       MDM:    Here with general fatigue, malaise, general weakness, appetite change, dehydration, constipation but resolved with laxatives. He is in no distress vital signs are stable patient does appear pale he has no unilateral weakness he has no complaints currently. Work-up performed he does have a new hyponatremia given IV fluids does have new acute renal insufficiency creatinine 2.3 which is new. Due to this I will admit him to the hospital for observation hydration otherwise patient stable. Was taking Bactrim recently for urinary tract infections/kidney stone. Did talk to hospital medicine patient admitted. Stable. CLINICAL IMPRESSION:  Final diagnoses:   Dizziness   Hyponatremia   Serum creatinine raised       (Please note that portions of this note may have been completed with a voice recognition program. Efforts were made to edit the dictations but occasionally words aremis-transcribed.)    DISPOSITION REFERRAL (if applicable):  No follow-up provider specified.     DISPOSITION MEDICATIONS (if applicable):  New Prescriptions    No medications on file          Viry Green, [facility],   08/18/19 1924

## 2019-08-18 NOTE — H&P
Moderate,[]  High  Patient's risk as above due to      [] One or more chronic illnesses with exacerbation progression      [x] Two or more stable chronic illnesses      [] Undiagnosed new problem with uncertain prognosis      [] Elective major surgery      []Prescription drug management     History of Present Illness:     Chief Complaint: confusion     Ricky Rapp is a 80 y.o.  male  who presents from home with generalized weakness, mild confusion. Patient's son and Elsie Davison is at bedside and helps with hx. Context is, patient had procedure at [facility] to place ureteral stent for ureterolithiasis about 10 days ago. He had difficulty voiding post-op, so a Novak cath was placed and he was prescribed flomax. He has an o/p f/u meeting with Urology at [facility] for 8/27. At that time, he was also placed on Bactrim and metoprolol. Since that time, he and family report he has been feeling generally weak. Today, family noted that he was confused and is usually aaox3 at baseline. He denies chest pain/pressure, peripheral edema, sob, cough, fever/chills, abdominal pain, n/v, diarrhea. He has PMH of CKD, CHF, Afib with pacemaker, HTN, HLD. He lives independently with help from children. He does not require ambulatory assist devices at home. Full code status confirmed. Ten point ROS reviewed negative, unless as noted above    Objective:   No intake or output data in the 24 hours ending 08/18/19 1924   Vitals:   Vitals:    08/18/19 1831   BP: 120/69   Pulse: 68   Resp: 15   Temp: 98.7   SpO2: 100%     Physical Exam:   GEN Awake male, sitting upright in bed in no apparent distress. Appears given age. EYES Pupils are 3mm and PERRLA bilat. EOMs intact. No scleral erythema, discharge, or conjunctivitis. HENT Mucous membranes are moist. Oral pharynx without exudates, no evidence of thrush. NECK Supple, no apparent thyromegaly or masses. RESP Clear to auscultation, no wheezes, rales or rhonchi.   Symmetric chest movement while on room air. CARDIO/VASC S1/S2 auscultated. Regular rate without appreciable murmurs, rubs, or gallops. No JVD or carotid bruits. Peripheral pulses equal bilaterally and palpable. No peripheral edema. GI Abdomen is soft without significant tenderness, masses, or guarding. Bowel sounds are normoactive. Rectal exam deferred.  No costovertebral angle tenderness. Novak catheter is present. HEME/LYMPH No palpable cervical lymphadenopathy and no hepatosplenomegaly. No petechiae or ecchymoses. MSK No gross joint deformities. 5/5 Strength bilaterally to upper and lower extremities  SKIN Normal coloration, warm, dry. NEURO Cranial nerves appear grossly intact, normal speech, no lateralizing weakness. Sensation intact and equal bilaterally  PSYCH Awake, alert, oriented x 4. Affect appropriate. Past Medical History:      Past Medical History:   Diagnosis Date    ACTA2-related familial thoracic aortic aneurysm     Arrhythmia     Atrial fibrillation (HCC)     BBBB (bilateral bundle branch block)     Bradycardia     CHF (congestive heart failure) (HCC)     Essential hypertension, malignant     History of cardioversion 04/17/2018    History of chest x-ray 02/27/2017    Enlargement of the aorta knob which may represent a thoracic aortic aneurysm. Further evaluation w/ a contrast enhanced CT of the chest recommended. no evidence of acute pulmonary process. 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 Recommend thyroid US.      History of echocardiogram 03/27/2017    TTE EF 55%, LV Normal Size, borderline LVH concentric, Normal LV systolic function, transmittal doppler flow pttern suggest impaired LV relaxation Mild aortic stenosis, mild aortic regurgitation.  History of EKG 02/27/2017    Time 12:03 AM, HR 75, A fib, Axis normal, Intervals normal, P waves normal, St-T Segments: nonspecific ST segment changes to the anterior lateral leads, QRS RBBB,  No significant Q waves, no ectopy. A-fib w/RBBB w/nonspecific ST segment change EKG.  History of EKG 02/27/2017    Time 2:58AM: HR 59, NSR, Axis normal, Intervals normal, P waves normal, ST-T seg: nonspecific ST segment changes, QRS RBBB, no significant Q waves, no ectopy. Sinus bradycardia w/ RBBB nonspecific ST Segment changes EKG    History of Holter monitoring 11/08/2017    monitored 48 hours: Patient noted to have multiple PAC and PVCs. Risk of atrial fibrillation present given previous episode Recommend antiarrhythmic therapy.  History of stress test 03/27/2017    NM Stress test: EF 54%, Abnormal study, evidence of mild ischemia in mild inferior regions. post stress LV is enlarged in size. Post-stress LV function is normal.     Hx of transesophageal echocardiography (SIMONE) for monitoring 04/17/2018    EF45-50% mild TR, mild-mod MR, mod-severe AS    Hyperlipemia     Hyperlipidemia     Hypertension     Nonspecific abnormal electrocardiogram (ECG) (EKG)     Persistent atrial fibrillation (HCC)     Sick sinus syndrome (HCC)     Vertigo      PSHX:  has a past surgical history that includes Pacemaker insertion (Left, 03/29/2018) and CYSTOSCOPY INSERTION / REMOVAL STENT / STONE (Left, 5/6/2019). Allergies: Allergies   Allergen Reactions    Biaxin [Clarithromycin]     Cephalexin     Viagra [Sildenafil Citrate] Nausea Only and Other (See Comments)     Dizziness and BP dropped       FAM HX: family history is not on file.   Soc HX:   Social History     Socioeconomic History    Marital status:      Spouse name: None    Number of children: None    Years of education: None    Highest education level: None   Occupational History    None   Social Needs acute abnormality. SINUSES: The visualized paranasal sinuses and mastoid air cells demonstrate  no acute abnormality. SOFT TISSUES/SKULL:  No acute abnormality of the visualized skull or soft  tissues.     Impression:       No acute intracranial abnormality. Diffuse atrophic changes with findings suggesting chronic microvascular  ischemia     XR CHEST STANDARD (2 VW) [[id number]] Collected: 08/18/19 1820     Order Status: Completed Specimen: Chest Updated: 08/18/19 1824     Narrative:       EXAMINATION:  TWO XRAY VIEWS OF THE CHEST    8/18/2019 6:06 pm    COMPARISON:  05/23/2019    HISTORY:  ORDERING SYSTEM PROVIDED HISTORY: Chest pain  TECHNOLOGIST PROVIDED HISTORY:  Reason for exam:->Chest pain  Reason for Exam: felling sob;weak;fatigued;unable to stand for long  Acuity: Chronic  Type of Exam: Initial  Additional signs and symptoms: symptoms ongoing for days    FINDINGS:  There is a left pacemaker present.  The thoracic aorta is markedly tortuous,  unchanged in appearance.  The mediastinal and cardiac contours are stable.   There is no focal consolidation, pleural effusion or evidence of edema.     Impression:       Stable appearance of the chest without acute cardiopulmonary process  identified.         AV dual-paced rhythm   Abnormal ECG   When compared with ECG of 23-MAY-2019 17:20,   Electronic ventricular pacemaker has replaced Electronic atrial pacemaker     Electronically signed by CARI Mandujano NP on 8/18/2019 at 7:24 PM

## 2019-08-18 NOTE — ED NOTES
19:25 Josefa Yi level Hospitalist returned call -- parked call @ [phone] -- notified Dr Dede Louie [address]  08/18/19 1926

## 2019-08-18 NOTE — ED NOTES
Son states pt has had increased weakness, dizziness, worse with standing. Son states pt has been seen by PCP for orthostatic Hypotension. Pt a/o, able to answer questions.       Demetra Mckeon RN  08/18/19 1700

## 2019-08-19 ENCOUNTER — APPOINTMENT (OUTPATIENT)
Dept: ULTRASOUND IMAGING | Age: 84
DRG: 682 | End: 2019-08-19
Payer: MEDICARE

## 2019-08-19 PROBLEM — E44.0 MODERATE MALNUTRITION (HCC): Chronic | Status: ACTIVE | Noted: 2019-08-19

## 2019-08-19 LAB
ALBUMIN SERPL-MCNC: 3.4 GM/DL (ref 3.4–5)
ALP BLD-CCNC: 79 IU/L (ref 40–128)
ALT SERPL-CCNC: 192 U/L (ref 10–40)
ANION GAP SERPL CALCULATED.3IONS-SCNC: 9 MMOL/L (ref 4–16)
AST SERPL-CCNC: 97 IU/L (ref 15–37)
BASOPHILS ABSOLUTE: 0 K/CU MM
BASOPHILS RELATIVE PERCENT: 0.1 % (ref 0–1)
BILIRUB SERPL-MCNC: 0.5 MG/DL (ref 0–1)
BUN BLDV-MCNC: 43 MG/DL (ref 6–23)
CALCIUM SERPL-MCNC: 9.1 MG/DL (ref 8.3–10.6)
CHLORIDE BLD-SCNC: 94 MMOL/L (ref 99–110)
CO2: 26 MMOL/L (ref 21–32)
CREAT SERPL-MCNC: 2.7 MG/DL (ref 0.9–1.3)
DIFFERENTIAL TYPE: ABNORMAL
EOSINOPHILS ABSOLUTE: 0 K/CU MM
EOSINOPHILS RELATIVE PERCENT: 0.6 % (ref 0–3)
GFR AFRICAN AMERICAN: 27 ML/MIN/1.73M2
GFR NON-AFRICAN AMERICAN: 23 ML/MIN/1.73M2
GLUCOSE BLD-MCNC: 83 MG/DL (ref 70–99)
HCT VFR BLD CALC: 29.7 % (ref 42–52)
HEMOGLOBIN: 9.3 GM/DL (ref 13.5–18)
IMMATURE NEUTROPHIL %: 0.3 % (ref 0–0.43)
LYMPHOCYTES ABSOLUTE: 1.4 K/CU MM
LYMPHOCYTES RELATIVE PERCENT: 19.6 % (ref 24–44)
MCH RBC QN AUTO: 26.1 PG (ref 27–31)
MCHC RBC AUTO-ENTMCNC: 31.3 % (ref 32–36)
MCV RBC AUTO: 83.2 FL (ref 78–100)
MONOCYTES ABSOLUTE: 0.8 K/CU MM
MONOCYTES RELATIVE PERCENT: 10.9 % (ref 0–4)
NUCLEATED RBC %: 0 %
PDW BLD-RTO: 15.8 % (ref 11.7–14.9)
PLATELET # BLD: 335 K/CU MM (ref 140–440)
PMV BLD AUTO: 9.6 FL (ref 7.5–11.1)
POTASSIUM SERPL-SCNC: 4.7 MMOL/L (ref 3.5–5.1)
RBC # BLD: 3.57 M/CU MM (ref 4.6–6.2)
SEGMENTED NEUTROPHILS ABSOLUTE COUNT: 4.9 K/CU MM
SEGMENTED NEUTROPHILS RELATIVE PERCENT: 68.5 % (ref 36–66)
SODIUM BLD-SCNC: 125 MMOL/L (ref 135–145)
SODIUM BLD-SCNC: 127 MMOL/L (ref 135–145)
SODIUM BLD-SCNC: 129 MMOL/L (ref 135–145)
TOTAL IMMATURE NEUTOROPHIL: 0.02 K/CU MM
TOTAL NUCLEATED RBC: 0 K/CU MM
TOTAL PROTEIN: 5.6 GM/DL (ref 6.4–8.2)
WBC # BLD: 7.1 K/CU MM (ref 4–10.5)

## 2019-08-19 PROCEDURE — G0378 HOSPITAL OBSERVATION PER HR: HCPCS

## 2019-08-19 PROCEDURE — 6370000000 HC RX 637 (ALT 250 FOR IP): Performed by: HOSPITALIST

## 2019-08-19 PROCEDURE — 96376 TX/PRO/DX INJ SAME DRUG ADON: CPT

## 2019-08-19 PROCEDURE — 1200000000 HC SEMI PRIVATE

## 2019-08-19 PROCEDURE — 93010 ELECTROCARDIOGRAM REPORT: CPT | Performed by: INTERNAL MEDICINE

## 2019-08-19 PROCEDURE — 85025 COMPLETE CBC W/AUTO DIFF WBC: CPT

## 2019-08-19 PROCEDURE — 76775 US EXAM ABDO BACK WALL LIM: CPT

## 2019-08-19 PROCEDURE — 76705 ECHO EXAM OF ABDOMEN: CPT

## 2019-08-19 PROCEDURE — 6370000000 HC RX 637 (ALT 250 FOR IP): Performed by: NURSE PRACTITIONER

## 2019-08-19 PROCEDURE — 80053 COMPREHEN METABOLIC PANEL: CPT

## 2019-08-19 PROCEDURE — 2580000003 HC RX 258: Performed by: NURSE PRACTITIONER

## 2019-08-19 PROCEDURE — 84295 ASSAY OF SERUM SODIUM: CPT

## 2019-08-19 PROCEDURE — 36415 COLL VENOUS BLD VENIPUNCTURE: CPT

## 2019-08-19 PROCEDURE — 94761 N-INVAS EAR/PLS OXIMETRY MLT: CPT

## 2019-08-19 RX ADMIN — APIXABAN 2.5 MG: 2.5 TABLET, FILM COATED ORAL at 21:19

## 2019-08-19 RX ADMIN — APIXABAN 2.5 MG: 2.5 TABLET, FILM COATED ORAL at 09:18

## 2019-08-19 RX ADMIN — AMOXICILLIN 500 MG: 500 CAPSULE ORAL at 09:18

## 2019-08-19 RX ADMIN — Medication 10 ML: at 21:20

## 2019-08-19 RX ADMIN — ATORVASTATIN CALCIUM 20 MG: 20 TABLET, FILM COATED ORAL at 21:20

## 2019-08-19 RX ADMIN — Medication 10 ML: at 09:19

## 2019-08-19 RX ADMIN — METOPROLOL TARTRATE 50 MG: 50 TABLET ORAL at 21:19

## 2019-08-19 RX ADMIN — AMIODARONE HYDROCHLORIDE 200 MG: 200 TABLET ORAL at 21:19

## 2019-08-19 RX ADMIN — Medication 15 G: at 13:44

## 2019-08-19 ASSESSMENT — PAIN SCALES - GENERAL
PAINLEVEL_OUTOF10: 0

## 2019-08-19 NOTE — PROGRESS NOTES
Nutrition Assessment    Type and Reason for Visit: Initial, Positive Nutrition Screen(wt loss, poor intake)    Nutrition Recommendations:   Continue current diet  Begin standard high danette oral nutrition supplement bid, between meals    Nutrition Assessment: Moderate nutrition risk with moderate malnutrition. Significant recent wt loss noted per Epic history, -17% in 6 months. Adequate intake so far here, consuming 76% to all. Malnutrition Assessment:  · Malnutrition Status: Meets the criteria for moderate malnutrition  · Context: Chronic illness  · Findings of the 6 clinical characteristics of malnutrition (Minimum of 2 out of 6 clinical characteristics is required to make the diagnosis of moderate or severe Protein Calorie Malnutrition based on AND/ASPEN Guidelines):  1. Energy Intake-Greater than 75% of estimated energy requirement, Greater than or equal to 1 month    2. Weight Loss-10% loss or greater, in 6 months  3. Fat Loss-Mild subcutaneous fat loss, Orbital  4. Muscle Loss-Mild muscle mass loss, Temples (temporalis muscle)  5. Fluid Accumulation-No significant fluid accumulation,    6.  Strength-Not measured    Nutrition Risk Level: Moderate    Nutrient Needs:  · Estimated Daily Total Kcal: 6076-0574 (25-30 kcal/kg)  · Estimated Daily Protein (g): 67-87 (1-1.3 g/kg)  · Estimated Daily Total Fluid (ml/day): 2642-6721 (1 ml/kcal)    Nutrition Diagnosis:   · Problem:  Moderate malnutrition, In context of chronic illness  · Etiology: related to Insufficient energy/nutrient consumption     Signs and symptoms:  as evidenced by Diet history of poor intake, Weight loss greater than or equal to 10% in 6 months, Mild loss of subcutaneous fat, Mild muscle loss    Objective Information:  · Wound Type: None  · Current Nutrition Therapies:  · Oral Diet Orders: Fluid Restriction, 2gm Sodium   · Oral Diet intake: %  · Oral Nutrition Supplement (ONS) Orders: None  · Anthropometric Measures:  · Ht: 5' 10\" (177.8 cm)   · Current Body Wt: 147 lb 1.6 oz (66.7 kg)  · Admission Body Wt: 143 lb 1.6 oz (64.9 kg)  · Usual Body Wt: 173 lb 6.4 oz (78.7 kg)(2/4/19)  · % Weight Change:    -17% in 6 months  · Ideal Body Wt: 166 lb (75.3 kg), % [place] Body 88  · BMI Classification: BMI 18.5 - 24.9 Normal Weight(21.2)    Nutrition Interventions:   Continue current diet, Start ONS  Continued Inpatient Monitoring, Education not appropriate at this time, Coordination of Care    Nutrition Evaluation:   · Evaluation: Goals set   · Goals:   Patient will meet at least 75% of estimated nutrient needs during los with meals and supplements provided    · Monitoring: Meal Intake, Supplement Intake, Diet Tolerance, Weight, Pertinent Labs      Electronically signed by Julia Bullard RD, LD on 8/19/19 at 12:46 PM    Contact Number: [phone]

## 2019-08-19 NOTE — ASSESSMENT & PLAN NOTE
· Patient is having dizziness with position changes. · Encouraged to wear compression stockings. · Patient wants to decrease or stop metoprolol, do not recommend as he has atrial fib and rate is controlled.    · Decrease lasix   · Follow up in two weeks for orthostatic BP check

## 2019-08-19 NOTE — CARE COORDINATION
Chart reviewed, in to see pt for dc planning. Introduced self and board updated. Pt was admitted for SABIHA. States he lives at home with his wife/Didi of 61 years who has dementia and he is completely independent with all needs. States they have 4 children (2 in [place], 1 in [place] and 1 in [place]) and they are supportive. Pt states he and his wife live on a [place] farm and are considering moving into assisted living. Explained he follows with PCP, has insurance with affordable RX co-pays and reliable transportation per himself or his children. Discussed HHC and pt declined [facility] need nor any other discharge needs. CM remains available if needs arise.

## 2019-08-19 NOTE — FLOWSHEET NOTE
Pt having some short and long term memory issues at this time. Unable to answer majority of admission questions. No family here with Pt at this time.

## 2019-08-19 NOTE — CONSULTS
Inpatient consult to Nephrology  Consult performed by: Vivian Lombardi MD  Consult ordered by: CARI Boles - NP  Assessment/Recommendations: Pt seen ,examined,interviewed and chart reviewed. Please see the dictated consult for details     Imp :   1. SABIHA/CKD stage 3  A3 - non oliguric- available data suggest relative (  Transient)  IVV depeltion - also bactrim may have inecarsed cr a bit  by blocking  tubular cr secretion urina does show rbc and wbc but  Has  monet and recent stent  And varsha r/o  Ureteral  obstrcution - AIN is ala ways a possibility   2. Low na likely from Na  loss  With  loop and associated volume depletion- na correcting too fast   3. High risk  For  ADHF/ underlying a.fib/ dementia /HTn etc   4.  Anemia NCNC -     Plan:  Renal US  Stop IVF  Check  next na level  Restrict  na rise  to 8 meq /l/24 h   Po food / fluid  follow clinically        Thanks for the consult    #[id number]

## 2019-08-19 NOTE — CONSULTS
[address], [address] [facility]                                  CONSULTATION    PATIENT NAME: Merlin Jose                 :        10/05/1933  MED REC NO:   [id number]                          ROOM:       3017  ACCOUNT NO:   [de-identified]                           ADMIT DATE: 2019  PROVIDER:     Michelle Fairchild MD    CONSULT DATE:  2019    BRIEF HISTORY:  The patient is an 80-year-old white male with multiple  medical conditions which include, but not limited to recent acute kidney  injury with underlying CKD and recent acute decompensated heart failure,  was brought to the emergency room via squad after having four days of  constipation followed by dizziness, lightheadedness, fatigue, tiredness,  etc.  In the emergency room, the patient was hemodynamically stable,  although blood pressure was a little bit in the lower side and underwent  several diagnostic tests, which include imaging and biochemical.   Imaging study, mainly CT of the head and chest x-ray was unremarkable. Biochemical testing though shows significantly increased BUN and  creatinine of 47 and 2.9 respectively, which is much higher than his  most recent creatinine of 1.7 and sodium of 123. He was also anemic  with a hemoglobin of 9.7 with normal white count and platelet count. He  did undergo urinalysis, by the way it is a Novak specimen. There are  few wbc and rbc, but no other active sediment. He still has an ureteral  stent and the Novak catheter that he had for stone removal and was seen  by urologist not too long ago. He was placed on Bactrim, apparently  took for four days, for presumable urinary tract infection. I am of course very familiar with him. I did see him during his last  admission in 2019. At that time, he was admitted with acute  decompensated heart failure. He also sustained acute kidney injury.   He  was successfully diuresed. At that time, his creatinine peaked around  2.0 or so and his baseline creatinine about 1.3 or so. I did see him  after his discharge in my office on 06/14/2019. At that time, his  creatinine was about 1.7. He had some peripheral edema, but no  pulmonary edema and I opted to continue his diuretics and spironolactone  he was on. PAST MEDICAL HISTORY:  1. Recent acute kidney injury. 2.  CKD stage 3A3. His proteinuria about 700 mg or so, although it  could be tubular proteinuria. 3.  Hypertension since 1980.  4.  Chronic atrial fibrillation. He does have a pacemaker placement. 5.  Acute decompensated heart failure history with preserved left  ventricular ejection fraction. 6.  Dementia was diagnosed in 2017. 7.  Kidney stone necessitating stent placement. He still has it with  Novak catheter now. PAST SURGICAL HISTORY:  1. He had a cardioversion in 2018. 2.  Pacemaker placement. 3.  Left ureteral stent placement of course. FAMILY MEDICAL HISTORY:  Apparently no significant family history by the  way. HABITS:  No history of alcohol or recreational drug abuse and he is not  smoking at this time. SOCIAL HISTORY:  The patient is  and lives with wife. He does  have a son who keeps an eye on him. HOME MEDICATIONS:  According to EPIC, which I cannot verify the  authenticity, but he was on Bactrim double strength twice a day along  with furosemide 20 mg twice a day and spironolactone 25 mg twice a day. He is also on Lopressor 50 mg twice a day, amiodarone 200 mg daily. Exelon is 4.6 mg per 24-hour patch, he changes it every morning as well  as vitamin D3 5000 units as well as Eliquis 2.5 mg twice a day, omega-3  fatty acids 1000 mg daily, multivitamin, atorvastatin 20 mg at bedtime,  and p.r.n. Constella for constipation. CURRENT MEDICATIONS:  Here in the hospital include, he is on normal  saline at 30 mL an hour.   He is also at the same home dose of Exelon,  metoprolol 50 mg b.i.d., meclizine p.r.n., atorvastatin 20 mg at  bedtime, Eliquis 2.5 mg b.i.d. He is placed on amoxicillin 500 mg twice  a day and amiodarone 200 mg daily. REVIEW OF SYSTEMS:  This all started about 7 or 8 days ago. He was  constipated. He took some laxative with minimal success then followed  by poor oral intake as well as lightheadedness, dizzy, a little bit  lower blood pressure. Denied any shortness of breath or chest pain. No  specific urinary symptoms. He has a Novak catheter and a stent. Rest  of the review of systems is negative other than previous paragraph. PHYSICAL EXAMINATION:  VITAL SIGNS:  At the time of examination reveal temperature, his T-max  was 98.7, blood pressure about 110-120/60s, according to EPIC by the  way. It is all automated blood pressure. Pulse 60s, respiratory rate  14, and saturation 97%. GENERAL:  The patient is without any acute distress. He is alert and  oriented. His son is in his room. HEENT:  Head and Neck:  Normocephalic and atraumatic. Eyes:  Mild  conjunctival pallor. Ears, Mouth, and Throat:  Normal oral mucosa. CARDIOVASCULAR:  Irregularly irregular rhythm. RESPIRATORY:  I did not hear any crackles. ABDOMEN:  Soft. EXTREMITIES:  I did not see any much edema in the peripheral extremity. LABORATORY VALUES AND ANCILLARY SERVICES:  His sodium was 123, but  increased to 129 within 8 hours or so. He is anemic as I mentioned  earlier. BUN and creatinine was up, but did go down a little bit with  hydration. His albumin is 3.3. Urinalysis which is a Novak specimen  showed 7 wbc and 146 rbc. IMPRESSION:  An 70-year-old male with acute kidney injury. 1.  Acute kidney injury on top of CKD stage 3A3 (nonoliguric). Available data suggests relative volume depletion, i.e., intravascular  volume depletion, also Bactrim may have increased creatinine by blocking  tubular creatinine secretion.   Urine has some rbc and wbc but

## 2019-08-19 NOTE — PROGRESS NOTES
[facility] Hospitalist Progress Note     Admit Date: 8/18/2019      Hospital Day: 2      Subjective:   Pt seen and examined. Birtha Dance is an 79yo man who came in with fatigue, dizziness, confusion for the past several days, progressively worsening. He feels better since he got admitted. He has monet cath. Denies fever, chills, flank or suprapubic pain. General ROS: No fever, chills. Cardiovascular ROS: no chest pain. Respiratory ROS: no cough, shortness of breath. Gastrointestinal ROS: no abdominal pain, diarrhea, N/V. Objective:   BP (!) 119/58   Pulse 67   Temp 97.8 °F (36.6 °C) (Oral)   Resp 20   Ht 5' 10\" (1.778 m)   Wt 147 lb 1.6 oz (66.7 kg)   SpO2 98%   BMI 21.11 kg/m²    General: The patient appears as stated age. Well appearing, and in no distress. Mental status: Alert, Oriented x3. Coherent. No agitation. Eyes: NERI. Normal conjunctiva. ENT/Mouth: normal appearing jaw and neck, no neck nodes or sinus tenderness. Clear oropharynx with moist mucous membrane. Cardiovascular:  normal rate, regular rhythm, normal S1, S2, no murmurs, rubs, clicks or gallops. No peripheral edema. Dorsal pedis pulses 2+ bilaterally. Respiratory: clear to auscultation, no wheezes, rales or rhonchi, symmetric air entry. Gastrointestinal: soft, nontender, nondistended, no masses or organomegaly. Genitourinary:  No CVA tenderness. +monet catheter. Normal appearance of urethra and penis. Musculoskletal:  no clubbing or cyanosis. No joint swelling, warmth, or tenderness. Skin:  normal coloration and turgor, no rashes, no suspicious skin lesions noted. Neurologic: Normal speech, no focal findings or movement disorder noted. Data Review  Labs were reviewed.     Lab Results   Component Value Date    WBC 7.1 08/19/2019    HGB 9.3 (L) 08/19/2019    HCT 29.7 (L) 08/19/2019    MCV 83.2 08/19/2019     08/19/2019     Lab Results   Component Value Date     (L) 08/19/2019 K 4.7 08/19/2019    CL 94 (L) 08/19/2019    CO2 26 08/19/2019    BUN 43 (H) 08/19/2019    CREATININE 2.7 (H) 08/19/2019    GLUCOSE 83 08/19/2019    CALCIUM 9.1 08/19/2019    PROT 5.6 (L) 08/19/2019    LABALBU 3.4 08/19/2019    BILITOT 0.5 08/19/2019    ALKPHOS 79 08/19/2019    AST 97 (H) 08/19/2019     (H) 08/19/2019    LABGLOM 23 (L) 08/19/2019    GFRAA 27 (L) 08/19/2019         Imaging films was personally reviewed with finding below. Xr Chest Standard (2 Vw)    Result Date: 8/18/2019  EXAMINATION: TWO XRAY VIEWS OF THE CHEST 8/18/2019 6:06 pm COMPARISON: 05/23/2019 HISTORY: ORDERING SYSTEM PROVIDED HISTORY: Chest pain TECHNOLOGIST PROVIDED HISTORY: Reason for exam:->Chest pain Reason for Exam: felling sob;weak;fatigued;unable to stand for long Acuity: Chronic Type of Exam: Initial Additional signs and symptoms: symptoms ongoing for days FINDINGS: There is a left pacemaker present. The thoracic aorta is markedly tortuous, unchanged in appearance. The mediastinal and cardiac contours are stable. There is no focal consolidation, pleural effusion or evidence of edema. Stable appearance of the chest without acute cardiopulmonary process identified. Ct Head Wo Contrast    Result Date: 8/18/2019  EXAMINATION: CT OF THE HEAD WITHOUT CONTRAST  8/18/2019 6:14 pm TECHNIQUE: CT of the head was performed without the administration of intravenous contrast. Dose modulation, iterative reconstruction, and/or weight based adjustment of the mA/kV was utilized to reduce the radiation dose to as low as reasonably achievable. COMPARISON: 05/04/2019 HISTORY: ORDERING SYSTEM PROVIDED HISTORY: HEAD TRAUMA, CLOSED, MILD, GCS >= 13, NO RISK FACTORS, NEURO EXAM NORMAL TECHNOLOGIST PROVIDED HISTORY: Has a \"code stroke\" or \"stroke alert\" been called? ->No Reason for Exam: DIZZY Acuity: Unknown Type of Exam: Unknown Additional signs and symptoms: NONE Relevant Medical/Surgical History: HX OF VERTIGO FINDINGS: BRAIN/VENTRICLES: The ventricles and sulci are diffusely enlarged. Low attenuation is seen in the periventricular and subcortical white matter. No acute intracranial hemorrhage or acute infarct is identified. ORBITS: The visualized portion of the orbits demonstrate no acute abnormality. SINUSES: The visualized paranasal sinuses and mastoid air cells demonstrate no acute abnormality. SOFT TISSUES/SKULL:  No acute abnormality of the visualized skull or soft tissues. No acute intracranial abnormality. Diffuse atrophic changes with findings suggesting chronic microvascular ischemia     Us Abdomen Limited    Result Date: 8/19/2019  EXAMINATION: RIGHT UPPER QUADRANT ULTRASOUND 8/19/2019 6:27 am COMPARISON: Abdomen and pelvis CT 05/11/2019 HISTORY: ORDERING SYSTEM PROVIDED HISTORY: New transaminitis TECHNOLOGIST PROVIDED HISTORY: Reason for exam:->New transaminitis Reason for Exam: Pain Acuity: Unknown Type of Exam: Ongoing FINDINGS: LIVER:  Coarsened echotexture. Normal size and echogenicity. No obvious surface nodularity nor focal lesions. BILIARY SYSTEM:  Shadowing gallstones in the dependent gallbladder lumen. No gallbladder distention, gallbladder wall thickening, nor pericholecystic fluid. No reported right upper quadrant pain during examination. No intrahepatic biliary dilation. At least mild dilation of the visualized common duct, measuring at least 1.2 cm. PANCREAS:  Completely obscured by overlying bowel gas. RIGHT KIDNEY:  Normal size, echogenicity, and parenchymal thickness. Suggestion a shadowing calculus in the right renal pelvis. No hydronephrosis. 8.1 cm x 6.0 cm x 6.2 cm cyst with layering debris, corresponding with note of calcium seen on CT (Bosniak category 2). OTHER:  No visualized free intraperitoneal fluid. 1. Cholelithiasis without additional secondary findings of cholecystitis.  Consider further evaluation with a nuclear medicine hepatobiliary scan if there are clinical findings of cholecystitis. 2. At least mild dilation of the visualized common duct, which is obscured distally. This appears new compared to the comparison CT in the be due to choledocholithiasis. Recommend further evaluation with MRCP. 3. Coarsened liver echotexture suggestive of underlying hepatocellular disease. No findings suggestive of cirrhosis or significant steatosis. 4. Right renal cyst for which no follow-up is recommended. Nonobstructing calculus suspected in the right renal pelvis. Telemetry EKG strip was personally reviewed. Assessment/Plan:     · Hyponatremia  · Generalized weakness, confusion related to above. - pt appears euvolemic on exam, in spite his BNP is elevated. - Suspect secondary to diuretics; furosemide 20mg bid and spironolactone 25mg bid, and bactrim. Holding all. - Improved by NS bolus 1L in ED, from 123 to 129, then went back down to 125 again after IV was discontinued. - Discussed with Dr. Alexx Perez. Will try Urea pack 15g tid. - continue to monitor Na q8h. · SABIHA on CKD staqe 3  Likely from bactrim. Will hold bactrim and follow Cr. Avoid nephrotoxins. · No evidence of current UTI  · Uroloithiasis without obstruction  US today did not show hydronephrosis. Hold bactrim for elevated Cr. Follow urine culture result. · Chronic diastolic CHF with normal EF in [id number]  Not in exacerbation in spite BNP is elevated. He is not edematous, not in respiratory distress, and CXR without pulm edema. Monitor symptoms and vitals closely as we hold diuretics. · AF  · S/p DDD pacemaker  Rate controlled  Cont amiodarone, eliquis, metoprolol. · Chronic anemia, at baseline. Monitor CBC. DVT prophylaxis - eliquis\  GI prophylaxis - protonix    dispo- d/c home once medically stable. The above assessment/plan has been explained to the patient, who indicated understanding.     Wesly William MD  8/19/2019 11:30 AM

## 2019-08-20 VITALS
SYSTOLIC BLOOD PRESSURE: 106 MMHG | HEART RATE: 60 BPM | WEIGHT: 147.1 LBS | RESPIRATION RATE: 16 BRPM | TEMPERATURE: 98 F | BODY MASS INDEX: 21.06 KG/M2 | OXYGEN SATURATION: 98 % | DIASTOLIC BLOOD PRESSURE: 62 MMHG | HEIGHT: 70 IN

## 2019-08-20 LAB
ANION GAP SERPL CALCULATED.3IONS-SCNC: 10 MMOL/L (ref 4–16)
ANION GAP SERPL CALCULATED.3IONS-SCNC: 10 MMOL/L (ref 4–16)
BUN BLDV-MCNC: 48 MG/DL (ref 6–23)
BUN BLDV-MCNC: 61 MG/DL (ref 6–23)
CALCIUM SERPL-MCNC: 9.1 MG/DL (ref 8.3–10.6)
CALCIUM SERPL-MCNC: 9.4 MG/DL (ref 8.3–10.6)
CHLORIDE BLD-SCNC: 96 MMOL/L (ref 99–110)
CHLORIDE BLD-SCNC: 97 MMOL/L (ref 99–110)
CO2: 25 MMOL/L (ref 21–32)
CO2: 26 MMOL/L (ref 21–32)
CREAT SERPL-MCNC: 2.1 MG/DL (ref 0.9–1.3)
CREAT SERPL-MCNC: 2.3 MG/DL (ref 0.9–1.3)
GFR AFRICAN AMERICAN: 33 ML/MIN/1.73M2
GFR AFRICAN AMERICAN: 37 ML/MIN/1.73M2
GFR NON-AFRICAN AMERICAN: 27 ML/MIN/1.73M2
GFR NON-AFRICAN AMERICAN: 30 ML/MIN/1.73M2
GLUCOSE BLD-MCNC: 138 MG/DL (ref 70–99)
GLUCOSE BLD-MCNC: 82 MG/DL (ref 70–99)
MAGNESIUM: 2.2 MG/DL (ref 1.8–2.4)
PHOSPHORUS: 3.1 MG/DL (ref 2.5–4.9)
POTASSIUM SERPL-SCNC: 5.1 MMOL/L (ref 3.5–5.1)
POTASSIUM SERPL-SCNC: 5.4 MMOL/L (ref 3.5–5.1)
SODIUM BLD-SCNC: 132 MMOL/L (ref 135–145)
SODIUM BLD-SCNC: 132 MMOL/L (ref 135–145)

## 2019-08-20 PROCEDURE — 2580000003 HC RX 258: Performed by: NURSE PRACTITIONER

## 2019-08-20 PROCEDURE — 1210000000 HC MED SURG R&B

## 2019-08-20 PROCEDURE — G0378 HOSPITAL OBSERVATION PER HR: HCPCS

## 2019-08-20 PROCEDURE — 80048 BASIC METABOLIC PNL TOTAL CA: CPT

## 2019-08-20 PROCEDURE — 83735 ASSAY OF MAGNESIUM: CPT

## 2019-08-20 PROCEDURE — 94761 N-INVAS EAR/PLS OXIMETRY MLT: CPT

## 2019-08-20 PROCEDURE — 97166 OT EVAL MOD COMPLEX 45 MIN: CPT

## 2019-08-20 PROCEDURE — 1800000000 HC LEAVE OF ABSENCE

## 2019-08-20 PROCEDURE — 6370000000 HC RX 637 (ALT 250 FOR IP): Performed by: NURSE PRACTITIONER

## 2019-08-20 PROCEDURE — 97116 GAIT TRAINING THERAPY: CPT

## 2019-08-20 PROCEDURE — 97535 SELF CARE MNGMENT TRAINING: CPT

## 2019-08-20 PROCEDURE — 6370000000 HC RX 637 (ALT 250 FOR IP): Performed by: HOSPITALIST

## 2019-08-20 PROCEDURE — 84100 ASSAY OF PHOSPHORUS: CPT

## 2019-08-20 PROCEDURE — 97162 PT EVAL MOD COMPLEX 30 MIN: CPT

## 2019-08-20 PROCEDURE — 6370000000 HC RX 637 (ALT 250 FOR IP): Performed by: INTERNAL MEDICINE

## 2019-08-20 PROCEDURE — 36415 COLL VENOUS BLD VENIPUNCTURE: CPT

## 2019-08-20 RX ADMIN — SODIUM ZIRCONIUM CYCLOSILICATE 10 G: 10 POWDER, FOR SUSPENSION ORAL at 15:20

## 2019-08-20 RX ADMIN — APIXABAN 2.5 MG: 2.5 TABLET, FILM COATED ORAL at 09:08

## 2019-08-20 RX ADMIN — Medication 10 ML: at 09:09

## 2019-08-20 RX ADMIN — Medication 15 G: at 09:08

## 2019-08-20 RX ADMIN — Medication 15 G: at 15:20

## 2019-08-20 ASSESSMENT — PAIN SCALES - GENERAL: PAINLEVEL_OUTOF10: 0

## 2019-08-20 NOTE — PLAN OF CARE
Problem: Falls - Risk of:  Goal: Will remain free from falls  Description  Will remain free from falls  Outcome: Ongoing  Goal: Absence of physical injury  Description  Absence of physical injury  Outcome: Ongoing     Problem: Risk for Impaired Skin Integrity  Goal: Tissue integrity - skin and mucous membranes  Description  Structural intactness and normal physiological function of skin and  mucous membranes.   Outcome: Ongoing     Problem: Nutrition  Goal: Optimal nutrition therapy  Outcome: Ongoing

## 2019-08-20 NOTE — PROGRESS NOTES
Assuming care for this pt at this time.  Electronically signed by Roberta Barrientos RN on 8/20/2019 at 1:03 AM

## 2019-08-20 NOTE — CARE COORDINATION
Received call from pt daughter/Torrie who states she and her siblings are assisting her parents with moving into AL. She shared they are looking at [facility] and they have a memory care bed available for her mother and should have an AL bed available soon for her father and he will return home with his current [id number]. PS to Dr. Rinku Mejia who states he will review pt for dc after his 4 pm BMP and daughter was updated about this. Informed Leslie with [facility] of pt admission.

## 2019-08-20 NOTE — PROGRESS NOTES
Nephrology Progress Note  8/20/2019 11:37 AM        Subjective:   Admit Date: 8/18/2019  PCP: Halima US    Interval History: doing much better     Diet: better    ROS:  No sob, good UOP - feels better     Data:     Current med's:    urea  15 g Oral TID    sodium chloride flush  10 mL Intravenous 2 times per day    amiodarone  200 mg Oral Nightly    apixaban  2.5 mg Oral BID    atorvastatin  20 mg Oral Nightly    metoprolol tartrate  50 mg Oral BID    rivastigmine  1 patch Transdermal QAM           I/O last 3 completed shifts: In: 1020 [P.O.:1020]  Out: 2175 [Urine:2175]    CBC:   Recent Labs     08/18/19 1816 08/19/19  0307   WBC 8.0 7.1   HGB 9.7* 9.3*    335          Recent Labs     08/18/19  1816 08/19/19  0307 08/19/19  1031 08/19/19  1818 08/20/19  0140   *   < > 129* 125* 127* 132*   K 4.9  --  4.7  --   --  5.4*   CL 88*  --  94*  --   --  97*   CO2 24  --  26  --   --  25   BUN 47*  --  43*  --   --  48*   CREATININE 2.9*  --  2.7*  --   --  2.3*   GLUCOSE 102*  --  83  --   --  82    < > = values in this interval not displayed. Lab Results   Component Value Date    CALCIUM 9.1 08/20/2019    PHOS 3.1 08/20/2019       Objective:     Vitals: /62   Pulse 60   Temp 98 °F (36.7 °C) (Oral)   Resp 16   Ht 5' 10\" (1.778 m)   Wt 147 lb 1.6 oz (66.7 kg)   SpO2 98%   BMI 21.11 kg/m²     General appearance:  No distress  HEENT:  + pallor  Neck:  supple  Lungs:  No gross crackles  Heart:  Seems irregular  Abdomen: soft  Extremities:  No overt edema       Problem List :         Impression :     1. SABIHA/CKD stage 3 a3- good UOP_ recovering - likely from relative volume depletion/ sec to bactrim etc- no construction   2. low na- getting better   3. mild high K- /?  aftermath of SABIHA/ bactrim- ? Hemolysis should go down   4. Anemia / Ch a.fib and kidney stone    Recommendation/Plan  :     1. May d/C from renal standpoint   2. Low K/ low salt diet   3. Daily wt  4.  Call me with wt gain > 3 lbs   5. BMP weekly x 2   6. F/U with me in 2-3 wk's   7. No need for urea pill as an out pt   8. High protein diet   9.  Add supplement       Katya South MD

## 2019-08-20 NOTE — DISCHARGE SUMMARY
Rehana Pearson 10/5/1933 [id number]  PCP:  Melissa US    Admit date: 8/18/2019  Admitting Physician: Heather Doshi MD    Discharge date: 8/20/2019 Discharge Physician: Amanda Morrow MD      Reason for admission:   Chief Complaint   Patient presents with    Dizziness     Present on Admission:   SABIHA (acute kidney injury) ([address]   Moderate malnutrition [facility])       Discharge Diagnoses & Hospital Course[de-identified]     79yo man who came in with fatigue, dizziness, confusion for the past several days, progressively worsening. He feels better since he got admitted. Generalized weakness, confusion Likely d/t   Hyponatremia  - pt appears euvolemic on exam, in spite his BNP is elevated. - Suspect secondary to diuretics; furosemide 20mg bid and spironolactone 25mg bid, and bactrim. Holding all. - Improved by NS bolus 1L in ED, from 123 to 129, then went back down to 125 again after IV was discontinued. - Discussed with Dr. Maikol Oseguera.  started Urea pack 15g tid. - continue to monitor Na q8h.    - sodium improved to 132    · SABIHA on CKD staqe 3  Likely from bactrim. Will hold bactrim and follow Cr. Avoid nephrotoxins.   - Cr continued to improve    · No evidence of current UTI  · Uroloithiasis without obstruction  US did not show hydronephrosis. Hold bactrim for elevated Cr. Follow urine culture result.      · Chronic diastolic CHF with normal EF in [id number]  Not in exacerbation in spite BNP is elevated. He is not edematous, not in respiratory distress, and CXR without pulm edema. Monitor symptoms and vitals closely as we hold diuretics.      · AF  · S/p DDD pacemaker  Rate controlled  Cont amiodarone, eliquis, metoprolol.       · Chronic anemia, at baseline. Monitor CBC.       DVT prophylaxis - eliquis\  GI prophylaxis - protonix  Patient was feeling and looking better on the day of discharge and wanted to go home.     Patient was discharged in a stable condition with following daily             atorvastatin (LIPITOR) 20 MG tablet  Take 20 mg by mouth nightly              Cholecalciferol (VITAMIN D3) 5000 units TABS  Take 5,000 Units by mouth every morning             furosemide (LASIX) 20 MG tablet  Take 1 tablet by mouth 2 times daily             metoprolol tartrate (LOPRESSOR) 50 MG tablet  Take 1 tablet by mouth 2 times daily             Multiple Vitamins-Minerals (THERAPEUTIC MULTIVITAMIN-MINERALS) tablet  Take 1 tablet by mouth daily             Omega-3 Fatty Acids (FISH OIL) 1000 MG CAPS  Take 1,000 mg by mouth daily              rivastigmine (EXELON) 4.6 MG/24HR  Place 1 patch onto the skin every morning             spironolactone (ALDACTONE) 25 MG tablet  Take 1 tablet by mouth 2 times daily             sulfamethoxazole-trimethoprim (BACTRIM DS;SEPTRA DS) 800-160 MG per tablet  Take 1 tablet by mouth 2 times daily                  Code Status: Full Code     Consults:   IP CONSULT TO HOSPITALIST  IP CONSULT TO NEPHROLOGY    Diet: renal diet    Activity: activity as tolerated   Work:    Discharged Condition: good    Prognosis: Fair - Good    Disposition: home      Follow-up with   1. PCP within   5-7    Days    Follow up labs: [facility]       Discharge Physician Signed:   Marybeth Edmonds. [facility], Internal medicine  8/20/2019 at 8:46 AM    The patient was seen and examined on day of discharge and this discharge summary is in conjunction with any daily progress note from day of discharge.   Time spent on discharge in the examination, evaluation, counseling and review of medications and discharge plan: <30 minutes    Please forward this discharge summary to patient's PCP

## 2019-08-20 NOTE — PROGRESS NOTES
[id number] Liaison spoke with pt and pt is agreeable to c at discharge.  Please place inpatient consult to home health needs order in [facility] [place].

## 2019-08-20 NOTE — PLAN OF CARE
Problem: Falls - Risk of:  Goal: Will remain free from falls  Description  Will remain free from falls  8/20/2019 1544 by Tino Robles RN  Outcome: Ongoing  8/20/2019 1223 by Tino Robles RN  Outcome: Ongoing  Goal: Absence of physical injury  Description  Absence of physical injury  8/20/2019 1544 by Tino Robles RN  Outcome: Ongoing  8/20/2019 1223 by Tino Robles RN  Outcome: Ongoing     Problem: Risk for Impaired Skin Integrity  Goal: Tissue integrity - skin and mucous membranes  Description  Structural intactness and normal physiological function of skin and  mucous membranes.   8/20/2019 1544 by Tino Robles RN  Outcome: Ongoing  8/20/2019 1223 by Tino Robles RN  Outcome: Ongoing     Problem: Nutrition  Goal: Optimal nutrition therapy  8/20/2019 1544 by Tino Robles RN  Outcome: Ongoing  8/20/2019 1223 by Tino Robles RN  Outcome: Ongoing

## 2019-08-20 NOTE — PROGRESS NOTES
Physical Therapy  Formerly [facility] ACUTE CARE PHYSICAL THERAPY EVALUATION  Priyanka Pearson, 10/5/1933, [id number]/[id number]-A, 8/20/2019    History  [place]:  The primary encounter diagnosis was Dizziness. Diagnoses of Hyponatremia and Serum creatinine raised were also pertinent to this visit. Patient  has a past medical history of ACTA2-related familial thoracic aortic aneurysm, Arrhythmia, Atrial fibrillation (HCC), BBBB (bilateral bundle branch block), Bradycardia, CHF (congestive heart failure) ([address] Essential hypertension, malignant, History of cardioversion, History of chest x-ray, History of CT scan, History of echocardiogram, History of EKG, History of EKG, History of Holter monitoring, History of stress test, Hx of transesophageal echocardiography (SIMONE) for monitoring, Hyperlipemia, Hyperlipidemia, Hypertension, Nonspecific abnormal electrocardiogram (ECG) (EKG), Persistent atrial fibrillation ([address] Sick sinus syndrome ([address] and Vertigo. Patient  has a past surgical history that includes Pacemaker insertion (Left, 03/29/2018) and CYSTOSCOPY INSERTION / REMOVAL STENT / STONE (Left, 5/6/2019). Subjective:  Patient states:  \"I don't do so much anymore\"    Pain:  Denies   Communication with other providers:  Handoff to RN, co-eval with OT.    Restrictions: general precautions, fall risk, monet (leg bag), portable tele, pulse ox, chair alarm     Home Setup/Prior level of function   Social/Functional History  Lives With: Spouse (has dementia-needs 24/7 supervision)  Type of Home: House  Home Layout: One level, Laundry in basement(12 steps with a handrail to access basement)  Home Access: Stairs to enter with rails  Entrance Stairs - Number of Steps: 2  Equipment: standard walker   ADL Assistance: Independent  Homemaking Assistance: Independent  Ambulation Assistance: Independent  Transfer Assistance: Independent  Additional comments: Pts wife has a caregiver present in the home during the day time hours and typically will have son stay the night. Pts son reported that pts wife will not let caregiver help with household chores so pt has to do it all and its getting to be \"too much\". Family very supportive and checks in frequently on pt and his wife. Pt has has \"near falls\" but no full falls in the last 3 months. Examination of body systems (includes body structures/functions, activity/participation limitations):  · Observation:  Sitting up in bed upon arrival. Agreeable to work with PT/OT. Son present towards end of session   · Vision:  [employer] with glasses  · Hearing:  [place]   · Cardiopulmonary:  /62, HR 60bpm. No complaints of SOB or dizziness. · Orientation: oriented to person, place, disoriented to time. Musculoskeletal  · ROM R/L:  Limited total knee extension bilat. · Strength R/L:  BLEs 5/5 within available range, good in function and endurance. Mobility/treatment:  · Rolling L/R:  NT  · Supine to sit:  SBA with HOB elevated to 60 deg. · Transfers:   · Sit to stand: SBA from EOB  · Stand to sit: SBA/CGA to recliner   · Step pivot: w/ out AD to recliner CGA/SBA  · Sitting balance:  SBA from EOB static and light dynamic   · Standing balance:  CGA/SBA for safety static and light dynamic performing self care tasks at sink with no UE support   · Gait: 150ft without AD CGA/SBA for safety. Slight deviations from path, dec step length, fair foot clearance, fwd posture, slightly flexed at knees. 80ft with RW CGA/SBA. Needed demonstration for sequencing and technique with AD to properly use. Fair carry over w/ frequent cueing though was forgetful. Pt reported fatigue/weaknes to LEs during ambulation. · Educated pt and son on POC, role of PT, DME, discharge recommendations. VCs/demonstration for sequencing, posture, weight shift, balance for inc safety and independence with mobility.      [facility] 6 Clicks Inpatient Mobility:  AM-PAC Inpatient Mobility Raw Score : 18    Safety: patient left in chair with chair alarm, call light within reach, RN notified, gait belt used. Assessment: Body structures, Functions, Activity limitations: Decreased functional mobility ; Decreased safe awareness; Decreased ROM; Decreased endurance; Decreased balance; Decreased ADL status; Decreased cognition; Decreased strength  Pt is an 80year old male admitted with SABIHA, dizziness, and fatigue. Recommend home w/ 24/7 supervision and [address] once medically stable. Prior to admission pt was indep without AD for gross mobility and ADLs. Pt is currently SBA/CGA for safety. Pt would benefit from continued therapy in home setting to address current deficits, dec potential fall risk, and restore function. Complexity: Moderate  Prognosis: Good, no significant barriers to participation at this time. Plan Times per week: 2+/week, 1 week,   Discharge Recommendations: 24 hour supervision or assist, Home with Home health PT, S Level 1  Equipment: continue to assess. Possible need for RW fro gait depending on ability to properly use and carry over safe use of walker. Goals:  Short term goals  Time Frame for Short term goals: 1 week   Short term goal 1: Pt will perform sit><supine Yenni   Short term goal 2: Pt will transition sit><stand Yenni   Short term goal 3: Pt will transfer to bed/recliner Yenni   Short term goal 4: Pt will ambulate 150ft with LRAD Yenni   Short term goal 5: Pt will ascend/descend 2 steps, single rail SBA        Treatment plan:  Strengthening; Transfer Training; Balance Training; ROM; Functional Mobility Training; ADL/Self-care Training; Endurance Training; Gait Training; IADL Training; Stair training; Neuromuscular Re-education; Home Exercise Program; Patient/Caregiver Education & Training; Modalities; Safety Education & Training; Equipment Evaluation, Education, & procurement; Positioning  Recommendations for NURSING mobility: ambulate 3x daily.  Continue to trial with RW     Time:   Time in: 0903  Time out: 0930  Timed treatment minutes: 12  Total time: 27    Electronically signed by:    Amira Austin [id number]  8/20/2019, 11:17 AM

## 2019-08-20 NOTE — PROGRESS NOTES
Occupational Therapy  [facility] OCCUPATIONAL THERAPY EVALUATION    History  [place]:  The primary encounter diagnosis was Dizziness. Diagnoses of Hyponatremia and Serum creatinine raised were also pertinent to this visit. Restrictions:  Restrictions/Precautions  Restrictions/Precautions: Fall Risk                        Communication with other providers: PT    Subjective:  Patient states:  \"my mind doesn't work anymore\"  Pain:  none  Patient goal:  Home, be able to care for himself. Wants to transition into long term soon    Occupational profile (relevant social history and personal factors):       Lives With: Spouse  Type of Home: House  Home Layout: One level, Laundry in basement(12 steps with a handrail to access basement)  Home Access: Stairs to enter with rails  Entrance Stairs - Number of Steps: 2  ADL Assistance: Independent  Homemaking Assistance: Independent  Ambulation Assistance: Independent  Transfer Assistance: Independent    Examination of body systems (includes body structures/functions, activity/participation limitations):  · Orientation: WFL   · Cognition:  WFL for command following and history  · Observation:  Received pt in bed. Alert and cooperative. · Vision:  WFL , glasses  · Hearing:  Mild hardness of hearing  · ROM:  WFL BUE  · Strength: 4/5  BUE  · Sensation: WFL BUE    ADLs  Feeding: OSVALDO    Grooming: Pt washed hands sinkside c CGA for steadying    Dressing: UB predict SBA in seated LB predict CGA for steadying    Bathing: UB predict SBA in seated LB predict CGA for steadying    Toileting: predict CGA for steadying. Pt has monet leg bag full time for urination. *Some ADL determined per observation of actual ADL performance, functional mobility, balance, activity tolerance, and cognition.      AM-PAC 6 click short form for inpatient daily activity:  Raw Score: 19  24/24 = unimpaired  23/24 = 1-20% impaired   20/24-22/24 = 21-40% impaired  15/24-19/24 = 41-59% impaired   10/24-14/24 = 60%-79% impaired  7/24-9/24 = 80%-99% impaired  6/24 = 100% impaired    Functional Mobility  Bed mobility:   Supine to sit: SBA HOB elevated    Sitting balance: Posterior lean c dynamic, but not true LOB. OSVALDO. Transfers:   Sit to stand: CGA  Stand to sit: CGA  *with safety cues     Standing balance: CGA no AD, SBA c RW    Ambulation:  CGA-Laura no AD. CGA c RW but with reduced safe use of walker. See PT evaluation. Activity tolerance  Good, near baseline    Assessment:  Assessment  Performance deficits / Impairments: Decreased strength, Decreased endurance, Decreased high-level IADLs, Decreased safe awareness, Decreased balance  Treatment Diagnosis: dizziness, hyponatremia  Prognosis: Good  Decision Making: Medium Complexity  REQUIRES OT FOLLOW UP: Yes  Discharge Recommendations: 24 hour supervision or assist, Home with assist PRN, Home with Home health OT    Goals:  By d/c or goals met:     Pt will perform all bed mobility with SBA in prep for EOB/OOB activity. Pt will perform all functional transfers with SBA and appropriate use of LRD to bed, toilet, chair in prep for increased functional independence. Pt will perform UB ADLs with SBA to increase functional independence. Pt will perform LB ADLs with SBA to increase functional independence. Pt will perform all aspects of toileting with SBA to increase functional independence. Pt will participate in therex/therax c emphasis on strength, activity tolerance,  safety, OSVALDO tasks. Plan:  Plan  Times per week: 2x      Recommendation for activity with nursing staff:  ambulate to bathroom with RW for toileting  ambulate in halls with RW    Treatment today:    Self Care Training:   Self care training was performed today. Cues were given for safety, sequence, UE/LE placement, visual cues, and balance. Activities performed today included dressing, toileting, hand hygiene, and grooming.     Education: Role of OT, OT POC, d/c needs, home safety    Safety: Left in chair with all needs in reach. Gait belt used for transfer and mobility. Time in: 0903  Time out: 0930  Timed treatment minutes: 12  Total time: 27    Electronically signed by:    [id number] Kirstie Saunders, OTR/L, [place]   [id number]   3:14 PM, 8/20/2019

## 2019-08-21 ENCOUNTER — HOSPITAL ENCOUNTER (INPATIENT)
Age: 84
LOS: 7 days | Discharge: NURSING, REHAB OR LONG-TERM CARE | DRG: 682 | End: 2019-08-28
Attending: EMERGENCY MEDICINE | Admitting: INTERNAL MEDICINE
Payer: MEDICARE

## 2019-08-21 PROBLEM — R53.1 GENERALIZED WEAKNESS: Status: ACTIVE | Noted: 2019-08-21

## 2019-08-21 PROBLEM — R31.9 HEMATURIA: Status: ACTIVE | Noted: 2019-08-21

## 2019-08-21 PROBLEM — E87.5 HYPERKALEMIA: Status: ACTIVE | Noted: 2019-08-21

## 2019-08-21 LAB
ANION GAP SERPL CALCULATED.3IONS-SCNC: 10 MMOL/L (ref 4–16)
BACTERIA: NEGATIVE /HPF
BASOPHILS ABSOLUTE: 0 K/CU MM
BASOPHILS RELATIVE PERCENT: 0.1 % (ref 0–1)
BILIRUBIN URINE: NEGATIVE MG/DL
BLOOD, URINE: ABNORMAL
BUN BLDV-MCNC: 65 MG/DL (ref 6–23)
CALCIUM SERPL-MCNC: 9.9 MG/DL (ref 8.3–10.6)
CHLORIDE BLD-SCNC: 92 MMOL/L (ref 99–110)
CLARITY: ABNORMAL
CO2: 26 MMOL/L (ref 21–32)
COLOR: ABNORMAL
CREAT SERPL-MCNC: 2.4 MG/DL (ref 0.9–1.3)
DIFFERENTIAL TYPE: ABNORMAL
EOSINOPHILS ABSOLUTE: 0 K/CU MM
EOSINOPHILS RELATIVE PERCENT: 0.1 % (ref 0–3)
GFR AFRICAN AMERICAN: 31 ML/MIN/1.73M2
GFR NON-AFRICAN AMERICAN: 26 ML/MIN/1.73M2
GLUCOSE BLD-MCNC: 130 MG/DL (ref 70–99)
GLUCOSE, URINE: 50 MG/DL
HCT VFR BLD CALC: 36 % (ref 42–52)
HEMOGLOBIN: 10.9 GM/DL (ref 13.5–18)
IMMATURE NEUTROPHIL %: 0.6 % (ref 0–0.43)
KETONES, URINE: NEGATIVE MG/DL
LEUKOCYTE ESTERASE, URINE: NEGATIVE
LYMPHOCYTES ABSOLUTE: 0.7 K/CU MM
LYMPHOCYTES RELATIVE PERCENT: 6 % (ref 24–44)
MAGNESIUM: 2.3 MG/DL (ref 1.8–2.4)
MCH RBC QN AUTO: 25.8 PG (ref 27–31)
MCHC RBC AUTO-ENTMCNC: 30.3 % (ref 32–36)
MCV RBC AUTO: 85.3 FL (ref 78–100)
MONOCYTES ABSOLUTE: 1 K/CU MM
MONOCYTES RELATIVE PERCENT: 8.8 % (ref 0–4)
MUCUS: ABNORMAL HPF
NITRITE URINE, QUANTITATIVE: NEGATIVE
NUCLEATED RBC %: 0 %
OSMOLALITY URINE: 200 MOS/L (ref 292–1090)
OSMOLALITY: 276 MOS/L (ref 280–300)
PDW BLD-RTO: 16.1 % (ref 11.7–14.9)
PH, URINE: 7 (ref 5–8)
PLATELET # BLD: 389 K/CU MM (ref 140–440)
PMV BLD AUTO: 9.4 FL (ref 7.5–11.1)
POTASSIUM SERPL-SCNC: ABNORMAL MMOL/L (ref 3.5–5.1)
PROTEIN UA: >500 MG/DL
RBC # BLD: 4.22 M/CU MM (ref 4.6–6.2)
RBC URINE: 8145 /HPF (ref 0–3)
SEGMENTED NEUTROPHILS ABSOLUTE COUNT: 9.9 K/CU MM
SEGMENTED NEUTROPHILS RELATIVE PERCENT: 84.4 % (ref 36–66)
SODIUM BLD-SCNC: 128 MMOL/L (ref 135–145)
SPECIFIC GRAVITY UA: 1.01 (ref 1–1.03)
TOTAL IMMATURE NEUTOROPHIL: 0.07 K/CU MM
TOTAL NUCLEATED RBC: 0 K/CU MM
TRICHOMONAS: ABNORMAL /HPF
UROBILINOGEN, URINE: NORMAL MG/DL (ref 0.2–1)
WBC # BLD: 11.7 K/CU MM (ref 4–10.5)
WBC UA: 57 /HPF (ref 0–2)

## 2019-08-21 PROCEDURE — 6370000000 HC RX 637 (ALT 250 FOR IP)

## 2019-08-21 PROCEDURE — 99284 EMERGENCY DEPT VISIT MOD MDM: CPT

## 2019-08-21 PROCEDURE — 6370000000 HC RX 637 (ALT 250 FOR IP): Performed by: PHYSICIAN ASSISTANT

## 2019-08-21 PROCEDURE — 6370000000 HC RX 637 (ALT 250 FOR IP): Performed by: INTERNAL MEDICINE

## 2019-08-21 PROCEDURE — 2580000003 HC RX 258: Performed by: INTERNAL MEDICINE

## 2019-08-21 PROCEDURE — 83735 ASSAY OF MAGNESIUM: CPT

## 2019-08-21 PROCEDURE — 1200000000 HC SEMI PRIVATE

## 2019-08-21 PROCEDURE — 80048 BASIC METABOLIC PNL TOTAL CA: CPT

## 2019-08-21 PROCEDURE — 81001 URINALYSIS AUTO W/SCOPE: CPT

## 2019-08-21 PROCEDURE — 85025 COMPLETE CBC W/AUTO DIFF WBC: CPT

## 2019-08-21 PROCEDURE — 36415 COLL VENOUS BLD VENIPUNCTURE: CPT

## 2019-08-21 PROCEDURE — 93005 ELECTROCARDIOGRAM TRACING: CPT | Performed by: PHYSICIAN ASSISTANT

## 2019-08-21 RX ORDER — FUROSEMIDE 20 MG/1
20 TABLET ORAL 2 TIMES DAILY
Status: DISCONTINUED | OUTPATIENT
Start: 2019-08-21 | End: 2019-08-22

## 2019-08-21 RX ORDER — M-VIT,TX,IRON,MINS/CALC/FOLIC 27MG-0.4MG
1 TABLET ORAL DAILY
Status: DISCONTINUED | OUTPATIENT
Start: 2019-08-22 | End: 2019-08-28 | Stop reason: HOSPADM

## 2019-08-21 RX ORDER — RIVASTIGMINE 4.6 MG/24H
1 PATCH, EXTENDED RELEASE TRANSDERMAL EVERY MORNING
Status: DISCONTINUED | OUTPATIENT
Start: 2019-08-22 | End: 2019-08-28 | Stop reason: HOSPADM

## 2019-08-21 RX ORDER — SODIUM POLYSTYRENE SULFONATE 15 G/60ML
15 SUSPENSION ORAL; RECTAL ONCE
Status: COMPLETED | OUTPATIENT
Start: 2019-08-21 | End: 2019-08-21

## 2019-08-21 RX ORDER — METOPROLOL TARTRATE 50 MG/1
50 TABLET, FILM COATED ORAL 2 TIMES DAILY
Status: DISCONTINUED | OUTPATIENT
Start: 2019-08-21 | End: 2019-08-25

## 2019-08-21 RX ORDER — ATORVASTATIN CALCIUM 20 MG/1
20 TABLET, FILM COATED ORAL NIGHTLY
Status: DISCONTINUED | OUTPATIENT
Start: 2019-08-21 | End: 2019-08-28 | Stop reason: HOSPADM

## 2019-08-21 RX ORDER — ONDANSETRON 2 MG/ML
4 INJECTION INTRAMUSCULAR; INTRAVENOUS EVERY 6 HOURS PRN
Status: DISCONTINUED | OUTPATIENT
Start: 2019-08-21 | End: 2019-08-28 | Stop reason: HOSPADM

## 2019-08-21 RX ORDER — LIDOCAINE HYDROCHLORIDE 20 MG/ML
JELLY TOPICAL ONCE
Status: COMPLETED | OUTPATIENT
Start: 2019-08-21 | End: 2019-08-21

## 2019-08-21 RX ORDER — LIDOCAINE HYDROCHLORIDE 20 MG/ML
JELLY TOPICAL
Status: COMPLETED
Start: 2019-08-21 | End: 2019-08-21

## 2019-08-21 RX ORDER — OMEGA-3-ACID ETHYL ESTERS 1 G/1
1000 CAPSULE, LIQUID FILLED ORAL DAILY
Status: DISCONTINUED | OUTPATIENT
Start: 2019-08-22 | End: 2019-08-28 | Stop reason: HOSPADM

## 2019-08-21 RX ORDER — SODIUM CHLORIDE 0.9 % (FLUSH) 0.9 %
10 SYRINGE (ML) INJECTION EVERY 12 HOURS SCHEDULED
Status: DISCONTINUED | OUTPATIENT
Start: 2019-08-21 | End: 2019-08-28 | Stop reason: HOSPADM

## 2019-08-21 RX ORDER — AMIODARONE HYDROCHLORIDE 200 MG/1
200 TABLET ORAL DAILY
Status: DISCONTINUED | OUTPATIENT
Start: 2019-08-22 | End: 2019-08-28 | Stop reason: HOSPADM

## 2019-08-21 RX ORDER — SODIUM CHLORIDE 0.9 % (FLUSH) 0.9 %
10 SYRINGE (ML) INJECTION PRN
Status: DISCONTINUED | OUTPATIENT
Start: 2019-08-21 | End: 2019-08-28 | Stop reason: HOSPADM

## 2019-08-21 RX ORDER — TRAMADOL HYDROCHLORIDE 50 MG/1
50 TABLET ORAL EVERY 6 HOURS PRN
Status: DISCONTINUED | OUTPATIENT
Start: 2019-08-21 | End: 2019-08-28 | Stop reason: HOSPADM

## 2019-08-21 RX ORDER — ACETAMINOPHEN 325 MG/1
650 TABLET ORAL EVERY 8 HOURS PRN
Status: DISCONTINUED | OUTPATIENT
Start: 2019-08-21 | End: 2019-08-28 | Stop reason: HOSPADM

## 2019-08-21 RX ADMIN — METOPROLOL TARTRATE 50 MG: 50 TABLET ORAL at 22:14

## 2019-08-21 RX ADMIN — SODIUM POLYSTYRENE SULFONATE 15 G: 15 SUSPENSION ORAL; RECTAL at 20:57

## 2019-08-21 RX ADMIN — Medication 10 ML: at 22:15

## 2019-08-21 RX ADMIN — LIDOCAINE HYDROCHLORIDE: 20 JELLY TOPICAL at 18:23

## 2019-08-21 RX ADMIN — FUROSEMIDE 20 MG: 20 TABLET ORAL at 22:15

## 2019-08-21 RX ADMIN — ATORVASTATIN CALCIUM 20 MG: 20 TABLET, FILM COATED ORAL at 22:14

## 2019-08-21 RX ADMIN — APIXABAN 2.5 MG: 2.5 TABLET, FILM COATED ORAL at 22:14

## 2019-08-21 NOTE — ED TRIAGE NOTES
Pt discharged from inpatient yesterday. Pt returns today with c/o no urine output in catheter bag since yesterday, and constipation. Family reports no BM x 2 days. Pt denies pain. Family reports pt is weak.

## 2019-08-21 NOTE — ED NOTES
Daughter called to check on pt, pt approved to speak with her about him. Left Stent placed in May, surgery in July, urinary retention so catheter placed.      [phone] Mario Rascon RN  08/21/19 [id number]

## 2019-08-21 NOTE — ED NOTES
@[id number] Dr Miladys Rodriguez returned call transferred to [facility]  08/21/19 [id number]

## 2019-08-21 NOTE — ED PROVIDER NOTES
Triage Chief Complaint:   Urinary Retention (Pt has a monet catheter, family reports no urine drained since yesterday) and Constipation (no BM X 2 DAYS)    [place]:  Santhosh Crawford is a 80 y.o. male that presents today to the emergency department complaining of weakness. Context is patient was recently discharged from this hospital.  Admitted for weakness and hypernatremia. Apparently he had a good appetite while he was in the hospital.  And his strength was coming back. Just discharged yesterday. Being taking care of by family members. And per family members they feel as though it is unsafe for him to go home. Because they cannot properly care for him. They state that he has been very very off balance. It has almost fell on several occasions. They are also concerned he has he has had gross hematuria through his catheter that was placed during his hospitalization. And also he has not had a bowel movement in several days. Patient denies any chest pain or shortness of breath denies abdominal pain flank pain. Denies any penile testicular pain. Does endorse being constipated and feeling very very weak and feeling off balance. ROS:  REVIEW OF SYSTEMS    At least 10 systems reviewed      All other review of systems are negative  See HPI and nursing notes for additional information       Past Medical History:   Diagnosis Date    ACTA2-related familial thoracic aortic aneurysm     Arrhythmia     Atrial fibrillation (HCC)     BBBB (bilateral bundle branch block)     Bradycardia     CHF (congestive heart failure) ([facility] [address]     Essential hypertension, malignant     History of cardioversion 04/17/2018    History of chest x-ray 02/27/2017    Enlargement of the aorta knob which may represent a thoracic aortic aneurysm. Further evaluation w/ a contrast enhanced CT of the chest recommended. no evidence of acute pulmonary process.     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 Recommend thyroid US.  History of echocardiogram 03/27/2017    TTE EF 55%, LV Normal Size, borderline LVH concentric, Normal LV systolic function, transmittal doppler flow pttern suggest impaired LV relaxation Mild aortic stenosis, mild aortic regurgitation.  History of EKG 02/27/2017    Time 12:03 AM, HR 75, A fib, Axis normal, Intervals normal, P waves normal, St-T Segments: nonspecific ST segment changes to the anterior lateral leads, QRS RBBB,  No significant Q waves, no ectopy. A-fib w/RBBB w/nonspecific ST segment change EKG.  History of EKG 02/27/2017    Time 2:58AM: HR 59, NSR, Axis normal, Intervals normal, P waves normal, ST-T seg: nonspecific ST segment changes, QRS RBBB, no significant Q waves, no ectopy. Sinus bradycardia w/ RBBB nonspecific ST Segment changes EKG    History of Holter monitoring 11/08/2017    monitored 48 hours: Patient noted to have multiple PAC and PVCs. Risk of atrial fibrillation present given previous episode Recommend antiarrhythmic therapy.  History of stress test 03/27/2017    NM Stress test: EF 54%, Abnormal study, evidence of mild ischemia in mild inferior regions. post stress LV is enlarged in size.  Post-stress LV function is normal.     Hx of transesophageal echocardiography (SIMONE) for monitoring 04/17/2018    EF45-50% mild TR, mild-mod MR, mod-severe AS    Hyperlipemia     Hyperlipidemia     Hypertension     Nonspecific abnormal electrocardiogram (ECG) (EKG)     Persistent atrial fibrillation (HCC)     Sick sinus syndrome (HCC)     Vertigo      Past Surgical History:   Procedure Laterality Date    CYSTOSCOPY INSERTION / REMOVAL STENT / STONE Left 5/6/2019    CYSTOSCOPY RETROGRADE PYELOGRAM STENT INSERTION, DIAGNOSTIC CYSTOSCOPY performed by Anmol Lucas MD at [facility] Left 03/29/2018    Dual Chamber [employer]onic Aura XT DR AMI Kincaid     History reviewed. No pertinent family history. Social History     Socioeconomic History    Marital status:      Spouse name: Not on file    Number of children: Not on file    Years of education: Not on file    Highest education level: Not on file   Occupational History    Not on file   Social Needs    Financial resource strain: Not on file    Food insecurity:     Worry: Not on file     Inability: Not on file    Transportation needs:     Medical: Not on file     Non-medical: Not on file   Tobacco Use    Smoking status: Never Smoker    Smokeless tobacco: Never Used   Substance and Sexual Activity    Alcohol use:  Yes     Alcohol/week: 1.0 standard drinks     Types: 1 Cans of beer per week     Comment: occasional beer    Drug use: No    Sexual activity: Never   Lifestyle    Physical activity:     Days per week: Not on file     Minutes per session: Not on file    Stress: Not on file   Relationships    Social connections:     Talks on phone: Not on file     Gets together: Not on file     Attends Religion service: Not on file     Active member of club or organization: Not on file     Attends meetings of clubs or organizations: Not on file     Relationship status: Not on file    Intimate partner violence:     Fear of current or ex partner: Not on file     Emotionally abused: Not on file     Physically abused: Not on file     Forced sexual activity: Not on file   Other Topics Concern    Not on file   Social History Narrative    Not on file     Current Facility-Administered Medications   Medication Dose Route Frequency Provider Last Rate Last Dose    polyethylene glycol (GLYCOLAX) packet 17 g  17 g Oral Daily Rody Looney MD   17 g at 08/27/19 1118    bisacodyl (DULCOLAX) suppository 10 mg  10 mg Rectal Daily PRN Kaylie Ellis MD   10 mg at 08/25/19 0709    lactulose (CHRONULAC) 10 GM/15ML solution 20 g  20 g Oral TID Pino Landry MD   Stopped at 08/27/19 2112    docusate sodium (COLACE) capsule 100 mg  100 mg Oral Daily Pino Landry MD   100 mg at 08/27/19 1118    finasteride (PROSCAR) tablet 5 mg  5 mg Oral Daily CARI Bailey - CNP   5 mg at 08/27/19 1118    sulfamethoxazole-trimethoprim (BACTRIM;SEPTRA) 400-80 MG per tablet 1 tablet  1 tablet Oral 2 times per day CARI Bailey - CNP   1 tablet at 08/27/19 2111    amiodarone (CORDARONE) tablet 200 mg  200 mg Oral Daily Diamond Ashton MD   200 mg at 08/27/19 1118    atorvastatin (LIPITOR) tablet 20 mg  20 mg Oral Nightly Diamond Ashton MD   20 mg at 08/27/19 2111    vitamin D (CHOLECALCIFEROL) tablet 5,000 Units  5,000 Units Oral QAM Diamond Ashton MD   5,000 Units at 08/27/19 1118    therapeutic multivitamin-minerals 1 tablet  1 tablet Oral Daily Diamond Ashton MD   1 tablet at 08/27/19 1117    omega-3 acid ethyl esters (LOVAZA) capsule 1,000 mg  1,000 mg Oral Daily Diamond Ashton MD   1,000 mg at 08/27/19 1118    rivastigmine (EXELON) 4.6 MG/24HR 1 patch  1 patch Transdermal QAM Diamond Ashton MD   1 patch at 08/27/19 1120    sodium chloride flush 0.9 % injection 10 mL  10 mL Intravenous 2 times per day Diamond Ashton MD   10 mL at 08/27/19 2112    sodium chloride flush 0.9 % injection 10 mL  10 mL Intravenous PRN Diamond Ashton MD        ondansetron [facility]) injection 4 mg  4 mg Intravenous Q6H PRN Diamond Ashton MD        traMADol [place]) tablet 50 mg  50 mg Oral Q6H PRN Diamond Ashton MD        acetaminophen (TYLENOL) tablet 650 mg  650 mg Oral Q8H PRN Diamond Ashton MD         Allergies   Allergen Reactions    Biaxin [Clarithromycin]     Cephalexin     Viagra [Sildenafil Citrate] Nausea Only and Other (See Comments)     Dizziness and BP dropped       Nursing Notes Reviewed    Physical Exam:  ED Triage Vitals [08/21/19 1517]   Enc Vitals Group      BP (!) 102/56      Pulse 69      Resp 17      Temp 97.2 °F (36.2 °C)      Temp Source Oral      SpO2 98 %      Weight 147 lb (66.7 kg)      Height 5' 10\" (1.778 m)      Head Circumference       Peak Flow       Pain Score       Pain Loc       Pain Edu? Excl. in [address]? General :Patient is awake alert oriented person place and time no acute distress nontoxic appearing thin orderly male elderly male   HEENT: Pupils are equally round and reactive to light extraocular motors are intact conjunctivae clear sclerae white there is no injection no icterus. Nose without any rhinorrhea or epistaxis. Oral mucosa is moist no exudate buccal mucosa shows no ulcerations. Uvula is midline    Neck: Neck is supple full range of motion trachea midline thyroid nonpalpable  Cardiac: Heart regular rate rhythm no murmurs rubs clicks or gallops  Lungs: Lungs are clear to auscultation there is no wheezing rhonchi or rales. There is no use of accessory muscles no nasal flaring identified. Chest wall: There is no tenderness to palpation over the chest wall or over ribs  Abdomen: Abdomen is soft nontender nondistended. There is no firm or pulsatile masses no rebound rigidity or guarding negative Stout's negative McBurney, no peritoneal signs  Suprapubic:  there is no tenderness to palpation over the external bladder   General genital exam: Indwelling catheter noted with scant blood around the catheters insertion of the urethra. Catheter bag itself does appear to have  Gross blood in it. Musculoskeletal: 5 out of 5 strength in all 4 extremities full flexion extension abduction and adduction supination pronation of all extremities and all digits. No obvious muscle atrophy is noted.  No focal muscle deficits are appreciated  Dermatology: Skin is warm and dry there is no obvious abscesses lacerations or lesions noted  Psych: Mentation is grossly normal cognition is grossly normal. American 26 (L) >60 mL/min/1.73m2    GFR  31 (L) >60 mL/min/1.73m2   Urinalysis with Microscopic   Result Value Ref Range    Color, UA RED (A) YELLOW    Clarity, UA HAZY (A) CLEAR    Glucose, Urine 50 (A) NEGATIVE MG/DL    Bilirubin Urine NEGATIVE NEGATIVE MG/DL    Ketones, Urine NEGATIVE NEGATIVE MG/DL    Specific Gravity, UA 1.013 1.001 - 1.035    Blood, Urine MODERATE (A) NEGATIVE    pH, Urine 7.0 5.0 - 8.0    Protein, UA >500 (HH) NEGATIVE MG/DL    Urobilinogen, Urine NORMAL 0.2 - 1.0 MG/DL    Nitrite Urine, Quantitative NEGATIVE NEGATIVE    Leukocyte Esterase, Urine NEGATIVE NEGATIVE    RBC, UA 8,145 (H) 0 - 3 /HPF    WBC, UA 57 (H) 0 - 2 /HPF    Bacteria, UA NEGATIVE NEGATIVE /HPF    Mucus, UA RARE (A) NEGATIVE HPF    Trichomonas, UA NONE SEEN NONE SEEN /HPF   Magnesium   Result Value Ref Range    Magnesium 2.3 1.8 - 2.4 mg/dl   Basic Metabolic Panel w/ Reflex to MG   Result Value Ref Range    Sodium 133 (L) 135 - 145 MMOL/L    Potassium 4.5 3.5 - 5.1 MMOL/L    Chloride 96 (L) 99 - 110 mMol/L    CO2 26 21 - 32 MMOL/L    Anion Gap 11 4 - 16    BUN 54 (H) 6 - 23 MG/DL    CREATININE 2.3 (H) 0.9 - 1.3 MG/DL    Glucose 83 70 - 99 MG/DL    Calcium 9.0 8.3 - 10.6 MG/DL    GFR Non- 27 (L) >60 mL/min/1.73m2    GFR  33 (L) >60 mL/min/1.73m2   Magnesium   Result Value Ref Range    Magnesium 2.1 1.8 - 2.4 mg/dl   CBC   Result Value Ref Range    WBC 8.5 4.0 - 10.5 K/CU MM    RBC 3.60 (L) 4.6 - 6.2 M/CU MM    Hemoglobin 9.3 (L) 13.5 - 18.0 GM/DL    Hematocrit 30.0 (L) 42 - 52 %    MCV 83.3 78 - 100 FL    MCH 25.8 (L) 27 - 31 PG    MCHC 31.0 (L) 32.0 - 36.0 %    RDW 16.1 (H) 11.7 - 14.9 %    Platelets 828 859 - 659 K/CU MM    MPV 9.9 7.5 - 11.1 FL   Basic Metabolic Panel   Result Value Ref Range    Sodium 136 135 - 145 MMOL/L    Potassium 3.8 3.5 - 5.1 MMOL/L    Chloride 99 99 - 110 mMol/L    CO2 27 21 - 32 MMOL/L    Anion Gap 10 4 - 16    BUN 42 (H) 6 - 23 MG/DL    CREATININE 2.2 (H) 0.9 - 1.3 MG/DL    Glucose 87 70 - 99 MG/DL    Calcium 8.9 8.3 - 10.6 MG/DL    GFR Non- 29 (L) >60 mL/min/1.73m2    GFR  35 (L) >60 mL/min/1.73m2   Phosphorus   Result Value Ref Range    Phosphorus 3.4 2.5 - 4.9 MG/DL   Magnesium   Result Value Ref Range    Magnesium 2.2 1.8 - 2.4 mg/dl   Albumin   Result Value Ref Range    Alb 3.3 (L) 3.4 - 5.0 GM/DL   Basic Metabolic Panel   Result Value Ref Range    Sodium 133 (L) 135 - 145 MMOL/L    Potassium 4.1 3.5 - 5.1 MMOL/L    Chloride 96 (L) 99 - 110 mMol/L    CO2 27 21 - 32 MMOL/L    Anion Gap 10 4 - 16    BUN 35 (H) 6 - 23 MG/DL    CREATININE 2.0 (H) 0.9 - 1.3 MG/DL    Glucose 87 70 - 99 MG/DL    Calcium 9.0 8.3 - 10.6 MG/DL    GFR Non- 32 (L) >60 mL/min/1.73m2    GFR  39 (L) >60 AM/PVG/3.36J3   Basic metabolic panel   Result Value Ref Range    Sodium 134 (L) 135 - 145 MMOL/L    Potassium 4.4 3.5 - 5.1 MMOL/L    Chloride 96 (L) 99 - 110 mMol/L    CO2 28 21 - 32 MMOL/L    Anion Gap 10 4 - 16    BUN 26 (H) 6 - 23 MG/DL    CREATININE 1.8 (H) 0.9 - 1.3 MG/DL    Glucose 88 70 - 99 MG/DL    Calcium 9.0 8.3 - 10.6 MG/DL    GFR Non- 36 (L) >60 mL/min/1.73m2    GFR  44 (L) >60 mL/min/1.73m2   EKG 12 Lead   Result Value Ref Range    Ventricular Rate 60 BPM    Atrial Rate 59 BPM    P-R Interval 188 ms    QRS Duration 258 ms    Q-T Interval 556 ms    QTc Calculation (Bazett) 556 ms    P Axis -32 degrees    R Axis -53 degrees    T Axis 102 degrees    Diagnosis       AV dual-paced rhythm  Abnormal ECG  When compared with ECG of 18-AUG-2019 17:55,  Vent.  rate has decreased BY   3 BPM  Confirmed by ASHLEY Hernandez ([id number]) on 8/22/2019 7:20:47 PM        Radiographs (if obtained):  [] The following radiograph was interpreted by myself in the absence of a radiologist:   [] Radiologist's Report Reviewed:  No orders to display       EKG (if obtained):   Please See Note of attending physician for EKG interpretation. Chart review shows recent radiograph(s):  Xr Chest Standard (2 Vw)    Result Date: 8/18/2019  EXAMINATION: TWO XRAY VIEWS OF THE CHEST 8/18/2019 6:06 pm COMPARISON: 05/23/2019 HISTORY: ORDERING SYSTEM PROVIDED HISTORY: Chest pain TECHNOLOGIST PROVIDED HISTORY: Reason for exam:->Chest pain Reason for Exam: felling sob;weak;fatigued;unable to stand for long Acuity: Chronic Type of Exam: Initial Additional signs and symptoms: symptoms ongoing for days FINDINGS: There is a left pacemaker present. The thoracic aorta is markedly tortuous, unchanged in appearance. The mediastinal and cardiac contours are stable. There is no focal consolidation, pleural effusion or evidence of edema. Stable appearance of the chest without acute cardiopulmonary process identified. Ct Head Wo Contrast    Result Date: 8/18/2019  EXAMINATION: CT OF THE HEAD WITHOUT CONTRAST  8/18/2019 6:14 pm TECHNIQUE: CT of the head was performed without the administration of intravenous contrast. Dose modulation, iterative reconstruction, and/or weight based adjustment of the mA/kV was utilized to reduce the radiation dose to as low as reasonably achievable. COMPARISON: 05/04/2019 HISTORY: ORDERING SYSTEM PROVIDED HISTORY: HEAD TRAUMA, CLOSED, MILD, GCS >= 13, NO RISK FACTORS, NEURO EXAM NORMAL TECHNOLOGIST PROVIDED HISTORY: Has a \"code stroke\" or \"stroke alert\" been called? ->No Reason for Exam: DIZZY Acuity: Unknown Type of Exam: Unknown Additional signs and symptoms: NONE Relevant Medical/Surgical History: HX OF VERTIGO FINDINGS: BRAIN/VENTRICLES: The ventricles and sulci are diffusely enlarged. Low attenuation is seen in the periventricular and subcortical white matter. No acute intracranial hemorrhage or acute infarct is identified. ORBITS: The visualized portion of the orbits demonstrate no acute abnormality.  SINUSES: The visualized paranasal sinuses and mastoid air cells demonstrate no acute abnormality. SOFT TISSUES/SKULL:  No acute abnormality of the visualized skull or soft tissues. No acute intracranial abnormality. Diffuse atrophic changes with findings suggesting chronic microvascular ischemia     Us Abdomen Limited    Result Date: 8/19/2019  EXAMINATION: RIGHT UPPER QUADRANT ULTRASOUND 8/19/2019 6:27 am COMPARISON: Abdomen and pelvis CT 05/11/2019 HISTORY: ORDERING SYSTEM PROVIDED HISTORY: New transaminitis TECHNOLOGIST PROVIDED HISTORY: Reason for exam:->New transaminitis Reason for Exam: Pain Acuity: Unknown Type of Exam: Ongoing FINDINGS: LIVER:  Coarsened echotexture. Normal size and echogenicity. No obvious surface nodularity nor focal lesions. BILIARY SYSTEM:  Shadowing gallstones in the dependent gallbladder lumen. No gallbladder distention, gallbladder wall thickening, nor pericholecystic fluid. No reported right upper quadrant pain during examination. No intrahepatic biliary dilation. At least mild dilation of the visualized common duct, measuring at least 1.2 cm. PANCREAS:  Completely obscured by overlying bowel gas. RIGHT KIDNEY:  Normal size, echogenicity, and parenchymal thickness. Suggestion a shadowing calculus in the right renal pelvis. No hydronephrosis. 8.1 cm x 6.0 cm x 6.2 cm cyst with layering debris, corresponding with note of calcium seen on CT (Bosniak category 2). OTHER:  No visualized free intraperitoneal fluid. 1. Cholelithiasis without additional secondary findings of cholecystitis. Consider further evaluation with a nuclear medicine hepatobiliary scan if there are clinical findings of cholecystitis. 2. At least mild dilation of the visualized common duct, which is obscured distally. This appears new compared to the comparison CT in the be due to choledocholithiasis. Recommend further evaluation with MRCP. 3. Coarsened liver echotexture suggestive of underlying hepatocellular disease. No findings suggestive of cirrhosis or significant steatosis.  4. Right renal cyst for which no follow-up is recommended. Nonobstructing calculus suspected in the right renal pelvis. Us Retroperitoneal Limited    Result Date: 8/19/2019  EXAMINATION: RETROPERITONEAL ULTRASOUND OF THE KIDNEYS 8/19/2019 COMPARISON: 05/11/2019 HISTORY: ORDERING SYSTEM PROVIDED HISTORY: RENAL FAILURE, ACUTE (KIDNEY INJURY) TECHNOLOGIST PROVIDED HISTORY: Acuity: Acute Type of Exam: Initial FINDINGS: Kidneys: The right kidney measures 11.9 cm in length and the left kidney measures 11.2 cm in length. Kidneys demonstrate normal cortical echogenicity. Bilateral simple appearing renal cysts, which measure up to 8.1 cm on the right and require no follow-up. No evidence of hydronephrosis. Echogenic focus along the mid/inferior right renal pelvis measuring up to 2.0 cm, consistent with a calculus. 1. No evidence of hydronephrosis. 2. Stable 2.0 cm calculus along the right renal pelvis. 3. Simple appearing bilateral renal cysts measuring up to 8.1 cm, which prior no further imaging follow-up. MDM:   She presents to the emergency department with generalized weakness. Urinary retention. Hematuria noted his labs look pretty good here today from hyperkalemia which is treated here in the ED. However he is generally weak on physical examination. Family does not feel safe taking him home they state that he is too much to care for at this time. Requests admission. Patient will likely need admission to hospital physical therapy/Occupational Therapy evaluation. And may require skilled nursing facility disposition. On-call physician  Was consulted regarding patient.   After thorough discussion regarding patient's history, physical exam.  laboratory values, radiographic evidence (if applicable  theymyself as well as my attending physician agreed Given the patient's presenting concerns, medical history and clinical findings, the patient will be admitted at this time to undergo further evaluation and disposition. . During patient's entire stay in the ED patient remained stable and comfortable. Analgesia is well-controlled. Patient will be admitted for all information regarding ongoing management and care of patient please see note of Admitting physician. I saw this patient ine conjunction with Attending Physician Dr. Heather Solo    All EKG interpretations are performed by Attending physician          BP (!) 115/52   Pulse 60   Temp 97.9 °F (36.6 °C) (Oral)   Resp 20   Ht 5' 10\" (1.778 m)   Wt 147 lb 0.8 oz (66.7 kg)   SpO2 97%   BMI 21.10 kg/m²       Clinical Impression:  1. General weakness    2. Gross hematuria    3. Hyperkalemia        Disposition referral (if applicable):  [place] [address] [address] [phone]          Disposition medications (if applicable):  Current Discharge Medication List            Comment: Please note this report has been produced using speech recognition software and may contain errors related to that system including errors in grammar, punctuation, and spelling, as well as words and phrases that may be inappropriate. If there are any questions or concerns please feel free to contact the dictating provider for clarification.       Kaylyn Dakins, [address] [place] [address]  08/28/19 7418

## 2019-08-21 NOTE — ED NOTES
Nsg supervisor notified of need for urology cart for pt catheter irrigation. Will retrieve from sterile processing.       Adelaida Gupta RN  08/21/19 [id number]

## 2019-08-21 NOTE — ED NOTES
Old catheter removed, new 26french three way catheter inserted. Pt had output of 750ml bloody bright red output. Pt has irrigation started.      Parris Lawton RN  08/21/19 [id number]

## 2019-08-21 NOTE — ED NOTES
Pt has red bloody non-cloudy drainage. Pt family report that was not present this AM and had very little output last night into this morning. Pt has leg bag catheter that has been in place for about a month by urologist. Has appointment for removal. Pt has tenderness to genitalia and leaking around catheter site. Pt has been feeling more weak, constipated and lack of appetite, was discharged from hospital last night.      Roxie Macario, RN  08/21/19 2984

## 2019-08-21 NOTE — ED PROVIDER NOTES
I independently examined and evaluated Nikolai Huddleston. In brief, she is presenting with decreased urine output has a Novak catheter in place. Focused exam revealed resting comfortably, respirations nonlabored. ED course: Labs sent he has an elevated BUN and creatinine close to his baseline potassium is 5.7 which is slightly higher than previous but not drastically which was treated, no UTI, three-way catheter will be placed that he will be irrigated, he does have hematuria if this clears up he will be discharged if not urology will be consulted. EKG shows a paced rhythm rate of 60 no old EKG available for comparison    All diagnostic, treatment, and disposition decisions were made by myself in conjunction with the advanced practice provider. For all further details of the patient's emergency department visit, please see the advanced practice provider's documentation. Comment: Please note this report has been produced using speech recognition software and may contain errors related to that system including errors in grammar, punctuation, and spelling, as well as words and phrases that may be inappropriate. If there are any questions or concerns please feel free to contact the dictating provider for clarification.         Jigna Samuels MD  08/21/19 0732

## 2019-08-22 LAB
ANION GAP SERPL CALCULATED.3IONS-SCNC: 11 MMOL/L (ref 4–16)
BUN BLDV-MCNC: 54 MG/DL (ref 6–23)
CALCIUM SERPL-MCNC: 9 MG/DL (ref 8.3–10.6)
CHLORIDE BLD-SCNC: 96 MMOL/L (ref 99–110)
CO2: 26 MMOL/L (ref 21–32)
CREAT SERPL-MCNC: 2.3 MG/DL (ref 0.9–1.3)
CULTURE: ABNORMAL
CULTURE: ABNORMAL
GFR AFRICAN AMERICAN: 33 ML/MIN/1.73M2
GFR NON-AFRICAN AMERICAN: 27 ML/MIN/1.73M2
GLUCOSE BLD-MCNC: 83 MG/DL (ref 70–99)
HCT VFR BLD CALC: 30 % (ref 42–52)
HEMOGLOBIN: 9.3 GM/DL (ref 13.5–18)
Lab: ABNORMAL
MAGNESIUM: 2.1 MG/DL (ref 1.8–2.4)
MCH RBC QN AUTO: 25.8 PG (ref 27–31)
MCHC RBC AUTO-ENTMCNC: 31 % (ref 32–36)
MCV RBC AUTO: 83.3 FL (ref 78–100)
PDW BLD-RTO: 16.1 % (ref 11.7–14.9)
PLATELET # BLD: 315 K/CU MM (ref 140–440)
PMV BLD AUTO: 9.9 FL (ref 7.5–11.1)
POTASSIUM SERPL-SCNC: 4.5 MMOL/L (ref 3.5–5.1)
RBC # BLD: 3.6 M/CU MM (ref 4.6–6.2)
SODIUM BLD-SCNC: 133 MMOL/L (ref 135–145)
SPECIMEN: ABNORMAL
TOTAL COLONY COUNT: ABNORMAL
WBC # BLD: 8.5 K/CU MM (ref 4–10.5)

## 2019-08-22 PROCEDURE — 83735 ASSAY OF MAGNESIUM: CPT

## 2019-08-22 PROCEDURE — 93010 ELECTROCARDIOGRAM REPORT: CPT | Performed by: INTERNAL MEDICINE

## 2019-08-22 PROCEDURE — 97530 THERAPEUTIC ACTIVITIES: CPT

## 2019-08-22 PROCEDURE — 36415 COLL VENOUS BLD VENIPUNCTURE: CPT

## 2019-08-22 PROCEDURE — 85027 COMPLETE CBC AUTOMATED: CPT

## 2019-08-22 PROCEDURE — 6370000000 HC RX 637 (ALT 250 FOR IP): Performed by: NURSE PRACTITIONER

## 2019-08-22 PROCEDURE — 6370000000 HC RX 637 (ALT 250 FOR IP): Performed by: INTERNAL MEDICINE

## 2019-08-22 PROCEDURE — 2580000003 HC RX 258: Performed by: INTERNAL MEDICINE

## 2019-08-22 PROCEDURE — 97162 PT EVAL MOD COMPLEX 30 MIN: CPT

## 2019-08-22 PROCEDURE — 94761 N-INVAS EAR/PLS OXIMETRY MLT: CPT

## 2019-08-22 PROCEDURE — 99222 1ST HOSP IP/OBS MODERATE 55: CPT | Performed by: INTERNAL MEDICINE

## 2019-08-22 PROCEDURE — 1200000000 HC SEMI PRIVATE

## 2019-08-22 PROCEDURE — 80048 BASIC METABOLIC PNL TOTAL CA: CPT

## 2019-08-22 RX ORDER — SULFAMETHOXAZOLE AND TRIMETHOPRIM 800; 160 MG/1; MG/1
1 TABLET ORAL EVERY 12 HOURS SCHEDULED
Status: DISCONTINUED | OUTPATIENT
Start: 2019-08-22 | End: 2019-08-22 | Stop reason: DRUGHIGH

## 2019-08-22 RX ORDER — DOCUSATE SODIUM 100 MG/1
100 CAPSULE, LIQUID FILLED ORAL DAILY
Status: DISCONTINUED | OUTPATIENT
Start: 2019-08-22 | End: 2019-08-28 | Stop reason: HOSPADM

## 2019-08-22 RX ORDER — LACTULOSE 10 G/15ML
20 SOLUTION ORAL 3 TIMES DAILY
Status: DISCONTINUED | OUTPATIENT
Start: 2019-08-22 | End: 2019-08-28 | Stop reason: HOSPADM

## 2019-08-22 RX ORDER — SULFAMETHOXAZOLE AND TRIMETHOPRIM 400; 80 MG/1; MG/1
1 TABLET ORAL EVERY 12 HOURS SCHEDULED
Status: DISCONTINUED | OUTPATIENT
Start: 2019-08-22 | End: 2019-08-28 | Stop reason: HOSPADM

## 2019-08-22 RX ORDER — FINASTERIDE 5 MG/1
5 TABLET, FILM COATED ORAL DAILY
Status: DISCONTINUED | OUTPATIENT
Start: 2019-08-22 | End: 2019-08-28 | Stop reason: HOSPADM

## 2019-08-22 RX ADMIN — MULTIPLE VITAMINS W/ MINERALS TAB 1 TABLET: TAB at 10:26

## 2019-08-22 RX ADMIN — ATORVASTATIN CALCIUM 20 MG: 20 TABLET, FILM COATED ORAL at 21:57

## 2019-08-22 RX ADMIN — LACTULOSE 20 G: 10 SOLUTION ORAL at 21:57

## 2019-08-22 RX ADMIN — DOCUSATE SODIUM 100 MG: 100 CAPSULE, LIQUID FILLED ORAL at 10:26

## 2019-08-22 RX ADMIN — SULFAMETHOXAZOLE AND TRIMETHOPRIM 1 TABLET: 400; 80 TABLET ORAL at 21:57

## 2019-08-22 RX ADMIN — VITAMIN D, TAB 1000IU (100/BT) 5000 UNITS: 25 TAB at 10:41

## 2019-08-22 RX ADMIN — LACTULOSE 20 G: 10 SOLUTION ORAL at 10:26

## 2019-08-22 RX ADMIN — OMEGA-3-ACID ETHYL ESTERS 1000 MG: 1 CAPSULE, LIQUID FILLED ORAL at 10:26

## 2019-08-22 RX ADMIN — AMIODARONE HYDROCHLORIDE 200 MG: 200 TABLET ORAL at 10:26

## 2019-08-22 RX ADMIN — SULFAMETHOXAZOLE AND TRIMETHOPRIM 1 TABLET: 400; 80 TABLET ORAL at 10:40

## 2019-08-22 RX ADMIN — Medication 10 ML: at 10:33

## 2019-08-22 RX ADMIN — LACTULOSE 20 G: 10 SOLUTION ORAL at 14:54

## 2019-08-22 RX ADMIN — Medication 10 ML: at 22:00

## 2019-08-22 RX ADMIN — FINASTERIDE 5 MG: 5 TABLET, FILM COATED ORAL at 10:26

## 2019-08-22 ASSESSMENT — PAIN SCALES - GENERAL
PAINLEVEL_OUTOF10: 0
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
## 2019-08-22 NOTE — PROGRESS NOTES
Hospitalist Progress Note      Name:  Tash Escalante /Age/Sex: 10/5/1933  (80 y.o. male)   MRN & CSN:  [id number] & [id number] Admission Date/Time: 2019  3:45 PM   Location:  6575/7619-T [facility]    Assessment and Plan:   Tash Escalante is a 80 y.o.  male  who presents with generalized weakness and fatigue    SABIHA on CKD, likely pre renal  Cr 2.4  Hold diuretics  Encourage oral hydration  Evaluated by nephrology  Repeat labs in am    Hyperkalemia  Resolved    Gross hematuria  Evaluated by urology  Bladder irrigation  Dc eliquis    MDRO UTI   Recent Urine Cx +ve Stenotrophomonas Maltophilia   sens to bactim, started by urology    A.fib on eliquis  Hold eliquis due to hematuria   Cont amiodarone    Diet DIET RENAL;   DVT Prophylaxis [] Lovenox, []  Heparin, [] SCDs, []No VTE prophylaxis, patient ambulating   GI Prophylaxis [] PPI, [] H2 Blocker, [] No GI prophylaxis, patient is receiving diet/Tube Feeds   Code Status Full Code             History of Present Illness:     Pt S&E. Still has hematuria, denies pain    Ten point ROS reviewed negative, unless as noted above    Objective: Intake/Output Summary (Last 24 hours) at 2019 1513  Last data filed at 2019 1227  Gross per 24 hour   Intake 10 ml   Output 3425 ml   Net -3415 ml      Vitals:   Vitals:    19 1445   BP: (!) 104/50   Pulse:    Resp:    Temp: 97.9 °F (36.6 °C)   SpO2:      Physical Exam:    GEN Awake male, in no apparent distress. RESP Clear to auscultation, no wheezes, rales or rhonchi. CARDIO/VASC S1/S2 auscultated. Regular rate without appreciable murmurs, rubs, or gallops. No peripheral edema. GI Abdomen is soft without significant tenderness,  masses, or guarding. Bowel sounds are normoactive.  monet in place, red urine noted in bag  MSK No gross joint deformities. Spontaneous movement of all extremities  SKIN Normal coloration, warm, dry.   NEURO Cranial nerves

## 2019-08-22 NOTE — CARE COORDINATION
CM - room 40 -late on the scene to obtain information -no family present , pt poor historian . Information was difficult to obtain for this pt . Pt was just released from the hospital yesterday , back today with complaint of urinary retention , blood in urine -has monet catheter. Some procedures were performed in [place] . See previous CM notes -placements were in the works or plans were formed , with no results or they seemed to fall thru. Pt daughter Mary Nielson indicated earlier (to nurse -PA ?) that pt could no longer take care of himself  And was unsafe to return home with wife , who also had unknown problems , it seems family was all in agreement for placement -CM didn't have a chance to inform the family that they are ultimately responsible for the long term care of pt , perhaps rehabilitation to start with -financial situation of pt is unknown -Payer for rehab would be [employer] .  Again pt admitted before CM could address the issues---[place] / Melanie Bland / [address] [facility]

## 2019-08-22 NOTE — PROGRESS NOTES
Gross hematuria (stopped Eliquis today)  Stenotrophomonas UTI on bactrim  Recent left kidney stone surgery with stent  Urinary retention w/ hx BPH- failed 2 voiding trials    Returned to hospital for weakness  Monet repositioned today, on CBI, manual irrigation today - urine is now light pink w/o clots on slow CBI  No leakage/bleeding around catheter  He denies pain  hgb relatively stable    Af,vss  Soft, nd/nt, monet in place    1. Urinary Retention:  Continue monet. Office cystoscopy and stent removal in near future with Dr. Mikki Felix. Voiding trial and if failure then evaluation for urolift vs TURP vs sp tube. Start proscar  2. Left ureter Stent:  Remove in office. S/p kidney stone surgery  3. Gross Hematuria:  Secondary to Eliquis and urinary catheter trauma. Hold Eliquis. Question to medicine and cardiology: can patient stop Eliquis permanently? Continue CBI and start to wean tomorrow. Hopefully will resolve off Eliquis and CBI  4. Stenotrophomonas UTI:  Bactrim for total of 7 days  5.   Urology team will follow

## 2019-08-22 NOTE — CONSULTS
[facility]   Consult Note  [facility] 1 2 3 4 5    Date: 2019   Patient: Birtha Dance   : 10/5/1933   DOA: 2019   MRN: [id number]   ROOM#: [id number]     Reason for Consult:    Requesting Physician:  Dr. Apple Batista   Collaborating Urologist on Call at time of admission: Dr Calvert Kras: generalized weakness and gross hematuria     History Obtained From:  patient, electronic medical record    HISTORY OF PRESENT ILLNESS:                The patient is a 80 y.o. male with significant past medical history of ACTA2-related familial thoracic aortic aneurysm, Arrhythmia, Atrial fibrillation ([address] BBBB (bilateral bundle branch block), Bradycardia, CHF (congestive heart failure) ([address] Essential hypertension, Hyperlipemia, Hyperlipidemia, Hypertension, Nonspecific abnormal electrocardiogram (ECG) (EKG), Persistent atrial fibrillation ([address] Sick sinus syndrome ([address] and Vertigo. who presented with generalized weakness      Past Medical History:        Diagnosis Date    ACTA2-related familial thoracic aortic aneurysm     Arrhythmia     Atrial fibrillation (HCC)     BBBB (bilateral bundle branch block)     Bradycardia     CHF (congestive heart failure) (HCC)     Essential hypertension, malignant     History of cardioversion 2018    History of chest x-ray 2017    Enlargement of the aorta knob which may represent a thoracic aortic aneurysm. Further evaluation w/ a contrast enhanced CT of the chest recommended. no evidence of acute pulmonary process.  History of CT scan 2017    CT Angio Chest: no evidence of pulmonary embolism, ascending thoracic aorta aneurysm measuring 4.8 cm, descending thoracic aoric aneurysm 4.1 cm, bilateral nephrllthiasis, R renal cyst, cholelithiasis, cardiomegaly, low attenuated lesion is noted w/in L lobe of thyroid gland containng Calcification. Recommend thyroid US.      History of echocardiogram 03/27/2017    TTE EF 55%, LV Normal Size, borderline LVH concentric, Normal LV systolic function, transmittal doppler flow pttern suggest impaired LV relaxation Mild aortic stenosis, mild aortic regurgitation.  History of EKG 02/27/2017    Time 12:03 AM, HR 75, A fib, Axis normal, Intervals normal, P waves normal, St-T Segments: nonspecific ST segment changes to the anterior lateral leads, QRS RBBB,  No significant Q waves, no ectopy. A-fib w/RBBB w/nonspecific ST segment change EKG.  History of EKG 02/27/2017    Time 2:58AM: HR 59, NSR, Axis normal, Intervals normal, P waves normal, ST-T seg: nonspecific ST segment changes, QRS RBBB, no significant Q waves, no ectopy. Sinus bradycardia w/ RBBB nonspecific ST Segment changes EKG    History of Holter monitoring 11/08/2017    monitored 48 hours: Patient noted to have multiple PAC and PVCs. Risk of atrial fibrillation present given previous episode Recommend antiarrhythmic therapy.  History of stress test 03/27/2017    NM Stress test: EF 54%, Abnormal study, evidence of mild ischemia in mild inferior regions. post stress LV is enlarged in size.  Post-stress LV function is normal.     Hx of transesophageal echocardiography (SIMONE) for monitoring 04/17/2018    EF45-50% mild TR, mild-mod MR, mod-severe AS    Hyperlipemia     Hyperlipidemia     Hypertension     Nonspecific abnormal electrocardiogram (ECG) (EKG)     Persistent atrial fibrillation (HCC)     Sick sinus syndrome (HCC)     Vertigo      Past Surgical History:        Procedure Laterality Date    CYSTOSCOPY INSERTION / REMOVAL STENT / STONE Left 5/6/2019    CYSTOSCOPY RETROGRADE PYELOGRAM STENT INSERTION, DIAGNOSTIC CYSTOSCOPY performed by Fito Mitchell MD at [address] [facility] Left 03/29/2018    Dual Chamber [employer] [place] [facility] XT DR AMI Kincaid     Current Medications:   Current Facility-Administered Medications: amiodarone (CORDARONE) tablet 200 mg, 200 mg, Oral, Daily  apixaban (ELIQUIS) tablet 2.5 mg, 2.5 mg, Oral, BID  atorvastatin (LIPITOR) tablet 20 mg, 20 mg, Oral, Nightly  vitamin D (CHOLECALCIFEROL) tablet 5,000 Units, 5,000 Units, Oral, QAM  furosemide (LASIX) tablet 20 mg, 20 mg, Oral, BID  metoprolol tartrate (LOPRESSOR) tablet 50 mg, 50 mg, Oral, BID  therapeutic multivitamin-minerals 1 tablet, 1 tablet, Oral, Daily  omega-3 acid ethyl esters (LOVAZA) capsule 1,000 mg, 1,000 mg, Oral, Daily  rivastigmine (EXELON) 4.6 MG/24HR 1 patch, 1 patch, Transdermal, QAM  sodium chloride flush 0.9 % injection 10 mL, 10 mL, Intravenous, 2 times per day  sodium chloride flush 0.9 % injection 10 mL, 10 mL, Intravenous, PRN  magnesium hydroxide (MILK OF MAGNESIA) 400 MG/5ML suspension 30 mL, 30 mL, Oral, Daily PRN  ondansetron (ZOFRAN) injection 4 mg, 4 mg, Intravenous, Q6H PRN  traMADol (ULTRAM) tablet 50 mg, 50 mg, Oral, Q6H PRN  acetaminophen (TYLENOL) tablet 650 mg, 650 mg, Oral, Q8H PRN    Allergies:  Biaxin [clarithromycin]; Cephalexin; and Viagra [sildenafil citrate]    Social History:   TOBACCO:   reports that he has never smoked. He has never used smokeless tobacco.  ETOH:   reports that he drinks about 1.0 standard drinks of alcohol per week. DRUGS:   reports that he does not use drugs. Family History:   History reviewed. No pertinent family history. REVIEW OF SYSTEMS:     CONSTITUTIONAL:  negative  GENITOURINARY:  positive for hematuria  BACK: negative   ABD: negative     PHYSICAL EXAM:      VITALS:  BP (!) 95/57   Pulse 60   Temp 98.5 °F (36.9 °C) (Oral)   Resp 18   Ht 5' 10\" (1.778 m)   Wt 148 lb 5.9 oz (67.3 kg)   SpO2 95%   BMI 21.29 kg/m²      TEMPERATURE:  Current - Temp: 98.5 °F (36.9 °C);  Max - Temp  Av.8 °F (36.6 °C)  Min: 97.2 °F (36.2 °C)  Max: 98.5 °F (36.9 °C)  24HR BLOOD PRESSURE RANGE:  Systolic (45XTK), KN , Min:95 , QNB:969   ; Diastolic (14TRK), KGY:75, Min:55, Max:63    General appearance: alert, appears stated age, cooperative and no distress  Back: no CVA tenderness  Abdomen: soft non-tender  Male genitalia: normal, uncircumcised male   : 3 way monet catheter in place with Dark RED urine without clots in monet bag     DATA:    WBC:    Lab Results   Component Value Date    WBC 8.5 08/22/2019     Hemoglobin/Hematocrit:    Lab Results   Component Value Date    HGB 9.3 08/22/2019    HCT 30.0 08/22/2019     BMP:    Lab Results   Component Value Date     08/22/2019    K 4.5 08/22/2019    CL 96 08/22/2019    CO2 26 08/22/2019    BUN 54 08/22/2019    LABALBU 3.4 08/19/2019    CREATININE 2.3 08/22/2019    CALCIUM 9.0 08/22/2019    GFRAA 33 08/22/2019    LABGLOM 27 08/22/2019     PT/INR:    Lab Results   Component Value Date    PROTIME 16.7 08/18/2019    INR 1.47 08/18/2019     Blood Culture:   Urine Culture:  SETUP DATE/TIME:  08/19/2019 0846   Susceptibility     Stenotrophomonas maltophilia (1)     Antibiotic Interpretation NING Status   trimethoprim-sulfamethoxazole Sensitive <=2/38 Final         Imaging:  Us Abdomen Limited    Result Date: 8/19/2019  EXAMINATION: RIGHT UPPER QUADRANT ULTRASOUND 8/19/2019 6:27 am COMPARISON: Abdomen and pelvis CT 05/11/2019 HISTORY: ORDERING SYSTEM PROVIDED HISTORY: New transaminitis TECHNOLOGIST PROVIDED HISTORY: Reason for exam:->New transaminitis Reason for Exam: Pain Acuity: Unknown Type of Exam: Ongoing FINDINGS: LIVER:  Coarsened echotexture. Normal size and echogenicity. No obvious surface nodularity nor focal lesions. BILIARY SYSTEM:  Shadowing gallstones in the dependent gallbladder lumen. No gallbladder distention, gallbladder wall thickening, nor pericholecystic fluid. No reported right upper quadrant pain during examination. No intrahepatic biliary dilation. At least mild dilation of the visualized common duct, measuring at least 1.2 cm. PANCREAS:  Completely obscured by overlying bowel gas. RIGHT KIDNEY:  Normal size, echogenicity, and parenchymal thickness.  Suggestion a shadowing calculus in the right renal pelvis. No hydronephrosis. 8.1 cm x 6.0 cm x 6.2 cm cyst with layering debris, corresponding with note of calcium seen on CT (Bosniak category 2). OTHER:  No visualized free intraperitoneal fluid. 1. Cholelithiasis without additional secondary findings of cholecystitis. Consider further evaluation with a nuclear medicine hepatobiliary scan if there are clinical findings of cholecystitis. 2. At least mild dilation of the visualized common duct, which is obscured distally. This appears new compared to the comparison CT in the be due to choledocholithiasis. Recommend further evaluation with MRCP. 3. Coarsened liver echotexture suggestive of underlying hepatocellular disease. No findings suggestive of cirrhosis or significant steatosis. 4. Right renal cyst for which no follow-up is recommended. Nonobstructing calculus suspected in the right renal pelvis. Us Retroperitoneal Limited    Result Date: 8/19/2019  EXAMINATION: RETROPERITONEAL ULTRASOUND OF THE KIDNEYS 8/19/2019 COMPARISON: 05/11/2019 HISTORY: ORDERING SYSTEM PROVIDED HISTORY: RENAL FAILURE, ACUTE (KIDNEY INJURY) TECHNOLOGIST PROVIDED HISTORY: Acuity: Acute Type of Exam: Initial FINDINGS: Kidneys: The right kidney measures 11.9 cm in length and the left kidney measures 11.2 cm in length. Kidneys demonstrate normal cortical echogenicity. Bilateral simple appearing renal cysts, which measure up to 8.1 cm on the right and require no follow-up. No evidence of hydronephrosis. Echogenic focus along the mid/inferior right renal pelvis measuring up to 2.0 cm, consistent with a calculus. 1. No evidence of hydronephrosis. 2. Stable 2.0 cm calculus along the right renal pelvis. 3. Simple appearing bilateral renal cysts measuring up to 8.1 cm, which prior no further imaging follow-up.        Assessment & Plan:      Bela Frazier is a 80y.o. year old male admitted 8/21/2019 for hyperkalemia     1) Hematuria:    WBC 8.5 improved from

## 2019-08-22 NOTE — PROGRESS NOTES
Pt seen ,examined,interviewed and chart reviewed. Please see the dictated consult for details     Imp :   1. SABIHA/CKD stage 3 Ba3-  likely sec to relative IVV depletion with loop/ MRA and constipation with perhaps GI fluid sequestering   2. High K from SABIHA and also from bactrim and aldactone   3. No overt pulmo edema now but has off course risk  4. Debility/ frailty and likely need ECF placement   5. underlying Ch a.fib/dementia/ stone   6. ? albuminuria- his urien is cponcentarted - but need to repeat just in case   7. Hyponatremia- likely this time from hypovolemia - will follow     Plan:  1. Hold loop  2. Trial with safe laxatives ( no mg or phos containing  for now )  3. Po fluid/ food  4. He has Ch indwelling monet and has risk for UT infection  And watch for sx  5. ? ECF placement   6.  Follow clinically      Thanks for the consult    #[id number]

## 2019-08-22 NOTE — H&P
Hospitalist H&P Note:   Date of Service: 8/21/2019   ? CHIEF COMPLAINT:   Generalized weakness    HISTORY OF PRESENT ILLNESS (HPI):   Mr. Reese Rahman, a 80y.o. year old male who  has a past medical history of ACTA2-related familial thoracic aortic aneurysm, Arrhythmia, Atrial fibrillation (HCC), BBBB (bilateral bundle branch block), Bradycardia, CHF (congestive heart failure) ([address] Essential hypertension, malignant, History of cardioversion, History of chest x-ray, History of CT scan, History of echocardiogram, History of EKG, History of EKG, History of Holter monitoring, History of stress test, Hx of transesophageal echocardiography (SIMONE) for monitoring, Hyperlipemia, Hyperlipidemia, Hypertension, Nonspecific abnormal electrocardiogram (ECG) (EKG), Persistent atrial fibrillation ([address] Sick sinus syndrome ([address] and Vertigo. Presented with complaints of generalized weakness and fatigue after discharge to home yesterday. He was treated for weakness, hyponatremia, kidney injury-however he came back again with more or less similar complaints. Denies  chest pain, palpitations, orthopnea or paroxysmal nocturnal dyspnea. Average appetite. Denies nausea,vomiting, diarrhea or constipation. Denies headache,vision changes, numbness or tingling in extremities. On presentation, Blood pressure 118/60, pulse 60, temperature 97.2 °F (36.2 °C), temperature source Oral, resp. rate 17, height 5' 10\" (1.778 m), weight 147 lb (66.7 kg), SpO2 99 %. PAST MEDICAL HISTORY   Past Medical History:   Diagnosis Date    ACTA2-related familial thoracic aortic aneurysm     Arrhythmia     Atrial fibrillation (HCC)     BBBB (bilateral bundle branch block)     Bradycardia     CHF (congestive heart failure) ([address]     Essential hypertension, malignant     History of cardioversion 04/17/2018    History of chest x-ray 02/27/2017    Enlargement of the aorta knob which may represent a thoracic aortic aneurysm.  Further evaluation w/ a contrast enhanced CT of the chest recommended. no evidence of acute pulmonary process. 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 Recommend thyroid US.  History of echocardiogram 03/27/2017    TTE EF 55%, LV Normal Size, borderline LVH concentric, Normal LV systolic function, transmittal doppler flow pttern suggest impaired LV relaxation Mild aortic stenosis, mild aortic regurgitation.  History of EKG 02/27/2017    Time 12:03 AM, HR 75, A fib, Axis normal, Intervals normal, P waves normal, St-T Segments: nonspecific ST segment changes to the anterior lateral leads, QRS RBBB,  No significant Q waves, no ectopy. A-fib w/RBBB w/nonspecific ST segment change EKG.  History of EKG 02/27/2017    Time 2:58AM: HR 59, NSR, Axis normal, Intervals normal, P waves normal, ST-T seg: nonspecific ST segment changes, QRS RBBB, no significant Q waves, no ectopy. Sinus bradycardia w/ RBBB nonspecific ST Segment changes EKG    History of Holter monitoring 11/08/2017    monitored 48 hours: Patient noted to have multiple PAC and PVCs. Risk of atrial fibrillation present given previous episode Recommend antiarrhythmic therapy.  History of stress test 03/27/2017    NM Stress test: EF 54%, Abnormal study, evidence of mild ischemia in mild inferior regions. post stress LV is enlarged in size.  Post-stress LV function is normal.     Hx of transesophageal echocardiography (SIMONE) for monitoring 04/17/2018    EF45-50% mild TR, mild-mod MR, mod-severe AS    Hyperlipemia     Hyperlipidemia     Hypertension     Nonspecific abnormal electrocardiogram (ECG) (EKG)     Persistent atrial fibrillation (HCC)     Sick sinus syndrome ([address]     Vertigo           SURGICAL HISTORY:   Past Surgical History:   Procedure Laterality Date    CYSTOSCOPY INSERTION / REMOVAL STENT / STONE Left 5/6/2019    CYSTOSCOPY RETROGRADE PYELOGRAM STENT INSERTION, DIAGNOSTIC CYSTOSCOPY performed by Shahrzad Moore MD at [address] [facility] Left 03/29/2018    Dual Chamber [employer] [place] XT DR AMI Kincaid        ALLERGIES:   Biaxin [clarithromycin]; Cephalexin; and Viagra [sildenafil citrate]   ? SOCIAL HISTORY:    reports that he has never smoked. He has never used smokeless tobacco. He reports that he drinks about 1.0 standard drinks of alcohol per week. He reports that he does not use drugs. ?     FAMILY HISTORY:   History reviewed. No pertinent family history. ?     MEDICATIONS:   Current Facility-Administered Medications   Medication Dose Route Frequency Provider Last Rate Last Dose    sodium polystyrene (KAYEXALATE) 15 GM/60ML suspension 15 g  15 g Oral Once Jon Vuong PA-C         Current Outpatient Medications   Medication Sig Dispense Refill    sulfamethoxazole-trimethoprim (BACTRIM DS;SEPTRA DS) 800-160 MG per tablet Take 1 tablet by mouth 2 times daily      furosemide (LASIX) 20 MG tablet Take 1 tablet by mouth 2 times daily 90 tablet 1    spironolactone (ALDACTONE) 25 MG tablet Take 1 tablet by mouth 2 times daily 60 tablet 5    metoprolol tartrate (LOPRESSOR) 50 MG tablet Take 1 tablet by mouth 2 times daily 60 tablet 3    amiodarone (CORDARONE) 200 MG tablet Take 1 tablet by mouth daily (Patient taking differently: Take 200 mg by mouth nightly ) 30 tablet 3    rivastigmine (EXELON) 4.6 MG/24HR Place 1 patch onto the skin every morning      Cholecalciferol (VITAMIN D3) 5000 units TABS Take 5,000 Units by mouth every morning      apixaban (ELIQUIS) 2.5 MG TABS tablet Take 1 tablet by mouth 2 times daily 60 tablet 3    Omega-3 Fatty Acids (FISH OIL) 1000 MG CAPS Take 1,000 mg by mouth daily       Multiple Vitamins-Minerals (THERAPEUTIC MULTIVITAMIN-MINERALS) tablet Take 1 tablet by mouth daily      atorvastatin (LIPITOR) 20 MG tablet Take 20 mg by mouth nightly         ? ? REVIEW OF SYSTEMS:   All systems were reviewed and all were negative except for those mentioned in HPI. PHYSICAL EXAM:   Blood pressure 118/60, pulse 60, temperature 97.2 °F (36.2 °C), temperature source Oral, resp. rate 17, height 5' 10\" (1.778 m), weight 147 lb (66.7 kg), SpO2 99 %. . Body mass index is 21.09 kg/m². CONSTITUTIONAL: Not in acute distress  HENT: NC/AT Ear: normal, patent without effusion Nose: no deformities, nares patent  EYES:Conjunctiva normal. No discharge. NECK: Neck supple,No JVD /Thyromegaly/LAD   RESP:No chest wall deformities or tenderness. No wheezing or rales. B/L air entry positive+  CVS: Regular rate and rhythm. S1 and S2 normal, no murmurs, clicks, gallops or rubs  GI: Soft, ND/NT,No guarding/rebound/mass/organomegaly. Bowel sounds are normal.    MUSCULAR/EXT:  no pedal edema, no clubbing or cyanosis,Pulses 2+ B/L  CNS: Awake, alert. Cranial nerves intact, no focal neurological deficits.    MOOD/PSYCH: Normal mood and affect  SKIN: Warm and dry,No rashes    Lab results:   Results for orders placed or performed during the hospital encounter of 08/21/19   CBC Auto Differential   Result Value Ref Range    WBC 11.7 (H) 4.0 - 10.5 K/CU MM    RBC 4.22 (L) 4.6 - 6.2 M/CU MM    Hemoglobin 10.9 (L) 13.5 - 18.0 GM/DL    Hematocrit 36.0 (L) 42 - 52 %    MCV 85.3 78 - 100 FL    MCH 25.8 (L) 27 - 31 PG    MCHC 30.3 (L) 32.0 - 36.0 %    RDW 16.1 (H) 11.7 - 14.9 %    Platelets 203 474 - 228 K/CU MM    MPV 9.4 7.5 - 11.1 FL    Differential Type AUTOMATED DIFFERENTIAL     Segs Relative 84.4 (H) 36 - 66 %    Lymphocytes % 6.0 (L) 24 - 44 %    Monocytes % 8.8 (H) 0 - 4 %    Eosinophils % 0.1 0 - 3 %    Basophils % 0.1 0 - 1 %    Segs Absolute 9.9 K/CU MM    Lymphocytes # 0.7 K/CU MM    Monocytes # 1.0 K/CU MM    Eosinophils # 0.0 K/CU MM    Basophils # 0.0 K/CU MM    Nucleated RBC % 0.0 %    Total Nucleated RBC 0.0 K/CU MM    Total Immature Neutrophil 0.07 K/CU MM    Immature Neutrophil % 0.6 (H) 0 - 0.43 %   Basic Metabolic Panel   Result Value Ref Range    Sodium 128 (L) 135 - 145 MMOL/L    Potassium (HH) 3.5 - 5.1 MMOL/L     5.7  K CALLED TO Terrence YO 1610 [id number] [facility]   RESULTS READ BACK  K CALLED TO Derrell Posadas PA 1612 [id number] [facility]   RESULTS READ BACK      Chloride 92 (L) 99 - 110 mMol/L    CO2 26 21 - 32 MMOL/L    Anion Gap 10 4 - 16    BUN 65 (H) 6 - 23 MG/DL    CREATININE 2.4 (H) 0.9 - 1.3 MG/DL    Glucose 130 (H) 70 - 99 MG/DL    Calcium 9.9 8.3 - 10.6 MG/DL    GFR Non- 26 (L) >60 mL/min/1.73m2    GFR  31 (L) >60 mL/min/1.73m2   Urinalysis with Microscopic   Result Value Ref Range    Color, UA RED (A) YELLOW    Clarity, UA HAZY (A) CLEAR    Glucose, Urine 50 (A) NEGATIVE MG/DL    Bilirubin Urine NEGATIVE NEGATIVE MG/DL    Ketones, Urine NEGATIVE NEGATIVE MG/DL    Specific Gravity, UA 1.013 1.001 - 1.035    Blood, Urine MODERATE (A) NEGATIVE    pH, Urine 7.0 5.0 - 8.0    Protein, UA >500 (HH) NEGATIVE MG/DL    Urobilinogen, Urine NORMAL 0.2 - 1.0 MG/DL    Nitrite Urine, Quantitative NEGATIVE NEGATIVE    Leukocyte Esterase, Urine NEGATIVE NEGATIVE    RBC, UA 8,145 (H) 0 - 3 /HPF    WBC, UA 57 (H) 0 - 2 /HPF    Bacteria, UA NEGATIVE NEGATIVE /HPF    Mucus, UA RARE (A) NEGATIVE HPF    Trichomonas, UA NONE SEEN NONE SEEN /HPF   Magnesium   Result Value Ref Range    Magnesium 2.3 1.8 - 2.4 mg/dl   EKG 12 Lead   Result Value Ref Range    Ventricular Rate 60 BPM    Atrial Rate 59 BPM    P-R Interval 188 ms    QRS Duration 258 ms    Q-T Interval 556 ms    QTc Calculation (Bazett) 556 ms    P Axis -32 degrees    R Axis -53 degrees    T Axis 102 degrees    Diagnosis       AV dual-paced rhythm  Abnormal ECG  When compared with ECG of 18-AUG-2019 17:55,  Vent.  rate has decreased

## 2019-08-22 NOTE — CONSULTS
[address] [facility]               [address] [facility], [address] [facility]                                  CONSULTATION    PATIENT NAME: Eulalio Centeno                 :        10/05/1933  MED REC NO:   [id number]                          ROOM:       1714  ACCOUNT NO:   [de-identified]                           ADMIT DATE: 2019  PROVIDER:     Eliane Lundborg, MD    CONSULT DATE:  2019    CONSULT REQUESTED BY:  Dharmesh Hart MD    REASON FOR CONSULT:  Acute kidney injury with underlying CKD,  hyponatremia, and hyperkalemia. BRIEF HISTORY:  The patient is an 77-year-old male with multiple medical  conditions and declining general well-being, who was just discharged a  day ago or so, only to come back with constipation, unable to void as  well as hematuria, fatigue, tiredness, etc.  He was here not too long  ago and stayed for two or three days and his initial complaint was  constipation followed by feeling dizzy and lightheaded. While in the  hospital, he felt better, and he was discharged with Bactrim for  presumable cystitis along with loop and spironolactone as he has risk of  acute decompensated heart failure. I am unsure whether he was taking  them or not. In the emergency room, he was rather hypotensive and underwent several  diagnostic tests, mainly biochemical, which showed high potassium of  5.7, low sodium, slightly increased BUN and creatinine, and anemia with  hemoglobin of 9.3. Interestingly, his plain UA showed more than 500  mg/dL of albumin, although the urine was concentrated with a specific  gravity of 1.013 and significant rbc about more than 8000 and 57 wbc. This is obviously a Novak specimen as he has chronic indwelling  catheter. I am familiar with him. I have been seeing him since 2019 when I  first met him.   He was admitted with acute decompensated heart failure,  also had a cardiorenal syndrome and obviously acute kidney injury by  creatinine criteria. He was successfully diuresed at that time,  although he could not add some of the renal cardioprotective medicine,  but he was discharged home. In general, his creatinine was about 1.3 or  so, but it did increase close to 2ish during his admission in 06/2019. I did see him in my office on 06/14/2019 after his discharge. At that  time, creatinine was 1.7, and I opted to continue his diuretics. He  still had some peripheral edema in my office, but not much of pulmonary  edema by my exam.  Obviously, I tried to get him out of the hospital and  keep him decongested and paying less attention, but creatinine which  obviously will fluctuate with diuretic therapy. That was the whole  purpose. PAST MEDICAL HISTORY:  1. Recurrent acute kidney injury by creatinine criteria, but does have  underlying CKD stage 3A/BA3. He had some proteinuria before, although  it is thought to be tubular proteinuria. He is not diabetic. 2.  Hypertension since 1980, obviously blood pressure little low now. 3.  Chronic atrial fibrillation. He does have a pacemaker placement. 4.  Chronic bladder obstruction necessitating indwelling catheter that  was done by the urologist.  He has it for a month or so. 5.  Several acute decompensated heart failure with relatively preserved  left ventricular ejection fraction, probably diastolic dysfunction. 6.  Dementia, diagnosed in 2017. 7.  Kidney stone necessitating stent placement. I think he still has  the stent. PAST SURGICAL HISTORY:  1. Cardioversion in 2018. 2.  Pacemaker placement. 3.  Left ureteral stent placement. I am sure that he still has the  indwelling stent in his left ureter. FAMILY MEDICAL HISTORY:  Apparently, no significant medical history  according to him and rest of the family members that were available last  time when he was here. HABITS:  No history of alcohol or recreational drug abuse.   No history  of smoking at this time.    SOCIAL HISTORY:  The patient is  and I think his wife is still  living with him. He does have a son who keeps an eye, but it has been  increasingly difficult for him to take care or his wife to take care of  him. HOME MEDICATIONS:  According to the discharge summary, I am assuming  that he was taking it. If he did, he was on Lasix 20 mg twice a day,  Bactrim double strength one tablet twice a day, spironolactone 25 mg two  times daily, Exelon patch 4.6 mg for 24 hours daily, omega-3 1 gm daily,  multivitamin daily, metoprolol 50 mg twice a day, vitamin D3 5000 units  daily, atorvastatin 20 mg at night, Eliquis 2.5 mg twice a day, and  amiodarone 200 mg daily. CURRENT MEDICATIONS:  Here in the hospital include, he is on amiodarone  200 mg daily, atorvastatin 20 mg at bedtime, Colace 100 mg daily,  Proscar 5 mg daily, metoprolol 50 mg twice a day, omega-3 1 gm daily,  Exelon 4.6 mg for 24-hour patch daily. He was given one dose of  Kayexalate. He is on multivitamin and D3 supplementation 5000 units  every morning. REVIEW OF SYSTEMS:  He has several symptoms. In general, it is  debility, frailty, fatigue, tiredness, unable to care for himself. He  is constipated, he has low urine output, hematuria, etc.  There is no  really shortness of breath or chest pain. There is no PND or orthopnea. There is no fever, chills, rigor, dyspnea, nausea or vomiting. Obviously, constipation is the major issue. Rest of the review of  systems is negative other than previous paragraph. He does not have any  arthralgia, myalgia, or suspicious skin rash. PHYSICAL EXAMINATION:  VITAL SIGNS:  At the time of examination, his T-max was 98.2, blood  pressure barely 100/60s, although this is an electronic blood pressure. We might have to confirm with the manual and most of them are  inaccurate, saturating 95% on room air, pulse 60, respiratory rate 18. GENERAL:  The patient is without any acute distress. He is thin and  cachectic. HEENT:  Head and neck:  Normocephalic and atraumatic. Eyes:  Mild  conjunctival pallor. Ear, Mouth, and Throat:  Dry oral mucosa. CARDIOVASCULAR:  Irregular rhythm. RESPIRATORY:  Poor airflow. I did not really hear any crackles. ABDOMEN:  Soft. EXTREMITIES:  I do not see any edema. LABORATORY VALUES AND ANCILLARY SERVICES:  Potassium did normalize and  rest of the lab as I mentioned earlier. He is obviously anemic. IMPRESSION:  An 66-year-old male with acute kidney injury and  electrolyte imbalance. 1.  Acute kidney injury on top of CKD stage 3BA3. Likely secondary to  relative intravascular volume depletion with loop , _ and  constipation, perhaps GI fluid sequestration. 2.  Hyperkalemia from acute kidney injury and also from Bactrim and  Aldactone. 3.  Hyponatremia, likely this time he has hypovolemia, but it is  difficult to tell. I do not think he has any extra water on board. 4.  No overt pulmonary edema, but has risk of course for acute  decompensated heart failure, we will keep an eye. 5.  Debility, frailty, and likely needs ESF placement. 6.  Underlying AFib, dementia, kidney stone, anemia, etc.  7.  Surprisingly he has significant proteinuria. I do not know what to  make of it. I probably have to repeat it as he is not diabetic. His  urine is very concentrated. PLAN:  1. I will hold loop for now. I will trial with safe laxative, i.e., we  will avoid any laxative or enema that has magnesium and phosphorus in  it, that will be little risky for him. 2.  P.o. food and fluid. He has chronic indwelling catheter, is at risk  for urinary tract infection. We will watch for symptoms. Probably he  would need ESF placement, PT, OT. Otherwise we will follow clinically.         Milana Lomeli MD    D: 08/22/2019 8:45:03       T: 08/22/2019 12:30:36     [id number]  Job#: [id number]     Doc#: [id number]    CC:

## 2019-08-22 NOTE — ED NOTES
Report called to Kingsley Hidalgo RN for dirty bed [address], RN  08/21/19 2700

## 2019-08-23 LAB
ALBUMIN SERPL-MCNC: 3.3 GM/DL (ref 3.4–5)
ANION GAP SERPL CALCULATED.3IONS-SCNC: 10 MMOL/L (ref 4–16)
BUN BLDV-MCNC: 42 MG/DL (ref 6–23)
CALCIUM SERPL-MCNC: 8.9 MG/DL (ref 8.3–10.6)
CHLORIDE BLD-SCNC: 99 MMOL/L (ref 99–110)
CO2: 27 MMOL/L (ref 21–32)
CREAT SERPL-MCNC: 2.2 MG/DL (ref 0.9–1.3)
EKG ATRIAL RATE: 61 BPM
EKG DIAGNOSIS: NORMAL
EKG P AXIS: 9 DEGREES
EKG Q-T INTERVAL: 556 MS
EKG QRS DURATION: 212 MS
EKG QTC CALCULATION (BAZETT): 568 MS
EKG R AXIS: -57 DEGREES
EKG T AXIS: 108 DEGREES
EKG VENTRICULAR RATE: 63 BPM
GFR AFRICAN AMERICAN: 35 ML/MIN/1.73M2
GFR NON-AFRICAN AMERICAN: 29 ML/MIN/1.73M2
GLUCOSE BLD-MCNC: 87 MG/DL (ref 70–99)
MAGNESIUM: 2.2 MG/DL (ref 1.8–2.4)
PHOSPHORUS: 3.4 MG/DL (ref 2.5–4.9)
POTASSIUM SERPL-SCNC: 3.8 MMOL/L (ref 3.5–5.1)
SODIUM BLD-SCNC: 136 MMOL/L (ref 135–145)

## 2019-08-23 PROCEDURE — 83735 ASSAY OF MAGNESIUM: CPT

## 2019-08-23 PROCEDURE — 6370000000 HC RX 637 (ALT 250 FOR IP): Performed by: NURSE PRACTITIONER

## 2019-08-23 PROCEDURE — 6370000000 HC RX 637 (ALT 250 FOR IP): Performed by: INTERNAL MEDICINE

## 2019-08-23 PROCEDURE — 80048 BASIC METABOLIC PNL TOTAL CA: CPT

## 2019-08-23 PROCEDURE — 97110 THERAPEUTIC EXERCISES: CPT

## 2019-08-23 PROCEDURE — 82040 ASSAY OF SERUM ALBUMIN: CPT

## 2019-08-23 PROCEDURE — 51700 IRRIGATION OF BLADDER: CPT

## 2019-08-23 PROCEDURE — 36415 COLL VENOUS BLD VENIPUNCTURE: CPT

## 2019-08-23 PROCEDURE — 97535 SELF CARE MNGMENT TRAINING: CPT

## 2019-08-23 PROCEDURE — 97167 OT EVAL HIGH COMPLEX 60 MIN: CPT

## 2019-08-23 PROCEDURE — 1200000000 HC SEMI PRIVATE

## 2019-08-23 PROCEDURE — 84100 ASSAY OF PHOSPHORUS: CPT

## 2019-08-23 PROCEDURE — 2580000003 HC RX 258: Performed by: INTERNAL MEDICINE

## 2019-08-23 RX ADMIN — SULFAMETHOXAZOLE AND TRIMETHOPRIM 1 TABLET: 400; 80 TABLET ORAL at 21:50

## 2019-08-23 RX ADMIN — ATORVASTATIN CALCIUM 20 MG: 20 TABLET, FILM COATED ORAL at 21:50

## 2019-08-23 RX ADMIN — OMEGA-3-ACID ETHYL ESTERS 1000 MG: 1 CAPSULE, LIQUID FILLED ORAL at 08:34

## 2019-08-23 RX ADMIN — VITAMIN D, TAB 1000IU (100/BT) 5000 UNITS: 25 TAB at 09:01

## 2019-08-23 RX ADMIN — LACTULOSE 20 G: 10 SOLUTION ORAL at 15:26

## 2019-08-23 RX ADMIN — AMIODARONE HYDROCHLORIDE 200 MG: 200 TABLET ORAL at 08:34

## 2019-08-23 RX ADMIN — SULFAMETHOXAZOLE AND TRIMETHOPRIM 1 TABLET: 400; 80 TABLET ORAL at 09:01

## 2019-08-23 RX ADMIN — Medication 10 ML: at 08:41

## 2019-08-23 RX ADMIN — DOCUSATE SODIUM 100 MG: 100 CAPSULE, LIQUID FILLED ORAL at 08:34

## 2019-08-23 RX ADMIN — LACTULOSE 20 G: 10 SOLUTION ORAL at 21:49

## 2019-08-23 RX ADMIN — METOPROLOL TARTRATE 50 MG: 50 TABLET ORAL at 08:34

## 2019-08-23 RX ADMIN — FINASTERIDE 5 MG: 5 TABLET, FILM COATED ORAL at 08:34

## 2019-08-23 RX ADMIN — Medication 10 ML: at 21:50

## 2019-08-23 RX ADMIN — LACTULOSE 20 G: 10 SOLUTION ORAL at 08:35

## 2019-08-23 RX ADMIN — MULTIPLE VITAMINS W/ MINERALS TAB 1 TABLET: TAB at 08:34

## 2019-08-23 ASSESSMENT — PAIN SCALES - GENERAL
PAINLEVEL_OUTOF10: 0

## 2019-08-23 NOTE — CARE COORDINATION
Chart reviewed, in to see pt for dc planning. Introduced self and board updated. Pt was admitted for hyperkalemia and is known to this CM from visit earlier this week. Pt requested CM call his son for dc planning. Left in network SNF list at bedside. 4:28 PM  CM spoke with both pt son Nancy Hinton and his daughter/Torrie in depth about dc options and will follow up on Monday once it is known if pt has out of network benefit for SNF.

## 2019-08-23 NOTE — PROGRESS NOTES
Morning Assessment completed at 912am, pt very talkative. Spoke about him  leaving his home after 40 some years of living there,  to now be moving to assisted living facility with this wife. Pt had no other concerns while in his room.

## 2019-08-23 NOTE — PROGRESS NOTES
Physical Therapy    Physical Therapy Treatment Note  Name: Cathy Campo MRN: [id number] :   10/5/1933   Date:  2019   Admission Date: 2019 Room:  [address] [place] [address]   Restrictions/Precautions:        Orthostatic  Communication with other providers: okay to do tx per RN Merry Smiley. Per OT, pt was very orthostatic and just was returned to chair at arrival. RN notified of pt's very shallow shallow RR and <12 RR. Pt now O2 monitored. Subjective:  Patient states:  \" I am used to be being active and I am sleeping too much now. \"/ \" Thank you for explaining everything to me.'  Pain:   Location, Type, Intensity (0/10 to 10/10):  0/10   Objective:    Observation:  Seated in chair. Agreeable to tx. Treatment, including education/measures:  Vitals:  BP:  107/52 HR:  67  O2 % with drops to low 80's  RR 7-12 with spikes to 22 very shallow RR/breaths    Therapeutic Activity/Gait: Deferred as OT just got pt to chair and has orthostatic episode. Pt education on importance of activity and activity tolerance sx/sx to improve functional mobility. Diaphragmatic breathing exercises with tactile cues for abdominal expansion with inspirations x 10 and PRN to improve  RR and O2 sats with activity. Exercises:  Education:  Pt was instructed on Purpose of Exercise Program, Holly Scale and Signs and Symptoms of Exercise Intolerance  Overhead Side Stretch x 10  Ankle Pumps/ Heel Raises x 10  LAQ x 10  Marching Seated x 10  Forward Arm Raises x 10  Side Arm Raises x 10  Arm Crosses x 10  SittingTrunkTwist x 10    vc/tc  proper technique to improve ROM and strength   Comments: Pt demonstrated drop in O2 sats into the low 80's periodically and rise back up to high 90's with rest break. Rest breaks PRN    Safety  Patient left safely in the chair, with call light/phone in reach with alarm applied. Gait belt was used for transfers and gait.      Assessment / Impression:       Patient's tolerance of treatment:  fair   Adverse

## 2019-08-23 NOTE — PROGRESS NOTES
Nephrology Progress Note  8/23/2019 1:03 PM        Subjective:   Admit Date: 8/21/2019  PCP: Carlene Sensor VO    Interval History: doing better    Diet:  He reports better    ROS:  No sob/  UOP seems ok with monet- / hematuria     Data:     Current meds:    lactulose  20 g Oral TID    docusate sodium  100 mg Oral Daily    finasteride  5 mg Oral Daily    sulfamethoxazole-trimethoprim  1 tablet Oral 2 times per day    amiodarone  200 mg Oral Daily    atorvastatin  20 mg Oral Nightly    vitamin D  5,000 Units Oral QAM    metoprolol tartrate  50 mg Oral BID    therapeutic multivitamin-minerals  1 tablet Oral Daily    omega-3 acid ethyl esters  1,000 mg Oral Daily    rivastigmine  1 patch Transdermal QAM    sodium chloride flush  10 mL Intravenous 2 times per day           I/O last 3 completed shifts:   In: 10 [I.V.:10]  Out: 8950 [Urine:8950]    CBC:   Recent Labs     08/21/19  1530 08/22/19  0327   WBC 11.7* 8.5   HGB 10.9* 9.3*    315          Recent Labs     08/21/19  1530 08/22/19  0327 08/23/19  0450   * 133* 136   K 5.7  K CALLED TO FLORIDALMA YO 1610 [id number] [facility]   RESULTS READ BACK  K CALLED TO Afia YO [id number] [facility]   RESULTS READ BACK  * 4.5 3.8   CL 92* 96* 99   CO2 26 26 27   BUN 65* 54* 42*   CREATININE 2.4* 2.3* 2.2*   GLUCOSE 130* 83 87       Lab Results   Component Value Date    CALCIUM 8.9 08/23/2019    PHOS 3.4 08/23/2019       Objective:     Vitals: BP (!) 112/54   Pulse 60   Temp 98.2 °F (36.8 °C) (Oral)   Resp 19   Ht 5' 10\" (1.778 m)   Wt 152 lb 12.5 oz (69.3 kg)   SpO2 99%   BMI 21.92 kg/m²     General appearance:  No distress   HEENT:  + pallor  Neck:  supple  Lungs:  irregular  Heart:  No crackls  Abdomen: soft  Extremities:  No overt edema       Problem List :         Impression :     1. SABIHA/CKD stage 3 /B a3- stable   2. ch a.fib/ debility/ ch monet/ kidney stone with stent now  And UT infection  3. arrhythmia Recommendation/Plan  :     1. He has compensated CHF   2. Off diuretics   3. Keep an eye   4. Feeling better   5. Likely need ECF   6. On bactrim but K ok  7.  Follow clinically and periodically bio chemically       Cloteal Early MD

## 2019-08-23 NOTE — PROGRESS NOTES
[facility] [address], [address]   Progress Note  [facility] 0 1 2      Date: 2019   Patient: Steph Leach   : 10/5/1933   DOA: 2019   MRN: [id number]   ROOM#: [id number]     Admit Date: 2019     Reason for Consult:    Requesting Physician:  Dr. Orville Kocher   Collaborating Urologist on Call at time of admission: Dr Diogo Boyer: generalized weakness and gross hematuria        Subjective:     Pain: none, no nausea and no vomiting,   Bowel Movement/Flatus:   Not recorded   Voiding:  Pt light pink tinged urin danielle CBI,     Pt tolerating monet catheter well    Objective:    Pt resting in bed. Pt appears well.    Vitals:    BP (!) 99/52   Pulse 60   Temp 98.2 °F (36.8 °C) (Oral)   Resp 17   Ht 5' 10\" (1.778 m)   Wt 152 lb 12.5 oz (69.3 kg)   SpO2 97%   BMI 21.92 kg/m²    Temp  Av.8 °F (36.6 °C)  Min: 97.4 °F (36.3 °C)  Max: 98.2 °F (36.8 °C)       Intake/Output Summary (Last 24 hours) at 2019 0845  Last data filed at 2019 0753  Gross per 24 hour   Intake 10 ml   Output 9400 ml   Net -9390 ml       Physical Exam:   General appearance: alert, appears stated age, cooperative and no distress  Back: no CVA tenderness  Abdomen: soft non-tender  Male genitalia: normal, uncircumcised male   : 3 way monet catheter in place with pink tinged urine without clots in monet bag     Labs:   WBC:    Lab Results   Component Value Date    WBC 8.5 2019      Hemoglobin/Hematocrit:    Lab Results   Component Value Date    HGB 9.3 2019    HCT 30.0 2019      BMP:   Lab Results   Component Value Date     2019    K 3.8 2019    CL 99 2019    CO2 27 2019    BUN 42 2019    LABALBU 3.3 2019    CREATININE 2.2 2019    CALCIUM 8.9 2019    GFRAA 35 2019    LABGLOM 29 2019      PT/INR:    Lab Results   Component Value Date    PROTIME 16.7 2019    INR 1.47 2019      PTT:    Lab Results Component Value Date    APTT 42.9 08/18/2019      Blood Culture:     Urine Culture:   SETUP DATE/TIME:  08/19/2019 0846   Susceptibility     Stenotrophomonas maltophilia (1)     Antibiotic Interpretation NING Status   trimethoprim-sulfamethoxazole Sensitive <=2/38 Final         Imaging:   Us Abdomen Limited    Result Date: 8/19/2019  EXAMINATION: RIGHT UPPER QUADRANT ULTRASOUND 8/19/2019 6:27 am COMPARISON: Abdomen and pelvis CT 05/11/2019 HISTORY: ORDERING SYSTEM PROVIDED HISTORY: New transaminitis TECHNOLOGIST PROVIDED HISTORY: Reason for exam:->New transaminitis Reason for Exam: Pain Acuity: Unknown Type of Exam: Ongoing FINDINGS: LIVER:  Coarsened echotexture. Normal size and echogenicity. No obvious surface nodularity nor focal lesions. BILIARY SYSTEM:  Shadowing gallstones in the dependent gallbladder lumen. No gallbladder distention, gallbladder wall thickening, nor pericholecystic fluid. No reported right upper quadrant pain during examination. No intrahepatic biliary dilation. At least mild dilation of the visualized common duct, measuring at least 1.2 cm. PANCREAS:  Completely obscured by overlying bowel gas. RIGHT KIDNEY:  Normal size, echogenicity, and parenchymal thickness. Suggestion a shadowing calculus in the right renal pelvis. No hydronephrosis. 8.1 cm x 6.0 cm x 6.2 cm cyst with layering debris, corresponding with note of calcium seen on CT (Bosniak category 2). OTHER:  No visualized free intraperitoneal fluid. 1. Cholelithiasis without additional secondary findings of cholecystitis. Consider further evaluation with a nuclear medicine hepatobiliary scan if there are clinical findings of cholecystitis. 2. At least mild dilation of the visualized common duct, which is obscured distally. This appears new compared to the comparison CT in the be due to choledocholithiasis. Recommend further evaluation with MRCP.  3. Coarsened liver echotexture suggestive of underlying hepatocellular
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
## 2019-08-23 NOTE — CONSULTS
Electrophysiology Consult Note      Reason for consultation: need for eliquis    Chief complaint : Hematuria    Referring physician: Joel Wheeler      Primary care physician: Aileen US      History of Present Illness:     Minesh Chilel is an 80year old male with a past medical history of Atrial Fibrillation, S/P Pacemaker, Hypotension, CHF. He presented to the emergency room with complaints of urinary retention and constipation. He reports that he has been having issues with hematuria for the past year. He has been following with urology. He had a monet catheter in place at the time of the admission. He states that he had not had any passage of urine for 1 day. He additionally complained of constipation for 2 days. He has had abdominal pain also. He reports that he has been nauseated and having emesis. Per the RN, patient had bladder irrigation and was noted to have several large blood clots. He is receiving continuous bladder irrigation and noted to have hematuria. Electrophysiology was consulted for the permanent vs temporary discontinuation of eliquis due to the hematuria. Patient denies chest pain, palpitations, shortness of breath, edema, dizziness, or syncope. Patient has PPM. Hasn't had noted atrial fibrillation since March of 2018. Pastmedical history:   Past Medical History:   Diagnosis Date    ACTA2-related familial thoracic aortic aneurysm     Arrhythmia     Atrial fibrillation (HCC)     BBBB (bilateral bundle branch block)     Bradycardia     CHF (congestive heart failure) (HCC)     Essential hypertension, malignant     History of cardioversion 04/17/2018    History of chest x-ray 02/27/2017    Enlargement of the aorta knob which may represent a thoracic aortic aneurysm. Further evaluation w/ a contrast enhanced CT of the chest recommended. no evidence of acute pulmonary process.     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 Recommend thyroid US.  History of echocardiogram 03/27/2017    TTE EF 55%, LV Normal Size, borderline LVH concentric, Normal LV systolic function, transmittal doppler flow pttern suggest impaired LV relaxation Mild aortic stenosis, mild aortic regurgitation.  History of EKG 02/27/2017    Time 12:03 AM, HR 75, A fib, Axis normal, Intervals normal, P waves normal, St-T Segments: nonspecific ST segment changes to the anterior lateral leads, QRS RBBB,  No significant Q waves, no ectopy. A-fib w/RBBB w/nonspecific ST segment change EKG.  History of EKG 02/27/2017    Time 2:58AM: HR 59, NSR, Axis normal, Intervals normal, P waves normal, ST-T seg: nonspecific ST segment changes, QRS RBBB, no significant Q waves, no ectopy. Sinus bradycardia w/ RBBB nonspecific ST Segment changes EKG    History of Holter monitoring 11/08/2017    monitored 48 hours: Patient noted to have multiple PAC and PVCs. Risk of atrial fibrillation present given previous episode Recommend antiarrhythmic therapy.  History of stress test 03/27/2017    NM Stress test: EF 54%, Abnormal study, evidence of mild ischemia in mild inferior regions. post stress LV is enlarged in size.  Post-stress LV function is normal.     Hx of transesophageal echocardiography (SIMONE) for monitoring 04/17/2018    EF45-50% mild TR, mild-mod MR, mod-severe AS    Hyperlipemia     Hyperlipidemia     Hypertension     Nonspecific abnormal electrocardiogram (ECG) (EKG)     Persistent atrial fibrillation (HCC)     Sick sinus syndrome (HCC)     Vertigo        Surgical history :   Past Surgical History:   Procedure Laterality Date    CYSTOSCOPY INSERTION Systems:   Review of Systems   Constitutional: Positive for fatigue. Negative for activity change, chills and fever. HENT: Negative for congestion, ear pain and tinnitus. Eyes: Negative for photophobia, pain and visual disturbance. Respiratory: Negative for cough, chest tightness, shortness of breath and wheezing. Cardiovascular: Negative for chest pain, palpitations and leg swelling. Gastrointestinal: Positive for abdominal distention, constipation and nausea. Negative for abdominal pain, blood in stool and vomiting. Endocrine: Negative for cold intolerance and heat intolerance. Genitourinary: Positive for difficulty urinating and hematuria. Negative for flank pain. Musculoskeletal: Positive for arthralgias and back pain. Negative for myalgias and neck stiffness. Skin: Negative for color change and rash. Allergic/Immunologic: Negative for food allergies. Neurological: Negative for dizziness, light-headedness, numbness and headaches. Hematological: Does not bruise/bleed easily. Psychiatric/Behavioral: Negative for agitation, behavioral problems and confusion. Examination:      BP (!) 104/50   Pulse 60   Temp 97.9 °F (36.6 °C) (Oral)   Resp 18   Ht 5' 10\" (1.778 m)   Wt 148 lb 5.9 oz (67.3 kg)   SpO2 100%   BMI 21.29 kg/m²    Wt Readings from Last 3 Encounters:  08/22/19 148 lb 5.9 oz (67.3 kg)  08/19/19 147 lb 1.6 oz (66.7 kg)  08/13/19 150 lb 9.6 oz (68.3 kg)     Body mass index is 21.29 kg/m². Physical Exam   Constitutional: He is oriented to person, place, and time. He appears well-developed and well-nourished. No distress. HENT:   Head: Normocephalic and atraumatic. Eyes: Pupils are equal, round, and reactive to light. EOM are normal.   Neck: Normal range of motion. No JVD present. Cardiovascular: Normal rate and regular rhythm. Exam reveals no friction rub. No murmur heard. Pulmonary/Chest: Effort normal and breath sounds normal. No respiratory distress. He has no wheezes. He has no rales. Abdominal: Soft. Bowel sounds are normal. He exhibits no distension. There is no tenderness. Genitourinary:   Genitourinary Comments: Novak catheter with blood tinged urine noted   Musculoskeletal: He exhibits no edema or tenderness. Neurological: He is alert and oriented to person, place, and time. No cranial nerve deficit. Skin: Skin is warm and dry. Psychiatric: He has a normal mood and affect. CBC:   Lab Results   Component Value Date    WBC 8.5 08/22/2019    HGB 9.3 08/22/2019    HCT 30.0 08/22/2019     08/22/2019     Lipids:  Lab Results   Component Value Date    CHOL 137 05/24/2019    TRIG 72 05/24/2019    HDL 47 05/24/2019    LDLDIRECT 86 05/24/2019     PT/INR:   Lab Results   Component Value Date    INR 1.47 08/18/2019        CMP:   Lab Results   Component Value Date    AST 97 (H) 08/19/2019    PROT 5.6 (L) 08/19/2019    BILITOT 0.5 08/19/2019    ALKPHOS 79 08/19/2019     TSH:  No results found for: TSH    EKGINTERPRETATION - EKG Interpretation:        IMPRESSION / RECOMMENDATIONS:       Atrial Fibrillation -paroxsymal  Hematuria  S/P placement of cardiac pacemaker  SABIHA  Aortic stenosis      Pacemaker interrogated and no arrhythmia noted recently  Patient with bleeding / hematuris  Recommend to hold eliquis as bleeding risk more than stroke and discussed with patient  Patient bleeding issue needs to be addressed before restarting anticoagulation  Will continue amiodarone     Discussed with patient about risk of bleeding vs stroke and patient agreeable with holding anticoagulation for now until its diagnozed and until bleeding is resolved     Thanks again for allowing me to participate in care of this patient. Please call me if you have any questions. With best regards.       Chantal Gallegos MD

## 2019-08-23 NOTE — PROGRESS NOTES
Hospitalist Progress Note      Name:  Jimmie Magana /Age/Sex: 10/5/1933  (80 y.o. male)   MRN & CSN:  [id number] & [id number] Admission Date/Time: 2019  3:45 PM   Location:  [id number]-B [facility]    Assessment and Plan:   Jimmie Magana is a 80 y.o.  male  who presents with generalized weakness and fatigue    SABIHA on CKD, likely pre renal  Cr improving 2.2  Hold diuretics  Encourage oral hydration  Evaluated by nephrology  Repeat labs in am    Hyperkalemia  Resolved    Coagulopathy secondary to eliquis  Gross hematuria  Evaluated by urology  Bladder irrigation  Dc eliquis  Urine color is improving today, still has a pink tinge     MDRO UTI   Recent Urine Cx +ve Stenotrophomonas Maltophilia   sens to bactim, started by urology    A.fib on eliquis  Hold eliquis due to hematuria   Cont amiodarone    Diet DIET RENAL;   DVT Prophylaxis [] Lovenox, []  Heparin, [x] SCDs, []No VTE prophylaxis, patient ambulating   GI Prophylaxis [] PPI, [] H2 Blocker, [x] No GI prophylaxis, patient is receiving diet/Tube Feeds   Code Status Full Code             History of Present Illness:     Pt S&E. Urine still has a pink tinge, BP low today, patient is asymptomatic    Ten point ROS reviewed negative, unless as noted above    Objective: Intake/Output Summary (Last 24 hours) at 2019 1617  Last data filed at 2019 1613  Gross per 24 hour   Intake 6 ml   Output 57513 ml   Net -77472 ml      Vitals:   Vitals:    19 1445   BP: (!) 82/47   Pulse: 67   Resp: 14   Temp: 97.6 °F (36.4 °C)   SpO2:      Physical Exam:    GEN Awake male, in no apparent distress. RESP Clear to auscultation, no wheezes, rales or rhonchi. CARDIO/VASC S1/S2 auscultated. Regular rate without appreciable murmurs, rubs, or gallops. No peripheral edema. GI Abdomen is soft without significant tenderness,  masses, or guarding. Bowel sounds are normoactive.    monet in place, pinkish urine noted in bag  MSK No gross joint deformities. Spontaneous movement of all extremities  SKIN Normal coloration, warm, dry. NEURO Cranial nerves appear grossly intact, normal speech, no lateralizing weakness. PSYCH Awake, alert, oriented x 4. Affect appropriate.     Medications:   Medications:    lactulose  20 g Oral TID    docusate sodium  100 mg Oral Daily    finasteride  5 mg Oral Daily    sulfamethoxazole-trimethoprim  1 tablet Oral 2 times per day    amiodarone  200 mg Oral Daily    atorvastatin  20 mg Oral Nightly    vitamin D  5,000 Units Oral QAM    metoprolol tartrate  50 mg Oral BID    therapeutic multivitamin-minerals  1 tablet Oral Daily    omega-3 acid ethyl esters  1,000 mg Oral Daily    rivastigmine  1 patch Transdermal QAM    sodium chloride flush  10 mL Intravenous 2 times per day      Infusions:   PRN Meds:     sodium chloride flush 10 mL PRN   magnesium hydroxide 30 mL Daily PRN   ondansetron 4 mg Q6H PRN   traMADol 50 mg Q6H PRN   acetaminophen 650 mg Q8H PRN         Electronically signed by Monae Awad MD on 8/23/2019 at 4:17 PM

## 2019-08-24 LAB
ANION GAP SERPL CALCULATED.3IONS-SCNC: 10 MMOL/L (ref 4–16)
BUN BLDV-MCNC: 35 MG/DL (ref 6–23)
CALCIUM SERPL-MCNC: 9 MG/DL (ref 8.3–10.6)
CHLORIDE BLD-SCNC: 96 MMOL/L (ref 99–110)
CO2: 27 MMOL/L (ref 21–32)
CREAT SERPL-MCNC: 2 MG/DL (ref 0.9–1.3)
GFR AFRICAN AMERICAN: 39 ML/MIN/1.73M2
GFR NON-AFRICAN AMERICAN: 32 ML/MIN/1.73M2
GLUCOSE BLD-MCNC: 87 MG/DL (ref 70–99)
POTASSIUM SERPL-SCNC: 4.1 MMOL/L (ref 3.5–5.1)
SODIUM BLD-SCNC: 133 MMOL/L (ref 135–145)

## 2019-08-24 PROCEDURE — 99232 SBSQ HOSP IP/OBS MODERATE 35: CPT | Performed by: INTERNAL MEDICINE

## 2019-08-24 PROCEDURE — 6370000000 HC RX 637 (ALT 250 FOR IP): Performed by: INTERNAL MEDICINE

## 2019-08-24 PROCEDURE — 1200000000 HC SEMI PRIVATE

## 2019-08-24 PROCEDURE — 2580000003 HC RX 258: Performed by: INTERNAL MEDICINE

## 2019-08-24 PROCEDURE — 36415 COLL VENOUS BLD VENIPUNCTURE: CPT

## 2019-08-24 PROCEDURE — 80048 BASIC METABOLIC PNL TOTAL CA: CPT

## 2019-08-24 PROCEDURE — 6370000000 HC RX 637 (ALT 250 FOR IP): Performed by: NURSE PRACTITIONER

## 2019-08-24 RX ORDER — BISACODYL 10 MG
10 SUPPOSITORY, RECTAL RECTAL DAILY PRN
Status: DISCONTINUED | OUTPATIENT
Start: 2019-08-24 | End: 2019-08-28 | Stop reason: HOSPADM

## 2019-08-24 RX ADMIN — FINASTERIDE 5 MG: 5 TABLET, FILM COATED ORAL at 10:12

## 2019-08-24 RX ADMIN — LACTULOSE 20 G: 10 SOLUTION ORAL at 10:11

## 2019-08-24 RX ADMIN — SULFAMETHOXAZOLE AND TRIMETHOPRIM 1 TABLET: 400; 80 TABLET ORAL at 22:10

## 2019-08-24 RX ADMIN — Medication 10 ML: at 22:10

## 2019-08-24 RX ADMIN — SULFAMETHOXAZOLE AND TRIMETHOPRIM 1 TABLET: 400; 80 TABLET ORAL at 10:12

## 2019-08-24 RX ADMIN — LACTULOSE 20 G: 10 SOLUTION ORAL at 22:10

## 2019-08-24 RX ADMIN — LACTULOSE 20 G: 10 SOLUTION ORAL at 17:22

## 2019-08-24 RX ADMIN — ATORVASTATIN CALCIUM 20 MG: 20 TABLET, FILM COATED ORAL at 22:10

## 2019-08-24 RX ADMIN — Medication 10 ML: at 10:12

## 2019-08-24 RX ADMIN — OMEGA-3-ACID ETHYL ESTERS 1000 MG: 1 CAPSULE, LIQUID FILLED ORAL at 10:11

## 2019-08-24 RX ADMIN — VITAMIN D, TAB 1000IU (100/BT) 5000 UNITS: 25 TAB at 10:12

## 2019-08-24 RX ADMIN — DOCUSATE SODIUM 100 MG: 100 CAPSULE, LIQUID FILLED ORAL at 10:11

## 2019-08-24 RX ADMIN — AMIODARONE HYDROCHLORIDE 200 MG: 200 TABLET ORAL at 10:12

## 2019-08-24 RX ADMIN — MULTIPLE VITAMINS W/ MINERALS TAB 1 TABLET: TAB at 10:11

## 2019-08-24 ASSESSMENT — PAIN SCALES - GENERAL
PAINLEVEL_OUTOF10: 0
PAINLEVEL_OUTOF10: 0

## 2019-08-24 NOTE — PROGRESS NOTES
Hospitalist Progress Note      Name:  Adelia Stevens /Age/Sex: 10/5/1933  (80 y.o. male)   MRN & CSN:  [id number] & [id number] Admission Date/Time: 2019  3:45 PM   Location:  [id number] PCP: Mariola Nino [facility] Day: 4    Assessment and Plan:   Adelia Stevens is a 80 y.o.  male  who presents with generalized weakness and fatigue    SABIHA on CKD, likely pre renal  Cr improving 2.0  Hold diuretics  Encourage oral hydration  Evaluated by nephrology    Hyperkalemia  Resolved    Coagulopathy secondary to eliquis  Gross hematuria, resolved  Evaluated by urology  Bladder irrigation  Dc eliquis  Will need follow up with urology as outpatient    MDRO UTI   Recent Urine Cx +ve Stenotrophomonas Maltophilia   sens to bactim, started by urology    A.fib on eliquis  Hold eliquis due to hematuria   Cont amiodarone    Diet DIET RENAL;   DVT Prophylaxis [] Lovenox, []  Heparin, [x] SCDs, []No VTE prophylaxis, patient ambulating   GI Prophylaxis [] PPI, [] H2 Blocker, [x] No GI prophylaxis, patient is receiving diet/Tube Feeds   Code Status Full Code             History of Present Illness:     Pt S&E. Hematuria resolved  Patient is awaiting SNF placement    Ten point ROS reviewed negative, unless as noted above    Objective: Intake/Output Summary (Last 24 hours) at 2019 1624  Last data filed at 2019 1031  Gross per 24 hour   Intake 10 ml   Output 14673 ml   Net -48615 ml      Vitals:   Vitals:    19 1000   BP: (!) 104/51   Pulse: 60   Resp: 19   Temp: 97.9 °F (36.6 °C)   SpO2: 97%     Physical Exam:    GEN Awake male, in no apparent distress. RESP Clear to auscultation, no wheezes, rales or rhonchi. CARDIO/VASC S1/S2 auscultated. Regular rate without appreciable murmurs, rubs, or gallops. No peripheral edema. GI Abdomen is soft without significant tenderness,  masses, or guarding. Bowel sounds are normoactive.    monet in place, clear urine noted in bag  MSK No gross joint deformities. Spontaneous movement of all extremities  SKIN Normal coloration, warm, dry. NEURO Cranial nerves appear grossly intact, normal speech, no lateralizing weakness. PSYCH Awake, alert, oriented x 4. Affect appropriate.     Medications:   Medications:    lactulose  20 g Oral TID    docusate sodium  100 mg Oral Daily    finasteride  5 mg Oral Daily    sulfamethoxazole-trimethoprim  1 tablet Oral 2 times per day    amiodarone  200 mg Oral Daily    atorvastatin  20 mg Oral Nightly    vitamin D  5,000 Units Oral QAM    [Held by provider] metoprolol tartrate  50 mg Oral BID    therapeutic multivitamin-minerals  1 tablet Oral Daily    omega-3 acid ethyl esters  1,000 mg Oral Daily    rivastigmine  1 patch Transdermal QAM    sodium chloride flush  10 mL Intravenous 2 times per day      Infusions:   PRN Meds:     bisacodyl 10 mg Daily PRN   sodium chloride flush 10 mL PRN   ondansetron 4 mg Q6H PRN   traMADol 50 mg Q6H PRN   acetaminophen 650 mg Q8H PRN         Electronically signed by Tanner Lisa MD on 8/24/2019 at 4:24 PM

## 2019-08-24 NOTE — PROGRESS NOTES
4.3 oz (66.8 kg)   SpO2 97%   BMI 21.13 kg/m²       Intake/Output Summary (Last 24 hours) at 8/24/2019 1706  Last data filed at 8/24/2019 1031  Gross per 24 hour   Intake 10 ml   Output 66743 ml   Net -89627 ml           Physical Exam:  Constitutional:  Well developed, Well nourished, No acute distress, Non-toxic appearance. HENT:  Normocephalic, Atraumatic, Bilateral external ears normal, Oropharynx moist, No oral exudates, Nose normal. Neck- Normal range of motion, No tenderness, Supple, No stridor. Eyes:  PERRL, EOMI, Conjunctiva normal, No discharge. Respiratory:  Normal breath sounds, No respiratory distress, No wheezing, No chest tenderness. ,no use of accessory muscles, diaphragm movement is normal  Cardiovascular: (PMI) apex non displaced,no lifts no thrills, no s3,no s4, Normal heart rate, Normal rhythm, No murmurs, No rubs, No gallops. Carotid arteries pulse and amplitude are normal no bruit, no abdominal bruit noted ( normal abdominal aorta ausculation), femoral arteries pulse and amplitude are normal no bruit, pedal pulses are normal  GI:  Bowel sounds normal, Soft, No tenderness, No masses, No pulsatile masses. : External genitalia appear normal, No masses or lesions. No discharge. No CVA tenderness. Musculoskeletal:  Intact distal pulses, No edema, No tenderness, No cyanosis, No clubbing. Good range of motion in all major joints. No tenderness to palpation or major deformities noted. Back- No tenderness. Integument:  Warm, Dry, No erythema, No rash. Lymphatic:  No lymphadenopathy noted. Neurologic:  Alert & oriented x 3, Normal motor function, Normal sensory function, No focal deficits noted.    Psychiatric:  Affect normal, Judgment normal, Mood normal.     Medications:    lactulose  20 g Oral TID    docusate sodium  100 mg Oral Daily    finasteride  5 mg Oral Daily    sulfamethoxazole-trimethoprim  1 tablet Oral 2 times per day    amiodarone  200 mg Oral Daily    atorvastatin 20 mg Oral Nightly    vitamin D  5,000 Units Oral QAM    [Held by provider] metoprolol tartrate  50 mg Oral BID    therapeutic multivitamin-minerals  1 tablet Oral Daily    omega-3 acid ethyl esters  1,000 mg Oral Daily    rivastigmine  1 patch Transdermal QAM    sodium chloride flush  10 mL Intravenous 2 times per day       bisacodyl, sodium chloride flush, ondansetron, traMADol, acetaminophen    Lab Data:  CBC:   Recent Labs     08/22/19  0327   WBC 8.5   HGB 9.3*   HCT 30.0*   MCV 83.3        BMP:   Recent Labs     08/22/19  0327 08/23/19  0450 08/24/19  0208   * 136 133*   K 4.5 3.8 4.1   CL 96* 99 96*   CO2 26 27 27   PHOS  --  3.4  --    BUN 54* 42* 35*   CREATININE 2.3* 2.2* 2.0*     LIVER PROFILE: No results for input(s): AST, ALT, LIPASE, BILIDIR, BILITOT, ALKPHOS in the last 72 hours. Invalid input(s): AMYLASE,  ALB  PT/INR: No results for input(s): PROTIME, INR in the last 72 hours. APTT: No results for input(s): APTT in the last 72 hours. BNP:  No results for input(s): BNP in the last 72 hours. TROPONIN: @TROPONINI:3@      Assessment:  80 y. o.year old who is admitted for          Plan:  AS ABOVE  Health maintenance: exerise and diet  All labs, medications and tests reviewed, continue all other medications of all above medical condition listed as is.       Christina Kwon MD 8/24/2019 5:06 PM

## 2019-08-24 NOTE — PROGRESS NOTES
Nephrology Progress Note  8/24/2019 6:22 PM        Subjective:   Admit Date: 8/21/2019  PCP: Bhargavi US     This is a late entry. Pt seen earlier today    Interval History: doing much better    Diet: better    ROS:  Some BM- less than he desires    Data:     Current meds:    lactulose  20 g Oral TID    docusate sodium  100 mg Oral Daily    finasteride  5 mg Oral Daily    sulfamethoxazole-trimethoprim  1 tablet Oral 2 times per day    amiodarone  200 mg Oral Daily    atorvastatin  20 mg Oral Nightly    vitamin D  5,000 Units Oral QAM    [Held by provider] metoprolol tartrate  50 mg Oral BID    therapeutic multivitamin-minerals  1 tablet Oral Daily    omega-3 acid ethyl esters  1,000 mg Oral Daily    rivastigmine  1 patch Transdermal QAM    sodium chloride flush  10 mL Intravenous 2 times per day           I/O last 3 completed shifts: In: 10 [I.V.:10]  Out: 01289 [[id number]]    CBC:   Recent Labs     08/22/19  0327   WBC 8.5   HGB 9.3*             Recent Labs     08/22/19  0327 08/23/19  0450 08/24/19  0208   * 136 133*   K 4.5 3.8 4.1   CL 96* 99 96*   CO2 26 27 27   BUN 54* 42* 35*   CREATININE 2.3* 2.2* 2.0*   GLUCOSE 83 87 87       Lab Results   Component Value Date    CALCIUM 9.0 08/24/2019    PHOS 3.4 08/23/2019       Objective:     Vitals: BP (!) 104/56   Pulse 69   Temp 97.8 °F (36.6 °C) (Oral)   Resp 24   Ht 5' 10\" (1.778 m)   Wt 147 lb 4.3 oz (66.8 kg)   SpO2 97%   BMI 21.13 kg/m²     General appearance:  No distress  HEENT:  + pallor  Neck:  supple  Lungs:  No gross crackles  Heart:  Seems irregular  Abdomen: soft  Extremities:  No overt edema yet       Problem List :         Impression :     1. SABIHA/ CKD stage 3 b a3 - stable   2. bladder obstruction with monet- no gross hematuria now -   3. A.fib/ ureteral stone  Etc   4. constipation   5. Low na ? Lab variability     Recommendation/Plan  :     1. Keep laxatives for now  2. 'he is waiting ro ECF  3.  Off diuretics   4. he is on bactrim - so cr may go up a bit- acceptable   5.  Follow clinically       Libertad Butts MD

## 2019-08-25 PROCEDURE — 1200000000 HC SEMI PRIVATE

## 2019-08-25 PROCEDURE — 2580000003 HC RX 258: Performed by: INTERNAL MEDICINE

## 2019-08-25 PROCEDURE — 6370000000 HC RX 637 (ALT 250 FOR IP): Performed by: NURSE PRACTITIONER

## 2019-08-25 PROCEDURE — 6370000000 HC RX 637 (ALT 250 FOR IP): Performed by: INTERNAL MEDICINE

## 2019-08-25 PROCEDURE — 99232 SBSQ HOSP IP/OBS MODERATE 35: CPT | Performed by: INTERNAL MEDICINE

## 2019-08-25 PROCEDURE — 6370000000 HC RX 637 (ALT 250 FOR IP): Performed by: HOSPITALIST

## 2019-08-25 RX ORDER — POLYETHYLENE GLYCOL 3350 17 G/17G
17 POWDER, FOR SOLUTION ORAL DAILY
Status: DISCONTINUED | OUTPATIENT
Start: 2019-08-25 | End: 2019-08-28 | Stop reason: HOSPADM

## 2019-08-25 RX ADMIN — LACTULOSE 20 G: 10 SOLUTION ORAL at 23:00

## 2019-08-25 RX ADMIN — DOCUSATE SODIUM 100 MG: 100 CAPSULE, LIQUID FILLED ORAL at 11:27

## 2019-08-25 RX ADMIN — Medication 10 ML: at 23:02

## 2019-08-25 RX ADMIN — POLYETHYLENE GLYCOL (3350) 17 G: 17 POWDER, FOR SOLUTION ORAL at 18:23

## 2019-08-25 RX ADMIN — LACTULOSE 20 G: 10 SOLUTION ORAL at 18:23

## 2019-08-25 RX ADMIN — OMEGA-3-ACID ETHYL ESTERS 1000 MG: 1 CAPSULE, LIQUID FILLED ORAL at 11:27

## 2019-08-25 RX ADMIN — Medication 10 ML: at 11:28

## 2019-08-25 RX ADMIN — AMIODARONE HYDROCHLORIDE 200 MG: 200 TABLET ORAL at 11:28

## 2019-08-25 RX ADMIN — FINASTERIDE 5 MG: 5 TABLET, FILM COATED ORAL at 11:27

## 2019-08-25 RX ADMIN — MULTIPLE VITAMINS W/ MINERALS TAB 1 TABLET: TAB at 11:27

## 2019-08-25 RX ADMIN — SULFAMETHOXAZOLE AND TRIMETHOPRIM 1 TABLET: 400; 80 TABLET ORAL at 11:27

## 2019-08-25 RX ADMIN — SULFAMETHOXAZOLE AND TRIMETHOPRIM 1 TABLET: 400; 80 TABLET ORAL at 23:01

## 2019-08-25 RX ADMIN — BISACODYL 10 MG: 10 SUPPOSITORY RECTAL at 07:09

## 2019-08-25 RX ADMIN — VITAMIN D, TAB 1000IU (100/BT) 5000 UNITS: 25 TAB at 11:26

## 2019-08-25 RX ADMIN — LACTULOSE 20 G: 10 SOLUTION ORAL at 11:26

## 2019-08-25 RX ADMIN — ATORVASTATIN CALCIUM 20 MG: 20 TABLET, FILM COATED ORAL at 23:01

## 2019-08-25 ASSESSMENT — PAIN SCALES - GENERAL
PAINLEVEL_OUTOF10: 0

## 2019-08-25 NOTE — PROGRESS NOTES
Nephrology Progress Note  8/25/2019 2:15 PM        Subjective:   Admit Date: 8/21/2019  PCP: Gertrudis US    Interval History: no BM    Diet: better    ROS:  Constipated , no sob , no edema , ? Lost wt     Data:     Current meds:    lactulose  20 g Oral TID    docusate sodium  100 mg Oral Daily    finasteride  5 mg Oral Daily    sulfamethoxazole-trimethoprim  1 tablet Oral 2 times per day    amiodarone  200 mg Oral Daily    atorvastatin  20 mg Oral Nightly    vitamin D  5,000 Units Oral QAM    [Held by provider] metoprolol tartrate  50 mg Oral BID    therapeutic multivitamin-minerals  1 tablet Oral Daily    omega-3 acid ethyl esters  1,000 mg Oral Daily    rivastigmine  1 patch Transdermal QAM    sodium chloride flush  10 mL Intravenous 2 times per day           I/O last 3 completed shifts: In: 250 [P.O.:240; I.V.:10]  Out: 7475 [Urine:7475]    CBC: No results for input(s): WBC, HGB, PLT in the last 72 hours. Recent Labs     08/23/19  0450 08/24/19  0208    133*   K 3.8 4.1   CL 99 96*   CO2 27 27   BUN 42* 35*   CREATININE 2.2* 2.0*   GLUCOSE 87 87       Lab Results   Component Value Date    CALCIUM 9.0 08/24/2019    PHOS 3.4 08/23/2019       Objective:     Vitals: /66   Pulse 60   Temp 98.4 °F (36.9 °C) (Oral)   Resp 19   Ht 5' 10\" (1.778 m)   Wt 147 lb 4.3 oz (66.8 kg)   SpO2 96%   BMI 21.13 kg/m²     General appearance:  No distress  HEENT:  + pallor  Neck:  supple  Lungs:  No crackles  Heart:  irregular  Abdomen: soft  Extremities:  No overt edema yet       Problem List :         Impression :     1. SABIHA/ CKD stage 3 B a3- will check BMP in am   2. Constipation- avoid mg or [hso containing  enema   3. A,fib/ stent ( ureteral ) / low na     Recommendation/Plan  :     1. BMP in am   2. laxatives but no mg . Phos  In it   3. He is waiting for placement  4.  Follow clinically  5. so far no [pulmo edema - watch       Maggie Plascencia MD

## 2019-08-25 NOTE — PROGRESS NOTES
(66.8 kg)   SpO2 96%   BMI 21.13 kg/m²       Intake/Output Summary (Last 24 hours) at 8/25/2019 1400  Last data filed at 8/25/2019 1126  Gross per 24 hour   Intake 250 ml   Output 825 ml   Net -575 ml           Physical Exam:  Constitutional:  Well developed, Well nourished, No acute distress, Non-toxic appearance. HENT:  Normocephalic, Atraumatic, Bilateral external ears normal, Oropharynx moist, No oral exudates, Nose normal. Neck- Normal range of motion, No tenderness, Supple, No stridor. Eyes:  PERRL, EOMI, Conjunctiva normal, No discharge. Respiratory:  Normal breath sounds, No respiratory distress, No wheezing, No chest tenderness. ,no use of accessory muscles, diaphragm movement is normal  Cardiovascular: (PMI) apex non displaced,no lifts no thrills, no s3,no s4, Normal heart rate, Normal rhythm, No murmurs, No rubs, No gallops. Carotid arteries pulse and amplitude are normal no bruit, no abdominal bruit noted ( normal abdominal aorta ausculation), femoral arteries pulse and amplitude are normal no bruit, pedal pulses are normal  GI:  Bowel sounds normal, Soft, No tenderness, No masses, No pulsatile masses. : External genitalia appear normal, No masses or lesions. No discharge. No CVA tenderness. Musculoskeletal:  Intact distal pulses, No edema, No tenderness, No cyanosis, No clubbing. Good range of motion in all major joints. No tenderness to palpation or major deformities noted. Back- No tenderness. Integument:  Warm, Dry, No erythema, No rash. Lymphatic:  No lymphadenopathy noted. Neurologic:  Alert & oriented x 3, Normal motor function, Normal sensory function, No focal deficits noted.    Psychiatric:  Affect normal, Judgment normal, Mood normal.     Medications:    lactulose  20 g Oral TID    docusate sodium  100 mg Oral Daily    finasteride  5 mg Oral Daily    sulfamethoxazole-trimethoprim  1 tablet Oral 2 times per day    amiodarone  200 mg Oral Daily    atorvastatin  20 mg Oral Nightly    vitamin D  5,000 Units Oral QAM    [Held by provider] metoprolol tartrate  50 mg Oral BID    therapeutic multivitamin-minerals  1 tablet Oral Daily    omega-3 acid ethyl esters  1,000 mg Oral Daily    rivastigmine  1 patch Transdermal QAM    sodium chloride flush  10 mL Intravenous 2 times per day       bisacodyl, sodium chloride flush, ondansetron, traMADol, acetaminophen    Lab Data:  CBC:   No results for input(s): WBC, HGB, HCT, MCV, PLT in the last 72 hours. BMP:   Recent Labs     08/23/19  0450 08/24/19  0208    133*   K 3.8 4.1   CL 99 96*   CO2 27 27   PHOS 3.4  --    BUN 42* 35*   CREATININE 2.2* 2.0*     LIVER PROFILE: No results for input(s): AST, ALT, LIPASE, BILIDIR, BILITOT, ALKPHOS in the last 72 hours. Invalid input(s): AMYLASE,  ALB  PT/INR: No results for input(s): PROTIME, INR in the last 72 hours. APTT: No results for input(s): APTT in the last 72 hours. BNP:  No results for input(s): BNP in the last 72 hours. TROPONIN: @TROPONINI:3@      Assessment:  80 y. o.year old who is admitted for          Plan:  AS ABOVE  Health maintenance: exerise and diet  All labs, medications and tests reviewed, continue all other medications of all above medical condition listed as is.       Anam Pierre MD 8/25/2019 2:00 PM

## 2019-08-25 NOTE — PROGRESS NOTES
Patients daughter was concerned about his upcoming appointment with Dr. Ailyn Parisi . It is August 27th. Per Dr. Ailyn Parisi, he can reschedule that appointment and follow up in two weeks.

## 2019-08-25 NOTE — PROGRESS NOTES
Bowel sounds are normoactive.  monet in place, clear urine noted in bag  MSK No gross joint deformities. Spontaneous movement of all extremities  SKIN Normal coloration, warm, dry. NEURO Cranial nerves appear grossly intact, normal speech, no lateralizing weakness. PSYCH Awake, alert, oriented x 4. Affect appropriate.     Medications:   Medications:    polyethylene glycol  17 g Oral Daily    lactulose  20 g Oral TID    docusate sodium  100 mg Oral Daily    finasteride  5 mg Oral Daily    sulfamethoxazole-trimethoprim  1 tablet Oral 2 times per day    amiodarone  200 mg Oral Daily    atorvastatin  20 mg Oral Nightly    vitamin D  5,000 Units Oral QAM    [Held by provider] metoprolol tartrate  50 mg Oral BID    therapeutic multivitamin-minerals  1 tablet Oral Daily    omega-3 acid ethyl esters  1,000 mg Oral Daily    rivastigmine  1 patch Transdermal QAM    sodium chloride flush  10 mL Intravenous 2 times per day      Infusions:   PRN Meds:     bisacodyl 10 mg Daily PRN   sodium chloride flush 10 mL PRN   ondansetron 4 mg Q6H PRN   traMADol 50 mg Q6H PRN   acetaminophen 650 mg Q8H PRN         Electronically signed by Nickolas Mora MD on 8/25/2019 at 4:00 PM

## 2019-08-26 ENCOUNTER — TELEPHONE (OUTPATIENT)
Dept: CARDIOLOGY CLINIC | Age: 84
End: 2019-08-26

## 2019-08-26 LAB
ANION GAP SERPL CALCULATED.3IONS-SCNC: 10 MMOL/L (ref 4–16)
BUN BLDV-MCNC: 26 MG/DL (ref 6–23)
CALCIUM SERPL-MCNC: 9 MG/DL (ref 8.3–10.6)
CHLORIDE BLD-SCNC: 96 MMOL/L (ref 99–110)
CO2: 28 MMOL/L (ref 21–32)
CREAT SERPL-MCNC: 1.8 MG/DL (ref 0.9–1.3)
GFR AFRICAN AMERICAN: 44 ML/MIN/1.73M2
GFR NON-AFRICAN AMERICAN: 36 ML/MIN/1.73M2
GLUCOSE BLD-MCNC: 88 MG/DL (ref 70–99)
POTASSIUM SERPL-SCNC: 4.4 MMOL/L (ref 3.5–5.1)
SODIUM BLD-SCNC: 134 MMOL/L (ref 135–145)

## 2019-08-26 PROCEDURE — 6370000000 HC RX 637 (ALT 250 FOR IP): Performed by: NURSE PRACTITIONER

## 2019-08-26 PROCEDURE — 97530 THERAPEUTIC ACTIVITIES: CPT

## 2019-08-26 PROCEDURE — 6370000000 HC RX 637 (ALT 250 FOR IP): Performed by: INTERNAL MEDICINE

## 2019-08-26 PROCEDURE — 80048 BASIC METABOLIC PNL TOTAL CA: CPT

## 2019-08-26 PROCEDURE — 97116 GAIT TRAINING THERAPY: CPT

## 2019-08-26 PROCEDURE — 2580000003 HC RX 258: Performed by: INTERNAL MEDICINE

## 2019-08-26 PROCEDURE — 36415 COLL VENOUS BLD VENIPUNCTURE: CPT

## 2019-08-26 PROCEDURE — 1200000000 HC SEMI PRIVATE

## 2019-08-26 RX ADMIN — MULTIPLE VITAMINS W/ MINERALS TAB 1 TABLET: TAB at 10:32

## 2019-08-26 RX ADMIN — Medication 10 ML: at 10:33

## 2019-08-26 RX ADMIN — LACTULOSE 20 G: 10 SOLUTION ORAL at 22:53

## 2019-08-26 RX ADMIN — DOCUSATE SODIUM 100 MG: 100 CAPSULE, LIQUID FILLED ORAL at 10:32

## 2019-08-26 RX ADMIN — SULFAMETHOXAZOLE AND TRIMETHOPRIM 1 TABLET: 400; 80 TABLET ORAL at 10:32

## 2019-08-26 RX ADMIN — ATORVASTATIN CALCIUM 20 MG: 20 TABLET, FILM COATED ORAL at 22:53

## 2019-08-26 RX ADMIN — VITAMIN D, TAB 1000IU (100/BT) 5000 UNITS: 25 TAB at 10:32

## 2019-08-26 RX ADMIN — SULFAMETHOXAZOLE AND TRIMETHOPRIM 1 TABLET: 400; 80 TABLET ORAL at 22:53

## 2019-08-26 RX ADMIN — AMIODARONE HYDROCHLORIDE 200 MG: 200 TABLET ORAL at 10:32

## 2019-08-26 RX ADMIN — LACTULOSE 20 G: 10 SOLUTION ORAL at 14:30

## 2019-08-26 RX ADMIN — POLYETHYLENE GLYCOL (3350) 17 G: 17 POWDER, FOR SOLUTION ORAL at 10:32

## 2019-08-26 RX ADMIN — Medication 10 ML: at 22:53

## 2019-08-26 RX ADMIN — LACTULOSE 20 G: 10 SOLUTION ORAL at 10:32

## 2019-08-26 RX ADMIN — FINASTERIDE 5 MG: 5 TABLET, FILM COATED ORAL at 10:32

## 2019-08-26 RX ADMIN — OMEGA-3-ACID ETHYL ESTERS 1000 MG: 1 CAPSULE, LIQUID FILLED ORAL at 10:32

## 2019-08-26 ASSESSMENT — PAIN SCALES - GENERAL
PAINLEVEL_OUTOF10: 0

## 2019-08-26 NOTE — PROGRESS NOTES
Physical Therapy    Physical Therapy Treatment Note  Name: Silvia Callejas MRN: [id number] :   10/5/1933   Date:  2019   Admission Date: 2019 Room:  [address] [place] [address]-A   Restrictions/Precautions:        General precautions. Fall risk  Communication with other providers: okay to do tx per JESSICA Perera  Subjective:  Patient states:  \" I hope I go today. \"; \" I would like a shower if I could. \"  Pain:   Location, Type, Intensity (0/10 to 10/10):  0/10   Objective:    Observation:  Seated at EOB. Agreeable to tx. Treatment, including education/measures:  Vitals:  BP:  91/56 pre tx seated EOB; 102/53 post trx; 109/52 post short ambulation   HR:  62-65  O2 room air    Therapeutic Activity:  CGA sit<>stand from various surfaces and heights vc/tc proper technique, sequencing, WS and safety to improve functional mobility  CGA SPT vc/tc proper technique, sequencing,  WS, WB and safety to improve functional mobility  SBA dynamic sitting balance 5 min vc/tc proper technique, posture, WS and safety to improve functional mobility  CGA standing balance 1 min vc/tc proper technique, posture, WS and safety to improve functional mobility    Comments: Pt demonstrated posterior lean w dynamic sitting and loss of B LE support    Gait:  Laura amb w RW  ~ 5 ft + 18 ft vc/tc proper technique, sequencing, posture, Step H/L, brandt, lat deviation and safety to improve functional mobility  Comments: Pt demonstrated slow brandt, narrow QUIQUE and short H/L steps, stiff trunk. Pt had one LOB with 180 degree turn in with Mod A to regain midline and QUIQUE. Pt has slight decrease in safety awareness. Pt needed RW management cued.     Exercises:  Education:  Pt was instructed on Purpose of Exercise Program, Holly Scale and Signs and Symptoms of Exercise Intolerance  Ankle Pumps/ Heel Raises x 10  LAQ x 10  Marching Seated x 10    vc/tc  proper technique to improve ROM and strength     Safety  Patient left safely in the chair, with call light/phone in reach with alarm applied. Gait belt was used for transfers and gait. Assessment / Impression:  Pt's BP had slight increase with mild activity and amb. Patient's tolerance of treatment:  good   Adverse Reaction: none  Significant change in status and impact:  none  Barriers to improvement:  strength, balance and safety     Plan for Next Session:    Cont POC.  Rec SNF  Time in:  0907  Time out:  0930  Timed treatment minutes:  23  Total treatment time:  23    Previously filed items:             Electronically signed by:    JENELLE Malone  8/26/2019, 9:12 AM

## 2019-08-26 NOTE — PROGRESS NOTES
Hospitalist Progress Note      Name:  Bela Frazier /Age/Sex: 10/5/1933  (80 y.o. male)   MRN & CSN:  [id number] & [id number] Admission Date/Time: 2019  3:45 PM   Location:  2650/9782-D PCP: Yosef Garza [facility] Day: 6    Assessment and Plan:   Bela Frazier is a 80 y.o.  male  who presents with generalized weakness and fatigue    SABIHA on CKD, likely pre renal  Cr improving 1.8  Hold diuretics  Encourage oral hydration  Evaluated by nephrology    Hyperkalemia  Resolved    Coagulopathy secondary to eliquis  Gross hematuria, resolved  Evaluated by urology  Bladder irrigation  Dc eliquis  Will need follow up with urology as outpatient    MDRO UTI   Recent Urine Cx +ve Stenotrophomonas Maltophilia   sens to bactim, started by urology    A.fib on eliquis  Hold eliquis due to hematuria   Cont amiodarone  Help metoprolol due to hypotensn    Diet DIET RENAL;   DVT Prophylaxis [] Lovenox, []  Heparin, [x] SCDs, []No VTE prophylaxis, patient ambulating   GI Prophylaxis [] PPI, [] H2 Blocker, [x] No GI prophylaxis, patient is receiving diet/Tube Feeds   Code Status Full Code             History of Present Illness:   Presented for gross hematuria, dced eliquis, will need urology f/u. He was also hypotensive so his metoprolol was discontinued, if HR increases may need to restart at a smaller dose. Plan to dc to SNF with monet. Pt S&E. Feeling better today, no dizziness  Patient is awaiting SNF placement,     Ten point ROS reviewed negative, unless as noted above    Objective: Intake/Output Summary (Last 24 hours) at 2019 1525  Last data filed at 2019 1030  Gross per 24 hour   Intake 480 ml   Output 1475 ml   Net -995 ml      Vitals:   Vitals:    19 1030   BP:    Pulse: 62   Resp: 14   Temp:    SpO2:      Physical Exam:    GEN Awake male, in no apparent distress. RESP Clear to auscultation, no wheezes, rales or rhonchi. CARDIO/VASC S1/S2 auscultated.  Regular rate without appreciable murmurs, rubs, or gallops. No peripheral edema. GI Abdomen is soft without significant tenderness,  masses, or guarding. Bowel sounds are normoactive.  monet in place, clear urine noted in bag  MSK No gross joint deformities. Spontaneous movement of all extremities  SKIN Normal coloration, warm, dry. NEURO Cranial nerves appear grossly intact, normal speech, no lateralizing weakness. PSYCH Awake, alert, oriented x 4. Affect appropriate.     Medications:   Medications:    polyethylene glycol  17 g Oral Daily    lactulose  20 g Oral TID    docusate sodium  100 mg Oral Daily    finasteride  5 mg Oral Daily    sulfamethoxazole-trimethoprim  1 tablet Oral 2 times per day    amiodarone  200 mg Oral Daily    atorvastatin  20 mg Oral Nightly    vitamin D  5,000 Units Oral QAM    therapeutic multivitamin-minerals  1 tablet Oral Daily    omega-3 acid ethyl esters  1,000 mg Oral Daily    rivastigmine  1 patch Transdermal QAM    sodium chloride flush  10 mL Intravenous 2 times per day      Infusions:   PRN Meds:     bisacodyl 10 mg Daily PRN   sodium chloride flush 10 mL PRN   ondansetron 4 mg Q6H PRN   traMADol 50 mg Q6H PRN   acetaminophen 650 mg Q8H PRN         Electronically signed by Dian Meredith MD on 8/26/2019 at 3:25 PM

## 2019-08-26 NOTE — PROGRESS NOTES
Nephrology Progress Note  8/26/2019 8:24 AM        Subjective:   Admit Date: 8/21/2019  PCP: Melissa US    Interval History: had bowel movement     Diet: better    ROS:  No sob ? Wt loss     Data:     Current meds:    polyethylene glycol  17 g Oral Daily    lactulose  20 g Oral TID    docusate sodium  100 mg Oral Daily    finasteride  5 mg Oral Daily    sulfamethoxazole-trimethoprim  1 tablet Oral 2 times per day    amiodarone  200 mg Oral Daily    atorvastatin  20 mg Oral Nightly    vitamin D  5,000 Units Oral QAM    therapeutic multivitamin-minerals  1 tablet Oral Daily    omega-3 acid ethyl esters  1,000 mg Oral Daily    rivastigmine  1 patch Transdermal QAM    sodium chloride flush  10 mL Intravenous 2 times per day           I/O last 3 completed shifts: In: 10 [I.V.:10]  Out: 600 [Urine:600]    CBC: No results for input(s): WBC, HGB, PLT in the last 72 hours. Recent Labs     08/24/19  0208 08/26/19  0541   * 134*   K 4.1 4.4   CL 96* 96*   CO2 27 28   BUN 35* 26*   CREATININE 2.0* 1.8*   GLUCOSE 87 88       Lab Results   Component Value Date    CALCIUM 9.0 08/26/2019    PHOS 3.4 08/23/2019       Objective:     Vitals: /63   Pulse 61   Temp 98.1 °F (36.7 °C) (Oral)   Resp 18   Ht 5' 10\" (1.778 m)   Wt 147 lb 4.3 oz (66.8 kg)   SpO2 99%   BMI 21.13 kg/m²     General appearance:  No distress, thon  HEENT:  + conj pallor mild   Neck:  supple  Lungs:  No gross crackles  Heart:  Seems irregular  Abdomen: soft  Extremities:  No overt edema       Problem List :         Impression :     1. SABIHA/ CKD stage 3 b 13- stable   2. Had BM  3. A.fib/ ureteral stent   4. No ADHF yet     Recommendation/Plan  :     1. Ok to d/C   2. I will cancel his apt with me tomorrow  3. BMP in 1-2 wk's   4. F/u with me in 2-03 wk's  5. Call me for edema , sob R wt gain > 3 lbs   6. Low salt  7. Good diet  8. Physical activity  9.  Daily bowel movement       Anna Marie Nelson MD

## 2019-08-26 NOTE — CARE COORDINATION
Called Carolina at [facility] with voicemail left for ASAP call with patient out of network benefit information. 8:45 AM  Received call back from Carolina/michelle at [facility] and provided SS# and she states she will call back soon with benefits. 9:51 AM  Spoke with Carolina/michelle at [facility] who states per plan estimate pt will have a $1,850/deductible and then will have to pay 30% of the billable amount for skilled care. Spoke with Venecia/admissions at [facility] who confirmed they do have a semi-private bed available and without a roommate. Spoke with patient daughter/Torrie and updated her of both and stressed the [facility] benefit check is estimated. We also reviewed medicare. gov ratings. Allen Renae states she and patient family prefer [facility] for rehab. Left message for Carolina/michelle and requested she begin pre-cert today for skilled stay as patient family have chosen [facility] for rehab and they intend to move patient wife into the memory care this week. Updated Venecia/admissions at [facility] of family choosing another SNF. PLAN: [facility] once pre-cert received and patient is medically stable.

## 2019-08-26 NOTE — PROGRESS NOTES
[facility], [address]   Progress Note  [facility] 0 1 2      Date: 2019   Patient: Lisa Pond   : 10/5/1933   DOA: 2019   MRN: [id number]   ROOM#: [facility]     Admit Date: 2019     Reason for Consult:    Requesting Physician:  Dr. Jen Schlatter   Collaborating Urologist on Call at time of admission: Dr Senthil Mcgregor: generalized weakness and gross hematuria        Subjective:     Pain: none, no nausea and no vomiting,   Bowel Movement/Flatus:   Not recorded   Voiding:  Urine yellow and clear in monet     Pt tolerating monet catheter well. Objective:    Pt up to bedside chair. Pt appears well.    Vitals:    BP (!) 112/56   Pulse 60   Temp 98.5 °F (36.9 °C) (Oral)   Resp 23   Ht 5' 10\" (1.778 m)   Wt 147 lb 4.3 oz (66.8 kg)   SpO2 98%   BMI 21.13 kg/m²    Temp  Av.5 °F (36.9 °C)  Min: 98.1 °F (36.7 °C)  Max: 98.7 °F (37.1 °C)       Intake/Output Summary (Last 24 hours) at 2019 1902  Last data filed at 2019 1800  Gross per 24 hour   Intake 840 ml   Output 1275 ml   Net -435 ml       Physical Exam:   General appearance: alert, appears stated age, cooperative and no distress  Back: no CVA tenderness  Abdomen: soft non-tender  Male genitalia: normal, uncircumcised male   : 3 way monet catheter in place with clear yellow urine   Labs:   WBC:    Lab Results   Component Value Date    WBC 8.5 2019      Hemoglobin/Hematocrit:    Lab Results   Component Value Date    HGB 9.3 2019    HCT 30.0 2019      BMP:   Lab Results   Component Value Date     2019    K 4.4 2019    CL 96 2019    CO2 28 2019    BUN 26 2019    LABALBU 3.3 2019    CREATININE 1.8 2019    CALCIUM 9.0 2019    GFRAA 44 2019    LABGLOM 36 2019      PT/INR:    Lab Results   Component Value Date    PROTIME 16.7 2019    INR 1.47 2019      PTT:    Lab Results   Component Value Date APTT 42.9 08/18/2019      Blood Culture:     Urine Culture:   SETUP DATE/TIME:  08/19/2019 0846   Susceptibility     Stenotrophomonas maltophilia (1)     Antibiotic Interpretation NING Status   trimethoprim-sulfamethoxazole Sensitive <=2/38 Final         Imaging:   Us Abdomen Limited    Result Date: 8/19/2019  EXAMINATION: RIGHT UPPER QUADRANT ULTRASOUND 8/19/2019 6:27 am COMPARISON: Abdomen and pelvis CT 05/11/2019 HISTORY: ORDERING SYSTEM PROVIDED HISTORY: New transaminitis TECHNOLOGIST PROVIDED HISTORY: Reason for exam:->New transaminitis Reason for Exam: Pain Acuity: Unknown Type of Exam: Ongoing FINDINGS: LIVER:  Coarsened echotexture. Normal size and echogenicity. No obvious surface nodularity nor focal lesions. BILIARY SYSTEM:  Shadowing gallstones in the dependent gallbladder lumen. No gallbladder distention, gallbladder wall thickening, nor pericholecystic fluid. No reported right upper quadrant pain during examination. No intrahepatic biliary dilation. At least mild dilation of the visualized common duct, measuring at least 1.2 cm. PANCREAS:  Completely obscured by overlying bowel gas. RIGHT KIDNEY:  Normal size, echogenicity, and parenchymal thickness. Suggestion a shadowing calculus in the right renal pelvis. No hydronephrosis. 8.1 cm x 6.0 cm x 6.2 cm cyst with layering debris, corresponding with note of calcium seen on CT (Bosniak category 2). OTHER:  No visualized free intraperitoneal fluid. 1. Cholelithiasis without additional secondary findings of cholecystitis. Consider further evaluation with a nuclear medicine hepatobiliary scan if there are clinical findings of cholecystitis. 2. At least mild dilation of the visualized common duct, which is obscured distally. This appears new compared to the comparison CT in the be due to choledocholithiasis. Recommend further evaluation with MRCP. 3. Coarsened liver echotexture suggestive of underlying hepatocellular disease.   No findings suggestive of cirrhosis or significant steatosis. 4. Right renal cyst for which no follow-up is recommended. Nonobstructing calculus suspected in the right renal pelvis. Us Retroperitoneal Limited    Result Date: 8/23/2019  EXAMINATION: RETROPERITONEAL ULTRASOUND OF THE KIDNEYS 8/19/2019 COMPARISON: 05/11/2019 HISTORY: ORDERING SYSTEM PROVIDED HISTORY: RENAL FAILURE, ACUTE (KIDNEY INJURY) TECHNOLOGIST PROVIDED HISTORY: Acuity: Acute Type of Exam: Initial FINDINGS: Kidneys: The right kidney measures 11.9 cm in length and the left kidney measures 11.2 cm in length. Kidneys demonstrate normal cortical echogenicity. Bilateral simple appearing renal cysts, which measure up to 8.1 cm on the right and require no follow-up. No evidence of hydronephrosis. Echogenic focus along the mid/inferior right renal pelvis measuring up to 2.0 cm, consistent with a calculus. 1. No evidence of hydronephrosis. 2. Stable 2.0 cm calculus along the right renal pelvis. 3. Simple appearing bilateral renal cysts measuring up to 8.1 cm, which prior no further imaging follow-up. Assessment & Plan:      Murphy Noguera is a 80y.o. year old male admitted 8/21/2019 for hyperkalemia      1) Hematuria:               WBC 8.5 8/22/2019              CR 2.2              8/19/2019 SHA no hydronephrosis               UA neg leuk, neg bacteria, negative nitrite               Urine culture MDR suggest starting bactrim ds 500 mg po bid                8/22/2019 Pt urine dark red without clots. Monet irrigated with 2000 cc normal sterile saline. 2200 cc returned. Urine now clear with CBI. Pt urine now clear yellow    Pt ok to DC per urology with monet catheter in place until planned visit w Dr Rona Henao for cystoscopy. Would recommend holding anticoagulant 2 more days if ok w Cardio. Will sign off for now please call with questions                 Patient seen and examined, chart reviewed.  Pt seen in AM and charted on in PM Electronically signed by CARI Sandhu CNP on 8/26/2019 at 7:02 PM

## 2019-08-26 NOTE — PROGRESS NOTES
Occupational Therapy  Attempted OT tx, but pt was unavailable.     [address] Saunders, OTR/L, [place]   [id number]   3:52 PM, 8/26/2019

## 2019-08-27 PROCEDURE — 6370000000 HC RX 637 (ALT 250 FOR IP): Performed by: INTERNAL MEDICINE

## 2019-08-27 PROCEDURE — 6370000000 HC RX 637 (ALT 250 FOR IP): Performed by: NURSE PRACTITIONER

## 2019-08-27 PROCEDURE — 97530 THERAPEUTIC ACTIVITIES: CPT

## 2019-08-27 PROCEDURE — 94761 N-INVAS EAR/PLS OXIMETRY MLT: CPT

## 2019-08-27 PROCEDURE — 2580000003 HC RX 258: Performed by: INTERNAL MEDICINE

## 2019-08-27 PROCEDURE — 1200000000 HC SEMI PRIVATE

## 2019-08-27 PROCEDURE — 97535 SELF CARE MNGMENT TRAINING: CPT

## 2019-08-27 RX ADMIN — LACTULOSE 20 G: 10 SOLUTION ORAL at 11:19

## 2019-08-27 RX ADMIN — SULFAMETHOXAZOLE AND TRIMETHOPRIM 1 TABLET: 400; 80 TABLET ORAL at 11:18

## 2019-08-27 RX ADMIN — POLYETHYLENE GLYCOL (3350) 17 G: 17 POWDER, FOR SOLUTION ORAL at 11:18

## 2019-08-27 RX ADMIN — OMEGA-3-ACID ETHYL ESTERS 1000 MG: 1 CAPSULE, LIQUID FILLED ORAL at 11:18

## 2019-08-27 RX ADMIN — VITAMIN D, TAB 1000IU (100/BT) 5000 UNITS: 25 TAB at 11:18

## 2019-08-27 RX ADMIN — AMIODARONE HYDROCHLORIDE 200 MG: 200 TABLET ORAL at 11:18

## 2019-08-27 RX ADMIN — Medication 10 ML: at 11:19

## 2019-08-27 RX ADMIN — DOCUSATE SODIUM 100 MG: 100 CAPSULE, LIQUID FILLED ORAL at 11:18

## 2019-08-27 RX ADMIN — Medication 10 ML: at 21:12

## 2019-08-27 RX ADMIN — ATORVASTATIN CALCIUM 20 MG: 20 TABLET, FILM COATED ORAL at 21:11

## 2019-08-27 RX ADMIN — FINASTERIDE 5 MG: 5 TABLET, FILM COATED ORAL at 11:18

## 2019-08-27 RX ADMIN — LACTULOSE 20 G: 10 SOLUTION ORAL at 16:20

## 2019-08-27 RX ADMIN — SULFAMETHOXAZOLE AND TRIMETHOPRIM 1 TABLET: 400; 80 TABLET ORAL at 21:11

## 2019-08-27 RX ADMIN — MULTIPLE VITAMINS W/ MINERALS TAB 1 TABLET: TAB at 11:17

## 2019-08-27 ASSESSMENT — PAIN SCALES - GENERAL
PAINLEVEL_OUTOF10: 0

## 2019-08-27 NOTE — PLAN OF CARE
Problem: Falls - Risk of:  Goal: Will remain free from falls  Description  Will remain free from falls  Outcome: Ongoing     Problem: Falls - Risk of:  Goal: Absence of physical injury  Description  Absence of physical injury  Outcome: Ongoing     Problem: SAFETY  Goal: Free from accidental physical injury  Outcome: Ongoing     Problem: SAFETY  Goal: Free from intentional harm  Outcome: Ongoing     Problem: DAILY CARE  Goal: Daily care needs are met  Outcome: Ongoing     Problem: KNOWLEDGE DEFICIT  Goal: Patient/S.O. demonstrates understanding of disease process, treatment plan, medications, and discharge instructions.   Outcome: Ongoing     Problem: DISCHARGE BARRIERS  Goal: Patient's continuum of care needs are met  Outcome: Ongoing

## 2019-08-27 NOTE — PROGRESS NOTES
Nephrology Progress Note  8/27/2019 8:57 AM        Subjective:   Admit Date: 8/21/2019  PCP: Dianne US    Interval History: waiting for placement - [place]    Diet: better    ROS:  No sob, + BS     Data:     Current med's:    polyethylene glycol  17 g Oral Daily    lactulose  20 g Oral TID    docusate sodium  100 mg Oral Daily    finasteride  5 mg Oral Daily    sulfamethoxazole-trimethoprim  1 tablet Oral 2 times per day    amiodarone  200 mg Oral Daily    atorvastatin  20 mg Oral Nightly    vitamin D  5,000 Units Oral QAM    therapeutic multivitamin-minerals  1 tablet Oral Daily    omega-3 acid ethyl esters  1,000 mg Oral Daily    rivastigmine  1 patch Transdermal QAM    sodium chloride flush  10 mL Intravenous 2 times per day           I/O last 3 completed shifts: In: 840 [P.O.:840]  Out: 1675 [Urine:1675]    CBC: No results for input(s): WBC, HGB, PLT in the last 72 hours. Recent Labs     08/26/19  0541   *   K 4.4   CL 96*   CO2 28   BUN 26*   CREATININE 1.8*   GLUCOSE 88       Lab Results   Component Value Date    CALCIUM 9.0 08/26/2019    PHOS 3.4 08/23/2019       Objective:     Vitals: BP (!) 123/57   Pulse 60   Temp 97.6 °F (36.4 °C) (Oral)   Resp 20   Ht 5' 10\" (1.778 m)   Wt 147 lb 0.8 oz (66.7 kg)   SpO2 96%   BMI 21.10 kg/m²     General appearance:  No distress/ thin  HEENT:  + pallor  Neck:  supple  Lungs:  No crackles  Heart:  Seems RRR  Abdomen: soft  Extremities:  No apparent edema       Problem List :         Impression :     1. SABIHA/CKD stage 3- was atble   2. Lower cr likely from wt loas/ muscle mas loess  3. A.fib  4. Ch monet and has indwelling ureteral stent   5. No clinical evidence of pulmo edema     Recommendation/Plan  :     1. waiting for placement   2. BMP 1 wk after d/C   3. F/U with me in 2-3 wk's  4. Low salt  5. Daily wt  6. No diuretics for now   7.  Call me with wt gain/ > 2 lbs  edema / Maria Isabel Guerrero MD

## 2019-08-27 NOTE — PROGRESS NOTES
hallway CGA using no AD. Pt topographically disoriented in hallway, and required max cues for returning to appropriate room. Pt transferred stand to sit to chair CGA with min cues for reaching back with arms. Pt left positioned for comfort in chair with all lines intact, all needs within reach, and chair alarm on. RN notified.          Assessment / Impression:        Patient's tolerance of treatment: Well  Adverse Reaction: None  Significant change in status and impact: Much improved tolerance of OOB activity this date compared to initial evaluation  Barriers to improvement: Overall weakness/debility, Mild cognitive deficits      Plan for Next Session:    Continue per OT POC      Time in: 925  Time out: 949  Timed treatment minutes: 24  Total treatment time: 24      Electronically signed by:    Ruth Alexander OT/L, [facility], [id number]

## 2019-08-28 VITALS
SYSTOLIC BLOOD PRESSURE: 128 MMHG | DIASTOLIC BLOOD PRESSURE: 59 MMHG | HEIGHT: 70 IN | RESPIRATION RATE: 13 BRPM | WEIGHT: 155.2 LBS | HEART RATE: 60 BPM | TEMPERATURE: 97.8 F | BODY MASS INDEX: 22.22 KG/M2 | OXYGEN SATURATION: 98 %

## 2019-08-28 LAB
ANION GAP SERPL CALCULATED.3IONS-SCNC: 9 MMOL/L (ref 4–16)
BUN BLDV-MCNC: 30 MG/DL (ref 6–23)
CALCIUM SERPL-MCNC: 9 MG/DL (ref 8.3–10.6)
CHLORIDE BLD-SCNC: 99 MMOL/L (ref 99–110)
CO2: 28 MMOL/L (ref 21–32)
CREAT SERPL-MCNC: 2 MG/DL (ref 0.9–1.3)
GFR AFRICAN AMERICAN: 39 ML/MIN/1.73M2
GFR NON-AFRICAN AMERICAN: 32 ML/MIN/1.73M2
GLUCOSE BLD-MCNC: 87 MG/DL (ref 70–99)
MAGNESIUM: 2.5 MG/DL (ref 1.8–2.4)
PHOSPHORUS: 2.9 MG/DL (ref 2.5–4.9)
POTASSIUM SERPL-SCNC: 4.6 MMOL/L (ref 3.5–5.1)
SODIUM BLD-SCNC: 136 MMOL/L (ref 135–145)

## 2019-08-28 PROCEDURE — 80048 BASIC METABOLIC PNL TOTAL CA: CPT

## 2019-08-28 PROCEDURE — 97116 GAIT TRAINING THERAPY: CPT

## 2019-08-28 PROCEDURE — 36415 COLL VENOUS BLD VENIPUNCTURE: CPT

## 2019-08-28 PROCEDURE — 6370000000 HC RX 637 (ALT 250 FOR IP): Performed by: NURSE PRACTITIONER

## 2019-08-28 PROCEDURE — 6370000000 HC RX 637 (ALT 250 FOR IP): Performed by: INTERNAL MEDICINE

## 2019-08-28 PROCEDURE — 2580000003 HC RX 258: Performed by: INTERNAL MEDICINE

## 2019-08-28 PROCEDURE — 83735 ASSAY OF MAGNESIUM: CPT

## 2019-08-28 PROCEDURE — 84100 ASSAY OF PHOSPHORUS: CPT

## 2019-08-28 RX ORDER — POLYETHYLENE GLYCOL 3350 17 G/17G
17 POWDER, FOR SOLUTION ORAL DAILY
Qty: 527 G | Refills: 0 | Status: SHIPPED | OUTPATIENT
Start: 2019-08-29 | End: 2019-09-28

## 2019-08-28 RX ORDER — FINASTERIDE 5 MG/1
5 TABLET, FILM COATED ORAL DAILY
Qty: 30 TABLET | Refills: 0 | Status: SHIPPED | OUTPATIENT
Start: 2019-08-29

## 2019-08-28 RX ORDER — PSEUDOEPHEDRINE HCL 30 MG
100 TABLET ORAL 2 TIMES DAILY PRN
Qty: 30 CAPSULE | Refills: 0 | Status: SHIPPED | OUTPATIENT
Start: 2019-08-28

## 2019-08-28 RX ORDER — METOPROLOL TARTRATE 50 MG/1
25 TABLET, FILM COATED ORAL 2 TIMES DAILY
Qty: 60 TABLET | Refills: 0 | Status: SHIPPED | OUTPATIENT
Start: 2019-08-28 | End: 2019-10-04 | Stop reason: SDUPTHER

## 2019-08-28 RX ADMIN — MULTIPLE VITAMINS W/ MINERALS TAB 1 TABLET: TAB at 09:40

## 2019-08-28 RX ADMIN — AMIODARONE HYDROCHLORIDE 200 MG: 200 TABLET ORAL at 09:40

## 2019-08-28 RX ADMIN — VITAMIN D, TAB 1000IU (100/BT) 5000 UNITS: 25 TAB at 09:41

## 2019-08-28 RX ADMIN — DOCUSATE SODIUM 100 MG: 100 CAPSULE, LIQUID FILLED ORAL at 09:40

## 2019-08-28 RX ADMIN — LACTULOSE 20 G: 10 SOLUTION ORAL at 09:40

## 2019-08-28 RX ADMIN — FINASTERIDE 5 MG: 5 TABLET, FILM COATED ORAL at 09:40

## 2019-08-28 RX ADMIN — SULFAMETHOXAZOLE AND TRIMETHOPRIM 1 TABLET: 400; 80 TABLET ORAL at 09:40

## 2019-08-28 RX ADMIN — OMEGA-3-ACID ETHYL ESTERS 1000 MG: 1 CAPSULE, LIQUID FILLED ORAL at 09:40

## 2019-08-28 RX ADMIN — POLYETHYLENE GLYCOL (3350) 17 G: 17 POWDER, FOR SOLUTION ORAL at 09:41

## 2019-08-28 RX ADMIN — Medication 10 ML: at 09:44

## 2019-08-28 ASSESSMENT — PAIN SCALES - GENERAL
PAINLEVEL_OUTOF10: 0

## 2019-08-28 NOTE — PROGRESS NOTES
Hospitalist Progress Note      Name:  Lopez Kulkarni /Age/Sex: 10/5/1933  (80 y.o. male)   MRN & CSN:  [id number] & [id number] Admission Date/Time: 2019  3:45 PM   Location:  3058/2526-Y PCP: Linnea Castleman VO       History of Present Illness:     Hospital Day: 7    Lopez Kulkarni is a 80 y.o.  male  who presents with: generalized weakness and fatigue    Assessment and Plan:    SABIHA/CKD, likely prerenal. Improved with hydration.  Hyperkalemia, resolved   Coagulopathy dt eliquis with associated gross hematuria, resolved s/p bladder irrigation. Complicated by chronic indwelling monet. OP f/u with urology   MDRO UTI with chronic indwelling monet. Bactrim   P afib on eliquis. Initially held eliquis due to hematuria. ? restart eliquis prior to discharge  Dc to SNF once accepted. Diet DIET RENAL;   DVT Prophylaxis [] Lovenox, []  Heparin, [] SCDs, []No VTE prophylaxis, patient ambulating   GI Prophylaxis [] PPI, [] H2 Blocker, [] No GI prophylaxis, patient is receiving diet/Tube Feeds   Code Status Full Code   Disposition Patient requires continued admission due to placement   MDM [] Low, [] Moderate,[]  High  Patient's risk as above due to acute illness with systemic symptoms      Subjective:     Pt S&E. Denies cp sob or ha  Discussed the pending precert. Denies having any questions. 10-14 point ROS reviewed negative, unless as noted above    Objective: Intake/Output Summary (Last 24 hours) at 2019  Last data filed at 2019 1300  Gross per 24 hour   Intake 240 ml   Output 800 ml   Net -560 ml      Vitals:   Vitals:    19   BP: (!) 115/52   Pulse: 60   Resp: 20   Temp: 97.9 °F (36.6 °C)   SpO2:      Physical Exam:    GEN Awake male, laying in bed in no apparent distress. Appears given age. EYES Pupils are equally round.   No scleral discharge  HENT Atraumatic and symmetric head  NECK No apparent thyromegaly  RESP Symmetric chest movement while on room air. CARDIO/VASC Peripheral pulses equal bilaterally and palpable. No peripheral edema. GI Abdomen is not distended. Rectal exam deferred.  Novak catheter is not present. HEME/LYMPH No petechiae or ecchymoses. MSK Spontaneous movement of BL upper extremities  SKIN Normal coloration, warm, dry. NEURO Cranial nerves appear grossly intact  PSYCH Awake, alert.  Oriented    Medications:   Medications:    polyethylene glycol  17 g Oral Daily    lactulose  20 g Oral TID    docusate sodium  100 mg Oral Daily    finasteride  5 mg Oral Daily    sulfamethoxazole-trimethoprim  1 tablet Oral 2 times per day    amiodarone  200 mg Oral Daily    atorvastatin  20 mg Oral Nightly    vitamin D  5,000 Units Oral QAM    therapeutic multivitamin-minerals  1 tablet Oral Daily    omega-3 acid ethyl esters  1,000 mg Oral Daily    rivastigmine  1 patch Transdermal QAM    sodium chloride flush  10 mL Intravenous 2 times per day      Infusions:   PRN Meds:   bisacodyl 10 mg Daily PRN   sodium chloride flush 10 mL PRN   ondansetron 4 mg Q6H PRN   traMADol 50 mg Q6H PRN   acetaminophen 650 mg Q8H PRN         Electronically signed by Louise Greene DO on 8/27/2019 at 10:08 PM

## 2019-08-28 NOTE — PROGRESS NOTES
[address] at [phone] to try to give report at 12:58. The facility did not answer the phone. Transport has arrived to take patient to facility. Will try to call back again at a different time.

## 2019-08-28 NOTE — PROGRESS NOTES
Pt's daughter Diogo Galvan was notified precert was approved. Diogo Galvan doesn't have a preference on transport company. Transport scheduled for pt to d/c to [facility] today at [address] Daughter notified of time. Notified  Attempted to call & notify [facility] of transport time but received a voicemail. LM asking them to call.

## 2019-08-28 NOTE — PROGRESS NOTES
Physical Therapy Treatment Note  Name: Maximiliano Rivas MRN: [id number] :   10/5/1933   Date:  2019   Admission Date: 2019 Room:  [address] [place] [address]   Restrictions/Precautions:        contact  Communication with other providers:  RN, OT  Subjective:  Patient states:  Feeling better, says he and wife are moving to long-term after short rehab stay   Pain:   Location, Type, Intensity (0/10 to 10/10): Denies   Objective:    Observation: In chair upon arrival attempting sodoku   Treatment, including education/measures:  Therapeutic Activity Training:   Therapeutic activity training was instructed today. Cues were given for safety, sequence, UE/LE placement, awareness, and balance. Activities performed today included bed mobility training, sup-sit, sit-stand, SPT. STS training, supervision, bilat UE pushoff, improving strength and power    Gait Training:  Cues were given for safety, sequence, device management, balance, posture, awareness, path. SBA in villagran with RW, 200 ft x 2. Functional gait speed, stable gait path with changing gaze. Cues for device mgt through turns, as pt has tendency to lift device excessively. Assessment / Impression:    Pt progressing well, tolerating more OOB activity.  Will benefit from short SNF stay primarily for balance training, safety awareness, and progression to LRAD       Patient's tolerance of treatment:  good   Adverse Reaction: none  Significant change in status and impact:  none  Barriers to improvement:  none  Plan for Next Session:    Per POC  Time in:  923  Time out:  949  Timed treatment minutes:  26  Total treatment time:  26    Previously filed items:        Electronically signed by:    Sara Falk PT  2019, 10:14 AM

## 2019-08-28 NOTE — PROGRESS NOTES
Nutrition Assessment (Low Risk)    Type and Reason for Visit: Initial, Patient Education(los 7)    Nutrition Recommendations:   Continue current diet     Nutrition Assessment:  Adequate/improved  intake, consuming greater than half to all of meals. No wt loss past 3 months per Epic history, BMI 22.2. No wounds noted. Provided/reviewed High Fiber Nutrition Therapy (Nutrition Care Manual). Patient assessed for nutritional risk. Deemed to be at low risk at this time. Will continue to monitor for changes in status.     Malnutrition Assessment:  · Malnutrition Status: No malnutrition    Nutrition Risk Level   Risk Level: Low    Nutrition Diagnosis:   · Problem: No nutrition diagnosis at this time    Nutrition Intervention:  Food and/or Delivery: Continue current diet  Nutrition Education/Counseling/Coordination of Care:  Continued Inpatient Monitoring, Education Completed, Coordination of Care      Electronically signed by Kiersten Bennett RD, MIKAEL on 8/28/19 at 11:10 AM    Contact Number: [phone]

## 2019-08-28 NOTE — DISCHARGE SUMMARY
Discharge Summary    Name:  Tash Escalante /Age/Sex: 10/5/1933  (80 y.o. male)   MRN & CSN:  [id number] & [id number] Admission Date/Time: 2019  3:45 PM   Attending:  No att. providers found Discharging Physician: Hansa Zendejas DO     HPI and Hospital Course:   Tash Escalante is a 80 y.o. male who presents with: generalized weakness and fatigue    · SABIHA/CKD, likely prerenal. Improved with hydration. · Hyperkalemia, resolved  · Coagulopathy dt eliquis with associated gross hematuria, resolved s/p bladder irrigation. Complicated by chronic indwelling monet. OP f/u with urology  · MDRO UTI with chronic indwelling monet. Bactrim  · P afib on eliquis. Initially held eliquis due to hematuria. · Chronic indwelling monet      The patient expressed appropriate understanding of and agreement with the discharge recommendations, medications, and plan. Discharged to [facility] for continued rehab.     Consults this admission:  IP CONSULT TO HOSPITALIST  IP CONSULT TO NEPHROLOGY  IP CONSULT TO UROLOGY  IP CONSULT TO ELECTROPHYSIOLOGY    Discharge Instruction:   Follow up appointments: *nephrology   Primary care physician:  within 2 weeks    Diet:  General/cardiac/ADA/as tolerated  Activity: {discharge activity: as tolerated  Disposition: Discharged to:   [x]Home, []C, [x]SNF, []Acute Rehab, []Hospice   Condition on discharge: Stable    Discharge Medications:      Cavalier Renetta   Home Medication Instructions GDS:[id number]    Printed on:19 4944   Medication Information                      amiodarone (CORDARONE) 200 MG tablet  Take 1 tablet by mouth daily             apixaban (ELIQUIS) 2.5 MG TABS tablet  Take 1 tablet by mouth 2 times daily             atorvastatin (LIPITOR) 20 MG tablet  Take 20 mg by mouth nightly              Cholecalciferol (VITAMIN D3) 5000 units TABS  Take 5,000 Units by mouth every morning             docusate sodium (COLACE, DULCOLAX) 100 MG CAPS  Take 100 mg by

## 2019-08-28 NOTE — PROGRESS NOTES
Nephrology Progress Note  8/28/2019 9:07 AM        Subjective:   Admit Date: 8/21/2019  PCP: Judge Davey US    Interval History: waiting for placement     Diet: better    ROS:  irregular bowel movement     Data:     Current med's:    polyethylene glycol  17 g Oral Daily    lactulose  20 g Oral TID    docusate sodium  100 mg Oral Daily    finasteride  5 mg Oral Daily    sulfamethoxazole-trimethoprim  1 tablet Oral 2 times per day    amiodarone  200 mg Oral Daily    atorvastatin  20 mg Oral Nightly    vitamin D  5,000 Units Oral QAM    therapeutic multivitamin-minerals  1 tablet Oral Daily    omega-3 acid ethyl esters  1,000 mg Oral Daily    rivastigmine  1 patch Transdermal QAM    sodium chloride flush  10 mL Intravenous 2 times per day           I/O last 3 completed shifts: In: 240 [P.O.:240]  Out: -     CBC: No results for input(s): WBC, HGB, PLT in the last 72 hours. Recent Labs     08/26/19  0541 08/28/19  0400   * 136   K 4.4 4.6   CL 96* 99   CO2 28 28   BUN 26* 30*   CREATININE 1.8* 2.0*   GLUCOSE 88 87       Lab Results   Component Value Date    CALCIUM 9.0 08/28/2019    PHOS 2.9 08/28/2019       Objective:     Vitals: BP (!) 120/57   Pulse 63   Temp 98 °F (36.7 °C) (Oral)   Resp 20   Ht 5' 10\" (1.778 m)   Wt 155 lb 3.3 oz (70.4 kg)   SpO2 98%   BMI 22.27 kg/m²     General appearance:  No distress/ thin  HEENT:  + pallor  Neck:  supple  Lungs:  No crackles  Heart:  Seems RRR  Abdomen: soft  Extremities:  No edema       Problem List :         Impression :     1. SABIHA/ CKD stage  3 - some cr variation could be from bactrim by blocking  tubular creatinine secretion  And off course day to day variability- stable  2. No ADHF  3. Underlying a.fib/ constipation  4. Na ok now     Recommendation/Plan  :     1. Po food/ fluid  2. High fiber - laxatives  prn- hopefully getting out of the hospital and ambulation/physical activity will help bowel to move better   3. Low salt  4.  Wt

## 2019-08-29 LAB
EKG ATRIAL RATE: 59 BPM
EKG DIAGNOSIS: NORMAL
EKG P AXIS: -32 DEGREES
EKG P-R INTERVAL: 188 MS
EKG Q-T INTERVAL: 556 MS
EKG QRS DURATION: 258 MS
EKG QTC CALCULATION (BAZETT): 556 MS
EKG R AXIS: -53 DEGREES
EKG T AXIS: 102 DEGREES
EKG VENTRICULAR RATE: 60 BPM

## 2019-08-30 ENCOUNTER — HOSPITAL ENCOUNTER (OUTPATIENT)
Age: 84
Discharge: HOME | End: 2019-08-30

## 2019-08-30 LAB
ANION GAP SERPL CALCULATED.3IONS-SCNC: 12 MMOL/L (ref 4–16)
BUN BLDV-MCNC: 34 MG/DL (ref 6–23)
CALCIUM SERPL-MCNC: 8.8 MG/DL (ref 8.3–10.6)
CHLORIDE BLD-SCNC: 98 MMOL/L (ref 99–110)
CO2: 27 MMOL/L (ref 21–32)
CREAT SERPL-MCNC: 1.9 MG/DL (ref 0.9–1.3)
GFR AFRICAN AMERICAN: 41 ML/MIN/1.73M2
GFR NON-AFRICAN AMERICAN: 34 ML/MIN/1.73M2
GLUCOSE BLD-MCNC: 86 MG/DL (ref 70–99)
HCT VFR BLD CALC: 28.4 % (ref 42–52)
HEMOGLOBIN: 8.5 GM/DL (ref 13.5–18)
MCH RBC QN AUTO: 26.3 PG (ref 27–31)
MCHC RBC AUTO-ENTMCNC: 29.9 % (ref 32–36)
MCV RBC AUTO: 87.9 FL (ref 78–100)
PDW BLD-RTO: 16.4 % (ref 11.7–14.9)
PLATELET # BLD: 229 K/CU MM (ref 140–440)
PMV BLD AUTO: 10 FL (ref 7.5–11.1)
POTASSIUM SERPL-SCNC: 4 MMOL/L (ref 3.5–5.1)
PRO-BNP: 2126 PG/ML
RBC # BLD: 3.23 M/CU MM (ref 4.6–6.2)
SODIUM BLD-SCNC: 137 MMOL/L (ref 135–145)
WBC # BLD: 6.3 K/CU MM (ref 4–10.5)

## 2019-08-30 PROCEDURE — 80048 BASIC METABOLIC PNL TOTAL CA: CPT

## 2019-08-30 PROCEDURE — 36415 COLL VENOUS BLD VENIPUNCTURE: CPT

## 2019-08-30 PROCEDURE — 83880 ASSAY OF NATRIURETIC PEPTIDE: CPT

## 2019-08-30 PROCEDURE — 85027 COMPLETE CBC AUTOMATED: CPT

## 2019-09-03 ENCOUNTER — TELEPHONE (OUTPATIENT)
Dept: CARDIOLOGY CLINIC | Age: 84
End: 2019-09-03

## 2019-09-03 ENCOUNTER — HOSPITAL ENCOUNTER (OUTPATIENT)
Age: 84
Discharge: HOME | End: 2019-09-03

## 2019-09-03 LAB
ANION GAP SERPL CALCULATED.3IONS-SCNC: 12 MMOL/L (ref 4–16)
BUN BLDV-MCNC: 35 MG/DL (ref 6–23)
CALCIUM SERPL-MCNC: 8.5 MG/DL (ref 8.3–10.6)
CHLORIDE BLD-SCNC: 101 MMOL/L (ref 99–110)
CO2: 28 MMOL/L (ref 21–32)
CREAT SERPL-MCNC: 1.7 MG/DL (ref 0.9–1.3)
GFR AFRICAN AMERICAN: 47 ML/MIN/1.73M2
GFR NON-AFRICAN AMERICAN: 39 ML/MIN/1.73M2
GLUCOSE BLD-MCNC: 84 MG/DL (ref 70–99)
HCT VFR BLD CALC: 26 % (ref 42–52)
HEMOGLOBIN: 7.8 GM/DL (ref 13.5–18)
MCH RBC QN AUTO: 26.2 PG (ref 27–31)
MCHC RBC AUTO-ENTMCNC: 30 % (ref 32–36)
MCV RBC AUTO: 87.2 FL (ref 78–100)
PDW BLD-RTO: 16.6 % (ref 11.7–14.9)
PLATELET # BLD: 200 K/CU MM (ref 140–440)
PMV BLD AUTO: 10 FL (ref 7.5–11.1)
POTASSIUM SERPL-SCNC: 3.5 MMOL/L (ref 3.5–5.1)
RBC # BLD: 2.98 M/CU MM (ref 4.6–6.2)
SODIUM BLD-SCNC: 141 MMOL/L (ref 135–145)
TSH HIGH SENSITIVITY: 2.7 UIU/ML (ref 0.27–4.2)
WBC # BLD: 10.7 K/CU MM (ref 4–10.5)

## 2019-09-03 PROCEDURE — 80048 BASIC METABOLIC PNL TOTAL CA: CPT

## 2019-09-03 PROCEDURE — 85027 COMPLETE CBC AUTOMATED: CPT

## 2019-09-03 PROCEDURE — 84443 ASSAY THYROID STIM HORMONE: CPT

## 2019-09-03 PROCEDURE — 36415 COLL VENOUS BLD VENIPUNCTURE: CPT

## 2019-09-04 ENCOUNTER — OFFICE VISIT (OUTPATIENT)
Dept: CARDIOLOGY CLINIC | Age: 84
End: 2019-09-04
Payer: MEDICARE

## 2019-09-04 VITALS
SYSTOLIC BLOOD PRESSURE: 118 MMHG | BODY MASS INDEX: 24.04 KG/M2 | WEIGHT: 153.2 LBS | DIASTOLIC BLOOD PRESSURE: 72 MMHG | RESPIRATION RATE: 16 BRPM | HEIGHT: 67 IN | OXYGEN SATURATION: 98 % | HEART RATE: 60 BPM

## 2019-09-04 DIAGNOSIS — I50.32 CHRONIC DIASTOLIC CONGESTIVE HEART FAILURE (HCC): Primary | ICD-10-CM

## 2019-09-04 DIAGNOSIS — I48.19 PERSISTENT ATRIAL FIBRILLATION (HCC): ICD-10-CM

## 2019-09-04 DIAGNOSIS — Z95.0 PACEMAKER: ICD-10-CM

## 2019-09-04 DIAGNOSIS — I95.1 ORTHOSTATIC HYPOTENSION: ICD-10-CM

## 2019-09-04 PROCEDURE — 4040F PNEUMOC VAC/ADMIN/RCVD: CPT | Performed by: NURSE PRACTITIONER

## 2019-09-04 PROCEDURE — G8427 DOCREV CUR MEDS BY ELIG CLIN: HCPCS | Performed by: NURSE PRACTITIONER

## 2019-09-04 PROCEDURE — 1123F ACP DISCUSS/DSCN MKR DOCD: CPT | Performed by: NURSE PRACTITIONER

## 2019-09-04 PROCEDURE — 1111F DSCHRG MED/CURRENT MED MERGE: CPT | Performed by: NURSE PRACTITIONER

## 2019-09-04 PROCEDURE — 99213 OFFICE O/P EST LOW 20 MIN: CPT | Performed by: NURSE PRACTITIONER

## 2019-09-04 PROCEDURE — 1036F TOBACCO NON-USER: CPT | Performed by: NURSE PRACTITIONER

## 2019-09-04 PROCEDURE — G8420 CALC BMI NORM PARAMETERS: HCPCS | Performed by: NURSE PRACTITIONER

## 2019-09-10 ENCOUNTER — TELEPHONE (OUTPATIENT)
Dept: CARDIOLOGY CLINIC | Age: 84
End: 2019-09-10

## 2019-09-10 ENCOUNTER — HOSPITAL ENCOUNTER (OUTPATIENT)
Age: 84
Discharge: HOME | End: 2019-09-10

## 2019-09-10 LAB
ANION GAP SERPL CALCULATED.3IONS-SCNC: 12 MMOL/L (ref 4–16)
BUN BLDV-MCNC: 35 MG/DL (ref 6–23)
CALCIUM SERPL-MCNC: 9.2 MG/DL (ref 8.3–10.6)
CHLORIDE BLD-SCNC: 99 MMOL/L (ref 99–110)
CO2: 33 MMOL/L (ref 21–32)
CREAT SERPL-MCNC: 1.4 MG/DL (ref 0.9–1.3)
GFR AFRICAN AMERICAN: 58 ML/MIN/1.73M2
GFR NON-AFRICAN AMERICAN: 48 ML/MIN/1.73M2
GLUCOSE BLD-MCNC: 80 MG/DL (ref 70–99)
HCT VFR BLD CALC: 30.9 % (ref 42–52)
HEMOGLOBIN: 9.1 GM/DL (ref 13.5–18)
MCH RBC QN AUTO: 25.8 PG (ref 27–31)
MCHC RBC AUTO-ENTMCNC: 29.4 % (ref 32–36)
MCV RBC AUTO: 87.5 FL (ref 78–100)
PDW BLD-RTO: 15.9 % (ref 11.7–14.9)
PLATELET # BLD: 422 K/CU MM (ref 140–440)
PMV BLD AUTO: 9.9 FL (ref 7.5–11.1)
POTASSIUM SERPL-SCNC: 4.3 MMOL/L (ref 3.5–5.1)
RBC # BLD: 3.53 M/CU MM (ref 4.6–6.2)
SODIUM BLD-SCNC: 144 MMOL/L (ref 135–145)
WBC # BLD: 8.6 K/CU MM (ref 4–10.5)

## 2019-09-10 PROCEDURE — 80048 BASIC METABOLIC PNL TOTAL CA: CPT

## 2019-09-10 PROCEDURE — 36415 COLL VENOUS BLD VENIPUNCTURE: CPT

## 2019-09-10 PROCEDURE — 85027 COMPLETE CBC AUTOMATED: CPT

## 2019-09-10 NOTE — TELEPHONE ENCOUNTER
Elisabeth from [place] called and said Dr. Candice Chi, PCP for [place], has increased Lasix to 40 MG due to increase weight gain and leg edema.

## 2019-09-10 NOTE — TELEPHONE ENCOUNTER
[facility] called and stated that the patient is gaining about 2 lbs each day .  I spoke with triage and they took over the call

## 2019-09-11 ENCOUNTER — APPOINTMENT (OUTPATIENT)
Dept: GENERAL RADIOLOGY | Age: 84
End: 2019-09-11
Payer: MEDICARE

## 2019-09-11 ENCOUNTER — HOSPITAL ENCOUNTER (EMERGENCY)
Age: 84
Discharge: HOME | End: 2019-09-12
Attending: EMERGENCY MEDICINE
Payer: MEDICARE

## 2019-09-11 DIAGNOSIS — R79.1 SUBTHERAPEUTIC INTERNATIONAL NORMALIZED RATIO (INR): ICD-10-CM

## 2019-09-11 DIAGNOSIS — S80.211A ABRASION OF KNEE, BILATERAL: ICD-10-CM
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
## 2019-09-11 DIAGNOSIS — S80.212A ABRASION OF KNEE, BILATERAL: ICD-10-CM

## 2019-09-11 PROCEDURE — 73560 X-RAY EXAM OF KNEE 1 OR 2: CPT

## 2019-09-11 PROCEDURE — 85610 PROTHROMBIN TIME: CPT

## 2019-09-11 PROCEDURE — 99284 EMERGENCY DEPT VISIT MOD MDM: CPT

## 2019-09-11 RX ORDER — BACITRACIN, NEOMYCIN, POLYMYXIN B 400; 3.5; 5 [USP'U]/G; MG/G; [USP'U]/G
OINTMENT TOPICAL ONCE
Status: DISCONTINUED | OUTPATIENT
Start: 2019-09-11 | End: 2019-09-12 | Stop reason: HOSPADM

## 2019-09-11 RX ORDER — DIAPER,BRIEF,INFANT-TODD,DISP
EACH MISCELLANEOUS ONCE
Status: DISCONTINUED | OUTPATIENT
Start: 2019-09-11 | End: 2019-09-11 | Stop reason: CLARIF

## 2019-09-11 RX ORDER — ACETAMINOPHEN 325 MG/1
650 TABLET ORAL ONCE
Status: DISCONTINUED | OUTPATIENT
Start: 2019-09-11 | End: 2019-09-12 | Stop reason: HOSPADM

## 2019-09-11 RX ORDER — DIAPER,BRIEF,INFANT-TODD,DISP
EACH MISCELLANEOUS ONCE
Status: DISCONTINUED | OUTPATIENT
Start: 2019-09-12 | End: 2019-09-12 | Stop reason: CLARIF

## 2019-09-11 ASSESSMENT — PAIN SCALES - GENERAL: PAINLEVEL_OUTOF10: 0

## 2019-09-12 ENCOUNTER — HOSPITAL ENCOUNTER (OUTPATIENT)
Age: 84
Setting detail: SPECIMEN
Discharge: HOME | End: 2019-09-12
Payer: MEDICARE

## 2019-09-12 VITALS
DIASTOLIC BLOOD PRESSURE: 91 MMHG | SYSTOLIC BLOOD PRESSURE: 135 MMHG | WEIGHT: 153 LBS | BODY MASS INDEX: 24.01 KG/M2 | HEIGHT: 67 IN | RESPIRATION RATE: 15 BRPM | OXYGEN SATURATION: 95 % | HEART RATE: 62 BPM | TEMPERATURE: 98.7 F

## 2019-09-12 LAB
INR BLD: 1.71 INDEX
PROTHROMBIN TIME: 20 SECONDS (ref 9.12–12.5)

## 2019-09-12 PROCEDURE — 87077 CULTURE AEROBIC IDENTIFY: CPT

## 2019-09-12 PROCEDURE — 87086 URINE CULTURE/COLONY COUNT: CPT

## 2019-09-12 PROCEDURE — 81001 URINALYSIS AUTO W/SCOPE: CPT

## 2019-09-12 PROCEDURE — 87186 SC STD MICRODIL/AGAR DIL: CPT

## 2019-09-12 RX ORDER — BACITRACIN, NEOMYCIN, POLYMYXIN B 400; 3.5; 5 [USP'U]/G; MG/G; [USP'U]/G
OINTMENT TOPICAL ONCE
Status: DISCONTINUED | OUTPATIENT
Start: 2019-09-12 | End: 2019-09-12 | Stop reason: HOSPADM

## 2019-09-12 RX ORDER — BACITRACIN, NEOMYCIN, POLYMYXIN B 400; 3.5; 5 [USP'U]/G; MG/G; [USP'U]/G
OINTMENT TOPICAL
Qty: 1 TUBE | Refills: 0 | Status: SHIPPED | OUTPATIENT
Start: 2019-09-12 | End: 2019-09-22

## 2019-09-12 NOTE — ED PROVIDER NOTES
recommended. no evidence of acute pulmonary process. 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 Recommend thyroid US.  History of echocardiogram 03/27/2017    TTE EF 55%, LV Normal Size, borderline LVH concentric, Normal LV systolic function, transmittal doppler flow pttern suggest impaired LV relaxation Mild aortic stenosis, mild aortic regurgitation.  History of EKG 02/27/2017    Time 12:03 AM, HR 75, A fib, Axis normal, Intervals normal, P waves normal, St-T Segments: nonspecific ST segment changes to the anterior lateral leads, QRS RBBB,  No significant Q waves, no ectopy. A-fib w/RBBB w/nonspecific ST segment change EKG.  History of EKG 02/27/2017    Time 2:58AM: HR 59, NSR, Axis normal, Intervals normal, P waves normal, ST-T seg: nonspecific ST segment changes, QRS RBBB, no significant Q waves, no ectopy. Sinus bradycardia w/ RBBB nonspecific ST Segment changes EKG    History of Holter monitoring 11/08/2017    monitored 48 hours: Patient noted to have multiple PAC and PVCs. Risk of atrial fibrillation present given previous episode Recommend antiarrhythmic therapy.  History of stress test 03/27/2017    NM Stress test: EF 54%, Abnormal study, evidence of mild ischemia in mild inferior regions. post stress LV is enlarged in size.  Post-stress LV function is normal.     Hx of transesophageal echocardiography (SIMONE) for monitoring 04/17/2018    EF45-50% mild TR, mild-mod MR, mod-severe AS    Hyperlipemia     Hyperlipidemia     Hypertension     Nonspecific abnormal electrocardiogram (ECG) (EKG)     Persistent atrial fibrillation (HCC)     Sick sinus syndrome (HCC)     Vertigo      Past Surgical History:   Procedure Laterality Date    CYSTOSCOPY INSERTION / REMOVAL STENT / STONE Left 5/6/2019    CYSTOSCOPY RETROGRADE PYELOGRAM STENT INSERTION, DIAGNOSTIC CYSTOSCOPY performed by Marita Webb MD at [facility] Left 03/29/2018    Dual Chamber [employer] Aura XT DR AMI Kincaid     History reviewed. No pertinent family history. Social History     Socioeconomic History    Marital status:      Spouse name: Not on file    Number of children: Not on file    Years of education: Not on file    Highest education level: Not on file   Occupational History    Not on file   Social Needs    Financial resource strain: Not on file    Food insecurity:     Worry: Not on file     Inability: Not on file    Transportation needs:     Medical: Not on file     Non-medical: Not on file   Tobacco Use    Smoking status: Never Smoker    Smokeless tobacco: Never Used   Substance and Sexual Activity    Alcohol use:  Yes     Alcohol/week: 1.0 standard drinks     Types: 1 Cans of beer per week     Comment: occasional beer    Drug use: No    Sexual activity: Never   Lifestyle    Physical activity:     Days per week: Not on file     Minutes per session: Not on file    Stress: Not on file   Relationships    Social connections:     Talks on phone: Not on file     Gets together: Not on file     Attends Religion service: Not on file     Active member of club or organization: Not on file     Attends meetings of clubs or organizations: Not on file     Relationship status: Not on file    Intimate partner violence:     Fear of current or ex partner: Not on file     Emotionally abused: Not on file     Physically abused: Not on file     Forced sexual activity: Not on file   Other Topics Concern    Not on file   Social History Narrative    Not on file     Current Facility-Administered Medications   Medication Dose Route Frequency Provider Last Rate Last Dose    neomycin-bacitracin-polymyxin (NEOSPORIN)

## 2019-09-13 ENCOUNTER — OUTSIDE SERVICES (OUTPATIENT)
Dept: INTERNAL MEDICINE CLINIC | Age: 84
End: 2019-09-13
Payer: MEDICARE

## 2019-09-13 DIAGNOSIS — Z95.0 PACEMAKER: ICD-10-CM

## 2019-09-13 DIAGNOSIS — I48.19 PERSISTENT ATRIAL FIBRILLATION (HCC): ICD-10-CM

## 2019-09-13 DIAGNOSIS — N18.31 CHRONIC KIDNEY DISEASE (CKD) STAGE G3A/A3, MODERATELY DECREASED GLOMERULAR FILTRATION RATE (GFR) BETWEEN 45-59 ML/MIN/1.73 SQUARE METER AND ALBUMINURIA CREATININE RATIO GREATER THAN 300 MG/G (HCC): ICD-10-CM

## 2019-09-13 DIAGNOSIS — R53.1 GENERALIZED WEAKNESS: Primary | ICD-10-CM

## 2019-09-13 DIAGNOSIS — I48.91 ATRIAL FIBRILLATION, UNSPECIFIED TYPE (HCC): ICD-10-CM

## 2019-09-13 DIAGNOSIS — N30.00 ACUTE CYSTITIS WITHOUT HEMATURIA: ICD-10-CM

## 2019-09-13 LAB
BACTERIA: ABNORMAL /HPF
BILIRUBIN URINE: NEGATIVE MG/DL
BLOOD, URINE: ABNORMAL
CLARITY: ABNORMAL
COLOR: ABNORMAL
GLUCOSE, URINE: NEGATIVE MG/DL
HYALINE CASTS: 10 /LPF
KETONES, URINE: NEGATIVE MG/DL
LEUKOCYTE ESTERASE, URINE: ABNORMAL
NITRITE URINE, QUANTITATIVE: NEGATIVE
PH, URINE: 7 (ref 5–8)
PROTEIN UA: 30 MG/DL
RBC URINE: 164 /HPF (ref 0–3)
SPECIFIC GRAVITY UA: 1.01 (ref 1–1.03)
TRICHOMONAS: ABNORMAL /HPF
UROBILINOGEN, URINE: NORMAL MG/DL (ref 0.2–1)
WBC CLUMP: ABNORMAL /HPF
WBC UA: 1334 /HPF (ref 0–2)

## 2019-09-14 PROBLEM — N30.00 ACUTE CYSTITIS WITHOUT HEMATURIA: Status: ACTIVE | Noted: 2019-09-14

## 2019-09-14 PROBLEM — E87.70 HYPERVOLEMIA: Status: RESOLVED | Noted: 2019-05-04 | Resolved: 2019-09-14

## 2019-09-14 LAB
CULTURE: ABNORMAL
Lab: ABNORMAL
SPECIMEN: ABNORMAL
TOTAL COLONY COUNT: ABNORMAL

## 2019-09-14 NOTE — PROGRESS NOTES
History and physical   ___________________________    Walker Juan Muller's  is 10/5/1933  SEEN ON 2019 at [address]  ___________________________    S:   Patient is seen today and discussed with the nurses. Patient is not a good historian. He is admitted to assisted living as he was not able to take care of himself at home. Patient's family is not available. Patient has a history of, atrial fibrillation, chronic kidney disease, pacemaker  Recently admitted to the hospital for generalized weakness and fatigue. Found to have SABIHA on CKD hyperkalemia and hematuria he was transferred to the SNF for rehab. Patient denies any chest pain or shortness of breath. No cough or sputum production. No nausea or vomiting. He is confused. He had fallen on the day of admission to assisted living on his knees. He had Novak catheter which was removed recently. Was sent to the ER and x-rays of the knees did not show any fracture. Patient is walking with the help of walker    ALLERGIES:  Allergies   Allergen Reactions    Biaxin [Clarithromycin]     Cephalexin     Viagra [Sildenafil Citrate] Nausea Only and Other (See Comments)     Dizziness and BP dropped        PAST MEDICAL HISTORY:    has a past medical history of ACTA2-related familial thoracic aortic aneurysm, Arrhythmia, Atrial fibrillation (HCC), BBBB (bilateral bundle branch block), Bradycardia, CHF (congestive heart failure) ([address] Essential hypertension, malignant, History of cardioversion,  Hx of transesophageal echocardiography (SIMONE) for monitoring, Hyperlipemia, Hyperlipidemia, Hypertension,, Persistent atrial fibrillation , Sick sinus syndrome and Vertigo. PAST SURGICAL HISTORY:   has a past surgical history that includes Pacemaker insertion (Left, 2018) and CYSTOSCOPY INSERTION / REMOVAL STENT / STONE (Left, 2019).        SOCIAL HISTORY:   reports that

## 2019-09-27 ENCOUNTER — HOSPITAL ENCOUNTER (OUTPATIENT)
Age: 84
Discharge: HOME | End: 2019-09-27
Payer: MEDICARE

## 2019-10-01 ENCOUNTER — HOSPITAL ENCOUNTER (OUTPATIENT)
Age: 84
Discharge: HOME | End: 2019-10-01
Payer: MEDICARE

## 2019-10-01 LAB
ANION GAP SERPL CALCULATED.3IONS-SCNC: 10 MMOL/L (ref 4–16)
BUN BLDV-MCNC: 37 MG/DL (ref 6–23)
CALCIUM SERPL-MCNC: 8.9 MG/DL (ref 8.3–10.6)
CHLORIDE BLD-SCNC: 94 MMOL/L (ref 99–110)
CO2: 29 MMOL/L (ref 21–32)
CREAT SERPL-MCNC: 2.4 MG/DL (ref 0.9–1.3)
GFR AFRICAN AMERICAN: 31 ML/MIN/1.73M2
GFR NON-AFRICAN AMERICAN: 26 ML/MIN/1.73M2
GLUCOSE BLD-MCNC: 85 MG/DL (ref 70–99)
HCT VFR BLD CALC: 28.9 % (ref 42–52)
HEMOGLOBIN: 8.6 GM/DL (ref 13.5–18)
MCH RBC QN AUTO: 25.1 PG (ref 27–31)
MCHC RBC AUTO-ENTMCNC: 29.8 % (ref 32–36)
MCV RBC AUTO: 84.3 FL (ref 78–100)
PDW BLD-RTO: 15.2 % (ref 11.7–14.9)
PLATELET # BLD: 271 K/CU MM (ref 140–440)
PMV BLD AUTO: 10.4 FL (ref 7.5–11.1)
POTASSIUM SERPL-SCNC: 4.6 MMOL/L (ref 3.5–5.1)
RBC # BLD: 3.43 M/CU MM (ref 4.6–6.2)
SODIUM BLD-SCNC: 133 MMOL/L (ref 135–145)
WBC # BLD: 6 K/CU MM (ref 4–10.5)

## 2019-10-01 PROCEDURE — 85027 COMPLETE CBC AUTOMATED: CPT

## 2019-10-01 PROCEDURE — 36415 COLL VENOUS BLD VENIPUNCTURE: CPT

## 2019-10-01 PROCEDURE — 80048 BASIC METABOLIC PNL TOTAL CA: CPT

## 2019-10-02 ENCOUNTER — TELEPHONE (OUTPATIENT)
Dept: CARDIOLOGY CLINIC | Age: 84
End: 2019-10-02

## 2019-10-04 ENCOUNTER — OUTSIDE SERVICES (OUTPATIENT)
Dept: INTERNAL MEDICINE CLINIC | Age: 84
End: 2019-10-04

## 2019-10-04 ENCOUNTER — TELEPHONE (OUTPATIENT)
Dept: CARDIOLOGY CLINIC | Age: 84
End: 2019-10-04

## 2019-10-04 ENCOUNTER — OFFICE VISIT (OUTPATIENT)
Dept: CARDIOLOGY CLINIC | Age: 84
End: 2019-10-04
Payer: MEDICARE

## 2019-10-04 VITALS
SYSTOLIC BLOOD PRESSURE: 128 MMHG | HEIGHT: 67 IN | DIASTOLIC BLOOD PRESSURE: 70 MMHG | WEIGHT: 152 LBS | OXYGEN SATURATION: 98 % | HEART RATE: 67 BPM | BODY MASS INDEX: 23.86 KG/M2 | RESPIRATION RATE: 16 BRPM

## 2019-10-04 DIAGNOSIS — I50.43 CHF (CONGESTIVE HEART FAILURE), NYHA CLASS I, ACUTE ON CHRONIC, COMBINED (HCC): Primary | ICD-10-CM

## 2019-10-04 PROCEDURE — 1036F TOBACCO NON-USER: CPT | Performed by: NURSE PRACTITIONER

## 2019-10-04 PROCEDURE — G8428 CUR MEDS NOT DOCUMENT: HCPCS | Performed by: NURSE PRACTITIONER

## 2019-10-04 PROCEDURE — 4040F PNEUMOC VAC/ADMIN/RCVD: CPT | Performed by: NURSE PRACTITIONER

## 2019-10-04 PROCEDURE — G8484 FLU IMMUNIZE NO ADMIN: HCPCS | Performed by: NURSE PRACTITIONER

## 2019-10-04 PROCEDURE — G8420 CALC BMI NORM PARAMETERS: HCPCS | Performed by: NURSE PRACTITIONER

## 2019-10-04 PROCEDURE — 1123F ACP DISCUSS/DSCN MKR DOCD: CPT | Performed by: NURSE PRACTITIONER

## 2019-10-04 PROCEDURE — 99212 OFFICE O/P EST SF 10 MIN: CPT | Performed by: NURSE PRACTITIONER

## 2019-10-04 RX ORDER — FUROSEMIDE 20 MG/1
20 TABLET ORAL 2 TIMES DAILY
Qty: 90 TABLET | Refills: 1
Start: 2019-10-04 | End: 2020-01-31

## 2019-10-04 RX ORDER — ACETAMINOPHEN 325 MG/1
650 TABLET ORAL EVERY 4 HOURS PRN
COMMUNITY

## 2019-10-05 NOTE — PROGRESS NOTES
History and physical   ___________________________    Claudene Quick Pateint's  is 10/5/1933  SEEN ON 2019 at [address]  ___________________________    S:  Patient is seen today and discussed with the nurses. Patient is not a good historian. He is admitted to assisted living as he was not able to take care of himself at home. Patient's family is not available. Patient has a history of, atrial fibrillation, chronic kidney disease, pacemaker  Recently admitted to the hospital for generalized weakness and fatigue. Patient again had hematuria and urology Dr. Savita Hutchins was informed. Patient denies any chest pain or shortness of breath. No cough or sputum production. No nausea or vomiting. ALLERGIES:  Allergies   Allergen Reactions    Biaxin [Clarithromycin]     Cephalexin     Viagra [Sildenafil Citrate] Nausea Only and Other (See Comments)     Dizziness and BP dropped        PAST MEDICAL HISTORY:    has a past medical history of ACTA2-related familial thoracic aortic aneurysm, Arrhythmia, Atrial fibrillation (HCC), BBBB (bilateral bundle branch block), Bradycardia, CHF (congestive heart failure) ([address] Essential hypertension, malignant, History of cardioversion,  Hx of transesophageal echocardiography (SIMONE) for monitoring, Hyperlipemia, Hyperlipidemia, Hypertension,, Persistent atrial fibrillation , Sick sinus syndrome and Vertigo. PAST SURGICAL HISTORY:   has a past surgical history that includes Pacemaker insertion (Left, 2018); CYSTOSCOPY INSERTION / REMOVAL STENT / STONE (Left, 2019); and Upper gastrointestinal endoscopy (N/A, 2020). SOCIAL HISTORY:   reports that he has never smoked. He has never used smokeless tobacco. He reports previous alcohol use. He reports that he does not use drugs. Vital signs: As in [facility]  GENERAL: - Alert, oriented, pleasant, in no apparent distress. HEENT: - Conjunctiva pink, no scleral icterus. ENT clear. NECK: - Supple. No jugular venous distention noted. No masses felt,  CARDIOVASCULAR: - Normal S1 and S2. Systolic ejection murmur grade 2/6 with diastolic blow  PULMONARY: - No respiratory distress. No wheezes or rales. ABDOMEN: - Soft and non-tender,no masses  or organomegaly. EXTREMITIES: - No cyanosis, clubbing, or significant edema. SKIN: Skin is warm and dry. NEUROLOGICAL: - Cranial nerves II through XII are grossly intact. Oriented x2        Assessment:  .  Generalized weakness  . Atrial fibrillation on Eliquis and amiodarone  . Hyperlipidemia on atorvastatin  . Cognitive impairment on Exelon  . BPH on finasteride  . Congestive heart failure on Lasix and Aldactone  . Constipation on MiraLAX           Plan:  Medication were reviewed. Follow-up with urologist  Continue current treatment.   We will check the urine culture

## 2019-10-08 ENCOUNTER — HOSPITAL ENCOUNTER (OUTPATIENT)
Age: 84
Discharge: HOME | End: 2019-10-08
Payer: MEDICARE

## 2019-10-08 LAB
ANION GAP SERPL CALCULATED.3IONS-SCNC: 13 MMOL/L (ref 4–16)
BUN BLDV-MCNC: 34 MG/DL (ref 6–23)
CALCIUM SERPL-MCNC: 9 MG/DL (ref 8.3–10.6)
CHLORIDE BLD-SCNC: 97 MMOL/L (ref 99–110)
CO2: 25 MMOL/L (ref 21–32)
CREAT SERPL-MCNC: 2.2 MG/DL (ref 0.9–1.3)
GFR AFRICAN AMERICAN: 35 ML/MIN/1.73M2
GFR NON-AFRICAN AMERICAN: 29 ML/MIN/1.73M2
GLUCOSE BLD-MCNC: 80 MG/DL (ref 70–99)
HCT VFR BLD CALC: 30.4 % (ref 42–52)
HEMOGLOBIN: 8.7 GM/DL (ref 13.5–18)
MCH RBC QN AUTO: 24.3 PG (ref 27–31)
MCHC RBC AUTO-ENTMCNC: 28.6 % (ref 32–36)
MCV RBC AUTO: 84.9 FL (ref 78–100)
PDW BLD-RTO: 15 % (ref 11.7–14.9)
PLATELET # BLD: 319 K/CU MM (ref 140–440)
PMV BLD AUTO: 9.9 FL (ref 7.5–11.1)
POTASSIUM SERPL-SCNC: 4.4 MMOL/L (ref 3.5–5.1)
PRO-BNP: 4083 PG/ML
RBC # BLD: 3.58 M/CU MM (ref 4.6–6.2)
SODIUM BLD-SCNC: 135 MMOL/L (ref 135–145)
WBC # BLD: 6.5 K/CU MM (ref 4–10.5)

## 2019-10-08 PROCEDURE — 36415 COLL VENOUS BLD VENIPUNCTURE: CPT

## 2019-10-08 PROCEDURE — 80048 BASIC METABOLIC PNL TOTAL CA: CPT

## 2019-10-08 PROCEDURE — 85027 COMPLETE CBC AUTOMATED: CPT

## 2019-10-08 PROCEDURE — 83880 ASSAY OF NATRIURETIC PEPTIDE: CPT

## 2019-10-15 ENCOUNTER — HOSPITAL ENCOUNTER (OUTPATIENT)
Age: 84
Discharge: HOME | End: 2019-10-15

## 2019-10-15 PROBLEM — R35.89 HYPOVOLEMIA ASSOCIATED WITH DIURESIS: Status: ACTIVE | Noted: 2019-10-15

## 2019-10-15 PROBLEM — E86.1 HYPOVOLEMIA ASSOCIATED WITH DIURESIS: Status: ACTIVE | Noted: 2019-10-15

## 2019-10-15 LAB
ANION GAP SERPL CALCULATED.3IONS-SCNC: 12 MMOL/L (ref 4–16)
BUN BLDV-MCNC: 28 MG/DL (ref 6–23)
CALCIUM SERPL-MCNC: 9.2 MG/DL (ref 8.3–10.6)
CHLORIDE BLD-SCNC: 100 MMOL/L (ref 99–110)
CO2: 27 MMOL/L (ref 21–32)
CREAT SERPL-MCNC: 2.1 MG/DL (ref 0.9–1.3)
GFR AFRICAN AMERICAN: 36 ML/MIN/1.73M2
GFR NON-AFRICAN AMERICAN: 30 ML/MIN/1.73M2
GLUCOSE BLD-MCNC: 80 MG/DL (ref 70–99)
HCT VFR BLD CALC: 28.6 % (ref 42–52)
HEMOGLOBIN: 8.4 GM/DL (ref 13.5–18)
MCH RBC QN AUTO: 24.4 PG (ref 27–31)
MCHC RBC AUTO-ENTMCNC: 29.4 % (ref 32–36)
MCV RBC AUTO: 83.1 FL (ref 78–100)
PDW BLD-RTO: 15.1 % (ref 11.7–14.9)
PLATELET # BLD: 343 K/CU MM (ref 140–440)
PMV BLD AUTO: 10.2 FL (ref 7.5–11.1)
POTASSIUM SERPL-SCNC: 4.8 MMOL/L (ref 3.5–5.1)
RBC # BLD: 3.44 M/CU MM (ref 4.6–6.2)
SODIUM BLD-SCNC: 139 MMOL/L (ref 135–145)
WBC # BLD: 8.4 K/CU MM (ref 4–10.5)

## 2019-10-15 PROCEDURE — 36415 COLL VENOUS BLD VENIPUNCTURE: CPT

## 2019-10-15 PROCEDURE — 85027 COMPLETE CBC AUTOMATED: CPT

## 2019-10-15 PROCEDURE — 80048 BASIC METABOLIC PNL TOTAL CA: CPT

## 2019-10-22 ENCOUNTER — HOSPITAL ENCOUNTER (OUTPATIENT)
Age: 84
Discharge: HOME | End: 2019-10-22
Payer: MEDICARE

## 2019-10-22 LAB
ANION GAP SERPL CALCULATED.3IONS-SCNC: 9 MMOL/L (ref 4–16)
BUN BLDV-MCNC: 32 MG/DL (ref 6–23)
CALCIUM SERPL-MCNC: 9.1 MG/DL (ref 8.3–10.6)
CHLORIDE BLD-SCNC: 95 MMOL/L (ref 99–110)
CO2: 28 MMOL/L (ref 21–32)
CREAT SERPL-MCNC: 2.5 MG/DL (ref 0.9–1.3)
GFR AFRICAN AMERICAN: 30 ML/MIN/1.73M2
GFR NON-AFRICAN AMERICAN: 25 ML/MIN/1.73M2
GLUCOSE BLD-MCNC: 80 MG/DL (ref 70–99)
HCT VFR BLD CALC: 27.9 % (ref 42–52)
HEMOGLOBIN: 8.2 GM/DL (ref 13.5–18)
MCH RBC QN AUTO: 24 PG (ref 27–31)
MCHC RBC AUTO-ENTMCNC: 29.4 % (ref 32–36)
MCV RBC AUTO: 81.6 FL (ref 78–100)
PDW BLD-RTO: 15.2 % (ref 11.7–14.9)
PLATELET # BLD: 344 K/CU MM (ref 140–440)
PMV BLD AUTO: 9.7 FL (ref 7.5–11.1)
POTASSIUM SERPL-SCNC: 4.6 MMOL/L (ref 3.5–5.1)
RBC # BLD: 3.42 M/CU MM (ref 4.6–6.2)
SODIUM BLD-SCNC: 132 MMOL/L (ref 135–145)
WBC # BLD: 7.3 K/CU MM (ref 4–10.5)

## 2019-10-22 PROCEDURE — 85027 COMPLETE CBC AUTOMATED: CPT

## 2019-10-22 PROCEDURE — 80048 BASIC METABOLIC PNL TOTAL CA: CPT

## 2019-10-22 PROCEDURE — 36415 COLL VENOUS BLD VENIPUNCTURE: CPT

## 2019-10-29 ENCOUNTER — HOSPITAL ENCOUNTER (OUTPATIENT)
Age: 84
Discharge: HOME | End: 2019-10-29
Payer: MEDICARE

## 2019-10-29 LAB
ANION GAP SERPL CALCULATED.3IONS-SCNC: 6 MMOL/L (ref 4–16)
BUN BLDV-MCNC: 34 MG/DL (ref 6–23)
CALCIUM SERPL-MCNC: 8.9 MG/DL (ref 8.3–10.6)
CHLORIDE BLD-SCNC: 100 MMOL/L (ref 99–110)
CO2: 29 MMOL/L (ref 21–32)
CREAT SERPL-MCNC: 2 MG/DL (ref 0.9–1.3)
GFR AFRICAN AMERICAN: 39 ML/MIN/1.73M2
GFR NON-AFRICAN AMERICAN: 32 ML/MIN/1.73M2
GLUCOSE BLD-MCNC: 80 MG/DL (ref 70–99)
HCT VFR BLD CALC: 26.8 % (ref 42–52)
HEMOGLOBIN: 7.8 GM/DL (ref 13.5–18)
MCH RBC QN AUTO: 23.6 PG (ref 27–31)
MCHC RBC AUTO-ENTMCNC: 29.1 % (ref 32–36)
MCV RBC AUTO: 81 FL (ref 78–100)
PDW BLD-RTO: 15 % (ref 11.7–14.9)
PLATELET # BLD: 300 K/CU MM (ref 140–440)
PMV BLD AUTO: 10 FL (ref 7.5–11.1)
POTASSIUM SERPL-SCNC: 5.3 MMOL/L (ref 3.5–5.1)
RBC # BLD: 3.31 M/CU MM (ref 4.6–6.2)
SODIUM BLD-SCNC: 135 MMOL/L (ref 135–145)
WBC # BLD: 6.2 K/CU MM (ref 4–10.5)

## 2019-10-29 PROCEDURE — 36415 COLL VENOUS BLD VENIPUNCTURE: CPT

## 2019-10-29 PROCEDURE — 85027 COMPLETE CBC AUTOMATED: CPT

## 2019-10-29 PROCEDURE — 80048 BASIC METABOLIC PNL TOTAL CA: CPT

## 2019-11-05 ENCOUNTER — HOSPITAL ENCOUNTER (OUTPATIENT)
Age: 84
Discharge: HOME | End: 2019-11-05
Payer: MEDICARE

## 2019-11-05 LAB
ANION GAP SERPL CALCULATED.3IONS-SCNC: 11 MMOL/L (ref 4–16)
BUN BLDV-MCNC: 34 MG/DL (ref 6–23)
CALCIUM SERPL-MCNC: 9.4 MG/DL (ref 8.3–10.6)
CHLORIDE BLD-SCNC: 102 MMOL/L (ref 99–110)
CO2: 28 MMOL/L (ref 21–32)
CREAT SERPL-MCNC: 2.1 MG/DL (ref 0.9–1.3)
GFR AFRICAN AMERICAN: 36 ML/MIN/1.73M2
GFR NON-AFRICAN AMERICAN: 30 ML/MIN/1.73M2
GLUCOSE BLD-MCNC: 89 MG/DL (ref 70–99)
HCT VFR BLD CALC: 28.8 % (ref 42–52)
HEMOGLOBIN: 8.4 GM/DL (ref 13.5–18)
MCH RBC QN AUTO: 23.6 PG (ref 27–31)
MCHC RBC AUTO-ENTMCNC: 29.2 % (ref 32–36)
MCV RBC AUTO: 80.9 FL (ref 78–100)
PDW BLD-RTO: 15 % (ref 11.7–14.9)
PLATELET # BLD: 303 K/CU MM (ref 140–440)
PMV BLD AUTO: 10 FL (ref 7.5–11.1)
POTASSIUM SERPL-SCNC: 5.1 MMOL/L (ref 3.5–5.1)
RBC # BLD: 3.56 M/CU MM (ref 4.6–6.2)
SODIUM BLD-SCNC: 141 MMOL/L (ref 135–145)
WBC # BLD: 6.7 K/CU MM (ref 4–10.5)

## 2019-11-05 PROCEDURE — 85027 COMPLETE CBC AUTOMATED: CPT

## 2019-11-05 PROCEDURE — 36415 COLL VENOUS BLD VENIPUNCTURE: CPT

## 2019-11-05 PROCEDURE — 80048 BASIC METABOLIC PNL TOTAL CA: CPT

## 2019-11-08 ENCOUNTER — OFFICE VISIT (OUTPATIENT)
Dept: CARDIOLOGY CLINIC | Age: 84
End: 2019-11-08
Payer: MEDICARE

## 2019-11-08 VITALS
DIASTOLIC BLOOD PRESSURE: 66 MMHG | BODY MASS INDEX: 25.3 KG/M2 | WEIGHT: 161.2 LBS | OXYGEN SATURATION: 98 % | HEIGHT: 67 IN | HEART RATE: 102 BPM | RESPIRATION RATE: 18 BRPM | SYSTOLIC BLOOD PRESSURE: 110 MMHG

## 2019-11-08 DIAGNOSIS — I50.43 CHF (CONGESTIVE HEART FAILURE), NYHA CLASS I, ACUTE ON CHRONIC, COMBINED (HCC): Primary | ICD-10-CM

## 2019-11-08 PROCEDURE — 1036F TOBACCO NON-USER: CPT | Performed by: NURSE PRACTITIONER

## 2019-11-08 PROCEDURE — 99212 OFFICE O/P EST SF 10 MIN: CPT | Performed by: NURSE PRACTITIONER

## 2019-11-08 PROCEDURE — G8428 CUR MEDS NOT DOCUMENT: HCPCS | Performed by: NURSE PRACTITIONER

## 2019-11-08 PROCEDURE — G8484 FLU IMMUNIZE NO ADMIN: HCPCS | Performed by: NURSE PRACTITIONER

## 2019-11-08 PROCEDURE — G8417 CALC BMI ABV UP PARAM F/U: HCPCS | Performed by: NURSE PRACTITIONER

## 2019-11-08 PROCEDURE — 4040F PNEUMOC VAC/ADMIN/RCVD: CPT | Performed by: NURSE PRACTITIONER

## 2019-11-08 PROCEDURE — 1123F ACP DISCUSS/DSCN MKR DOCD: CPT | Performed by: NURSE PRACTITIONER

## 2019-11-12 ENCOUNTER — HOSPITAL ENCOUNTER (OUTPATIENT)
Age: 84
Discharge: HOME | End: 2019-11-12
Payer: MEDICARE

## 2019-11-12 LAB
ANION GAP SERPL CALCULATED.3IONS-SCNC: 13 MMOL/L (ref 4–16)
BUN BLDV-MCNC: 44 MG/DL (ref 6–23)
CALCIUM SERPL-MCNC: 9.6 MG/DL (ref 8.3–10.6)
CHLORIDE BLD-SCNC: 94 MMOL/L (ref 99–110)
CO2: 25 MMOL/L (ref 21–32)
CREAT SERPL-MCNC: 2.7 MG/DL (ref 0.9–1.3)
GFR AFRICAN AMERICAN: 27 ML/MIN/1.73M2
GFR NON-AFRICAN AMERICAN: 23 ML/MIN/1.73M2
GLUCOSE BLD-MCNC: 83 MG/DL (ref 70–99)
HCT VFR BLD CALC: 30 % (ref 42–52)
HEMOGLOBIN: 8.8 GM/DL (ref 13.5–18)
MCH RBC QN AUTO: 22.9 PG (ref 27–31)
MCHC RBC AUTO-ENTMCNC: 29.3 % (ref 32–36)
MCV RBC AUTO: 77.9 FL (ref 78–100)
PDW BLD-RTO: 15.3 % (ref 11.7–14.9)
PLATELET # BLD: 351 K/CU MM (ref 140–440)
PMV BLD AUTO: 10.2 FL (ref 7.5–11.1)
POTASSIUM SERPL-SCNC: 4.8 MMOL/L (ref 3.5–5.1)
RBC # BLD: 3.85 M/CU MM (ref 4.6–6.2)
SODIUM BLD-SCNC: 132 MMOL/L (ref 135–145)
WBC # BLD: 9.1 K/CU MM (ref 4–10.5)

## 2019-11-12 PROCEDURE — 36415 COLL VENOUS BLD VENIPUNCTURE: CPT

## 2019-11-12 PROCEDURE — 80048 BASIC METABOLIC PNL TOTAL CA: CPT

## 2019-11-12 PROCEDURE — 85027 COMPLETE CBC AUTOMATED: CPT

## 2019-11-19 ENCOUNTER — HOSPITAL ENCOUNTER (OUTPATIENT)
Age: 84
Discharge: HOME | End: 2019-11-19
Payer: MEDICARE

## 2019-11-19 LAB
ANION GAP SERPL CALCULATED.3IONS-SCNC: 10 MMOL/L (ref 4–16)
BUN BLDV-MCNC: 45 MG/DL (ref 6–23)
CALCIUM SERPL-MCNC: 9.7 MG/DL (ref 8.3–10.6)
CHLORIDE BLD-SCNC: 100 MMOL/L (ref 99–110)
CO2: 26 MMOL/L (ref 21–32)
CREAT SERPL-MCNC: 2.8 MG/DL (ref 0.9–1.3)
GFR AFRICAN AMERICAN: 26 ML/MIN/1.73M2
GFR NON-AFRICAN AMERICAN: 22 ML/MIN/1.73M2
GLUCOSE BLD-MCNC: 91 MG/DL (ref 70–99)
HCT VFR BLD CALC: 29.6 % (ref 42–52)
HEMOGLOBIN: 8.5 GM/DL (ref 13.5–18)
MCH RBC QN AUTO: 22.9 PG (ref 27–31)
MCHC RBC AUTO-ENTMCNC: 28.7 % (ref 32–36)
MCV RBC AUTO: 79.8 FL (ref 78–100)
PDW BLD-RTO: 16.1 % (ref 11.7–14.9)
PLATELET # BLD: 301 K/CU MM (ref 140–440)
PMV BLD AUTO: 10 FL (ref 7.5–11.1)
POTASSIUM SERPL-SCNC: 4.9 MMOL/L (ref 3.5–5.1)
RBC # BLD: 3.71 M/CU MM (ref 4.6–6.2)
SODIUM BLD-SCNC: 136 MMOL/L (ref 135–145)
WBC # BLD: 7.2 K/CU MM (ref 4–10.5)

## 2019-11-19 PROCEDURE — 80048 BASIC METABOLIC PNL TOTAL CA: CPT

## 2019-11-19 PROCEDURE — 36415 COLL VENOUS BLD VENIPUNCTURE: CPT

## 2019-11-19 PROCEDURE — 85027 COMPLETE CBC AUTOMATED: CPT

## 2019-11-22 ENCOUNTER — OUTSIDE SERVICES (OUTPATIENT)
Dept: INTERNAL MEDICINE CLINIC | Age: 84
End: 2019-11-22

## 2019-11-23 NOTE — PROGRESS NOTES
___________________________    Lisa Muller's  is 10/5/1933  SEEN ON 21 at [address]  ___________________________    S:  Patient is seen today and discussed with the nurses. Patient is not a good historian. Patient has a history of, atrial fibrillation, chronic kidney disease, pacemaker  Recently admitted to the hospital for generalized weakness and fatigue. Patient denies any chest pain or shortness of breath. No cough or sputum production. No nausea or vomiting. Hematuria has resolved. Patient was treated for UTI  Nurses report no major problems    ALLERGIES:  Allergies   Allergen Reactions    Biaxin [Clarithromycin]     Cephalexin     Viagra [Sildenafil Citrate] Nausea Only and Other (See Comments)     Dizziness and BP dropped        PAST MEDICAL HISTORY:    has a past medical history of ACTA2-related familial thoracic aortic aneurysm, Arrhythmia, Atrial fibrillation (HCC), BBBB (bilateral bundle branch block), Bradycardia, CHF (congestive heart failure) ([address] Essential hypertension, malignant, History of cardioversion,  Hx of transesophageal echocardiography (SIMONE) for monitoring, Hyperlipemia, Hyperlipidemia, Hypertension,, Persistent atrial fibrillation , Sick sinus syndrome and Vertigo. PAST SURGICAL HISTORY:   has a past surgical history that includes Pacemaker insertion (Left, 2018); CYSTOSCOPY INSERTION / REMOVAL STENT / STONE (Left, 2019); and Upper gastrointestinal endoscopy (N/A, 2020). SOCIAL HISTORY:   reports that he has never smoked. He has never used smokeless tobacco. He reports previous alcohol use. He reports that he does not use drugs. Vital signs: As in [address]  GENERAL: - Alert, oriented, pleasant, in no apparent distress. HEENT: - Conjunctiva pink, no scleral icterus. ENT clear. NECK: - Supple. No jugular venous distention noted.  No masses felt,  CARDIOVASCULAR: - Normal S1 and S2. Systolic ejection murmur grade 2/6 with diastolic blow  PULMONARY: - No respiratory distress. No wheezes or rales. ABDOMEN: - Soft and non-tender,no masses  or organomegaly. EXTREMITIES: - No cyanosis, clubbing, or significant edema. SKIN: Skin is warm and dry. NEUROLOGICAL: - Cranial nerves II through XII are grossly intact. Oriented x2        Assessment:  .  Generalized weakness, improved  . Atrial fibrillation on Eliquis and amiodarone  . Hyperlipidemia on atorvastatin  . Cognitive impairment on Exelon  . BPH on finasteride  . Congestive heart failure on Lasix and Aldactone  . Constipation on MiraLAX  . Anemia had hematuria. Monitor         Plan:  Medication were reviewed. Continue current treatment.   We will check the urine culture

## 2019-11-26 ENCOUNTER — HOSPITAL ENCOUNTER (OUTPATIENT)
Age: 84
Discharge: HOME | End: 2019-11-26
Payer: MEDICARE

## 2019-11-26 LAB
ANION GAP SERPL CALCULATED.3IONS-SCNC: 10 MMOL/L (ref 4–16)
BUN BLDV-MCNC: 39 MG/DL (ref 6–23)
CALCIUM SERPL-MCNC: 9.1 MG/DL (ref 8.3–10.6)
CHLORIDE BLD-SCNC: 99 MMOL/L (ref 99–110)
CO2: 28 MMOL/L (ref 21–32)
CREAT SERPL-MCNC: 2.4 MG/DL (ref 0.9–1.3)
GFR AFRICAN AMERICAN: 31 ML/MIN/1.73M2
GFR NON-AFRICAN AMERICAN: 26 ML/MIN/1.73M2
GLUCOSE BLD-MCNC: 82 MG/DL (ref 70–99)
HCT VFR BLD CALC: 31 % (ref 42–52)
HEMOGLOBIN: 8.8 GM/DL (ref 13.5–18)
MCH RBC QN AUTO: 22.5 PG (ref 27–31)
MCHC RBC AUTO-ENTMCNC: 28.4 % (ref 32–36)
MCV RBC AUTO: 79.3 FL (ref 78–100)
PDW BLD-RTO: 16.2 % (ref 11.7–14.9)
PLATELET # BLD: 329 K/CU MM (ref 140–440)
PMV BLD AUTO: 10 FL (ref 7.5–11.1)
POTASSIUM SERPL-SCNC: 5.1 MMOL/L (ref 3.5–5.1)
RBC # BLD: 3.91 M/CU MM (ref 4.6–6.2)
SODIUM BLD-SCNC: 137 MMOL/L (ref 135–145)
WBC # BLD: 8.3 K/CU MM (ref 4–10.5)

## 2019-11-26 PROCEDURE — 85027 COMPLETE CBC AUTOMATED: CPT

## 2019-11-26 PROCEDURE — 80048 BASIC METABOLIC PNL TOTAL CA: CPT

## 2019-11-26 PROCEDURE — 36415 COLL VENOUS BLD VENIPUNCTURE: CPT

## 2019-12-03 ENCOUNTER — HOSPITAL ENCOUNTER (OUTPATIENT)
Age: 84
Discharge: HOME | End: 2019-12-03
Payer: MEDICARE

## 2019-12-03 LAB
ANION GAP SERPL CALCULATED.3IONS-SCNC: 9 MMOL/L (ref 4–16)
BUN BLDV-MCNC: 47 MG/DL (ref 6–23)
CALCIUM SERPL-MCNC: 9.5 MG/DL (ref 8.3–10.6)
CHLORIDE BLD-SCNC: 97 MMOL/L (ref 99–110)
CO2: 29 MMOL/L (ref 21–32)
CREAT SERPL-MCNC: 2.2 MG/DL (ref 0.9–1.3)
GFR AFRICAN AMERICAN: 35 ML/MIN/1.73M2
GFR NON-AFRICAN AMERICAN: 29 ML/MIN/1.73M2
GLUCOSE BLD-MCNC: 87 MG/DL (ref 70–99)
HCT VFR BLD CALC: 32.4 % (ref 42–52)
HEMOGLOBIN: 9.2 GM/DL (ref 13.5–18)
MCH RBC QN AUTO: 22.4 PG (ref 27–31)
MCHC RBC AUTO-ENTMCNC: 28.4 % (ref 32–36)
MCV RBC AUTO: 78.8 FL (ref 78–100)
PDW BLD-RTO: 16 % (ref 11.7–14.9)
PLATELET # BLD: 326 K/CU MM (ref 140–440)
PMV BLD AUTO: 9.9 FL (ref 7.5–11.1)
POTASSIUM SERPL-SCNC: 5 MMOL/L (ref 3.5–5.1)
RBC # BLD: 4.11 M/CU MM (ref 4.6–6.2)
SODIUM BLD-SCNC: 135 MMOL/L (ref 135–145)
WBC # BLD: 7.3 K/CU MM (ref 4–10.5)

## 2019-12-03 PROCEDURE — 36415 COLL VENOUS BLD VENIPUNCTURE: CPT

## 2019-12-03 PROCEDURE — 80048 BASIC METABOLIC PNL TOTAL CA: CPT

## 2019-12-03 PROCEDURE — 85027 COMPLETE CBC AUTOMATED: CPT

## 2019-12-05 ENCOUNTER — TELEPHONE (OUTPATIENT)
Dept: CARDIOLOGY CLINIC | Age: 84
End: 2019-12-05

## 2019-12-10 ENCOUNTER — PROCEDURE VISIT (OUTPATIENT)
Dept: CARDIOLOGY CLINIC | Age: 84
End: 2019-12-10
Payer: MEDICARE

## 2019-12-10 ENCOUNTER — HOSPITAL ENCOUNTER (OUTPATIENT)
Age: 84
Discharge: HOME | End: 2019-12-10
Payer: MEDICARE

## 2019-12-10 DIAGNOSIS — I49.5 SINUS NODE DYSFUNCTION (HCC): ICD-10-CM

## 2019-12-10 DIAGNOSIS — Z95.0 CARDIAC PACEMAKER IN SITU: Primary | ICD-10-CM

## 2019-12-10 DIAGNOSIS — I44.0 AV BLOCK, 1ST DEGREE: ICD-10-CM

## 2019-12-10 LAB
ANION GAP SERPL CALCULATED.3IONS-SCNC: 14 MMOL/L (ref 4–16)
BUN BLDV-MCNC: 42 MG/DL (ref 6–23)
CALCIUM SERPL-MCNC: 9.4 MG/DL (ref 8.3–10.6)
CHLORIDE BLD-SCNC: 96 MMOL/L (ref 99–110)
CO2: 27 MMOL/L (ref 21–32)
CREAT SERPL-MCNC: 2.2 MG/DL (ref 0.9–1.3)
GFR AFRICAN AMERICAN: 35 ML/MIN/1.73M2
GFR NON-AFRICAN AMERICAN: 29 ML/MIN/1.73M2
GLUCOSE BLD-MCNC: 94 MG/DL (ref 70–99)
HCT VFR BLD CALC: 32 % (ref 42–52)
HEMOGLOBIN: 9 GM/DL (ref 13.5–18)
MCH RBC QN AUTO: 21.8 PG (ref 27–31)
MCHC RBC AUTO-ENTMCNC: 28.1 % (ref 32–36)
MCV RBC AUTO: 77.7 FL (ref 78–100)
PDW BLD-RTO: 16.2 % (ref 11.7–14.9)
PLATELET # BLD: 314 K/CU MM (ref 140–440)
PMV BLD AUTO: 9.7 FL (ref 7.5–11.1)
POTASSIUM SERPL-SCNC: 4.6 MMOL/L (ref 3.5–5.1)
RBC # BLD: 4.12 M/CU MM (ref 4.6–6.2)
SODIUM BLD-SCNC: 137 MMOL/L (ref 135–145)
WBC # BLD: 7.1 K/CU MM (ref 4–10.5)

## 2019-12-10 PROCEDURE — 85027 COMPLETE CBC AUTOMATED: CPT

## 2019-12-10 PROCEDURE — 80048 BASIC METABOLIC PNL TOTAL CA: CPT

## 2019-12-10 PROCEDURE — 36415 COLL VENOUS BLD VENIPUNCTURE: CPT

## 2019-12-10 PROCEDURE — 93294 REM INTERROG EVL PM/LDLS PM: CPT | Performed by: INTERNAL MEDICINE

## 2019-12-10 PROCEDURE — 93296 REM INTERROG EVL PM/IDS: CPT | Performed by: INTERNAL MEDICINE

## 2019-12-17 ENCOUNTER — HOSPITAL ENCOUNTER (OUTPATIENT)
Age: 84
Setting detail: SPECIMEN
Discharge: HOME | End: 2019-12-17
Payer: MEDICARE

## 2019-12-17 LAB
ANION GAP SERPL CALCULATED.3IONS-SCNC: 10 MMOL/L (ref 4–16)
BUN BLDV-MCNC: 43 MG/DL (ref 6–23)
CALCIUM SERPL-MCNC: 9.4 MG/DL (ref 8.3–10.6)
CHLORIDE BLD-SCNC: 100 MMOL/L (ref 99–110)
CO2: 28 MMOL/L (ref 21–32)
CREAT SERPL-MCNC: 2.4 MG/DL (ref 0.9–1.3)
GFR AFRICAN AMERICAN: 31 ML/MIN/1.73M2
GFR NON-AFRICAN AMERICAN: 26 ML/MIN/1.73M2
GLUCOSE BLD-MCNC: 101 MG/DL (ref 70–99)
HCT VFR BLD CALC: 32.3 % (ref 42–52)
HEMOGLOBIN: 9 GM/DL (ref 13.5–18)
MCH RBC QN AUTO: 21.8 PG (ref 27–31)
MCHC RBC AUTO-ENTMCNC: 27.9 % (ref 32–36)
MCV RBC AUTO: 78.2 FL (ref 78–100)
PDW BLD-RTO: 16.6 % (ref 11.7–14.9)
PLATELET # BLD: 312 K/CU MM (ref 140–440)
PMV BLD AUTO: 10.3 FL (ref 7.5–11.1)
POTASSIUM SERPL-SCNC: 5 MMOL/L (ref 3.5–5.1)
RBC # BLD: 4.13 M/CU MM (ref 4.6–6.2)
SODIUM BLD-SCNC: 138 MMOL/L (ref 135–145)
WBC # BLD: 8.1 K/CU MM (ref 4–10.5)

## 2019-12-17 PROCEDURE — 85027 COMPLETE CBC AUTOMATED: CPT

## 2019-12-17 PROCEDURE — 36415 COLL VENOUS BLD VENIPUNCTURE: CPT

## 2019-12-17 PROCEDURE — 80048 BASIC METABOLIC PNL TOTAL CA: CPT

## 2019-12-23 ENCOUNTER — HOSPITAL ENCOUNTER (OUTPATIENT)
Age: 84
Setting detail: SPECIMEN
Discharge: HOME | End: 2019-12-23
Payer: MEDICARE

## 2019-12-23 LAB
ANION GAP SERPL CALCULATED.3IONS-SCNC: 10 MMOL/L (ref 4–16)
BUN BLDV-MCNC: 46 MG/DL (ref 6–23)
CALCIUM SERPL-MCNC: 9 MG/DL (ref 8.3–10.6)
CHLORIDE BLD-SCNC: 101 MMOL/L (ref 99–110)
CO2: 27 MMOL/L (ref 21–32)
CREAT SERPL-MCNC: 2.3 MG/DL (ref 0.9–1.3)
GFR AFRICAN AMERICAN: 33 ML/MIN/1.73M2
GFR NON-AFRICAN AMERICAN: 27 ML/MIN/1.73M2
GLUCOSE BLD-MCNC: 80 MG/DL (ref 70–99)
HCT VFR BLD CALC: 28.4 % (ref 42–52)
HEMOGLOBIN: 8 GM/DL (ref 13.5–18)
MCH RBC QN AUTO: 21.7 PG (ref 27–31)
MCHC RBC AUTO-ENTMCNC: 28.2 % (ref 32–36)
MCV RBC AUTO: 77 FL (ref 78–100)
PDW BLD-RTO: 16.6 % (ref 11.7–14.9)
PLATELET # BLD: 281 K/CU MM (ref 140–440)
PMV BLD AUTO: 9.9 FL (ref 7.5–11.1)
POTASSIUM SERPL-SCNC: 5.3 MMOL/L (ref 3.5–5.1)
RBC # BLD: 3.69 M/CU MM (ref 4.6–6.2)
SODIUM BLD-SCNC: 138 MMOL/L (ref 135–145)
WBC # BLD: 7.6 K/CU MM (ref 4–10.5)

## 2019-12-23 PROCEDURE — 80048 BASIC METABOLIC PNL TOTAL CA: CPT

## 2019-12-23 PROCEDURE — 36415 COLL VENOUS BLD VENIPUNCTURE: CPT

## 2019-12-23 PROCEDURE — 85027 COMPLETE CBC AUTOMATED: CPT

## 2019-12-31 ENCOUNTER — HOSPITAL ENCOUNTER (OUTPATIENT)
Age: 84
Setting detail: SPECIMEN
Discharge: HOME | End: 2019-12-31
Payer: MEDICARE

## 2019-12-31 LAB
ANION GAP SERPL CALCULATED.3IONS-SCNC: 11 MMOL/L (ref 4–16)
BUN BLDV-MCNC: 47 MG/DL (ref 6–23)
CALCIUM SERPL-MCNC: 9.1 MG/DL (ref 8.3–10.6)
CHLORIDE BLD-SCNC: 104 MMOL/L (ref 99–110)
CO2: 25 MMOL/L (ref 21–32)
CREAT SERPL-MCNC: 2.2 MG/DL (ref 0.9–1.3)
GFR AFRICAN AMERICAN: 35 ML/MIN/1.73M2
GFR NON-AFRICAN AMERICAN: 29 ML/MIN/1.73M2
GLUCOSE BLD-MCNC: 84 MG/DL (ref 70–99)
HCT VFR BLD CALC: 31.2 % (ref 42–52)
HEMOGLOBIN: 8.7 GM/DL (ref 13.5–18)
MCH RBC QN AUTO: 21.1 PG (ref 27–31)
MCHC RBC AUTO-ENTMCNC: 27.9 % (ref 32–36)
MCV RBC AUTO: 75.7 FL (ref 78–100)
PDW BLD-RTO: 16.9 % (ref 11.7–14.9)
PLATELET # BLD: 319 K/CU MM (ref 140–440)
PMV BLD AUTO: 9.8 FL (ref 7.5–11.1)
POTASSIUM SERPL-SCNC: 5.3 MMOL/L (ref 3.5–5.1)
RBC # BLD: 4.12 M/CU MM (ref 4.6–6.2)
SODIUM BLD-SCNC: 140 MMOL/L (ref 135–145)
WBC # BLD: 7.6 K/CU MM (ref 4–10.5)

## 2019-12-31 PROCEDURE — 85027 COMPLETE CBC AUTOMATED: CPT

## 2019-12-31 PROCEDURE — 36415 COLL VENOUS BLD VENIPUNCTURE: CPT

## 2019-12-31 PROCEDURE — 80048 BASIC METABOLIC PNL TOTAL CA: CPT

## 2020-01-07 ENCOUNTER — HOSPITAL ENCOUNTER (OUTPATIENT)
Age: 85
Setting detail: SPECIMEN
Discharge: HOME | End: 2020-01-07
Payer: MEDICARE

## 2020-01-10 ENCOUNTER — NURSE ONLY (OUTPATIENT)
Dept: CARDIOLOGY CLINIC | Age: 85
End: 2020-01-10
Payer: MEDICARE

## 2020-01-10 ENCOUNTER — HOSPITAL ENCOUNTER (OUTPATIENT)
Age: 85
Setting detail: SPECIMEN
Discharge: HOME | End: 2020-01-10
Payer: MEDICARE

## 2020-01-10 VITALS
BODY MASS INDEX: 26.53 KG/M2 | WEIGHT: 169 LBS | RESPIRATION RATE: 18 BRPM | HEIGHT: 67 IN | DIASTOLIC BLOOD PRESSURE: 60 MMHG | HEART RATE: 67 BPM | SYSTOLIC BLOOD PRESSURE: 104 MMHG | OXYGEN SATURATION: 98 %

## 2020-01-10 LAB
ANION GAP SERPL CALCULATED.3IONS-SCNC: 14 MMOL/L (ref 4–16)
BUN BLDV-MCNC: 41 MG/DL (ref 6–23)
CALCIUM SERPL-MCNC: 8.5 MG/DL (ref 8.3–10.6)
CHLORIDE BLD-SCNC: 103 MMOL/L (ref 99–110)
CO2: 23 MMOL/L (ref 21–32)
CREAT SERPL-MCNC: 2.1 MG/DL (ref 0.9–1.3)
GFR AFRICAN AMERICAN: 36 ML/MIN/1.73M2
GFR NON-AFRICAN AMERICAN: 30 ML/MIN/1.73M2
GLUCOSE BLD-MCNC: 81 MG/DL (ref 70–99)
HCT VFR BLD CALC: 28.6 % (ref 42–52)
HEMOGLOBIN: 7.9 GM/DL (ref 13.5–18)
MCH RBC QN AUTO: 20.7 PG (ref 27–31)
MCHC RBC AUTO-ENTMCNC: 27.6 % (ref 32–36)
MCV RBC AUTO: 75.1 FL (ref 78–100)
PDW BLD-RTO: 17.6 % (ref 11.7–14.9)
PLATELET # BLD: 338 K/CU MM (ref 140–440)
PMV BLD AUTO: 9.7 FL (ref 7.5–11.1)
POTASSIUM SERPL-SCNC: 5.1 MMOL/L (ref 3.5–5.1)
RBC # BLD: 3.81 M/CU MM (ref 4.6–6.2)
SODIUM BLD-SCNC: 140 MMOL/L (ref 135–145)
WBC # BLD: 7.8 K/CU MM (ref 4–10.5)

## 2020-01-10 PROCEDURE — 85027 COMPLETE CBC AUTOMATED: CPT

## 2020-01-10 PROCEDURE — 81001 URINALYSIS AUTO W/SCOPE: CPT

## 2020-01-10 PROCEDURE — 80048 BASIC METABOLIC PNL TOTAL CA: CPT

## 2020-01-10 PROCEDURE — 87077 CULTURE AEROBIC IDENTIFY: CPT

## 2020-01-10 PROCEDURE — 87186 SC STD MICRODIL/AGAR DIL: CPT

## 2020-01-10 PROCEDURE — 99212 OFFICE O/P EST SF 10 MIN: CPT | Performed by: NURSE PRACTITIONER

## 2020-01-10 PROCEDURE — 87086 URINE CULTURE/COLONY COUNT: CPT

## 2020-01-10 PROCEDURE — 36415 COLL VENOUS BLD VENIPUNCTURE: CPT

## 2020-01-12 LAB
BACTERIA: ABNORMAL /HPF
BILIRUBIN URINE: NEGATIVE MG/DL
BLOOD, URINE: ABNORMAL
CLARITY: ABNORMAL
COLOR: ABNORMAL
GLUCOSE, URINE: NEGATIVE MG/DL
KETONES, URINE: NEGATIVE MG/DL
LEUKOCYTE ESTERASE, URINE: ABNORMAL
MUCUS: ABNORMAL HPF
NITRITE URINE, QUANTITATIVE: POSITIVE
PH, URINE: 5 (ref 5–8)
PROTEIN UA: 30 MG/DL
RBC URINE: 80 /HPF (ref 0–3)
SPECIFIC GRAVITY UA: 1.02 (ref 1–1.03)
SQUAMOUS EPITHELIAL: 2 /HPF
TRICHOMONAS: ABNORMAL /HPF
UROBILINOGEN, URINE: NORMAL MG/DL (ref 0.2–1)
WBC CLUMP: ABNORMAL /HPF
WBC UA: 1340 /HPF (ref 0–2)

## 2020-01-13 LAB
CULTURE: ABNORMAL
Lab: ABNORMAL
SPECIMEN: ABNORMAL
TOTAL COLONY COUNT: ABNORMAL

## 2020-01-14 ENCOUNTER — HOSPITAL ENCOUNTER (OUTPATIENT)
Age: 85
Setting detail: SPECIMEN
Discharge: HOME | End: 2020-01-14
Payer: MEDICARE

## 2020-01-14 LAB
ANION GAP SERPL CALCULATED.3IONS-SCNC: 11 MMOL/L (ref 4–16)
BUN BLDV-MCNC: 35 MG/DL (ref 6–23)
CALCIUM SERPL-MCNC: 8.7 MG/DL (ref 8.3–10.6)
CHLORIDE BLD-SCNC: 101 MMOL/L (ref 99–110)
CO2: 27 MMOL/L (ref 21–32)
CREAT SERPL-MCNC: 2 MG/DL (ref 0.9–1.3)
GFR AFRICAN AMERICAN: 39 ML/MIN/1.73M2
GFR NON-AFRICAN AMERICAN: 32 ML/MIN/1.73M2
GLUCOSE BLD-MCNC: 92 MG/DL (ref 70–99)
HCT VFR BLD CALC: 30.7 % (ref 42–52)
HEMOGLOBIN: 8.5 GM/DL (ref 13.5–18)
MCH RBC QN AUTO: 21.2 PG (ref 27–31)
MCHC RBC AUTO-ENTMCNC: 27.7 % (ref 32–36)
MCV RBC AUTO: 76.6 FL (ref 78–100)
PDW BLD-RTO: 18.1 % (ref 11.7–14.9)
PLATELET # BLD: 422 K/CU MM (ref 140–440)
PMV BLD AUTO: 9.7 FL (ref 7.5–11.1)
POTASSIUM SERPL-SCNC: 4.8 MMOL/L (ref 3.5–5.1)
RBC # BLD: 4.01 M/CU MM (ref 4.6–6.2)
SODIUM BLD-SCNC: 139 MMOL/L (ref 135–145)
WBC # BLD: 9.3 K/CU MM (ref 4–10.5)

## 2020-01-14 PROCEDURE — 80048 BASIC METABOLIC PNL TOTAL CA: CPT

## 2020-01-14 PROCEDURE — 36415 COLL VENOUS BLD VENIPUNCTURE: CPT

## 2020-01-14 PROCEDURE — 85027 COMPLETE CBC AUTOMATED: CPT

## 2020-01-17 ENCOUNTER — OFFICE VISIT (OUTPATIENT)
Dept: CARDIOLOGY CLINIC | Age: 85
End: 2020-01-17
Payer: MEDICARE

## 2020-01-17 ENCOUNTER — TELEPHONE (OUTPATIENT)
Dept: CARDIOLOGY CLINIC | Age: 85
End: 2020-01-17

## 2020-01-17 VITALS
HEART RATE: 110 BPM | HEIGHT: 68 IN | SYSTOLIC BLOOD PRESSURE: 110 MMHG | DIASTOLIC BLOOD PRESSURE: 62 MMHG | BODY MASS INDEX: 26.4 KG/M2 | WEIGHT: 174.2 LBS

## 2020-01-17 PROCEDURE — G8417 CALC BMI ABV UP PARAM F/U: HCPCS | Performed by: NURSE PRACTITIONER

## 2020-01-17 PROCEDURE — 1123F ACP DISCUSS/DSCN MKR DOCD: CPT | Performed by: NURSE PRACTITIONER

## 2020-01-17 PROCEDURE — G8484 FLU IMMUNIZE NO ADMIN: HCPCS | Performed by: NURSE PRACTITIONER

## 2020-01-17 PROCEDURE — 4040F PNEUMOC VAC/ADMIN/RCVD: CPT | Performed by: NURSE PRACTITIONER

## 2020-01-17 PROCEDURE — 99214 OFFICE O/P EST MOD 30 MIN: CPT | Performed by: NURSE PRACTITIONER

## 2020-01-17 PROCEDURE — G8428 CUR MEDS NOT DOCUMENT: HCPCS | Performed by: NURSE PRACTITIONER

## 2020-01-17 PROCEDURE — 93000 ELECTROCARDIOGRAM COMPLETE: CPT | Performed by: NURSE PRACTITIONER

## 2020-01-17 PROCEDURE — 1036F TOBACCO NON-USER: CPT | Performed by: NURSE PRACTITIONER

## 2020-01-17 RX ORDER — SULFAMETHOXAZOLE AND TRIMETHOPRIM 800; 160 MG/1; MG/1
TABLET ORAL
COMMUNITY
Start: 2020-01-13 | End: 2020-02-18

## 2020-01-17 NOTE — ASSESSMENT & PLAN NOTE
Weight gain of 8 lbs in one day. Patient has UTI and aldactone stopped due to antibiotic interation with medication. Planned to restart aldactone when antibiotic duration is completed in 4 more days. Patient has CKD and urinary retention which is complicating urine output. Increase Lasix to 40 mg BID for next 3 days then return to 20 mg BID. If weight not improved by Monday notify office. Patient needs BMP next week to monitor renal function. Patient my require IV lasix. He denies any shortness of breath.

## 2020-01-17 NOTE — TELEPHONE ENCOUNTER
Returned call to Sara Thornton at [place]. Made and appt for Iesha Altman at 2:40 today at [facility]. She is to call back if patient can not make appt.

## 2020-01-17 NOTE — PATIENT INSTRUCTIONS
Increase Lasix to 40 mg BID for the next three days then back to 20 mg BID. If weight has not decreased by Monday call office.

## 2020-01-17 NOTE — TELEPHONE ENCOUNTER
Que Gerber from [facility] call and states wt 1/16/20 was 162  Today 170.6 please advise. Shalom CORTES have pt come in today if poss. If not have pt see Liliam Armas at .     Call jose at [phone] to schedule

## 2020-01-17 NOTE — PROGRESS NOTES
AIDE ([facility] [place]  [address], [address] [facility],Third [address]  Phone: [phone]    Fax [phone]                  Diana Gaffney MD, Wagner Dior MD, Marybeth Briceno MD, Anju Carvalho, MD Sarah Mayen, MD Tiana Barrett, MD Yaakov Valle, APRN      Christ Randall, APRN  Chaim Jauregui, APRN     Mague Diane, APRN    CARDIOLOGY  NOTE      1/19/2020    RE: Any Gant  (10/5/1933)                               TO:  Dr. Alexa Lozano VO  The primary cardiologist is Dr. Hortencia Mcneill    CC:   1. Ascending aortic aneurysm ([address]    2. Chronic diastolic congestive heart failure (HCC)    3. Persistent atrial fibrillation    4. S/P placement of cardiac pacemaker        Patient denies all of the following:  Chest Pain  Palpitations  Shortness of Breath  Edema  Dizziness  Syncope      HPI: Thank you for involving me in taking care of your patient Any Gant, who is a  80y.o. year old male with a history as listed above. Patient presents to the office for follow up on CAD, HTN, and hyperlipidemia. Patient has slowly gaining weight over the last month and had 8 lbs in one day today. Patient denies shortness of breath, no chest pain, no palpitations. Patient has a UTI and has been off aldactone for the last 3 days do to interaction with antibiotic.      Vitals:    01/17/20 1454   BP: 110/62   Pulse: 110       Current Outpatient Medications   Medication Sig Dispense Refill    sulfamethoxazole-trimethoprim (BACTRIM DS;SEPTRA DS) 800-160 MG per tablet       Polyvinyl Alcohol-Povidone (REFRESH OP) Apply 1 drop to eye as needed For dry eyes      acetaminophen (TYLENOL) 325 MG tablet Take 650 mg by mouth every 6 hours as needed for Pain For pain or temp      metoprolol tartrate (LOPRESSOR) 25 MG tablet Take 0.5 tablets by mouth 2 times daily 30 tablet 0    furosemide (LASIX) 20 MG tablet Take 1 tablet by mouth 2 times daily 90 tablet 1    docusate sodium (COLACE, DULCOLAX) 100 MG CAPS Take 100 mg by mouth 2 times daily as needed for Constipation 30 capsule 0    finasteride (PROSCAR) 5 MG tablet Take 1 tablet by mouth daily 30 tablet 0    spironolactone (ALDACTONE) 25 MG tablet Take 1 tablet by mouth 2 times daily (Patient taking differently: Take 25 mg by mouth daily Changes 12/30/19  On 1/13/2020 order to hold for 7 days while on Bactrim and then restart.) 60 tablet 5    amiodarone (CORDARONE) 200 MG tablet Take 1 tablet by mouth daily (Patient taking differently: Take 200 mg by mouth nightly ) 30 tablet 3    rivastigmine (EXELON) 4.6 MG/24HR Place 1 patch onto the skin every morning      Cholecalciferol (VITAMIN D3) 5000 units TABS Take 5,000 Units by mouth every morning      apixaban (ELIQUIS) 2.5 MG TABS tablet Take 1 tablet by mouth 2 times daily 60 tablet 3    Omega-3 Fatty Acids (FISH OIL) 1000 MG CAPS Take 1,000 mg by mouth daily       Multiple Vitamins-Minerals (THERAPEUTIC MULTIVITAMIN-MINERALS) tablet Take 1 tablet by mouth daily      atorvastatin (LIPITOR) 20 MG tablet Take 20 mg by mouth nightly        No current facility-administered medications for this visit. Allergies: Biaxin [clarithromycin]; Cephalexin; and Viagra [sildenafil citrate]  Past Medical History:   Diagnosis Date    ACTA2-related familial thoracic aortic aneurysm     Arrhythmia     Atrial fibrillation (HCC)     BBBB (bilateral bundle branch block)     Bradycardia     [address] ([address] heart [facility]) [address]     Essential hypertension, malignant     Fall at home     History of cardioversion 04/17/2018    History of chest x-ray 02/27/2017    Enlargement of the aorta knob which may represent a thoracic aortic aneurysm. Further evaluation w/ a contrast enhanced CT of the chest recommended. no evidence of acute pulmonary process.     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 Recommend thyroid US.  History of echocardiogram 03/27/2017    TTE EF 55%, LV Normal Size, borderline LVH concentric, Normal LV systolic function, transmittal doppler flow pttern suggest impaired LV relaxation Mild aortic stenosis, mild aortic regurgitation.  History of EKG 02/27/2017    Time 12:03 AM, HR 75, A fib, Axis normal, Intervals normal, P waves normal, St-T Segments: nonspecific ST segment changes to the anterior lateral leads, QRS RBBB,  No significant Q waves, no ectopy. A-fib w/RBBB w/nonspecific ST segment change EKG.  History of EKG 02/27/2017    Time 2:58AM: HR 59, NSR, Axis normal, Intervals normal, P waves normal, ST-T seg: nonspecific ST segment changes, QRS RBBB, no significant Q waves, no ectopy. Sinus bradycardia w/ RBBB nonspecific ST Segment changes EKG    History of Holter monitoring 11/08/2017    monitored 48 hours: Patient noted to have multiple PAC and PVCs. Risk of atrial fibrillation present given previous episode Recommend antiarrhythmic therapy.  History of stress test 03/27/2017    NM Stress test: EF 54%, Abnormal study, evidence of mild ischemia in mild inferior regions. post stress LV is enlarged in size.  Post-stress LV function is normal.     Hx of transesophageal echocardiography (SIMONE) for monitoring 04/17/2018    EF45-50% mild TR, mild-mod MR, mod-severe AS    Hyperlipemia     Hyperlipidemia     Hypertension     Nonspecific abnormal electrocardiogram (ECG) (EKG)     Persistent atrial fibrillation     Sick sinus syndrome (HCC)     Vertigo      Past Surgical History:   Procedure Laterality Date    CYSTOSCOPY INSERTION / REMOVAL STENT / STONE Left 5/6/2019    CYSTOSCOPY RETROGRADE PYELOGRAM STENT INSERTION, DIAGNOSTIC CYSTOSCOPY performed by Alex Jay MD at

## 2020-01-17 NOTE — PROGRESS NOTES
[id number] Score for Atrial Fibrillation Stroke Risk   Risk   Factors  Component Value   C CHF Yes 1   H HTN No 0   A2 Age >= 76 Yes,  (80 y.o.) 2   D DM No 0   S2 Prior Stroke/TIA No 0   V Vascular Disease No 0   A Age 74-69 No,  (80 y.o.) 0   Sc Sex male 0    [id number]  Score  3   Score last updated 2/60/14 6:96 PM    Click here for a link to the UpToDate guideline \"Atrial Fibrillation: Anticoagulation therapy to prevent embolization    Disclaimer: Risk Score calculation is dependent on accuracy of patient problem list and past encounter diagnosis.

## 2020-01-18 NOTE — PROGRESS NOTES
ACTA2-related familial thoracic aortic aneurysm     Arrhythmia     Atrial fibrillation (HCC)     BBBB (bilateral bundle branch block)     Bradycardia     CHF (congestive heart failure) ([address]     Essential hypertension, malignant     Fall at home     History of cardioversion 04/17/2018    History of chest x-ray 02/27/2017    Enlargement of the aorta knob which may represent a thoracic aortic aneurysm. Further evaluation w/ a contrast enhanced CT of the chest recommended. no evidence of acute pulmonary process. 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 Recommend thyroid US.  History of echocardiogram 03/27/2017    TTE EF 55%, LV Normal Size, borderline LVH concentric, Normal LV systolic function, transmittal doppler flow pttern suggest impaired LV relaxation Mild aortic stenosis, mild aortic regurgitation.  History of EKG 02/27/2017    Time 12:03 AM, HR 75, A fib, Axis normal, Intervals normal, P waves normal, St-T Segments: nonspecific ST segment changes to the anterior lateral leads, QRS RBBB,  No significant Q waves, no ectopy. A-fib w/RBBB w/nonspecific ST segment change EKG.  History of EKG 02/27/2017    Time 2:58AM: HR 59, NSR, Axis normal, Intervals normal, P waves normal, ST-T seg: nonspecific ST segment changes, QRS RBBB, no significant Q waves, no ectopy. Sinus bradycardia w/ RBBB nonspecific ST Segment changes EKG    History of Holter monitoring 11/08/2017    monitored 48 hours: Patient noted to have multiple PAC and PVCs. Risk of atrial fibrillation present given previous episode Recommend antiarrhythmic therapy.      History of stress test 03/27/2017    NM Stress test: EF 54%, Abnormal study, Position: Sitting, Cuff Size: Medium Adult)   Pulse 67   Resp 18   Ht 5' 7\" (1.702 m)   Wt 169 lb (76.7 kg)   SpO2 98%   BMI 26.47 kg/m²     Wt Readings from Last 3 Encounters:   01/17/20 174 lb 3.2 oz (79 kg)   01/10/20 169 lb (76.7 kg)   11/08/19 161 lb 3.2 oz (73.1 kg)     BP Readings from Last 3 Encounters:   01/17/20 110/62   01/10/20 104/60   11/08/19 110/66     Pulse Readings from Last 3 Encounters:   01/17/20 110   01/10/20 67   11/08/19 102     Body mass index is 26.47 kg/m². GENERAL - Alert, oriented, pleasant, in no apparent distress. Head -unremarkable  Eyes - pupils equal and reactive to light - bilateral conjunctiva are pink: sclera are white   ENT - external ears intact, nose is intact:  tongue is midline pink and moist  Neck - Supple. Jugular venous distention noted No: carotid bruits appreciated No.   Cardiovascular - Normal S1 and S2: murmur appreciated Yes, No gallop. Regular rate- Yes,  rhythm regular-Yes. Extremities - No cyanosis, clubbing, trace edema to lower legs. Pulmonary - No respiratory distress. No wheezes or rales. Chest is diminished  Pulses: Bilateral radial and pedal pulses normal  Abdomen -  Soft no tenderness, non distended   Musculoskeletal - Normal movement of all extremities  Neurologic - alert and oriented  Skin-  No rash: No echymosis    Affect- normal mood and pleasant     6 minute walk test:  Time walked: not performed today  Distance walked: not performed today  Required Oxygen No    Most recent ECHO:   5/2019  Left ventricular function is normal, EF is estimated at 50-55%.   Moderate left ventricular hypertrophy.   Infero-posterior wall segments are hypokinetic.   Moderate aortic regurgitation is noted ( ms).   No evidence of pericardial effusion.     Results reviewed:  BNP:   Lab Results   Component Value Date    PROBNP 4,083 (H) 10/08/2019     CBC:   Lab Results   Component Value Date    WBC 9.3 01/14/2020    RBC 4.01 01/14/2020    HGB 8.5

## 2020-01-21 ENCOUNTER — HOSPITAL ENCOUNTER (OUTPATIENT)
Age: 85
Setting detail: SPECIMEN
Discharge: HOME | End: 2020-01-21
Payer: MEDICARE

## 2020-01-21 LAB
ANION GAP SERPL CALCULATED.3IONS-SCNC: 11 MMOL/L (ref 4–16)
BUN BLDV-MCNC: 29 MG/DL (ref 6–23)
CALCIUM SERPL-MCNC: 8.8 MG/DL (ref 8.3–10.6)
CHLORIDE BLD-SCNC: 97 MMOL/L (ref 99–110)
CO2: 28 MMOL/L (ref 21–32)
CREAT SERPL-MCNC: 2.3 MG/DL (ref 0.9–1.3)
GFR AFRICAN AMERICAN: 33 ML/MIN/1.73M2
GFR NON-AFRICAN AMERICAN: 27 ML/MIN/1.73M2
GLUCOSE BLD-MCNC: 83 MG/DL (ref 70–99)
HCT VFR BLD CALC: 32.6 % (ref 42–52)
HEMOGLOBIN: 9.1 GM/DL (ref 13.5–18)
MCH RBC QN AUTO: 21 PG (ref 27–31)
MCHC RBC AUTO-ENTMCNC: 27.9 % (ref 32–36)
MCV RBC AUTO: 75.1 FL (ref 78–100)
PDW BLD-RTO: 18.6 % (ref 11.7–14.9)
PLATELET # BLD: 587 K/CU MM (ref 140–440)
PMV BLD AUTO: 9.2 FL (ref 7.5–11.1)
POTASSIUM SERPL-SCNC: 4.4 MMOL/L (ref 3.5–5.1)
RBC # BLD: 4.34 M/CU MM (ref 4.6–6.2)
SODIUM BLD-SCNC: 136 MMOL/L (ref 135–145)
WBC # BLD: 8.5 K/CU MM (ref 4–10.5)

## 2020-01-21 PROCEDURE — 36415 COLL VENOUS BLD VENIPUNCTURE: CPT

## 2020-01-21 PROCEDURE — 85027 COMPLETE CBC AUTOMATED: CPT

## 2020-01-21 PROCEDURE — 80048 BASIC METABOLIC PNL TOTAL CA: CPT

## 2020-01-28 ENCOUNTER — HOSPITAL ENCOUNTER (OUTPATIENT)
Age: 85
Setting detail: SPECIMEN
Discharge: HOME | End: 2020-01-28
Payer: MEDICARE

## 2020-01-28 LAB
ANION GAP SERPL CALCULATED.3IONS-SCNC: 13 MMOL/L (ref 4–16)
BUN BLDV-MCNC: 27 MG/DL (ref 6–23)
CALCIUM SERPL-MCNC: 8.7 MG/DL (ref 8.3–10.6)
CHLORIDE BLD-SCNC: 98 MMOL/L (ref 99–110)
CO2: 27 MMOL/L (ref 21–32)
CREAT SERPL-MCNC: 1.9 MG/DL (ref 0.9–1.3)
GFR AFRICAN AMERICAN: 41 ML/MIN/1.73M2
GFR NON-AFRICAN AMERICAN: 34 ML/MIN/1.73M2
GLUCOSE BLD-MCNC: 81 MG/DL (ref 70–99)
POTASSIUM SERPL-SCNC: 4.5 MMOL/L (ref 3.5–5.1)
SODIUM BLD-SCNC: 138 MMOL/L (ref 135–145)

## 2020-01-28 PROCEDURE — 80048 BASIC METABOLIC PNL TOTAL CA: CPT

## 2020-01-28 PROCEDURE — 85027 COMPLETE CBC AUTOMATED: CPT

## 2020-01-28 PROCEDURE — 36415 COLL VENOUS BLD VENIPUNCTURE: CPT

## 2020-01-31 ENCOUNTER — NURSE ONLY (OUTPATIENT)
Dept: CARDIOLOGY CLINIC | Age: 85
End: 2020-01-31

## 2020-01-31 ENCOUNTER — OUTSIDE SERVICES (OUTPATIENT)
Dept: INTERNAL MEDICINE CLINIC | Age: 85
End: 2020-01-31
Payer: MEDICARE

## 2020-01-31 VITALS
SYSTOLIC BLOOD PRESSURE: 130 MMHG | RESPIRATION RATE: 18 BRPM | DIASTOLIC BLOOD PRESSURE: 68 MMHG | HEIGHT: 67 IN | WEIGHT: 175 LBS | BODY MASS INDEX: 27.47 KG/M2 | HEART RATE: 62 BPM | OXYGEN SATURATION: 99 %

## 2020-01-31 RX ORDER — FUROSEMIDE 20 MG/1
60 TABLET ORAL SEE ADMIN INSTRUCTIONS
Qty: 90 TABLET | Refills: 1 | Status: ON HOLD
Start: 2020-01-31 | End: 2020-08-21 | Stop reason: HOSPADM

## 2020-01-31 RX ORDER — SPIRONOLACTONE 25 MG/1
25 TABLET ORAL 2 TIMES DAILY
Qty: 60 TABLET | Refills: 5
Start: 2020-01-31 | End: 2020-02-19

## 2020-01-31 NOTE — PROGRESS NOTES
mg by mouth 2 times daily as needed for Constipation 30 capsule 0    finasteride (PROSCAR) 5 MG tablet Take 1 tablet by mouth daily 30 tablet 0    amiodarone (CORDARONE) 200 MG tablet Take 1 tablet by mouth daily (Patient taking differently: Take 200 mg by mouth nightly ) 30 tablet 3    rivastigmine (EXELON) 4.6 MG/24HR Place 1 patch onto the skin every morning      Cholecalciferol (VITAMIN D3) 5000 units TABS Take 5,000 Units by mouth every morning      apixaban (ELIQUIS) 2.5 MG TABS tablet Take 1 tablet by mouth 2 times daily 60 tablet 3    Omega-3 Fatty Acids (FISH OIL) 1000 MG CAPS Take 1,000 mg by mouth daily       Multiple Vitamins-Minerals (THERAPEUTIC MULTIVITAMIN-MINERALS) tablet Take 1 tablet by mouth daily      atorvastatin (LIPITOR) 20 MG tablet Take 20 mg by mouth nightly        No current facility-administered medications for this visit. Plan for pt is to check BMP and BNP in two weeks and return of HFC in 2 weeks.     Pt is to report any dizziness or syncope to the office    Electronically signed by CARI Hauser CNP on 1/31/2020 at 10:13 AM

## 2020-02-01 ENCOUNTER — HOSPITAL ENCOUNTER (OUTPATIENT)
Age: 85
Setting detail: SPECIMEN
Discharge: HOME | End: 2020-02-01
Payer: MEDICARE

## 2020-02-01 PROCEDURE — 87186 SC STD MICRODIL/AGAR DIL: CPT

## 2020-02-01 PROCEDURE — 87086 URINE CULTURE/COLONY COUNT: CPT

## 2020-02-01 PROCEDURE — 81001 URINALYSIS AUTO W/SCOPE: CPT

## 2020-02-01 PROCEDURE — 87077 CULTURE AEROBIC IDENTIFY: CPT

## 2020-02-01 NOTE — PROGRESS NOTES
Progress note  ___________________________    Jose R Muller's  is 10/5/1933  SEEN ON 2020 at [address]  ___________________________    S:   Patient is seen today and discussed with the nurses. Patient is not a good historian. Patient had UTI and that was treated with Bactrim. Patient has chronic kidney disease the creatinine has decreased from 2.3-1.9. Patient denies any chest pain or shortness of breath. No cough or sputum production. No nausea or vomiting. He is confused. He had fallen on the day of admission to assisted living on his knees. He had Noavk catheter which was removed recently. ALLERGIES:  Allergies   Allergen Reactions    Biaxin [Clarithromycin]     Cephalexin     Viagra [Sildenafil Citrate] Nausea Only and Other (See Comments)     Dizziness and BP dropped        PAST MEDICAL HISTORY:    has a past medical history of ACTA2-related familial thoracic aortic aneurysm, Arrhythmia, Atrial fibrillation (HCC), BBBB (bilateral bundle branch block), Bradycardia, CHF (congestive heart failure) ([address] Essential hypertension, malignant, History of cardioversion,  Hx of transesophageal echocardiography (SIMONE) for monitoring, Hyperlipemia, Hyperlipidemia, Hypertension,, Persistent atrial fibrillation , Sick sinus syndrome and Vertigo. PAST SURGICAL HISTORY:   has a past surgical history that includes Pacemaker insertion (Left, 2018) and CYSTOSCOPY INSERTION / REMOVAL STENT / STONE (Left, 2019). SOCIAL HISTORY:   reports that he has never smoked. He has never used smokeless tobacco. He reports previous alcohol use. He reports that he does not use drugs. Vital signs: As in [facility]  GENERAL: - Alert, oriented, pleasant, in no apparent distress. HEENT: - Conjunctiva pink, no scleral icterus. ENT clear. NECK: - Supple. No jugular venous distention noted.  No masses

## 2020-02-02 LAB
BACTERIA: ABNORMAL /HPF
BILIRUBIN URINE: NEGATIVE MG/DL
BLOOD, URINE: ABNORMAL
CLARITY: ABNORMAL
COLOR: YELLOW
GLUCOSE, URINE: NEGATIVE MG/DL
KETONES, URINE: NEGATIVE MG/DL
LEUKOCYTE ESTERASE, URINE: ABNORMAL
NITRITE URINE, QUANTITATIVE: NEGATIVE
PH, URINE: 7 (ref 5–8)
PROTEIN UA: 100 MG/DL
RBC URINE: 59 /HPF (ref 0–3)
SPECIFIC GRAVITY UA: 1.01 (ref 1–1.03)
TRICHOMONAS: ABNORMAL /HPF
UROBILINOGEN, URINE: NORMAL MG/DL (ref 0.2–1)
WBC CLUMP: ABNORMAL /HPF
WBC UA: 2125 /HPF (ref 0–2)

## 2020-02-04 ENCOUNTER — HOSPITAL ENCOUNTER (OUTPATIENT)
Age: 85
Setting detail: SPECIMEN
Discharge: HOME | End: 2020-02-04
Payer: MEDICARE

## 2020-02-04 LAB
ANION GAP SERPL CALCULATED.3IONS-SCNC: 16 MMOL/L (ref 4–16)
BUN BLDV-MCNC: 25 MG/DL (ref 6–23)
CALCIUM SERPL-MCNC: 9 MG/DL (ref 8.3–10.6)
CHLORIDE BLD-SCNC: 97 MMOL/L (ref 99–110)
CO2: 25 MMOL/L (ref 21–32)
CREAT SERPL-MCNC: 1.9 MG/DL (ref 0.9–1.3)
GFR AFRICAN AMERICAN: 41 ML/MIN/1.73M2
GFR NON-AFRICAN AMERICAN: 34 ML/MIN/1.73M2
GLUCOSE BLD-MCNC: 84 MG/DL (ref 70–99)
HCT VFR BLD CALC: 32 % (ref 42–52)
HEMOGLOBIN: 8.8 GM/DL (ref 13.5–18)
MAGNESIUM: 2.4 MG/DL (ref 1.8–2.4)
MCH RBC QN AUTO: 20.8 PG (ref 27–31)
MCHC RBC AUTO-ENTMCNC: 27.5 % (ref 32–36)
MCV RBC AUTO: 75.7 FL (ref 78–100)
PDW BLD-RTO: 19.1 % (ref 11.7–14.9)
PHOSPHORUS: 3.3 MG/DL (ref 2.5–4.9)
PLATELET # BLD: 387 K/CU MM (ref 140–440)
PMV BLD AUTO: 9.2 FL (ref 7.5–11.1)
POTASSIUM SERPL-SCNC: 4.8 MMOL/L (ref 3.5–5.1)
RBC # BLD: 4.23 M/CU MM (ref 4.6–6.2)
SODIUM BLD-SCNC: 138 MMOL/L (ref 135–145)
WBC # BLD: 8 K/CU MM (ref 4–10.5)

## 2020-02-04 PROCEDURE — 36415 COLL VENOUS BLD VENIPUNCTURE: CPT

## 2020-02-04 PROCEDURE — 84100 ASSAY OF PHOSPHORUS: CPT

## 2020-02-04 PROCEDURE — 83735 ASSAY OF MAGNESIUM: CPT

## 2020-02-04 PROCEDURE — 80048 BASIC METABOLIC PNL TOTAL CA: CPT

## 2020-02-04 PROCEDURE — 85027 COMPLETE CBC AUTOMATED: CPT

## 2020-02-05 LAB
CULTURE: ABNORMAL
CULTURE: ABNORMAL
Lab: ABNORMAL
SPECIMEN: ABNORMAL
TOTAL COLONY COUNT: ABNORMAL

## 2020-02-11 ENCOUNTER — HOSPITAL ENCOUNTER (OUTPATIENT)
Age: 85
Setting detail: SPECIMEN
Discharge: HOME | End: 2020-02-11
Payer: MEDICARE

## 2020-02-11 LAB
ANION GAP SERPL CALCULATED.3IONS-SCNC: 13 MMOL/L (ref 4–16)
BUN BLDV-MCNC: 40 MG/DL (ref 6–23)
CALCIUM SERPL-MCNC: 8.9 MG/DL (ref 8.3–10.6)
CHLORIDE BLD-SCNC: 96 MMOL/L (ref 99–110)
CO2: 27 MMOL/L (ref 21–32)
CREAT SERPL-MCNC: 2.5 MG/DL (ref 0.9–1.3)
GFR AFRICAN AMERICAN: 30 ML/MIN/1.73M2
GFR NON-AFRICAN AMERICAN: 25 ML/MIN/1.73M2
GLUCOSE BLD-MCNC: 88 MG/DL (ref 70–99)
HCT VFR BLD CALC: 31.4 % (ref 42–52)
HEMOGLOBIN: 8.8 GM/DL (ref 13.5–18)
MCH RBC QN AUTO: 20.7 PG (ref 27–31)
MCHC RBC AUTO-ENTMCNC: 28 % (ref 32–36)
MCV RBC AUTO: 73.7 FL (ref 78–100)
PDW BLD-RTO: 18.8 % (ref 11.7–14.9)
PLATELET # BLD: 416 K/CU MM (ref 140–440)
PMV BLD AUTO: 9.2 FL (ref 7.5–11.1)
POTASSIUM SERPL-SCNC: 4.8 MMOL/L (ref 3.5–5.1)
RBC # BLD: 4.26 M/CU MM (ref 4.6–6.2)
SODIUM BLD-SCNC: 136 MMOL/L (ref 135–145)
WBC # BLD: 8.6 K/CU MM (ref 4–10.5)

## 2020-02-11 PROCEDURE — 80048 BASIC METABOLIC PNL TOTAL CA: CPT

## 2020-02-11 PROCEDURE — 36415 COLL VENOUS BLD VENIPUNCTURE: CPT

## 2020-02-11 PROCEDURE — 85027 COMPLETE CBC AUTOMATED: CPT

## 2020-02-12 ENCOUNTER — TELEPHONE (OUTPATIENT)
Dept: CARDIOLOGY CLINIC | Age: 85
End: 2020-02-12

## 2020-02-14 ENCOUNTER — HOSPITAL ENCOUNTER (OUTPATIENT)
Age: 85
Setting detail: SPECIMEN
Discharge: HOME | End: 2020-02-14
Payer: MEDICARE

## 2020-02-14 ENCOUNTER — OUTSIDE SERVICES (OUTPATIENT)
Dept: INTERNAL MEDICINE CLINIC | Age: 85
End: 2020-02-14
Payer: MEDICARE

## 2020-02-14 LAB
ANION GAP SERPL CALCULATED.3IONS-SCNC: 14 MMOL/L (ref 4–16)
BUN BLDV-MCNC: 41 MG/DL (ref 6–23)
CALCIUM SERPL-MCNC: 9.1 MG/DL (ref 8.3–10.6)
CHLORIDE BLD-SCNC: 100 MMOL/L (ref 99–110)
CO2: 27 MMOL/L (ref 21–32)
CREAT SERPL-MCNC: 2.4 MG/DL (ref 0.9–1.3)
GFR AFRICAN AMERICAN: 31 ML/MIN/1.73M2
GFR NON-AFRICAN AMERICAN: 26 ML/MIN/1.73M2
GLUCOSE BLD-MCNC: 87 MG/DL (ref 70–99)
POTASSIUM SERPL-SCNC: 4.8 MMOL/L (ref 3.5–5.1)
PRO-BNP: 1102 PG/ML
SODIUM BLD-SCNC: 141 MMOL/L (ref 135–145)

## 2020-02-14 PROCEDURE — 36415 COLL VENOUS BLD VENIPUNCTURE: CPT

## 2020-02-14 PROCEDURE — 80048 BASIC METABOLIC PNL TOTAL CA: CPT

## 2020-02-14 PROCEDURE — 83880 ASSAY OF NATRIURETIC PEPTIDE: CPT

## 2020-02-15 NOTE — PROGRESS NOTES
rales.    ABDOMEN: - Soft and non-tender,no masses  or organomegaly. EXTREMITIES: - No cyanosis, clubbing, or significant edema. SKIN: Skin is warm and dry. NEUROLOGICAL: - Cranial nerves II through XII are grossly intact. He is ambulatory with the help of walker. He is confused. Knows his name. Does not remember date of birth but knows the year of birth. Does not remember his age. Knows his wife is in the memory unit. Assessment:    . Atrial fibrillation on Eliquis and amiodarone  . Hyperlipidemia on atorvastatin  . Cognitive impairment on Exelon  . BPH on finasteride  . Congestive heart failure on Lasix and Aldactone  . Constipation on MiraLAX  . UTI resolved. Plan:  Medication were reviewed. Continue current medication  Sees Nephrology Dr Vangie Chavez.

## 2020-02-17 ENCOUNTER — HOSPITAL ENCOUNTER (OUTPATIENT)
Age: 85
Setting detail: SPECIMEN
Discharge: HOME | End: 2020-02-17
Payer: MEDICARE

## 2020-02-17 LAB
ANION GAP SERPL CALCULATED.3IONS-SCNC: 15 MMOL/L (ref 4–16)
BUN BLDV-MCNC: 37 MG/DL (ref 6–23)
CALCIUM SERPL-MCNC: 9.2 MG/DL (ref 8.3–10.6)
CHLORIDE BLD-SCNC: 100 MMOL/L (ref 99–110)
CO2: 27 MMOL/L (ref 21–32)
CREAT SERPL-MCNC: 2.3 MG/DL (ref 0.9–1.3)
GFR AFRICAN AMERICAN: 33 ML/MIN/1.73M2
GFR NON-AFRICAN AMERICAN: 27 ML/MIN/1.73M2
GLUCOSE BLD-MCNC: 116 MG/DL (ref 70–99)
HCT VFR BLD CALC: 33.4 % (ref 42–52)
HEMOGLOBIN: 9.2 GM/DL (ref 13.5–18)
MAGNESIUM: 2.4 MG/DL (ref 1.8–2.4)
MCH RBC QN AUTO: 20.7 PG (ref 27–31)
MCHC RBC AUTO-ENTMCNC: 27.5 % (ref 32–36)
MCV RBC AUTO: 75.2 FL (ref 78–100)
PDW BLD-RTO: 18.8 % (ref 11.7–14.9)
PHOSPHORUS: 3.4 MG/DL (ref 2.5–4.9)
PLATELET # BLD: 434 K/CU MM (ref 140–440)
PMV BLD AUTO: 9.1 FL (ref 7.5–11.1)
POTASSIUM SERPL-SCNC: 4.6 MMOL/L (ref 3.5–5.1)
RBC # BLD: 4.44 M/CU MM (ref 4.6–6.2)
SODIUM BLD-SCNC: 142 MMOL/L (ref 135–145)
WBC # BLD: 9.6 K/CU MM (ref 4–10.5)

## 2020-02-17 PROCEDURE — 85027 COMPLETE CBC AUTOMATED: CPT

## 2020-02-17 PROCEDURE — 84100 ASSAY OF PHOSPHORUS: CPT

## 2020-02-17 PROCEDURE — 80048 BASIC METABOLIC PNL TOTAL CA: CPT

## 2020-02-17 PROCEDURE — 83735 ASSAY OF MAGNESIUM: CPT

## 2020-02-17 PROCEDURE — 36415 COLL VENOUS BLD VENIPUNCTURE: CPT

## 2020-02-19 ENCOUNTER — NURSE ONLY (OUTPATIENT)
Dept: CARDIOLOGY CLINIC | Age: 85
End: 2020-02-19

## 2020-02-19 VITALS
DIASTOLIC BLOOD PRESSURE: 62 MMHG | BODY MASS INDEX: 27.31 KG/M2 | WEIGHT: 174 LBS | RESPIRATION RATE: 18 BRPM | OXYGEN SATURATION: 98 % | SYSTOLIC BLOOD PRESSURE: 112 MMHG | HEART RATE: 67 BPM | HEIGHT: 67 IN

## 2020-02-19 RX ORDER — SPIRONOLACTONE 25 MG/1
25 TABLET ORAL DAILY
COMMUNITY

## 2020-02-19 RX ORDER — CEPHALEXIN 500 MG/1
500 CAPSULE ORAL 2 TIMES DAILY
COMMUNITY
Start: 2020-02-20 | End: 2020-03-02

## 2020-02-19 NOTE — PROGRESS NOTES
[address]      Visit Date: 2/19/2020  Cardiologist:  Dr. Erich Garzon  Primary Care Physician: Dr. Lubna Sepulveda is a 80 y.o. male who presents today for:  Chief Complaint   Patient presents with    Congestive Heart Failure       HPI:   Pema Galaviz is a 80 y.o. male who presents to the office for a follow up  in the heart failure clinic. He reports that he is doing well. He does not have chest pain,  palpitations, shortness of breath, edema, dizziness, or syncope. His daughter is with him today. She states that her dad has been eating well. He lives in [address] [place] [address] and does not cook for himself. He wears compression stockings but they fall down. He has a UTI and is going to start Keflex. Chief complaint none. Patient has:  Last hospital admission related to Heart Failure:  5/23/2019   baseline BNP 1102     baseline weight 170 pounds   Chest Pain: no  Worsening SOB/orthopnea/PND: no  Edema: no  Any extra diuretic use: no  Weight gain: no  Compliant checking home weight: no  Fatigue: no  Abdominal bloating: no  Appetite: good  Difficulty sleeping: no  CARMEN: no  Cough: no  Compliant checking blood pressure: yes  Compliant with medication regimen: yes    Vaccinations:  flu Yes  Vaccinations : pneumonia Yes      Device: PPM  ICD counseling: No    Activity: fair  Can you walk 1-2 blocks or do a moderate amount of house/yard work? No    NYHA Class:    Class II   Patients with slight, mild limitation of activity; they are comfortable with rest or with mild exertion. Sodium Restrictions: 2g  Fluid Restrictions: 48-64 oz/day  Sodium and fluid restriction compliance: fair, lives in skilled nursing facility and does not prepare his own meals.       Past Medical History:   Diagnosis Date    ACTA2-related familial thoracic aortic aneurysm     Arrhythmia     Atrial fibrillation (HCC)     BBBB (bilateral bundle branch block)     Bradycardia     echocardiography (SIMONE) for monitoring 04/17/2018    EF45-50% mild TR, mild-mod MR, mod-severe AS    Hyperlipemia     Hyperlipidemia     Hypertension     Nonspecific abnormal electrocardiogram (ECG) (EKG)     Persistent atrial fibrillation     Sick sinus syndrome (HCC)     Vertigo      Past Surgical History:   Procedure Laterality Date    CYSTOSCOPY INSERTION / REMOVAL STENT / STONE Left 5/6/2019    CYSTOSCOPY RETROGRADE PYELOGRAM STENT INSERTION, DIAGNOSTIC CYSTOSCOPY performed by Alex Jay MD at [address] [facility] Left 03/29/2018    Dual Chamber [employer] Amber XT DR AMI Kincaid     No family history on file. Social History     Tobacco Use    Smoking status: Never Smoker    Smokeless tobacco: Never Used   Substance Use Topics    Alcohol use: Not Currently     Alcohol/week: 0.0 standard drinks     Current Outpatient Medications   Medication Sig Dispense Refill    [START ON 2/20/2020] cephALEXin (KEFLEX) 500 MG capsule Take 500 mg by mouth 2 times daily      spironolactone (ALDACTONE) 25 MG tablet Take 25 mg by mouth daily      Elastic Bandages & Supports (JOBST KNEE HIGH COMPRESSION SM) MISC 1 each by Does not apply route daily Knee high compression  Apply upon waking in the morning and remove at bedtime 1 each 0    polyethylene glycol (GLYCOLAX) powder Take 17 g by mouth daily      furosemide (LASIX) 20 MG tablet Take 3 tablets by mouth See Admin Instructions Take 2 tabs in the morning and 1 tab in the afternoon.  90 tablet 1    Polyvinyl Alcohol-Povidone (REFRESH OP) Apply 1 drop to eye as needed For dry eyes      acetaminophen (TYLENOL) 325 MG tablet Take 650 mg by mouth every 6 hours as needed for Pain For pain or temp      metoprolol tartrate (LOPRESSOR) 25 MG tablet Take 0.5 tablets by mouth 2 times daily (Patient taking differently: Take 12.5 mg by mouth 2 times daily Hold if SBP < 110 or HR is < 55) 30 tablet 0    docusate sodium (COLACE, DULCOLAX) 100 MG CAPS Take 100 Vitals:  /62 (Site: Left Upper Arm, Position: Sitting, Cuff Size: Medium Adult)   Pulse 67   Resp 18   Ht 5' 7\" (1.702 m)   Wt 174 lb (78.9 kg)   SpO2 98%   BMI 27.25 kg/m²     Wt Readings from Last 3 Encounters:   02/19/20 174 lb (78.9 kg)   02/18/20 172 lb 12.8 oz (78.4 kg)   01/31/20 175 lb (79.4 kg)     BP Readings from Last 3 Encounters:   02/19/20 112/62   02/18/20 (!) 90/55   01/31/20 130/68     Pulse Readings from Last 3 Encounters:   02/19/20 67   02/18/20 72   01/31/20 62     Body mass index is 27.25 kg/m². GENERAL - Alert, oriented, pleasant, in no apparent distress. Head -unremarkable  Eyes - pupils equal and reactive to light - bilateral conjunctiva are pink: sclera are white   ENT - external ears intact, nose is intact:  tongue is midline pink and moist  Neck - Supple. Jugular venous distention noted No: carotid bruits appreciated No.   Cardiovascular - Normal S1 and S2: murmur appreciated Yes, No gallop. Regular rate- Yes,  rhythm regular-Yes. Extremities - No cyanosis, clubbing, trace edema to lower legs. Pulmonary - No respiratory distress. No wheezes or rales. Chest is diminished  Pulses: Bilateral radial and pedal pulses normal  Abdomen -  Soft no tenderness, non distended   Musculoskeletal - Normal movement of all extremities  Neurologic - alert and oriented  Skin-  No rash: No echymosis    Affect- normal mood and pleasant     6 minute walk test:  Time walked: not performed today  Distance walked: not performed today  Required Oxygen No    Most recent ECHO:   5/2019  Left ventricular function is normal, EF is estimated at 50-55%.   Moderate left ventricular hypertrophy.   Infero-posterior wall segments are hypokinetic.   Moderate aortic regurgitation is noted ( ms).   No evidence of pericardial effusion.     Results reviewed:  BNP:   Lab Results   Component Value Date    PROBNP 1,102 (H) 02/14/2020     CBC:   Lab Results   Component Value Date    WBC 9.6 02/17/2020 RBC 4.44 02/17/2020    HGB 9.2 02/17/2020    HCT 33.4 02/17/2020     02/17/2020     CMP:    Lab Results   Component Value Date     02/17/2020    K 4.6 02/17/2020     02/17/2020    CO2 27 02/17/2020    BUN 37 02/17/2020    CREATININE 2.3 02/17/2020    GFRAA 33 02/17/2020    LABGLOM 27 02/17/2020    GLUCOSE 116 02/17/2020    CALCIUM 9.2 02/17/2020     Hepatic Function Panel:    Lab Results   Component Value Date    ALKPHOS 79 08/19/2019     08/19/2019    AST 97 08/19/2019    PROT 5.6 08/19/2019    BILITOT 0.5 08/19/2019    LABALBU 3.3 08/23/2019     Magnesium:    Lab Results   Component Value Date    MG 2.4 02/17/2020     PT/INR:    Lab Results   Component Value Date    PROTIME 20.0 09/11/2019    INR 1.71 09/11/2019     Lipids:    Lab Results   Component Value Date    TRIG 72 05/24/2019    HDL 47 05/24/2019    LDLDIRECT 86 05/24/2019       Iron Studies:   Iron Deficiency Anemia:  Yes IV Iron Therapy:  Yes  2017 ACC/AHA HF Guidelines:   intravenous iron replacement in patients with New York Heart Association (NYHA) class II and III HF and iron deficiency (ferritin <100 ng/ml or 100-300 ng/ml if transferrin saturation <20%), to improve functional status and QoL. ASSESSMENT AND PLAN:     Chronic Diastolic Congestive Heart Failure    Patient doing well  Does not have symptoms  Weight gain due to excessive calories, not fluid volume overload  Continue aldactone 25 mg daily  Continue lopressor 12.5 mg BID  Vitals:    02/19/20 1414   BP: 112/62   Pulse: 67   Resp: 18   SpO2: 98%         ACE/ ARB No    Can this be changed to ARNI No - current renal dysfuntion will not allow use.  Plus patient HFpEF       Loop Diuretic Yes    NYHA class II-IV with eGFR >30/mil/min/173.m2 and K <5.0 mEq/l   mineralcorctcoid receptor antagonist (spiroalacotne ( aldactone)/ eplerone) in addition to ACE or ARB and in conjunction with B blocker  No    B-blocker yes     HR greater than 70 with patient with LVEF  < 35 No  Able to use Ivabradine NA     Diastolic Dysfunction  · Continue with daily weights- to bring in records on each office visit  · Fluid restriction of 2 Liters per day  · Limit sodium in diet to around 5949-3981 mg/day  · Monitor BP at home- to bring in records on each office visit   · Activity as tolerated   · Continue present medical management  · Increase lasix to twice a day, second dose may be taken at 5 pm to allow for patient to rest at night. Patient was instructed to call the [facility] for changes in the following symptoms:    Weight gain of 3 pounds in 1 day or 5 pounds in 1 week   Increased shortness of breath   Shortness of breath while laying down   Cough   Chest pain   Swelling in feet, ankles or legs   Tenderness or bloating in the abdomen   Fatigue    Decreased appetite or nausea    Confusion     Patient to follow up with Dr Santa Cevallos in 3 months  Then follow up in the [facility] in 6 months    Patient given educational materials - see patient instructions. We discussed the importance of weighing oneself and recording daily. We also discussed the importance of a lowsodium diet, higher sodium foods to avoid and better low sodium food options. Discussed use, benefit, and side effects of prescribed medications. All patient questions answered. Patient verbalizes understanding of plan of care using teach back method, and is agreeable to the treatment plan.      Copy of note to be sent to consulting provider and primary cardiologist   Electronicallysigned by CARI Lira - VANCE

## 2020-02-20 ENCOUNTER — TELEPHONE (OUTPATIENT)
Dept: CARDIOLOGY CLINIC | Age: 85
End: 2020-02-20

## 2020-02-20 NOTE — TELEPHONE ENCOUNTER
I returned call to Blaine Chavis unable to reach her but left her a message that I checked on his previous weights and even though the weight has been going up and down he has only gained a couple of lb. Per Tristan Moore this is non significant continue to monitor and advise us if his weight goes up and stays up.

## 2020-02-20 NOTE — TELEPHONE ENCOUNTER
Jerrica at [facility] living called said pt weight has been fluctuating, 170,169,170,172,170,172

## 2020-02-24 PROCEDURE — 87086 URINE CULTURE/COLONY COUNT: CPT

## 2020-02-24 PROCEDURE — 81001 URINALYSIS AUTO W/SCOPE: CPT

## 2020-02-24 PROCEDURE — 36415 COLL VENOUS BLD VENIPUNCTURE: CPT

## 2020-02-25 ENCOUNTER — HOSPITAL ENCOUNTER (OUTPATIENT)
Age: 85
Setting detail: SPECIMEN
Discharge: HOME | End: 2020-02-25
Payer: MEDICARE

## 2020-02-25 LAB
ANION GAP SERPL CALCULATED.3IONS-SCNC: 11 MMOL/L (ref 4–16)
BACTERIA: NEGATIVE /HPF
BILIRUBIN URINE: NEGATIVE MG/DL
BLOOD, URINE: ABNORMAL
BUN BLDV-MCNC: 36 MG/DL (ref 6–23)
CALCIUM SERPL-MCNC: 9.1 MG/DL (ref 8.3–10.6)
CHLORIDE BLD-SCNC: 98 MMOL/L (ref 99–110)
CLARITY: ABNORMAL
CO2: 28 MMOL/L (ref 21–32)
COLOR: YELLOW
CREAT SERPL-MCNC: 2 MG/DL (ref 0.9–1.3)
GFR AFRICAN AMERICAN: 39 ML/MIN/1.73M2
GFR NON-AFRICAN AMERICAN: 32 ML/MIN/1.73M2
GLUCOSE BLD-MCNC: 132 MG/DL (ref 70–99)
GLUCOSE, URINE: NEGATIVE MG/DL
HCT VFR BLD CALC: 32.5 % (ref 42–52)
HEMOGLOBIN: 8.9 GM/DL (ref 13.5–18)
KETONES, URINE: NEGATIVE MG/DL
LEUKOCYTE ESTERASE, URINE: ABNORMAL
MCH RBC QN AUTO: 20.7 PG (ref 27–31)
MCHC RBC AUTO-ENTMCNC: 27.4 % (ref 32–36)
MCV RBC AUTO: 75.8 FL (ref 78–100)
NITRITE URINE, QUANTITATIVE: NEGATIVE
PDW BLD-RTO: 18.6 % (ref 11.7–14.9)
PH, URINE: 7 (ref 5–8)
PLATELET # BLD: 364 K/CU MM (ref 140–440)
PMV BLD AUTO: 9.3 FL (ref 7.5–11.1)
POTASSIUM SERPL-SCNC: 4.7 MMOL/L (ref 3.5–5.1)
PROTEIN UA: 100 MG/DL
RBC # BLD: 4.29 M/CU MM (ref 4.6–6.2)
RBC URINE: 200 /HPF (ref 0–3)
SODIUM BLD-SCNC: 137 MMOL/L (ref 135–145)
SPECIFIC GRAVITY UA: 1.01 (ref 1–1.03)
TRICHOMONAS: ABNORMAL /HPF
UROBILINOGEN, URINE: NORMAL MG/DL (ref 0.2–1)
WBC # BLD: 8.6 K/CU MM (ref 4–10.5)
WBC CLUMP: ABNORMAL /HPF
WBC UA: 1272 /HPF (ref 0–2)

## 2020-02-25 PROCEDURE — 36415 COLL VENOUS BLD VENIPUNCTURE: CPT

## 2020-02-25 PROCEDURE — 85027 COMPLETE CBC AUTOMATED: CPT

## 2020-02-25 PROCEDURE — 80048 BASIC METABOLIC PNL TOTAL CA: CPT

## 2020-02-26 LAB
CULTURE: ABNORMAL
Lab: ABNORMAL
SPECIMEN: ABNORMAL
TOTAL COLONY COUNT: ABNORMAL

## 2020-02-27 ENCOUNTER — HOSPITAL ENCOUNTER (OUTPATIENT)
Age: 85
Setting detail: SPECIMEN
Discharge: HOME | End: 2020-02-27
Payer: MEDICARE

## 2020-02-27 PROCEDURE — 87086 URINE CULTURE/COLONY COUNT: CPT

## 2020-02-27 PROCEDURE — 81001 URINALYSIS AUTO W/SCOPE: CPT

## 2020-02-28 LAB
BACTERIA: ABNORMAL /HPF
BILIRUBIN URINE: NEGATIVE MG/DL
BLOOD, URINE: ABNORMAL
CLARITY: ABNORMAL
COLOR: YELLOW
CULTURE: ABNORMAL
GLUCOSE, URINE: NEGATIVE MG/DL
KETONES, URINE: NEGATIVE MG/DL
LEUKOCYTE ESTERASE, URINE: ABNORMAL
Lab: ABNORMAL
NITRITE URINE, QUANTITATIVE: NEGATIVE
PH, URINE: 7 (ref 5–8)
PROTEIN UA: 100 MG/DL
RBC URINE: 41 /HPF (ref 0–3)
SPECIFIC GRAVITY UA: 1.01 (ref 1–1.03)
SPECIMEN: ABNORMAL
TOTAL COLONY COUNT: ABNORMAL
TRICHOMONAS: ABNORMAL /HPF
UROBILINOGEN, URINE: NORMAL MG/DL (ref 0.2–1)
WBC CLUMP: ABNORMAL /HPF
WBC UA: 765 /HPF (ref 0–2)

## 2020-03-03 ENCOUNTER — HOSPITAL ENCOUNTER (OUTPATIENT)
Age: 85
Setting detail: SPECIMEN
Discharge: HOME | End: 2020-03-03
Payer: MEDICARE

## 2020-03-03 LAB
ANION GAP SERPL CALCULATED.3IONS-SCNC: 13 MMOL/L (ref 4–16)
BUN BLDV-MCNC: 28 MG/DL (ref 6–23)
CALCIUM SERPL-MCNC: 8.9 MG/DL (ref 8.3–10.6)
CHLORIDE BLD-SCNC: 100 MMOL/L (ref 99–110)
CO2: 24 MMOL/L (ref 21–32)
CREAT SERPL-MCNC: 1.8 MG/DL (ref 0.9–1.3)
GFR AFRICAN AMERICAN: 44 ML/MIN/1.73M2
GFR NON-AFRICAN AMERICAN: 36 ML/MIN/1.73M2
GLUCOSE BLD-MCNC: 85 MG/DL (ref 70–99)
HCT VFR BLD CALC: 31.5 % (ref 42–52)
HEMOGLOBIN: 8.7 GM/DL (ref 13.5–18)
MCH RBC QN AUTO: 20.9 PG (ref 27–31)
MCHC RBC AUTO-ENTMCNC: 27.6 % (ref 32–36)
MCV RBC AUTO: 75.7 FL (ref 78–100)
PDW BLD-RTO: 18.7 % (ref 11.7–14.9)
PLATELET # BLD: 386 K/CU MM (ref 140–440)
PMV BLD AUTO: 9.5 FL (ref 7.5–11.1)
POTASSIUM SERPL-SCNC: 4.6 MMOL/L (ref 3.5–5.1)
RBC # BLD: 4.16 M/CU MM (ref 4.6–6.2)
SODIUM BLD-SCNC: 137 MMOL/L (ref 135–145)
WBC # BLD: 9.3 K/CU MM (ref 4–10.5)

## 2020-03-03 PROCEDURE — 80048 BASIC METABOLIC PNL TOTAL CA: CPT

## 2020-03-03 PROCEDURE — 36415 COLL VENOUS BLD VENIPUNCTURE: CPT

## 2020-03-03 PROCEDURE — 85027 COMPLETE CBC AUTOMATED: CPT

## 2020-03-10 ENCOUNTER — HOSPITAL ENCOUNTER (OUTPATIENT)
Age: 85
Setting detail: SPECIMEN
Discharge: HOME | End: 2020-03-10
Payer: MEDICARE

## 2020-03-10 ENCOUNTER — PROCEDURE VISIT (OUTPATIENT)
Dept: CARDIOLOGY CLINIC | Age: 85
End: 2020-03-10
Payer: MEDICARE

## 2020-03-10 LAB
ANION GAP SERPL CALCULATED.3IONS-SCNC: 12 MMOL/L (ref 4–16)
BUN BLDV-MCNC: 29 MG/DL (ref 6–23)
CALCIUM SERPL-MCNC: 9 MG/DL (ref 8.3–10.6)
CHLORIDE BLD-SCNC: 100 MMOL/L (ref 99–110)
CO2: 25 MMOL/L (ref 21–32)
CREAT SERPL-MCNC: 2 MG/DL (ref 0.9–1.3)
GFR AFRICAN AMERICAN: 39 ML/MIN/1.73M2
GFR NON-AFRICAN AMERICAN: 32 ML/MIN/1.73M2
GLUCOSE BLD-MCNC: 73 MG/DL (ref 70–99)
HCT VFR BLD CALC: 34.2 % (ref 42–52)
HEMOGLOBIN: 9.6 GM/DL (ref 13.5–18)
MCH RBC QN AUTO: 21.2 PG (ref 27–31)
MCHC RBC AUTO-ENTMCNC: 28.1 % (ref 32–36)
MCV RBC AUTO: 75.7 FL (ref 78–100)
PDW BLD-RTO: 19.2 % (ref 11.7–14.9)
PLATELET # BLD: 417 K/CU MM (ref 140–440)
PMV BLD AUTO: 9.1 FL (ref 7.5–11.1)
POTASSIUM SERPL-SCNC: 4.7 MMOL/L (ref 3.5–5.1)
RBC # BLD: 4.52 M/CU MM (ref 4.6–6.2)
SODIUM BLD-SCNC: 137 MMOL/L (ref 135–145)
WBC # BLD: 7.6 K/CU MM (ref 4–10.5)

## 2020-03-10 PROCEDURE — 85027 COMPLETE CBC AUTOMATED: CPT

## 2020-03-10 PROCEDURE — 36415 COLL VENOUS BLD VENIPUNCTURE: CPT

## 2020-03-10 PROCEDURE — 80048 BASIC METABOLIC PNL TOTAL CA: CPT

## 2020-03-10 PROCEDURE — 93294 REM INTERROG EVL PM/LDLS PM: CPT | Performed by: INTERNAL MEDICINE

## 2020-03-10 PROCEDURE — 93296 REM INTERROG EVL PM/IDS: CPT | Performed by: INTERNAL MEDICINE

## 2020-03-17 ENCOUNTER — HOSPITAL ENCOUNTER (OUTPATIENT)
Age: 85
Setting detail: SPECIMEN
Discharge: HOME | End: 2020-03-17
Payer: MEDICARE

## 2020-03-17 LAB
ANION GAP SERPL CALCULATED.3IONS-SCNC: 11 MMOL/L (ref 4–16)
BUN BLDV-MCNC: 31 MG/DL (ref 6–23)
CALCIUM SERPL-MCNC: 8.7 MG/DL (ref 8.3–10.6)
CHLORIDE BLD-SCNC: 98 MMOL/L (ref 99–110)
CO2: 26 MMOL/L (ref 21–32)
CREAT SERPL-MCNC: 2.1 MG/DL (ref 0.9–1.3)
GFR AFRICAN AMERICAN: 36 ML/MIN/1.73M2
GFR NON-AFRICAN AMERICAN: 30 ML/MIN/1.73M2
GLUCOSE BLD-MCNC: 85 MG/DL (ref 70–99)
HCT VFR BLD CALC: 32.1 % (ref 42–52)
HEMOGLOBIN: 9.2 GM/DL (ref 13.5–18)
MCH RBC QN AUTO: 21.5 PG (ref 27–31)
MCHC RBC AUTO-ENTMCNC: 28.7 % (ref 32–36)
MCV RBC AUTO: 75 FL (ref 78–100)
PDW BLD-RTO: 19.3 % (ref 11.7–14.9)
PLATELET # BLD: 388 K/CU MM (ref 140–440)
PMV BLD AUTO: 9.3 FL (ref 7.5–11.1)
POTASSIUM SERPL-SCNC: 5.1 MMOL/L (ref 3.5–5.1)
RBC # BLD: 4.28 M/CU MM (ref 4.6–6.2)
SODIUM BLD-SCNC: 135 MMOL/L (ref 135–145)
WBC # BLD: 8.7 K/CU MM (ref 4–10.5)

## 2020-03-17 PROCEDURE — 36415 COLL VENOUS BLD VENIPUNCTURE: CPT

## 2020-03-17 PROCEDURE — 80048 BASIC METABOLIC PNL TOTAL CA: CPT

## 2020-03-17 PROCEDURE — 85027 COMPLETE CBC AUTOMATED: CPT

## 2020-03-24 ENCOUNTER — TELEPHONE (OUTPATIENT)
Dept: INTERNAL MEDICINE CLINIC | Age: 85
End: 2020-03-24

## 2020-03-24 ENCOUNTER — HOSPITAL ENCOUNTER (OUTPATIENT)
Age: 85
Setting detail: SPECIMEN
Discharge: HOME | End: 2020-03-24
Payer: MEDICARE

## 2020-03-24 LAB
ANION GAP SERPL CALCULATED.3IONS-SCNC: 13 MMOL/L (ref 4–16)
BUN BLDV-MCNC: 38 MG/DL (ref 6–23)
CALCIUM SERPL-MCNC: 9.2 MG/DL (ref 8.3–10.6)
CHLORIDE BLD-SCNC: 101 MMOL/L (ref 99–110)
CO2: 27 MMOL/L (ref 21–32)
CREAT SERPL-MCNC: 2.3 MG/DL (ref 0.9–1.3)
GFR AFRICAN AMERICAN: 33 ML/MIN/1.73M2
GFR NON-AFRICAN AMERICAN: 27 ML/MIN/1.73M2
GLUCOSE BLD-MCNC: 95 MG/DL (ref 70–99)
HCT VFR BLD CALC: 32.5 % (ref 42–52)
HEMOGLOBIN: 9.1 GM/DL (ref 13.5–18)
MCH RBC QN AUTO: 21.4 PG (ref 27–31)
MCHC RBC AUTO-ENTMCNC: 28 % (ref 32–36)
MCV RBC AUTO: 76.5 FL (ref 78–100)
PDW BLD-RTO: 19.9 % (ref 11.7–14.9)
PLATELET # BLD: 345 K/CU MM (ref 140–440)
PMV BLD AUTO: 9.2 FL (ref 7.5–11.1)
POTASSIUM SERPL-SCNC: 4.7 MMOL/L (ref 3.5–5.1)
RBC # BLD: 4.25 M/CU MM (ref 4.6–6.2)
SODIUM BLD-SCNC: 141 MMOL/L (ref 135–145)
WBC # BLD: 8.4 K/CU MM (ref 4–10.5)

## 2020-03-24 PROCEDURE — 85027 COMPLETE CBC AUTOMATED: CPT

## 2020-03-24 PROCEDURE — 36415 COLL VENOUS BLD VENIPUNCTURE: CPT

## 2020-03-24 PROCEDURE — 80048 BASIC METABOLIC PNL TOTAL CA: CPT

## 2020-03-25 NOTE — PROGRESS NOTES
Labs reviewed that are faxed to office. Continue current medications    Check BMP around 5/6/2020 to be reviewed when seen at [address] 5/11/2020 with Dr. Erin Cunningham.      Electronically signed by CARI Arreola CNP on 3/25/2020 at 10:14 AM

## 2020-03-26 NOTE — PROGRESS NOTES
Attempted to contact [facility].  They transferred my call to o multiple people and then they hung up on me

## 2020-03-27 ENCOUNTER — TELEPHONE (OUTPATIENT)
Dept: CARDIOLOGY CLINIC | Age: 85
End: 2020-03-27

## 2020-03-31 ENCOUNTER — HOSPITAL ENCOUNTER (OUTPATIENT)
Age: 85
Setting detail: SPECIMEN
Discharge: HOME | End: 2020-03-31
Payer: MEDICARE

## 2020-03-31 LAB
ANION GAP SERPL CALCULATED.3IONS-SCNC: 11 MMOL/L (ref 4–16)
BASOPHILS ABSOLUTE: ABNORMAL
BASOPHILS RELATIVE PERCENT: ABNORMAL
BUN BLDV-MCNC: 29 MG/DL
BUN BLDV-MCNC: 29 MG/DL (ref 6–23)
CALCIUM SERPL-MCNC: 8.9 MG/DL
CALCIUM SERPL-MCNC: 8.9 MG/DL (ref 8.3–10.6)
CHLORIDE BLD-SCNC: 103 MMOL/L
CHLORIDE BLD-SCNC: 103 MMOL/L (ref 99–110)
CO2: 25 MMOL/L
CO2: 25 MMOL/L (ref 21–32)
CREAT SERPL-MCNC: 1.9 MG/DL
CREAT SERPL-MCNC: 1.9 MG/DL (ref 0.9–1.3)
EOSINOPHILS ABSOLUTE: ABNORMAL
EOSINOPHILS RELATIVE PERCENT: ABNORMAL
GFR AFRICAN AMERICAN: 41 ML/MIN/1.73M2
GFR CALCULATED: 34
GFR NON-AFRICAN AMERICAN: 34 ML/MIN/1.73M2
GLUCOSE BLD-MCNC: 120 MG/DL
GLUCOSE BLD-MCNC: 120 MG/DL (ref 70–99)
HCT VFR BLD CALC: 32.7 % (ref 41–53)
HCT VFR BLD CALC: 32.7 % (ref 42–52)
HEMOGLOBIN: 9 G/DL (ref 13.5–17.5)
HEMOGLOBIN: 9 GM/DL (ref 13.5–18)
LYMPHOCYTES ABSOLUTE: ABNORMAL
LYMPHOCYTES RELATIVE PERCENT: ABNORMAL
MCH RBC QN AUTO: 21.5 PG (ref 27–31)
MCH RBC QN AUTO: 27.5 PG
MCHC RBC AUTO-ENTMCNC: 27.5 % (ref 32–36)
MCHC RBC AUTO-ENTMCNC: ABNORMAL G/DL
MCV RBC AUTO: 21.5 FL
MCV RBC AUTO: 78 FL (ref 78–100)
MONOCYTES ABSOLUTE: ABNORMAL
MONOCYTES RELATIVE PERCENT: ABNORMAL
NEUTROPHILS ABSOLUTE: ABNORMAL
NEUTROPHILS RELATIVE PERCENT: ABNORMAL
PDW BLD-RTO: 20.9 % (ref 11.7–14.9)
PLATELET # BLD: 380 K/CU MM (ref 140–440)
PLATELET # BLD: 380 K/ΜL
PMV BLD AUTO: 9.6 FL (ref 7.5–11.1)
PMV BLD AUTO: ABNORMAL FL
POTASSIUM SERPL-SCNC: 4.6 MMOL/L
POTASSIUM SERPL-SCNC: 4.6 MMOL/L (ref 3.5–5.1)
RBC # BLD: 4.19 10^6/ΜL
RBC # BLD: 4.19 M/CU MM (ref 4.6–6.2)
SODIUM BLD-SCNC: 139 MMOL/L
SODIUM BLD-SCNC: 139 MMOL/L (ref 135–145)
WBC # BLD: 8.1 10^3/ML
WBC # BLD: 8.1 K/CU MM (ref 4–10.5)

## 2020-03-31 PROCEDURE — 85027 COMPLETE CBC AUTOMATED: CPT

## 2020-03-31 PROCEDURE — 80048 BASIC METABOLIC PNL TOTAL CA: CPT

## 2020-03-31 PROCEDURE — 36415 COLL VENOUS BLD VENIPUNCTURE: CPT

## 2020-04-07 ENCOUNTER — HOSPITAL ENCOUNTER (OUTPATIENT)
Age: 85
Setting detail: SPECIMEN
Discharge: HOME | End: 2020-04-07
Payer: MEDICARE

## 2020-04-07 PROCEDURE — 80048 BASIC METABOLIC PNL TOTAL CA: CPT

## 2020-04-12 ENCOUNTER — HOSPITAL ENCOUNTER (OUTPATIENT)
Age: 85
Setting detail: SPECIMEN
Discharge: HOME | End: 2020-04-12
Payer: MEDICARE

## 2020-04-12 PROCEDURE — 87077 CULTURE AEROBIC IDENTIFY: CPT

## 2020-04-12 PROCEDURE — 87186 SC STD MICRODIL/AGAR DIL: CPT

## 2020-04-12 PROCEDURE — 81001 URINALYSIS AUTO W/SCOPE: CPT

## 2020-04-12 PROCEDURE — 87086 URINE CULTURE/COLONY COUNT: CPT

## 2020-04-13 LAB
BACTERIA: ABNORMAL /HPF
BILIRUBIN URINE: NEGATIVE MG/DL
BLOOD, URINE: ABNORMAL
CLARITY: ABNORMAL
COLOR: YELLOW
GLUCOSE, URINE: NEGATIVE MG/DL
KETONES, URINE: NEGATIVE MG/DL
LEUKOCYTE ESTERASE, URINE: ABNORMAL
NITRITE URINE, QUANTITATIVE: POSITIVE
PH, URINE: 6 (ref 5–8)
PROTEIN UA: 100 MG/DL
RBC URINE: 141 /HPF (ref 0–3)
SPECIFIC GRAVITY UA: 1.01 (ref 1–1.03)
TRICHOMONAS: ABNORMAL /HPF
UROBILINOGEN, URINE: NORMAL MG/DL (ref 0.2–1)
WBC CLUMP: ABNORMAL /HPF
WBC UA: 2255 /HPF (ref 0–2)

## 2020-04-14 ENCOUNTER — HOSPITAL ENCOUNTER (OUTPATIENT)
Age: 85
Setting detail: SPECIMEN
Discharge: HOME | End: 2020-04-14
Payer: MEDICARE

## 2020-04-14 LAB
ANION GAP SERPL CALCULATED.3IONS-SCNC: 12 MMOL/L (ref 4–16)
BUN BLDV-MCNC: 30 MG/DL (ref 6–23)
CALCIUM SERPL-MCNC: 8.9 MG/DL (ref 8.3–10.6)
CHLORIDE BLD-SCNC: 97 MMOL/L (ref 99–110)
CO2: 26 MMOL/L (ref 21–32)
CREAT SERPL-MCNC: 1.9 MG/DL (ref 0.9–1.3)
GFR AFRICAN AMERICAN: 41 ML/MIN/1.73M2
GFR NON-AFRICAN AMERICAN: 34 ML/MIN/1.73M2
GLUCOSE BLD-MCNC: 107 MG/DL (ref 70–99)
HCT VFR BLD CALC: 32.3 % (ref 42–52)
HEMOGLOBIN: 9 GM/DL (ref 13.5–18)
MCH RBC QN AUTO: 21.6 PG (ref 27–31)
MCHC RBC AUTO-ENTMCNC: 27.9 % (ref 32–36)
MCV RBC AUTO: 77.5 FL (ref 78–100)
PDW BLD-RTO: 20.6 % (ref 11.7–14.9)
PLATELET # BLD: 391 K/CU MM (ref 140–440)
PMV BLD AUTO: 9.5 FL (ref 7.5–11.1)
POTASSIUM SERPL-SCNC: 4 MMOL/L (ref 3.5–5.1)
RBC # BLD: 4.17 M/CU MM (ref 4.6–6.2)
SODIUM BLD-SCNC: 135 MMOL/L (ref 135–145)
WBC # BLD: 7.6 K/CU MM (ref 4–10.5)

## 2020-04-14 PROCEDURE — 85027 COMPLETE CBC AUTOMATED: CPT

## 2020-04-14 PROCEDURE — 36415 COLL VENOUS BLD VENIPUNCTURE: CPT

## 2020-04-14 PROCEDURE — 80048 BASIC METABOLIC PNL TOTAL CA: CPT

## 2020-04-15 ENCOUNTER — HOSPITAL ENCOUNTER (OUTPATIENT)
Age: 85
Setting detail: SPECIMEN
Discharge: HOME | End: 2020-04-15
Payer: MEDICARE

## 2020-04-15 LAB
CULTURE: ABNORMAL
CULTURE: ABNORMAL
Lab: ABNORMAL
SPECIMEN: ABNORMAL
TOTAL COLONY COUNT: ABNORMAL

## 2020-04-21 ENCOUNTER — HOSPITAL ENCOUNTER (OUTPATIENT)
Age: 85
Setting detail: SPECIMEN
Discharge: HOME | End: 2020-04-21
Payer: MEDICARE

## 2020-04-21 LAB
ANION GAP SERPL CALCULATED.3IONS-SCNC: 14 MMOL/L (ref 4–16)
BUN BLDV-MCNC: 32 MG/DL (ref 6–23)
CALCIUM SERPL-MCNC: 9.3 MG/DL (ref 8.3–10.6)
CHLORIDE BLD-SCNC: 97 MMOL/L (ref 99–110)
CO2: 25 MMOL/L (ref 21–32)
CREAT SERPL-MCNC: 2.5 MG/DL (ref 0.9–1.3)
GFR AFRICAN AMERICAN: 30 ML/MIN/1.73M2
GFR NON-AFRICAN AMERICAN: 25 ML/MIN/1.73M2
GLUCOSE BLD-MCNC: 74 MG/DL (ref 70–99)
HCT VFR BLD CALC: 32.6 % (ref 42–52)
HEMOGLOBIN: 9.3 GM/DL (ref 13.5–18)
MCH RBC QN AUTO: 21.8 PG (ref 27–31)
MCHC RBC AUTO-ENTMCNC: 28.5 % (ref 32–36)
MCV RBC AUTO: 76.3 FL (ref 78–100)
PDW BLD-RTO: 20.3 % (ref 11.7–14.9)
PLATELET # BLD: 438 K/CU MM (ref 140–440)
PMV BLD AUTO: 9.4 FL (ref 7.5–11.1)
POTASSIUM SERPL-SCNC: 4.6 MMOL/L (ref 3.5–5.1)
RBC # BLD: 4.27 M/CU MM (ref 4.6–6.2)
SODIUM BLD-SCNC: 136 MMOL/L (ref 135–145)
WBC # BLD: 8.6 K/CU MM (ref 4–10.5)

## 2020-04-21 PROCEDURE — 36415 COLL VENOUS BLD VENIPUNCTURE: CPT

## 2020-04-21 PROCEDURE — 80048 BASIC METABOLIC PNL TOTAL CA: CPT

## 2020-04-21 PROCEDURE — 85027 COMPLETE CBC AUTOMATED: CPT

## 2020-04-24 ENCOUNTER — HOSPITAL ENCOUNTER (OUTPATIENT)
Age: 85
Setting detail: SPECIMEN
Discharge: HOME | End: 2020-04-24
Payer: MEDICARE

## 2020-04-24 LAB
ANION GAP SERPL CALCULATED.3IONS-SCNC: 10 MMOL/L (ref 4–16)
BUN BLDV-MCNC: 27 MG/DL (ref 6–23)
CALCIUM SERPL-MCNC: 9.2 MG/DL (ref 8.3–10.6)
CHLORIDE BLD-SCNC: 96 MMOL/L (ref 99–110)
CO2: 25 MMOL/L (ref 21–32)
CREAT SERPL-MCNC: 2.2 MG/DL (ref 0.9–1.3)
GFR AFRICAN AMERICAN: 35 ML/MIN/1.73M2
GFR NON-AFRICAN AMERICAN: 29 ML/MIN/1.73M2
GLUCOSE BLD-MCNC: 80 MG/DL (ref 70–99)
POTASSIUM SERPL-SCNC: 5.1 MMOL/L (ref 3.5–5.1)
SODIUM BLD-SCNC: 131 MMOL/L (ref 135–145)

## 2020-04-24 PROCEDURE — 80048 BASIC METABOLIC PNL TOTAL CA: CPT

## 2020-04-24 PROCEDURE — 36415 COLL VENOUS BLD VENIPUNCTURE: CPT

## 2020-04-26 ENCOUNTER — OUTSIDE SERVICES (OUTPATIENT)
Dept: INTERNAL MEDICINE CLINIC | Age: 85
End: 2020-04-26
Payer: MEDICARE

## 2020-04-26 PROBLEM — F03.90 DEMENTIA WITHOUT BEHAVIORAL DISTURBANCE (HCC): Status: ACTIVE | Noted: 2020-04-26

## 2020-04-27 NOTE — PROGRESS NOTES
wheezes or rales. ABDOMEN: - Soft and non-tender,no masses  or organomegaly. EXTREMITIES: - No cyanosis, clubbing, or significant edema. SKIN: Skin is warm and dry. NEUROLOGICAL: - Cranial nerves II through XII are grossly intact. Patient is confused. Knows his name. Assessment:  .  Dementia on Exelon  . Chronic kidney disease stage III  . Atrial fibrillation on Eliquis and amiodarone  . Hyperlipidemia on atorvastatin  . BPH on finasteride  . Congestive heart failure  . Constipation on MiraLAX  . History of kidney stones  . Anemia ? Due CKD. Plan:  Medication were reviewed. Continue current treatment.   Discussed with patient about fall precautions

## 2020-04-28 ENCOUNTER — HOSPITAL ENCOUNTER (OUTPATIENT)
Age: 85
Setting detail: SPECIMEN
Discharge: HOME | End: 2020-04-28
Payer: MEDICARE

## 2020-04-28 LAB
ANION GAP SERPL CALCULATED.3IONS-SCNC: 14 MMOL/L (ref 4–16)
BUN BLDV-MCNC: 30 MG/DL (ref 6–23)
CALCIUM SERPL-MCNC: 9.5 MG/DL (ref 8.3–10.6)
CHLORIDE BLD-SCNC: 98 MMOL/L (ref 99–110)
CO2: 25 MMOL/L (ref 21–32)
CREAT SERPL-MCNC: 2.4 MG/DL (ref 0.9–1.3)
GFR AFRICAN AMERICAN: 31 ML/MIN/1.73M2
GFR NON-AFRICAN AMERICAN: 26 ML/MIN/1.73M2
GLUCOSE BLD-MCNC: 105 MG/DL (ref 70–99)
HCT VFR BLD CALC: 33.7 % (ref 42–52)
HEMOGLOBIN: 9.8 GM/DL (ref 13.5–18)
MCH RBC QN AUTO: 22.2 PG (ref 27–31)
MCHC RBC AUTO-ENTMCNC: 29.1 % (ref 32–36)
MCV RBC AUTO: 76.2 FL (ref 78–100)
PDW BLD-RTO: 19.9 % (ref 11.7–14.9)
PLATELET # BLD: 448 K/CU MM (ref 140–440)
PMV BLD AUTO: 9 FL (ref 7.5–11.1)
POTASSIUM SERPL-SCNC: 4.6 MMOL/L (ref 3.5–5.1)
RBC # BLD: 4.42 M/CU MM (ref 4.6–6.2)
SODIUM BLD-SCNC: 137 MMOL/L (ref 135–145)
WBC # BLD: 8.2 K/CU MM (ref 4–10.5)

## 2020-04-28 PROCEDURE — 80048 BASIC METABOLIC PNL TOTAL CA: CPT

## 2020-04-28 PROCEDURE — 36415 COLL VENOUS BLD VENIPUNCTURE: CPT

## 2020-04-28 PROCEDURE — 85027 COMPLETE CBC AUTOMATED: CPT

## 2020-05-05 ENCOUNTER — HOSPITAL ENCOUNTER (OUTPATIENT)
Age: 85
Setting detail: SPECIMEN
Discharge: HOME | End: 2020-05-05
Payer: MEDICARE

## 2020-05-05 ENCOUNTER — TELEPHONE (OUTPATIENT)
Dept: CARDIOLOGY CLINIC | Age: 85
End: 2020-05-05

## 2020-05-05 LAB
ANION GAP SERPL CALCULATED.3IONS-SCNC: 10 MMOL/L (ref 4–16)
BUN BLDV-MCNC: 30 MG/DL (ref 6–23)
CALCIUM SERPL-MCNC: 9.3 MG/DL (ref 8.3–10.6)
CHLORIDE BLD-SCNC: 96 MMOL/L (ref 99–110)
CO2: 26 MMOL/L (ref 21–32)
CREAT SERPL-MCNC: 2.1 MG/DL (ref 0.9–1.3)
GFR AFRICAN AMERICAN: 36 ML/MIN/1.73M2
GFR NON-AFRICAN AMERICAN: 30 ML/MIN/1.73M2
GLUCOSE BLD-MCNC: 84 MG/DL (ref 70–99)
HCT VFR BLD CALC: 33.9 % (ref 42–52)
HEMOGLOBIN: 9.4 GM/DL (ref 13.5–18)
MCH RBC QN AUTO: 21.6 PG (ref 27–31)
MCHC RBC AUTO-ENTMCNC: 27.7 % (ref 32–36)
MCV RBC AUTO: 77.9 FL (ref 78–100)
PDW BLD-RTO: 18.9 % (ref 11.7–14.9)
PLATELET # BLD: 387 K/CU MM (ref 140–440)
PMV BLD AUTO: 9 FL (ref 7.5–11.1)
POTASSIUM SERPL-SCNC: 4.5 MMOL/L (ref 3.5–5.1)
RBC # BLD: 4.35 M/CU MM (ref 4.6–6.2)
SODIUM BLD-SCNC: 132 MMOL/L (ref 135–145)
WBC # BLD: 8.2 K/CU MM (ref 4–10.5)

## 2020-05-05 PROCEDURE — 85027 COMPLETE CBC AUTOMATED: CPT

## 2020-05-05 PROCEDURE — 36415 COLL VENOUS BLD VENIPUNCTURE: CPT

## 2020-05-05 PROCEDURE — 80048 BASIC METABOLIC PNL TOTAL CA: CPT

## 2020-05-05 NOTE — TELEPHONE ENCOUNTER
Labs reviewed from 4/28/2020    Continue current medications per labs. He is scheduled to see Dr. Ranjith Siddiqi 5/11/2020. Patient needs to keep this appointment as he has not been physically examined for his HF in a few months.      Electronically signed by CARI Vieyra CNP on 5/5/2020 at 9:01 AM

## 2020-05-08 ENCOUNTER — TELEPHONE (OUTPATIENT)
Dept: CARDIOLOGY CLINIC | Age: 85
End: 2020-05-08

## 2020-05-12 ENCOUNTER — HOSPITAL ENCOUNTER (OUTPATIENT)
Age: 85
Setting detail: SPECIMEN
Discharge: HOME | End: 2020-05-12
Payer: MEDICARE

## 2020-05-12 LAB
ANION GAP SERPL CALCULATED.3IONS-SCNC: 11 MMOL/L (ref 4–16)
BUN BLDV-MCNC: 28 MG/DL (ref 6–23)
CALCIUM SERPL-MCNC: 9 MG/DL (ref 8.3–10.6)
CHLORIDE BLD-SCNC: 101 MMOL/L (ref 99–110)
CO2: 29 MMOL/L (ref 21–32)
CREAT SERPL-MCNC: 1.9 MG/DL (ref 0.9–1.3)
GFR AFRICAN AMERICAN: 41 ML/MIN/1.73M2
GFR NON-AFRICAN AMERICAN: 34 ML/MIN/1.73M2
GLUCOSE BLD-MCNC: 86 MG/DL (ref 70–99)
HCT VFR BLD CALC: 33.2 % (ref 42–52)
HEMOGLOBIN: 9.2 GM/DL (ref 13.5–18)
MCH RBC QN AUTO: 21.7 PG (ref 27–31)
MCHC RBC AUTO-ENTMCNC: 27.7 % (ref 32–36)
MCV RBC AUTO: 78.3 FL (ref 78–100)
PDW BLD-RTO: 18.6 % (ref 11.7–14.9)
PLATELET # BLD: 356 K/CU MM (ref 140–440)
PMV BLD AUTO: 9.5 FL (ref 7.5–11.1)
POTASSIUM SERPL-SCNC: 4.3 MMOL/L (ref 3.5–5.1)
RBC # BLD: 4.24 M/CU MM (ref 4.6–6.2)
SODIUM BLD-SCNC: 141 MMOL/L (ref 135–145)
WBC # BLD: 6.3 K/CU MM (ref 4–10.5)

## 2020-05-12 PROCEDURE — 36415 COLL VENOUS BLD VENIPUNCTURE: CPT

## 2020-05-12 PROCEDURE — 80048 BASIC METABOLIC PNL TOTAL CA: CPT

## 2020-05-12 PROCEDURE — 85027 COMPLETE CBC AUTOMATED: CPT

## 2020-05-13 ENCOUNTER — TELEMEDICINE (OUTPATIENT)
Dept: CARDIOLOGY CLINIC | Age: 85
End: 2020-05-13
Payer: MEDICARE

## 2020-05-13 PROCEDURE — G8428 CUR MEDS NOT DOCUMENT: HCPCS | Performed by: NURSE PRACTITIONER

## 2020-05-13 PROCEDURE — 1036F TOBACCO NON-USER: CPT | Performed by: NURSE PRACTITIONER

## 2020-05-13 PROCEDURE — 1123F ACP DISCUSS/DSCN MKR DOCD: CPT | Performed by: NURSE PRACTITIONER

## 2020-05-13 PROCEDURE — 4040F PNEUMOC VAC/ADMIN/RCVD: CPT | Performed by: NURSE PRACTITIONER

## 2020-05-13 PROCEDURE — G8417 CALC BMI ABV UP PARAM F/U: HCPCS | Performed by: NURSE PRACTITIONER

## 2020-05-13 PROCEDURE — 99213 OFFICE O/P EST LOW 20 MIN: CPT | Performed by: NURSE PRACTITIONER

## 2020-05-13 ASSESSMENT — ENCOUNTER SYMPTOMS
CHEST TIGHTNESS: 0
SHORTNESS OF BREATH: 0
BACK PAIN: 0
NAUSEA: 0
SINUS PRESSURE: 0
EYE ITCHING: 0
SORE THROAT: 0
BLOOD IN STOOL: 0
ABDOMINAL DISTENTION: 0
CONSTIPATION: 0
ABDOMINAL PAIN: 0
VOMITING: 0
SINUS PAIN: 0
COLOR CHANGE: 0
DIARRHEA: 0
EYE REDNESS: 0

## 2020-05-13 NOTE — PROGRESS NOTES
Take 200 mg by mouth nightly   Valenteit Shanthi, APRN - CNP   rivastigmine (EXELON) 4.6 MG/24HR Place 1 patch onto the skin every morning  Historical Provider, MD   Cholecalciferol (VITAMIN D3) 5000 units TABS Take 5,000 Units by mouth every morning  Historical Provider, MD   apixaban (ELIQUIS) 2.5 MG TABS tablet Take 1 tablet by mouth 2 times daily  Alice Beckford MD   Omega-3 Fatty Acids (FISH OIL) 1000 MG CAPS Take 1,000 mg by mouth daily   Historical Provider, MD   Multiple Vitamins-Minerals (THERAPEUTIC MULTIVITAMIN-MINERALS) tablet Take 1 tablet by mouth daily  Historical Provider, MD   atorvastatin (LIPITOR) 20 MG tablet Take 20 mg by mouth nightly   Historical Provider, MD       Social History     Tobacco Use    Smoking status: Never Smoker    Smokeless tobacco: Never Used   Substance Use Topics    Alcohol use: Not Currently     Alcohol/week: 0.0 standard drinks    Drug use: No        PHYSICAL EXAMINATION:  [ INSTRUCTIONS:  \"[x]\" Indicates a positive item  \"[]\" Indicates a negative item  -- DELETE ALL ITEMS NOT EXAMINED]  Vital Signs: (As obtained by patient/caregiver or practitioner observation)    Blood pressure- 123/60 Heart rate- 73     Constitutional: [x] Appears well-developed and well-nourished [x] No apparent distress      [] Abnormal-   Mental status  [x] Alert and awake  [x] Oriented to person/place/time []Able to follow commands      Eyes:  EOM    [x]  Normal  [] Abnormal-  Sclera  [x]  Normal  [] Abnormal -         Discharge [x]  None visible  [] Abnormal -    HENT:   [x] Normocephalic, atraumatic.   [] Abnormal   [x] Mouth/Throat: Mucous membranes are moist.     External Ears [x] Normal  [] Abnormal-     Neck: [x] No visualized mass     Pulmonary/Chest: [x] Respiratory effort normal.  [x] No visualized signs of difficulty breathing or respiratory distress        [] Abnormal-      Musculoskeletal:   [x] Normal gait with no signs of ataxia         [x] Normal range of motion of neck nonspecific ST segment changes to the anterior lateral leads, QRS RBBB,  No significant Q waves, no ectopy. A-fib w/RBBB w/nonspecific ST segment change EKG.  History of EKG 02/27/2017    Time 2:58AM: HR 59, NSR, Axis normal, Intervals normal, P waves normal, ST-T seg: nonspecific ST segment changes, QRS RBBB, no significant Q waves, no ectopy. Sinus bradycardia w/ RBBB nonspecific ST Segment changes EKG    History of Holter monitoring 11/08/2017    monitored 48 hours: Patient noted to have multiple PAC and PVCs. Risk of atrial fibrillation present given previous episode Recommend antiarrhythmic therapy.  History of stress test 03/27/2017    NM Stress test: EF 54%, Abnormal study, evidence of mild ischemia in mild inferior regions. post stress LV is enlarged in size. Post-stress LV function is normal.     Hx of transesophageal echocardiography (SIMONE) for monitoring 04/17/2018    EF45-50% mild TR, mild-mod MR, mod-severe AS    Hyperlipemia     Hyperlipidemia     Hypertension     Nonspecific abnormal electrocardiogram (ECG) (EKG)     Persistent atrial fibrillation     Sick sinus syndrome (HCC)     Vertigo      Past Surgical History:   Procedure Laterality Date    CYSTOSCOPY INSERTION / REMOVAL STENT / STONE Left 5/6/2019    CYSTOSCOPY RETROGRADE PYELOGRAM STENT INSERTION, DIAGNOSTIC CYSTOSCOPY performed by Ozzie Reyna MD at [place] [address] Left 03/29/2018    Dual Chamber [employer] [place] XT DR AMI Kincaid     No family history on file.   Social History     Tobacco Use    Smoking status: Never Smoker    Smokeless tobacco: Never Used   Substance Use Topics    Alcohol use: Not Currently     Alcohol/week: 0.0 standard drinks     Current Outpatient Medications   Medication Sig Dispense Refill    spironolactone (ALDACTONE) 25 MG tablet Take 25 mg by mouth daily      Elastic Bandages & Supports (JOBST KNEE HIGH COMPRESSION SM) MISC 1 each by Does not apply route daily Knee high compression  Apply upon waking in the morning and remove at bedtime 1 each 0    polyethylene glycol (GLYCOLAX) powder Take 17 g by mouth daily      furosemide (LASIX) 20 MG tablet Take 3 tablets by mouth See Admin Instructions Take 2 tabs in the morning and 1 tab in the afternoon. 90 tablet 1    Polyvinyl Alcohol-Povidone (REFRESH OP) Apply 1 drop to eye as needed For dry eyes      acetaminophen (TYLENOL) 325 MG tablet Take 650 mg by mouth every 6 hours as needed for Pain For pain or temp      metoprolol tartrate (LOPRESSOR) 25 MG tablet Take 0.5 tablets by mouth 2 times daily (Patient taking differently: Take 12.5 mg by mouth 2 times daily Hold if SBP < 110 or HR is < 55) 30 tablet 0    docusate sodium (COLACE, DULCOLAX) 100 MG CAPS Take 100 mg by mouth 2 times daily as needed for Constipation 30 capsule 0    finasteride (PROSCAR) 5 MG tablet Take 1 tablet by mouth daily 30 tablet 0    amiodarone (CORDARONE) 200 MG tablet Take 1 tablet by mouth daily (Patient taking differently: Take 200 mg by mouth nightly ) 30 tablet 3    rivastigmine (EXELON) 4.6 MG/24HR Place 1 patch onto the skin every morning      Cholecalciferol (VITAMIN D3) 5000 units TABS Take 5,000 Units by mouth every morning      apixaban (ELIQUIS) 2.5 MG TABS tablet Take 1 tablet by mouth 2 times daily 60 tablet 3    Omega-3 Fatty Acids (FISH OIL) 1000 MG CAPS Take 1,000 mg by mouth daily       Multiple Vitamins-Minerals (THERAPEUTIC MULTIVITAMIN-MINERALS) tablet Take 1 tablet by mouth daily      atorvastatin (LIPITOR) 20 MG tablet Take 20 mg by mouth nightly        No current facility-administered medications for this visit. Allergies   Allergen Reactions    Biaxin [Clarithromycin]     Cephalexin     Viagra [Sildenafil Citrate] Nausea Only and Other (See Comments)     Dizziness and BP dropped       Today's Vitals: There were no vitals taken for this visit.     Wt Readings from Last 3 Encounters:   02/19/20 174 lb (78.9 kg)

## 2020-05-19 ENCOUNTER — HOSPITAL ENCOUNTER (OUTPATIENT)
Age: 85
Setting detail: SPECIMEN
Discharge: HOME | End: 2020-05-19
Payer: MEDICARE

## 2020-05-19 LAB
ALBUMIN SERPL-MCNC: 3.3 GM/DL (ref 3.4–5)
ALP BLD-CCNC: 79 IU/L (ref 40–129)
ALT SERPL-CCNC: 29 U/L (ref 10–40)
ANION GAP SERPL CALCULATED.3IONS-SCNC: 10 MMOL/L (ref 4–16)
AST SERPL-CCNC: 31 IU/L (ref 15–37)
BACTERIA: ABNORMAL /HPF
BILIRUB SERPL-MCNC: 0.3 MG/DL (ref 0–1)
BILIRUBIN DIRECT: 0.2 MG/DL (ref 0–0.3)
BILIRUBIN URINE: NEGATIVE MG/DL
BILIRUBIN, INDIRECT: 0.1 MG/DL (ref 0–0.7)
BLOOD, URINE: ABNORMAL
BUN BLDV-MCNC: 23 MG/DL (ref 6–23)
CALCIUM SERPL-MCNC: 8.9 MG/DL (ref 8.3–10.6)
CHLORIDE BLD-SCNC: 100 MMOL/L (ref 99–110)
CLARITY: ABNORMAL
CO2: 27 MMOL/L (ref 21–32)
COLOR: ABNORMAL
CREAT SERPL-MCNC: 1.8 MG/DL (ref 0.9–1.3)
GFR AFRICAN AMERICAN: 44 ML/MIN/1.73M2
GFR NON-AFRICAN AMERICAN: 36 ML/MIN/1.73M2
GLUCOSE BLD-MCNC: 76 MG/DL (ref 70–99)
GLUCOSE, URINE: NEGATIVE MG/DL
HCT VFR BLD CALC: 31.2 % (ref 42–52)
HEMOGLOBIN: 8.8 GM/DL (ref 13.5–18)
KETONES, URINE: NEGATIVE MG/DL
LEUKOCYTE ESTERASE, URINE: ABNORMAL
MAGNESIUM: 2.3 MG/DL (ref 1.8–2.4)
MCH RBC QN AUTO: 21.7 PG (ref 27–31)
MCHC RBC AUTO-ENTMCNC: 28.2 % (ref 32–36)
MCV RBC AUTO: 76.8 FL (ref 78–100)
NITRITE URINE, QUANTITATIVE: NEGATIVE
NON SQUAM EPI CELLS: 14 /HPF
PDW BLD-RTO: 18.1 % (ref 11.7–14.9)
PH, URINE: 7 (ref 5–8)
PHOSPHORUS: 2.6 MG/DL (ref 2.5–4.9)
PLATELET # BLD: 350 K/CU MM (ref 140–440)
PMV BLD AUTO: 9.4 FL (ref 7.5–11.1)
POTASSIUM SERPL-SCNC: 4.3 MMOL/L (ref 3.5–5.1)
PROTEIN UA: 100 MG/DL
RBC # BLD: 4.06 M/CU MM (ref 4.6–6.2)
RBC URINE: 52 /HPF (ref 0–3)
SODIUM BLD-SCNC: 137 MMOL/L (ref 135–145)
SPECIFIC GRAVITY UA: 1.01 (ref 1–1.03)
SQUAMOUS EPITHELIAL: 5 /HPF
TOTAL PROTEIN: 5.9 GM/DL (ref 6.4–8.2)
TRICHOMONAS: ABNORMAL /HPF
TSH HIGH SENSITIVITY: 2.89 UIU/ML (ref 0.27–4.2)
UROBILINOGEN, URINE: NORMAL MG/DL (ref 0.2–1)
WBC # BLD: 7.5 K/CU MM (ref 4–10.5)
WBC CLUMP: ABNORMAL /HPF
WBC UA: 2037 /HPF (ref 0–2)
YEAST: ABNORMAL /HPF

## 2020-05-19 PROCEDURE — 84443 ASSAY THYROID STIM HORMONE: CPT

## 2020-05-19 PROCEDURE — 82248 BILIRUBIN DIRECT: CPT

## 2020-05-19 PROCEDURE — 84100 ASSAY OF PHOSPHORUS: CPT

## 2020-05-19 PROCEDURE — 80053 COMPREHEN METABOLIC PANEL: CPT

## 2020-05-19 PROCEDURE — 83735 ASSAY OF MAGNESIUM: CPT

## 2020-05-19 PROCEDURE — 81001 URINALYSIS AUTO W/SCOPE: CPT

## 2020-05-19 PROCEDURE — 36415 COLL VENOUS BLD VENIPUNCTURE: CPT

## 2020-05-19 PROCEDURE — 85027 COMPLETE CBC AUTOMATED: CPT

## 2020-05-26 ENCOUNTER — HOSPITAL ENCOUNTER (OUTPATIENT)
Age: 85
Setting detail: SPECIMEN
Discharge: HOME | End: 2020-05-26
Payer: MEDICARE

## 2020-05-26 LAB
ANION GAP SERPL CALCULATED.3IONS-SCNC: 11 MMOL/L (ref 4–16)
BUN BLDV-MCNC: 26 MG/DL (ref 6–23)
CALCIUM SERPL-MCNC: 9 MG/DL (ref 8.3–10.6)
CHLORIDE BLD-SCNC: 98 MMOL/L (ref 99–110)
CO2: 28 MMOL/L (ref 21–32)
CREAT SERPL-MCNC: 1.8 MG/DL (ref 0.9–1.3)
GFR AFRICAN AMERICAN: 44 ML/MIN/1.73M2
GFR NON-AFRICAN AMERICAN: 36 ML/MIN/1.73M2
GLUCOSE BLD-MCNC: 84 MG/DL (ref 70–99)
HCT VFR BLD CALC: 30.9 % (ref 42–52)
HEMOGLOBIN: 8.7 GM/DL (ref 13.5–18)
MCH RBC QN AUTO: 21.6 PG (ref 27–31)
MCHC RBC AUTO-ENTMCNC: 28.2 % (ref 32–36)
MCV RBC AUTO: 76.9 FL (ref 78–100)
PDW BLD-RTO: 17.6 % (ref 11.7–14.9)
PLATELET # BLD: 384 K/CU MM (ref 140–440)
PMV BLD AUTO: 9.5 FL (ref 7.5–11.1)
POTASSIUM SERPL-SCNC: 4.4 MMOL/L (ref 3.5–5.1)
RBC # BLD: 4.02 M/CU MM (ref 4.6–6.2)
SODIUM BLD-SCNC: 137 MMOL/L (ref 135–145)
WBC # BLD: 8.4 K/CU MM (ref 4–10.5)

## 2020-05-26 PROCEDURE — 36415 COLL VENOUS BLD VENIPUNCTURE: CPT

## 2020-05-26 PROCEDURE — 80048 BASIC METABOLIC PNL TOTAL CA: CPT

## 2020-05-26 PROCEDURE — 85027 COMPLETE CBC AUTOMATED: CPT

## 2020-06-02 ENCOUNTER — HOSPITAL ENCOUNTER (OUTPATIENT)
Age: 85
Setting detail: SPECIMEN
Discharge: HOME | End: 2020-06-02
Payer: MEDICARE

## 2020-06-02 LAB
ALBUMIN SERPL-MCNC: 3.7 GM/DL (ref 3.4–5)
ALP BLD-CCNC: 90 IU/L (ref 40–128)
ALT SERPL-CCNC: 27 U/L (ref 10–40)
ANION GAP SERPL CALCULATED.3IONS-SCNC: 12 MMOL/L (ref 4–16)
AST SERPL-CCNC: 30 IU/L (ref 15–37)
BILIRUB SERPL-MCNC: 0.4 MG/DL (ref 0–1)
BUN BLDV-MCNC: 22 MG/DL (ref 6–23)
CALCIUM SERPL-MCNC: 9.2 MG/DL (ref 8.3–10.6)
CHLORIDE BLD-SCNC: 100 MMOL/L (ref 99–110)
CO2: 28 MMOL/L (ref 21–32)
CREAT SERPL-MCNC: 1.7 MG/DL (ref 0.9–1.3)
GFR AFRICAN AMERICAN: 46 ML/MIN/1.73M2
GFR NON-AFRICAN AMERICAN: 38 ML/MIN/1.73M2
GLUCOSE BLD-MCNC: 98 MG/DL (ref 70–99)
HCT VFR BLD CALC: 31 % (ref 42–52)
HEMOGLOBIN: 9 GM/DL (ref 13.5–18)
MCH RBC QN AUTO: 22.2 PG (ref 27–31)
MCHC RBC AUTO-ENTMCNC: 29 % (ref 32–36)
MCV RBC AUTO: 76.5 FL (ref 78–100)
PDW BLD-RTO: 17.4 % (ref 11.7–14.9)
PLATELET # BLD: 340 K/CU MM (ref 140–440)
PMV BLD AUTO: 9.6 FL (ref 7.5–11.1)
POTASSIUM SERPL-SCNC: 4.5 MMOL/L (ref 3.5–5.1)
RBC # BLD: 4.05 M/CU MM (ref 4.6–6.2)
SODIUM BLD-SCNC: 140 MMOL/L (ref 135–145)
TOTAL PROTEIN: 6.6 GM/DL (ref 6.4–8.2)
WBC # BLD: 7.2 K/CU MM (ref 4–10.5)

## 2020-06-02 PROCEDURE — 36415 COLL VENOUS BLD VENIPUNCTURE: CPT

## 2020-06-02 PROCEDURE — 85027 COMPLETE CBC AUTOMATED: CPT

## 2020-06-02 PROCEDURE — 80053 COMPREHEN METABOLIC PANEL: CPT

## 2020-06-09 ENCOUNTER — HOSPITAL ENCOUNTER (OUTPATIENT)
Age: 85
Setting detail: SPECIMEN
Discharge: HOME | End: 2020-06-09
Payer: MEDICARE

## 2020-06-09 LAB
ANION GAP SERPL CALCULATED.3IONS-SCNC: 11 MMOL/L (ref 4–16)
BUN BLDV-MCNC: 25 MG/DL (ref 6–23)
CALCIUM SERPL-MCNC: 9 MG/DL (ref 8.3–10.6)
CHLORIDE BLD-SCNC: 102 MMOL/L (ref 99–110)
CO2: 26 MMOL/L (ref 21–32)
CREAT SERPL-MCNC: 1.8 MG/DL (ref 0.9–1.3)
GFR AFRICAN AMERICAN: 44 ML/MIN/1.73M2
GFR NON-AFRICAN AMERICAN: 36 ML/MIN/1.73M2
GLUCOSE BLD-MCNC: 109 MG/DL (ref 70–99)
HCT VFR BLD CALC: 31.6 % (ref 42–52)
HEMOGLOBIN: 8.9 GM/DL (ref 13.5–18)
MCH RBC QN AUTO: 21.6 PG (ref 27–31)
MCHC RBC AUTO-ENTMCNC: 28.2 % (ref 32–36)
MCV RBC AUTO: 76.7 FL (ref 78–100)
PDW BLD-RTO: 17.4 % (ref 11.7–14.9)
PLATELET # BLD: 337 K/CU MM (ref 140–440)
PMV BLD AUTO: 9.7 FL (ref 7.5–11.1)
POTASSIUM SERPL-SCNC: 4.2 MMOL/L (ref 3.5–5.1)
RBC # BLD: 4.12 M/CU MM (ref 4.6–6.2)
SODIUM BLD-SCNC: 139 MMOL/L (ref 135–145)
WBC # BLD: 7.1 K/CU MM (ref 4–10.5)

## 2020-06-09 PROCEDURE — 80048 BASIC METABOLIC PNL TOTAL CA: CPT

## 2020-06-09 PROCEDURE — 85027 COMPLETE CBC AUTOMATED: CPT

## 2020-06-09 PROCEDURE — 36415 COLL VENOUS BLD VENIPUNCTURE: CPT

## 2020-06-15 ENCOUNTER — TELEPHONE (OUTPATIENT)
Dept: CARDIOLOGY CLINIC | Age: 85
End: 2020-06-15

## 2020-06-16 ENCOUNTER — TELEPHONE (OUTPATIENT)
Dept: CARDIOLOGY CLINIC | Age: 85
End: 2020-06-16

## 2020-06-16 ENCOUNTER — HOSPITAL ENCOUNTER (OUTPATIENT)
Age: 85
Setting detail: SPECIMEN
Discharge: HOME | End: 2020-06-16
Payer: MEDICARE

## 2020-06-16 ENCOUNTER — PROCEDURE VISIT (OUTPATIENT)
Dept: CARDIOLOGY CLINIC | Age: 85
End: 2020-06-16
Payer: MEDICARE

## 2020-06-16 LAB
ANION GAP SERPL CALCULATED.3IONS-SCNC: 11 MMOL/L (ref 4–16)
BUN BLDV-MCNC: 24 MG/DL (ref 6–23)
CALCIUM SERPL-MCNC: 9.4 MG/DL (ref 8.3–10.6)
CHLORIDE BLD-SCNC: 101 MMOL/L (ref 99–110)
CO2: 27 MMOL/L (ref 21–32)
CREAT SERPL-MCNC: 1.7 MG/DL (ref 0.9–1.3)
GFR AFRICAN AMERICAN: 46 ML/MIN/1.73M2
GFR NON-AFRICAN AMERICAN: 38 ML/MIN/1.73M2
GLUCOSE BLD-MCNC: 89 MG/DL (ref 70–99)
HCT VFR BLD CALC: 32.8 % (ref 42–52)
HEMOGLOBIN: 9.4 GM/DL (ref 13.5–18)
MAGNESIUM: 2.3 MG/DL (ref 1.8–2.4)
MCH RBC QN AUTO: 21.7 PG (ref 27–31)
MCHC RBC AUTO-ENTMCNC: 28.7 % (ref 32–36)
MCV RBC AUTO: 75.8 FL (ref 78–100)
PDW BLD-RTO: 17.1 % (ref 11.7–14.9)
PHOSPHORUS: 2.9 MG/DL (ref 2.5–4.9)
PLATELET # BLD: 367 K/CU MM (ref 140–440)
PMV BLD AUTO: 9.6 FL (ref 7.5–11.1)
POTASSIUM SERPL-SCNC: 4.4 MMOL/L (ref 3.5–5.1)
RBC # BLD: 4.33 M/CU MM (ref 4.6–6.2)
SODIUM BLD-SCNC: 139 MMOL/L (ref 135–145)
WBC # BLD: 7.5 K/CU MM (ref 4–10.5)

## 2020-06-16 PROCEDURE — 80048 BASIC METABOLIC PNL TOTAL CA: CPT

## 2020-06-16 PROCEDURE — 83735 ASSAY OF MAGNESIUM: CPT

## 2020-06-16 PROCEDURE — 93296 REM INTERROG EVL PM/IDS: CPT | Performed by: INTERNAL MEDICINE

## 2020-06-16 PROCEDURE — 84100 ASSAY OF PHOSPHORUS: CPT

## 2020-06-16 PROCEDURE — 36415 COLL VENOUS BLD VENIPUNCTURE: CPT

## 2020-06-16 PROCEDURE — 93294 REM INTERROG EVL PM/LDLS PM: CPT | Performed by: INTERNAL MEDICINE

## 2020-06-16 PROCEDURE — 85027 COMPLETE CBC AUTOMATED: CPT

## 2020-06-16 NOTE — LETTER
Cardiology [address]  Phone: [phone]  Fax: [phone]    6/16/2020        Juliet Robertson [address] [place] [id number]            Dear Flora Hein: This is your [facility] schedule. You can gregoria your calendar with these dates. Remember that your device is wireless and should automatically do these checks while you are sleeping. If for any reason I do not get your transmission then I will call you and ask that you send a manual transmission. If you have any questions or concerns, please call and ask for [place] at [phone]. Thank you.

## 2020-06-17 ENCOUNTER — TELEPHONE (OUTPATIENT)
Dept: CARDIOLOGY CLINIC | Age: 85
End: 2020-06-17

## 2020-06-17 NOTE — TELEPHONE ENCOUNTER
Order and approval faxed to [facility] at patients daughters request.

## 2020-06-18 ENCOUNTER — TELEPHONE (OUTPATIENT)
Dept: CARDIOLOGY CLINIC | Age: 85
End: 2020-06-18

## 2020-06-23 ENCOUNTER — HOSPITAL ENCOUNTER (OUTPATIENT)
Age: 85
Setting detail: SPECIMEN
Discharge: HOME | End: 2020-06-23
Payer: MEDICARE

## 2020-06-23 LAB
ANION GAP SERPL CALCULATED.3IONS-SCNC: 7 MMOL/L (ref 4–16)
BUN BLDV-MCNC: 24 MG/DL (ref 6–23)
CALCIUM SERPL-MCNC: 8.9 MG/DL (ref 8.3–10.6)
CHLORIDE BLD-SCNC: 102 MMOL/L (ref 99–110)
CO2: 29 MMOL/L (ref 21–32)
CREAT SERPL-MCNC: 1.8 MG/DL (ref 0.9–1.3)
GFR AFRICAN AMERICAN: 44 ML/MIN/1.73M2
GFR NON-AFRICAN AMERICAN: 36 ML/MIN/1.73M2
GLUCOSE BLD-MCNC: 85 MG/DL (ref 70–99)
HCT VFR BLD CALC: 28.6 % (ref 42–52)
HEMOGLOBIN: 8.2 GM/DL (ref 13.5–18)
MCH RBC QN AUTO: 21.5 PG (ref 27–31)
MCHC RBC AUTO-ENTMCNC: 28.7 % (ref 32–36)
MCV RBC AUTO: 74.9 FL (ref 78–100)
PDW BLD-RTO: 16.8 % (ref 11.7–14.9)
PLATELET # BLD: 306 K/CU MM (ref 140–440)
PMV BLD AUTO: 9.7 FL (ref 7.5–11.1)
POTASSIUM SERPL-SCNC: 4.5 MMOL/L (ref 3.5–5.1)
RBC # BLD: 3.82 M/CU MM (ref 4.6–6.2)
SODIUM BLD-SCNC: 138 MMOL/L (ref 135–145)
WBC # BLD: 7.3 K/CU MM (ref 4–10.5)

## 2020-06-23 PROCEDURE — 85027 COMPLETE CBC AUTOMATED: CPT

## 2020-06-23 PROCEDURE — 80048 BASIC METABOLIC PNL TOTAL CA: CPT

## 2020-06-23 PROCEDURE — 36415 COLL VENOUS BLD VENIPUNCTURE: CPT

## 2020-06-28 ENCOUNTER — OUTSIDE SERVICES (OUTPATIENT)
Dept: INTERNAL MEDICINE CLINIC | Age: 85
End: 2020-06-28
Payer: MEDICARE

## 2020-06-29 ENCOUNTER — TELEPHONE (OUTPATIENT)
Dept: CARDIOLOGY CLINIC | Age: 85
End: 2020-06-29

## 2020-06-29 NOTE — TELEPHONE ENCOUNTER
Jose called patient had labs drawn that   are showing that is Anemia is getting   Worse and they would to discontinue his   Eliquis

## 2020-06-29 NOTE — TELEPHONE ENCOUNTER
Per DR Hunter Rodriguez, patient should hold eliquis and will need work up to figure out why anemia is worsening, bleeding? Mitul Shea to return phone call to number provided no answer. Message left asking for return phone call as soon as possible to discuss.

## 2020-06-29 NOTE — TELEPHONE ENCOUNTER
Adelaida from assisted living returned phone call. She is aware of previous and voiced understanding.

## 2020-06-29 NOTE — PROGRESS NOTES
Progress note  ___________________________    Bela Muller's  is 10/5/1933  SEEN ON 2020 at [address]  ___________________________    S:  Patient is seen today and discussed with the nurses. Patient has a history of, atrial fibrillation, chronic kidney disease, pacemaker and anemia  Patient denies any chest pain or shortness of breath. No cough or sputum production. No nausea or vomiting. He is confused. Patient is walking in the room with out any aid. He was working on crossword puzzles. Pt states he goes to sleep when he is doing cross word puzzles. Denies any headache. No dizziness. No falls. He is able to ambulate. Nurses report no active bleeding or melena or hematochezia. Pt sees cardiologist for AF and pacemaker check. ALLERGIES:  Allergies   Allergen Reactions    Biaxin [Clarithromycin]     Cephalexin     Viagra [Sildenafil Citrate] Nausea Only and Other (See Comments)     Dizziness and BP dropped        PAST MEDICAL HISTORY:    has a past medical history of ACTA2-related familial thoracic aortic aneurysm, Arrhythmia, Atrial fibrillation (HCC), BBBB (bilateral bundle branch block), Bradycardia, CHF (congestive heart failure) ([address] Essential hypertension, malignant, History of cardioversion,  Hx of transesophageal echocardiography (SIMONE) for monitoring, Hyperlipemia, Hyperlipidemia, Hypertension,, Persistent atrial fibrillation , Sick sinus syndrome and Vertigo. PAST SURGICAL HISTORY:   has a past surgical history that includes Pacemaker insertion (Left, 2018) and CYSTOSCOPY INSERTION / REMOVAL STENT / STONE (Left, 2019). SOCIAL HISTORY:   reports that he has never smoked. He has never used smokeless tobacco. He reports previous alcohol use. He reports that he does not use drugs. Vital signs: as in [facility]. Pt was seen appropriate PPE.   Did not examined his heart and lung.  GENERAL: - Alert,  pleasant, in no apparent distress. HEENT: - Conjunctiva pink,   NECK: - no masses   CARDIOVASCULAR: - Not examined. PULMONARY: - No respiratory distress. Breathing is normal.   ABDOMEN: - not distended   EXTREMITIES: - No edema. SKIN: no bruises noted   NEUROLOGICAL: - Patient is confused. Knows his name. Ambulatory. Advised patient to use walker. Assessment:  .  Dementia on Exelon  . Chronic kidney disease stage III  . Atrial fibrillation on Eliquis and amiodarone. eliquis d/c. Anushka Marbin Hyperlipidemia on atorvastatin  . BPH on finasteride  . Congestive heart failure  . Constipation on MiraLAX  . History of kidney stones  . Anemia ? Due CKD./ GI consult. .  S/p pacemaker          Plan:  Medication were reviewed. Discussed with patient about fall precautions    Patient has chronic kidney disease and sees a nephrologist.  Labs on 6/23/2020 shows potassium 4.5. Creatinine 1.8 and BUN 24. H&H was 8.2 and 28.6. MCV was 75.  2 weeks earlier hemoglobin was 9.4. Patient is on Eliquis and is anemic also. Will order Hemoccult x3 will do the CBC every month as well as BMP. Will discontinue Eliquis advised nurse to check with cardiologist and inform family also. There is risk of stroke with AF. Risk of bleeding vs continuation of anticoagulation assessed. Hold till GI consult. Refer patient to GI also.

## 2020-06-30 ENCOUNTER — HOSPITAL ENCOUNTER (OUTPATIENT)
Age: 85
Setting detail: SPECIMEN
Discharge: HOME | End: 2020-06-30
Payer: MEDICARE

## 2020-06-30 LAB
ALBUMIN SERPL-MCNC: 3.6 GM/DL (ref 3.4–5)
ALBUMIN SERPL-MCNC: 3.7 GM/DL (ref 3.4–5)
ALP BLD-CCNC: 77 IU/L (ref 40–129)
ALP BLD-CCNC: 80 IU/L (ref 40–128)
ALT SERPL-CCNC: 19 U/L (ref 10–40)
ALT SERPL-CCNC: 19 U/L (ref 10–40)
ANION GAP SERPL CALCULATED.3IONS-SCNC: 9 MMOL/L (ref 4–16)
AST SERPL-CCNC: 22 IU/L (ref 15–37)
AST SERPL-CCNC: 23 IU/L (ref 15–37)
BILIRUB SERPL-MCNC: 0.5 MG/DL (ref 0–1)
BILIRUB SERPL-MCNC: 0.5 MG/DL (ref 0–1)
BILIRUBIN DIRECT: 0.2 MG/DL (ref 0–0.3)
BILIRUBIN, INDIRECT: 0.3 MG/DL (ref 0–0.7)
BUN BLDV-MCNC: 24 MG/DL (ref 6–23)
CALCIUM SERPL-MCNC: 8.9 MG/DL (ref 8.3–10.6)
CHLORIDE BLD-SCNC: 103 MMOL/L (ref 99–110)
CO2: 28 MMOL/L (ref 21–32)
CREAT SERPL-MCNC: 1.9 MG/DL (ref 0.9–1.3)
GFR AFRICAN AMERICAN: 41 ML/MIN/1.73M2
GFR NON-AFRICAN AMERICAN: 34 ML/MIN/1.73M2
GLUCOSE BLD-MCNC: 101 MG/DL (ref 70–99)
HCT VFR BLD CALC: 28.9 % (ref 42–52)
HEMOGLOBIN: 8.4 GM/DL (ref 13.5–18)
MCH RBC QN AUTO: 21.7 PG (ref 27–31)
MCHC RBC AUTO-ENTMCNC: 29.1 % (ref 32–36)
MCV RBC AUTO: 74.7 FL (ref 78–100)
PDW BLD-RTO: 16.8 % (ref 11.7–14.9)
PLATELET # BLD: 298 K/CU MM (ref 140–440)
PMV BLD AUTO: 10 FL (ref 7.5–11.1)
POTASSIUM SERPL-SCNC: 4.1 MMOL/L (ref 3.5–5.1)
RBC # BLD: 3.87 M/CU MM (ref 4.6–6.2)
SODIUM BLD-SCNC: 140 MMOL/L (ref 135–145)
TOTAL PROTEIN: 6.3 GM/DL (ref 6.4–8.2)
TOTAL PROTEIN: 6.4 GM/DL (ref 6.4–8.2)
TSH HIGH SENSITIVITY: 2.31 UIU/ML (ref 0.27–4.2)
WBC # BLD: 6.6 K/CU MM (ref 4–10.5)

## 2020-06-30 PROCEDURE — 80053 COMPREHEN METABOLIC PANEL: CPT

## 2020-06-30 PROCEDURE — G0328 FECAL BLOOD SCRN IMMUNOASSAY: HCPCS

## 2020-06-30 PROCEDURE — 36415 COLL VENOUS BLD VENIPUNCTURE: CPT

## 2020-06-30 PROCEDURE — 84443 ASSAY THYROID STIM HORMONE: CPT

## 2020-06-30 PROCEDURE — 82248 BILIRUBIN DIRECT: CPT

## 2020-06-30 PROCEDURE — 85027 COMPLETE CBC AUTOMATED: CPT

## 2020-07-01 ENCOUNTER — OFFICE VISIT (OUTPATIENT)
Dept: CARDIOLOGY CLINIC | Age: 85
End: 2020-07-01
Payer: MEDICARE

## 2020-07-01 ENCOUNTER — PROCEDURE VISIT (OUTPATIENT)
Dept: CARDIOLOGY CLINIC | Age: 85
End: 2020-07-01
Payer: MEDICARE

## 2020-07-01 VITALS
BODY MASS INDEX: 27.25 KG/M2 | HEIGHT: 69 IN | SYSTOLIC BLOOD PRESSURE: 138 MMHG | HEART RATE: 62 BPM | WEIGHT: 184 LBS | DIASTOLIC BLOOD PRESSURE: 70 MMHG

## 2020-07-01 LAB
LV EF: 53 %
LVEF MODALITY: NORMAL

## 2020-07-01 PROCEDURE — 93306 TTE W/DOPPLER COMPLETE: CPT | Performed by: INTERNAL MEDICINE

## 2020-07-01 PROCEDURE — 1123F ACP DISCUSS/DSCN MKR DOCD: CPT | Performed by: INTERNAL MEDICINE

## 2020-07-01 PROCEDURE — 4040F PNEUMOC VAC/ADMIN/RCVD: CPT | Performed by: INTERNAL MEDICINE

## 2020-07-01 PROCEDURE — G8417 CALC BMI ABV UP PARAM F/U: HCPCS | Performed by: INTERNAL MEDICINE

## 2020-07-01 PROCEDURE — 1036F TOBACCO NON-USER: CPT | Performed by: INTERNAL MEDICINE

## 2020-07-01 PROCEDURE — 93000 ELECTROCARDIOGRAM COMPLETE: CPT | Performed by: INTERNAL MEDICINE

## 2020-07-01 PROCEDURE — G8427 DOCREV CUR MEDS BY ELIG CLIN: HCPCS | Performed by: INTERNAL MEDICINE

## 2020-07-01 PROCEDURE — 99214 OFFICE O/P EST MOD 30 MIN: CPT | Performed by: INTERNAL MEDICINE

## 2020-07-01 NOTE — PROGRESS NOTES
hypertension, malignant     Fall at home     History of cardioversion 04/17/2018    History of chest x-ray 02/27/2017    Enlargement of the aorta knob which may represent a thoracic aortic aneurysm. Further evaluation w/ a contrast enhanced CT of the chest recommended. no evidence of acute pulmonary process. 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 Recommend thyroid US.  History of echocardiogram 03/27/2017    TTE EF 55%, LV Normal Size, borderline LVH concentric, Normal LV systolic function, transmittal doppler flow pttern suggest impaired LV relaxation Mild aortic stenosis, mild aortic regurgitation.  History of EKG 02/27/2017    Time 12:03 AM, HR 75, A fib, Axis normal, Intervals normal, P waves normal, St-T Segments: nonspecific ST segment changes to the anterior lateral leads, QRS RBBB,  No significant Q waves, no ectopy. A-fib w/RBBB w/nonspecific ST segment change EKG.  History of EKG 02/27/2017    Time 2:58AM: HR 59, NSR, Axis normal, Intervals normal, P waves normal, ST-T seg: nonspecific ST segment changes, QRS RBBB, no significant Q waves, no ectopy. Sinus bradycardia w/ RBBB nonspecific ST Segment changes EKG    History of Holter monitoring 11/08/2017    monitored 48 hours: Patient noted to have multiple PAC and PVCs. Risk of atrial fibrillation present given previous episode Recommend antiarrhythmic therapy.  History of stress test 03/27/2017    NM Stress test: EF 54%, Abnormal study, evidence of mild ischemia in mild inferior regions. post stress LV is enlarged in size.  Post-stress LV function is normal.     Hx of transesophageal echocardiography (SIMONE) for monitoring 04/17/2018    EF45-50% kg/m². Patient-Reported Vitals 6/19/2020   Patient-Reported Weight 182 lb   Patient-Reported Height 5 8       Vitals:    07/01/20 1428   BP: 138/70   Pulse: 62   Weight: 184 lb (83.5 kg)   Height: 5' 8.5\" (1.74 m)      GENERAL - Alert, oriented, pleasant, in no apparent distress. EYES: No jaundice, no conjunctival pallor. SKIN: It is warm & dry. No rashes. No Echhymosis    HEENT - No clinically significant abnormalities seen. Neck - Supple. No jugular venous distention noted. No carotid bruits. Cardiovascular - Normal S1 and S2 with 3/6 ED murmur. Extremities - No cyanosis, clubbing, or significant edema. Pulmonary - No respiratory distress. No wheezes or rales. Abdomen - No masses, tenderness, or organomegaly. Musculoskeletal - No significant edema. No joint deformities. No muscle wasting. Neurologic - Cranial nerves II through XII are grossly intact. There were no gross focal neurologic abnormalities.     Lab Review   Lab Results   Component Value Date    CKTOTAL 35 08/18/2019    TROPONINT <0.010 08/18/2019    TROPONINT 0.011 05/25/2019     BNP:  No results found for: BNP  PT/INR:    Lab Results   Component Value Date    INR 1.71 09/11/2019    INR 1.47 08/18/2019     No results found for: LABA1C  Lab Results   Component Value Date    WBC 6.6 06/30/2020    WBC 7.3 06/23/2020    HCT 28.9 (L) 06/30/2020    HCT 28.6 (L) 06/23/2020    MCV 74.7 (L) 06/30/2020    MCV 74.9 (L) 06/23/2020     06/30/2020     06/23/2020     Lab Results   Component Value Date    CHOL 137 05/24/2019    TRIG 72 05/24/2019    HDL 47 05/24/2019    LDLDIRECT 86 05/24/2019     Lab Results   Component Value Date    ALT 19 06/30/2020    ALT 19 06/30/2020    AST 22 06/30/2020    AST 23 06/30/2020     BMP:    Lab Results   Component Value Date     06/30/2020     06/23/2020    K 4.1 06/30/2020    K 4.5 06/23/2020     06/30/2020     06/23/2020    CO2 28 06/30/2020    CO2 29 06/23/2020    BUN 24 without behavioral disturbance ([facility] [address] F03.90       Assessment & Plan:  CAD:No  HTN:well controlled on current medical regimen, see list above. - changes in  treatment:   no   CARDIOMYOPATHY: None known   CONGESTIVE HEART FAILURE:  KNOWN HISTORY. Compensated      VHD: No significant VHD noted  DYSLIPIDEMIA: Patient's profile is at / near [facility],                                                              See most recent Lab values in Labs section above. Diabetes mellitis: .no  ARRHYTHMIAS:  Known. Patient has H/O Atrial fibrillation                                He is rate controlled & not on anticoagulation due to low Hgb. W/U in progress. OTHER RELEVANT DIAGNOSIS:as noted in patient's active problem list:  TESTS ORDERED: None this visit                                     All previously ordered tests reviewed. MEDICATIONS: CPM   Office f/u in six months. Device check per protocol. Primary/secondary prevention is the goal by aggressive risk modification, healthy and therapeutic life style changes for cardiovascular risk reduction. Various goals are discussed and multiple questions answered.

## 2020-07-01 NOTE — PROGRESS NOTES
[id number] Score for Atrial Fibrillation Stroke Risk   Risk   Factors  Component Value   C CHF Yes 1   H HTN Yes 1   A2 Age >= 76 Yes,  (80 y.o.) 2   D DM No 0   S2 Prior Stroke/TIA No 0   V Vascular Disease No 0   A Age 74-69 No,  (80 y.o.) 0   Sc Sex male 0    [id number]  Score  4   Score last updated 7/1/20 2:54 PM EDT    Click here for a link to the UpToDate guideline \"Atrial Fibrillation: Anticoagulation therapy to prevent embolization    Disclaimer: Risk Score calculation is dependent on accuracy of patient problem list and past encounter diagnosis.       When Pt was started on Amiodarone:     Test                     Date of last test  EKG                     [x]  07/01/2020   PFT                      [] Unknown                                                                        CXR                     [x] 8/18/2019                                                                       THYROID            [x] 6/30/2020                                               LFT                      [x] 6/30/2020                                         EYE EXAM          [x] Oct 2019

## 2020-07-01 NOTE — LETTER
[facility]  [address] [address]  Phone: [phone]  Fax: [phone]    Neela Boateng MD        July 1, 2020     [place], [address] [facility] [id number]    Patient: Ian Duval  MR Number: [id number]  YOB: 1933  Date of Visit: 7/1/2020    Dear Dr. Milvia Gamez VO:    Thank you for the request for consultation for Steph Klein to me for the evaluation of VHD S/P PPM. Below are the relevant portions of my assessment and plan of care. If you have questions, please do not hesitate to call me. I look forward to following Leela Escobedo along with you.     Sincerely,        Neela Boateng MD

## 2020-07-02 ENCOUNTER — TELEPHONE (OUTPATIENT)
Dept: INTERNAL MEDICINE CLINIC | Age: 85
End: 2020-07-02

## 2020-07-02 ENCOUNTER — HOSPITAL ENCOUNTER (OUTPATIENT)
Age: 85
Setting detail: SPECIMEN
Discharge: HOME | End: 2020-07-02
Payer: MEDICARE

## 2020-07-02 PROBLEM — N18.9 ACUTE KIDNEY INJURY SUPERIMPOSED ON CHRONIC KIDNEY DISEASE (HCC): Status: RESOLVED | Noted: 2019-06-14 | Resolved: 2020-07-02

## 2020-07-02 PROBLEM — D64.9 ANEMIA: Status: ACTIVE | Noted: 2020-07-02

## 2020-07-02 PROBLEM — R53.1 GENERALIZED WEAKNESS: Status: RESOLVED | Noted: 2019-08-21 | Resolved: 2020-07-02

## 2020-07-02 PROBLEM — N40.0 BENIGN PROSTATIC HYPERPLASIA WITHOUT LOWER URINARY TRACT SYMPTOMS: Status: ACTIVE | Noted: 2020-07-02

## 2020-07-02 PROBLEM — N17.9 ACUTE KIDNEY INJURY SUPERIMPOSED ON CHRONIC KIDNEY DISEASE (HCC): Status: RESOLVED | Noted: 2019-06-14 | Resolved: 2020-07-02

## 2020-07-02 PROBLEM — N30.00 ACUTE CYSTITIS WITHOUT HEMATURIA: Status: RESOLVED | Noted: 2019-09-14 | Resolved: 2020-07-02

## 2020-07-02 PROCEDURE — G0328 FECAL BLOOD SCRN IMMUNOASSAY: HCPCS

## 2020-07-03 LAB — HEMOCCULT SP1 STL QL: NEGATIVE

## 2020-07-07 ENCOUNTER — HOSPITAL ENCOUNTER (OUTPATIENT)
Age: 85
Setting detail: SPECIMEN
Discharge: HOME | End: 2020-07-07
Payer: MEDICARE

## 2020-07-07 LAB
ANION GAP SERPL CALCULATED.3IONS-SCNC: 8 MMOL/L (ref 4–16)
BUN BLDV-MCNC: 24 MG/DL (ref 6–23)
CALCIUM SERPL-MCNC: 9.2 MG/DL (ref 8.3–10.6)
CHLORIDE BLD-SCNC: 100 MMOL/L (ref 99–110)
CO2: 29 MMOL/L (ref 21–32)
CREAT SERPL-MCNC: 1.9 MG/DL (ref 0.9–1.3)
GFR AFRICAN AMERICAN: 41 ML/MIN/1.73M2
GFR NON-AFRICAN AMERICAN: 34 ML/MIN/1.73M2
GLUCOSE BLD-MCNC: 90 MG/DL (ref 70–99)
HCT VFR BLD CALC: 32.9 % (ref 42–52)
HEMOGLOBIN: 9.1 GM/DL (ref 13.5–18)
MCH RBC QN AUTO: 21 PG (ref 27–31)
MCHC RBC AUTO-ENTMCNC: 27.7 % (ref 32–36)
MCV RBC AUTO: 75.8 FL (ref 78–100)
PDW BLD-RTO: 16.7 % (ref 11.7–14.9)
PLATELET # BLD: 348 K/CU MM (ref 140–440)
PMV BLD AUTO: 9.8 FL (ref 7.5–11.1)
POTASSIUM SERPL-SCNC: 4.2 MMOL/L (ref 3.5–5.1)
RBC # BLD: 4.34 M/CU MM (ref 4.6–6.2)
SODIUM BLD-SCNC: 137 MMOL/L (ref 135–145)
WBC # BLD: 7.9 K/CU MM (ref 4–10.5)

## 2020-07-07 PROCEDURE — 36415 COLL VENOUS BLD VENIPUNCTURE: CPT

## 2020-07-07 PROCEDURE — 80048 BASIC METABOLIC PNL TOTAL CA: CPT

## 2020-07-07 PROCEDURE — 85027 COMPLETE CBC AUTOMATED: CPT

## 2020-07-08 ENCOUNTER — HOSPITAL ENCOUNTER (OUTPATIENT)
Age: 85
Setting detail: SPECIMEN
Discharge: HOME | End: 2020-07-08
Payer: MEDICARE

## 2020-07-08 PROCEDURE — 87086 URINE CULTURE/COLONY COUNT: CPT

## 2020-07-08 PROCEDURE — 81001 URINALYSIS AUTO W/SCOPE: CPT

## 2020-07-08 PROCEDURE — 87186 SC STD MICRODIL/AGAR DIL: CPT

## 2020-07-08 PROCEDURE — 87077 CULTURE AEROBIC IDENTIFY: CPT

## 2020-07-09 ENCOUNTER — TELEPHONE (OUTPATIENT)
Dept: CARDIOLOGY CLINIC | Age: 85
End: 2020-07-09

## 2020-07-10 LAB
BACTERIA: ABNORMAL /HPF
BILIRUBIN URINE: NEGATIVE MG/DL
BLOOD, URINE: ABNORMAL
CLARITY: ABNORMAL
COLOR: YELLOW
GLUCOSE, URINE: NEGATIVE MG/DL
HYALINE CASTS: 10 /LPF
KETONES, URINE: NEGATIVE MG/DL
LEUKOCYTE ESTERASE, URINE: ABNORMAL
NITRITE URINE, QUANTITATIVE: NEGATIVE
PH, URINE: 6 (ref 5–8)
PROTEIN UA: 100 MG/DL
RBC URINE: 11 /HPF (ref 0–3)
SPECIFIC GRAVITY UA: 1.01 (ref 1–1.03)
TRICHOMONAS: ABNORMAL /HPF
UROBILINOGEN, URINE: NORMAL MG/DL (ref 0.2–1)
WBC CLUMP: ABNORMAL /HPF
WBC UA: 1616 /HPF (ref 0–2)

## 2020-07-13 LAB
CULTURE: ABNORMAL
Lab: ABNORMAL
SPECIMEN: ABNORMAL

## 2020-07-14 ENCOUNTER — HOSPITAL ENCOUNTER (OUTPATIENT)
Age: 85
Setting detail: SPECIMEN
Discharge: HOME | End: 2020-07-14
Payer: MEDICARE

## 2020-07-17 ENCOUNTER — HOSPITAL ENCOUNTER (OUTPATIENT)
Age: 85
Setting detail: SPECIMEN
Discharge: HOME | End: 2020-07-17
Payer: MEDICARE

## 2020-07-17 LAB
BASOPHILS ABSOLUTE: 0 K/CU MM
BASOPHILS RELATIVE PERCENT: 0.3 % (ref 0–1)
DIFFERENTIAL TYPE: ABNORMAL
EOSINOPHILS ABSOLUTE: 0.2 K/CU MM
EOSINOPHILS RELATIVE PERCENT: 2.5 % (ref 0–3)
FERRITIN: 18 NG/ML (ref 30–400)
FOLATE: >20 NG/ML (ref 3.1–17.5)
HCT VFR BLD CALC: 30.7 % (ref 42–52)
HEMOGLOBIN: 8.8 GM/DL (ref 13.5–18)
IMMATURE NEUTROPHIL %: 0.4 % (ref 0–0.43)
IRON: 22 UG/DL (ref 59–158)
LYMPHOCYTES ABSOLUTE: 1.6 K/CU MM
LYMPHOCYTES RELATIVE PERCENT: 24.2 % (ref 24–44)
MCH RBC QN AUTO: 21.8 PG (ref 27–31)
MCHC RBC AUTO-ENTMCNC: 28.7 % (ref 32–36)
MCV RBC AUTO: 76 FL (ref 78–100)
MONOCYTES ABSOLUTE: 0.9 K/CU MM
MONOCYTES RELATIVE PERCENT: 13.5 % (ref 0–4)
NUCLEATED RBC %: 0 %
PCT TRANSFERRIN: 6 % (ref 10–44)
PDW BLD-RTO: 18.8 % (ref 11.7–14.9)
PLATELET # BLD: 287 K/CU MM (ref 140–440)
PMV BLD AUTO: 10.3 FL (ref 7.5–11.1)
RBC # BLD: 4.04 M/CU MM (ref 4.6–6.2)
SEGMENTED NEUTROPHILS ABSOLUTE COUNT: 4 K/CU MM
SEGMENTED NEUTROPHILS RELATIVE PERCENT: 59.1 % (ref 36–66)
TOTAL IMMATURE NEUTOROPHIL: 0.03 K/CU MM
TOTAL IRON BINDING CAPACITY: 353 UG/DL (ref 250–450)
TOTAL NUCLEATED RBC: 0 K/CU MM
UNSATURATED IRON BINDING CAPACITY: 331 UG/DL (ref 110–370)
WBC # BLD: 6.8 K/CU MM (ref 4–10.5)

## 2020-07-17 PROCEDURE — 83540 ASSAY OF IRON: CPT

## 2020-07-17 PROCEDURE — 83550 IRON BINDING TEST: CPT

## 2020-07-17 PROCEDURE — 36415 COLL VENOUS BLD VENIPUNCTURE: CPT

## 2020-07-17 PROCEDURE — 83516 IMMUNOASSAY NONANTIBODY: CPT

## 2020-07-17 PROCEDURE — 85025 COMPLETE CBC W/AUTO DIFF WBC: CPT

## 2020-07-17 PROCEDURE — 82746 ASSAY OF FOLIC ACID SERUM: CPT

## 2020-07-17 PROCEDURE — 82728 ASSAY OF FERRITIN: CPT

## 2020-07-19 LAB — TRANSGLUTAMINASE IGA: <2 U/ML (ref 0–3)

## 2020-07-20 LAB — IMMUNOGLOBULIN A, SERUM: 12 UNITS (ref 0–19)

## 2020-07-24 ENCOUNTER — OUTSIDE SERVICES (OUTPATIENT)
Dept: INTERNAL MEDICINE CLINIC | Age: 85
End: 2020-07-24
Payer: MEDICARE

## 2020-07-24 DIAGNOSIS — D64.9 ACUTE ON CHRONIC ANEMIA: Primary | ICD-10-CM

## 2020-07-24 DIAGNOSIS — N18.31 CHRONIC KIDNEY DISEASE (CKD) STAGE G3A/A3, MODERATELY DECREASED GLOMERULAR FILTRATION RATE (GFR) BETWEEN 45-59 ML/MIN/1.73 SQUARE METER AND ALBUMINURIA CREATININE RATIO GREATER THAN 300 MG/G (HCC): ICD-10-CM

## 2020-07-24 DIAGNOSIS — Z95.0 S/P PLACEMENT OF CARDIAC PACEMAKER: ICD-10-CM

## 2020-07-24 DIAGNOSIS — I48.91 ATRIAL FIBRILLATION, UNSPECIFIED TYPE (HCC): ICD-10-CM

## 2020-07-25 NOTE — PROGRESS NOTES
Progress note  ___________________________    Yaneli Muller's  is 10/5/1933  SEEN ON 2020 at [address]  ___________________________    S:  Patient is seen today and discussed with the nurses. Patient has a history of, atrial fibrillation, chronic kidney disease, pacemaker. Patient was anemic therefore Eliquis was discontinued. Patient was referred to gastroenterologist Dr. Aylin Watkins for GI work-up for  Pt is off of ELiquis  Patient denies any chest pain or shortness of breath. No cough or sputum production. No nausea or vomiting. He is confused. No obvious bleeding noted by nurses  Discussed with patient daughter earlier this month about anemia and some weakness of the arms that has resolved    ALLERGIES:  Allergies   Allergen Reactions    Biaxin [Clarithromycin]     Cephalexin     Viagra [Sildenafil Citrate] Nausea Only and Other (See Comments)     Dizziness and BP dropped        PAST MEDICAL HISTORY:    has a past medical history of ACTA2-related familial thoracic aortic aneurysm, Arrhythmia, Atrial fibrillation (HCC), BBBB (bilateral bundle branch block), Bradycardia, CHF (congestive heart failure) ([address] Essential hypertension, malignant, History of cardioversion,  Hx of transesophageal echocardiography (SIMONE) for monitoring, Hyperlipemia, Hyperlipidemia, Hypertension,, Persistent atrial fibrillation , Sick sinus syndrome and Vertigo. PAST SURGICAL HISTORY:   has a past surgical history that includes Pacemaker insertion (Left, 2018) and CYSTOSCOPY INSERTION / REMOVAL STENT / STONE (Left, 2019). SOCIAL HISTORY:   reports that he has never smoked. He has never used smokeless tobacco. He reports previous alcohol use. He reports that he does not use drugs. Vital signs: as in [facility]  Patient was not examined because of COVID 19 precautions.   As per nurses his examination was normal Patient does get up and walk around. He is confused as before        Assessment:  . Anemia, iron deficiency  . Dementia on Exelon  . Chronic kidney disease stage III  . Atrial fibrillation on amiodarone  . Hyperlipidemia on atorvastatin  . BPH on finasteride  . Congestive heart failure  . Constipation on MiraLAX  . History of kidney stones           Plan:  Patient had labs done. CBC showed hemoglobin 8.8 and hematocrit 30.7. MCV 76  Ferritin was low at 18, iron was low  Celiac disease panel negative  Medication were reviewed. Continue current treatment.   Discussed with patient about fall precautions  Patient has follow-up appointment with gastroenterologist.

## 2020-07-30 ENCOUNTER — TELEPHONE (OUTPATIENT)
Dept: CARDIOLOGY CLINIC | Age: 85
End: 2020-07-30

## 2020-07-30 NOTE — TELEPHONE ENCOUNTER
Patient's wife calling regarding patient having low hemoglobin, it was 8.8 on 7/17/20 and was 7/30/20 8.4. Dr Megan Ryan has put patient on iron and has discontinued Eliquis. Patient's daughter wants to know if patient is safe taking 2.5 mg once a day instead of twice a day. Please return her call at # [phone]. She wants a call back today.

## 2020-07-30 NOTE — TELEPHONE ENCOUNTER
Per Dr. Anthony Castillo he has not seen patient for a while. Please address with primary cardiologist (Dr. Usman Chaney) message forwarded to ANIYAH Ruiz to address.

## 2020-07-31 NOTE — TELEPHONE ENCOUNTER
Daughter informed per RUPERT Dupont/Andre that patient to restart per Dr Prakash Martines.  She will have Dr Prakash Martines give order to facility

## 2020-08-07 ENCOUNTER — TELEPHONE (OUTPATIENT)
Dept: CARDIOLOGY CLINIC | Age: 85
End: 2020-08-07

## 2020-08-07 NOTE — TELEPHONE ENCOUNTER
Dr Glenda Fregoso was treating patient and stopped eliquis and started asa. Patient is no longer under Dr Jorge Alberto Olvera care and he states restart is up to cardiology.  Per Sandy Sandhu NP, stop ASA and restart Eliquis 2.5mg

## 2020-08-12 ENCOUNTER — OFFICE VISIT (OUTPATIENT)
Dept: CARDIOLOGY CLINIC | Age: 85
End: 2020-08-12
Payer: MEDICARE

## 2020-08-12 VITALS
RESPIRATION RATE: 18 BRPM | WEIGHT: 183.3 LBS | BODY MASS INDEX: 27.78 KG/M2 | SYSTOLIC BLOOD PRESSURE: 100 MMHG | OXYGEN SATURATION: 98 % | TEMPERATURE: 97.2 F | DIASTOLIC BLOOD PRESSURE: 62 MMHG | HEIGHT: 68 IN | HEART RATE: 70 BPM

## 2020-08-12 PROCEDURE — 1036F TOBACCO NON-USER: CPT | Performed by: NURSE PRACTITIONER

## 2020-08-12 PROCEDURE — G8427 DOCREV CUR MEDS BY ELIG CLIN: HCPCS | Performed by: NURSE PRACTITIONER

## 2020-08-12 PROCEDURE — 99213 OFFICE O/P EST LOW 20 MIN: CPT | Performed by: NURSE PRACTITIONER

## 2020-08-12 PROCEDURE — 1123F ACP DISCUSS/DSCN MKR DOCD: CPT | Performed by: NURSE PRACTITIONER

## 2020-08-12 PROCEDURE — 4040F PNEUMOC VAC/ADMIN/RCVD: CPT | Performed by: NURSE PRACTITIONER

## 2020-08-12 PROCEDURE — G8417 CALC BMI ABV UP PARAM F/U: HCPCS | Performed by: NURSE PRACTITIONER

## 2020-08-12 RX ORDER — FERROUS SULFATE 325(65) MG
325 TABLET ORAL
COMMUNITY
End: 2021-06-24

## 2020-08-12 RX ORDER — PANTOPRAZOLE SODIUM 40 MG/1
40 TABLET, DELAYED RELEASE ORAL DAILY
COMMUNITY

## 2020-08-12 RX ORDER — FUROSEMIDE 20 MG/1
20 TABLET ORAL DAILY PRN
Qty: 30 TABLET | Refills: 0 | Status: SHIPPED | OUTPATIENT
Start: 2020-08-12

## 2020-08-12 NOTE — PATIENT INSTRUCTIONS
Will add 20 mg of oral lasix to evening dose of 20 mg of oral lasix for a total of 40 mg of oral lasix for the next 3 days. If patient sees improvement of lower leg edema and decrease in weight, continue taking 40 mg lasix in the morning and 40 mg of lasix in the evening.

## 2020-08-12 NOTE — PROGRESS NOTES
Yessy Toussaint walked for 6 min. For a total of approx. 600 feet. HR ranged from pre walk 70 to post walk 88.

## 2020-08-12 NOTE — PROGRESS NOTES
[address]      Visit Date: 8/12/2020  Cardiologist:  Dr. Carol Sun  Primary Care Physician: Dr. Boubacar Robertson is a 80 y.o. male who presents today for:  Chief Complaint   Patient presents with    Congestive Heart Failure       HPI:   Lisa Pond is a 80 y.o. male who presents to the office for a follow up  in the heart failure clinic. His daughter is with him for today's appointment. Patient tells me that he has been feeling well since his last office visit. He states that he does not have orthopnea. He does not have lower leg edema. He does not have shortness of breath with exertion. He denies chest pain, palpitations, lightheadedness, dizziness or syncope. Daughter is worried about patient's weight gain as his baseline weight gain is 170 pounds and today he weighs 183 pounds. At previous office visit with Dr. Pollo Jeff 1 month ago he weighed 184 pounds. Patient states that he does feel like his pants are tighter. He also tells me that he is not on a low-sodium diet as he does not cook for himself he lives in assisted living. He also tells me that he will add salt to his coffee. He does eat regular snacks daily. Chief complaint none. Patient has:  Last hospital admission related to Heart Failure:  5/23/2019   baseline BNP 1102     baseline weight 170 pounds   Chest Pain: no  Worsening SOB/orthopnea/PND: no  Edema: no  Any extra diuretic use: no  Weight gain: no  Compliant checking home weight: no  Fatigue: no  Abdominal bloating: no  Appetite: good  Difficulty sleeping: no  CARMEN: no  Cough: no  Compliant checking blood pressure: yes  Compliant with medication regimen: yes    Vaccinations:  flu Yes  Vaccinations : pneumonia Yes      Device: PPM  ICD counseling: No    Activity: fair  Can you walk 1-2 blocks or do a moderate amount of house/yard work? No    NYHA Class:    Class II   Patients with slight, mild limitation of activity; they are comfortable with rest or with mild exertion. Sodium Restrictions: 2g  Fluid Restrictions: 48-64 oz/day  Sodium and fluid restriction compliance: fair, lives in skilled nursing facility and does not prepare his own meals. Past Medical History:   Diagnosis Date    ACTA2-related familial thoracic aortic aneurysm     Arrhythmia     Atrial fibrillation (HCC)     BBBB (bilateral bundle branch block)     Bradycardia     CHF (congestive heart failure) ([address]     Essential hypertension, malignant     Fall at home     H/O echocardiogram 07/01/2020    EF 50-55%, Mod-Severe AS, Mild PHTN, Aortic Root 3.9cm.(pt had OV after echo)    History of cardioversion 04/17/2018    History of chest x-ray 02/27/2017    Enlargement of the aorta knob which may represent a thoracic aortic aneurysm. Further evaluation w/ a contrast enhanced CT of the chest recommended. no evidence of acute pulmonary process. 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 Recommend thyroid US.  History of echocardiogram 03/27/2017    TTE EF 55%, LV Normal Size, borderline LVH concentric, Normal LV systolic function, transmittal doppler flow pttern suggest impaired LV relaxation Mild aortic stenosis, mild aortic regurgitation.  History of EKG 02/27/2017    Time 12:03 AM, HR 75, A fib, Axis normal, Intervals normal, P waves normal, St-T Segments: nonspecific ST segment changes to the anterior lateral leads, QRS RBBB,  No significant Q waves, no ectopy. A-fib w/RBBB w/nonspecific ST segment change EKG.      History of EKG 02/27/2017    Time 2:58AM: HR 59, NSR, Axis normal, Intervals normal, P waves normal, ST-T seg: nonspecific ST segment changes, QRS RBBB, no significant Q waves, no ectopy. Sinus bradycardia w/ RBBB nonspecific ST Segment changes EKG    History of Holter monitoring 11/08/2017    monitored 48 hours: Patient noted to have multiple PAC and PVCs. Risk of atrial fibrillation present given previous episode Recommend antiarrhythmic therapy.  History of stress test 03/27/2017    NM Stress test: EF 54%, Abnormal study, evidence of mild ischemia in mild inferior regions. post stress LV is enlarged in size. Post-stress LV function is normal.     Hx of transesophageal echocardiography (SIMONE) for monitoring 04/17/2018    EF45-50% mild TR, mild-mod MR, mod-severe AS    Hyperlipemia     Hyperlipidemia     Hypertension     Nonspecific abnormal electrocardiogram (ECG) (EKG)     Persistent atrial fibrillation     Sick sinus syndrome (HCC)     Vertigo      Past Surgical History:   Procedure Laterality Date    CYSTOSCOPY INSERTION / REMOVAL STENT / STONE Left 5/6/2019    CYSTOSCOPY RETROGRADE PYELOGRAM STENT INSERTION, DIAGNOSTIC CYSTOSCOPY performed by Jong Lewis MD at [place] [id number] Left 03/29/2018    Dual Chamber Medtronic Aura XT DR MUELLER [employer]     History reviewed. No pertinent family history.   Social History     Tobacco Use    Smoking status: Never Smoker    Smokeless tobacco: Never Used   Substance Use Topics    Alcohol use: Not Currently     Alcohol/week: 0.0 standard drinks     Current Outpatient Medications   Medication Sig Dispense Refill    pantoprazole (PROTONIX) 40 MG tablet Take 40 mg by mouth daily      Carboxymethylcellulose Sodium (ARTIFICIAL TEARS OP) Apply 1 drop to eye as needed      ferrous sulfate (IRON 325) 325 (65 Fe) MG tablet Take 325 mg by mouth daily (with breakfast)      spironolactone (ALDACTONE) 25 MG tablet Take 25 mg by mouth daily      Elastic Bandages & Supports (JOBST KNEE HIGH COMPRESSION ) MISC 1 each by Does not apply route daily Knee high compression  Apply upon waking in the morning and remove at bedtime 1 each 0    polyethylene glycol (GLYCOLAX) powder Take 17 g by mouth daily      furosemide (LASIX) 20 MG tablet Take 3 tablets by mouth See Admin Instructions Take 2 tabs in the morning and 1 tab in the afternoon. 90 tablet 1    Polyvinyl Alcohol-Povidone (REFRESH OP) Apply 1 drop to eye as needed For dry eyes      acetaminophen (TYLENOL) 325 MG tablet Take 650 mg by mouth every 6 hours as needed for Pain For pain or temp      metoprolol tartrate (LOPRESSOR) 25 MG tablet Take 0.5 tablets by mouth 2 times daily (Patient taking differently: Take 12.5 mg by mouth 2 times daily Hold if SBP < 110 or HR is < 55) 30 tablet 0    docusate sodium (COLACE, DULCOLAX) 100 MG CAPS Take 100 mg by mouth 2 times daily as needed for Constipation 30 capsule 0    finasteride (PROSCAR) 5 MG tablet Take 1 tablet by mouth daily 30 tablet 0    amiodarone (CORDARONE) 200 MG tablet Take 1 tablet by mouth daily (Patient taking differently: Take 200 mg by mouth nightly ) 30 tablet 3    rivastigmine (EXELON) 4.6 MG/24HR Place 1 patch onto the skin daily       Cholecalciferol (VITAMIN D3) 5000 units TABS Take 5,000 Units by mouth every morning      apixaban (ELIQUIS) 2.5 MG TABS tablet Take 1 tablet by mouth 2 times daily 60 tablet 3    Omega-3 Fatty Acids (FISH OIL) 1000 MG CAPS Take 1,000 mg by mouth daily       Multiple Vitamins-Minerals (THERAPEUTIC MULTIVITAMIN-MINERALS) tablet Take 1 tablet by mouth daily      atorvastatin (LIPITOR) 20 MG tablet Take 20 mg by mouth nightly        No current facility-administered medications for this visit. Allergies   Allergen Reactions    Biaxin [Clarithromycin]     Cephalexin     Viagra [Sildenafil Citrate] Nausea Only and Other (See Comments)     Dizziness and BP dropped       SUBJECTIVE:   Review of Systems:   · Constitutional: No Fever,no unintentional weight Loss   · Eyes: No change in Vision:     · ENT: No Headaches, Hearing Loss or Vertigo.  No tinnitus   · Cardiovascular: as per note above   · Respiratory: No cough or wheezing and as per note above. · Gastrointestinal: no abdominal pain, no appetite loss, no blood in stools, constipation, or diarrhea, No heartburn  · Genitourinary: No dysuria, trouble voiding, or hematuria  · Musculoskeletal: back pain- No: myalgia No,  Arthralgia- Yes  · Integumentary: No rash or pruritis  · Neurological: No TIA or stroke symptoms  · Psychiatric: Anxiety- No: depression-  No   · Endocrine: No malaise-No, fatigue Yes,- no temperature intolerance  · Hematologic/Lymphatic: No bleeding problems, blood clots or swollen lymph nodes  · Allergic/Immunologic: No nasal congestion or hives    OBJECTIVE:   Today's Vitals:  /62 (Site: Left Upper Arm, Position: Sitting, Cuff Size: Medium Adult)   Pulse 70   Temp 97.2 °F (36.2 °C)   Resp 18   Ht 5' 8\" (1.727 m)   Wt 183 lb 4.8 oz (83.1 kg)   SpO2 98%   BMI 27.87 kg/m²     Wt Readings from Last 3 Encounters:   08/12/20 183 lb 4.8 oz (83.1 kg)   07/01/20 184 lb (83.5 kg)   02/19/20 174 lb (78.9 kg)     BP Readings from Last 3 Encounters:   08/12/20 100/62   07/01/20 138/70   02/19/20 112/62     Pulse Readings from Last 3 Encounters:   08/12/20 70   07/01/20 62   02/19/20 67     Body mass index is 27.87 kg/m². GENERAL - Alert, oriented, pleasant, in no apparent distress. Head -unremarkable  Eyes - pupils equal and reactive to light - bilateral conjunctiva are pink: sclera are white   ENT - external ears intact, nose is intact:  tongue is midline pink and moist  Neck - Supple. Jugular venous distention noted No: carotid bruits appreciated No.   Cardiovascular - Normal S1 and S2: murmur appreciated Yes, No gallop. Regular rate- Yes,  rhythm regular-Yes. Extremities - No cyanosis, clubbing, trace edema to lower legs. Pulmonary - No respiratory distress. No wheezes or rales.  Chest is diminished  Pulses: Bilateral radial and pedal pulses normal  Abdomen -  Soft no tenderness, non distended   Musculoskeletal - Normal movement of all extremities  Neurologic - alert and oriented  Skin-  No rash: No echymosis    Affect- normal mood and pleasant     6 minute walk test:  Time walked: not performed today  Distance walked: not performed today  Required Oxygen No    Most recent ECHO:   5/2019  Left ventricular function is normal, EF is estimated at 50-55%.   Moderate left ventricular hypertrophy.   Infero-posterior wall segments are hypokinetic.   Moderate aortic regurgitation is noted ( ms).   No evidence of pericardial effusion.     Results reviewed:  BNP:   Lab Results   Component Value Date    PROBNP 1,102 (H) 02/14/2020     CBC:   Lab Results   Component Value Date    WBC 6.8 07/17/2020    RBC 4.04 07/17/2020    HGB 8.8 07/17/2020    HCT 30.7 07/17/2020     07/17/2020     CMP:    Lab Results   Component Value Date     07/07/2020    K 4.2 07/07/2020     07/07/2020    CO2 29 07/07/2020    BUN 24 07/07/2020    CREATININE 1.9 07/07/2020    GFRAA 41 07/07/2020    LABGLOM 34 07/07/2020    GLUCOSE 90 07/07/2020    CALCIUM 9.2 07/07/2020     Hepatic Function Panel:    Lab Results   Component Value Date    ALKPHOS 80 06/30/2020    ALKPHOS 77 06/30/2020    ALT 19 06/30/2020    ALT 19 06/30/2020    AST 22 06/30/2020    AST 23 06/30/2020    PROT 6.3 06/30/2020    PROT 6.4 06/30/2020    BILITOT 0.5 06/30/2020    BILITOT 0.5 06/30/2020    BILIDIR 0.2 06/30/2020    IBILI 0.3 06/30/2020    LABALBU 3.7 06/30/2020    LABALBU 3.6 06/30/2020     Magnesium:    Lab Results   Component Value Date    MG 2.3 06/16/2020     PT/INR:    Lab Results   Component Value Date    PROTIME 20.0 09/11/2019    INR 1.71 09/11/2019     Lipids:    Lab Results   Component Value Date    TRIG 72 05/24/2019    HDL 47 05/24/2019    LDLDIRECT 86 05/24/2019       Iron Studies:   Iron Deficiency Anemia:  Yes IV Iron Therapy:  Yes  2017 ACC/AHA HF Guidelines:   intravenous iron replacement in patients with New York Heart Association (NYHA) class II and III HF and iron deficiency (ferritin <100 ng/ml or 100-300 ng/ml if transferrin saturation <20%), to improve functional status and QoL. ASSESSMENT AND PLAN:     Chronic Diastolic Congestive Heart Failure    Overall patient appears to be doing well  Patient's lungs are clear. Trace leg edema did  Recommend patient to increase his Lasix to 40 mg twice daily for the next 3 days. If patient no significant urination and decrease in weight okay for patient to increase Lasix to 40 mg twice daily on a daily basis. Otherwise patient to resume Lasix 40 mg in the morning and 20 mg in the evening. Daughter to call if symptoms do not improve    ACE/ ARB No    Can this be changed to ARNI No - current renal dysfuntion will not allow use. Plus patient HFpEF       Loop Diuretic Yes    NYHA class II-IV with eGFR >30/mil/min/173.m2 and K <5.0 mEq/l   mineralcorctcoid receptor antagonist (spiroalacotne ( aldactone)/ eplerone) in addition to ACE or ARB and in conjunction with B blocker  No    B-blocker yes     HR greater than 70 with patient with LVEF  < 35 No  Able to use Ivabradine NA     Diastolic Dysfunction  · Continue with daily weights- to bring in records on each office visit  · Fluid restriction of 2 Liters per day  · Limit sodium in diet to around 5825-0320 mg/day  · Monitor BP at home- to bring in records on each office visit   · Activity as tolerated   · Continue present medical management  · Increase lasix to twice a day, second dose may be taken at 5 pm to allow for patient to rest at night.       Patient was instructed to call the [facility] for changes in the following symptoms:    Weight gain of 3 pounds in 1 day or 5 pounds in 1 week   Increased shortness of breath   Shortness of breath while laying down   Cough   Chest pain   Swelling in feet, ankles or legs   Tenderness or bloating in the abdomen   Fatigue    Decreased appetite or nausea  Confusion     Patient to follow-up in the heart failure clinic in 3 months    Will order echo for follow-up on moderate to severe aortic regurgitation for reevaluation with Dr. Claude Dural in January    Patient given educational materials - see patient instructions. We discussed the importance of weighing oneself and recording daily. We also discussed the importance of a lowsodium diet, higher sodium foods to avoid and better low sodium food options. Discussed use, benefit, and side effects of prescribed medications. All patient questions answered. Patient verbalizes understanding of plan of care using teach back method, and is agreeable to the treatment plan.      Copy of note to be sent to consulting provider and primary cardiologist   Electronicallysigned by CARI Benson - VANCE

## 2020-08-13 ENCOUNTER — HOSPITAL ENCOUNTER (OUTPATIENT)
Age: 85
Setting detail: SPECIMEN
Discharge: HOME | End: 2020-08-13
Payer: MEDICARE

## 2020-08-13 PROCEDURE — U0002 COVID-19 LAB TEST NON-CDC: HCPCS

## 2020-08-14 LAB
SARS-COV-2: NOT DETECTED
SOURCE: NORMAL

## 2020-08-18 ENCOUNTER — APPOINTMENT (OUTPATIENT)
Dept: GENERAL RADIOLOGY | Age: 85
DRG: 378 | End: 2020-08-18
Payer: MEDICARE

## 2020-08-18 ENCOUNTER — APPOINTMENT (OUTPATIENT)
Dept: CT IMAGING | Age: 85
DRG: 378 | End: 2020-08-18
Payer: MEDICARE

## 2020-08-18 ENCOUNTER — HOSPITAL ENCOUNTER (INPATIENT)
Age: 85
LOS: 3 days | Discharge: NURSING, REHAB OR LONG-TERM CARE | DRG: 378 | End: 2020-08-21
Attending: INTERNAL MEDICINE | Admitting: INTERNAL MEDICINE
Payer: MEDICARE

## 2020-08-18 ENCOUNTER — HOSPITAL ENCOUNTER (OUTPATIENT)
Age: 85
Setting detail: SPECIMEN
Discharge: HOME | DRG: 378 | End: 2020-08-18
Payer: MEDICARE

## 2020-08-18 DIAGNOSIS — D64.9 ANEMIA, UNSPECIFIED TYPE: Primary | ICD-10-CM

## 2020-08-18 DIAGNOSIS — R77.8 ELEVATED TROPONIN: ICD-10-CM

## 2020-08-18 DIAGNOSIS — N18.9 CHRONIC KIDNEY DISEASE, UNSPECIFIED CKD STAGE: ICD-10-CM

## 2020-08-18 DIAGNOSIS — D64.9 ACUTE ON CHRONIC ANEMIA: ICD-10-CM

## 2020-08-18 LAB
ALBUMIN SERPL-MCNC: 3.3 GM/DL (ref 3.4–5)
ALP BLD-CCNC: 71 IU/L (ref 40–129)
ALT SERPL-CCNC: 24 U/L (ref 10–40)
ANION GAP SERPL CALCULATED.3IONS-SCNC: 11 MMOL/L (ref 4–16)
ANION GAP SERPL CALCULATED.3IONS-SCNC: 11 MMOL/L (ref 4–16)
AST SERPL-CCNC: 32 IU/L (ref 15–37)
BACTERIA: ABNORMAL /HPF
BASOPHILS ABSOLUTE: 0 K/CU MM
BASOPHILS RELATIVE PERCENT: 0.2 % (ref 0–1)
BILIRUB SERPL-MCNC: 0.5 MG/DL (ref 0–1)
BILIRUBIN URINE: NEGATIVE MG/DL
BLOOD, URINE: ABNORMAL
BUN BLDV-MCNC: 38 MG/DL (ref 6–23)
BUN BLDV-MCNC: 38 MG/DL (ref 6–23)
CALCIUM SERPL-MCNC: 9.1 MG/DL (ref 8.3–10.6)
CALCIUM SERPL-MCNC: 9.1 MG/DL (ref 8.3–10.6)
CHLORIDE BLD-SCNC: 102 MMOL/L (ref 99–110)
CHLORIDE BLD-SCNC: 102 MMOL/L (ref 99–110)
CLARITY: ABNORMAL
CO2: 25 MMOL/L (ref 21–32)
CO2: 28 MMOL/L (ref 21–32)
COLOR: ABNORMAL
CREAT SERPL-MCNC: 1.8 MG/DL (ref 0.9–1.3)
CREAT SERPL-MCNC: 1.9 MG/DL (ref 0.9–1.3)
DIFFERENTIAL TYPE: ABNORMAL
EOSINOPHILS ABSOLUTE: 0.1 K/CU MM
EOSINOPHILS RELATIVE PERCENT: 0.6 % (ref 0–3)
GFR AFRICAN AMERICAN: 41 ML/MIN/1.73M2
GFR AFRICAN AMERICAN: 44 ML/MIN/1.73M2
GFR NON-AFRICAN AMERICAN: 34 ML/MIN/1.73M2
GFR NON-AFRICAN AMERICAN: 36 ML/MIN/1.73M2
GLUCOSE BLD-MCNC: 110 MG/DL (ref 70–99)
GLUCOSE BLD-MCNC: 113 MG/DL (ref 70–99)
GLUCOSE, URINE: NEGATIVE MG/DL
HCT VFR BLD CALC: 23 % (ref 42–52)
HCT VFR BLD CALC: 24 % (ref 42–52)
HCT VFR BLD CALC: 26.1 % (ref 42–52)
HEMOGLOBIN: 6.6 GM/DL (ref 13.5–18)
HEMOGLOBIN: 6.8 GM/DL (ref 13.5–18)
HEMOGLOBIN: 7.7 GM/DL (ref 13.5–18)
IMMATURE NEUTROPHIL %: 0.7 % (ref 0–0.43)
KETONES, URINE: NEGATIVE MG/DL
LEUKOCYTE ESTERASE, URINE: ABNORMAL
LYMPHOCYTES ABSOLUTE: 1.5 K/CU MM
LYMPHOCYTES RELATIVE PERCENT: 12.7 % (ref 24–44)
MCH RBC QN AUTO: 24.5 PG (ref 27–31)
MCH RBC QN AUTO: 25 PG (ref 27–31)
MCHC RBC AUTO-ENTMCNC: 28.3 % (ref 32–36)
MCHC RBC AUTO-ENTMCNC: 28.7 % (ref 32–36)
MCV RBC AUTO: 85.5 FL (ref 78–100)
MCV RBC AUTO: 88.2 FL (ref 78–100)
MONOCYTES ABSOLUTE: 1.2 K/CU MM
MONOCYTES RELATIVE PERCENT: 9.8 % (ref 0–4)
NITRITE URINE, QUANTITATIVE: NEGATIVE
NUCLEATED RBC %: 0 %
PDW BLD-RTO: 26.2 % (ref 11.7–14.9)
PDW BLD-RTO: 26.7 % (ref 11.7–14.9)
PH, URINE: 5 (ref 5–8)
PLATELET # BLD: 303 K/CU MM (ref 140–440)
PLATELET # BLD: 317 K/CU MM (ref 140–440)
PMV BLD AUTO: 10.1 FL (ref 7.5–11.1)
PMV BLD AUTO: 10.4 FL (ref 7.5–11.1)
POTASSIUM SERPL-SCNC: 3.8 MMOL/L (ref 3.5–5.1)
POTASSIUM SERPL-SCNC: 3.9 MMOL/L (ref 3.5–5.1)
PRO-BNP: 1672 PG/ML
PROTEIN UA: 100 MG/DL
RBC # BLD: 2.69 M/CU MM (ref 4.6–6.2)
RBC # BLD: 2.72 M/CU MM (ref 4.6–6.2)
RBC URINE: 56 /HPF (ref 0–3)
SEGMENTED NEUTROPHILS ABSOLUTE COUNT: 8.9 K/CU MM
SEGMENTED NEUTROPHILS RELATIVE PERCENT: 76 % (ref 36–66)
SODIUM BLD-SCNC: 138 MMOL/L (ref 135–145)
SODIUM BLD-SCNC: 141 MMOL/L (ref 135–145)
SPECIFIC GRAVITY UA: 1.01 (ref 1–1.03)
TOTAL IMMATURE NEUTOROPHIL: 0.08 K/CU MM
TOTAL NUCLEATED RBC: 0 K/CU MM
TOTAL PROTEIN: 6.2 GM/DL (ref 6.4–8.2)
TRICHOMONAS: ABNORMAL /HPF
TROPONIN T: 0.02 NG/ML
UROBILINOGEN, URINE: NORMAL MG/DL (ref 0.2–1)
WBC # BLD: 10.5 K/CU MM (ref 4–10.5)
WBC # BLD: 11.7 K/CU MM (ref 4–10.5)
WBC CLUMP: ABNORMAL /HPF
WBC UA: 1033 /HPF (ref 0–2)

## 2020-08-18 PROCEDURE — 81001 URINALYSIS AUTO W/SCOPE: CPT

## 2020-08-18 PROCEDURE — 80048 BASIC METABOLIC PNL TOTAL CA: CPT

## 2020-08-18 PROCEDURE — 87186 SC STD MICRODIL/AGAR DIL: CPT

## 2020-08-18 PROCEDURE — 87088 URINE BACTERIA CULTURE: CPT

## 2020-08-18 PROCEDURE — 86901 BLOOD TYPING SEROLOGIC RH(D): CPT

## 2020-08-18 PROCEDURE — 85025 COMPLETE CBC W/AUTO DIFF WBC: CPT

## 2020-08-18 PROCEDURE — 94761 N-INVAS EAR/PLS OXIMETRY MLT: CPT

## 2020-08-18 PROCEDURE — 2580000003 HC RX 258: Performed by: PHYSICIAN ASSISTANT

## 2020-08-18 PROCEDURE — 85027 COMPLETE CBC AUTOMATED: CPT

## 2020-08-18 PROCEDURE — 85018 HEMOGLOBIN: CPT

## 2020-08-18 PROCEDURE — P9016 RBC LEUKOCYTES REDUCED: HCPCS

## 2020-08-18 PROCEDURE — 6360000002 HC RX W HCPCS: Performed by: NURSE PRACTITIONER

## 2020-08-18 PROCEDURE — 4500000027

## 2020-08-18 PROCEDURE — 80053 COMPREHEN METABOLIC PANEL: CPT

## 2020-08-18 PROCEDURE — 6360000002 HC RX W HCPCS: Performed by: PHYSICIAN ASSISTANT

## 2020-08-18 PROCEDURE — 93010 ELECTROCARDIOGRAM REPORT: CPT | Performed by: INTERNAL MEDICINE

## 2020-08-18 PROCEDURE — 86850 RBC ANTIBODY SCREEN: CPT

## 2020-08-18 PROCEDURE — 83880 ASSAY OF NATRIURETIC PEPTIDE: CPT

## 2020-08-18 PROCEDURE — C9113 INJ PANTOPRAZOLE SODIUM, VIA: HCPCS | Performed by: PHYSICIAN ASSISTANT

## 2020-08-18 PROCEDURE — 2580000003 HC RX 258: Performed by: NURSE PRACTITIONER

## 2020-08-18 PROCEDURE — 96365 THER/PROPH/DIAG IV INF INIT: CPT

## 2020-08-18 PROCEDURE — 96375 TX/PRO/DX INJ NEW DRUG ADDON: CPT

## 2020-08-18 PROCEDURE — 36430 TRANSFUSION BLD/BLD COMPNT: CPT

## 2020-08-18 PROCEDURE — 72125 CT NECK SPINE W/O DYE: CPT

## 2020-08-18 PROCEDURE — 70450 CT HEAD/BRAIN W/O DYE: CPT

## 2020-08-18 PROCEDURE — 84484 ASSAY OF TROPONIN QUANT: CPT

## 2020-08-18 PROCEDURE — 86922 COMPATIBILITY TEST ANTIGLOB: CPT

## 2020-08-18 PROCEDURE — 85014 HEMATOCRIT: CPT

## 2020-08-18 PROCEDURE — 6370000000 HC RX 637 (ALT 250 FOR IP): Performed by: NURSE PRACTITIONER

## 2020-08-18 PROCEDURE — 36415 COLL VENOUS BLD VENIPUNCTURE: CPT

## 2020-08-18 PROCEDURE — 93005 ELECTROCARDIOGRAM TRACING: CPT | Performed by: PHYSICIAN ASSISTANT

## 2020-08-18 PROCEDURE — 1200000000 HC SEMI PRIVATE

## 2020-08-18 PROCEDURE — 71045 X-RAY EXAM CHEST 1 VIEW: CPT

## 2020-08-18 PROCEDURE — 87086 URINE CULTURE/COLONY COUNT: CPT

## 2020-08-18 PROCEDURE — 99291 CRITICAL CARE FIRST HOUR: CPT

## 2020-08-18 PROCEDURE — 6360000002 HC RX W HCPCS

## 2020-08-18 PROCEDURE — 86900 BLOOD TYPING SEROLOGIC ABO: CPT

## 2020-08-18 RX ORDER — M-VIT,TX,IRON,MINS/CALC/FOLIC 27MG-0.4MG
1 TABLET ORAL DAILY
Status: DISCONTINUED | OUTPATIENT
Start: 2020-08-19 | End: 2020-08-21 | Stop reason: HOSPADM

## 2020-08-18 RX ORDER — AMIODARONE HYDROCHLORIDE 200 MG/1
200 TABLET ORAL NIGHTLY
Status: DISCONTINUED | OUTPATIENT
Start: 2020-08-18 | End: 2020-08-21 | Stop reason: HOSPADM

## 2020-08-18 RX ORDER — FINASTERIDE 5 MG/1
5 TABLET, FILM COATED ORAL DAILY
Status: DISCONTINUED | OUTPATIENT
Start: 2020-08-19 | End: 2020-08-21 | Stop reason: HOSPADM

## 2020-08-18 RX ORDER — DOCUSATE SODIUM 100 MG/1
100 CAPSULE, LIQUID FILLED ORAL 2 TIMES DAILY PRN
Status: DISCONTINUED | OUTPATIENT
Start: 2020-08-18 | End: 2020-08-21 | Stop reason: HOSPADM

## 2020-08-18 RX ORDER — OMEGA-3-ACID ETHYL ESTERS 1 G/1
1000 CAPSULE, LIQUID FILLED ORAL DAILY
Status: DISCONTINUED | OUTPATIENT
Start: 2020-08-19 | End: 2020-08-21 | Stop reason: HOSPADM

## 2020-08-18 RX ORDER — POLYETHYLENE GLYCOL 3350 17 G/17G
17 POWDER, FOR SOLUTION ORAL DAILY
Status: DISCONTINUED | OUTPATIENT
Start: 2020-08-18 | End: 2020-08-21 | Stop reason: HOSPADM

## 2020-08-18 RX ORDER — SODIUM CHLORIDE 0.9 % (FLUSH) 0.9 %
10 SYRINGE (ML) INJECTION PRN
Status: DISCONTINUED | OUTPATIENT
Start: 2020-08-18 | End: 2020-08-21 | Stop reason: HOSPADM

## 2020-08-18 RX ORDER — PANTOPRAZOLE SODIUM 40 MG/10ML
40 INJECTION, POWDER, LYOPHILIZED, FOR SOLUTION INTRAVENOUS ONCE
Status: COMPLETED | OUTPATIENT
Start: 2020-08-18 | End: 2020-08-18

## 2020-08-18 RX ORDER — ACETAMINOPHEN 325 MG/1
650 TABLET ORAL EVERY 6 HOURS PRN
Status: CANCELLED | OUTPATIENT
Start: 2020-08-18

## 2020-08-18 RX ORDER — RIVASTIGMINE 4.6 MG/24H
1 PATCH, EXTENDED RELEASE TRANSDERMAL DAILY
Status: DISCONTINUED | OUTPATIENT
Start: 2020-08-18 | End: 2020-08-21 | Stop reason: HOSPADM

## 2020-08-18 RX ORDER — PANTOPRAZOLE SODIUM 40 MG/1
40 TABLET, DELAYED RELEASE ORAL DAILY
Status: DISCONTINUED | OUTPATIENT
Start: 2020-08-18 | End: 2020-08-21 | Stop reason: HOSPADM

## 2020-08-18 RX ORDER — FERROUS SULFATE 325(65) MG
325 TABLET ORAL
Status: DISCONTINUED | OUTPATIENT
Start: 2020-08-19 | End: 2020-08-21 | Stop reason: HOSPADM

## 2020-08-18 RX ORDER — POLYETHYLENE GLYCOL 3350 17 G/17G
17 POWDER, FOR SOLUTION ORAL DAILY
Status: DISCONTINUED | OUTPATIENT
Start: 2020-08-18 | End: 2020-08-18 | Stop reason: CLARIF

## 2020-08-18 RX ORDER — ACETAMINOPHEN 650 MG/1
650 SUPPOSITORY RECTAL EVERY 6 HOURS PRN
Status: CANCELLED | OUTPATIENT
Start: 2020-08-18

## 2020-08-18 RX ORDER — SODIUM CHLORIDE 0.9 % (FLUSH) 0.9 %
10 SYRINGE (ML) INJECTION EVERY 12 HOURS SCHEDULED
Status: DISCONTINUED | OUTPATIENT
Start: 2020-08-18 | End: 2020-08-21 | Stop reason: HOSPADM

## 2020-08-18 RX ORDER — ONDANSETRON 2 MG/ML
4 INJECTION INTRAMUSCULAR; INTRAVENOUS EVERY 6 HOURS PRN
Status: DISCONTINUED | OUTPATIENT
Start: 2020-08-18 | End: 2020-08-21 | Stop reason: HOSPADM

## 2020-08-18 RX ORDER — ATORVASTATIN CALCIUM 20 MG/1
20 TABLET, FILM COATED ORAL NIGHTLY
Status: DISCONTINUED | OUTPATIENT
Start: 2020-08-18 | End: 2020-08-21 | Stop reason: HOSPADM

## 2020-08-18 RX ORDER — 0.9 % SODIUM CHLORIDE 0.9 %
20 INTRAVENOUS SOLUTION INTRAVENOUS ONCE
Status: COMPLETED | OUTPATIENT
Start: 2020-08-18 | End: 2020-08-18

## 2020-08-18 RX ORDER — PROMETHAZINE HYDROCHLORIDE 12.5 MG/1
12.5 TABLET ORAL EVERY 6 HOURS PRN
Status: DISCONTINUED | OUTPATIENT
Start: 2020-08-18 | End: 2020-08-21 | Stop reason: HOSPADM

## 2020-08-18 RX ADMIN — SODIUM CHLORIDE 20 ML: 9 INJECTION, SOLUTION INTRAVENOUS at 17:52

## 2020-08-18 RX ADMIN — SODIUM CHLORIDE, PRESERVATIVE FREE 10 ML: 5 INJECTION INTRAVENOUS at 20:05

## 2020-08-18 RX ADMIN — CEFEPIME 2 G: 2 INJECTION, POWDER, FOR SOLUTION INTRAVENOUS at 20:04

## 2020-08-18 RX ADMIN — METOPROLOL TARTRATE 12.5 MG: 25 TABLET, FILM COATED ORAL at 20:04

## 2020-08-18 RX ADMIN — PANTOPRAZOLE SODIUM 40 MG: 40 TABLET, DELAYED RELEASE ORAL at 20:04

## 2020-08-18 RX ADMIN — PANTOPRAZOLE SODIUM 40 MG: 40 INJECTION, POWDER, FOR SOLUTION INTRAVENOUS at 17:51

## 2020-08-18 RX ADMIN — ATORVASTATIN CALCIUM 20 MG: 20 TABLET, FILM COATED ORAL at 20:04

## 2020-08-18 RX ADMIN — PROMETHAZINE HYDROCHLORIDE 12.5 MG: 12.5 TABLET ORAL at 20:05

## 2020-08-18 RX ADMIN — POLYETHYLENE GLYCOL (3350) 17 G: 17 POWDER, FOR SOLUTION ORAL at 22:09

## 2020-08-18 RX ADMIN — AMIODARONE HYDROCHLORIDE 200 MG: 200 TABLET ORAL at 20:04

## 2020-08-18 NOTE — ED TRIAGE NOTES
Pt to ED with EMS from [facility] for fall last night, chest pain, and low hemoglobin. Pt states he became dizzy last night and fell in bathroom, legs \"slid out from underneath me\", landed on bottom, denies LOC or striking head. QCT states pt began experiencing chest pains en route to hospital, no intervention and resolved pta. QCT also reports pt hgb 6.6 from facility on lab work today. Pt currently denies pain, states \"feels like the last time I was in a-fib\", pacemaker in place.

## 2020-08-18 NOTE — ED NOTES
1933 paged hospitalist     Raghavendra Stoner  08/18/20 [id number]  1503 Kier[facility] with [facility] returned call      Raghavendra Stoner  08/18/20 1500

## 2020-08-18 NOTE — ED PROVIDER NOTES
eMERGENCY dEPARTMENT eNCOUnter      PCP: [address]    Chief Complaint   Patient presents with    Chest Pain     when [facility] arrived to transport pt, resolved pta    Fall     became dizzy last night and fell on bottom, denies LOC or striking head    Other     low hemoglobin, 6.6       HPI    Anise Goodpasture is a 80 y.o. male with past medical history of A. fib on Eliquis, CHF, hypertension, hyperlipidemia who presents from [facility] following a fall. Patient states that he was in the shower last evening when he slipped and fell onto his bottom. He denies hitting his head or loss of consciousness. He states he was still feeling generally weak this morning which prompted transport to the ED. In route to the emergency department according to [facility], patient did develop an episode of chest pain and shortness of breath which resolved prior to arrival to the emergency department, lasted a couple minutes. Patient is currently denying any symptoms. No dizziness or lightheadedness. No chest pain, shortness of breath, abdominal pain. No headache, cervical or back pain. Patient also reports that he had a low hemoglobin of 6.6 on outpatient labs. REVIEW OF SYSTEMS    General: No fever  ENT:  No visual changes. No headache. Cardiac: No current Chest Pain, denies syncope  Respiratory: No cough or difficulty breathing  GI: No vomiting. No Bloody Stool or Diarrhea  : No Dysuria or Hematuria  MSKTL:  See HPI. No neck or back pain. Neurologic: denies LOC, no headache, dizziness, confusion.   No hearing loss    See HPI and nursing notes for additional information     PAST MEDICAL & SURGICAL HISTORY    Past Medical History:   Diagnosis Date    ACTA2-related familial thoracic aortic aneurysm     Arrhythmia     Atrial fibrillation (HCC)     BBBB (bilateral bundle branch block)     Bradycardia     [facility]F [facility]congest[facility] failure) ([facility] [address]     Essential Post-stress LV function is normal.     Hx of transesophageal echocardiography (SIMONE) for monitoring 04/17/2018    EF45-50% mild TR, mild-mod MR, mod-severe AS    Hyperlipemia     Hyperlipidemia     Hypertension     Nonspecific abnormal electrocardiogram (ECG) (EKG)     Persistent atrial fibrillation     Sick sinus syndrome ([address]     Vertigo      Past Surgical History:   Procedure Laterality Date    CYSTOSCOPY INSERTION / REMOVAL STENT / STONE Left 5/6/2019    CYSTOSCOPY RETROGRADE PYELOGRAM STENT INSERTION, DIAGNOSTIC CYSTOSCOPY performed by Ronan Parson MD at [facility] Left 03/29/2018    Dual Chamber [employer] XT DR AMI Kincaid       CURRENT MEDICATIONS    Current Outpatient Rx   Medication Sig Dispense Refill    pantoprazole (PROTONIX) 40 MG tablet Take 40 mg by mouth daily      Carboxymethylcellulose Sodium (ARTIFICIAL TEARS OP) Apply 1 drop to eye as needed      ferrous sulfate (IRON 325) 325 (65 Fe) MG tablet Take 325 mg by mouth daily (with breakfast)      furosemide (LASIX) 20 MG tablet Take 1 tablet by mouth daily as needed (lower leg edema) Take 20 mg of oral lasix at evening, for a total of 40 mg of oral lasix at the evening dose  for the next 3 days. 30 tablet 0    spironolactone (ALDACTONE) 25 MG tablet Take 25 mg by mouth daily      Elastic Bandages & Supports (JOBST KNEE HIGH COMPRESSION ) MISC 1 each by Does not apply route daily Knee high compression  Apply upon waking in the morning and remove at bedtime 1 each 0    polyethylene glycol (GLYCOLAX) powder Take 17 g by mouth daily      furosemide (LASIX) 20 MG tablet Take 3 tablets by mouth See Admin Instructions Take 2 tabs in the morning and 1 tab in the afternoon.  90 tablet 1    Polyvinyl Alcohol-Povidone (REFRESH OP) Apply 1 drop to eye as needed For dry eyes      acetaminophen (TYLENOL) 325 MG tablet Take 650 mg by mouth every 6 hours as needed for Pain For pain or temp      metoprolol tartrate (LOPRESSOR) 25 MG tablet Take 0.5 tablets by mouth 2 times daily (Patient taking differently: Take 12.5 mg by mouth 2 times daily Hold if SBP < 110 or HR is < 55) 30 tablet 0    docusate sodium (COLACE, DULCOLAX) 100 MG CAPS Take 100 mg by mouth 2 times daily as needed for Constipation 30 capsule 0    finasteride (PROSCAR) 5 MG tablet Take 1 tablet by mouth daily 30 tablet 0    amiodarone (CORDARONE) 200 MG tablet Take 1 tablet by mouth daily (Patient taking differently: Take 200 mg by mouth nightly ) 30 tablet 3    rivastigmine (EXELON) 4.6 MG/24HR Place 1 patch onto the skin daily       Cholecalciferol (VITAMIN D3) 5000 units TABS Take 5,000 Units by mouth every morning      apixaban (ELIQUIS) 2.5 MG TABS tablet Take 1 tablet by mouth 2 times daily 60 tablet 3    Omega-3 Fatty Acids (FISH OIL) 1000 MG CAPS Take 1,000 mg by mouth daily       Multiple Vitamins-Minerals (THERAPEUTIC MULTIVITAMIN-MINERALS) tablet Take 1 tablet by mouth daily      atorvastatin (LIPITOR) 20 MG tablet Take 20 mg by mouth nightly          ALLERGIES    Allergies   Allergen Reactions    Biaxin [Clarithromycin]     Cephalexin     Viagra [Sildenafil Citrate] Nausea Only and Other (See Comments)     Dizziness and BP dropped       SOCIAL & FAMILY HISTORY    Social History     Socioeconomic History    Marital status:      Spouse name: None    Number of children: None    Years of education: None    Highest education level: None   Occupational History    None   Social Needs    Financial resource strain: None    Food insecurity     Worry: None     Inability: None    Transportation needs     Medical: None     Non-medical: None   Tobacco Use    Smoking status: Never Smoker    Smokeless tobacco: Never Used   Substance and Sexual Activity    Alcohol use: Not Currently     Alcohol/week: 0.0 standard drinks    Drug use: No    Sexual activity: Never   Lifestyle    Physical activity     Days per week: None     Minutes per session: None    Stress: None   Relationships    Social connections     Talks on phone: None     Gets together: None     Attends Christian service: None     Active member of club or organization: None     Attends meetings of clubs or organizations: None     Relationship status: None    Intimate partner violence     Fear of current or ex partner: None     Emotionally abused: None     Physically abused: None     Forced sexual activity: None   Other Topics Concern    None   Social History Narrative    None     History reviewed. No pertinent family history. PHYSICAL EXAM    VITAL SIGNS: /66   Pulse 62   Temp 98.1 °F (36.7 °C) (Oral)   Resp 15   SpO2 98%    Constitutional:  Well developed, well nourished, no acute distress   Eyes: EOMI. PERRL, sclera nonicteric. Anterior chambers clear. Funduscopic exam without any gross abnormality or hemorrhages. HENT:  Atraumatic, no trismus. Neck/Lymphatics: supple, no JVD, no swollen nodes. no posterior neck tenderness  Respiratory:  Lungs Clear, no retractions   Cardiovascular: Regular rate, no murmurs  GI:  Soft, nontender, normal bowel sounds  Musculoskeletal:  No edema, no tenderness palpation of upper or lower extremities with good range of motion and strength  Integument:  Well hydrated, no petechia, pale   Neurologic:    Alert & oriented , No focal deficits noted. Cranial nerves II through XII grossly intact. Normal gross motor coordination & motor strength bilateral upper and lower extremities  Sensation intact.   Psych: Pleasant affect, no hallucinations        LABS:  Results for orders placed or performed during the hospital encounter of 08/18/20   CBC Auto Differential   Result Value Ref Range    WBC 11.7 (H) 4.0 - 10.5 K/CU MM    RBC 2.72 (L) 4.6 - 6.2 M/CU MM    Hemoglobin 6.8 (LL) 13.5 - 18.0 GM/DL    Hematocrit 24.0 (L) 42 - 52 %    MCV 88.2 78 - 100 FL    MCH 25.0 (L) 27 - 31 PG    MCHC 28.3 (L) 32.0 - 36.0 %    RDW 26.7 (H) 11.7 - 14.9 %    Platelets 402 636 - 452 K/CU MM    MPV 10.1 7.5 - 11.1 FL    Differential Type AUTOMATED DIFFERENTIAL     Segs Relative 76.0 (H) 36 - 66 %    Lymphocytes % 12.7 (L) 24 - 44 %    Monocytes % 9.8 (H) 0 - 4 %    Eosinophils % 0.6 0 - 3 %    Basophils % 0.2 0 - 1 %    Segs Absolute 8.9 K/CU MM    Lymphocytes Absolute 1.5 K/CU MM    Monocytes Absolute 1.2 K/CU MM    Eosinophils Absolute 0.1 K/CU MM    Basophils Absolute 0.0 K/CU MM    Nucleated RBC % 0.0 %    Total Nucleated RBC 0.0 K/CU MM    Total Immature Neutrophil 0.08 K/CU MM    Immature Neutrophil % 0.7 (H) 0 - 0.43 %   Comprehensive Metabolic Panel   Result Value Ref Range    Sodium 138 135 - 145 MMOL/L    Potassium 3.8 3.5 - 5.1 MMOL/L    Chloride 102 99 - 110 mMol/L    CO2 25 21 - 32 MMOL/L    BUN 38 (H) 6 - 23 MG/DL    CREATININE 1.8 (H) 0.9 - 1.3 MG/DL    Glucose 110 (H) 70 - 99 MG/DL    Calcium 9.1 8.3 - 10.6 MG/DL    Alb 3.3 (L) 3.4 - 5.0 GM/DL    Total Protein 6.2 (L) 6.4 - 8.2 GM/DL    Total Bilirubin 0.5 0.0 - 1.0 MG/DL    ALT 24 10 - 40 U/L    AST 32 15 - 37 IU/L    Alkaline Phosphatase 71 40 - 129 IU/L    GFR Non- 36 (L) >60 mL/min/1.73m2    GFR  44 (L) >60 mL/min/1.73m2    Anion Gap 11 4 - 16   Troponin   Result Value Ref Range    Troponin T 0.023 (H) <0.01 NG/ML   Brain Natriuretic Peptide   Result Value Ref Range    Pro-BNP 1,672 (H) <300 PG/ML   EKG 12 Lead   Result Value Ref Range    Ventricular Rate 69 BPM    Atrial Rate 69 BPM    P-R Interval 186 ms    QRS Duration 188 ms    Q-T Interval 612 ms    QTc Calculation (Bazett) 655 ms    P Axis 39 degrees    R Axis -51 degrees    T Axis 106 degrees    Diagnosis       AV dual-paced rhythm  Abnormal ECG  When compared with ECG of 21-AUG-2019 16:46,  Vent.  rate has increased BY   9 BPM           EKG Interpretation  Please see ED physician's note for EKG interpretation        RADIOLOGY       CT CERVICAL SPINE WO CONTRAST   Final Result   No acute abnormality of the cervical spine. Stable 16 mm thyroid nodule that is partially calcified. Given its stability   and patient age, no further imaging evaluation suggested         CT HEAD WO CONTRAST   Final Result   No acute intracranial abnormality. XR CHEST PORTABLE   Final Result   Limited hypoventilatory exam.      No acute abnormality visualized. Cardiomegaly and severely tortuous thoracic aorta. ED COURSE & MEDICAL DECISION MAKING       Vital signs and nursing notes reviewed during ED course. I have independently evaluated this patient . Supervising MD present in the Emergency Department, available for consultation, throughout entirety of  patient care. I did don/doff appropriate PPE (including N95 face mask, safety glasses, gloves), as recommended by the health facility/national standard best practice, during my bedside interactions with the patient. Patient presents as above following a fall last evening with feelings of weakness and episode of chest pain and shortness of breath prior to arrival.  Patient is hemodynamically stable on arrival, afebrile. He is denying any current pain. No sensation of dizziness or lightheadedness. His neurological exam is grossly intact and he is answering all questions appropriately, very pleasant. Lab work significant for anemia with hemoglobin of 6.8. Mild leukocytosis of 11,700. He does have CKD, creatinine appears about baseline for him at 1.8. In the setting of his baseline kidney function he does have elevated troponin at 0.023. BNP elevated over thousand without findings of fluid overload on clinical exam or on chest x-ray. Type and screen obtained 1 unit of PRBC ordered. I discussed anemia with patient today and he denies known history of similar. I did review chart and I do not see any history of GI bleeding. He is on Eliquis. I did attempt rectal exam to further differentiate this anemia, however patient is refusing.   I did discuss that we will need a stool sample for him then to rule out blood in his stool. He is denying any change in stool color or consistency. I do not see that he has seen any GI provider in the past.  In the setting of this anemia, did not treat patient with aspirin with his elevated troponin. He follows with Dr. Mounika Gandhi and Dr. Desi Petersen. Pending pacemaker interrogation here in the ED. At this time suspect patient's market anemia is likely attributing to the fall and generalized weakness. Patient is agreeable to admission for further evaluation and treatment of above. In consideration of current COVID19 pandemic, with effort to minimize unnecessary provider exposure, this patient was seen at bedside by me independently. However, in compliance with current hospital WING/ED protocol, prior to admission I did discuss this patient case with emergency department physician, Dr. Teresa Brown. I spent a total of 33 minutes of critical care time in obtaining history, performing a physical exam, bedside monitoring of interventions, collecting and interpreting tests and discussion with consultants but not including time spent performing procedures. Clinical Concern: anemia, elevated trop  Intervention: PRBC transfusion, cardiac monitoring        Clinical  IMPRESSION    1. Anemia, unspecified type    2. Elevated troponin    3. Chronic kidney disease, unspecified CKD stage        Admission        Comment: Please note this report has been produced using speech recognition software and may contain errors related to that system including errors in grammar, punctuation, and spelling, as well as words and phrases that may be inappropriate. If there are any questions or concerns please feel free to contact the dictating provider for clarification.       SRINATH Bautista  08/18/20 2123

## 2020-08-18 NOTE — H&P
g Oral Daily      Infusions:   PRN Meds: docusate sodium, 100 mg, BID PRN  sodium chloride flush, 10 mL, PRN  promethazine, 12.5 mg, Q6H PRN    Or  ondansetron, 4 mg, Q6H PRN        Current Facility-Administered Medications:     0.9 % sodium chloride bolus, 20 mL, Intravenous, Once, SRINATH Cook, Last Rate: 10 mL/hr at 08/18/20 1752, 20 mL at 08/18/20 1752    amiodarone (CORDARONE) tablet 200 mg, 200 mg, Oral, Nightly, CARI Tillman CNP    atorvastatin (LIPITOR) tablet 20 mg, 20 mg, Oral, Nightly, CARI Isaacs CNP    rivastigmine (EXELON) 4.6 MG/24HR 1 patch, 1 patch, Transdermal, Daily, CARI Tillman CNP    pantoprazole (PROTONIX) tablet 40 mg, 40 mg, Oral, Daily, CARI Tillman CNP    [START ON 8/19/2020] therapeutic multivitamin-minerals 1 tablet, 1 tablet, Oral, Daily, CARI Tillman CNP    [START ON 8/19/2020] omega-3 acid ethyl esters (LOVAZA) capsule 1,000 mg, 1,000 mg, Oral, Daily, CARI Tillman CNP    metoprolol tartrate (LOPRESSOR) tablet 12.5 mg, 12.5 mg, Oral, BID, CARI Tillman CNP    [START ON 8/19/2020] finasteride (PROSCAR) tablet 5 mg, 5 mg, Oral, Daily, CARI Tillman CNP    [START ON 8/19/2020] ferrous sulfate (IRON 325) tablet 325 mg, 325 mg, Oral, Daily with breakfast, CARI Tillman CNP    docusate sodium (COLACE) capsule 100 mg, 100 mg, Oral, BID PRN, CARI Tillman CNP    [START ON 8/19/2020] vitamin D CAPS 5,000 Units, 5,000 Units, Oral, QAM, CARI Tillman CNP    sodium chloride flush 0.9 % injection 10 mL, 10 mL, Intravenous, 2 times per day, CARI Tillman CNP    sodium chloride flush 0.9 % injection 10 mL, 10 mL, Intravenous, PRN, CARI Tillman CNP    promethazine (PHENERGAN) tablet 12.5 mg, 12.5 mg, Oral, Q6H PRN **OR** ondansetron (ZOFRAN) injection 4 mg, 4 mg, Intravenous, Q6H PRN, CARI Tillman CNP    polyethylene glycol (GLYCOLAX) Intake 500 ml   Output --   Net 500 ml      Vitals:   Vitals:    08/18/20 1734   BP: 139/63   Pulse: 63   Resp: 16   Temp: 98.2 °F (36.8 °C)   SpO2: 97%     Physical Exam:   Gen:  awake, alert, cooperative, no apparent distress. Ill appearing  EYES:Lids and lashes normal, pupils equal, round ,extra ocular muscles intact, sclera clear, conjunctiva normal  ENT:  Normocephalic, oral pharynx with moist mucus membranes  NECK:  Supple, symmetrical, trachea midline, no adenopathy,  LUNGS:  Clear to auscultate bilaterally, no rales ronchi or wheezing noted. CARDIOVASCULAR:  regular rate and rhythm, normal S1 and S2,no murmur noted, peripheral pulses 2+, no pitting edema  ABDOMEN: Normal BS, Non tender, non distended, no HSM noted. MUSCULOSKELETAL:  ROM of all extremities grossly wnl  NEUROLOGIC: AOx 3,  Cranial nerves II-XII are grossly intact. Motor is 5 out of 5 bilaterally. Sensory is intact, no lateralizing findings. SKIN:  no bruising or bleeding, pale, turgor, no redness, warmth, or swelling      Past Medical History:      Past Medical History:   Diagnosis Date    ACTA2-related familial thoracic aortic aneurysm     Arrhythmia     Atrial fibrillation (HCC)     BBBB (bilateral bundle branch block)     Bradycardia     [facility] ([facility] [address]     Essential hypertension, malignant     Fall at home     H/O echocardiogram 07/01/2020    EF 50-55%, Mod-Severe AS, Mild PHTN, Aortic Root 3.9cm.(pt had OV after echo)    History of cardioversion 04/17/2018    History of chest x-ray 02/27/2017    Enlargement of the aorta knob which may represent a thoracic aortic aneurysm. Further evaluation w/ a contrast enhanced CT of the chest recommended. no evidence of acute pulmonary process.     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 Recommend thyroid US.  History of echocardiogram 03/27/2017    TTE EF 55%, LV Normal Size, borderline LVH concentric, Normal LV systolic function, transmittal doppler flow pttern suggest impaired LV relaxation Mild aortic stenosis, mild aortic regurgitation.  History of EKG 02/27/2017    Time 12:03 AM, HR 75, A fib, Axis normal, Intervals normal, P waves normal, St-T Segments: nonspecific ST segment changes to the anterior lateral leads, QRS RBBB,  No significant Q waves, no ectopy. A-fib w/RBBB w/nonspecific ST segment change EKG.  History of EKG 02/27/2017    Time 2:58AM: HR 59, NSR, Axis normal, Intervals normal, P waves normal, ST-T seg: nonspecific ST segment changes, QRS RBBB, no significant Q waves, no ectopy. Sinus bradycardia w/ RBBB nonspecific ST Segment changes EKG    History of Holter monitoring 11/08/2017    monitored 48 hours: Patient noted to have multiple PAC and PVCs. Risk of atrial fibrillation present given previous episode Recommend antiarrhythmic therapy.  History of stress test 03/27/2017    NM Stress test: EF 54%, Abnormal study, evidence of mild ischemia in mild inferior regions. post stress LV is enlarged in size. Post-stress LV function is normal.     Hx of transesophageal echocardiography (SIMONE) for monitoring 04/17/2018    EF45-50% mild TR, mild-mod MR, mod-severe AS    Hyperlipemia     Hyperlipidemia     Hypertension     Nonspecific abnormal electrocardiogram (ECG) (EKG)     Persistent atrial fibrillation     Sick sinus syndrome (HCC)     Vertigo      PSHX:  has a past surgical history that includes Pacemaker insertion (Left, 03/29/2018) and CYSTOSCOPY INSERTION / REMOVAL STENT / STONE (Left, 5/6/2019). Allergies:    Allergies   Allergen Reactions    Biaxin [Clarithromycin]     Cephalexin     Viagra [Sildenafil Citrate] Nausea Only and Other (See Comments)     Dizziness and BP dropped       FAM HX:Unable to obtain due to dementia  Family history reviewed and is essentially negative unless as stated above.      Soc HX:   Social History     Socioeconomic History    Marital status:      Spouse name: None    Number of children: None    Years of education: None    Highest education level: None   Occupational History    None   Social Needs    Financial resource strain: None    Food insecurity     Worry: None     Inability: None    Transportation needs     Medical: None     Non-medical: None   Tobacco Use    Smoking status: Never Smoker    Smokeless tobacco: Never Used   Substance and Sexual Activity    Alcohol use: Not Currently     Alcohol/week: 0.0 standard drinks    Drug use: No    Sexual activity: Never   Lifestyle    Physical activity     Days per week: None     Minutes per session: None    Stress: None   Relationships    Social connections     Talks on phone: None     Gets together: None     Attends [employer] service: None     Active member of club or organization: None     Attends meetings of clubs or organizations: None     Relationship status: None    Intimate partner violence     Fear of current or ex partner: None     Emotionally abused: None     Physically abused: None     Forced sexual activity: None   Other Topics Concern    None   Social History Narrative    None       Electronically signed by CARI Larry CNP on 8/18/2020 at 6:38 PM

## 2020-08-18 NOTE — ACP (ADVANCE CARE PLANNING)
Advance Care Planning     Advance Care Planning Activator (Inpatient)  Conversation Note      Date of ACP Conversation: 8/18/2020    Conversation Conducted with: Patient with Decision Making Capacity    ACP Activator: Rhoda Sexton. Kevinine    *When Decision Maker makes decisions on behalf of the incapacitated patient: Decision Maker is asked to consider and make decisions based on patient values, known preferences, or best interests. Health Care Decision Maker:     Current Designated Health Care Decision Maker:   Primary Decision Maker: Taylor Gary [facility] - [phone]  (If there is a valid Health Care Decision Maker named in the \"Healthcare Decision Makers\" box in the ACP activity, but it is not visible above, be sure to open that field and then select the health care decision maker relationship (ie \"primary\") in the blank space to the right of the name.) Validate  this information as still accurate & up-to-date; edit [address] field as needed.)    Note: Assess and validate information in current ACP documents, as indicated. If no Decision Maker listed above or available through scanned documents, then:    If no Authorized Decision Maker has previously been identified, then patient chooses [address]:  \"Who would you like to name as your primary health care decision-maker? \"               Name: Alejandra Edwards   Relationship: daughter          Phone number: [phone]  Natanael Erps this person be reached easily? \" Yes      Note: If the relationship of these Decision-Makers to the patient does NOT follow your state's Next of Kin hierarchy, recommend that patient complete ACP document that meets state-specific requirements to allow them to act on the patient's behalf when appropriate. Care Preferences    Ventilation:   \"If you were in your present state of health and suddenly became very ill and were unable to breathe on your own, what would your preference be about the use of a ventilator (breathing machine) if it were available to you? \"      Would the patient desire the use of ventilator (breathing machine)?: yes    \"If your health worsens and it becomes clear that your chance of recovery is unlikely, what would your preference be about the use of a ventilator (breathing machine) if it were available to you? \"     Would the patient desire the use of ventilator (breathing machine)?: refer to previous living will      Resuscitation  \"CPR works best to restart the heart when there is a sudden event, like a heart attack, in someone who is otherwise healthy. Unfortunately, CPR does not typically restart the heart for people who have serious health conditions or who are very sick. \"    \"In the event your heart stopped as a result of an underlying serious health condition, would you want attempts to be made to restart your heart (answer \"yes\" for attempt to resuscitate) or would you prefer a natural death (answer \"no\" for do not attempt to resuscitate)? \" yes      NOTE: If the patient has a valid advance directive AND now provides care preference(s) that are inconsistent with that prior directive, advise the patient to consider either: creating a new advance directive that complies with state-specific requirements; or, if that is not possible, orally revoking that prior directive in accordance with state-specific requirements, which must be documented in the EHR. [x] Yes   [] No   Educated Patient / Ashvin Alves regarding differences between Advance Directives and portable DNR orders.     Length of ACP Conversation in minutes:  15    Conversation Outcomes:  [x] ACP discussion completed  [x] Existing advance directive reviewed with patient; no changes to patient's previously recorded wishes  [] New Advance Directive completed  [] Portable Do Not Rescitate prepared for Provider review and signature  [] POLST/POST/MOLST/MOST prepared for Provider review and signature      Follow-up plan:    [] Schedule follow-up conversation to continue planning  [] Referred individual to Provider for additional questions/concerns   [x] Advised patient/agent/surrogate to review completed ACP document and update if needed with changes in condition, patient preferences or care setting    [x] This note routed to one or more involved healthcare providers

## 2020-08-18 NOTE — ED NOTES
In room as chaperone for rectal exam, pt refused at this time. Neena Baumgarten, PA at bedside.           Nolberto Miller RN  08/18/20 [id number]

## 2020-08-18 NOTE — ED NOTES
Bed: ED-31  Expected date:   Expected time:   Means of arrival:   Comments:  Hold EMS     Bhargavi Jimenez RN  08/18/20 0397

## 2020-08-19 LAB
ABO/RH: NORMAL
ALBUMIN SERPL-MCNC: 3.1 GM/DL (ref 3.4–5)
ALP BLD-CCNC: 58 IU/L (ref 40–128)
ALT SERPL-CCNC: 20 U/L (ref 10–40)
AMYLASE: 55 U/L (ref 25–115)
ANION GAP SERPL CALCULATED.3IONS-SCNC: 5 MMOL/L (ref 4–16)
ANTIBODY SCREEN: NEGATIVE
APTT: 35.9 SECONDS (ref 25.1–37.1)
AST SERPL-CCNC: 21 IU/L (ref 15–37)
BASOPHILS ABSOLUTE: 0 K/CU MM
BASOPHILS RELATIVE PERCENT: 0.4 % (ref 0–1)
BILIRUB SERPL-MCNC: 0.6 MG/DL (ref 0–1)
BUN BLDV-MCNC: 37 MG/DL (ref 6–23)
CALCIUM SERPL-MCNC: 8.6 MG/DL (ref 8.3–10.6)
CHLORIDE BLD-SCNC: 103 MMOL/L (ref 99–110)
CO2: 30 MMOL/L (ref 21–32)
COMPONENT: NORMAL
COMPONENT: NORMAL
CREAT SERPL-MCNC: 1.7 MG/DL (ref 0.9–1.3)
CROSSMATCH RESULT: NORMAL
CROSSMATCH RESULT: NORMAL
DIFFERENTIAL TYPE: ABNORMAL
EOSINOPHILS ABSOLUTE: 0.2 K/CU MM
EOSINOPHILS RELATIVE PERCENT: 2.2 % (ref 0–3)
GFR AFRICAN AMERICAN: 46 ML/MIN/1.73M2
GFR NON-AFRICAN AMERICAN: 38 ML/MIN/1.73M2
GLUCOSE BLD-MCNC: 91 MG/DL (ref 70–99)
HCT VFR BLD CALC: 23.3 % (ref 42–52)
HCT VFR BLD CALC: 24.2 % (ref 42–52)
HEMOGLOBIN: 7 GM/DL (ref 13.5–18)
HEMOGLOBIN: 7 GM/DL (ref 13.5–18)
IMMATURE NEUTROPHIL %: 0.3 % (ref 0–0.43)
INR BLD: 1.21 INDEX
IRON: 17 UG/DL (ref 59–158)
LIPASE: 29 IU/L (ref 13–60)
LYMPHOCYTES ABSOLUTE: 1.6 K/CU MM
LYMPHOCYTES RELATIVE PERCENT: 16.1 % (ref 24–44)
MCH RBC QN AUTO: 26.1 PG (ref 27–31)
MCHC RBC AUTO-ENTMCNC: 30 % (ref 32–36)
MCV RBC AUTO: 86.9 FL (ref 78–100)
MONOCYTES ABSOLUTE: 0.9 K/CU MM
MONOCYTES RELATIVE PERCENT: 9.2 % (ref 0–4)
NUCLEATED RBC %: 0 %
PCT TRANSFERRIN: 5 % (ref 10–44)
PDW BLD-RTO: 24.4 % (ref 11.7–14.9)
PLATELET # BLD: 252 K/CU MM (ref 140–440)
PMV BLD AUTO: 9.4 FL (ref 7.5–11.1)
POTASSIUM SERPL-SCNC: 3.5 MMOL/L (ref 3.5–5.1)
PROTHROMBIN TIME: 14.7 SECONDS (ref 11.7–14.5)
RBC # BLD: 2.68 M/CU MM (ref 4.6–6.2)
SEGMENTED NEUTROPHILS ABSOLUTE COUNT: 7.1 K/CU MM
SEGMENTED NEUTROPHILS RELATIVE PERCENT: 71.8 % (ref 36–66)
SODIUM BLD-SCNC: 138 MMOL/L (ref 135–145)
STATUS: NORMAL
STATUS: NORMAL
TOTAL IMMATURE NEUTOROPHIL: 0.03 K/CU MM
TOTAL IRON BINDING CAPACITY: 316 UG/DL (ref 250–450)
TOTAL NUCLEATED RBC: 0 K/CU MM
TOTAL PROTEIN: 5.2 GM/DL (ref 6.4–8.2)
TRANSFUSION STATUS: NORMAL
TRANSFUSION STATUS: NORMAL
TROPONIN T: 0.03 NG/ML
TROPONIN T: 0.04 NG/ML
UNIT DIVISION: 0
UNIT DIVISION: 0
UNIT NUMBER: NORMAL
UNIT NUMBER: NORMAL
UNSATURATED IRON BINDING CAPACITY: 299 UG/DL (ref 110–370)
WBC # BLD: 9.9 K/CU MM (ref 4–10.5)

## 2020-08-19 PROCEDURE — 85610 PROTHROMBIN TIME: CPT

## 2020-08-19 PROCEDURE — 80053 COMPREHEN METABOLIC PANEL: CPT

## 2020-08-19 PROCEDURE — 85025 COMPLETE CBC W/AUTO DIFF WBC: CPT

## 2020-08-19 PROCEDURE — U0002 COVID-19 LAB TEST NON-CDC: HCPCS

## 2020-08-19 PROCEDURE — 1200000000 HC SEMI PRIVATE

## 2020-08-19 PROCEDURE — 86850 RBC ANTIBODY SCREEN: CPT

## 2020-08-19 PROCEDURE — P9016 RBC LEUKOCYTES REDUCED: HCPCS

## 2020-08-19 PROCEDURE — 85014 HEMATOCRIT: CPT

## 2020-08-19 PROCEDURE — 86901 BLOOD TYPING SEROLOGIC RH(D): CPT

## 2020-08-19 PROCEDURE — 6360000002 HC RX W HCPCS: Performed by: NURSE PRACTITIONER

## 2020-08-19 PROCEDURE — 36430 TRANSFUSION BLD/BLD COMPNT: CPT

## 2020-08-19 PROCEDURE — 6370000000 HC RX 637 (ALT 250 FOR IP): Performed by: NURSE PRACTITIONER

## 2020-08-19 PROCEDURE — 85730 THROMBOPLASTIN TIME PARTIAL: CPT

## 2020-08-19 PROCEDURE — 83540 ASSAY OF IRON: CPT

## 2020-08-19 PROCEDURE — 83690 ASSAY OF LIPASE: CPT

## 2020-08-19 PROCEDURE — 94761 N-INVAS EAR/PLS OXIMETRY MLT: CPT

## 2020-08-19 PROCEDURE — 82150 ASSAY OF AMYLASE: CPT

## 2020-08-19 PROCEDURE — 2580000003 HC RX 258: Performed by: INTERNAL MEDICINE

## 2020-08-19 PROCEDURE — 84484 ASSAY OF TROPONIN QUANT: CPT

## 2020-08-19 PROCEDURE — 85018 HEMOGLOBIN: CPT

## 2020-08-19 PROCEDURE — 86900 BLOOD TYPING SEROLOGIC ABO: CPT

## 2020-08-19 PROCEDURE — 86922 COMPATIBILITY TEST ANTIGLOB: CPT

## 2020-08-19 PROCEDURE — 2580000003 HC RX 258: Performed by: NURSE PRACTITIONER

## 2020-08-19 PROCEDURE — 83550 IRON BINDING TEST: CPT

## 2020-08-19 PROCEDURE — 36415 COLL VENOUS BLD VENIPUNCTURE: CPT

## 2020-08-19 PROCEDURE — 96366 THER/PROPH/DIAG IV INF ADDON: CPT

## 2020-08-19 RX ORDER — 0.9 % SODIUM CHLORIDE 0.9 %
20 INTRAVENOUS SOLUTION INTRAVENOUS ONCE
Status: COMPLETED | OUTPATIENT
Start: 2020-08-19 | End: 2020-08-20

## 2020-08-19 RX ADMIN — Medication 5000 UNITS: at 08:51

## 2020-08-19 RX ADMIN — FINASTERIDE 5 MG: 5 TABLET, FILM COATED ORAL at 08:52

## 2020-08-19 RX ADMIN — SODIUM CHLORIDE, PRESERVATIVE FREE 10 ML: 5 INJECTION INTRAVENOUS at 08:52

## 2020-08-19 RX ADMIN — SODIUM CHLORIDE 20 ML: 9 INJECTION, SOLUTION INTRAVENOUS at 22:26

## 2020-08-19 RX ADMIN — AMIODARONE HYDROCHLORIDE 200 MG: 200 TABLET ORAL at 22:24

## 2020-08-19 RX ADMIN — CEFEPIME 2 G: 2 INJECTION, POWDER, FOR SOLUTION INTRAVENOUS at 08:52

## 2020-08-19 RX ADMIN — ATORVASTATIN CALCIUM 20 MG: 20 TABLET, FILM COATED ORAL at 22:24

## 2020-08-19 RX ADMIN — METOPROLOL TARTRATE 12.5 MG: 25 TABLET, FILM COATED ORAL at 08:52

## 2020-08-19 RX ADMIN — FERROUS SULFATE TAB 325 MG (65 MG ELEMENTAL FE) 325 MG: 325 (65 FE) TAB at 08:52

## 2020-08-19 RX ADMIN — METOPROLOL TARTRATE 12.5 MG: 25 TABLET, FILM COATED ORAL at 22:24

## 2020-08-19 RX ADMIN — CEFEPIME 2 G: 2 INJECTION, POWDER, FOR SOLUTION INTRAVENOUS at 22:24

## 2020-08-19 RX ADMIN — OMEGA-3-ACID ETHYL ESTERS 1000 MG: 1 CAPSULE, LIQUID FILLED ORAL at 08:52

## 2020-08-19 RX ADMIN — MULTIPLE VITAMINS W/ MINERALS TAB 1 TABLET: TAB at 08:51

## 2020-08-19 NOTE — CONSULTS
patient complained of chest pain upon  admission, but denies shortness of breath or leg swelling. GENITOURINARY SYSTEM:  No history of dysuria, pyuria, or hematuria. MUSCULOSKELETAL SYSTEM:  The patient complains of generalized weakness. RESPIRATORY SYSTEM:  No history of cough, hemoptysis, fever, or chills. PAST MEDICAL HISTORY:  Significant for history of hypertension,  hyperlipidemia, coronary artery disease, sick sinus syndrome, CHF,  atrial fibrillation, on Eliquis with a pacemaker in place. FAMILY HISTORY:  Noncontribhutory. MEDICATIONS:  Please refer to chart (the patient was on Eliquis prior to  coming to the hospital). SOCIOECONOMIC HISTORY:  No history of EtOH abuse. The patient does not  smoke cigarette. PAST SURGICAL HISTORY:  The patient has pacemaker in place and also had  a cystoscopy done for renal stone. ALLERGIES:  The patient is allergic to BIAXIN, CEPHALEXIN, and VIAGRA. PHYSICAL EXAMINATION:  GENERAL:  Shows an 80year-old white gentleman of average build and  nutritional status, who is lying comfortably flat in bed, in no acute  distress. He is awake, alert, and oriented and pleasant to talk with. VITAL SIGNS:  Stable. HEENT:  Shows conjunctivae to be pale. NECK:  Supple. CHEST:  Clear. HEART:  S1 and S2 are normal.  ABDOMEN:  Soft, nondistended, and nontender. Liver and spleen are not  palpable. Bowel sounds are present. RECTAL:  Deferred. CNS:  Shows the patient to be awake, alert, and oriented. There are no  focal sensorimotor signs. MUSCULOSKELETAL SYSTEM:  Shows evidence of degenerative joint disease  changes. LABORATORY DATA:  The labs drawn during the present hospitalization  comprised a chem profile, which is remarkable for a BUN of 30 and  creatinine 1.9. LFTs were within normal limits. CBC showed hemoglobin  of 6.6, and after 1 unit of packed RBC is 7. The patient 's platelet  counts are 252,000 with an INR of 1.21.     IMPRESSION:  An 80-year-old white gentleman with history of coronary  artery disease, on Eliquis, presents with generalized weakness,  dizziness, chest pain, \"dark-colored stools,\" and is noted to be  severely anemic, rule out GI bleeding, source most likely upper  gastrointestinal tract; however, lower GI bleeding lesion cannot be  ruled out. RECOMMENDATION:  1. Agree with present management with Protonix. 2.  Monitor the patient's serial H and H and transfuse on a p.r.n. basis  to keep hemoglobin above 7 gm percent. 3.  We will proceed with EGD in a.m., and if negative, the patient will  need a colonoscopy and small bowel evaluation also later. 4.  The case and plan have been discussed in detail with the patient.         Meghana Chung MD    D: 08/19/2020 10:21:46       T: 08/19/2020 12:10:51     [email or web address]  Job#: [id number]     Doc#: [id number]    CC:

## 2020-08-19 NOTE — PROGRESS NOTES
2nd call made to lab requesting type and screen be completed to start transfusion. Advised \"I will let them know\".      Will continue to monitor

## 2020-08-19 NOTE — PROGRESS NOTES
Call made to lab requesting type and screen to be completed so blood transfusion can be initiated.      Will continue to monitor

## 2020-08-19 NOTE — PROGRESS NOTES
Hospitalist Progress Note      Name:  Yaneli Rodriguez /Age/Sex: 10/5/1933  (80 y.o. male)   MRN & CSN:  [id number] & [id number] Admission Date/Time: 2020 12:54 PM   Location:  [facility] PCP: Timi Veag [facility] Day: 2    History of Present Illness:     Chief Complaint: Yaneli Rodriguez is a 80 y.o.  male  who presents with fall from dizziness     The patient seen and examined at bed side. Denies having any chest pain, SOB, dizziness. Says feeling tired. Ten point ROS reviewed negative, unless as noted above    Objective:        Intake/Output Summary (Last 24 hours) at 2020 1229  Last data filed at 2020 0851  Gross per 24 hour   Intake 1020 ml   Output --   Net 1020 ml      Vitals:   Vitals:    20 0820   BP: (!) 123/59   Pulse: 63   Resp: 16   Temp: 97.8 °F (36.6 °C)   SpO2: 99%     Physical Exam:   General Appearance: alert and oriented to person, place and time, in no acute distress  Cardiovascular: normal rate, regular rhythm, normal S1 and S2  Pulmonary/Chest: clear to auscultation bilaterally- no wheezes, rales or rhonchi, normal air movement, no respiratory distress  Abdomen: soft, non-tender, non-distended, normal bowel sounds, no masses   Extremities: no cyanosis, clubbing or edema, pulse   Skin: appears pale, warm and dry, no rash or erythema  Head: normocephalic and atraumatic  Eyes: pupils equal, round, and reactive to light  Neck: supple and non-tender without mass, no thyromegaly   Musculoskeletal: normal range of motion, no joint swelling, deformity or tenderness  Neurological: alert, oriented, normal speech, no focal findings or movement disorder noted    Medications:   Medications:    sodium chloride  20 mL Intravenous Once    amiodarone  200 mg Oral Nightly    atorvastatin  20 mg Oral Nightly    rivastigmine  1 patch Transdermal Daily    pantoprazole  40 mg Oral Daily    therapeutic multivitamin-minerals  1 tablet Oral Daily    omega-3 acid ethyl esters  1,000 mg Oral Daily    metoprolol tartrate  12.5 mg Oral BID    finasteride  5 mg Oral Daily    ferrous sulfate  325 mg Oral Daily with breakfast    vitamin D  5,000 Units Oral QAM    sodium chloride flush  10 mL Intravenous 2 times per day    polyethylene glycol  17 g Oral Daily    cefepime  2 g Intravenous Q12H      Infusions:   PRN Meds: docusate sodium, 100 mg, BID PRN  sodium chloride flush, 10 mL, PRN  promethazine, 12.5 mg, Q6H PRN    Or  ondansetron, 4 mg, Q6H PRN            Pertinent New Labs & Imaging Studies     CBC with Differential:    Lab Results   Component Value Date    WBC 9.9 08/19/2020    RBC 2.68 08/19/2020    HGB 7.0 08/19/2020    HCT 23.3 08/19/2020     08/19/2020    MCV 86.9 08/19/2020    MCH 26.1 08/19/2020    MCHC 30.0 08/19/2020    RDW 24.4 08/19/2020    SEGSPCT 71.8 08/19/2020    BANDSPCT 2 01/29/2017    LYMPHOPCT 16.1 08/19/2020    MONOPCT 9.2 08/19/2020    EOSPCT 1.2 02/28/2017    BASOPCT 0.4 08/19/2020    MONOSABS 0.9 08/19/2020    LYMPHSABS 1.6 08/19/2020    EOSABS 0.2 08/19/2020    BASOSABS 0.0 08/19/2020    DIFFTYPE AUTOMATED DIFFERENTIAL 08/19/2020     CMP:    Lab Results   Component Value Date     08/19/2020    K 3.5 08/19/2020     08/19/2020    CO2 30 08/19/2020    BUN 37 08/19/2020    CREATININE 1.7 08/19/2020    GFRAA 46 08/19/2020    LABGLOM 38 08/19/2020    GLUCOSE 91 08/19/2020    PROT 5.2 08/19/2020    LABALBU 3.1 08/19/2020    CALCIUM 8.6 08/19/2020    BILITOT 0.6 08/19/2020    ALKPHOS 58 08/19/2020    AST 21 08/19/2020    ALT 20 08/19/2020     Ct Head Wo Contrast    Result Date: 8/18/2020  EXAMINATION: CT OF THE HEAD WITHOUT CONTRAST  8/18/2020 2:28 pm TECHNIQUE: CT of the head was performed without the administration of intravenous contrast. Dose modulation, iterative reconstruction, and/or weight based adjustment of the mA/kV was utilized to reduce the radiation dose to as low as reasonably achievable.  COMPARISON: 08/18/2019 HISTORY: ORDERING SYSTEM PROVIDED HISTORY: fall TECHNOLOGIST PROVIDED HISTORY: Has a \"code stroke\" or \"stroke alert\" been called? ->No Reason for exam:->fall Reason for Exam: fall Acuity: Acute Type of Exam: Initial Additional signs and symptoms: chest pain;fall FINDINGS: BRAIN/VENTRICLES: The ventricles and sulci are diffusely enlarged. Low attenuation is seen in the periventricular and subcortical white matter. No acute intracranial hemorrhage or acute infarct is identified. ORBITS: The visualized portion of the orbits demonstrate no acute abnormality. SINUSES: The visualized paranasal sinuses and mastoid air cells demonstrate no acute abnormality. SOFT TISSUES/SKULL:  No acute abnormality of the visualized skull or soft tissues. No acute intracranial abnormality. Ct Cervical Spine Wo Contrast    Result Date: 8/18/2020  EXAMINATION: CT OF THE CERVICAL SPINE WITHOUT CONTRAST 8/18/2020 2:28 pm TECHNIQUE: CT of the cervical spine was performed without the administration of intravenous contrast. Multiplanar reformatted images are provided for review. Dose modulation, iterative reconstruction, and/or weight based adjustment of the mA/kV was utilized to reduce the radiation dose to as low as reasonably achievable. COMPARISON: 05/02/2019 HISTORY: ORDERING SYSTEM PROVIDED HISTORY: injury TECHNOLOGIST PROVIDED HISTORY: Reason for exam:->injury Reason for Exam: fall Acuity: Acute Type of Exam: Initial Additional signs and symptoms: chest pain;fall FINDINGS: BONES/ALIGNMENT: There is no acute fracture or traumatic malalignment. DEGENERATIVE CHANGES: Multilevel degenerative changes. Bridging anterior osteophytes lower cervical spine extending into the superior thoracic spine. SOFT TISSUES: There is no prevertebral soft tissue swelling. Stable 16 mm partially calcified thyroid nodule left thyroid lobe. No acute abnormality of the cervical spine. Stable 16 mm thyroid nodule that is partially calcified.   Given its stability and patient age, no further imaging evaluation suggested     Xr Chest Portable    Result Date: 8/18/2020  EXAMINATION: ONE XRAY VIEW OF THE CHEST 8/18/2020 2:00 pm COMPARISON: None. HISTORY: ORDERING SYSTEM PROVIDED HISTORY: Chest pain TECHNOLOGIST PROVIDED HISTORY: Reason for exam:->Chest pain Reason for Exam: Chest pain Acuity: Acute Type of Exam: Initial Additional signs and symptoms: Pt to ED with [facility] for fall last night, chest pain, and low hemoglobin. Pt states he became dizzy last night and fell in bathroom, legs \"slid out from underneath me\", landed on bottom, denies LOC or striking head FINDINGS: There is bandlike atelectasis versus scar at the right lung base. The heart size is magnified by portable technique but is likely at least mildly enlarged. There is a severely tortuous thoracic aorta. Shallow inspiration limits evaluation but no infiltrate or consolidation or effusion is identified. There is no pneumothorax. A pulse generator is seen over the left anterior chest wall with 2 leads projecting over the heart. Limited hypoventilatory exam. No acute abnormality visualized. Cardiomegaly and severely tortuous thoracic aorta.        Assessment and Plan:   Vianey Pulliam is a 80 y.o.  male  who presents with Dizziness    Acute on chronic anemia likely 2/2 eliquis/GI bleed  Colonoscopy in the past with no polyps, hemorrhoids, or diverticulitis  -1 unit of PRBC ordered  -Monitor H&H and transfuse if Hgb<7  -Hold Eliquis  -Trend troponin  -Consulted GI, likely plan for EGD     Acute cystitis with hematuria  Hx of chronic bladder obstruction  -Cefepime IV  -Urine cx pending  -Continue Proscar  -Bladder scan PRN     Permenant A-fib  -Hold Eliquis and continue Amiodarone     Chronic HFpEF, compensated  -Hold Lasix  -Continue Metoprolol  -Daily weight and strict I&O     HTN  -Continue Metorpolol     CKD 3  -Creat baseline at 1.8     Dementia   -Continue Sameer     HLD  -Continue Lipitor    Diet DIET CLEAR LIQUID;  Diet NPO Time Specified   DVT Prophylaxis [] Lovenox, []  Heparin, [] SCDs, [x] Contrindicated   GI Prophylaxis [x] PPI,  [] H2 Blocker,  [] Carafate,  [] Diet/Tube Feeds   Code Status Full Code   Disposition Patient requires continued admission due to Anemia   MDM [] Low, [x] Moderate,[]  High     Electronically signed by Richard Walters MD on 8/19/2020 at 12:29 PM

## 2020-08-19 NOTE — PROGRESS NOTES
3rd call made to lab regarding type and screen. Reviewing orders, it appears someone canceled the order because the lab was drawn yesterday. I advised [facility] in the lab I am aware the patient had a type and screen completed yesterday; however, patient does not have a blood lock code on and another type and screen needs to be completed. She states that she is unsure who canceled the order. I will place another order and requested the lab come ASAP to draw.

## 2020-08-19 NOTE — CARE COORDINATION
Reviewed chart for continued discharge planning. Pt's plan is to return to [facility], confirmed with pt's nurse pt suitable to return. Sent message to Sung at [place] to determine if new covid order is needed and she confirmed a new covid test would be required for pt to return.   Sent PS to Dr. Juliana Bernheim to request covid test.

## 2020-08-20 ENCOUNTER — ANESTHESIA EVENT (OUTPATIENT)
Dept: ENDOSCOPY | Age: 85
DRG: 378 | End: 2020-08-20
Payer: MEDICARE

## 2020-08-20 ENCOUNTER — ANESTHESIA (OUTPATIENT)
Dept: ENDOSCOPY | Age: 85
DRG: 378 | End: 2020-08-20
Payer: MEDICARE

## 2020-08-20 VITALS — OXYGEN SATURATION: 100 % | SYSTOLIC BLOOD PRESSURE: 123 MMHG | DIASTOLIC BLOOD PRESSURE: 54 MMHG

## 2020-08-20 LAB
ALBUMIN SERPL-MCNC: 3.2 GM/DL (ref 3.4–5)
ALP BLD-CCNC: 60 IU/L (ref 40–128)
ALT SERPL-CCNC: 21 U/L (ref 10–40)
AMYLASE: 47 U/L (ref 25–115)
ANION GAP SERPL CALCULATED.3IONS-SCNC: 6 MMOL/L (ref 4–16)
AST SERPL-CCNC: 21 IU/L (ref 15–37)
BASOPHILS ABSOLUTE: 0 K/CU MM
BASOPHILS RELATIVE PERCENT: 0.4 % (ref 0–1)
BILIRUB SERPL-MCNC: 0.6 MG/DL (ref 0–1)
BUN BLDV-MCNC: 33 MG/DL (ref 6–23)
CALCIUM SERPL-MCNC: 8.5 MG/DL (ref 8.3–10.6)
CHLORIDE BLD-SCNC: 108 MMOL/L (ref 99–110)
CO2: 29 MMOL/L (ref 21–32)
CREAT SERPL-MCNC: 1.6 MG/DL (ref 0.9–1.3)
CULTURE: ABNORMAL
CULTURE: ABNORMAL
DIFFERENTIAL TYPE: ABNORMAL
EOSINOPHILS ABSOLUTE: 0.3 K/CU MM
EOSINOPHILS RELATIVE PERCENT: 3.5 % (ref 0–3)
GFR AFRICAN AMERICAN: 50 ML/MIN/1.73M2
GFR NON-AFRICAN AMERICAN: 41 ML/MIN/1.73M2
GLUCOSE BLD-MCNC: 85 MG/DL (ref 70–99)
HCT VFR BLD CALC: 24.5 % (ref 42–52)
HCT VFR BLD CALC: 26.9 % (ref 42–52)
HEMOGLOBIN: 7.5 GM/DL (ref 13.5–18)
HEMOGLOBIN: 8.1 GM/DL (ref 13.5–18)
IMMATURE NEUTROPHIL %: 0.3 % (ref 0–0.43)
LIPASE: 24 IU/L (ref 13–60)
LYMPHOCYTES ABSOLUTE: 1.7 K/CU MM
LYMPHOCYTES RELATIVE PERCENT: 18.8 % (ref 24–44)
Lab: ABNORMAL
MCH RBC QN AUTO: 27 PG (ref 27–31)
MCHC RBC AUTO-ENTMCNC: 30.6 % (ref 32–36)
MCV RBC AUTO: 88.1 FL (ref 78–100)
MONOCYTES ABSOLUTE: 1 K/CU MM
MONOCYTES RELATIVE PERCENT: 11.5 % (ref 0–4)
NUCLEATED RBC %: 0 %
PDW BLD-RTO: 22.9 % (ref 11.7–14.9)
PLATELET # BLD: 264 K/CU MM (ref 140–440)
PMV BLD AUTO: 9.8 FL (ref 7.5–11.1)
POTASSIUM SERPL-SCNC: 3.7 MMOL/L (ref 3.5–5.1)
RBC # BLD: 2.78 M/CU MM (ref 4.6–6.2)
SARS-COV-2: NOT DETECTED
SEGMENTED NEUTROPHILS ABSOLUTE COUNT: 5.9 K/CU MM
SEGMENTED NEUTROPHILS RELATIVE PERCENT: 65.5 % (ref 36–66)
SODIUM BLD-SCNC: 143 MMOL/L (ref 135–145)
SOURCE: NORMAL
SPECIMEN: ABNORMAL
TOTAL IMMATURE NEUTOROPHIL: 0.03 K/CU MM
TOTAL NUCLEATED RBC: 0 K/CU MM
TOTAL PROTEIN: 5.1 GM/DL (ref 6.4–8.2)
WBC # BLD: 9 K/CU MM (ref 4–10.5)

## 2020-08-20 PROCEDURE — 85025 COMPLETE CBC W/AUTO DIFF WBC: CPT

## 2020-08-20 PROCEDURE — 6360000002 HC RX W HCPCS: Performed by: NURSE PRACTITIONER

## 2020-08-20 PROCEDURE — 2709999900 HC NON-CHARGEABLE SUPPLY: Performed by: SPECIALIST

## 2020-08-20 PROCEDURE — 3700000001 HC ADD 15 MINUTES (ANESTHESIA): Performed by: SPECIALIST

## 2020-08-20 PROCEDURE — 3609017100 HC EGD: Performed by: SPECIALIST

## 2020-08-20 PROCEDURE — 2580000003 HC RX 258: Performed by: NURSE PRACTITIONER

## 2020-08-20 PROCEDURE — 85014 HEMATOCRIT: CPT

## 2020-08-20 PROCEDURE — 2500000003 HC RX 250 WO HCPCS: Performed by: NURSE ANESTHETIST, CERTIFIED REGISTERED

## 2020-08-20 PROCEDURE — 6360000002 HC RX W HCPCS: Performed by: NURSE ANESTHETIST, CERTIFIED REGISTERED

## 2020-08-20 PROCEDURE — 36415 COLL VENOUS BLD VENIPUNCTURE: CPT

## 2020-08-20 PROCEDURE — 6370000000 HC RX 637 (ALT 250 FOR IP): Performed by: NURSE PRACTITIONER

## 2020-08-20 PROCEDURE — 80053 COMPREHEN METABOLIC PANEL: CPT

## 2020-08-20 PROCEDURE — 94761 N-INVAS EAR/PLS OXIMETRY MLT: CPT

## 2020-08-20 PROCEDURE — 0DJ08ZZ INSPECTION OF UPPER INTESTINAL TRACT, VIA NATURAL OR ARTIFICIAL OPENING ENDOSCOPIC: ICD-10-PCS | Performed by: SPECIALIST

## 2020-08-20 PROCEDURE — 85018 HEMOGLOBIN: CPT

## 2020-08-20 PROCEDURE — 1200000000 HC SEMI PRIVATE

## 2020-08-20 PROCEDURE — 2580000003 HC RX 258: Performed by: ANESTHESIOLOGY

## 2020-08-20 PROCEDURE — 3700000000 HC ANESTHESIA ATTENDED CARE: Performed by: SPECIALIST

## 2020-08-20 PROCEDURE — 82150 ASSAY OF AMYLASE: CPT

## 2020-08-20 PROCEDURE — 83690 ASSAY OF LIPASE: CPT

## 2020-08-20 RX ORDER — PROPOFOL 10 MG/ML
INJECTION, EMULSION INTRAVENOUS PRN
Status: DISCONTINUED | OUTPATIENT
Start: 2020-08-20 | End: 2020-08-20 | Stop reason: SDUPTHER

## 2020-08-20 RX ORDER — LIDOCAINE HYDROCHLORIDE 20 MG/ML
INJECTION, SOLUTION EPIDURAL; INFILTRATION; INTRACAUDAL; PERINEURAL PRN
Status: DISCONTINUED | OUTPATIENT
Start: 2020-08-20 | End: 2020-08-20 | Stop reason: SDUPTHER

## 2020-08-20 RX ORDER — SODIUM CHLORIDE, SODIUM LACTATE, POTASSIUM CHLORIDE, CALCIUM CHLORIDE 600; 310; 30; 20 MG/100ML; MG/100ML; MG/100ML; MG/100ML
INJECTION, SOLUTION INTRAVENOUS CONTINUOUS
Status: DISCONTINUED | OUTPATIENT
Start: 2020-08-20 | End: 2020-08-21 | Stop reason: HOSPADM

## 2020-08-20 RX ADMIN — METOPROLOL TARTRATE 12.5 MG: 25 TABLET, FILM COATED ORAL at 20:15

## 2020-08-20 RX ADMIN — SODIUM CHLORIDE, PRESERVATIVE FREE 10 ML: 5 INJECTION INTRAVENOUS at 08:31

## 2020-08-20 RX ADMIN — SODIUM CHLORIDE, POTASSIUM CHLORIDE, SODIUM LACTATE AND CALCIUM CHLORIDE: 600; 310; 30; 20 INJECTION, SOLUTION INTRAVENOUS at 11:32

## 2020-08-20 RX ADMIN — SODIUM CHLORIDE, POTASSIUM CHLORIDE, SODIUM LACTATE AND CALCIUM CHLORIDE: 600; 310; 30; 20 INJECTION, SOLUTION INTRAVENOUS at 20:17

## 2020-08-20 RX ADMIN — PROPOFOL 60 MG: 10 INJECTION, EMULSION INTRAVENOUS at 09:30

## 2020-08-20 RX ADMIN — CEFEPIME 2 G: 2 INJECTION, POWDER, FOR SOLUTION INTRAVENOUS at 08:31

## 2020-08-20 RX ADMIN — ATORVASTATIN CALCIUM 20 MG: 20 TABLET, FILM COATED ORAL at 20:15

## 2020-08-20 RX ADMIN — LIDOCAINE HYDROCHLORIDE 100 MG: 20 INJECTION, SOLUTION EPIDURAL; INFILTRATION; INTRACAUDAL; PERINEURAL at 09:30

## 2020-08-20 RX ADMIN — AMIODARONE HYDROCHLORIDE 200 MG: 200 TABLET ORAL at 20:15

## 2020-08-20 RX ADMIN — CEFEPIME 2 G: 2 INJECTION, POWDER, FOR SOLUTION INTRAVENOUS at 20:15

## 2020-08-20 RX ADMIN — SODIUM CHLORIDE, POTASSIUM CHLORIDE, SODIUM LACTATE AND CALCIUM CHLORIDE: 600; 310; 30; 20 INJECTION, SOLUTION INTRAVENOUS at 08:59

## 2020-08-20 RX ADMIN — SODIUM CHLORIDE, PRESERVATIVE FREE 10 ML: 5 INJECTION INTRAVENOUS at 20:15

## 2020-08-20 NOTE — PROGRESS NOTES
Physician Progress Note      PATIENT:               Obdulia Syed  CSN #:                  [id number]  :                       10/5/1933  ADMIT DATE:       2020 12:54 PM  [address] [place] DATE:  RESPONDING  PROVIDER #:        Niesha De Leon MD          QUERY TEXT:    Patient admitted with Anemia documented without type, noted to have weakness,   dizziness, and possible GI bleed. If possible, please document in progress   notes and discharge summary if you are evaluating and/or treating any of the   following:  :      The medical record reflects the following:  Risk Factors: age  81 yo  Clinical Indicators: weakness, dizziness, anemia documented wo type, on long   term AC for A Fib,  HGB noted to be 6.6 on admission  Treatment: Withhold Eliquis, i unit of PRBC, Monitor H & H, GI consult  Thank you , Ashlie Murrieta, MSN, CDS  Options provided:  -- Anemia due to acute blood loss  -- Anemia due to Acute on  chronic blood loss  -- Other - I will add my own diagnosis  -- Disagree - Not applicable / Not valid  -- Disagree - Clinically unable to determine / Unknown  -- Refer to Clinical Documentation Reviewer    PROVIDER RESPONSE TEXT:    This patient has acute blood loss anemia.     Query created by: Jen Ramírez on 2020 1:07 PM      Electronically signed by:  Niesha De Leon MD 2020 11:58 AM

## 2020-08-20 NOTE — PROGRESS NOTES
Hospitalist Progress Note      Name:  Anise Goodpasture /Age/Sex: 10/5/1933  (80 y.o. male)   MRN & CSN:  [id number] & [id number] Admission Date/Time: 2020 12:54 PM   Location:  [id number]/2292-D PCP: [facility] Day: 3    History of Present Illness:     Chief Complaint: Anise Goodpasture is a 80 y.o.  male  who presents with fall from dizziness     The patient seen and examined at bed side after EGD. Denies having any chest pain, SOB, dizziness. Says feeling tired. Ten point ROS reviewed negative, unless as noted above    Objective:        Intake/Output Summary (Last 24 hours) at 2020 1159  Last data filed at 2020 0043  Gross per 24 hour   Intake 300 ml   Output --   Net 300 ml      Vitals:   Vitals:    20 1145   BP: 130/60   Pulse: 69   Resp: 16   Temp: 98 °F (36.7 °C)   SpO2: 98%     Physical Exam:   General Appearance: alert and oriented to person, place and time, in no acute distress  Cardiovascular: normal rate, regular rhythm, normal S1 and S2  Pulmonary/Chest: clear to auscultation bilaterally- no wheezes, rales or rhonchi, normal air movement, no respiratory distress  Abdomen: soft, non-tender, non-distended, normal bowel sounds, no masses   Extremities: no cyanosis, clubbing or edema, pulse   Skin: appears pale, warm and dry, no rash or erythema  Head: normocephalic and atraumatic  Eyes: pupils equal, round, and reactive to light  Neck: supple and non-tender without mass, no thyromegaly   Musculoskeletal: normal range of motion, no joint swelling, deformity or tenderness  Neurological: alert, oriented, normal speech, no focal findings or movement disorder noted    Medications:   Medications:    amiodarone  200 mg Oral Nightly    atorvastatin  20 mg Oral Nightly    rivastigmine  1 patch Transdermal Daily    pantoprazole  40 mg Oral Daily    therapeutic multivitamin-minerals  1 tablet Oral Daily    omega-3 acid ethyl esters  1,000 mg Oral Daily  metoprolol tartrate  12.5 mg Oral BID    finasteride  5 mg Oral Daily    ferrous sulfate  325 mg Oral Daily with breakfast    vitamin D  5,000 Units Oral QAM    sodium chloride flush  10 mL Intravenous 2 times per day    polyethylene glycol  17 g Oral Daily    cefepime  2 g Intravenous Q12H      Infusions:    lactated ringers 100 mL/hr at 08/20/20 1132     PRN Meds: docusate sodium, 100 mg, BID PRN  sodium chloride flush, 10 mL, PRN  promethazine, 12.5 mg, Q6H PRN    Or  ondansetron, 4 mg, Q6H PRN            Pertinent New Labs & Imaging Studies     CBC with Differential:    Lab Results   Component Value Date    WBC 9.0 08/20/2020    RBC 2.78 08/20/2020    HGB 7.5 08/20/2020    HCT 24.5 08/20/2020     08/20/2020    MCV 88.1 08/20/2020    MCH 27.0 08/20/2020    MCHC 30.6 08/20/2020    RDW 22.9 08/20/2020    SEGSPCT 65.5 08/20/2020    BANDSPCT 2 01/29/2017    LYMPHOPCT 18.8 08/20/2020    MONOPCT 11.5 08/20/2020    EOSPCT 1.2 02/28/2017    BASOPCT 0.4 08/20/2020    MONOSABS 1.0 08/20/2020    LYMPHSABS 1.7 08/20/2020    EOSABS 0.3 08/20/2020    BASOSABS 0.0 08/20/2020    DIFFTYPE AUTOMATED DIFFERENTIAL 08/20/2020     CMP:    Lab Results   Component Value Date     08/20/2020    K 3.7 08/20/2020     08/20/2020    CO2 29 08/20/2020    BUN 33 08/20/2020    CREATININE 1.6 08/20/2020    GFRAA 50 08/20/2020    LABGLOM 41 08/20/2020    GLUCOSE 85 08/20/2020    PROT 5.1 08/20/2020    LABALBU 3.2 08/20/2020    CALCIUM 8.5 08/20/2020    BILITOT 0.6 08/20/2020    ALKPHOS 60 08/20/2020    AST 21 08/20/2020    ALT 21 08/20/2020     Ct Head Wo Contrast    Result Date: 8/18/2020  EXAMINATION: CT OF THE HEAD WITHOUT CONTRAST  8/18/2020 2:28 pm TECHNIQUE: CT of the head was performed without the administration of intravenous contrast. Dose modulation, iterative reconstruction, and/or weight based adjustment of the mA/kV was utilized to reduce the radiation dose to as low as reasonably achievable.  COMPARISON: 08/18/2019 HISTORY: ORDERING SYSTEM PROVIDED HISTORY: fall TECHNOLOGIST PROVIDED HISTORY: Has a \"code stroke\" or \"stroke alert\" been called? ->No Reason for exam:->fall Reason for Exam: fall Acuity: Acute Type of Exam: Initial Additional signs and symptoms: chest pain;fall FINDINGS: BRAIN/VENTRICLES: The ventricles and sulci are diffusely enlarged. Low attenuation is seen in the periventricular and subcortical white matter. No acute intracranial hemorrhage or acute infarct is identified. ORBITS: The visualized portion of the orbits demonstrate no acute abnormality. SINUSES: The visualized paranasal sinuses and mastoid air cells demonstrate no acute abnormality. SOFT TISSUES/SKULL:  No acute abnormality of the visualized skull or soft tissues. No acute intracranial abnormality. Ct Cervical Spine Wo Contrast    Result Date: 8/18/2020  EXAMINATION: CT OF THE CERVICAL SPINE WITHOUT CONTRAST 8/18/2020 2:28 pm TECHNIQUE: CT of the cervical spine was performed without the administration of intravenous contrast. Multiplanar reformatted images are provided for review. Dose modulation, iterative reconstruction, and/or weight based adjustment of the mA/kV was utilized to reduce the radiation dose to as low as reasonably achievable. COMPARISON: 05/02/2019 HISTORY: ORDERING SYSTEM PROVIDED HISTORY: injury TECHNOLOGIST PROVIDED HISTORY: Reason for exam:->injury Reason for Exam: fall Acuity: Acute Type of Exam: Initial Additional signs and symptoms: chest pain;fall FINDINGS: BONES/ALIGNMENT: There is no acute fracture or traumatic malalignment. DEGENERATIVE CHANGES: Multilevel degenerative changes. Bridging anterior osteophytes lower cervical spine extending into the superior thoracic spine. SOFT TISSUES: There is no prevertebral soft tissue swelling. Stable 16 mm partially calcified thyroid nodule left thyroid lobe. No acute abnormality of the cervical spine. Stable 16 mm thyroid nodule that is partially calcified. Given its stability and patient age, no further imaging evaluation suggested     Xr Chest Portable    Result Date: 8/18/2020  EXAMINATION: ONE XRAY VIEW OF THE CHEST 8/18/2020 2:00 pm COMPARISON: None. HISTORY: ORDERING SYSTEM PROVIDED HISTORY: Chest pain TECHNOLOGIST PROVIDED HISTORY: Reason for exam:->Chest pain Reason for Exam: Chest pain Acuity: Acute Type of Exam: Initial Additional signs and symptoms: Pt to ED with EMS from [facility] for fall last night, chest pain, and low hemoglobin. Pt states he became dizzy last night and fell in bathroom, legs \"slid out from underneath me\", landed on bottom, denies LOC or striking head FINDINGS: There is bandlike atelectasis versus scar at the right lung base. The heart size is magnified by portable technique but is likely at least mildly enlarged. There is a severely tortuous thoracic aorta. Shallow inspiration limits evaluation but no infiltrate or consolidation or effusion is identified. There is no pneumothorax. A pulse generator is seen over the left anterior chest wall with 2 leads projecting over the heart. Limited hypoventilatory exam. No acute abnormality visualized. Cardiomegaly and severely tortuous thoracic aorta.        Assessment and Plan:   George Mathias is a 80 y.o.  male  who presents with Dizziness    Acute on chronic anemia likely 2/2 eliquis/GI bleed  Acute blood loss anemia  Colonoscopy in the past with no polyps, hemorrhoids, or diverticulitis  -1 unit of PRBC ordered  -Monitor H&H and transfuse if Hgb<7  -Hold Eliquis  -Hb stable   -Consulted GI, appreciated recommendations  EGD: MID ESOPHAGEAL DIVERTICULUM--- GASTRITIS WITH SOME RECENT BLEEDING  Protonix     Acute cystitis with hematuria  Hx of chronic bladder obstruction  -Continue Cefepime IV>>will change to oral on discharge  -Urine cx with E Coli, sensitivity pending  -Continue Proscar  -Bladder scan PRN     Permenant A-fib  -Hold Eliquis and continue Amiodarone     Chronic HFpEF, compensated  -Hold Lasix  -Continue Metoprolol  -Daily weight and strict I&O     HTN  -Continue Metorpolol     CKD 3     Dementia   -Continue Exelon     HLD  -Continue Lipitor    Diet DIET GENERAL;   DVT Prophylaxis [] Lovenox, []  Heparin, [] SCDs, [x] Contrindicated   GI Prophylaxis [x] PPI,  [] H2 Blocker,  [] Carafate,  [] Diet/Tube Feeds   Code Status Full Code   Disposition Patient requires continued admission due to Anemia   MDM [] Low, [x] Moderate,[]  High     Electronically signed by Dot Frias MD on 8/20/2020 at 11:59 AM

## 2020-08-20 NOTE — ANESTHESIA PRE PROCEDURE
Department of Anesthesiology  Preprocedure Note       Name:  Nikolai Huddleston   Age:  80 y.o.  :  10/5/1933                                          MRN:  [id number]         Date:  2020      Surgeon: Brittney Houston):  John Reardon MD    Procedure: Procedure(s):  EGD ESOPHAGOGASTRODUODENOSCOPY    Medications prior to admission:   Prior to Admission medications    Medication Sig Start Date End Date Taking? Authorizing Provider   pantoprazole (PROTONIX) 40 MG tablet Take 40 mg by mouth daily    Historical Provider, MD   Carboxymethylcellulose Sodium (ARTIFICIAL TEARS OP) Apply 1 drop to eye as needed    Historical Provider, MD   ferrous sulfate (IRON 325) 325 (65 Fe) MG tablet Take 325 mg by mouth daily (with breakfast)    Historical Provider, MD   furosemide (LASIX) 20 MG tablet Take 1 tablet by mouth daily as needed (lower leg edema) Take 20 mg of oral lasix at evening, for a total of 40 mg of oral lasix at the evening dose  for the next 3 days. 20   CARI Frost CNP   spironolactone (ALDACTONE) 25 MG tablet Take 25 mg by mouth daily    Historical Provider, MD   Elastic Bandages & Supports (JOBST KNEE HIGH COMPRESSION SM) MISC 1 each by Does not apply route daily Knee high compression  Apply upon waking in the morning and remove at bedtime 20   CARI Frost CNP   polyethylene glycol (GLYCOLAX) powder Take 17 g by mouth daily    Historical Provider, MD   furosemide (LASIX) 20 MG tablet Take 3 tablets by mouth See Admin Instructions Take 2 tabs in the morning and 1 tab in the afternoon.  20   Mirta CARI Diego CNP   Polyvinyl Alcohol-Povidone (REFRESH OP) Apply 1 drop to eye as needed For dry eyes    Historical Provider, MD   acetaminophen (TYLENOL) 325 MG tablet Take 650 mg by mouth every 6 hours as needed for Pain For pain or temp    Historical Provider, MD   metoprolol tartrate (LOPRESSOR) 25 MG tablet Take 0.5 tablets by mouth 2 times daily  Patient taking differently: Take 12.5 mg by mouth 2 times daily Hold if SBP < 110 or HR is < 55 10/4/19   CARI Scott CNP   docusate sodium (COLACE, DULCOLAX) 100 MG CAPS Take 100 mg by mouth 2 times daily as needed for Constipation 8/28/19   Orestes Cantu DO   finasteride (PROSCAR) 5 MG tablet Take 1 tablet by mouth daily 8/29/19   Orestes Cantu DO   amiodarone (CORDARONE) 200 MG tablet Take 1 tablet by mouth daily  Patient taking differently: Take 200 mg by mouth nightly  5/23/19   CARI Barrow CNP   rivastigmine (EXELON) 4.6 MG/24HR Place 1 patch onto the skin daily  3/28/19   Historical Provider, MD   Cholecalciferol (VITAMIN D3) 5000 units TABS Take 5,000 Units by mouth every morning    Historical Provider, MD   apixaban (ELIQUIS) 2.5 MG TABS tablet Take 1 tablet by mouth 2 times daily 4/10/19   Johnna Ozuna MD   Omega-3 Fatty Acids (FISH OIL) 1000 MG CAPS Take 1,000 mg by mouth daily     Historical Provider, MD   Multiple Vitamins-Minerals (THERAPEUTIC MULTIVITAMIN-MINERALS) tablet Take 1 tablet by mouth daily    Historical Provider, MD   atorvastatin (LIPITOR) 20 MG tablet Take 20 mg by mouth nightly     Historical Provider, MD       Current medications:    Current Facility-Administered Medications   Medication Dose Route Frequency Provider Last Rate Last Dose    lactated ringers infusion   Intravenous Continuous Ray Kwok  mL/hr at 08/20/20 0859      amiodarone (CORDARONE) tablet 200 mg  200 mg Oral Nightly CARI Hanks CNP   200 mg at 08/19/20 2224    atorvastatin (LIPITOR) tablet 20 mg  20 mg Oral Nightly CARI Hanks CNP   20 mg at 08/19/20 2224    rivastigmine (EXELON) 4.6 MG/24HR 1 patch  1 patch Transdermal Daily CARI Hanks CNP   1 patch at 08/19/20 0852    pantoprazole (PROTONIX) tablet 40 mg  40 mg Oral Daily CARI Hanks CNP   40 mg at 08/18/20 2004    therapeutic multivitamin-minerals 1 tablet  1 tablet Oral Daily Brooklynn Cuellar Ferron [place] - CNP   1 tablet at 08/19/20 0851    omega-3 acid ethyl esters (LOVAZA) capsule 1,000 mg  1,000 mg Oral Daily [facility], APRN - CNP   1,000 mg at 08/19/20 0852    metoprolol tartrate (LOPRESSOR) tablet 12.5 mg  12.5 mg Oral BID [facility], APRN - CNP   12.5 mg at 08/19/20 2224    finasteride (PROSCAR) tablet 5 mg  5 mg Oral Daily [facility], APRN - CNP   5 mg at 08/19/20 4815    ferrous sulfate (IRON 325) tablet 325 mg  325 mg Oral Daily with breakfast [facility], APRN - CNP   325 mg at 08/19/20 9584    docusate sodium (COLACE) capsule 100 mg  100 mg Oral BID PRN [facility], APRN - CNP        vitamin D CAPS 5,000 Units  5,000 Units Oral QAM [facility], APRN - CNP   5,000 Units at 08/19/20 0851    sodium chloride flush 0.9 % injection 10 mL  10 mL Intravenous 2 times per day [facility], APRN - CNP   10 mL at 08/20/20 0831    sodium chloride flush 0.9 % injection 10 mL  10 mL Intravenous PRN [facility], APRN - CNP        promethazine (PHENERGAN) tablet 12.5 mg  12.5 mg Oral Q6H PRN [facility], APRN - CNP   12.5 mg at 08/18/20 2005    Or    ondansetron (ZOFRAN) injection 4 mg  4 mg Intravenous Q6H PRN [facility], APRN - CNP        polyethylene glycol (GLYCOLAX) packet 17 g  17 g Oral Daily [facility], APRN - CNP   Stopped at 08/19/20 0853    cefepime (MAXIPIME) 2 g IVPB minibag  2 g Intravenous Q12H [facility], APRN -  mL/hr at 08/20/20 0831 2 g at 08/20/20 0831       Allergies:     Allergies   Allergen Reactions    Biaxin [Clarithromycin]     Cephalexin     Viagra [Sildenafil Citrate] Nausea Only and Other (See Comments)     Dizziness and BP dropped       Problem List:    Patient Active Problem List   Diagnosis Code    S/P placement of cardiac pacemaker Z95.0    Persistent atrial fibrillation I48.19    Ascending aortic aneurysm (HCC) I71.2    CHF (congestive heart failure), NYHA class I, acute on chronic, combined normal, Intervals normal, P waves normal, St-T Segments: nonspecific ST segment changes to the anterior lateral leads, QRS RBBB,  No significant Q waves, no ectopy. A-fib w/RBBB w/nonspecific ST segment change EKG.  History of EKG 02/27/2017    Time 2:58AM: HR 59, NSR, Axis normal, Intervals normal, P waves normal, ST-T seg: nonspecific ST segment changes, QRS RBBB, no significant Q waves, no ectopy. Sinus bradycardia w/ RBBB nonspecific ST Segment changes EKG    History of Holter monitoring 11/08/2017    monitored 48 hours: Patient noted to have multiple PAC and PVCs. Risk of atrial fibrillation present given previous episode Recommend antiarrhythmic therapy.  History of stress test 03/27/2017    NM Stress test: EF 54%, Abnormal study, evidence of mild ischemia in mild inferior regions. post stress LV is enlarged in size.  Post-stress LV function is normal.     Hx of transesophageal echocardiography (SIMONE) for monitoring 04/17/2018    EF45-50% mild TR, mild-mod MR, mod-severe AS    Hyperlipemia     Hyperlipidemia     Hypertension     Nonspecific abnormal electrocardiogram (ECG) (EKG)     Persistent atrial fibrillation     Sick sinus syndrome (HCC)     Vertigo        Past Surgical History:        Procedure Laterality Date    CYSTOSCOPY INSERTION / REMOVAL STENT / STONE Left 5/6/2019    CYSTOSCOPY RETROGRADE PYELOGRAM STENT INSERTION, DIAGNOSTIC CYSTOSCOPY performed by Maru Beckford MD at [facility] Left 03/29/2018    Dual Chamber [employer] XT DR AMI Kincaid       Social History:    Social History     Tobacco Use    Smoking status: Never Smoker    Smokeless tobacco: Never Used   Substance Use Topics    Alcohol use: Not Currently     Alcohol/week: 0.0 standard drinks                                Counseling given: Not Answered      Vital Signs (Current):   Vitals:    08/19/20 2210 08/19/20 2230 08/20/20 0043 08/20/20 0215   BP: 134/72 128/67 131/62 137/64   Pulse: 82 78 91 69   Resp: 15 16 17 16   Temp: 36.6 °C (97.9 °F) 36.7 °C (98 °F) 36.7 °C (98 °F) 36.6 °C (97.8 °F)   TempSrc:  Oral Oral Oral   SpO2:  99% 95% 99%   Weight:       Height:                                                  BP Readings from Last 3 Encounters:   08/20/20 137/64   08/12/20 100/62   07/01/20 138/70       NPO Status: Time of last liquid consumption: 0300                        Time of last solid consumption: 1800                        Date of last liquid consumption: 08/20/20(0300 sip water)                        Date of last solid food consumption: 08/19/20    BMI:   Wt Readings from Last 3 Encounters:   08/19/20 179 lb (81.2 kg)   08/12/20 183 lb 4.8 oz (83.1 kg)   07/01/20 184 lb (83.5 kg)     Body mass index is 27.22 kg/m². CBC:   Lab Results   Component Value Date    WBC 9.0 08/20/2020    RBC 2.78 08/20/2020    HGB 7.5 08/20/2020    HCT 24.5 08/20/2020    MCV 88.1 08/20/2020    RDW 22.9 08/20/2020     08/20/2020       CMP:   Lab Results   Component Value Date     08/20/2020    K 3.7 08/20/2020     08/20/2020    CO2 29 08/20/2020    BUN 33 08/20/2020    CREATININE 1.6 08/20/2020    GFRAA 50 08/20/2020    LABGLOM 41 08/20/2020    GLUCOSE 85 08/20/2020    PROT 5.1 08/20/2020    CALCIUM 8.5 08/20/2020    BILITOT 0.6 08/20/2020    ALKPHOS 60 08/20/2020    AST 21 08/20/2020    ALT 21 08/20/2020       POC Tests: No results for input(s): POCGLU, POCNA, POCK, POCCL, POCBUN, POCHEMO, POCHCT in the last 72 hours.     Coags:   Lab Results   Component Value Date    PROTIME 14.7 08/19/2020    INR 1.21 08/19/2020    APTT 35.9 08/19/2020       HCG (If Applicable): No results found for: PREGTESTUR, PREGSERUM, HCG, HCGQUANT     ABGs:   Lab Results   Component Value Date    PO2ART 70 05/26/2019    YRS3KYE 56.0 05/26/2019    SLZ7GRL 46.8 05/26/2019        Type & Screen (If Applicable):  No results found for: LABABO, LABRH    Drug/Infectious Status (If Applicable):  No results found for: HIV, HEPCAB    COVID-19 Screening (If Applicable):   Lab Results   Component Value Date    COVID19 NOT DETECTED 08/13/2020         Anesthesia Evaluation    Airway:         Dental:          Pulmonary:                              Cardiovascular:    (+) hypertension:, pacemaker:, CHF:, hyperlipidemia               ROS comment: Ascending aortic aneurysm    Echo 7/2020     Summary   Left ventricular systolic function is normal with an ejection fraction of 50   to 55 %. Grade I diastolic dysfunction. Mild concentric left ventricular hypertrophy. The left atrium is mildly dilated. Dilatation of the aortic root measuring 3.9 cm. Mild aortic stenosis with mean gradient of 14 mmHg. Moderate to severe aortic regurgitation is noted with a pressure half time   of 421 msec. Right ventricular systolic pressure of 41 mmHg consistent with mild   pulmonary hypertension. No evidence of pericardial effusion. Neuro/Psych:   (+) psychiatric history:            GI/Hepatic/Renal:   (+) renal disease:,           Endo/Other:                     Abdominal:           Vascular:                                        Anesthesia Plan      MAC and TIVA     ASA 4       Induction: intravenous.                           CARI Mills - CRNA   8/20/2020

## 2020-08-20 NOTE — ANESTHESIA PRE PROCEDURE
Take 12.5 mg by mouth 2 times daily Hold if SBP < 110 or HR is < 55 10/4/19   [facility]   docusate sodium (COLACE, DULCOLAX) 100 MG CAPS Take 100 mg by mouth 2 times daily as needed for Constipation 8/28/19   Angeles Deed, DO   finasteride (PROSCAR) 5 MG tablet Take 1 tablet by mouth daily 8/29/19   Angeles Deed, DO   amiodarone (CORDARONE) 200 MG tablet Take 1 tablet by mouth daily  Patient taking differently: Take 200 mg by mouth nightly  5/23/19   CARI Savage CNP   rivastigmine (EXELON) 4.6 MG/24HR Place 1 patch onto the skin daily  3/28/19   Historical Provider, MD   Cholecalciferol (VITAMIN D3) 5000 units TABS Take 5,000 Units by mouth every morning    Historical Provider, MD   apixaban (ELIQUIS) 2.5 MG TABS tablet Take 1 tablet by mouth 2 times daily 4/10/19   Dashawn Deleon MD   Omega-3 Fatty Acids (FISH OIL) 1000 MG CAPS Take 1,000 mg by mouth daily     Historical Provider, MD   Multiple Vitamins-Minerals (THERAPEUTIC MULTIVITAMIN-MINERALS) tablet Take 1 tablet by mouth daily    Historical Provider, MD   atorvastatin (LIPITOR) 20 MG tablet Take 20 mg by mouth nightly     Historical Provider, MD       Current medications:    Current Facility-Administered Medications   Medication Dose Route Frequency Provider Last Rate Last Dose    lactated ringers infusion   Intravenous Continuous Leonarda Delgado  mL/hr at 08/20/20 0859      amiodarone (CORDARONE) tablet 200 mg  200 mg Oral Nightly CARI Mckay CNP   200 mg at 08/19/20 2224    atorvastatin (LIPITOR) tablet 20 mg  20 mg Oral Nightly CARI Mckay CNP   20 mg at 08/19/20 2224    rivastigmine (EXELON) 4.6 MG/24HR 1 patch  1 patch Transdermal Daily CARI Mckay CNP   1 patch at 08/19/20 0852    pantoprazole (PROTONIX) tablet 40 mg  40 mg Oral Daily CARI Mckay CNP   40 mg at 08/18/20 2004    therapeutic multivitamin-minerals 1 tablet  1 tablet Oral Daily Steve Morin David  - CNP   1 tablet at 08/19/20 0851    omega-3 acid ethyl esters (LOVAZA) capsule 1,000 mg  1,000 mg Oral Daily CARI Nieves CNP   1,000 mg at 08/19/20 0852    metoprolol tartrate (LOPRESSOR) tablet 12.5 mg  12.5 mg Oral BID CARI Nieves CNP   12.5 mg at 08/19/20 2224    finasteride (PROSCAR) tablet 5 mg  5 mg Oral Daily CARI Nieves CNP   5 mg at 08/19/20 3971    ferrous sulfate (IRON 325) tablet 325 mg  325 mg Oral Daily with breakfast CARI Nieves CNP   325 mg at 08/19/20 0281    docusate sodium (COLACE) capsule 100 mg  100 mg Oral BID PRN CARI Nieves CNP        vitamin D CAPS 5,000 Units  5,000 Units Oral QAM CARI Nieves CNP   5,000 Units at 08/19/20 0851    sodium chloride flush 0.9 % injection 10 mL  10 mL Intravenous 2 times per day CARI Nieves CNP   10 mL at 08/20/20 0831    sodium chloride flush 0.9 % injection 10 mL  10 mL Intravenous PRN CARI Nieves CNP        promethazine (PHENERGAN) tablet 12.5 mg  12.5 mg Oral Q6H PRN CARI Nieves CNP   12.5 mg at 08/18/20 2005    Or    ondansetron (ZOFRAN) injection 4 mg  4 mg Intravenous Q6H PRN CARI Nieves CNP        polyethylene glycol (GLYCOLAX) packet 17 g  17 g Oral Daily CARI Nieves CNP   Stopped at 08/19/20 0853    cefepime (MAXIPIME) 2 g IVPB minibag  2 g Intravenous Q12H CARI Nieves  mL/hr at 08/20/20 0831 2 g at 08/20/20 0831       Allergies:     Allergies   Allergen Reactions    Biaxin [Clarithromycin]     Cephalexin     Viagra [Sildenafil Citrate] Nausea Only and Other (See Comments)     Dizziness and BP dropped       Problem List:    Patient Active Problem List   Diagnosis Code    S/P placement of cardiac pacemaker Z95.0    Persistent atrial fibrillation I48.19    Ascending aortic aneurysm (HCC) I71.2    CHF (congestive heart failure), NYHA class I, acute on chronic, combined ([facility] I50.43    Chronic diastolic congestive heart failure (HCC) I50.32    Chronic kidney disease (CKD) stage G3a/A3, moderately decreased glomerular filtration rate (GFR) between 45-59 mL/min/1.73 square meter and albuminuria creatinine ratio greater than 300 mg/g (HCC) N18.3    Atrial fibrillation (HCC) I48.91    Orthostatic hypotension I95.1    Kidney stone N20.0    Hematuria R31.9    Pacemaker Z95.0    Dementia without behavioral disturbance ([facility] F03.90    Acute on chronic anemia D64.9    Benign prostatic hyperplasia without lower urinary tract symptoms N40.0       Past Medical History:        Diagnosis Date    ACTA2-related familial thoracic aortic aneurysm     Arrhythmia     Atrial fibrillation (HCC)     BBBB (bilateral bundle branch block)     Bradycardia     CHF (congestive heart failure) ([facility]     Essential hypertension, malignant     Fall at home     H/O echocardiogram 07/01/2020    EF 50-55%, Mod-Severe AS, Mild PHTN, Aortic Root 3.9cm.(pt had OV after echo)    History of cardioversion 04/17/2018    History of chest x-ray 02/27/2017    Enlargement of the aorta knob which may represent a thoracic aortic aneurysm. Further evaluation w/ a contrast enhanced CT of the chest recommended. no evidence of acute pulmonary process. 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 Recommend thyroid US.  History of echocardiogram 03/27/2017    TTE EF 55%, LV Normal Size, borderline LVH concentric, Normal LV systolic function, transmittal doppler flow pttern suggest impaired LV relaxation Mild aortic stenosis, mild aortic regurgitation.      History of EKG 02/27/2017    Time 12:03 AM, HR 75, A fib, Axis normal, Intervals normal, P waves normal, St-T Segments: nonspecific ST segment changes to the anterior lateral leads, QRS RBBB,  No significant Q waves, no ectopy. A-fib w/RBBB w/nonspecific ST segment change EKG.  History of EKG 02/27/2017    Time 2:58AM: HR 59, NSR, Axis normal, Intervals normal, P waves normal, ST-T seg: nonspecific ST segment changes, QRS RBBB, no significant Q waves, no ectopy. Sinus bradycardia w/ RBBB nonspecific ST Segment changes EKG    History of Holter monitoring 11/08/2017    monitored 48 hours: Patient noted to have multiple PAC and PVCs. Risk of atrial fibrillation present given previous episode Recommend antiarrhythmic therapy.  History of stress test 03/27/2017    NM Stress test: EF 54%, Abnormal study, evidence of mild ischemia in mild inferior regions. post stress LV is enlarged in size.  Post-stress LV function is normal.     Hx of transesophageal echocardiography (SIMONE) for monitoring 04/17/2018    EF45-50% mild TR, mild-mod MR, mod-severe AS    Hyperlipemia     Hyperlipidemia     Hypertension     Nonspecific abnormal electrocardiogram (ECG) (EKG)     Persistent atrial fibrillation     Sick sinus syndrome (HCC)     Vertigo        Past Surgical History:        Procedure Laterality Date    CYSTOSCOPY INSERTION / REMOVAL STENT / STONE Left 5/6/2019    CYSTOSCOPY RETROGRADE PYELOGRAM STENT INSERTION, DIAGNOSTIC CYSTOSCOPY performed by Anmol Lucas MD at [facility] Left 03/29/2018    Dual Chamber [employer]tronic Aura XT DR AMI Kincaid       Social History:    Social History     Tobacco Use    Smoking status: Never Smoker    Smokeless tobacco: Never Used   Substance Use Topics    Alcohol use: Not Currently     Alcohol/week: 0.0 standard drinks                                Counseling given: Not Answered      Vital Signs (Current):   Vitals:    08/19/20 2210 08/19/20 2230 08/20/20 0043 08/20/20 0215   BP: 134/72 128/67 131/62 137/64   Pulse: 82 78 HEPCAB    COVID-19 Screening (If Applicable):   Lab Results   Component Value Date    COVID19 NOT DETECTED 08/13/2020         Anesthesia Evaluation    Airway: Mallampati: II  TM distance: >3 FB     Mouth opening: > = 3 FB Dental: normal exam         Pulmonary:normal exam                               Cardiovascular:    (+) hypertension:, pacemaker:, CHF:, hyperlipidemia               ROS comment: Ascending aortic aneurysm    Echo 7/2020     Summary   Left ventricular systolic function is normal with an ejection fraction of 50   to 55 %. Grade I diastolic dysfunction. Mild concentric left ventricular hypertrophy. The left atrium is mildly dilated. Dilatation of the aortic root measuring 3.9 cm. Mild aortic stenosis with mean gradient of 14 mmHg. Moderate to severe aortic regurgitation is noted with a pressure half time   of 421 msec. Right ventricular systolic pressure of 41 mmHg consistent with mild   pulmonary hypertension. No evidence of pericardial effusion. PE comment: paced   Neuro/Psych:   (+) psychiatric history:            GI/Hepatic/Renal:   (+) renal disease:,           Endo/Other:                     Abdominal:           Vascular:                                        Anesthesia Plan      MAC and TIVA     ASA 4       Induction: intravenous.                           CARI Maria - CRNA   8/20/2020

## 2020-08-20 NOTE — BRIEF OP NOTE
Brief Postoperative Note      Mortimer Richards is a 80 y.o. male     Pre-operative Diagnosis: ANEMIA  / GIT BLEEDING    Post-operative Diagnosis: MID ESOPHAGEAL DIVERTICULUM--- GASTRITIS WITH SOME RECENT BLEEDING    Procedure: EGD    Anesthesia: MAC    Surgeons/Assistants:Georgia Peacock     Estimated Blood Loss: NONE    Complications: None    Specimens: were not obtained      Georgia Peacock   8/20/2020   9:42 AM

## 2020-08-20 NOTE — OP NOTE
[address] [place] [address], [address] [place] [address]                                OPERATIVE REPORT    PATIENT NAME: Sandra Roth                 :        10/05/1933  MED REC NO:   [id number]                          ROOM:       4122  ACCOUNT NO:   [de-identified]                           ADMIT DATE: 2020  PROVIDER:     Shepard Felty, MD    DATE OF PROCEDURE:  2020    PROCEDURE:  EGD. The patient is an inpatient. CHIEF COMPLAINT:  History of anemia and GI bleeding. PREMEDICATION:  Please refer to the anesthesiologist's notes. DESCRIPTION OF PROCEDURE:  The patient was placed in the left lateral  decubitus position and the video Olympus gastroscope was introduced in  the back of the throat and was advanced into the esophagus. ESOPHAGUS:  The mucosa of the esophagus was unremarkable. No erosion,  ulcer, stricture, or mass lesions were seen; however, incidentally mid  esophageal diverticulum was noted. STOMACH:  The fundus, cardia, body, antrum, lesser curvature, greater  curvature, and the pyloric regions of the stomach were examined. The  mucosa of the stomach in the antrum and prepyloric region was inflamed  with some recent bleeding, but no ulcers or mass lesions were seen. The  gastroscope was retroflexed and the fundus and cardia were carefully  examined and no mass lesions were seen. DUODENUM:  The first and second portions of the duodenum were examined  and the mucosa was unremarkable. No blood recent or old was seen in the  lumen of the stomach or duodenum. POSTOPERATIVE DIAGNOSES:  1. Incidentally, mid esophageal diverticulum noted. 2.  Mild-to-moderate gastritis with some recent bleeding. RECOMMENDATIONS:  1. Antireflux measures. 2.  We will continue the patient on Protonix. 3.  May restart the patient on Eliquis as needed.   4.  Monitor the patient's H and H and transfuse on a p.r.n. basis to  keep hemoglobin above 7 gm percent. The patient tolerated the procedure well. There were no postop  complications. The blood loss during the procedure was nil.         Bobby Arreguin MD    D: 08/20/2020 9:47:35       T: 08/20/2020 15:44:42     [email or web address]  Job#: [id number]     Doc#: [id number]    CC:

## 2020-08-21 VITALS
WEIGHT: 182 LBS | SYSTOLIC BLOOD PRESSURE: 111 MMHG | DIASTOLIC BLOOD PRESSURE: 56 MMHG | HEART RATE: 61 BPM | OXYGEN SATURATION: 97 % | TEMPERATURE: 97.8 F | HEIGHT: 68 IN | RESPIRATION RATE: 17 BRPM | BODY MASS INDEX: 27.58 KG/M2

## 2020-08-21 LAB
ABO/RH: NORMAL
ALBUMIN SERPL-MCNC: 3 GM/DL (ref 3.4–5)
ALP BLD-CCNC: 60 IU/L (ref 40–129)
ALT SERPL-CCNC: 22 U/L (ref 10–40)
ANION GAP SERPL CALCULATED.3IONS-SCNC: 7 MMOL/L (ref 4–16)
ANTIBODY SCREEN: NEGATIVE
AST SERPL-CCNC: 23 IU/L (ref 15–37)
BASOPHILS ABSOLUTE: 0 K/CU MM
BASOPHILS RELATIVE PERCENT: 0.2 % (ref 0–1)
BILIRUB SERPL-MCNC: 0.6 MG/DL (ref 0–1)
BUN BLDV-MCNC: 30 MG/DL (ref 6–23)
CALCIUM SERPL-MCNC: 8.6 MG/DL (ref 8.3–10.6)
CHLORIDE BLD-SCNC: 106 MMOL/L (ref 99–110)
CO2: 28 MMOL/L (ref 21–32)
COMPONENT: NORMAL
CREAT SERPL-MCNC: 1.6 MG/DL (ref 0.9–1.3)
CROSSMATCH RESULT: NORMAL
DIFFERENTIAL TYPE: ABNORMAL
EOSINOPHILS ABSOLUTE: 0.3 K/CU MM
EOSINOPHILS RELATIVE PERCENT: 3.5 % (ref 0–3)
GFR AFRICAN AMERICAN: 50 ML/MIN/1.73M2
GFR NON-AFRICAN AMERICAN: 41 ML/MIN/1.73M2
GLUCOSE BLD-MCNC: 87 MG/DL (ref 70–99)
HCT VFR BLD CALC: 24.3 % (ref 42–52)
HCT VFR BLD CALC: 25.8 % (ref 42–52)
HEMOGLOBIN: 7.1 GM/DL (ref 13.5–18)
HEMOGLOBIN: 7.7 GM/DL (ref 13.5–18)
IMMATURE NEUTROPHIL %: 0.6 % (ref 0–0.43)
LYMPHOCYTES ABSOLUTE: 1.3 K/CU MM
LYMPHOCYTES RELATIVE PERCENT: 15.2 % (ref 24–44)
MCH RBC QN AUTO: 26.2 PG (ref 27–31)
MCH RBC QN AUTO: 26.7 PG (ref 27–31)
MCHC RBC AUTO-ENTMCNC: 29.2 % (ref 32–36)
MCHC RBC AUTO-ENTMCNC: 29.8 % (ref 32–36)
MCV RBC AUTO: 89.6 FL (ref 78–100)
MCV RBC AUTO: 89.7 FL (ref 78–100)
MONOCYTES ABSOLUTE: 1 K/CU MM
MONOCYTES RELATIVE PERCENT: 11.7 % (ref 0–4)
NUCLEATED RBC %: 0 %
PDW BLD-RTO: 22.6 % (ref 11.7–14.9)
PDW BLD-RTO: 22.8 % (ref 11.7–14.9)
PLATELET # BLD: 258 K/CU MM (ref 140–440)
PLATELET # BLD: 278 K/CU MM (ref 140–440)
PMV BLD AUTO: 9.3 FL (ref 7.5–11.1)
PMV BLD AUTO: 9.6 FL (ref 7.5–11.1)
POTASSIUM SERPL-SCNC: 3.9 MMOL/L (ref 3.5–5.1)
RBC # BLD: 2.71 M/CU MM (ref 4.6–6.2)
RBC # BLD: 2.88 M/CU MM (ref 4.6–6.2)
SEGMENTED NEUTROPHILS ABSOLUTE COUNT: 5.8 K/CU MM
SEGMENTED NEUTROPHILS RELATIVE PERCENT: 68.8 % (ref 36–66)
SODIUM BLD-SCNC: 141 MMOL/L (ref 135–145)
STATUS: NORMAL
TOTAL IMMATURE NEUTOROPHIL: 0.05 K/CU MM
TOTAL NUCLEATED RBC: 0 K/CU MM
TOTAL PROTEIN: 4.8 GM/DL (ref 6.4–8.2)
TRANSFUSION STATUS: NORMAL
UNIT DIVISION: 0
UNIT NUMBER: NORMAL
WBC # BLD: 8.5 K/CU MM (ref 4–10.5)
WBC # BLD: 8.5 K/CU MM (ref 4–10.5)

## 2020-08-21 PROCEDURE — 85027 COMPLETE CBC AUTOMATED: CPT

## 2020-08-21 PROCEDURE — 85014 HEMATOCRIT: CPT

## 2020-08-21 PROCEDURE — 2580000003 HC RX 258: Performed by: ANESTHESIOLOGY

## 2020-08-21 PROCEDURE — 6360000002 HC RX W HCPCS: Performed by: NURSE PRACTITIONER

## 2020-08-21 PROCEDURE — 94761 N-INVAS EAR/PLS OXIMETRY MLT: CPT

## 2020-08-21 PROCEDURE — 85025 COMPLETE CBC W/AUTO DIFF WBC: CPT

## 2020-08-21 PROCEDURE — 2580000003 HC RX 258: Performed by: NURSE PRACTITIONER

## 2020-08-21 PROCEDURE — 85018 HEMOGLOBIN: CPT

## 2020-08-21 PROCEDURE — 6370000000 HC RX 637 (ALT 250 FOR IP): Performed by: NURSE PRACTITIONER

## 2020-08-21 PROCEDURE — 96366 THER/PROPH/DIAG IV INF ADDON: CPT

## 2020-08-21 PROCEDURE — 36415 COLL VENOUS BLD VENIPUNCTURE: CPT

## 2020-08-21 PROCEDURE — 80053 COMPREHEN METABOLIC PANEL: CPT

## 2020-08-21 RX ORDER — CEFDINIR 300 MG/1
300 CAPSULE ORAL 2 TIMES DAILY
Qty: 10 CAPSULE | Refills: 0 | Status: SHIPPED | OUTPATIENT
Start: 2020-08-21 | End: 2020-08-26

## 2020-08-21 RX ADMIN — CEFEPIME 2 G: 2 INJECTION, POWDER, FOR SOLUTION INTRAVENOUS at 08:33

## 2020-08-21 RX ADMIN — METOPROLOL TARTRATE 12.5 MG: 25 TABLET, FILM COATED ORAL at 08:34

## 2020-08-21 RX ADMIN — PANTOPRAZOLE SODIUM 40 MG: 40 TABLET, DELAYED RELEASE ORAL at 05:20

## 2020-08-21 RX ADMIN — SODIUM CHLORIDE, PRESERVATIVE FREE 10 ML: 5 INJECTION INTRAVENOUS at 08:33

## 2020-08-21 RX ADMIN — POLYETHYLENE GLYCOL (3350) 17 G: 17 POWDER, FOR SOLUTION ORAL at 08:33

## 2020-08-21 RX ADMIN — FERROUS SULFATE TAB 325 MG (65 MG ELEMENTAL FE) 325 MG: 325 (65 FE) TAB at 08:33

## 2020-08-21 RX ADMIN — SODIUM CHLORIDE, POTASSIUM CHLORIDE, SODIUM LACTATE AND CALCIUM CHLORIDE: 600; 310; 30; 20 INJECTION, SOLUTION INTRAVENOUS at 05:20

## 2020-08-21 RX ADMIN — OMEGA-3-ACID ETHYL ESTERS 1000 MG: 1 CAPSULE, LIQUID FILLED ORAL at 08:34

## 2020-08-21 RX ADMIN — Medication 5000 UNITS: at 11:47

## 2020-08-21 RX ADMIN — FINASTERIDE 5 MG: 5 TABLET, FILM COATED ORAL at 08:33

## 2020-08-21 RX ADMIN — MULTIPLE VITAMINS W/ MINERALS TAB 1 TABLET: TAB at 08:34

## 2020-08-21 NOTE — PROGRESS NOTES
DOING WELL NO ABD COMPLAINTS OR GROSS GIT BLEEDING WANTS TO GO HOME  VITALS STABLE   LABS NOTED HB 7.1 D/W DAUGHTER YESTERDAY  WILL CPM F/U H/H AS OUTPT  AND IF STABLE MAY RESTART ON ELIQUIS FROM 08/24/20

## 2020-08-21 NOTE — DISCHARGE SUMMARY
Discharge Summary Note  Patient ID:  Maverick Guzman  [id number]  80 y.o.  10/5/1933    Admit date: 8/18/2020    Discharge date and time: No discharge date for patient encounter.      Admitting Physician: Willi Pineda MD     Discharge Physician: Nicolasa Mcdonald MD    Admission Diagnoses:   GI bleed [K92.2]  Elevated troponin [R79.89]  Anemia, unspecified type [D64.9]  Chronic kidney disease, unspecified CKD stage [N18.9]    Discharge Diagnoses and Hospital Course:   Acute on chronic anemia likely 2/2 eliquis/GI bleed  Acute blood loss anemia  Colonoscopy in the past with no polyps, hemorrhoids, or diverticulitis  -1 unit of PRBC ordered  -Monitor H&H and transfuse if Hgb<7  -Hb stable   -Consulted GI, appreciated recommendations  EGD: MID ESOPHAGEAL DIVERTICULUM--- GASTRITIS WITH SOME RECENT BLEEDING  PPI  To hold off Eliquis for 1 week and to repeat CBC if stable can restart Eliquis     Acute cystitis with hematuria  Hx of chronic bladder obstruction  -Continue Cefepime IV>>changed to oral Cefdinir  -Urine cx with E Coli, sensitivity pending  -Continue Proscar  -Bladder scan PRN     Permenant A-fib  -Hold Eliquis and continue Amiodarone     Chronic HFpEF, compensated  -Restarted Lasix with low dose  -Continue Metoprolol     HTN  -Continue Metorpolol     CKD 3     Dementia   -Continue Exelon     HLD  -Continue Lipitor    Admission Condition: fair    Discharged Condition: stable    Consults: GI    Significant Diagnostic Studies:   CBC with Differential:    Lab Results   Component Value Date    WBC 8.5 08/21/2020    RBC 2.88 08/21/2020    HGB 7.7 08/21/2020    HCT 25.8 08/21/2020     08/21/2020    MCV 89.6 08/21/2020    MCH 26.7 08/21/2020    MCHC 29.8 08/21/2020    RDW 22.6 08/21/2020    SEGSPCT 68.8 08/21/2020    BANDSPCT 2 01/29/2017    LYMPHOPCT 15.2 08/21/2020    MONOPCT 11.7 08/21/2020    EOSPCT 1.2 02/28/2017    BASOPCT 0.2 08/21/2020    MONOSABS 1.0 08/21/2020    LYMPHSABS 1.3 08/21/2020 EOSABS 0.3 08/21/2020    BASOSABS 0.0 08/21/2020    DIFFTYPE AUTOMATED DIFFERENTIAL 08/21/2020     CMP:    Lab Results   Component Value Date     08/21/2020    K 3.9 08/21/2020     08/21/2020    CO2 28 08/21/2020    BUN 30 08/21/2020    CREATININE 1.6 08/21/2020    GFRAA 50 08/21/2020    LABGLOM 41 08/21/2020    GLUCOSE 87 08/21/2020    PROT 4.8 08/21/2020    LABALBU 3.0 08/21/2020    CALCIUM 8.6 08/21/2020    BILITOT 0.6 08/21/2020    ALKPHOS 60 08/21/2020    AST 23 08/21/2020    ALT 22 08/21/2020     Ct Head Wo Contrast    Result Date: 8/18/2020  EXAMINATION: CT OF THE HEAD WITHOUT CONTRAST  8/18/2020 2:28 pm TECHNIQUE: CT of the head was performed without the administration of intravenous contrast. Dose modulation, iterative reconstruction, and/or weight based adjustment of the mA/kV was utilized to reduce the radiation dose to as low as reasonably achievable. COMPARISON: 08/18/2019 HISTORY: ORDERING SYSTEM PROVIDED HISTORY: fall TECHNOLOGIST PROVIDED HISTORY: Has a \"code stroke\" or \"stroke alert\" been called? ->No Reason for exam:->fall Reason for Exam: fall Acuity: Acute Type of Exam: Initial Additional signs and symptoms: chest pain;fall FINDINGS: BRAIN/VENTRICLES: The ventricles and sulci are diffusely enlarged. Low attenuation is seen in the periventricular and subcortical white matter. No acute intracranial hemorrhage or acute infarct is identified. ORBITS: The visualized portion of the orbits demonstrate no acute abnormality. SINUSES: The visualized paranasal sinuses and mastoid air cells demonstrate no acute abnormality. SOFT TISSUES/SKULL:  No acute abnormality of the visualized skull or soft tissues. No acute intracranial abnormality.      Ct Cervical Spine Wo Contrast    Result Date: 8/18/2020  EXAMINATION: CT OF THE CERVICAL SPINE WITHOUT CONTRAST 8/18/2020 2:28 pm TECHNIQUE: CT of the cervical spine was performed without the administration of intravenous contrast. Multiplanar reformatted images are provided for review. Dose modulation, iterative reconstruction, and/or weight based adjustment of the mA/kV was utilized to reduce the radiation dose to as low as reasonably achievable. COMPARISON: 05/02/2019 HISTORY: ORDERING SYSTEM PROVIDED HISTORY: injury TECHNOLOGIST PROVIDED HISTORY: Reason for exam:->injury Reason for Exam: fall Acuity: Acute Type of Exam: Initial Additional signs and symptoms: chest pain;fall FINDINGS: BONES/ALIGNMENT: There is no acute fracture or traumatic malalignment. DEGENERATIVE CHANGES: Multilevel degenerative changes. Bridging anterior osteophytes lower cervical spine extending into the superior thoracic spine. SOFT TISSUES: There is no prevertebral soft tissue swelling. Stable 16 mm partially calcified thyroid nodule left thyroid lobe. No acute abnormality of the cervical spine. Stable 16 mm thyroid nodule that is partially calcified. Given its stability and patient age, no further imaging evaluation suggested     Xr Chest Portable    Result Date: 8/18/2020  EXAMINATION: ONE XRAY VIEW OF THE CHEST 8/18/2020 2:00 pm COMPARISON: None. HISTORY: ORDERING SYSTEM PROVIDED HISTORY: Chest pain TECHNOLOGIST PROVIDED HISTORY: Reason for exam:->Chest pain Reason for Exam: Chest pain Acuity: Acute Type of Exam: Initial Additional signs and symptoms: Pt to ED with EMS from [facility] for fall last night, chest pain, and low hemoglobin. Pt states he became dizzy last night and fell in bathroom, legs \"slid out from underneath me\", landed on bottom, denies LOC or striking head FINDINGS: There is bandlike atelectasis versus scar at the right lung base. The heart size is magnified by portable technique but is likely at least mildly enlarged. There is a severely tortuous thoracic aorta. Shallow inspiration limits evaluation but no infiltrate or consolidation or effusion is identified. There is no pneumothorax.   A pulse generator is seen over the left anterior chest wall with 2 leads projecting over the heart. Limited hypoventilatory exam. No acute abnormality visualized. Cardiomegaly and severely tortuous thoracic aorta.        Discharge Exam:  General Appearance: alert and oriented to person, place and time, in no acute distress  Cardiovascular: normal rate, regular rhythm, normal S1 and S2  Pulmonary/Chest: clear to auscultation bilaterally- no wheezes, rales or rhonchi, normal air movement, no respiratory distress  Abdomen: soft, non-tender, non-distended, normal bowel sounds, no masses   Extremities: no cyanosis, clubbing or edema, pulse   Skin: appears pale, warm and dry, no rash or erythema  Head: normocephalic and atraumatic  Eyes: pupils equal, round, and reactive to light  Neck: supple and non-tender without mass, no thyromegaly   Musculoskeletal: normal range of motion, no joint swelling, deformity or tenderness  Neurological: alert, oriented, normal speech, no focal findings or movement disorder noted    Disposition: Assisted Living    Patient Instructions:     Discharge Medications:   [address], [address] Medication Instructions DEMI:[id number]    Printed on:08/21/20 1422   Medication Information                      acetaminophen (TYLENOL) 325 MG tablet  Take 650 mg by mouth every 6 hours as needed for Pain For pain or temp             amiodarone (CORDARONE) 200 MG tablet  Take 1 tablet by mouth daily             apixaban (ELIQUIS) 2.5 MG TABS tablet  Take 1 tablet by mouth 2 times daily             atorvastatin (LIPITOR) 20 MG tablet  Take 20 mg by mouth nightly              Carboxymethylcellulose Sodium (ARTIFICIAL TEARS OP)  Apply 1 drop to eye as needed             cefdinir (OMNICEF) 300 MG capsule  Take 1 capsule by mouth 2 times daily for 5 days             Cholecalciferol (VITAMIN D3) 5000 units TABS  Take 5,000 Units by mouth every morning             docusate sodium (COLACE, DULCOLAX) 100 MG CAPS  Take 100 mg by mouth 2 times daily as needed for Constipation             Elastic Bandages & Supports (JOBST KNEE HIGH COMPRESSION SM) MISC  1 each by Does not apply route daily Knee high compression  Apply upon waking in the morning and remove at bedtime             ferrous sulfate (IRON 325) 325 (65 Fe) MG tablet  Take 325 mg by mouth daily (with breakfast)             finasteride (PROSCAR) 5 MG tablet  Take 1 tablet by mouth daily             furosemide (LASIX) 20 MG tablet  Take 1 tablet by mouth daily as needed (lower leg edema) Take 20 mg of oral lasix at evening, for a total of 40 mg of oral lasix at the evening dose  for the next 3 days.              metoprolol tartrate (LOPRESSOR) 25 MG tablet  Take 0.5 tablets by mouth 2 times daily             Multiple Vitamins-Minerals (THERAPEUTIC MULTIVITAMIN-MINERALS) tablet  Take 1 tablet by mouth daily             Omega-3 Fatty Acids (FISH OIL) 1000 MG CAPS  Take 1,000 mg by mouth daily              pantoprazole (PROTONIX) 40 MG tablet  Take 40 mg by mouth daily             polyethylene glycol (GLYCOLAX) powder  Take 17 g by mouth daily             Polyvinyl Alcohol-Povidone (REFRESH OP)  Apply 1 drop to eye as needed For dry eyes             rivastigmine (EXELON) 4.6 MG/24HR  Place 1 patch onto the skin daily              spironolactone (ALDACTONE) 25 MG tablet  Take 25 mg by mouth daily                 Activity: activity as tolerated    Diet: regular diet    Wound Care: none needed    Follow-up:  PCP 1-2 weeks    Time Spent Doing discharge 34 min  Electronically signed by Dot Frias MD  on 8/21/20 at 12:52 PM EDT

## 2020-08-21 NOTE — PROGRESS NOTES
[facility] called to [facility] his nurse at [facility]. [facility] to come at  for transport. No

## 2020-08-21 NOTE — PLAN OF CARE
Problem: Falls - Risk of:  Goal: Will remain free from falls  Description: Will remain free from falls  8/21/2020 0103 by Yu Davalos RN  Outcome: Ongoing  8/20/2020 1413 by Arpita Vidales RN  Outcome: Ongoing  Goal: Absence of physical injury  Description: Absence of physical injury  8/21/2020 0103 by Yu Davalos RN  Outcome: Ongoing  8/20/2020 1413 by Arpita Vidales RN  Outcome: Ongoing

## 2020-08-24 LAB
EKG ATRIAL RATE: 69 BPM
EKG DIAGNOSIS: NORMAL
EKG P AXIS: 39 DEGREES
EKG P-R INTERVAL: 186 MS
EKG Q-T INTERVAL: 612 MS
EKG QRS DURATION: 188 MS
EKG QTC CALCULATION (BAZETT): 655 MS
EKG R AXIS: -51 DEGREES
EKG T AXIS: 106 DEGREES
EKG VENTRICULAR RATE: 69 BPM

## 2020-08-25 ENCOUNTER — HOSPITAL ENCOUNTER (OUTPATIENT)
Age: 85
Setting detail: SPECIMEN
Discharge: HOME | End: 2020-08-25
Payer: MEDICARE

## 2020-08-25 LAB
HCT VFR BLD CALC: 24.7 % (ref 42–52)
HEMOGLOBIN: 7.1 GM/DL (ref 13.5–18)
MCH RBC QN AUTO: 26.4 PG (ref 27–31)
MCHC RBC AUTO-ENTMCNC: 28.7 % (ref 32–36)
MCV RBC AUTO: 91.8 FL (ref 78–100)
PDW BLD-RTO: 21.6 % (ref 11.7–14.9)
PLATELET # BLD: 272 K/CU MM (ref 140–440)
PMV BLD AUTO: 10 FL (ref 7.5–11.1)
RBC # BLD: 2.69 M/CU MM (ref 4.6–6.2)
WBC # BLD: 6.9 K/CU MM (ref 4–10.5)

## 2020-08-25 PROCEDURE — 85027 COMPLETE CBC AUTOMATED: CPT

## 2020-08-25 PROCEDURE — 36415 COLL VENOUS BLD VENIPUNCTURE: CPT

## 2020-09-01 ENCOUNTER — TELEPHONE (OUTPATIENT)
Dept: CARDIOLOGY CLINIC | Age: 85
End: 2020-09-01

## 2020-09-01 ENCOUNTER — HOSPITAL ENCOUNTER (OUTPATIENT)
Age: 85
Setting detail: SPECIMEN
Discharge: HOME | End: 2020-09-01
Payer: MEDICARE

## 2020-09-01 LAB
ANION GAP SERPL CALCULATED.3IONS-SCNC: 8 MMOL/L (ref 4–16)
BUN BLDV-MCNC: 19 MG/DL (ref 6–23)
CALCIUM SERPL-MCNC: 8.9 MG/DL (ref 8.3–10.6)
CHLORIDE BLD-SCNC: 100 MMOL/L (ref 99–110)
CO2: 29 MMOL/L (ref 21–32)
CREAT SERPL-MCNC: 1.8 MG/DL (ref 0.9–1.3)
GFR AFRICAN AMERICAN: 44 ML/MIN/1.73M2
GFR NON-AFRICAN AMERICAN: 36 ML/MIN/1.73M2
GLUCOSE BLD-MCNC: 100 MG/DL (ref 70–99)
HCT VFR BLD CALC: 29 % (ref 42–52)
HEMOGLOBIN: 8.3 GM/DL (ref 13.5–18)
MCH RBC QN AUTO: 26 PG (ref 27–31)
MCHC RBC AUTO-ENTMCNC: 28.6 % (ref 32–36)
MCV RBC AUTO: 90.9 FL (ref 78–100)
PDW BLD-RTO: 19.3 % (ref 11.7–14.9)
PLATELET # BLD: 315 K/CU MM (ref 140–440)
PMV BLD AUTO: 9.7 FL (ref 7.5–11.1)
POTASSIUM SERPL-SCNC: 4.1 MMOL/L (ref 3.5–5.1)
RBC # BLD: 3.19 M/CU MM (ref 4.6–6.2)
SODIUM BLD-SCNC: 137 MMOL/L (ref 135–145)
WBC # BLD: 6.5 K/CU MM (ref 4–10.5)

## 2020-09-01 PROCEDURE — 85027 COMPLETE CBC AUTOMATED: CPT

## 2020-09-01 PROCEDURE — 36415 COLL VENOUS BLD VENIPUNCTURE: CPT

## 2020-09-01 PROCEDURE — 80048 BASIC METABOLIC PNL TOTAL CA: CPT

## 2020-09-01 NOTE — TELEPHONE ENCOUNTER
Daughter Tracy Ross called, pt was taken off eliquis and then taken off baby aspirin due to bleeding and low hemoglobin. .  Had endoscopy at [facility]. Tracy Ross wants to know if he should go back on low dose aspirin. If so fax [phone]) order to [facility] and call Tracy Ross at [phone].

## 2020-09-09 NOTE — TELEPHONE ENCOUNTER
Per Masood Navarrete NP, patient may restart ASA 81mg and restart Eliquis when hbg stable.  Orders to [facility] pass at [facility]

## 2020-09-15 ENCOUNTER — HOSPITAL ENCOUNTER (OUTPATIENT)
Age: 85
Setting detail: SPECIMEN
Discharge: HOME | End: 2020-09-15
Payer: MEDICARE

## 2020-09-15 LAB
ALBUMIN SERPL-MCNC: 3.9 GM/DL (ref 3.4–5)
ALP BLD-CCNC: 90 IU/L (ref 40–128)
ALT SERPL-CCNC: 22 U/L (ref 10–40)
ANION GAP SERPL CALCULATED.3IONS-SCNC: 10 MMOL/L (ref 4–16)
AST SERPL-CCNC: 25 IU/L (ref 15–37)
BILIRUB SERPL-MCNC: 0.5 MG/DL (ref 0–1)
BUN BLDV-MCNC: 22 MG/DL (ref 6–23)
CALCIUM SERPL-MCNC: 9.1 MG/DL (ref 8.3–10.6)
CHLORIDE BLD-SCNC: 99 MMOL/L (ref 99–110)
CO2: 27 MMOL/L (ref 21–32)
CREAT SERPL-MCNC: 2 MG/DL (ref 0.9–1.3)
GFR AFRICAN AMERICAN: 39 ML/MIN/1.73M2
GFR NON-AFRICAN AMERICAN: 32 ML/MIN/1.73M2
GLUCOSE BLD-MCNC: 116 MG/DL (ref 70–99)
HCT VFR BLD CALC: 32.6 % (ref 42–52)
HEMOGLOBIN: 9.2 GM/DL (ref 13.5–18)
MCH RBC QN AUTO: 24.9 PG (ref 27–31)
MCHC RBC AUTO-ENTMCNC: 28.2 % (ref 32–36)
MCV RBC AUTO: 88.3 FL (ref 78–100)
PDW BLD-RTO: 16.6 % (ref 11.7–14.9)
PLATELET # BLD: 309 K/CU MM (ref 140–440)
PMV BLD AUTO: 9.2 FL (ref 7.5–11.1)
POTASSIUM SERPL-SCNC: 4 MMOL/L (ref 3.5–5.1)
RBC # BLD: 3.69 M/CU MM (ref 4.6–6.2)
SODIUM BLD-SCNC: 136 MMOL/L (ref 135–145)
TOTAL PROTEIN: 6.3 GM/DL (ref 6.4–8.2)
WBC # BLD: 6.7 K/CU MM (ref 4–10.5)

## 2020-09-15 PROCEDURE — 85027 COMPLETE CBC AUTOMATED: CPT

## 2020-09-15 PROCEDURE — 80053 COMPREHEN METABOLIC PANEL: CPT

## 2020-09-15 PROCEDURE — 36415 COLL VENOUS BLD VENIPUNCTURE: CPT

## 2020-09-20 PROCEDURE — 93294 REM INTERROG EVL PM/LDLS PM: CPT | Performed by: INTERNAL MEDICINE

## 2020-09-20 PROCEDURE — 93296 REM INTERROG EVL PM/IDS: CPT | Performed by: INTERNAL MEDICINE

## 2020-09-22 ENCOUNTER — HOSPITAL ENCOUNTER (OUTPATIENT)
Age: 85
Setting detail: SPECIMEN
Discharge: HOME | End: 2020-09-22
Payer: MEDICARE

## 2020-09-22 LAB
ANION GAP SERPL CALCULATED.3IONS-SCNC: 12 MMOL/L (ref 4–16)
BUN BLDV-MCNC: 26 MG/DL (ref 6–23)
CALCIUM SERPL-MCNC: 9 MG/DL (ref 8.3–10.6)
CHLORIDE BLD-SCNC: 102 MMOL/L (ref 99–110)
CO2: 25 MMOL/L (ref 21–32)
CREAT SERPL-MCNC: 1.9 MG/DL (ref 0.9–1.3)
GFR AFRICAN AMERICAN: 41 ML/MIN/1.73M2
GFR NON-AFRICAN AMERICAN: 34 ML/MIN/1.73M2
GLUCOSE BLD-MCNC: 86 MG/DL (ref 70–99)
HCT VFR BLD CALC: 32.9 % (ref 42–52)
HEMOGLOBIN: 9.4 GM/DL (ref 13.5–18)
MCH RBC QN AUTO: 24.9 PG (ref 27–31)
MCHC RBC AUTO-ENTMCNC: 28.6 % (ref 32–36)
MCV RBC AUTO: 87.3 FL (ref 78–100)
PDW BLD-RTO: 16.5 % (ref 11.7–14.9)
PLATELET # BLD: 301 K/CU MM (ref 140–440)
PMV BLD AUTO: 10.2 FL (ref 7.5–11.1)
POTASSIUM SERPL-SCNC: 4.9 MMOL/L (ref 3.5–5.1)
RBC # BLD: 3.77 M/CU MM (ref 4.6–6.2)
SODIUM BLD-SCNC: 139 MMOL/L (ref 135–145)
WBC # BLD: 7.2 K/CU MM (ref 4–10.5)

## 2020-09-22 PROCEDURE — 85027 COMPLETE CBC AUTOMATED: CPT

## 2020-09-22 PROCEDURE — 80048 BASIC METABOLIC PNL TOTAL CA: CPT

## 2020-09-22 PROCEDURE — 36415 COLL VENOUS BLD VENIPUNCTURE: CPT

## 2020-09-23 ENCOUNTER — TELEPHONE (OUTPATIENT)
Dept: CARDIOLOGY CLINIC | Age: 85
End: 2020-09-23

## 2020-09-23 ENCOUNTER — PROCEDURE VISIT (OUTPATIENT)
Dept: CARDIOLOGY CLINIC | Age: 85
End: 2020-09-23
Payer: MEDICARE

## 2020-09-23 NOTE — TELEPHONE ENCOUNTER
Received call from Jovanny Mail from [address] home. Patient is a resident there and she was requesting dates for pacemaker checks. I got a secure fax number from her and faxed the schedule to her.

## 2020-09-23 NOTE — LETTER
Cardiology [address] [place] [address] Mittal. [facility] [address] [facility]  Phone: [phone]  Fax: [phone]    9/23/2020        Quang VirgenRancho Los Amigos [facility] Rehabilitation Centerpriscila [id number] [place] [id number]            Dear Ilya David: This is your [employer] schedule. You can gregoria your calendar with these dates. Remember that your device is wireless and should automatically do these checks while you are sleeping. If for any reason I do not get your transmission then I will call you and ask that you send a manual transmission. If you have any questions or concerns, please call and ask for Virginia Denis at [phone]. Thank you.

## 2020-09-25 ENCOUNTER — OUTSIDE SERVICES (OUTPATIENT)
Dept: INTERNAL MEDICINE CLINIC | Age: 85
End: 2020-09-25
Payer: MEDICARE

## 2020-09-25 DIAGNOSIS — Z95.0 S/P PLACEMENT OF CARDIAC PACEMAKER: ICD-10-CM

## 2020-09-25 DIAGNOSIS — I48.19 PERSISTENT ATRIAL FIBRILLATION (HCC): ICD-10-CM

## 2020-09-25 DIAGNOSIS — N18.31 CHRONIC KIDNEY DISEASE (CKD) STAGE G3A/A3, MODERATELY DECREASED GLOMERULAR FILTRATION RATE (GFR) BETWEEN 45-59 ML/MIN/1.73 SQUARE METER AND ALBUMINURIA CREATININE RATIO GREATER THAN 300 MG/G (HCC): ICD-10-CM

## 2020-09-25 DIAGNOSIS — D64.9 ACUTE ON CHRONIC ANEMIA: Primary | ICD-10-CM

## 2020-09-25 DIAGNOSIS — I48.91 ATRIAL FIBRILLATION, UNSPECIFIED TYPE (HCC): ICD-10-CM

## 2020-09-26 NOTE — PROGRESS NOTES
Progress note  ___________________________    Meera Muller's  is 10/5/1933  SEEN ON 20 at [address]  ___________________________    S:  Patient is seen today and discussed with the nurses. Patient has a history of, atrial fibrillation, chronic kidney disease, pacemaker, anemia  Last month patient had a fall. Patient fell on his buttocks. Complains of chest pain also. Patient was admitted to the hospital. Patient was given 1 unit of packed RBC  EGD showed gastritis and diverticulum in duodenum  Pt is off of ELiquis  Patient denies any chest pain or shortness of breath. No cough or sputum production. No nausea or vomiting. He is confused. No obvious bleeding noted by nurses    ALLERGIES:  Allergies   Allergen Reactions    Biaxin [Clarithromycin]     Cephalexin     Viagra [Sildenafil Citrate] Nausea Only and Other (See Comments)     Dizziness and BP dropped        PAST MEDICAL HISTORY:    has a past medical history of ACTA2-related familial thoracic aortic aneurysm, Arrhythmia, Atrial fibrillation (HCC), BBBB (bilateral bundle branch block), Bradycardia, CHF (congestive heart failure) ([address] Essential hypertension, malignant, History of cardioversion,  Hx of transesophageal echocardiography (SIMONE) for monitoring, Hyperlipemia, Hyperlipidemia, Hypertension,, Persistent atrial fibrillation , Sick sinus syndrome and Vertigo. PAST SURGICAL HISTORY:   has a past surgical history that includes Pacemaker insertion (Left, 2018); CYSTOSCOPY INSERTION / REMOVAL STENT / STONE (Left, 2019); and Upper gastrointestinal endoscopy (N/A, 2020). SOCIAL HISTORY:   reports that he has never smoked. He has never used smokeless tobacco. He reports previous alcohol use. He reports that he does not use drugs. Vital signs: as in [facility]  Patient was not examined because of COVID 19 precautions. As per nurses his examination was normal  Patient does get up and walk around. Patient is awake  Does not appear to be in any distress  Breathing normal        Assessment:  . Anemia, could be multifactorial. Chronic loss, CKD  . Dementia on Exelon  . Chronic kidney disease stage III  . Atrial fibrillation on amiodarone  . Hyperlipidemia on atorvastatin  . BPH on finasteride  . Congestive heart failure  . Constipation on MiraLAX  . History of kidney stones           Plan:  Patient is anemic. H&H has improved  Discussed with patient daughter  Will keep patient off of Eliquis because of chronic blood loss  Medication were reviewed. Continue current treatment.   Discussed with patient about fall precautions  Patient has follow-up appointment with gastroenterologist.

## 2020-10-23 ENCOUNTER — OUTSIDE SERVICES (OUTPATIENT)
Dept: INTERNAL MEDICINE CLINIC | Age: 85
End: 2020-10-23
Payer: MEDICARE

## 2020-10-24 NOTE — PROGRESS NOTES
Progress note  ___________________________    Neena Muller's  is 10/5/1933  SEEN ON 10/23/2020 at [address]  ___________________________    S:  Patient is seen today and discussed with the nurses. Patient has a history of, atrial fibrillation, chronic kidney disease, pacemaker. Native impairment  Patient was anemic therefore Eliquis was discontinued. Patient was referred to gastroenterologist Dr. Jani Oquendo for GI work-up. Patient has a follow-up appointment  No obvious bleeding noted. Patient also saw urologist.  Patient denies any chest pain or shortness of breath. No cough or sputum production. No nausea or vomiting. He is confused. No obvious bleeding noted by nurses    ALLERGIES:  Allergies   Allergen Reactions    Biaxin [Clarithromycin]     Cephalexin     Viagra [Sildenafil Citrate] Nausea Only and Other (See Comments)     Dizziness and BP dropped        PAST MEDICAL HISTORY:    has a past medical history of ACTA2-related familial thoracic aortic aneurysm, Arrhythmia, Atrial fibrillation (HCC), BBBB (bilateral bundle branch block), Bradycardia, CHF (congestive heart failure) ([facility] [address] Essential hypertension, malignant, History of cardioversion,  Hx of transesophageal echocardiography (SIMONE) for monitoring, Hyperlipemia, Hyperlipidemia, Hypertension,, Persistent atrial fibrillation , Sick sinus syndrome and Vertigo. PAST SURGICAL HISTORY:   has a past surgical history that includes Pacemaker insertion (Left, 2018); CYSTOSCOPY INSERTION / REMOVAL STENT / STONE (Left, 2019); and Upper gastrointestinal endoscopy (N/A, 2020). SOCIAL HISTORY:   reports that he has never smoked. He has never used smokeless tobacco. He reports previous alcohol use. He reports that he does not use drugs. Vital signs: as in [facility]  Patient was not examined because of COVID 19 precautions.   As per nurses his examination was normal  Patient does get up and walk around. He is confused as before  Patient is sitting in the chair. Appears not in acute distress. Assessment:  . Anemia, iron deficiency  . Dementia on Exelon  . Chronic kidney disease stage III  . Atrial fibrillation on amiodarone  . Hyperlipidemia on atorvastatin  . BPH on finasteride  . Congestive heart failure  . Constipation on MiraLAX  . History of kidney stones           Plan:  Patient is a stable  Medication were reviewed. Continue current treatment. Discussed with patient about fall precautions  Patient has follow-up appointment with gastroenterologist.  Will decide about Eliquis after GI follow-up.

## 2020-10-27 ENCOUNTER — HOSPITAL ENCOUNTER (OUTPATIENT)
Age: 85
Setting detail: SPECIMEN
Discharge: HOME | End: 2020-10-27
Payer: MEDICARE

## 2020-10-27 LAB
ALBUMIN SERPL-MCNC: 3.8 GM/DL (ref 3.4–5)
ALP BLD-CCNC: 89 IU/L (ref 40–128)
ALT SERPL-CCNC: 28 U/L (ref 10–40)
ANION GAP SERPL CALCULATED.3IONS-SCNC: 7 MMOL/L (ref 4–16)
AST SERPL-CCNC: 30 IU/L (ref 15–37)
BILIRUB SERPL-MCNC: 0.4 MG/DL (ref 0–1)
BUN BLDV-MCNC: 26 MG/DL (ref 6–23)
CALCIUM SERPL-MCNC: 9.3 MG/DL (ref 8.3–10.6)
CHLORIDE BLD-SCNC: 104 MMOL/L (ref 99–110)
CO2: 29 MMOL/L (ref 21–32)
CREAT SERPL-MCNC: 1.9 MG/DL (ref 0.9–1.3)
GFR AFRICAN AMERICAN: 41 ML/MIN/1.73M2
GFR NON-AFRICAN AMERICAN: 34 ML/MIN/1.73M2
GLUCOSE BLD-MCNC: 101 MG/DL (ref 70–99)
HCT VFR BLD CALC: 39.9 % (ref 42–52)
HEMOGLOBIN: 11.5 GM/DL (ref 13.5–18)
MCH RBC QN AUTO: 24.8 PG (ref 27–31)
MCHC RBC AUTO-ENTMCNC: 28.8 % (ref 32–36)
MCV RBC AUTO: 86 FL (ref 78–100)
PDW BLD-RTO: 16.6 % (ref 11.7–14.9)
PLATELET # BLD: 265 K/CU MM (ref 140–440)
PMV BLD AUTO: 10 FL (ref 7.5–11.1)
POTASSIUM SERPL-SCNC: 4.3 MMOL/L (ref 3.5–5.1)
RBC # BLD: 4.64 M/CU MM (ref 4.6–6.2)
SODIUM BLD-SCNC: 140 MMOL/L (ref 135–145)
TOTAL PROTEIN: 6.3 GM/DL (ref 6.4–8.2)
WBC # BLD: 7.5 K/CU MM (ref 4–10.5)

## 2020-10-27 PROCEDURE — 85027 COMPLETE CBC AUTOMATED: CPT

## 2020-10-27 PROCEDURE — 36415 COLL VENOUS BLD VENIPUNCTURE: CPT

## 2020-10-27 PROCEDURE — 80053 COMPREHEN METABOLIC PANEL: CPT

## 2020-11-11 ENCOUNTER — HOSPITAL ENCOUNTER (OUTPATIENT)
Age: 85
Setting detail: SPECIMEN
Discharge: HOME | End: 2020-11-11
Payer: MEDICARE

## 2020-11-11 PROCEDURE — 87077 CULTURE AEROBIC IDENTIFY: CPT

## 2020-11-11 PROCEDURE — 87186 SC STD MICRODIL/AGAR DIL: CPT

## 2020-11-11 PROCEDURE — 87088 URINE BACTERIA CULTURE: CPT

## 2020-11-11 PROCEDURE — 87086 URINE CULTURE/COLONY COUNT: CPT

## 2020-11-11 PROCEDURE — 81001 URINALYSIS AUTO W/SCOPE: CPT

## 2020-11-12 LAB
BACTERIA: ABNORMAL /HPF
BILIRUBIN URINE: NEGATIVE MG/DL
BLOOD, URINE: ABNORMAL
CLARITY: ABNORMAL
COLOR: ABNORMAL
GLUCOSE, URINE: NEGATIVE MG/DL
KETONES, URINE: NEGATIVE MG/DL
LEUKOCYTE ESTERASE, URINE: ABNORMAL
NITRITE URINE, QUANTITATIVE: NEGATIVE
PH, URINE: 6 (ref 5–8)
PROTEIN UA: 100 MG/DL
RBC URINE: 80 /HPF (ref 0–3)
SPECIFIC GRAVITY UA: 1.01 (ref 1–1.03)
TRICHOMONAS: ABNORMAL /HPF
UROBILINOGEN, URINE: NORMAL MG/DL (ref 0.2–1)
WBC UA: 3300 /HPF (ref 0–2)

## 2020-11-14 LAB
CULTURE: ABNORMAL
CULTURE: ABNORMAL
Lab: ABNORMAL
SPECIMEN: ABNORMAL

## 2020-11-16 ENCOUNTER — OFFICE VISIT (OUTPATIENT)
Dept: CARDIOLOGY CLINIC | Age: 85
End: 2020-11-16
Payer: MEDICARE

## 2020-11-16 VITALS
SYSTOLIC BLOOD PRESSURE: 132 MMHG | DIASTOLIC BLOOD PRESSURE: 60 MMHG | WEIGHT: 181.4 LBS | RESPIRATION RATE: 12 BRPM | HEIGHT: 70 IN | BODY MASS INDEX: 25.97 KG/M2 | HEART RATE: 84 BPM | TEMPERATURE: 96.4 F

## 2020-11-16 PROCEDURE — 99212 OFFICE O/P EST SF 10 MIN: CPT | Performed by: NURSE PRACTITIONER

## 2020-11-16 PROCEDURE — 1036F TOBACCO NON-USER: CPT | Performed by: NURSE PRACTITIONER

## 2020-11-16 PROCEDURE — G8427 DOCREV CUR MEDS BY ELIG CLIN: HCPCS | Performed by: NURSE PRACTITIONER

## 2020-11-16 PROCEDURE — 4040F PNEUMOC VAC/ADMIN/RCVD: CPT | Performed by: NURSE PRACTITIONER

## 2020-11-16 PROCEDURE — G8484 FLU IMMUNIZE NO ADMIN: HCPCS | Performed by: NURSE PRACTITIONER

## 2020-11-16 PROCEDURE — 1123F ACP DISCUSS/DSCN MKR DOCD: CPT | Performed by: NURSE PRACTITIONER

## 2020-11-16 PROCEDURE — G8417 CALC BMI ABV UP PARAM F/U: HCPCS | Performed by: NURSE PRACTITIONER

## 2020-11-16 RX ORDER — ASPIRIN 81 MG/1
81 TABLET ORAL DAILY
COMMUNITY

## 2020-11-16 ASSESSMENT — ENCOUNTER SYMPTOMS
ABDOMINAL PAIN: 0
COLOR CHANGE: 0
DIARRHEA: 0
CONSTIPATION: 0
COUGH: 0
BLOOD IN STOOL: 0
NAUSEA: 0
SINUS PAIN: 0
SHORTNESS OF BREATH: 0
PHOTOPHOBIA: 0
VOMITING: 0

## 2020-11-16 NOTE — PROGRESS NOTES
moderate amount of house/yard work? No, he is walking daily. Sometime walks 30-40 minutes at a time. NYHA Class:    Class II   Patients with slight, mild limitation of activity; they are comfortable with rest or with mild exertion. Sodium Restrictions: 2g  Fluid Restrictions: 48-64 oz/day  Sodium and fluid restriction compliance: fair, lives in skilled nursing facility and does not prepare his own meals. Past Medical History:   Diagnosis Date    ACTA2-related familial thoracic aortic aneurysm     Arrhythmia     Atrial fibrillation (HCC)     BBBB (bilateral bundle branch block)     Bradycardia     CHF (congestive heart failure) ([address]     Essential hypertension, malignant     Fall at home     H/O echocardiogram 07/01/2020    EF 50-55%, Mod-Severe AS, Mild PHTN, Aortic Root 3.9cm.(pt had OV after echo)    History of cardioversion 04/17/2018    History of chest x-ray 02/27/2017    Enlargement of the aorta knob which may represent a thoracic aortic aneurysm. Further evaluation w/ a contrast enhanced CT of the chest recommended. no evidence of acute pulmonary process. 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 Recommend thyroid US.  History of echocardiogram 03/27/2017    TTE EF 55%, LV Normal Size, borderline LVH concentric, Normal LV systolic function, transmittal doppler flow pttern suggest impaired LV relaxation Mild aortic stenosis, mild aortic regurgitation.  History of EKG 02/27/2017    Time 12:03 AM, HR 75, A fib, Axis normal, Intervals normal, P waves normal, St-T Segments: nonspecific ST segment changes to the anterior lateral leads, QRS RBBB,  No significant Q waves, no ectopy.  A-fib w/RBBB w/nonspecific ST segment change EKG.  History of EKG 02/27/2017    Time 2:58AM: HR 59, NSR, Axis normal, Intervals normal, P waves normal, ST-T seg: nonspecific ST segment changes, QRS RBBB, no significant Q waves, no ectopy. Sinus bradycardia w/ RBBB nonspecific ST Segment changes EKG    History of Holter monitoring 11/08/2017    monitored 48 hours: Patient noted to have multiple PAC and PVCs. Risk of atrial fibrillation present given previous episode Recommend antiarrhythmic therapy.  History of stress test 03/27/2017    NM Stress test: EF 54%, Abnormal study, evidence of mild ischemia in mild inferior regions. post stress LV is enlarged in size. Post-stress LV function is normal.     Hx of transesophageal echocardiography (SIMONE) for monitoring 04/17/2018    EF45-50% mild TR, mild-mod MR, mod-severe AS    Hyperlipemia     Hyperlipidemia     Hypertension     Nonspecific abnormal electrocardiogram (ECG) (EKG)     Persistent atrial fibrillation (HCC)     Sick sinus syndrome (HCC)     Vertigo      Past Surgical History:   Procedure Laterality Date    CYSTOSCOPY INSERTION / REMOVAL STENT / STONE Left 5/6/2019    CYSTOSCOPY RETROGRADE PYELOGRAM STENT INSERTION, DIAGNOSTIC CYSTOSCOPY performed by Bobby Mancia MD at [address] [facility] Left 03/29/2018    Dual Chamber [employer] Aura XT DR AMI Kincaid    UPPER GASTROINTESTINAL ENDOSCOPY N/A 8/20/2020    EGD DIAGNOSTIC ONLY performed by Rylee Grullon MD at [address] [facility] ENDOSCOPY     No family history on file.   Social History     Tobacco Use    Smoking status: Never Smoker    Smokeless tobacco: Never Used   Substance Use Topics    Alcohol use: Not Currently     Alcohol/week: 0.0 standard drinks     Current Outpatient Medications   Medication Sig Dispense Refill    aspirin 81 MG EC tablet Take 81 mg by mouth daily      pantoprazole (PROTONIX) 40 MG tablet Take 40 mg by mouth daily      Carboxymethylcellulose Sodium (ARTIFICIAL TEARS OP) Apply 1 drop to eye as needed      ferrous sulfate (IRON 325) 325 (65 Fe) MG tablet Take 325 mg by mouth daily (with breakfast)      furosemide (LASIX) 20 MG tablet Take 1 tablet by mouth daily as needed (lower leg edema) Take 20 mg of oral lasix at evening, for a total of 40 mg of oral lasix at the evening dose  for the next 3 days.  30 tablet 0    spironolactone (ALDACTONE) 25 MG tablet Take 25 mg by mouth daily      Elastic Bandages & Supports (JOBST KNEE HIGH COMPRESSION ) MISC 1 each by Does not apply route daily Knee high compression  Apply upon waking in the morning and remove at bedtime 1 each 0    polyethylene glycol (GLYCOLAX) powder Take 17 g by mouth daily      Polyvinyl Alcohol-Povidone (REFRESH OP) Apply 1 drop to eye as needed For dry eyes      acetaminophen (TYLENOL) 325 MG tablet Take 650 mg by mouth every 6 hours as needed for Pain For pain or temp      metoprolol tartrate (LOPRESSOR) 25 MG tablet Take 0.5 tablets by mouth 2 times daily (Patient taking differently: Take 12.5 mg by mouth 2 times daily Hold if SBP < 110 or HR is < 55) 30 tablet 0    docusate sodium (COLACE, DULCOLAX) 100 MG CAPS Take 100 mg by mouth 2 times daily as needed for Constipation 30 capsule 0    finasteride (PROSCAR) 5 MG tablet Take 1 tablet by mouth daily 30 tablet 0    amiodarone (CORDARONE) 200 MG tablet Take 1 tablet by mouth daily (Patient taking differently: Take 200 mg by mouth nightly ) 30 tablet 3    rivastigmine (EXELON) 4.6 MG/24HR Place 1 patch onto the skin daily       Cholecalciferol (VITAMIN D3) 5000 units TABS Take 5,000 Units by mouth every morning      apixaban (ELIQUIS) 2.5 MG TABS tablet Take 1 tablet by mouth 2 times daily 60 tablet 3    Omega-3 Fatty Acids (FISH OIL) 1000 MG CAPS Take 1,000 mg by mouth daily       Multiple Vitamins-Minerals (THERAPEUTIC MULTIVITAMIN-MINERALS) tablet Take 1 tablet by mouth daily      atorvastatin (LIPITOR) 20 MG tablet Take 20 mg by mouth nightly        No current facility-administered medications for this visit. Allergies   Allergen Reactions    Biaxin [Clarithromycin]     Cephalexin     Viagra [Sildenafil Citrate] Nausea Only and Other (See Comments)     Dizziness and BP dropped       SUBJECTIVE:   Review of Systems   Constitutional: Negative for fatigue and fever. HENT: Negative for ear pain and sinus pain. Eyes: Negative for photophobia and visual disturbance. Respiratory: Negative for cough and shortness of breath. Cardiovascular: Negative for chest pain, palpitations and leg swelling. Gastrointestinal: Negative for abdominal pain, blood in stool, constipation, diarrhea, nausea and vomiting. Endocrine: Negative for polyphagia and polyuria. Genitourinary: Negative for decreased urine volume and difficulty urinating. Musculoskeletal: Positive for gait problem (using walker for balance and safty). Negative for arthralgias. Skin: Negative for color change and wound. Neurological: Negative for dizziness, syncope, weakness, light-headedness and headaches. Psychiatric/Behavioral: Negative for agitation. The patient is not hyperactive. OBJECTIVE:   Today's Vitals:  /60   Pulse 84   Temp 96.4 °F (35.8 °C)   Resp 12   Ht 5' 10\" (1.778 m)   Wt 181 lb 6.4 oz (82.3 kg)   BMI 26.03 kg/m²     Wt Readings from Last 3 Encounters:   11/16/20 181 lb 6.4 oz (82.3 kg)   08/21/20 182 lb (82.6 kg)   08/12/20 183 lb 4.8 oz (83.1 kg)     BP Readings from Last 3 Encounters:   11/16/20 132/60   08/21/20 (!) 111/56   08/20/20 (!) 123/54     Pulse Readings from Last 3 Encounters:   11/16/20 84   08/21/20 61   08/12/20 70     Body mass index is 26.03 kg/m². Physical Exam  Vitals signs reviewed. Exam conducted with a chaperone present (Son). Constitutional:       General: He is not in acute distress. Appearance: Normal appearance. He is not ill-appearing. HENT:      Head: Normocephalic and atraumatic.    Eyes: Conjunctiva/sclera: Conjunctivae normal.      Pupils: Pupils are equal, round, and reactive to light. Neck:      Musculoskeletal: Neck supple. No muscular tenderness. Vascular: No carotid bruit. Cardiovascular:      Rate and Rhythm: Normal rate and regular rhythm. Pulses: Normal pulses. Heart sounds: Murmur present. Pulmonary:      Effort: Pulmonary effort is normal. No respiratory distress. Breath sounds: Normal breath sounds. Musculoskeletal:         General: No swelling or deformity. Skin:     General: Skin is warm and dry. Capillary Refill: Capillary refill takes less than 2 seconds. Neurological:      Mental Status: He is alert and oriented to person, place, and time. Psychiatric:         Mood and Affect: Mood is depressed. Behavior: Behavior normal.         Thought Content: Thought content normal.         Judgment: Judgment normal.       6 minute walk test:  Time walked: not performed today  Distance walked: not performed today  Required Oxygen No    Most recent ECHO:   5/2019  Left ventricular function is normal, EF is estimated at 50-55%.   Moderate left ventricular hypertrophy.   Infero-posterior wall segments are hypokinetic.   Moderate aortic regurgitation is noted ( ms).   No evidence of pericardial effusion.     Results reviewed:  BNP:   Lab Results   Component Value Date    PROBNP 1,672 (H) 08/18/2020     CBC:   Lab Results   Component Value Date    WBC 7.5 10/27/2020    RBC 4.64 10/27/2020    HGB 11.5 10/27/2020    HCT 39.9 10/27/2020     10/27/2020     CMP:    Lab Results   Component Value Date     10/27/2020    K 4.3 10/27/2020     10/27/2020    CO2 29 10/27/2020    BUN 26 10/27/2020    CREATININE 1.9 10/27/2020    GFRAA 41 10/27/2020    LABGLOM 34 10/27/2020    GLUCOSE 101 10/27/2020    CALCIUM 9.3 10/27/2020     Hepatic Function Panel:    Lab Results   Component Value Date    ALKPHOS 89 10/27/2020    ALT 28 10/27/2020    AST 30 10/27/2020    PROT 6.3 10/27/2020    BILITOT 0.4 10/27/2020    BILIDIR 0.2 06/30/2020    IBILI 0.3 06/30/2020    LABALBU 3.8 10/27/2020     Magnesium:    Lab Results   Component Value Date    MG 2.3 06/16/2020     PT/INR:    Lab Results   Component Value Date    PROTIME 14.7 08/19/2020    INR 1.21 08/19/2020     Lipids:    Lab Results   Component Value Date    TRIG 72 05/24/2019    HDL 47 05/24/2019    LDLDIRECT 86 05/24/2019       Iron Studies:   Iron Deficiency Anemia:  Yes IV Iron Therapy:  Yes  2017 ACC/AHA HF Guidelines:   intravenous iron replacement in patients with New York Heart Association (NYHA) class II and III HF and iron deficiency (ferritin <100 ng/ml or 100-300 ng/ml if transferrin saturation <20%), to improve functional status and QoL. ASSESSMENT AND PLAN:     Chronic Diastolic Congestive Heart Failure    Symptoms have improved from previous office visit. I believe patient weight gain is now fluid related more actual fat increase. Weight is down from last visit 2 pounds. Patient has decreased use of sodium and coffee and he has a increased mobility without shortness of breath. Patient states that he is able to walk every day sometimes 30 to 40 minutes at a time. Primary concern is decreased hearing and increasing age. Patient does appear to be depressed. Patient is ejection fraction is 55% per last echocardiogram continue beta-blocker, Aldactone, and Lasix. Discussed with patient goal of care is to maintain current quality of life. Patient does feel that he is having as high quality as he can have with quarantine restrictions and COVID-19 pandemic in place.        · Continue with daily weights- to bring in records on each office visit  · Fluid restriction of 2 Liters per day  · Limit sodium in diet to around 2543-8796 mg/day  · Monitor BP at home- to bring in records on each office visit   · Activity as tolerated   · Continue present medical management      Patient was instructed to call the Heart Failure Clinic for changes in the following symptoms:    Weight gain of 3 pounds in 1 day or 5 pounds in 1 week   Increased shortness of breath   Shortness of breath while laying down   Cough   Chest pain   Swelling in feet, ankles or legs   Tenderness or bloating in the abdomen   Fatigue    Decreased appetite or nausea    Confusion     Patient to follow-up in the heart failure clinic in 5 months, as patient has follow-up visit with Dr. Kamaljit Fuller in January. Patient given educational materials - see patient instructions. We discussed the importance of weighing oneself and recording daily. We also discussed the importance of a lowsodium diet, higher sodium foods to avoid and better low sodium food options. Discussed use, benefit, and side effects of prescribed medications. All patient questions answered. Patient verbalizes understanding of plan of care using teach back method, and is agreeable to the treatment plan.      Copy of note to be sent to consulting provider and primary cardiologist     Electronicallysigned by CARI Worley CNP on 11/16/2020 at 11:08 AM

## 2020-11-24 ENCOUNTER — HOSPITAL ENCOUNTER (OUTPATIENT)
Age: 85
Setting detail: SPECIMEN
Discharge: HOME | End: 2020-11-24
Payer: MEDICARE

## 2020-11-24 LAB
ALBUMIN SERPL-MCNC: 3.4 GM/DL (ref 3.4–5)
ALP BLD-CCNC: 82 IU/L (ref 40–129)
ALT SERPL-CCNC: 35 U/L (ref 10–40)
ANION GAP SERPL CALCULATED.3IONS-SCNC: 8 MMOL/L (ref 4–16)
AST SERPL-CCNC: 37 IU/L (ref 15–37)
BILIRUB SERPL-MCNC: 0.3 MG/DL (ref 0–1)
BILIRUBIN DIRECT: 0.2 MG/DL (ref 0–0.3)
BILIRUBIN, INDIRECT: 0.1 MG/DL (ref 0–0.7)
BUN BLDV-MCNC: 26 MG/DL (ref 6–23)
CALCIUM SERPL-MCNC: 8.7 MG/DL (ref 8.3–10.6)
CHLORIDE BLD-SCNC: 99 MMOL/L (ref 99–110)
CO2: 24 MMOL/L (ref 21–32)
CREAT SERPL-MCNC: 2.2 MG/DL (ref 0.9–1.3)
GFR AFRICAN AMERICAN: 34 ML/MIN/1.73M2
GFR NON-AFRICAN AMERICAN: 28 ML/MIN/1.73M2
GLUCOSE BLD-MCNC: 74 MG/DL (ref 70–99)
HCT VFR BLD CALC: 38.8 % (ref 42–52)
HEMOGLOBIN: 12 GM/DL (ref 13.5–18)
MCH RBC QN AUTO: 26.1 PG (ref 27–31)
MCHC RBC AUTO-ENTMCNC: 30.9 % (ref 32–36)
MCV RBC AUTO: 84.3 FL (ref 78–100)
PDW BLD-RTO: 17.9 % (ref 11.7–14.9)
PLATELET # BLD: 237 K/CU MM (ref 140–440)
PMV BLD AUTO: 10 FL (ref 7.5–11.1)
POTASSIUM SERPL-SCNC: 4.8 MMOL/L (ref 3.5–5.1)
RBC # BLD: 4.6 M/CU MM (ref 4.6–6.2)
SODIUM BLD-SCNC: 131 MMOL/L (ref 135–145)
TOTAL PROTEIN: 5.9 GM/DL (ref 6.4–8.2)
TSH HIGH SENSITIVITY: 2.34 UIU/ML (ref 0.27–4.2)
WBC # BLD: 6.9 K/CU MM (ref 4–10.5)

## 2020-11-24 PROCEDURE — 85027 COMPLETE CBC AUTOMATED: CPT

## 2020-11-24 PROCEDURE — 82248 BILIRUBIN DIRECT: CPT

## 2020-11-24 PROCEDURE — 80053 COMPREHEN METABOLIC PANEL: CPT

## 2020-11-24 PROCEDURE — 84443 ASSAY THYROID STIM HORMONE: CPT

## 2020-11-24 PROCEDURE — 36415 COLL VENOUS BLD VENIPUNCTURE: CPT

## 2020-11-27 ENCOUNTER — OUTSIDE SERVICES (OUTPATIENT)
Dept: INTERNAL MEDICINE CLINIC | Age: 85
End: 2020-11-27
Payer: MEDICARE

## 2020-11-28 NOTE — PROGRESS NOTES
Progress note  ___________________________    Glenda Glasgowt's  is 10/5/1933  SEEN ON 2020 at [address]  ___________________________    S:    Patient is seen today and discussed with the nurses. Patient has a history of, atrial fibrillation, chronic kidney disease, pacemaker. Cognitive impairment  Patient was anemic therefore he he was referred to gastroenterologist.  Dr. Percy Santiago saw him. He fell patient has iron deficiency anemia. Eliquis was on hold because of anemia. H&H has improved since then. As per his notes Dr. Percy Santiago  did not do any GI work-up as per family's request.  Pt is off of ELiquis  Patient denies any chest pain or shortness of breath. No cough or sputum production. No nausea or vomiting. He is confused. No obvious bleeding noted by nurses    ALLERGIES:  Allergies   Allergen Reactions    Biaxin [Clarithromycin]     Cephalexin     Viagra [Sildenafil Citrate] Nausea Only and Other (See Comments)     Dizziness and BP dropped        PAST MEDICAL HISTORY:    has a past medical history of ACTA2-related familial thoracic aortic aneurysm, Arrhythmia, Atrial fibrillation (HCC), BBBB (bilateral bundle branch block), Bradycardia, CHF (congestive heart failure) ([facility] [address] Essential hypertension, malignant, History of cardioversion,  Hx of transesophageal echocardiography (SIMONE) for monitoring, Hyperlipemia, Hyperlipidemia, Hypertension,, Persistent atrial fibrillation , Sick sinus syndrome and Vertigo. PAST SURGICAL HISTORY:   has a past surgical history that includes Pacemaker insertion (Left, 2018); CYSTOSCOPY INSERTION / REMOVAL STENT / STONE (Left, 2019); and Upper gastrointestinal endoscopy (N/A, 2020). SOCIAL HISTORY:   reports that he has never smoked. He has never used smokeless tobacco. He reports previous alcohol use. He reports that he does not use drugs.        Vital signs: as in [facility]  Patient was not examined because of COVID 19 precautions. He is sitting in the chair. As per nurses his examination was normal  He is confused as before        Assessment:  . Anemia, iron deficiency  . Dementia on Exelon  . Chronic kidney disease stage III  . Atrial fibrillation on amiodarone  . Hyperlipidemia on atorvastatin  . BPH on finasteride  . Congestive heart failure  . Constipation on MiraLAX  . History of kidney stones           Plan:  Dr Harrison Macias note reviewed  He feels pt has anemia could be chronic blood loss or multifactorial.  H/H is stable now  Eliquis on hold and there fore will d/c it  Continue ASA . Nurse to inform the patient's daughter. Medication were reviewed. Continue current treatment.   Discussed with patient about fall precautions  Patient has follow-up appointment with gastroenterologist.

## 2020-12-01 ENCOUNTER — TELEPHONE (OUTPATIENT)
Dept: CARDIOLOGY CLINIC | Age: 85
End: 2020-12-01

## 2020-12-01 NOTE — TELEPHONE ENCOUNTER
[facility] to schedule CHF clinic follow up. No answer. LM to call back to schedule patient in March.

## 2020-12-22 ENCOUNTER — HOSPITAL ENCOUNTER (OUTPATIENT)
Age: 85
Setting detail: SPECIMEN
Discharge: HOME | End: 2020-12-22
Payer: MEDICARE

## 2020-12-22 LAB
ANION GAP SERPL CALCULATED.3IONS-SCNC: 10 MMOL/L (ref 4–16)
BUN BLDV-MCNC: 23 MG/DL (ref 6–23)
CALCIUM SERPL-MCNC: 9 MG/DL (ref 8.3–10.6)
CHLORIDE BLD-SCNC: 99 MMOL/L (ref 99–110)
CO2: 29 MMOL/L (ref 21–32)
CREAT SERPL-MCNC: 1.7 MG/DL (ref 0.9–1.3)
GFR AFRICAN AMERICAN: 46 ML/MIN/1.73M2
GFR NON-AFRICAN AMERICAN: 38 ML/MIN/1.73M2
GLUCOSE BLD-MCNC: 77 MG/DL (ref 70–99)
HCT VFR BLD CALC: 42.5 % (ref 42–52)
HEMOGLOBIN: 13.1 GM/DL (ref 13.5–18)
MCH RBC QN AUTO: 26.5 PG (ref 27–31)
MCHC RBC AUTO-ENTMCNC: 30.8 % (ref 32–36)
MCV RBC AUTO: 86 FL (ref 78–100)
PDW BLD-RTO: 19 % (ref 11.7–14.9)
PLATELET # BLD: 264 K/CU MM (ref 140–440)
PMV BLD AUTO: 10.7 FL (ref 7.5–11.1)
POTASSIUM SERPL-SCNC: 4 MMOL/L (ref 3.5–5.1)
RBC # BLD: 4.94 M/CU MM (ref 4.6–6.2)
SODIUM BLD-SCNC: 138 MMOL/L (ref 135–145)
WBC # BLD: 8.9 K/CU MM (ref 4–10.5)

## 2020-12-22 PROCEDURE — 80048 BASIC METABOLIC PNL TOTAL CA: CPT

## 2020-12-22 PROCEDURE — 85027 COMPLETE CBC AUTOMATED: CPT

## 2020-12-22 PROCEDURE — 36415 COLL VENOUS BLD VENIPUNCTURE: CPT

## 2020-12-23 ENCOUNTER — OUTSIDE SERVICES (OUTPATIENT)
Dept: INTERNAL MEDICINE CLINIC | Age: 85
End: 2020-12-23
Payer: MEDICARE

## 2020-12-23 DIAGNOSIS — Z95.0 PACEMAKER: ICD-10-CM

## 2020-12-23 DIAGNOSIS — I48.19 PERSISTENT ATRIAL FIBRILLATION (HCC): ICD-10-CM

## 2020-12-23 DIAGNOSIS — Z95.0 S/P PLACEMENT OF CARDIAC PACEMAKER: ICD-10-CM

## 2020-12-23 DIAGNOSIS — N20.0 KIDNEY STONE: ICD-10-CM

## 2020-12-23 DIAGNOSIS — I50.43 CHF (CONGESTIVE HEART FAILURE), NYHA CLASS I, ACUTE ON CHRONIC, COMBINED (HCC): ICD-10-CM

## 2020-12-23 DIAGNOSIS — F03.90 DEMENTIA WITHOUT BEHAVIORAL DISTURBANCE, UNSPECIFIED DEMENTIA TYPE: Primary | ICD-10-CM

## 2020-12-24 NOTE — PROGRESS NOTES
Progress note  ___________________________    Nannette Muller's  is 10/5/1933  SEEN ON 2020 at [address]  ___________________________    S:    Patient is seen today and discussed with the nurses. Patient has a history of, atrial fibrillation, chronic kidney disease, pacemaker. Cognitive impairment  Nurses report no major problems  Patient has anemia GI work-up not done by Dr. Caleb Doan. Patient is a CT scan of the abdomen and pelvis on [facility]10/[facility]. It showed diverticulosis, gallstones, bilateral renal calculi nonobstructing and renal cysts. There was 6 cm right posterior lateral bladder wall diverticulum containing bladder stones. There was a abdominal aortic aneurysm 3.8 x 3.7 cm. Patient also had osteoporosis    ALLERGIES:  Allergies   Allergen Reactions    Biaxin [Clarithromycin]     Cephalexin     Viagra [Sildenafil Citrate] Nausea Only and Other (See Comments)     Dizziness and BP dropped        PAST MEDICAL HISTORY:    has a past medical history of ACTA2-related familial thoracic aortic aneurysm, Arrhythmia, Atrial fibrillation (HCC), BBBB (bilateral bundle branch block), Bradycardia, CHF (congestive heart failure) ([address] Essential hypertension, malignant, History of cardioversion,  Hx of transesophageal echocardiography (SIMONE) for monitoring, Hyperlipemia, Hyperlipidemia, Hypertension,, Persistent atrial fibrillation , Sick sinus syndrome and Vertigo. PAST SURGICAL HISTORY:   has a past surgical history that includes Pacemaker insertion (Left, 2018); CYSTOSCOPY INSERTION / REMOVAL STENT / STONE (Left, 2019); and Upper gastrointestinal endoscopy (N/A, 2020). SOCIAL HISTORY:   reports that he has never smoked. He has never used smokeless tobacco. He reports previous alcohol use. He reports that he does not use drugs.        Vital signs: as in [facility] Patient was not examined because of COVID 19 precautions. As per nurses his examination was normal  He is confused as before        Assessment:  .  Dementia on Exelon  . Anemia stable. GI work-up not done per family. Seeing Dr. Asim Chatterjee  . Chronic kidney disease stage III  . Atrial fibrillation on amiodarone  . Sick sinus syndrome status post pacemaker: 2018  . Hyperlipidemia on atorvastatin  . BPH on finasteride  . Congestive heart failure  . Constipation on MiraLAX  . History of kidney stones  . Abdominal aortic aneurysm 3.8 x 3.7 cm  . Bilateral renal calculi nonobstructing/bladder diverticulum containing stones  . Gallstones           Plan:    Nurses report no problems.   Eliquis was discontinued  Patient is on aspirin  12/22/2020: BUN 23 creatinine 1.7

## 2020-12-27 PROCEDURE — 93296 REM INTERROG EVL PM/IDS: CPT | Performed by: INTERNAL MEDICINE

## 2020-12-27 PROCEDURE — 93294 REM INTERROG EVL PM/LDLS PM: CPT | Performed by: INTERNAL MEDICINE

## 2021-01-05 ENCOUNTER — OFFICE VISIT (OUTPATIENT)
Dept: CARDIOLOGY CLINIC | Age: 86
End: 2021-01-05
Payer: MEDICARE

## 2021-01-05 VITALS
WEIGHT: 179.6 LBS | HEART RATE: 70 BPM | DIASTOLIC BLOOD PRESSURE: 84 MMHG | BODY MASS INDEX: 25.71 KG/M2 | SYSTOLIC BLOOD PRESSURE: 130 MMHG | HEIGHT: 70 IN

## 2021-01-05 DIAGNOSIS — I50.43 CHF (CONGESTIVE HEART FAILURE), NYHA CLASS I, ACUTE ON CHRONIC, COMBINED (HCC): ICD-10-CM

## 2021-01-05 DIAGNOSIS — I71.21 ASCENDING AORTIC ANEURYSM: ICD-10-CM

## 2021-01-05 DIAGNOSIS — Z95.0 S/P PLACEMENT OF CARDIAC PACEMAKER: ICD-10-CM

## 2021-01-05 DIAGNOSIS — I48.19 PERSISTENT ATRIAL FIBRILLATION (HCC): Primary | ICD-10-CM

## 2021-01-05 PROBLEM — I95.1 ORTHOSTATIC HYPOTENSION: Status: RESOLVED | Noted: 2019-06-14 | Resolved: 2021-01-05

## 2021-01-05 PROCEDURE — 1123F ACP DISCUSS/DSCN MKR DOCD: CPT | Performed by: INTERNAL MEDICINE

## 2021-01-05 PROCEDURE — 4040F PNEUMOC VAC/ADMIN/RCVD: CPT | Performed by: INTERNAL MEDICINE

## 2021-01-05 PROCEDURE — 1036F TOBACCO NON-USER: CPT | Performed by: INTERNAL MEDICINE

## 2021-01-05 PROCEDURE — G8484 FLU IMMUNIZE NO ADMIN: HCPCS | Performed by: INTERNAL MEDICINE

## 2021-01-05 PROCEDURE — G8417 CALC BMI ABV UP PARAM F/U: HCPCS | Performed by: INTERNAL MEDICINE

## 2021-01-05 PROCEDURE — 99214 OFFICE O/P EST MOD 30 MIN: CPT | Performed by: INTERNAL MEDICINE

## 2021-01-05 PROCEDURE — G8427 DOCREV CUR MEDS BY ELIG CLIN: HCPCS | Performed by: INTERNAL MEDICINE

## 2021-01-05 NOTE — PROGRESS NOTES
OFFICE PROGRESS NOTES      Yanely Green is a 80 y.o. male who has    CHIEF COMPLAINT AS FOLLOWS:  CHEST PAIN: Patient denies any C/O chest pains at this time. SOB: No C/O SOB at this time. LEG EDEMA: No leg edema   PALPITATIONS: Denies any C/O Palpitations   DIZZINESS: No C/O Dizziness   SYNCOPE: None   OTHER:                                     HPI: Patient is here for F/U on his VHD,CHF & A-fib problems. He does not have any complaints at this time.     Lyle Najera has the following history recorded in care path:  Patient Active Problem List    Diagnosis Date Noted    Persistent atrial fibrillation ([facility]      Priority: High    S/P placement of cardiac pacemaker 03/29/2018     Priority: High    Acute on chronic anemia 07/02/2020    Benign prostatic hyperplasia without lower urinary tract symptoms 07/02/2020    Dementia without behavioral disturbance ([facility] 04/26/2020    Pacemaker 09/04/2019    Hematuria 08/21/2019    Chronic kidney disease (CKD) stage G3a/A3, moderately decreased glomerular filtration rate (GFR) between 45-59 mL/min/1.73 square meter and albuminuria creatinine ratio greater than 300 mg/g 06/14/2019    Atrial fibrillation (HCC) 06/14/2019    Orthostatic hypotension 06/14/2019    Kidney stone 06/14/2019    Chronic diastolic congestive heart failure ([facility] 06/03/2019    CHF (congestive heart failure), NYHA class I, acute on chronic, combined ([facility] 05/23/2019    Ascending aortic aneurysm (HCC) 04/18/2018     Current Outpatient Medications   Medication Sig Dispense Refill    aspirin 81 MG EC tablet Take 81 mg by mouth daily      pantoprazole (PROTONIX) 40 MG tablet Take 40 mg by mouth daily      Carboxymethylcellulose Sodium (ARTIFICIAL TEARS OP) Apply 1 drop to eye as needed      ferrous sulfate (IRON 325) 325 (65 Fe) MG tablet Take 325 mg by mouth daily (with breakfast)      furosemide (LASIX) 20 MG Medical History:   Diagnosis Date    ACTA2-related familial thoracic aortic aneurysm     Arrhythmia     Atrial fibrillation (HCC)     BBBB (bilateral bundle branch block)     Bradycardia     CHF (congestive heart failure) ([address]     Essential hypertension, malignant     Fall at home     H/O echocardiogram 07/01/2020    EF 50-55%, Mod-Severe AS, Mild PHTN, Aortic Root 3.9cm.(pt had OV after echo)    History of cardioversion 04/17/2018    History of chest x-ray 02/27/2017    Enlargement of the aorta knob which may represent a thoracic aortic aneurysm. Further evaluation w/ a contrast enhanced CT of the chest recommended. no evidence of acute pulmonary process. 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 Recommend thyroid US.  History of echocardiogram 03/27/2017    TTE EF 55%, LV Normal Size, borderline LVH concentric, Normal LV systolic function, transmittal doppler flow pttern suggest impaired LV relaxation Mild aortic stenosis, mild aortic regurgitation.  History of EKG 02/27/2017    Time 12:03 AM, HR 75, A fib, Axis normal, Intervals normal, P waves normal, St-T Segments: nonspecific ST segment changes to the anterior lateral leads, QRS RBBB,  No significant Q waves, no ectopy. A-fib w/RBBB w/nonspecific ST segment change EKG.  History of EKG 02/27/2017    Time 2:58AM: HR 59, NSR, Axis normal, Intervals normal, P waves normal, ST-T seg: nonspecific ST segment changes, QRS RBBB, no significant Q waves, no ectopy. Sinus bradycardia w/ RBBB nonspecific ST Segment changes EKG    History of Holter monitoring 11/08/2017    monitored 48 hours: Patient noted to have multiple PAC and PVCs.  Risk of atrial fibrillation present given previous episode Recommend antiarrhythmic therapy.  History of stress test 03/27/2017    NM Stress test: EF 54%, Abnormal study, evidence of mild ischemia in mild inferior regions. post stress LV is enlarged in size. Post-stress LV function is normal.     Hx of transesophageal echocardiography (SIMONE) for monitoring 04/17/2018    EF45-50% mild TR, mild-mod MR, mod-severe AS    Hyperlipemia     Hyperlipidemia     Hypertension     Nonspecific abnormal electrocardiogram (ECG) (EKG)     Persistent atrial fibrillation (HCC)     Sick sinus syndrome (HCC)     Vertigo      Past Surgical History:   Procedure Laterality Date    CYSTOSCOPY INSERTION / REMOVAL STENT / STONE Left 5/6/2019    CYSTOSCOPY RETROGRADE PYELOGRAM STENT INSERTION, DIAGNOSTIC CYSTOSCOPY performed by Von Carvalho MD at [facility] Left 03/29/2018    Dual Chamber [employer] [place] XT DR AMI Kincaid    UPPER GASTROINTESTINAL ENDOSCOPY N/A 8/20/2020    EGD DIAGNOSTIC ONLY performed by Kayla Salas MD at [facility]      As reviewed History reviewed. No pertinent family history. Social History     Tobacco Use    Smoking status: Never Smoker    Smokeless tobacco: Never Used   Substance Use Topics    Alcohol use: Not Currently     Alcohol/week: 0.0 standard drinks      Review of Systems:    Constitutional: Negative for diaphoresis and fatigue  Psychological:Negative for anxiety or depression  HENT: Negative for headaches, nasal congestion, sinus pain or vertigo  Eyes: Negative for visual disturbance.    Endocrine: Negative for polydipsia/polyuria  Respiratory: Negative for shortness of breath  Cardiovascular: Negative for chest pain, dyspnea on exertion, claudication, edema, irregular heartbeat, murmur, palpitations or shortness of breath  Gastrointestinal: Negative for abdominal pain or heartburn  Genito-Urinary: Negative for urinary frequency/urgency  Musculoskeletal: Negative for muscle pain, muscular weakness, negative for pain in arm and leg or swelling in foot and leg  Neurological: Negative for dizziness, headaches, memory loss, numbness/tingling, visual changes, syncope  Dermatological: Negative for rash    Objective:  /84   Pulse 70   Ht 5' 10\" (1.778 m)   Wt 179 lb 9.6 oz (81.5 kg)   BMI 25.77 kg/m²   Wt Readings from Last 3 Encounters:   01/05/21 179 lb 9.6 oz (81.5 kg)   11/16/20 181 lb 6.4 oz (82.3 kg)   08/21/20 182 lb (82.6 kg)     Body mass index is 25.77 kg/m². Patient-Reported Vitals 6/19/2020   Patient-Reported Weight 182 lb   Patient-Reported Height 5 8       Vitals:    01/05/21 1355   BP: 130/84   Pulse: 70   Weight: 179 lb 9.6 oz (81.5 kg)   Height: 5' 10\" (1.778 m)      GENERAL - Alert, oriented, pleasant, in no apparent distress. EYES: No jaundice, no conjunctival pallor. SKIN: It is warm & dry. No rashes. No Echhymosis    HEENT - No clinically significant abnormalities seen. Neck - Supple. No jugular venous distention noted. No carotid bruits. Cardiovascular - Normal S1 and S2 with 2/6 ED murmur. Extremities - No cyanosis, clubbing, or significant edema. Pulmonary - No respiratory distress. No wheezes or rales. Abdomen - No masses, tenderness, or organomegaly. Musculoskeletal - No significant edema. No joint deformities. No muscle wasting. Neurologic - Cranial nerves II through XII are grossly intact. There were no gross focal neurologic abnormalities.     Lab Review   Lab Results   Component Value Date    CKTOTAL 35 08/18/2019    TROPONINT 0.027 08/19/2020    TROPONINT 0.036 08/19/2020     BNP:    Lab Results   Component Value Date    PROBNP 1,672 08/18/2020    PROBNP 1,102 02/14/2020     PT/INR:    Lab Results   Component Value Date    INR 1.21 08/19/2020    INR 1.71 09/11/2019     No results found for: LABA1C  Lab Results   Component Value Date    WBC 8.9 12/22/2020    WBC 6.9 11/24/2020    HCT 42.5 12/22/2020    HCT 38.8 (L) 11/24/2020    MCV 86.0 12/22/2020    MCV 84.3 11/24/2020     12/22/2020     11/24/2020     Lab Results   Component Value Date    CHOL 137 05/24/2019    TRIG 72 05/24/2019    HDL 47 05/24/2019    LDLDIRECT 86 05/24/2019     Lab Results   Component Value Date    ALT 35 11/24/2020    ALT 28 10/27/2020    AST 37 11/24/2020    AST 30 10/27/2020     BMP:    Lab Results   Component Value Date     12/22/2020     11/24/2020    K 4.0 12/22/2020    K 4.8 11/24/2020    CL 99 12/22/2020    CL 99 11/24/2020    CO2 29 12/22/2020    CO2 24 11/24/2020    BUN 23 12/22/2020    BUN 26 11/24/2020    CREATININE 1.7 12/22/2020    CREATININE 2.2 11/24/2020     CMP:   Lab Results   Component Value Date     12/22/2020     11/24/2020    K 4.0 12/22/2020    K 4.8 11/24/2020    CL 99 12/22/2020    CL 99 11/24/2020    CO2 29 12/22/2020    CO2 24 11/24/2020    BUN 23 12/22/2020    BUN 26 11/24/2020    CREATININE 1.7 12/22/2020    CREATININE 2.2 11/24/2020    PROT 5.9 11/24/2020    PROT 6.3 10/27/2020     TSH:    Lab Results   Component Value Date    TSHHS 2.340 11/24/2020    TSHHS 2.310 06/30/2020       QUALITY MEASURES REVIEWED:  1.CAD:Patient is taking anti platelet agent:Yes  2. DYSLIPIDEMIA: Patient is on cholesterol lowering medication:Yes  3. Beta-Blocker therapy for CAD, if prior Myocardial Infarction:Yes  4. Atrial fibrillation & anticoagulation therapy Yes    Impression:    No diagnosis found.    Patient Active Problem List   Diagnosis Code    S/P placement of cardiac pacemaker Z95.0    Persistent atrial fibrillation (HCC) I48.19    Ascending aortic aneurysm (HCC) I71.2    CHF (congestive heart failure), NYHA class I, acute on chronic, combined (HCC) I50.43    Chronic diastolic congestive heart failure (HCC) I50.32    Chronic kidney disease (CKD) stage G3a/A3, moderately decreased glomerular filtration rate (GFR) between 45-59 mL/min/1.73 square meter and albuminuria creatinine ratio greater than 300 mg/g N18.31   

## 2021-01-05 NOTE — PATIENT INSTRUCTIONS
CAD:None known  HTN:well controlled on current medical regimen, see list above. - changes in  treatment:   no   CARDIOMYOPATHY: None known   CONGESTIVE HEART FAILURE:  KNOWN HISTORY. Compensated      VHD: Moderate to severe AI  DYSLIPIDEMIA: Patient's profile is at / near [facility],                                                              See most recent Lab values in Labs section above. Diabetes mellitis: .no  ARRHYTHMIAS:  Known. Patient has H/O Atrial fibrillation                                He is rate controlled & on anticoagulation due to low Hgb. OTHER RELEVANT DIAGNOSIS:as noted in patient's active problem list:  TESTS ORDERED: None this visit                                     All previously ordered tests reviewed. MEDICATIONS: CPM   Office f/u in six months. Device check per protocol. Primary/secondary prevention is the goal by aggressive risk modification, healthy and therapeutic life style changes for cardiovascular risk reduction. Various goals are discussed and multiple questions answered.

## 2021-01-05 NOTE — LETTER
[facility] [address]  [address] [address]  Phone: [phone]  Fax: [phone]    Juan Spivey MD        January 5, 2021     [place] [id number]    Patient: Mortimer Richards  MR Number: [id number]  YOB: 1933  Date of Visit: 1/5/2021    Dear Dr. Billy Hill VO:    Thank you for the request for consultation for Roxie Mckeon to me for the evaluation of A-FIB. Below are the relevant portions of my assessment and plan of care. If you have questions, please do not hesitate to call me. I look forward to following Edel Waddell along with you.     Sincerely,        Juan Spivey MD

## 2021-01-06 ENCOUNTER — PROCEDURE VISIT (OUTPATIENT)
Dept: CARDIOLOGY CLINIC | Age: 86
End: 2021-01-06
Payer: MEDICARE

## 2021-01-06 DIAGNOSIS — Z95.0 CARDIAC PACEMAKER IN SITU: Primary | ICD-10-CM

## 2021-01-06 DIAGNOSIS — I49.5 SINUS NODE DYSFUNCTION (HCC): ICD-10-CM

## 2021-01-06 DIAGNOSIS — I44.0 AV BLOCK, 1ST DEGREE: ICD-10-CM

## 2021-01-26 ENCOUNTER — HOSPITAL ENCOUNTER (OUTPATIENT)
Age: 86
Setting detail: SPECIMEN
Discharge: HOME | End: 2021-01-26
Payer: MEDICARE

## 2021-01-26 LAB
ALBUMIN SERPL-MCNC: 3.6 GM/DL (ref 3.4–5)
ALP BLD-CCNC: 91 IU/L (ref 40–128)
ALT SERPL-CCNC: 32 U/L (ref 10–40)
ANION GAP SERPL CALCULATED.3IONS-SCNC: 11 MMOL/L (ref 4–16)
AST SERPL-CCNC: 31 IU/L (ref 15–37)
BILIRUB SERPL-MCNC: 0.7 MG/DL (ref 0–1)
BUN BLDV-MCNC: 25 MG/DL (ref 6–23)
CALCIUM SERPL-MCNC: 8.7 MG/DL (ref 8.3–10.6)
CHLORIDE BLD-SCNC: 100 MMOL/L (ref 99–110)
CO2: 28 MMOL/L (ref 21–32)
CREAT SERPL-MCNC: 1.8 MG/DL (ref 0.9–1.3)
GFR AFRICAN AMERICAN: 43 ML/MIN/1.73M2
GFR NON-AFRICAN AMERICAN: 36 ML/MIN/1.73M2
GLUCOSE BLD-MCNC: 80 MG/DL (ref 70–99)
HCT VFR BLD CALC: 42.8 % (ref 42–52)
HEMOGLOBIN: 13.3 GM/DL (ref 13.5–18)
MCH RBC QN AUTO: 27.8 PG (ref 27–31)
MCHC RBC AUTO-ENTMCNC: 31.1 % (ref 32–36)
MCV RBC AUTO: 89.5 FL (ref 78–100)
PDW BLD-RTO: 17 % (ref 11.7–14.9)
PLATELET # BLD: 257 K/CU MM (ref 140–440)
PMV BLD AUTO: 10.4 FL (ref 7.5–11.1)
POTASSIUM SERPL-SCNC: 4.8 MMOL/L (ref 3.5–5.1)
RBC # BLD: 4.78 M/CU MM (ref 4.6–6.2)
SODIUM BLD-SCNC: 139 MMOL/L (ref 135–145)
TOTAL PROTEIN: 6.3 GM/DL (ref 6.4–8.2)
WBC # BLD: 8.5 K/CU MM (ref 4–10.5)

## 2021-01-26 PROCEDURE — 36415 COLL VENOUS BLD VENIPUNCTURE: CPT

## 2021-01-26 PROCEDURE — 85027 COMPLETE CBC AUTOMATED: CPT

## 2021-01-26 PROCEDURE — 80053 COMPREHEN METABOLIC PANEL: CPT

## 2021-01-30 ENCOUNTER — OUTSIDE SERVICES (OUTPATIENT)
Dept: INTERNAL MEDICINE CLINIC | Age: 86
End: 2021-01-30
Payer: MEDICARE

## 2021-01-30 DIAGNOSIS — D64.9 ACUTE ON CHRONIC ANEMIA: ICD-10-CM

## 2021-01-30 DIAGNOSIS — I48.91 ATRIAL FIBRILLATION, UNSPECIFIED TYPE (HCC): Primary | ICD-10-CM

## 2021-01-30 DIAGNOSIS — N40.0 BENIGN PROSTATIC HYPERPLASIA WITHOUT LOWER URINARY TRACT SYMPTOMS: ICD-10-CM

## 2021-01-30 DIAGNOSIS — I71.21 ASCENDING AORTIC ANEURYSM: ICD-10-CM

## 2021-01-30 DIAGNOSIS — F03.90 DEMENTIA WITHOUT BEHAVIORAL DISTURBANCE, UNSPECIFIED DEMENTIA TYPE: ICD-10-CM

## 2021-01-30 DIAGNOSIS — N18.31 CHRONIC KIDNEY DISEASE (CKD) STAGE G3A/A3, MODERATELY DECREASED GLOMERULAR FILTRATION RATE (GFR) BETWEEN 45-59 ML/MIN/1.73 SQUARE METER AND ALBUMINURIA CREATININE RATIO GREATER THAN 300 MG/G (HCC): ICD-10-CM

## 2021-01-30 NOTE — PROGRESS NOTES
Progress note  ___________________________    Vianey Muller's  is 10/5/1933  SEEN ON 2021 at [address]  ___________________________    S:    Patient is seen today and discussed with the nurses. Patient has a history of, atrial fibrillation, chronic kidney disease, pacemaker. Cognitive impairment  Patient states he is doing well  He has no complaints  No fever or chills  No cough or sputum production      ALLERGIES:  Allergies   Allergen Reactions    Biaxin [Clarithromycin]     Cephalexin     Viagra [Sildenafil Citrate] Nausea Only and Other (See Comments)     Dizziness and BP dropped        PAST MEDICAL HISTORY:    has a past medical history of ACTA2-related familial thoracic aortic aneurysm, Atrial fibrillation. H/o cardioversion. CHF (congestive heart failure) ([address] Essential hypertension, malignant, Hyperlipidemia, Hypertension,, Persistent atrial fibrillation , Sick sinus syndrome s/p pacemaker. Gall stones-asymptomatic. PAST SURGICAL HISTORY:   has a past surgical history that includes Pacemaker insertion (Left, 2018); CYSTOSCOPY INSERTION / REMOVAL STENT / STONE (Left, 2019); and Upper gastrointestinal endoscopy (N/A, 2020). SOCIAL HISTORY:   reports that he has never smoked. He has never used smokeless tobacco. He reports previous alcohol use. He reports that he does not use drugs. Vital signs: as in [facility]  Limited examination because of COvid 10 precautions. Pt is awake and oriented. Sitting in chair  HEENT : no abnormality  Neck no masses  Breathing is normal  No edema noted. Patient appears to be comfortable      Assessment:  .  Dementia on Exelon  . Anemia stable. GI work-up not done per family. Seeing Dr. Baudilio Balbuena  . Chronic kidney disease stage III  . Atrial fibrillation on amiodarone  .   Sick sinus syndrome status post pacemaker:

## 2021-02-23 ENCOUNTER — HOSPITAL ENCOUNTER (OUTPATIENT)
Age: 86
Setting detail: SPECIMEN
Discharge: HOME | End: 2021-02-23
Payer: MEDICARE

## 2021-02-23 LAB
ANION GAP SERPL CALCULATED.3IONS-SCNC: 8 MMOL/L (ref 4–16)
BUN BLDV-MCNC: 27 MG/DL (ref 6–23)
CALCIUM SERPL-MCNC: 9 MG/DL (ref 8.3–10.6)
CHLORIDE BLD-SCNC: 100 MMOL/L (ref 99–110)
CO2: 30 MMOL/L (ref 21–32)
CREAT SERPL-MCNC: 1.9 MG/DL (ref 0.9–1.3)
GFR AFRICAN AMERICAN: 41 ML/MIN/1.73M2
GFR NON-AFRICAN AMERICAN: 34 ML/MIN/1.73M2
GLUCOSE BLD-MCNC: 78 MG/DL (ref 70–99)
HCT VFR BLD CALC: 44.5 % (ref 42–52)
HEMOGLOBIN: 13.8 GM/DL (ref 13.5–18)
MCH RBC QN AUTO: 28.8 PG (ref 27–31)
MCHC RBC AUTO-ENTMCNC: 31 % (ref 32–36)
MCV RBC AUTO: 92.7 FL (ref 78–100)
PDW BLD-RTO: 15.7 % (ref 11.7–14.9)
PLATELET # BLD: 284 K/CU MM (ref 140–440)
PMV BLD AUTO: 10.5 FL (ref 7.5–11.1)
POTASSIUM SERPL-SCNC: 4.3 MMOL/L (ref 3.5–5.1)
RBC # BLD: 4.8 M/CU MM (ref 4.6–6.2)
SODIUM BLD-SCNC: 138 MMOL/L (ref 135–145)
WBC # BLD: 7.7 K/CU MM (ref 4–10.5)

## 2021-02-23 PROCEDURE — 36415 COLL VENOUS BLD VENIPUNCTURE: CPT

## 2021-02-23 PROCEDURE — 80048 BASIC METABOLIC PNL TOTAL CA: CPT

## 2021-02-23 PROCEDURE — 85027 COMPLETE CBC AUTOMATED: CPT

## 2021-02-24 ENCOUNTER — HOSPITAL ENCOUNTER (OUTPATIENT)
Age: 86
Setting detail: SPECIMEN
Discharge: HOME | End: 2021-02-24
Payer: MEDICARE

## 2021-02-24 PROCEDURE — 87086 URINE CULTURE/COLONY COUNT: CPT

## 2021-02-24 PROCEDURE — 87186 SC STD MICRODIL/AGAR DIL: CPT

## 2021-02-24 PROCEDURE — 81001 URINALYSIS AUTO W/SCOPE: CPT

## 2021-02-25 LAB
BACTERIA: ABNORMAL /HPF
BILIRUBIN URINE: NEGATIVE MG/DL
BLOOD, URINE: ABNORMAL
CLARITY: ABNORMAL
COLOR: YELLOW
GLUCOSE, URINE: NEGATIVE MG/DL
KETONES, URINE: NEGATIVE MG/DL
LEUKOCYTE ESTERASE, URINE: ABNORMAL
MUCUS: ABNORMAL HPF
NITRITE URINE, QUANTITATIVE: NEGATIVE
PH, URINE: 6 (ref 5–8)
PROTEIN UA: 100 MG/DL
RBC URINE: 95 /HPF (ref 0–3)
SPECIFIC GRAVITY UA: 1.01 (ref 1–1.03)
TRICHOMONAS: ABNORMAL /HPF
UROBILINOGEN, URINE: NEGATIVE MG/DL (ref 0.2–1)
WBC CLUMP: ABNORMAL /HPF
WBC UA: 2520 /HPF (ref 0–2)

## 2021-02-26 LAB
CULTURE: ABNORMAL
CULTURE: ABNORMAL
Lab: ABNORMAL
SPECIMEN: ABNORMAL

## 2021-02-27 ENCOUNTER — OUTSIDE SERVICES (OUTPATIENT)
Dept: INTERNAL MEDICINE CLINIC | Age: 86
End: 2021-02-27
Payer: MEDICARE

## 2021-02-27 DIAGNOSIS — I48.91 ATRIAL FIBRILLATION, UNSPECIFIED TYPE (HCC): Primary | ICD-10-CM

## 2021-02-27 DIAGNOSIS — Z95.0 S/P PLACEMENT OF CARDIAC PACEMAKER: ICD-10-CM

## 2021-02-27 DIAGNOSIS — N18.31 CHRONIC KIDNEY DISEASE (CKD) STAGE G3A/A3, MODERATELY DECREASED GLOMERULAR FILTRATION RATE (GFR) BETWEEN 45-59 ML/MIN/1.73 SQUARE METER AND ALBUMINURIA CREATININE RATIO GREATER THAN 300 MG/G (HCC): ICD-10-CM

## 2021-02-27 DIAGNOSIS — F03.90 DEMENTIA WITHOUT BEHAVIORAL DISTURBANCE, UNSPECIFIED DEMENTIA TYPE: ICD-10-CM

## 2021-02-27 DIAGNOSIS — N30.00 ACUTE CYSTITIS WITHOUT HEMATURIA: ICD-10-CM

## 2021-02-27 NOTE — PROGRESS NOTES
Progress note  ___________________________    Aakash Muller's  is 10/5/1933  SEEN ON 2021 at [address]  ___________________________    S:  Patient has a history of, atrial fibrillation, chronic kidney disease, pacemaker. Cognitive impairment  Patient had a urine culture ordered by urologist  which was positive for E. Coli  He was treated with Bactrim   Patient states he is doing well  He has no complaints  No fever or chills  No cough or sputum production  No abdominal pain. No burning on micturation. ALLERGIES:  Allergies   Allergen Reactions    Biaxin [Clarithromycin]     Cephalexin     Viagra [Sildenafil Citrate] Nausea Only and Other (See Comments)     Dizziness and BP dropped        PAST MEDICAL HISTORY:    has a past medical history of ACTA2-related familial thoracic aortic aneurysm, Atrial fibrillation. H/o cardioversion. CHF (congestive heart failure) ([address] Essential hypertension, malignant, Hyperlipidemia, Hypertension,, Persistent atrial fibrillation , Sick sinus syndrome s/p pacemaker. Gall stones-asymptomatic. PAST SURGICAL HISTORY:   has a past surgical history that includes Pacemaker insertion (Left, 2018); CYSTOSCOPY INSERTION / REMOVAL STENT / STONE (Left, 2019); and Upper gastrointestinal endoscopy (N/A, 2020). SOCIAL HISTORY:   reports that he has never smoked. He has never used smokeless tobacco. He reports previous alcohol use. He reports that he does not use drugs. Vital signs: as in [facility]  Limited examination because of COvid 10 precautions. Pt is awake and oriented. HEENT : no abnormality  Neck no masses  Breathing is normal  No edema noted. Patient appears to be comfortable      Assessment:  . UTI  . Dementia on Exelon  . Anemia stable. GI work-up not done per family. Seeing Dr. Porfirio Wilkinson  . Chronic kidney disease stage III  .   Atrial fibrillation on amiodarone  . Sick sinus syndrome status post pacemaker: 2018  . Hyperlipidemia on atorvastatin  . BPH on finasteride  . Congestive heart failure  . Aortic regurgitation: Moderate to severe. Echo: 7/1/2020  . Constipation on MiraLAX  . Abdominal aortic aneurysm 3.8 x 3.7 cm  . Bilateral renal calculi nonobstructing/bladder diverticulum containing stones  . Gallstones         Plan:  Pt has UTI. Start patient on Bactrim DS   Labs reviewed. Creatinine 1.9  Nurses report no problems.   Patient is on aspirin  Medications were reviewed  Continue current medications

## 2021-03-15 ENCOUNTER — HOSPITAL ENCOUNTER (OUTPATIENT)
Age: 86
Setting detail: SPECIMEN
Discharge: HOME | End: 2021-03-15
Payer: MEDICARE

## 2021-03-15 PROCEDURE — 81001 URINALYSIS AUTO W/SCOPE: CPT

## 2021-03-16 ENCOUNTER — HOSPITAL ENCOUNTER (OUTPATIENT)
Age: 86
Setting detail: SPECIMEN
Discharge: HOME | End: 2021-03-16
Payer: MEDICARE

## 2021-03-16 LAB
ALBUMIN SERPL-MCNC: 3.9 GM/DL (ref 3.4–5)
ALP BLD-CCNC: 95 IU/L (ref 40–128)
ALT SERPL-CCNC: 37 U/L (ref 10–40)
ANION GAP SERPL CALCULATED.3IONS-SCNC: 9 MMOL/L (ref 4–16)
AST SERPL-CCNC: 32 IU/L (ref 15–37)
BACTERIA: ABNORMAL /HPF
BILIRUB SERPL-MCNC: 0.7 MG/DL (ref 0–1)
BILIRUBIN URINE: NEGATIVE MG/DL
BLOOD, URINE: ABNORMAL
BUN BLDV-MCNC: 28 MG/DL (ref 6–23)
CALCIUM SERPL-MCNC: 9.6 MG/DL (ref 8.3–10.6)
CHLORIDE BLD-SCNC: 100 MMOL/L (ref 99–110)
CLARITY: ABNORMAL
CO2: 31 MMOL/L (ref 21–32)
COLOR: YELLOW
CREAT SERPL-MCNC: 2.1 MG/DL (ref 0.9–1.3)
GFR AFRICAN AMERICAN: 36 ML/MIN/1.73M2
GFR NON-AFRICAN AMERICAN: 30 ML/MIN/1.73M2
GLUCOSE BLD-MCNC: 79 MG/DL (ref 70–99)
GLUCOSE, URINE: NEGATIVE MG/DL
KETONES, URINE: NEGATIVE MG/DL
LEUKOCYTE ESTERASE, URINE: ABNORMAL
NITRITE URINE, QUANTITATIVE: NEGATIVE
PH, URINE: 5 (ref 5–8)
POTASSIUM SERPL-SCNC: 4.4 MMOL/L (ref 3.5–5.1)
PROTEIN UA: 100 MG/DL
RBC URINE: 20 /HPF (ref 0–3)
SODIUM BLD-SCNC: 140 MMOL/L (ref 135–145)
SPECIFIC GRAVITY UA: 1.01 (ref 1–1.03)
TOTAL PROTEIN: 6.7 GM/DL (ref 6.4–8.2)
TRICHOMONAS: ABNORMAL /HPF
UROBILINOGEN, URINE: NEGATIVE MG/DL (ref 0.2–1)
WBC CLUMP: ABNORMAL /HPF
WBC UA: 3757 /HPF (ref 0–2)

## 2021-03-16 PROCEDURE — 80053 COMPREHEN METABOLIC PANEL: CPT

## 2021-03-16 PROCEDURE — 36415 COLL VENOUS BLD VENIPUNCTURE: CPT

## 2021-03-23 ENCOUNTER — HOSPITAL ENCOUNTER (OUTPATIENT)
Age: 86
Setting detail: SPECIMEN
Discharge: HOME | End: 2021-03-23
Payer: MEDICARE

## 2021-03-23 LAB
ALBUMIN SERPL-MCNC: 3.6 GM/DL (ref 3.4–5)
ALP BLD-CCNC: 88 IU/L (ref 40–128)
ALT SERPL-CCNC: 32 U/L (ref 10–40)
ANION GAP SERPL CALCULATED.3IONS-SCNC: 7 MMOL/L (ref 4–16)
AST SERPL-CCNC: 32 IU/L (ref 15–37)
BASOPHILS ABSOLUTE: 0 K/CU MM
BASOPHILS RELATIVE PERCENT: 0.4 % (ref 0–1)
BILIRUB SERPL-MCNC: 0.7 MG/DL (ref 0–1)
BUN BLDV-MCNC: 30 MG/DL (ref 6–23)
CALCIUM SERPL-MCNC: 8.8 MG/DL (ref 8.3–10.6)
CHLORIDE BLD-SCNC: 97 MMOL/L (ref 99–110)
CO2: 29 MMOL/L (ref 21–32)
CREAT SERPL-MCNC: 1.9 MG/DL (ref 0.9–1.3)
DIFFERENTIAL TYPE: ABNORMAL
EOSINOPHILS ABSOLUTE: 0.1 K/CU MM
EOSINOPHILS RELATIVE PERCENT: 1.9 % (ref 0–3)
GFR AFRICAN AMERICAN: 41 ML/MIN/1.73M2
GFR NON-AFRICAN AMERICAN: 34 ML/MIN/1.73M2
GLUCOSE BLD-MCNC: 86 MG/DL (ref 70–99)
HCT VFR BLD CALC: 41.2 % (ref 42–52)
HEMOGLOBIN: 13.4 GM/DL (ref 13.5–18)
IMMATURE NEUTROPHIL %: 0.3 % (ref 0–0.43)
LYMPHOCYTES ABSOLUTE: 1.3 K/CU MM
LYMPHOCYTES RELATIVE PERCENT: 18.4 % (ref 24–44)
MCH RBC QN AUTO: 30.6 PG (ref 27–31)
MCHC RBC AUTO-ENTMCNC: 32.5 % (ref 32–36)
MCV RBC AUTO: 94.1 FL (ref 78–100)
MONOCYTES ABSOLUTE: 0.8 K/CU MM
MONOCYTES RELATIVE PERCENT: 11.4 % (ref 0–4)
NUCLEATED RBC %: 0 %
PDW BLD-RTO: 14.6 % (ref 11.7–14.9)
PLATELET # BLD: 229 K/CU MM (ref 140–440)
PMV BLD AUTO: 10.7 FL (ref 7.5–11.1)
POTASSIUM SERPL-SCNC: 4.2 MMOL/L (ref 3.5–5.1)
RBC # BLD: 4.38 M/CU MM (ref 4.6–6.2)
SEGMENTED NEUTROPHILS ABSOLUTE COUNT: 4.7 K/CU MM
SEGMENTED NEUTROPHILS RELATIVE PERCENT: 67.6 % (ref 36–66)
SODIUM BLD-SCNC: 133 MMOL/L (ref 135–145)
TOTAL IMMATURE NEUTOROPHIL: 0.02 K/CU MM
TOTAL NUCLEATED RBC: 0 K/CU MM
TOTAL PROTEIN: 6.4 GM/DL (ref 6.4–8.2)
WBC # BLD: 7 K/CU MM (ref 4–10.5)

## 2021-03-23 PROCEDURE — 80053 COMPREHEN METABOLIC PANEL: CPT

## 2021-03-23 PROCEDURE — 36415 COLL VENOUS BLD VENIPUNCTURE: CPT

## 2021-03-23 PROCEDURE — 85025 COMPLETE CBC W/AUTO DIFF WBC: CPT

## 2021-04-08 ENCOUNTER — PROCEDURE VISIT (OUTPATIENT)
Dept: CARDIOLOGY CLINIC | Age: 86
End: 2021-04-08
Payer: MEDICARE

## 2021-04-08 DIAGNOSIS — Z95.0 CARDIAC PACEMAKER IN SITU: Primary | ICD-10-CM

## 2021-04-08 DIAGNOSIS — I44.0 AV BLOCK, 1ST DEGREE: ICD-10-CM

## 2021-04-08 DIAGNOSIS — I49.5 SINUS NODE DYSFUNCTION (HCC): ICD-10-CM

## 2021-04-27 ENCOUNTER — HOSPITAL ENCOUNTER (OUTPATIENT)
Age: 86
Setting detail: SPECIMEN
Discharge: HOME | End: 2021-04-27
Payer: MEDICARE

## 2021-04-27 LAB
ALBUMIN SERPL-MCNC: 3.5 GM/DL (ref 3.4–5)
ALP BLD-CCNC: 92 IU/L (ref 40–128)
ALT SERPL-CCNC: 26 U/L (ref 10–40)
ANION GAP SERPL CALCULATED.3IONS-SCNC: 8 MMOL/L (ref 4–16)
AST SERPL-CCNC: 24 IU/L (ref 15–37)
BILIRUB SERPL-MCNC: 0.6 MG/DL (ref 0–1)
BUN BLDV-MCNC: 25 MG/DL (ref 6–23)
CALCIUM SERPL-MCNC: 8.7 MG/DL (ref 8.3–10.6)
CHLORIDE BLD-SCNC: 101 MMOL/L (ref 99–110)
CO2: 29 MMOL/L (ref 21–32)
CREAT SERPL-MCNC: 1.6 MG/DL (ref 0.9–1.3)
GFR AFRICAN AMERICAN: 50 ML/MIN/1.73M2
GFR NON-AFRICAN AMERICAN: 41 ML/MIN/1.73M2
GLUCOSE BLD-MCNC: 89 MG/DL (ref 70–99)
HCT VFR BLD CALC: 43 % (ref 42–52)
HEMOGLOBIN: 13.1 GM/DL (ref 13.5–18)
MCH RBC QN AUTO: 29.2 PG (ref 27–31)
MCHC RBC AUTO-ENTMCNC: 30.5 % (ref 32–36)
MCV RBC AUTO: 96 FL (ref 78–100)
PDW BLD-RTO: 13.6 % (ref 11.7–14.9)
PLATELET # BLD: 259 K/CU MM (ref 140–440)
PMV BLD AUTO: 10.2 FL (ref 7.5–11.1)
POTASSIUM SERPL-SCNC: 4.3 MMOL/L (ref 3.5–5.1)
RBC # BLD: 4.48 M/CU MM (ref 4.6–6.2)
SODIUM BLD-SCNC: 138 MMOL/L (ref 135–145)
TOTAL PROTEIN: 6.1 GM/DL (ref 6.4–8.2)
WBC # BLD: 6.9 K/CU MM (ref 4–10.5)

## 2021-04-27 PROCEDURE — 85027 COMPLETE CBC AUTOMATED: CPT

## 2021-04-27 PROCEDURE — 36415 COLL VENOUS BLD VENIPUNCTURE: CPT

## 2021-04-27 PROCEDURE — 80053 COMPREHEN METABOLIC PANEL: CPT

## 2021-04-30 ENCOUNTER — OUTSIDE SERVICES (OUTPATIENT)
Dept: INTERNAL MEDICINE CLINIC | Age: 86
End: 2021-04-30
Payer: MEDICARE

## 2021-04-30 DIAGNOSIS — F03.90 DEMENTIA WITHOUT BEHAVIORAL DISTURBANCE, UNSPECIFIED DEMENTIA TYPE: ICD-10-CM

## 2021-04-30 DIAGNOSIS — Z95.0 S/P PLACEMENT OF CARDIAC PACEMAKER: ICD-10-CM

## 2021-04-30 DIAGNOSIS — N18.31 CHRONIC KIDNEY DISEASE (CKD) STAGE G3A/A3, MODERATELY DECREASED GLOMERULAR FILTRATION RATE (GFR) BETWEEN 45-59 ML/MIN/1.73 SQUARE METER AND ALBUMINURIA CREATININE RATIO GREATER THAN 300 MG/G (HCC): ICD-10-CM

## 2021-04-30 DIAGNOSIS — I48.19 PERSISTENT ATRIAL FIBRILLATION (HCC): Primary | ICD-10-CM

## 2021-05-01 NOTE — PROGRESS NOTES
Progress note  ___________________________    Marietta Muller's  is 10/5/1933  SEEN ON 2021 at [address]  ___________________________    S:    Patient is seen today and discussed with the nurses. Patient has a history of, atrial fibrillation, chronic kidney disease, pacemaker. Cognitive impairment  Pt is being tested for covid 19 periodically as staff was + for covid  Pt is asymptomatic  Patient states he is doing well  He has no complaints  No fever or chills  No cough or sputum production  Pt saw Dr Edgar Sargent and cr is stable       ALLERGIES:  Allergies   Allergen Reactions    Biaxin [Clarithromycin]     Cephalexin     Viagra [Sildenafil Citrate] Nausea Only and Other (See Comments)     Dizziness and BP dropped        PAST MEDICAL HISTORY:    has a past medical history of ACTA2-related familial thoracic aortic aneurysm, Atrial fibrillation. H/o cardioversion. CHF (congestive heart failure) ([address] Essential hypertension, malignant, Hyperlipidemia, Hypertension,, Persistent atrial fibrillation , Sick sinus syndrome s/p pacemaker. Gall stones-asymptomatic. PAST SURGICAL HISTORY:   has a past surgical history that includes Pacemaker insertion (Left, 2018); CYSTOSCOPY INSERTION / REMOVAL STENT / STONE (Left, 2019); and Upper gastrointestinal endoscopy (N/A, 2020). SOCIAL HISTORY:   reports that he has never smoked. He has never used smokeless tobacco. He reports previous alcohol use. He reports that he does not use drugs. Vital signs: 152/81 P 70 RR 16. O2 sat is normal.     Pt is awake and oriented. Patient was seen taking COVID-19 precautions. Face mask, gloves were used. HEENT : conjunctive pink . No icterus  Neck no masses. Heart S1 S2  Lungs clear. No rales rhonchi  Abdomen soft and non-tender BS normal  Ext : No edema noted. CNS awake oriented to name      Assessment:  . Dementia on Exelon  . Anemia stable. GI work-up not done per family. Seeing Dr. Jaswinder Looney  . Chronic kidney disease stage III  . Atrial fibrillation on amiodarone  . Sick sinus syndrome status post pacemaker: 2018  . Hyperlipidemia on atorvastatin  . BPH on finasteride  . Congestive heart failure  . Aortic regurgitation: Moderate to severe. Echo: 7/1/2020  . Constipation on MiraLAX  . Abdominal aortic aneurysm 3.8 x 3.7 cm  . Bilateral renal calculi nonobstructing/bladder diverticulum containing stones  .   Gallstones       Plan:  Medications were reviewed  Continue current medications

## 2021-05-03 ENCOUNTER — OFFICE VISIT (OUTPATIENT)
Dept: CARDIOLOGY CLINIC | Age: 86
End: 2021-05-03
Payer: MEDICARE

## 2021-05-03 VITALS
WEIGHT: 180 LBS | DIASTOLIC BLOOD PRESSURE: 60 MMHG | SYSTOLIC BLOOD PRESSURE: 120 MMHG | HEIGHT: 70 IN | RESPIRATION RATE: 14 BRPM | OXYGEN SATURATION: 96 % | HEART RATE: 65 BPM | BODY MASS INDEX: 25.77 KG/M2

## 2021-05-03 DIAGNOSIS — I50.32 CHRONIC DIASTOLIC CONGESTIVE HEART FAILURE (HCC): Primary | ICD-10-CM

## 2021-05-03 DIAGNOSIS — I48.91 ATRIAL FIBRILLATION, UNSPECIFIED TYPE (HCC): ICD-10-CM

## 2021-05-03 PROCEDURE — 1123F ACP DISCUSS/DSCN MKR DOCD: CPT | Performed by: NURSE PRACTITIONER

## 2021-05-03 PROCEDURE — G8427 DOCREV CUR MEDS BY ELIG CLIN: HCPCS | Performed by: NURSE PRACTITIONER

## 2021-05-03 PROCEDURE — 1036F TOBACCO NON-USER: CPT | Performed by: NURSE PRACTITIONER

## 2021-05-03 PROCEDURE — G8417 CALC BMI ABV UP PARAM F/U: HCPCS | Performed by: NURSE PRACTITIONER

## 2021-05-03 PROCEDURE — 4040F PNEUMOC VAC/ADMIN/RCVD: CPT | Performed by: NURSE PRACTITIONER

## 2021-05-03 PROCEDURE — 99213 OFFICE O/P EST LOW 20 MIN: CPT | Performed by: NURSE PRACTITIONER

## 2021-05-03 ASSESSMENT — ENCOUNTER SYMPTOMS
CHEST TIGHTNESS: 0
SHORTNESS OF BREATH: 0
WHEEZING: 0

## 2021-05-03 NOTE — PROGRESS NOTES
(congestive heart failure) ([facility] [address]     Essential hypertension, malignant     Fall at home     H/O echocardiogram 07/01/2020    EF 50-55%, Mod-Severe AS, Mild PHTN, Aortic Root 3.9cm.(pt had OV after echo)    History of cardioversion 04/17/2018    History of chest x-ray 02/27/2017    Enlargement of the aorta knob which may represent a thoracic aortic aneurysm. Further evaluation w/ a contrast enhanced CT of the chest recommended. no evidence of acute pulmonary process. 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 Recommend thyroid US.  History of echocardiogram 03/27/2017    TTE EF 55%, LV Normal Size, borderline LVH concentric, Normal LV systolic function, transmittal doppler flow pttern suggest impaired LV relaxation Mild aortic stenosis, mild aortic regurgitation.  History of EKG 02/27/2017    Time 12:03 AM, HR 75, A fib, Axis normal, Intervals normal, P waves normal, St-T Segments: nonspecific ST segment changes to the anterior lateral leads, QRS RBBB,  No significant Q waves, no ectopy. A-fib w/RBBB w/nonspecific ST segment change EKG.  History of EKG 02/27/2017    Time 2:58AM: HR 59, NSR, Axis normal, Intervals normal, P waves normal, ST-T seg: nonspecific ST segment changes, QRS RBBB, no significant Q waves, no ectopy. Sinus bradycardia w/ RBBB nonspecific ST Segment changes EKG    History of Holter monitoring 11/08/2017    monitored 48 hours: Patient noted to have multiple PAC and PVCs. Risk of atrial fibrillation present given previous episode Recommend antiarrhythmic therapy.      History of stress test 03/27/2017    NM Stress test: EF 54%, Abnormal study, evidence of mild ischemia in mild inferior regions. post stress LV is enlarged in size. Post-stress LV function is normal.     Hx of transesophageal echocardiography (SIMONE) for monitoring 04/17/2018    EF45-50% mild TR, mild-mod MR, mod-severe AS    Hyperlipemia     Hyperlipidemia     Hypertension     Nonspecific abnormal electrocardiogram (ECG) (EKG)     Persistent atrial fibrillation (HCC)     Sick sinus syndrome (HCC)     Vertigo      Past Surgical History:   Procedure Laterality Date    CYSTOSCOPY INSERTION / REMOVAL STENT / STONE Left 5/6/2019    CYSTOSCOPY RETROGRADE PYELOGRAM STENT INSERTION, DIAGNOSTIC CYSTOSCOPY performed by Nannette Briggs MD at [address] [place] [address] Left 03/29/2018    Dual Chamber [employer] Aura XT DR AMI Kincaid    UPPER GASTROINTESTINAL ENDOSCOPY N/A 8/20/2020    EGD DIAGNOSTIC ONLY performed by Peggy Workman MD at [facility] ENDOSCOPY     No family history on file. Social History     Tobacco Use    Smoking status: Never Smoker    Smokeless tobacco: Never Used   Substance Use Topics    Alcohol use: Not Currently     Alcohol/week: 0.0 standard drinks     Current Outpatient Medications   Medication Sig Dispense Refill    aspirin 81 MG EC tablet Take 81 mg by mouth daily      pantoprazole (PROTONIX) 40 MG tablet Take 40 mg by mouth daily      furosemide (LASIX) 20 MG tablet Take 1 tablet by mouth daily as needed (lower leg edema) Take 20 mg of oral lasix at evening, for a total of 40 mg of oral lasix at the evening dose  for the next 3 days.  (Patient taking differently: Take 20 mg by mouth daily ) 30 tablet 0    spironolactone (ALDACTONE) 25 MG tablet Take 25 mg by mouth daily      polyethylene glycol (GLYCOLAX) powder Take 17 g by mouth daily      Polyvinyl Alcohol-Povidone (REFRESH OP) Apply 1 drop to eye as needed For dry eyes      acetaminophen (TYLENOL) 325 MG tablet Take 650 mg by mouth every 6 hours as needed for Pain For pain or temp      metoprolol tartrate (LOPRESSOR) 25 MG tablet Take 0.5 tablets by mouth 2 times daily (Patient taking differently: Take 12.5 mg by mouth 2 times daily Hold if SBP < 110 or HR is < 55) 30 tablet 0    docusate sodium (COLACE, DULCOLAX) 100 MG CAPS Take 100 mg by mouth 2 times daily as needed for Constipation 30 capsule 0    finasteride (PROSCAR) 5 MG tablet Take 1 tablet by mouth daily 30 tablet 0    amiodarone (CORDARONE) 200 MG tablet Take 1 tablet by mouth daily (Patient taking differently: Take 200 mg by mouth nightly ) 30 tablet 3    rivastigmine (EXELON) 4.6 MG/24HR Place 1 patch onto the skin daily       Cholecalciferol (VITAMIN D3) 5000 units TABS Take 5,000 Units by mouth every morning      Omega-3 Fatty Acids (FISH OIL) 1000 MG CAPS Take 1,000 mg by mouth daily       Multiple Vitamins-Minerals (THERAPEUTIC MULTIVITAMIN-MINERALS) tablet Take 1 tablet by mouth daily      atorvastatin (LIPITOR) 20 MG tablet Take 20 mg by mouth nightly       ferrous sulfate (IRON 325) 325 (65 Fe) MG tablet Take 325 mg by mouth daily (with breakfast)       No current facility-administered medications for this visit. Allergies   Allergen Reactions    Biaxin [Clarithromycin]     Cephalexin     Viagra [Sildenafil Citrate] Nausea Only and Other (See Comments)     Dizziness and BP dropped       SUBJECTIVE:     Review of Systems   Respiratory: Negative for chest tightness, shortness of breath and wheezing. Cardiovascular: Negative for chest pain, palpitations and leg swelling. Neurological: Negative for dizziness, syncope and light-headedness.        OBJECTIVE:   Today's Vitals:  /60   Pulse 65   Resp 14   Ht 5' 10\" (1.778 m)   Wt 180 lb (81.6 kg)   SpO2 96%   BMI 25.83 kg/m²     Wt Readings from Last 3 Encounters:   05/03/21 180 lb (81.6 kg)   03/19/21 180 lb (81.6 kg)   01/05/21 179 lb 9.6 oz (81.5 kg)     BP Readings from Last 3 Encounters:   05/03/21 120/60   03/19/21 100/60   01/05/21 130/84     Pulse Readings from Last 3 Encounters:   05/03/21 65   03/19/21 60   01/05/21 70     Body mass index is 25.83 kg/m². Physical Exam  Constitutional:       Appearance: Normal appearance. HENT:      Nose: Nose normal.   Neck:      Musculoskeletal: Normal range of motion and neck supple. Cardiovascular:      Rate and Rhythm: Normal rate and regular rhythm. Pulses: Normal pulses. Heart sounds: Murmur (diastolic ) present. Pulmonary:      Effort: Pulmonary effort is normal.      Breath sounds: Normal breath sounds. Abdominal:      General: There is no distension. Palpations: Abdomen is soft. Tenderness: There is no abdominal tenderness. Musculoskeletal: Normal range of motion. Right lower leg: No edema. Left lower leg: No edema. Skin:     General: Skin is warm. Capillary Refill: Capillary refill takes less than 2 seconds. Neurological:      General: No focal deficit present. Mental Status: He is alert and oriented to person, place, and time. Psychiatric:         Mood and Affect: Mood normal.         Behavior: Behavior normal.          6 minute walk test:  Time walked not able to do- walking with walker       Most recent ECHO:   Left ventricular systolic function is normal with an ejection fraction of 50   to 55 %. Grade I diastolic dysfunction. Mild concentric left ventricular hypertrophy. The left atrium is mildly dilated. Dilatation of the aortic root measuring 3.9 cm. Mild aortic stenosis with mean gradient of 14 mmHg. Moderate to severe aortic regurgitation is noted with a pressure half time   of 421 msec. Right ventricular systolic pressure of 41 mmHg consistent with mild   pulmonary hypertension. No evidence of pericardial effusion.          Results reviewed:  BNP:   Lab Results   Component Value Date    PROBNP 1,672 (H) 08/18/2020     CBC:   Lab Results   Component Value Date    WBC 6.9 04/27/2021    RBC 4.48 04/27/2021    HGB 13.1 04/27/2021    HCT 43.0 04/27/2021     04/27/2021     CMP:    Lab Results   Component Value Date     04/27/2021    K 4.3 04/27/2021     04/27/2021    CO2 29 04/27/2021    BUN 25 04/27/2021    CREATININE 1.6 04/27/2021    GFRAA 50 04/27/2021    LABGLOM 41 04/27/2021    GLUCOSE 89 04/27/2021    CALCIUM 8.7 04/27/2021     Hepatic Function Panel:    Lab Results   Component Value Date    ALKPHOS 92 04/27/2021    ALT 26 04/27/2021    AST 24 04/27/2021    PROT 6.1 04/27/2021    BILITOT 0.6 04/27/2021    BILIDIR 0.2 11/24/2020    IBILI 0.1 11/24/2020    LABALBU 3.5 04/27/2021     Magnesium:    Lab Results   Component Value Date    MG 2.3 06/16/2020     PT/INR:    Lab Results   Component Value Date    PROTIME 14.7 08/19/2020    INR 1.21 08/19/2020     Lipids:    Lab Results   Component Value Date    TRIG 72 05/24/2019    HDL 47 05/24/2019    LDLDIRECT 86 05/24/2019       Iron Studies:  No components found for: FE,  TIBC,  FERRITIN    Iron Deficiency Anemia:  Yes IV Iron Therapy:  No    ASSESSMENT AND PLAN:     1.          . Chronic diastolic congestive heart failure (HCC)  -EF 55%  Does not appear to be in fluid overload. Recommend to continue with metoprolol and Aldactone. To use Lasix as needed.     Weight gain of 3 pounds in 1 day or 5 pounds in 1 week   Increased shortness of breath   Shortness of breath while laying down   Cough   Chest pain   Swelling in feet, ankles or legs   Tenderness or bloating in the abdomen   Fatigue    Decreased appetite or nausea    Confusion                 Follow up in 6 months     Electronicallysigned by CARI Kay CNP on 5/3/2021 at 10:37 AM

## 2021-05-04 ENCOUNTER — TELEPHONE (OUTPATIENT)
Dept: CARDIOLOGY CLINIC | Age: 86
End: 2021-05-04

## 2021-05-25 ENCOUNTER — HOSPITAL ENCOUNTER (OUTPATIENT)
Age: 86
Setting detail: SPECIMEN
Discharge: HOME | End: 2021-05-25
Payer: MEDICARE

## 2021-05-25 LAB
ALBUMIN SERPL-MCNC: 3.6 GM/DL (ref 3.4–5)
ALBUMIN SERPL-MCNC: 3.6 GM/DL (ref 3.4–5)
ALP BLD-CCNC: 88 IU/L (ref 40–128)
ALP BLD-CCNC: 88 IU/L (ref 40–129)
ALT SERPL-CCNC: 32 U/L (ref 10–40)
ALT SERPL-CCNC: 32 U/L (ref 10–40)
ANION GAP SERPL CALCULATED.3IONS-SCNC: 9 MMOL/L (ref 4–16)
AST SERPL-CCNC: 29 IU/L (ref 15–37)
AST SERPL-CCNC: 29 IU/L (ref 15–37)
BASOPHILS ABSOLUTE: 0 K/CU MM
BASOPHILS RELATIVE PERCENT: 0.5 % (ref 0–1)
BILIRUB SERPL-MCNC: 0.9 MG/DL (ref 0–1)
BILIRUB SERPL-MCNC: 0.9 MG/DL (ref 0–1)
BILIRUBIN DIRECT: 0.2 MG/DL (ref 0–0.3)
BILIRUBIN, INDIRECT: 0.7 MG/DL (ref 0–0.7)
BUN BLDV-MCNC: 25 MG/DL (ref 6–23)
CALCIUM SERPL-MCNC: 9.2 MG/DL (ref 8.3–10.6)
CHLORIDE BLD-SCNC: 100 MMOL/L (ref 99–110)
CO2: 29 MMOL/L (ref 21–32)
CREAT SERPL-MCNC: 1.6 MG/DL (ref 0.9–1.3)
DIFFERENTIAL TYPE: ABNORMAL
EOSINOPHILS ABSOLUTE: 0.1 K/CU MM
EOSINOPHILS RELATIVE PERCENT: 2 % (ref 0–3)
GFR AFRICAN AMERICAN: 50 ML/MIN/1.73M2
GFR NON-AFRICAN AMERICAN: 41 ML/MIN/1.73M2
GLUCOSE BLD-MCNC: 89 MG/DL (ref 70–99)
HCT VFR BLD CALC: 41.4 % (ref 42–52)
HEMOGLOBIN: 13.4 GM/DL (ref 13.5–18)
IMMATURE NEUTROPHIL %: 0.5 % (ref 0–0.43)
LYMPHOCYTES ABSOLUTE: 1.3 K/CU MM
LYMPHOCYTES RELATIVE PERCENT: 19.5 % (ref 24–44)
MCH RBC QN AUTO: 29.7 PG (ref 27–31)
MCHC RBC AUTO-ENTMCNC: 32.4 % (ref 32–36)
MCV RBC AUTO: 91.8 FL (ref 78–100)
MONOCYTES ABSOLUTE: 1.1 K/CU MM
MONOCYTES RELATIVE PERCENT: 16.1 % (ref 0–4)
NUCLEATED RBC %: 0 %
PDW BLD-RTO: 13.5 % (ref 11.7–14.9)
PLATELET # BLD: 261 K/CU MM (ref 140–440)
PMV BLD AUTO: 10.1 FL (ref 7.5–11.1)
POTASSIUM SERPL-SCNC: 4.3 MMOL/L (ref 3.5–5.1)
RBC # BLD: 4.51 M/CU MM (ref 4.6–6.2)
SEGMENTED NEUTROPHILS ABSOLUTE COUNT: 4.1 K/CU MM
SEGMENTED NEUTROPHILS RELATIVE PERCENT: 61.4 % (ref 36–66)
SODIUM BLD-SCNC: 138 MMOL/L (ref 135–145)
TOTAL IMMATURE NEUTOROPHIL: 0.03 K/CU MM
TOTAL NUCLEATED RBC: 0 K/CU MM
TOTAL PROTEIN: 6.4 GM/DL (ref 6.4–8.2)
TOTAL PROTEIN: 6.4 GM/DL (ref 6.4–8.2)
TSH HIGH SENSITIVITY: 2.66 UIU/ML (ref 0.27–4.2)
WBC # BLD: 6.7 K/CU MM (ref 4–10.5)

## 2021-05-25 PROCEDURE — 84443 ASSAY THYROID STIM HORMONE: CPT

## 2021-05-25 PROCEDURE — 82248 BILIRUBIN DIRECT: CPT

## 2021-05-25 PROCEDURE — 80053 COMPREHEN METABOLIC PANEL: CPT

## 2021-05-25 PROCEDURE — 85025 COMPLETE CBC W/AUTO DIFF WBC: CPT

## 2021-05-25 PROCEDURE — 36415 COLL VENOUS BLD VENIPUNCTURE: CPT

## 2021-05-31 ENCOUNTER — TELEPHONE (OUTPATIENT)
Dept: INTERNAL MEDICINE CLINIC | Age: 86
End: 2021-05-31

## 2021-06-01 ENCOUNTER — HOSPITAL ENCOUNTER (OUTPATIENT)
Age: 86
Setting detail: SPECIMEN
Discharge: HOME | End: 2021-06-01
Payer: MEDICARE

## 2021-06-01 NOTE — TELEPHONE ENCOUNTER
Progress note  ___________________________    Janes Muller's  is 10/5/1933  SEEN ON 2021 at [address]  ___________________________    S:    Discussed with the nurses. Patient has a history of, atrial fibrillation, chronic kidney disease, pacemaker. Cognitive impairment  Patient has no complaints  No falls and injuries  No fever no chills. No headaches  No respiratory symptoms. No cough or sputum        ALLERGIES:  Allergies   Allergen Reactions    Biaxin [Clarithromycin]     Cephalexin     Viagra [Sildenafil Citrate] Nausea Only and Other (See Comments)     Dizziness and BP dropped        PAST MEDICAL HISTORY:    has a past medical history of ACTA2-related familial thoracic aortic aneurysm, Atrial fibrillation. H/o cardioversion. CHF (congestive heart failure) ([address] Essential hypertension, malignant, Hyperlipidemia, Hypertension,, Persistent atrial fibrillation , Sick sinus syndrome s/p pacemaker. Gall stones-asymptomatic. PAST SURGICAL HISTORY:   has a past surgical history that includes Pacemaker insertion (Left, 2018); CYSTOSCOPY INSERTION / REMOVAL STENT / STONE (Left, 2019); and Upper gastrointestinal endoscopy (N/A, 2020). SOCIAL HISTORY:   reports that he has never smoked. He has never used smokeless tobacco. He reports previous alcohol use. He reports that he does not use drugs. Vital signs: as in [facility]     Pt is awake and oriented to name. HEENT : Conjunctiva pink no scleral icterus  Neck no masses. Supple. No JVD  Heart: S1-S2 heard   Lungs: Clear no rales or rhonchi breathing is normal  Extremities: No edema noted. CNS patient appears to be comfortable. Confused at times      Assessment:  .  Dementia on Exelon  . Anemia stable. GI work-up not done per family. Seeing Dr. Kenya Wilson  . Chronic kidney disease stage III  . Atrial fibrillation on amiodarone  .   Gera Rodriguez sinus syndrome status post pacemaker: 2018  . Hyperlipidemia on atorvastatin  . BPH on finasteride  . Congestive heart failure  . Aortic regurgitation: Moderate to severe. Echo: 7/1/2020  . Constipation on MiraLAX  . Abdominal aortic aneurysm 3.8 x 3.7 cm  . Bilateral renal calculi nonobstructing/bladder diverticulum containing stones  . Gallstones       Plan:  Labs done on 5/25/2021 reviewed  TSH is normal  CBC mild anemia stable  CMP showed creatinine 1.6 stable    Patient is stable. Nurses report no problems.   Patient is on aspirin  Medications were reviewed  Continue current medications

## 2021-06-22 ENCOUNTER — HOSPITAL ENCOUNTER (OUTPATIENT)
Age: 86
Setting detail: SPECIMEN
Discharge: HOME | End: 2021-06-22
Payer: MEDICARE

## 2021-06-22 LAB
ALBUMIN SERPL-MCNC: 3.6 GM/DL (ref 3.4–5)
ALP BLD-CCNC: 85 IU/L (ref 40–128)
ALT SERPL-CCNC: 33 U/L (ref 10–40)
ANION GAP SERPL CALCULATED.3IONS-SCNC: 12 MMOL/L (ref 4–16)
AST SERPL-CCNC: 32 IU/L (ref 15–37)
BASOPHILS ABSOLUTE: 0 K/CU MM
BASOPHILS RELATIVE PERCENT: 0.4 % (ref 0–1)
BILIRUB SERPL-MCNC: 0.9 MG/DL (ref 0–1)
BUN BLDV-MCNC: 23 MG/DL (ref 6–23)
CALCIUM SERPL-MCNC: 9.3 MG/DL (ref 8.3–10.6)
CHLORIDE BLD-SCNC: 103 MMOL/L (ref 99–110)
CO2: 26 MMOL/L (ref 21–32)
CREAT SERPL-MCNC: 1.5 MG/DL (ref 0.9–1.3)
DIFFERENTIAL TYPE: ABNORMAL
EOSINOPHILS ABSOLUTE: 0.1 K/CU MM
EOSINOPHILS RELATIVE PERCENT: 1.3 % (ref 0–3)
GFR AFRICAN AMERICAN: 54 ML/MIN/1.73M2
GFR NON-AFRICAN AMERICAN: 44 ML/MIN/1.73M2
GLUCOSE BLD-MCNC: 85 MG/DL (ref 70–99)
HCT VFR BLD CALC: 40.6 % (ref 42–52)
HEMOGLOBIN: 13.3 GM/DL (ref 13.5–18)
IMMATURE NEUTROPHIL %: 0.5 % (ref 0–0.43)
LYMPHOCYTES ABSOLUTE: 1.4 K/CU MM
LYMPHOCYTES RELATIVE PERCENT: 18.2 % (ref 24–44)
MCH RBC QN AUTO: 29.8 PG (ref 27–31)
MCHC RBC AUTO-ENTMCNC: 32.8 % (ref 32–36)
MCV RBC AUTO: 91 FL (ref 78–100)
MONOCYTES ABSOLUTE: 1.1 K/CU MM
MONOCYTES RELATIVE PERCENT: 15.3 % (ref 0–4)
NUCLEATED RBC %: 0 %
PDW BLD-RTO: 13.6 % (ref 11.7–14.9)
PLATELET # BLD: 271 K/CU MM (ref 140–440)
PMV BLD AUTO: 10.1 FL (ref 7.5–11.1)
POTASSIUM SERPL-SCNC: 4.5 MMOL/L (ref 3.5–5.1)
RBC # BLD: 4.46 M/CU MM (ref 4.6–6.2)
SEGMENTED NEUTROPHILS ABSOLUTE COUNT: 4.8 K/CU MM
SEGMENTED NEUTROPHILS RELATIVE PERCENT: 64.3 % (ref 36–66)
SODIUM BLD-SCNC: 141 MMOL/L (ref 135–145)
TOTAL IMMATURE NEUTOROPHIL: 0.04 K/CU MM
TOTAL NUCLEATED RBC: 0 K/CU MM
TOTAL PROTEIN: 6.3 GM/DL (ref 6.4–8.2)
WBC # BLD: 7.4 K/CU MM (ref 4–10.5)

## 2021-06-22 PROCEDURE — 36415 COLL VENOUS BLD VENIPUNCTURE: CPT

## 2021-06-22 PROCEDURE — 80053 COMPREHEN METABOLIC PANEL: CPT

## 2021-06-22 PROCEDURE — 85025 COMPLETE CBC W/AUTO DIFF WBC: CPT

## 2021-06-26 ENCOUNTER — OUTSIDE SERVICES (OUTPATIENT)
Dept: INTERNAL MEDICINE CLINIC | Age: 86
End: 2021-06-26
Payer: MEDICARE

## 2021-06-26 DIAGNOSIS — I38 VHD (VALVULAR HEART DISEASE): ICD-10-CM

## 2021-06-26 DIAGNOSIS — N18.31 CHRONIC KIDNEY DISEASE (CKD) STAGE G3A/A3, MODERATELY DECREASED GLOMERULAR FILTRATION RATE (GFR) BETWEEN 45-59 ML/MIN/1.73 SQUARE METER AND ALBUMINURIA CREATININE RATIO GREATER THAN 300 MG/G (HCC): ICD-10-CM

## 2021-06-26 DIAGNOSIS — F03.90 DEMENTIA WITHOUT BEHAVIORAL DISTURBANCE, UNSPECIFIED DEMENTIA TYPE: ICD-10-CM

## 2021-06-26 DIAGNOSIS — I48.19 PERSISTENT ATRIAL FIBRILLATION (HCC): Primary | ICD-10-CM

## 2021-06-26 NOTE — PROGRESS NOTES
Progress note  ___________________________    Mimi Saba Muller's  is 10/5/1933  SEEN ON 2021 at [address]  ___________________________    S:    Discussed with the nurses. Patient has a history of, atrial fibrillation, chronic kidney disease, pacemaker. Cognitive impairment,   Pt states he is feeling well. Pt is monitored for covid 19  Patient has no complaints  No falls and injuries  No fever no chills. No headaches  No respiratory symptoms. No cough or sputum  Patient is seeing nephrologist Dr. Parminder Hernandez. Creatinine 1.5        ALLERGIES:  Allergies   Allergen Reactions    Biaxin [Clarithromycin]     Cephalexin     Viagra [Sildenafil Citrate] Nausea Only and Other (See Comments)     Dizziness and BP dropped        PAST MEDICAL HISTORY:    has a past medical history of ACTA2-related familial thoracic aortic aneurysm, Atrial fibrillation. H/o cardioversion. CHF (congestive heart failure) ([address] Essential hypertension, malignant, Hyperlipidemia, Hypertension,, Persistent atrial fibrillation , Sick sinus syndrome s/p pacemaker. Gall stones-asymptomatic. PAST SURGICAL HISTORY:   has a past surgical history that includes Pacemaker insertion (Left, 2018); CYSTOSCOPY INSERTION / REMOVAL STENT / STONE (Left, 2019); and Upper gastrointestinal endoscopy (N/A, 2020). SOCIAL HISTORY:   reports that he has never smoked. He has never used smokeless tobacco. He reports previous alcohol use. He reports that he does not use drugs. Vital signs: as in [facility]. .  /86  Patient was seen taking COVID-19 precautions. Face mask, gloves were used. Pt is awake and oriented to name. HEENT : Conjunctiva pink no scleral icterus  Neck no masses. Supple. No JVD  Heart: S1-S2 heard   Lungs: Clear no rales or rhonchi breathing is normal  Extremities: No edema noted. CNS patient appears to be comfortable. Confused at times      Assessment:  .  Dementia on Exelon  . Anemia stable. GI work-up not done per family. Seeing Dr. Kylee Jones  . Chronic kidney disease stage III  . Atrial fibrillation on amiodarone  . Sick sinus syndrome status post pacemaker: 2018  . Hyperlipidemia on atorvastatin  . BPH on finasteride  . Congestive heart failure  . Aortic regurgitation: Moderate to severe. Echo: 7/1/2020  . Constipation on MiraLAX  . Abdominal aortic aneurysm 3.8 x 3.7 cm  . Bilateral renal calculi nonobstructing/bladder diverticulum containing stones  . Gallstones       Plan:    Patient is stable.   Medications were reviewed  Continue current medications

## 2021-07-01 PROCEDURE — 93294 REM INTERROG EVL PM/LDLS PM: CPT | Performed by: INTERNAL MEDICINE

## 2021-07-01 PROCEDURE — 93296 REM INTERROG EVL PM/IDS: CPT | Performed by: INTERNAL MEDICINE

## 2021-07-14 ENCOUNTER — PROCEDURE VISIT (OUTPATIENT)
Dept: CARDIOLOGY CLINIC | Age: 86
End: 2021-07-14

## 2021-07-14 DIAGNOSIS — Z95.0 CARDIAC PACEMAKER IN SITU: Primary | ICD-10-CM

## 2021-07-14 DIAGNOSIS — I44.0 AV BLOCK, 1ST DEGREE: ICD-10-CM

## 2021-07-14 DIAGNOSIS — I49.5 SINUS NODE DYSFUNCTION (HCC): ICD-10-CM

## 2021-07-14 PROCEDURE — 93294 REM INTERROG EVL PM/LDLS PM: CPT | Performed by: INTERNAL MEDICINE

## 2021-07-14 PROCEDURE — 93296 REM INTERROG EVL PM/IDS: CPT | Performed by: INTERNAL MEDICINE

## 2021-07-15 ENCOUNTER — OFFICE VISIT (OUTPATIENT)
Dept: CARDIOLOGY CLINIC | Age: 86
End: 2021-07-15
Payer: MEDICARE

## 2021-07-15 ENCOUNTER — TELEPHONE (OUTPATIENT)
Dept: CARDIOLOGY CLINIC | Age: 86
End: 2021-07-15

## 2021-07-15 VITALS
DIASTOLIC BLOOD PRESSURE: 60 MMHG | WEIGHT: 175.8 LBS | HEART RATE: 60 BPM | HEIGHT: 68 IN | SYSTOLIC BLOOD PRESSURE: 100 MMHG | BODY MASS INDEX: 26.64 KG/M2

## 2021-07-15 DIAGNOSIS — I48.91 ATRIAL FIBRILLATION, UNSPECIFIED TYPE (HCC): ICD-10-CM

## 2021-07-15 DIAGNOSIS — I71.21 ASCENDING AORTIC ANEURYSM: ICD-10-CM

## 2021-07-15 DIAGNOSIS — I48.19 PERSISTENT ATRIAL FIBRILLATION (HCC): ICD-10-CM

## 2021-07-15 DIAGNOSIS — I50.32 CHRONIC DIASTOLIC CONGESTIVE HEART FAILURE (HCC): ICD-10-CM

## 2021-07-15 DIAGNOSIS — I38 VHD (VALVULAR HEART DISEASE): Primary | ICD-10-CM

## 2021-07-15 DIAGNOSIS — Z95.0 S/P PLACEMENT OF CARDIAC PACEMAKER: ICD-10-CM

## 2021-07-15 PROCEDURE — 1123F ACP DISCUSS/DSCN MKR DOCD: CPT | Performed by: INTERNAL MEDICINE

## 2021-07-15 PROCEDURE — G8417 CALC BMI ABV UP PARAM F/U: HCPCS | Performed by: INTERNAL MEDICINE

## 2021-07-15 PROCEDURE — G8427 DOCREV CUR MEDS BY ELIG CLIN: HCPCS | Performed by: INTERNAL MEDICINE

## 2021-07-15 PROCEDURE — 1036F TOBACCO NON-USER: CPT | Performed by: INTERNAL MEDICINE

## 2021-07-15 PROCEDURE — 4040F PNEUMOC VAC/ADMIN/RCVD: CPT | Performed by: INTERNAL MEDICINE

## 2021-07-15 PROCEDURE — 93000 ELECTROCARDIOGRAM COMPLETE: CPT | Performed by: INTERNAL MEDICINE

## 2021-07-15 PROCEDURE — 99214 OFFICE O/P EST MOD 30 MIN: CPT | Performed by: INTERNAL MEDICINE

## 2021-07-15 NOTE — LETTER
[address]  [address] [id number]  Phone: [phone]  Fax: [phone]    Yuly Heck MD    July 15, 2021     MD [address] [id number]    Patient: Xena Tong   MR Number: [id number]   YOB: 1933   Date of Visit: 7/15/2021       Dear Zaid Rodriguez: Thank you for referring Batool Davidson to me for evaluation/treatment. Below are the relevant portions of my assessment and plan of care. If you have questions, please do not hesitate to call me. I look forward to following Tyrese Arvizu along with you.     Sincerely,        Yuly Heck MD

## 2021-07-15 NOTE — PROGRESS NOTES
OFFICE PROGRESS NOTES      Alvaro Andrade is a 80 y.o. male who has    CHIEF COMPLAINT AS FOLLOWS:  CHEST PAIN: Patient denies any C/O chest pains at this time.      SOB: No C/O SOB at this time.                 LEG EDEMA: No leg edema   PALPITATIONS: Denies any C/O Palpitations   DIZZINESS: No C/O Dizziness   SYNCOPE: None   OTHER:                                     HPI: Patient is here for F/U on his  VHD,CHF & A-fib problems. VHD: Patient has known Hx of mitral / aortic valve disease. Has had valve repair / surgery in the past.  CHF: Patient has known Hx of systolic (HFrEF) / diastolic (HFpEF). Patient had been managed with medical regimen as stated below. Arrhythmia: Patient has known Hx of Supraventricular / Ventricular arrhythmia in the past.                 He does not have any new complaints at this time. Current Outpatient Medications   Medication Sig Dispense Refill    Nutritional Supplements (ENSURE ENLIVE PO) Take by mouth 3 times daily      aspirin 81 MG EC tablet Take 81 mg by mouth daily      pantoprazole (PROTONIX) 40 MG tablet Take 40 mg by mouth daily      furosemide (LASIX) 20 MG tablet Take 1 tablet by mouth daily as needed (lower leg edema) Take 20 mg of oral lasix at evening, for a total of 40 mg of oral lasix at the evening dose  for the next 3 days.  (Patient taking differently: Take 20 mg by mouth 2 times daily ) 30 tablet 0    spironolactone (ALDACTONE) 25 MG tablet Take 25 mg by mouth daily      polyethylene glycol (GLYCOLAX) powder Take 17 g by mouth daily      Polyvinyl Alcohol-Povidone (REFRESH OP) Apply 1 drop to eye as needed For dry eyes      acetaminophen (TYLENOL) 325 MG tablet Take 650 mg by mouth every 4 hours as needed for Pain For pain or temp       metoprolol tartrate (LOPRESSOR) 25 MG tablet Take 0.5 tablets by mouth 2 times daily (Patient taking differently: Take 12.5 mg by mouth 2 times daily Hold if SBP < 110 or HR is < 55) 30 tablet 0    docusate sodium (COLACE, DULCOLAX) 100 MG CAPS Take 100 mg by mouth 2 times daily as needed for Constipation 30 capsule 0    finasteride (PROSCAR) 5 MG tablet Take 1 tablet by mouth daily 30 tablet 0    amiodarone (CORDARONE) 200 MG tablet Take 1 tablet by mouth daily (Patient taking differently: Take 200 mg by mouth nightly ) 30 tablet 3    rivastigmine (EXELON) 4.6 MG/24HR Place 1 patch onto the skin daily       Cholecalciferol (VITAMIN D3) 5000 units TABS Take 5,000 Units by mouth every morning      Omega-3 Fatty Acids (FISH OIL) 1000 MG CAPS Take 1,000 mg by mouth daily       Multiple Vitamins-Minerals (THERAPEUTIC MULTIVITAMIN-MINERALS) tablet Take 1 tablet by mouth daily      atorvastatin (LIPITOR) 20 MG tablet Take 20 mg by mouth nightly        No current facility-administered medications for this visit. Allergies: Biaxin [clarithromycin], Cephalexin, and Viagra [sildenafil citrate]  Review of Systems:    Constitutional: Negative for diaphoresis and fatigue  Respiratory: Negative for shortness of breath  Cardiovascular: Negative for chest pain, dyspnea on exertion, claudication, edema, irregular heartbeat, murmur, palpitations or shortness of breath  Musculoskeletal: Negative for muscle pain, muscular weakness, negative for pain in arm and leg or swelling in foot and leg    Objective:  /60   Pulse 60   Ht 5' 8\" (1.727 m)   Wt 175 lb 12.8 oz (79.7 kg)   BMI 26.73 kg/m²   Wt Readings from Last 3 Encounters:   07/15/21 175 lb 12.8 oz (79.7 kg)   06/24/21 177 lb 6.4 oz (80.5 kg)   05/03/21 180 lb (81.6 kg)     Body mass index is 26.73 kg/m². GENERAL - Alert, oriented, pleasant, in no apparent distress. EYES: No jaundice, no conjunctival pallor. Neck - Supple. No jugular venous distention noted. No carotid bruits. Cardiovascular - Normal S1 and S2 with 3./6 EDM. Extremities - No cyanosis, clubbing, or significant edema. Pulmonary - No respiratory distress. No wheezes or rales.       Lab Review Lab Results   Component Value Date    TROPONINT 0.027 08/19/2020    TROPONINT 0.036 08/19/2020     Lab Results   Component Value Date    PROBNP 1,672 08/18/2020    PROBNP 1,102 02/14/2020     Lab Results   Component Value Date    INR 1.21 08/19/2020    INR 1.71 09/11/2019     No results found for: LABA1C  Lab Results   Component Value Date    WBC 7.4 06/22/2021    WBC 6.7 05/25/2021    HCT 40.6 (L) 06/22/2021    HCT 41.4 (L) 05/25/2021    MCV 91.0 06/22/2021    MCV 91.8 05/25/2021     06/22/2021     05/25/2021     Lab Results   Component Value Date    CHOL 137 05/24/2019    TRIG 72 05/24/2019    HDL 47 05/24/2019    LDLDIRECT 86 05/24/2019     Lab Results   Component Value Date    ALT 33 06/22/2021    ALT 32 05/25/2021    ALT 32 05/25/2021    AST 32 06/22/2021    AST 29 05/25/2021    AST 29 05/25/2021     BMP:    Lab Results   Component Value Date     06/22/2021     05/25/2021    K 4.5 06/22/2021    K 4.3 05/25/2021     06/22/2021     05/25/2021    CO2 26 06/22/2021    CO2 29 05/25/2021    BUN 23 06/22/2021    BUN 25 05/25/2021    CREATININE 1.5 06/22/2021    CREATININE 1.6 05/25/2021     CMP:   Lab Results   Component Value Date     06/22/2021     05/25/2021    K 4.5 06/22/2021    K 4.3 05/25/2021     06/22/2021     05/25/2021    CO2 26 06/22/2021    CO2 29 05/25/2021    BUN 23 06/22/2021    BUN 25 05/25/2021    CREATININE 1.5 06/22/2021    CREATININE 1.6 05/25/2021    PROT 6.3 06/22/2021    PROT 6.4 05/25/2021    PROT 6.4 05/25/2021     Lab Results   Component Value Date    TSH 2.660 05/25/2021    TSHHS 2.340 11/24/2020     ECHO 7/2020   Left ventricular systolic function is normal with an ejection fraction of 50   to 55 %. Grade I diastolic dysfunction. Mild concentric left ventricular hypertrophy. The left atrium is mildly dilated. Dilatation of the aortic root measuring 3.9 cm. Mild aortic stenosis with mean gradient of 14 mmHg.

## 2021-07-15 NOTE — LETTER
Patient Name: Jackson Beebe  : 10/5/1933  MRN# [id number]    REASON FOR VISIT: with wife       Current Outpatient Medications   Medication Sig Dispense Refill    Nutritional Supplements (ENSURE ENLIVE PO) Take by mouth 3 times daily      aspirin 81 MG EC tablet Take 81 mg by mouth daily      pantoprazole (PROTONIX) 40 MG tablet Take 40 mg by mouth daily      furosemide (LASIX) 20 MG tablet Take 1 tablet by mouth daily as needed (lower leg edema) Take 20 mg of oral lasix at evening, for a total of 40 mg of oral lasix at the evening dose  for the next 3 days.  (Patient taking differently: Take 20 mg by mouth 2 times daily ) 30 tablet 0    spironolactone (ALDACTONE) 25 MG tablet Take 25 mg by mouth daily      polyethylene glycol (GLYCOLAX) powder Take 17 g by mouth daily      Polyvinyl Alcohol-Povidone (REFRESH OP) Apply 1 drop to eye as needed For dry eyes      acetaminophen (TYLENOL) 325 MG tablet Take 650 mg by mouth every 4 hours as needed for Pain For pain or temp       metoprolol tartrate (LOPRESSOR) 25 MG tablet Take 0.5 tablets by mouth 2 times daily (Patient taking differently: Take 12.5 mg by mouth 2 times daily Hold if SBP < 110 or HR is < 55) 30 tablet 0    docusate sodium (COLACE, DULCOLAX) 100 MG CAPS Take 100 mg by mouth 2 times daily as needed for Constipation 30 capsule 0    finasteride (PROSCAR) 5 MG tablet Take 1 tablet by mouth daily 30 tablet 0    amiodarone (CORDARONE) 200 MG tablet Take 1 tablet by mouth daily (Patient taking differently: Take 200 mg by mouth nightly ) 30 tablet 3    rivastigmine (EXELON) 4.6 MG/24HR Place 1 patch onto the skin daily       Cholecalciferol (VITAMIN D3) 5000 units TABS Take 5,000 Units by mouth every morning      Omega-3 Fatty Acids (FISH OIL) 1000 MG CAPS Take 1,000 mg by mouth daily       Multiple Vitamins-Minerals (THERAPEUTIC MULTIVITAMIN-MINERALS) tablet Take 1 tablet by mouth daily      atorvastatin (LIPITOR) 20 MG tablet Take 20 mg by mouth nightly        No current facility-administered medications for this visit. Smoke: What:                           How much:    Alcohol: How Much:     Caffeine: Pop:         Tea:            Coffee:                Chocolate:    Exercise:    Labs: Lipids:             CBC:       BMP/CMP:          TSH:              A1C:    Last Visit:  Complaints:  Changes:    Last EKG:    CTA Chest W WO Contrast: 4/2018  1. 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  The   ascending thoracic aorta measures up to 4.8 cm in diameter.  The descending   thoracic aorta measures approximately up to 4.3 cm in diameter.  No thoracic   aortic dissection identified. 2. Small right pleural effusion and partial right lower lobe atelectasis. 3. Minimal ground-glass opacity in the right upper lobe.  Findings suspicious   for mild pneumonia. 4. Cholelithiasis. STRESS TEST:  NONE    ECHO: 7/2020   Left ventricular systolic function is normal with an ejection fraction of 50   to 55 %. Grade I diastolic dysfunction. Mild concentric left ventricular hypertrophy. The left atrium is mildly dilated. Dilatation of the aortic root measuring 3.9 cm. Mild aortic stenosis with mean gradient of 14 mmHg. Moderate to severe aortic regurgitation is noted with a pressure half time   of 421 msec. Right ventricular systolic pressure of 41 mmHg consistent with mild   pulmonary hypertension.    No evidence of pericardial effusion    CAROTID: NONE    MUGA: NONE    LAST PACER CHECK: 7/11/2021    CARDIAC CATH: NONE    [facility] Protocol:    CHADS: [id number] Score for Atrial Fibrillation Stroke Risk   Risk   Factors  Component Value   C CHF Yes 1   H HTN Yes 1   A2 Age >= 76 Yes,  (80 y.o.) 2   D DM No 0   S2 Prior Stroke/TIA No 0   V Vascular Disease No 0   A Age 74-69 No,  (80 y.o.) 0   Sc Sex male 0    [id number]  Score  4   Score last updated 7/15/21 79:05 AM EDT    Click here for a link to the UpToDate guideline \"Atrial Fibrillation: Anticoagulation therapy to prevent embolization    Disclaimer: Risk Score calculation is dependent on accuracy of patient problem list and past encounter diagnosis.

## 2021-07-15 NOTE — PATIENT INSTRUCTIONS
CAD:None known  HTN:well controlled on current medical regimen, see list above.             - changes in  treatment:   no   CARDIOMYOPATHY: None known   CONGESTIVE HEART FAILURE:  KNOWN HISTORY. Compensated      VHD: Moderate to severe AI  DYSLIPIDEMIA: Patient's profile is at / near [facility] most recent Lab values in Labs section above.   Diabetes mellitis: .no  ARRHYTHMIAS:  Known.                                Patient has H/O Atrial fibrillation                                He is rate controlled & on anticoagulation due to low Hgb.                     OTHER RELEVANT DIAGNOSIS:as noted in patient's active problem list:  TESTS ORDERED: ECHO                                     All previously ordered tests reviewed.  MEDICATIONS: CPM   Office f/u in six months. Device check per protocol.

## 2021-07-15 NOTE — PROGRESS NOTES
[id number] Score for Atrial Fibrillation Stroke Risk   Risk   Factors  Component Value   C CHF Yes 1   H HTN Yes 1   A2 Age >= 76 Yes,  (80 y.o.) 2   D DM No 0   S2 Prior Stroke/TIA No 0   V Vascular Disease No 0   A Age 74-69 No,  (80 y.o.) 0   Sc Sex male 0    [id number]  Score  4   Score last updated 7/15/21 1:33 PM EDT    Click here for a link to the UpToDate guideline \"Atrial Fibrillation: Anticoagulation therapy to prevent embolization    Disclaimer: Risk Score calculation is dependent on accuracy of patient problem list and past encounter diagnosis.

## 2021-07-15 NOTE — PROGRESS NOTES
When Pt was started on Amiodarone:     Test                     Date of last test  EKG                     [x] today    PFT                      []                                                                         CXR                     [x]  8/2020                                                                      THYROID            [x]    5/2021                                            LFT                      [x]5/2021                                          EYE EXAM          [x] 2021

## 2021-07-27 ENCOUNTER — OUTSIDE SERVICES (OUTPATIENT)
Dept: INTERNAL MEDICINE CLINIC | Age: 86
End: 2021-07-27
Payer: MEDICARE

## 2021-07-27 ENCOUNTER — HOSPITAL ENCOUNTER (OUTPATIENT)
Age: 86
Setting detail: SPECIMEN
Discharge: HOME | End: 2021-07-27
Payer: MEDICARE

## 2021-07-27 DIAGNOSIS — I38 VHD (VALVULAR HEART DISEASE): ICD-10-CM

## 2021-07-27 DIAGNOSIS — F03.90 DEMENTIA WITHOUT BEHAVIORAL DISTURBANCE, UNSPECIFIED DEMENTIA TYPE: ICD-10-CM

## 2021-07-27 DIAGNOSIS — I50.43 CHF (CONGESTIVE HEART FAILURE), NYHA CLASS I, ACUTE ON CHRONIC, COMBINED (HCC): ICD-10-CM

## 2021-07-27 DIAGNOSIS — Z95.0 S/P PLACEMENT OF CARDIAC PACEMAKER: ICD-10-CM

## 2021-07-27 DIAGNOSIS — N18.31 CHRONIC KIDNEY DISEASE (CKD) STAGE G3A/A3, MODERATELY DECREASED GLOMERULAR FILTRATION RATE (GFR) BETWEEN 45-59 ML/MIN/1.73 SQUARE METER AND ALBUMINURIA CREATININE RATIO GREATER THAN 300 MG/G (HCC): Primary | ICD-10-CM

## 2021-07-27 DIAGNOSIS — I48.91 ATRIAL FIBRILLATION, UNSPECIFIED TYPE (HCC): ICD-10-CM

## 2021-07-27 LAB
ALBUMIN SERPL-MCNC: 3.8 GM/DL (ref 3.4–5)
ALP BLD-CCNC: 98 IU/L (ref 40–128)
ALT SERPL-CCNC: 31 U/L (ref 10–40)
ANION GAP SERPL CALCULATED.3IONS-SCNC: 9 MMOL/L (ref 4–16)
AST SERPL-CCNC: 31 IU/L (ref 15–37)
BILIRUB SERPL-MCNC: 0.8 MG/DL (ref 0–1)
BUN BLDV-MCNC: 22 MG/DL (ref 6–23)
CALCIUM SERPL-MCNC: 9.2 MG/DL (ref 8.3–10.6)
CHLORIDE BLD-SCNC: 101 MMOL/L (ref 99–110)
CO2: 29 MMOL/L (ref 21–32)
CREAT SERPL-MCNC: 1.6 MG/DL (ref 0.9–1.3)
GFR AFRICAN AMERICAN: 50 ML/MIN/1.73M2
GFR NON-AFRICAN AMERICAN: 41 ML/MIN/1.73M2
GLUCOSE BLD-MCNC: 92 MG/DL (ref 70–99)
HCT VFR BLD CALC: 42.2 % (ref 42–52)
HEMOGLOBIN: 13.1 GM/DL (ref 13.5–18)
MCH RBC QN AUTO: 28.9 PG (ref 27–31)
MCHC RBC AUTO-ENTMCNC: 31 % (ref 32–36)
MCV RBC AUTO: 93 FL (ref 78–100)
PDW BLD-RTO: 14.2 % (ref 11.7–14.9)
PLATELET # BLD: 238 K/CU MM (ref 140–440)
PMV BLD AUTO: 10.5 FL (ref 7.5–11.1)
POTASSIUM SERPL-SCNC: 4.1 MMOL/L (ref 3.5–5.1)
RBC # BLD: 4.54 M/CU MM (ref 4.6–6.2)
SODIUM BLD-SCNC: 139 MMOL/L (ref 135–145)
TOTAL PROTEIN: 6.4 GM/DL (ref 6.4–8.2)
WBC # BLD: 7.3 K/CU MM (ref 4–10.5)

## 2021-07-27 PROCEDURE — 85027 COMPLETE CBC AUTOMATED: CPT

## 2021-07-27 PROCEDURE — 36415 COLL VENOUS BLD VENIPUNCTURE: CPT

## 2021-07-27 PROCEDURE — 80053 COMPREHEN METABOLIC PANEL: CPT

## 2021-07-28 NOTE — PROGRESS NOTES
Progress note  ___________________________    Katia Muller's  is 10/5/1933  SEEN ON 2021 at [address]  ___________________________    S:    Patient is doing well. Has no complaints. Denies any chest pain. No shortness of breath no cough or sputum production no abdominal pain  He is ambulatory. He visits his wife daily    Discussed with the nurses. Patient has a history of, atrial fibrillation, chronic kidney disease, pacemaker. Cognitive impairment  Patient has no complaints  No falls and injuries  No fever no chills. No headaches  No respiratory symptoms. No cough or sputum        ALLERGIES:  Allergies   Allergen Reactions    Biaxin [Clarithromycin]     Cephalexin     Viagra [Sildenafil Citrate] Nausea Only and Other (See Comments)     Dizziness and BP dropped        PAST MEDICAL HISTORY:    has a past medical history of ACTA2-related familial thoracic aortic aneurysm, Atrial fibrillation. H/o cardioversion. CHF (congestive heart failure) ([address] Essential hypertension, malignant, Hyperlipidemia, Hypertension,, Persistent atrial fibrillation , Sick sinus syndrome s/p pacemaker. Gall stones-asymptomatic. PAST SURGICAL HISTORY:   has a past surgical history that includes Pacemaker insertion (Left, 2018); CYSTOSCOPY INSERTION / REMOVAL STENT / STONE (Left, 2019); and Upper gastrointestinal endoscopy (N/A, 2020). SOCIAL HISTORY:   reports that he has never smoked. He has never used smokeless tobacco. He reports previous alcohol use. He reports that he does not use drugs. Vital signs: as in [facility] /72 O2 sat normal. afebrile  Patient was seen taking COVID-19 precautions. Face mask, gloves were used. Pt is awake and oriented to name. HEENT : Conjunctiva pink no scleral icterus  Neck no masses. Supple.   No JVD  Heart: S1-S2 heard   Lungs: Clear no rales or rhonchi breathing is normal  Extremities: No edema noted. CNS patient appears to be comfortable. Confused at times      Assessment:  .  Dementia on Exelon  . Anemia stable. GI work-up not done per family. Seeing Dr. Nugent Fails  . Chronic kidney disease stage III  . Atrial fibrillation on amiodarone  . Sick sinus syndrome status post pacemaker: 2018  . Hyperlipidemia on atorvastatin  . BPH on finasteride  . Congestive heart failure  . Aortic regurgitation: Moderate to severe. Echo: 7/1/2020  . Constipation on MiraLAX  . Abdominal aortic aneurysm 3.8 x 3.7 cm  . Bilateral renal calculi nonobstructing/bladder diverticulum containing stones  . Gallstones       Plan:  Labs done on 5/25/2021 reviewed  TSH is normal  CBC mild anemia stable  CMP showed creatinine 1.6 stable    Patient is stable. Nurses report no problems.   Patient is on aspirin  Medications were reviewed  Continue current medications

## 2021-08-05 ENCOUNTER — PROCEDURE VISIT (OUTPATIENT)
Dept: CARDIOLOGY CLINIC | Age: 86
End: 2021-08-05
Payer: MEDICARE

## 2021-08-05 DIAGNOSIS — I71.21 ASCENDING AORTIC ANEURYSM: ICD-10-CM

## 2021-08-05 DIAGNOSIS — I35.1 NONRHEUMATIC AORTIC VALVE INSUFFICIENCY: Primary | ICD-10-CM

## 2021-08-05 DIAGNOSIS — I38 VHD (VALVULAR HEART DISEASE): ICD-10-CM

## 2021-08-05 DIAGNOSIS — I48.91 ATRIAL FIBRILLATION, UNSPECIFIED TYPE (HCC): ICD-10-CM

## 2021-08-05 DIAGNOSIS — I48.19 PERSISTENT ATRIAL FIBRILLATION (HCC): ICD-10-CM

## 2021-08-05 DIAGNOSIS — I50.32 CHRONIC DIASTOLIC CONGESTIVE HEART FAILURE (HCC): ICD-10-CM

## 2021-08-05 DIAGNOSIS — Z95.0 S/P PLACEMENT OF CARDIAC PACEMAKER: ICD-10-CM

## 2021-08-05 LAB
LV EF: 50 %
LVEF MODALITY: NORMAL

## 2021-08-05 PROCEDURE — 93306 TTE W/DOPPLER COMPLETE: CPT | Performed by: INTERNAL MEDICINE

## 2021-08-06 ENCOUNTER — TELEPHONE (OUTPATIENT)
Dept: CARDIOLOGY CLINIC | Age: 86
End: 2021-08-06

## 2021-08-06 NOTE — TELEPHONE ENCOUNTER
Multiple views of the left main, left coronary artery, LAD and circumflex artery obtained using hand injection. Spoke to patients daughter regarding lasix. She would like the 20mg called in. The 40 mg tablets are too hard for the patient to cut in half. Stated that we would take care of that for her.

## 2021-08-06 NOTE — TELEPHONE ENCOUNTER
Patient understood verbally     Echo:  Left ventricular systolic function is low normal with an ejection fraction   of 50 %. Mild concentric left ventricular hypertrophy. The left atrium is mildly dilated. Dilatation of the aortic root measuring 3.9 cm. Dilatation of the ascending aorta measuring 4.3 cm. Mitral annular calcification is present. Mild aortic stenosis with mean gradient of 11 mmHg. Doppler evaluation reveals moderate aortic and mild mitral and tricuspid   regurgitation. Right ventricular systolic pressure of 40 mmHg consistent with mild   pulmonary hypertension. No evidence of pericardial effusion.

## 2021-08-15 ENCOUNTER — OUTSIDE SERVICES (OUTPATIENT)
Dept: INTERNAL MEDICINE CLINIC | Age: 86
End: 2021-08-15
Payer: MEDICARE

## 2021-08-15 DIAGNOSIS — I48.91 ATRIAL FIBRILLATION, UNSPECIFIED TYPE (HCC): ICD-10-CM

## 2021-08-15 DIAGNOSIS — F03.90 DEMENTIA WITHOUT BEHAVIORAL DISTURBANCE, UNSPECIFIED DEMENTIA TYPE: Primary | ICD-10-CM

## 2021-08-15 DIAGNOSIS — I50.43 CHF (CONGESTIVE HEART FAILURE), NYHA CLASS I, ACUTE ON CHRONIC, COMBINED (HCC): ICD-10-CM

## 2021-08-15 DIAGNOSIS — N18.31 CHRONIC KIDNEY DISEASE (CKD) STAGE G3A/A3, MODERATELY DECREASED GLOMERULAR FILTRATION RATE (GFR) BETWEEN 45-59 ML/MIN/1.73 SQUARE METER AND ALBUMINURIA CREATININE RATIO GREATER THAN 300 MG/G (HCC): ICD-10-CM

## 2021-08-15 DIAGNOSIS — Z95.0 S/P PLACEMENT OF CARDIAC PACEMAKER: ICD-10-CM

## 2021-08-16 NOTE — PROGRESS NOTES
Patient was seen on 5/31/2021 and the note was made in telephone encounter as outside services in the epic was not working.

## 2021-08-24 ENCOUNTER — HOSPITAL ENCOUNTER (OUTPATIENT)
Age: 86
Setting detail: SPECIMEN
Discharge: HOME | End: 2021-08-24
Payer: MEDICARE

## 2021-08-24 LAB
HCT VFR BLD CALC: 42.7 % (ref 42–52)
HEMOGLOBIN: 13.2 GM/DL (ref 13.5–18)
MCH RBC QN AUTO: 29 PG (ref 27–31)
MCHC RBC AUTO-ENTMCNC: 30.9 % (ref 32–36)
MCV RBC AUTO: 93.8 FL (ref 78–100)
PDW BLD-RTO: 14.3 % (ref 11.7–14.9)
PLATELET # BLD: 261 K/CU MM (ref 140–440)
PMV BLD AUTO: 10.5 FL (ref 7.5–11.1)
RBC # BLD: 4.55 M/CU MM (ref 4.6–6.2)
WBC # BLD: 7.6 K/CU MM (ref 4–10.5)

## 2021-08-24 PROCEDURE — 85027 COMPLETE CBC AUTOMATED: CPT

## 2021-08-24 PROCEDURE — 36415 COLL VENOUS BLD VENIPUNCTURE: CPT

## 2021-08-30 ENCOUNTER — OUTSIDE SERVICES (OUTPATIENT)
Dept: INTERNAL MEDICINE CLINIC | Age: 86
End: 2021-08-30
Payer: MEDICARE

## 2021-08-30 DIAGNOSIS — I48.91 ATRIAL FIBRILLATION, UNSPECIFIED TYPE (HCC): Primary | ICD-10-CM

## 2021-08-30 DIAGNOSIS — I48.19 PERSISTENT ATRIAL FIBRILLATION (HCC): ICD-10-CM

## 2021-08-30 DIAGNOSIS — Z95.0 S/P PLACEMENT OF CARDIAC PACEMAKER: ICD-10-CM

## 2021-08-30 DIAGNOSIS — I50.43 CHF (CONGESTIVE HEART FAILURE), NYHA CLASS I, ACUTE ON CHRONIC, COMBINED (HCC): ICD-10-CM

## 2021-08-30 DIAGNOSIS — N40.0 BENIGN PROSTATIC HYPERPLASIA WITHOUT LOWER URINARY TRACT SYMPTOMS: ICD-10-CM

## 2021-08-30 DIAGNOSIS — F03.90 DEMENTIA WITHOUT BEHAVIORAL DISTURBANCE, UNSPECIFIED DEMENTIA TYPE: ICD-10-CM

## 2021-08-31 NOTE — PROGRESS NOTES
Progress note  ___________________________    Elliott Muller's  is 10/5/1933  SEEN ON 2021 at [address]  ___________________________    S:    Pt states he is doing well  He had a fall earlier this month when his shoes slipped on wet floor when he was coming from porch. No major injury. Nurses checked him and he was not complaining of any pain. He was ambulating well without any pain. He had his teeth pulled but oral surgeon. .  Patient states he is doing well  Patient is doing well. Has no complaints. Denies any chest pain. No shortness of breath no cough or sputum production no abdominal pain  He is ambulatory. He visits his wife daily  Patient has a history of, atrial fibrillation, chronic kidney disease, pacemaker. Cognitive impairment  No falls and injuries  No fever no chills. No headaches  No respiratory symptoms. No cough or sputum        ALLERGIES:  Allergies   Allergen Reactions    Biaxin [Clarithromycin]     Cephalexin     Viagra [Sildenafil Citrate] Nausea Only and Other (See Comments)     Dizziness and BP dropped        PAST MEDICAL HISTORY:    has a past medical history of ACTA2-related familial thoracic aortic aneurysm, Atrial fibrillation. H/o cardioversion. [address]F (congestive heart failure) ([facility] [address] Essential hypertension, malignant, Hyperlipidemia, Hypertension,, Persistent atrial fibrillation , Sick sinus syndrome s/p pacemaker. Gall stones-asymptomatic. PAST SURGICAL HISTORY:   has a past surgical history that includes Pacemaker insertion (Left, 2018); CYSTOSCOPY INSERTION / REMOVAL STENT / STONE (Left, 2019); and Upper gastrointestinal endoscopy (N/A, 2020). SOCIAL HISTORY:   reports that he has never smoked. He has never used smokeless tobacco. He reports previous alcohol use. He reports that he does not use drugs. Vital signs: as in [facility].   /68 O2 saturation 97% RR 16 pulse of 70  Patient was seen taking COVID-19 precautions. Face mask, gloves were used. Pt is awake and oriented to name. HEENT : Conjunctiva pink no scleral icterus  Neck no masses. Supple. No JVD  Heart: S1-S2 heard   Lungs: Clear no rales or rhonchi breathing is normal  Extremities: No edema noted. CNS patient appears to be comfortable. Confused at times      Assessment:  .  Dementia on Exelon  . Anemia stable. GI work-up not done per family. Seeing Dr. Kalani Goddard  . Chronic kidney disease stage III  . Atrial fibrillation on amiodarone and metoprolol  . Sick sinus syndrome status post pacemaker: 2018  . Hyperlipidemia on atorvastatin  . BPH on finasteride  . Congestive heart failure on lasix and aldactone  . Aortic regurgitation: Moderate to severe. Echo: 7/1/2020  . Constipation on MiraLAX  . Abdominal aortic aneurysm 3.8 x 3.7 cm  . Bilateral renal calculi nonobstructing/bladder diverticulum containing stones  . Gallstones       Plan:  CBC is stable. Mild anemia.    Patient is on aspirin  Medications were reviewed  Continue current medications

## 2021-09-24 ENCOUNTER — HOSPITAL ENCOUNTER (OUTPATIENT)
Age: 86
Setting detail: SPECIMEN
Discharge: HOME | End: 2021-09-24
Payer: MEDICARE

## 2021-09-24 LAB
HCT VFR BLD CALC: 42.9 % (ref 42–52)
HEMOGLOBIN: 13.6 GM/DL (ref 13.5–18)
MCH RBC QN AUTO: 29.1 PG (ref 27–31)
MCHC RBC AUTO-ENTMCNC: 31.7 % (ref 32–36)
MCV RBC AUTO: 91.9 FL (ref 78–100)
PDW BLD-RTO: 14 % (ref 11.7–14.9)
PLATELET # BLD: 269 K/CU MM (ref 140–440)
PMV BLD AUTO: 10.6 FL (ref 7.5–11.1)
RBC # BLD: 4.67 M/CU MM (ref 4.6–6.2)
WBC # BLD: 7.1 K/CU MM (ref 4–10.5)

## 2021-09-24 PROCEDURE — 36415 COLL VENOUS BLD VENIPUNCTURE: CPT

## 2021-09-24 PROCEDURE — 85027 COMPLETE CBC AUTOMATED: CPT

## 2021-09-30 ENCOUNTER — OUTSIDE SERVICES (OUTPATIENT)
Dept: INTERNAL MEDICINE CLINIC | Age: 86
End: 2021-09-30
Payer: MEDICARE

## 2021-09-30 DIAGNOSIS — I50.43 CHF (CONGESTIVE HEART FAILURE), NYHA CLASS I, ACUTE ON CHRONIC, COMBINED (HCC): ICD-10-CM

## 2021-09-30 DIAGNOSIS — Z95.0 S/P PLACEMENT OF CARDIAC PACEMAKER: ICD-10-CM

## 2021-09-30 DIAGNOSIS — F03.90 DEMENTIA WITHOUT BEHAVIORAL DISTURBANCE, UNSPECIFIED DEMENTIA TYPE: Primary | ICD-10-CM

## 2021-09-30 DIAGNOSIS — I48.91 ATRIAL FIBRILLATION, UNSPECIFIED TYPE (HCC): ICD-10-CM

## 2021-10-01 NOTE — PROGRESS NOTES
Progress note  ___________________________    Elva Muller's  is 10/5/1933  SEEN ON 2021 at [address]  ___________________________    S:  Nurses report patient is doing well. Had no more  falls but no injuries  Pt's are isolated because of positive covid 19 in staff  Has no complaints. No shortness of breath no cough or sputum production no abdominal pain  He is ambulatory. He visits his wife daily  Patient has a history of, atrial fibrillation, chronic kidney disease, pacemaker. Cognitive impairment  No fever no chills. No headaches  No respiratory symptoms. No cough or sputum        ALLERGIES:  Allergies   Allergen Reactions    Biaxin [Clarithromycin]     Cephalexin     Viagra [Sildenafil Citrate] Nausea Only and Other (See Comments)     Dizziness and BP dropped        PAST MEDICAL HISTORY:    has a past medical history of ACTA2-related familial thoracic aortic aneurysm, Atrial fibrillation. H/o cardioversion. CHF (congestive heart failure) ([address] Essential hypertension, malignant, Hyperlipidemia, Hypertension,, Persistent atrial fibrillation , Sick sinus syndrome s/p pacemaker. Gall stones-asymptomatic. PAST SURGICAL HISTORY:   has a past surgical history that includes Pacemaker insertion (Left, 2018); CYSTOSCOPY INSERTION / REMOVAL STENT / STONE (Left, 2019); and Upper gastrointestinal endoscopy (N/A, 2020). SOCIAL HISTORY:   reports that he has never smoked. He has never used smokeless tobacco. He reports previous alcohol use. He reports that he does not use drugs. Vital signs: /70 O2 sat 97 %  Patient was seen taking COVID-19 precautions. Face mask, gloves were used. Limited examination done. Pt is awake and oriented to name. HEENT : not examined. Neck No JVD  Heart: not examined. Lungs: breathing normal.   Extremities: No edema noted.    CNS patient appears to be comfortable. Confused at times      Assessment:  .  Dementia on Exelon  . Anemia stable. GI work-up not done per family. Seeing Dr. Yuliet Mcduffie  . Chronic kidney disease stage III  . Atrial fibrillation on amiodarone and metoprolol   . Sick sinus syndrome status post pacemaker: 2018  . Hyperlipidemia on atorvastatin  . BPH on finasteride  . Congestive heart failure lasix and spironolactone. .  Aortic regurgitation: Moderate to severe. Echo: 7/1/2020  . Constipation on MiraLAX  . Abdominal aortic aneurysm 3.8 x 3.7 cm  . Bilateral renal calculi nonobstructing/bladder diverticulum containing stones  . Gallstones       Plan:    Patient is stable. Nurses report no problems. Falls precautions.    Medications were reviewed  Continue current medications

## 2021-10-17 PROCEDURE — 93296 REM INTERROG EVL PM/IDS: CPT | Performed by: INTERNAL MEDICINE

## 2021-10-17 PROCEDURE — 93294 REM INTERROG EVL PM/LDLS PM: CPT | Performed by: INTERNAL MEDICINE

## 2021-10-19 ENCOUNTER — PROCEDURE VISIT (OUTPATIENT)
Dept: CARDIOLOGY CLINIC | Age: 86
End: 2021-10-19
Payer: MEDICARE

## 2021-10-19 DIAGNOSIS — I49.5 SINUS NODE DYSFUNCTION (HCC): ICD-10-CM

## 2021-10-19 DIAGNOSIS — I44.0 AV BLOCK, 1ST DEGREE: ICD-10-CM

## 2021-10-19 DIAGNOSIS — Z95.0 CARDIAC PACEMAKER IN SITU: Primary | ICD-10-CM

## 2021-10-23 ENCOUNTER — OUTSIDE SERVICES (OUTPATIENT)
Dept: INTERNAL MEDICINE CLINIC | Age: 86
End: 2021-10-23
Payer: MEDICARE

## 2021-10-23 DIAGNOSIS — Z95.0 S/P PLACEMENT OF CARDIAC PACEMAKER: ICD-10-CM

## 2021-10-23 DIAGNOSIS — I48.19 PERSISTENT ATRIAL FIBRILLATION (HCC): ICD-10-CM

## 2021-10-23 DIAGNOSIS — F03.90 DEMENTIA WITHOUT BEHAVIORAL DISTURBANCE, UNSPECIFIED DEMENTIA TYPE: Primary | ICD-10-CM

## 2021-10-23 DIAGNOSIS — I48.91 ATRIAL FIBRILLATION, UNSPECIFIED TYPE (HCC): ICD-10-CM

## 2021-10-23 DIAGNOSIS — N18.31 CHRONIC KIDNEY DISEASE (CKD) STAGE G3A/A3, MODERATELY DECREASED GLOMERULAR FILTRATION RATE (GFR) BETWEEN 45-59 ML/MIN/1.73 SQUARE METER AND ALBUMINURIA CREATININE RATIO GREATER THAN 300 MG/G (HCC): ICD-10-CM

## 2021-10-23 NOTE — PROGRESS NOTES
Progress note  ___________________________    Jodie Muller's  is 10/5/1933  SEEN ON 10/23/2021  at [address]  ___________________________    S:  Nurses report patient is doing well. Had no more  falls but no injuries  Pt states he is doing well. Has no  Complaints  Working on [employer] today   No shortness of breath no cough or sputum production no abdominal pain  He is ambulatory. He visits his wife daily  Patient has a history of, atrial fibrillation, chronic kidney disease, pacemaker. Cognitive impairment  No fever no chills. No headaches  No respiratory symptoms. No cough or sputum        ALLERGIES:  Allergies   Allergen Reactions    Biaxin [Clarithromycin]     Cephalexin     Viagra [Sildenafil Citrate] Nausea Only and Other (See Comments)     Dizziness and BP dropped        PAST MEDICAL HISTORY:    has a past medical history of ACTA2-related familial thoracic aortic aneurysm, Atrial fibrillation. H/o cardioversion. CHF (congestive heart failure) ([address] Essential hypertension, malignant, Hyperlipidemia, Hypertension,, Persistent atrial fibrillation , Sick sinus syndrome s/p pacemaker. Gall stones-asymptomatic. PAST SURGICAL HISTORY:   has a past surgical history that includes Pacemaker insertion (Left, 2018); CYSTOSCOPY INSERTION / REMOVAL STENT / STONE (Left, 2019); and Upper gastrointestinal endoscopy (N/A, 2020). SOCIAL HISTORY:   reports that he has never smoked. He has never used smokeless tobacco. He reports previous alcohol use. He reports that he does not use drugs. Vital signs: /69. Temp 97.8. Pulse 69. RR 17.  O2 sat 95% and weight 165 pounds  Patient was seen taking COVID-19 precautions. Face mask, eye protectors and gloves were used. Limited examination done. Pt is awake and oriented to name.   HEENT : WNL   Neck No JVD  Heart: S1 S2   Lungs: breathing normal.   Extremities: No edema noted. CNS patient appears to be comfortable. Confused at times      Assessment:  .  Dementia on Exelon  . Anemia stable. GI work-up not done per family. Seeing Dr. Rui Vieira  . Chronic kidney disease stage III  . Atrial fibrillation on amiodarone and metoprolol   . Sick sinus syndrome status post pacemaker: 2018  . Hyperlipidemia on atorvastatin  . BPH on finasteride  . Congestive heart failure lasix and spironolactone. .  Aortic regurgitation: Moderate to severe. Echo: 7/1/2020  . Constipation on MiraLAX  . Abdominal aortic aneurysm 3.8 x 3.7 cm  . Bilateral renal calculi nonobstructing/bladder diverticulum containing stones  . Gallstones       Plan:    Patient is stable. Falls precautions.    Medications were reviewed  Continue current medications

## 2021-10-26 ENCOUNTER — HOSPITAL ENCOUNTER (OUTPATIENT)
Age: 86
Setting detail: SPECIMEN
Discharge: HOME | End: 2021-10-26
Payer: MEDICARE

## 2021-10-26 LAB
ALBUMIN SERPL-MCNC: 3.6 GM/DL (ref 3.4–5)
ALP BLD-CCNC: 121 IU/L (ref 40–128)
ALT SERPL-CCNC: 84 U/L (ref 10–40)
ANION GAP SERPL CALCULATED.3IONS-SCNC: 12 MMOL/L (ref 4–16)
AST SERPL-CCNC: 62 IU/L (ref 15–37)
BILIRUB SERPL-MCNC: 1 MG/DL (ref 0–1)
BUN BLDV-MCNC: 26 MG/DL (ref 6–23)
CALCIUM SERPL-MCNC: 9.3 MG/DL (ref 8.3–10.6)
CHLORIDE BLD-SCNC: 101 MMOL/L (ref 99–110)
CO2: 27 MMOL/L (ref 21–32)
CREAT SERPL-MCNC: 1.5 MG/DL (ref 0.9–1.3)
GFR AFRICAN AMERICAN: 53 ML/MIN/1.73M2
GFR NON-AFRICAN AMERICAN: 44 ML/MIN/1.73M2
GLUCOSE BLD-MCNC: 123 MG/DL (ref 70–99)
HCT VFR BLD CALC: 40.4 % (ref 42–52)
HEMOGLOBIN: 12.8 GM/DL (ref 13.5–18)
MCH RBC QN AUTO: 29.2 PG (ref 27–31)
MCHC RBC AUTO-ENTMCNC: 31.7 % (ref 32–36)
MCV RBC AUTO: 92.2 FL (ref 78–100)
PDW BLD-RTO: 13.5 % (ref 11.7–14.9)
PLATELET # BLD: 294 K/CU MM (ref 140–440)
PMV BLD AUTO: 10 FL (ref 7.5–11.1)
POTASSIUM SERPL-SCNC: 4.2 MMOL/L (ref 3.5–5.1)
RBC # BLD: 4.38 M/CU MM (ref 4.6–6.2)
SODIUM BLD-SCNC: 140 MMOL/L (ref 135–145)
TOTAL PROTEIN: 6.2 GM/DL (ref 6.4–8.2)
WBC # BLD: 8.6 K/CU MM (ref 4–10.5)

## 2021-10-26 PROCEDURE — 36415 COLL VENOUS BLD VENIPUNCTURE: CPT

## 2021-10-26 PROCEDURE — 85027 COMPLETE CBC AUTOMATED: CPT

## 2021-10-26 PROCEDURE — 80053 COMPREHEN METABOLIC PANEL: CPT

## 2021-11-15 ENCOUNTER — OUTSIDE SERVICES (OUTPATIENT)
Dept: INTERNAL MEDICINE CLINIC | Age: 86
End: 2021-11-15
Payer: MEDICARE

## 2021-11-15 DIAGNOSIS — I50.32 CHRONIC DIASTOLIC CONGESTIVE HEART FAILURE (HCC): ICD-10-CM

## 2021-11-15 DIAGNOSIS — Z95.0 S/P PLACEMENT OF CARDIAC PACEMAKER: ICD-10-CM

## 2021-11-15 DIAGNOSIS — F03.90 DEMENTIA WITHOUT BEHAVIORAL DISTURBANCE, UNSPECIFIED DEMENTIA TYPE: Primary | ICD-10-CM

## 2021-11-15 DIAGNOSIS — I48.19 PERSISTENT ATRIAL FIBRILLATION (HCC): ICD-10-CM

## 2021-11-15 PROCEDURE — 99308 SBSQ NF CARE LOW MDM 20: CPT | Performed by: INTERNAL MEDICINE

## 2021-11-16 ENCOUNTER — HOSPITAL ENCOUNTER (OUTPATIENT)
Age: 86
Setting detail: SPECIMEN
Discharge: HOME | End: 2021-11-16
Payer: MEDICARE

## 2021-11-16 LAB
ALBUMIN SERPL-MCNC: 3.2 GM/DL (ref 3.4–5)
ALP BLD-CCNC: 141 IU/L (ref 40–129)
ALT SERPL-CCNC: 130 U/L (ref 10–40)
AST SERPL-CCNC: 78 IU/L (ref 15–37)
BILIRUB SERPL-MCNC: 1.1 MG/DL (ref 0–1)
BILIRUBIN DIRECT: 0.5 MG/DL (ref 0–0.3)
BILIRUBIN, INDIRECT: 0.6 MG/DL (ref 0–0.7)
DOSE AMOUNT: ABNORMAL
DOSE TIME: ABNORMAL
FERRITIN: 293 NG/ML (ref 30–400)
HAV IGM SER IA-ACNC: NON REACTIVE
HEPATITIS B CORE IGM ANTIBODY: NON REACTIVE
HEPATITIS B SURFACE ANTIGEN: NON REACTIVE
HEPATITIS C ANTIBODY: NON REACTIVE
IRON: 90 UG/DL (ref 59–158)
PCT TRANSFERRIN: 41 % (ref 10–44)
SALICYLATE LEVEL: <0.3 MG/DL (ref 15–30)
TOTAL IRON BINDING CAPACITY: 220 UG/DL (ref 250–450)
TOTAL PROTEIN: 5.9 GM/DL (ref 6.4–8.2)
UNSATURATED IRON BINDING CAPACITY: 130 UG/DL (ref 110–370)

## 2021-11-16 PROCEDURE — 86038 ANTINUCLEAR ANTIBODIES: CPT

## 2021-11-16 PROCEDURE — 80074 ACUTE HEPATITIS PANEL: CPT

## 2021-11-16 PROCEDURE — 80076 HEPATIC FUNCTION PANEL: CPT

## 2021-11-16 PROCEDURE — 86256 FLUORESCENT ANTIBODY TITER: CPT

## 2021-11-16 PROCEDURE — G0480 DRUG TEST DEF 1-7 CLASSES: HCPCS

## 2021-11-16 PROCEDURE — 36415 COLL VENOUS BLD VENIPUNCTURE: CPT

## 2021-11-16 PROCEDURE — 82728 ASSAY OF FERRITIN: CPT

## 2021-11-16 PROCEDURE — 83540 ASSAY OF IRON: CPT

## 2021-11-16 PROCEDURE — 83550 IRON BINDING TEST: CPT

## 2021-11-16 NOTE — PROGRESS NOTES
Progress note  ___________________________    Cristy Muller's  is 10/5/1933  SEEN ON 11/15/2021 at [address]  ___________________________    S:   Patient states he is feeling well. He has dementia  Denies any chest pain. No shortness of breath no cough or sputum production. No abdominal pain. Nurses report patient is doing well. Had no more  falls but no injuries    He is ambulatory. Patient has a history of, atrial fibrillation, chronic kidney disease, pacemaker. Cognitive impairment  No fever no chills. No headaches  No respiratory symptoms. No cough or sputum        ALLERGIES:  Allergies   Allergen Reactions    Biaxin [Clarithromycin]     Cephalexin     Viagra [Sildenafil Citrate] Nausea Only and Other (See Comments)     Dizziness and BP dropped        PAST MEDICAL HISTORY:    has a past medical history of ACTA2-related familial thoracic aortic aneurysm, Atrial fibrillation. H/o cardioversion. CHF (congestive heart failure) ([address] Essential hypertension, malignant, Hyperlipidemia, Hypertension,, Persistent atrial fibrillation , Sick sinus syndrome s/p pacemaker. Gall stones-asymptomatic. PAST SURGICAL HISTORY:   has a past surgical history that includes Pacemaker insertion (Left, 2018); CYSTOSCOPY INSERTION / REMOVAL STENT / STONE (Left, 2019); and Upper gastrointestinal endoscopy (N/A, 2020). SOCIAL HISTORY:   reports that he has never smoked. He has never used smokeless tobacco. He reports previous alcohol use. He reports that he does not use drugs. Vital signs: as in [facility]   Patient was seen taking COVID-19 precautions. Face mask, eye protectors and gloves were used. Pt is awake and oriented to name. HEENT : WNL   Neck No JVD  Heart: S1 S2   Lungs: breathing normal.   Extremities: No edema noted. CNS patient appears to be comfortable.   Confused at times      Assessment:  .  Dementia on Exelon  . Anemia stable. GI work-up not done per family. Seeing Dr. Meño Garcia  . Chronic kidney disease stage III  . Atrial fibrillation on amiodarone and metoprolol   . Sick sinus syndrome status post pacemaker: 2018  . Hyperlipidemia on atorvastatin  . BPH on finasteride  . Congestive heart failure lasix and spironolactone. .  Aortic regurgitation: Moderate to severe. Echo: 7/1/2020  . Constipation on MiraLAX  . Abdominal aortic aneurysm 3.8 x 3.7 cm  . Bilateral renal calculi nonobstructing/bladder diverticulum containing stones  . Gallstones       Plan:    Patient is stable. Falls precautions.    Medications were reviewed  Continue current medications

## 2021-11-18 LAB
ANTI-MITOCHON TITER: 9.4 UNITS (ref 0–24.9)
ANTI-NUCLEAR ANTIBODY (ANA): NORMAL

## 2021-11-23 ENCOUNTER — HOSPITAL ENCOUNTER (OUTPATIENT)
Age: 86
Setting detail: SPECIMEN
Discharge: HOME | End: 2021-11-23
Payer: MEDICARE

## 2021-11-23 LAB
HCT VFR BLD CALC: 38.7 % (ref 42–52)
HEMOGLOBIN: 12.2 GM/DL (ref 13.5–18)
MCH RBC QN AUTO: 28.6 PG (ref 27–31)
MCHC RBC AUTO-ENTMCNC: 31.5 % (ref 32–36)
MCV RBC AUTO: 90.8 FL (ref 78–100)
PDW BLD-RTO: 13.9 % (ref 11.7–14.9)
PLATELET # BLD: 337 K/CU MM (ref 140–440)
PMV BLD AUTO: 9.9 FL (ref 7.5–11.1)
RBC # BLD: 4.26 M/CU MM (ref 4.6–6.2)
TSH HIGH SENSITIVITY: 0.01 UIU/ML (ref 0.27–4.2)
WBC # BLD: 7.7 K/CU MM (ref 4–10.5)

## 2021-11-23 PROCEDURE — 84443 ASSAY THYROID STIM HORMONE: CPT

## 2021-11-23 PROCEDURE — 36415 COLL VENOUS BLD VENIPUNCTURE: CPT

## 2021-11-23 PROCEDURE — 85027 COMPLETE CBC AUTOMATED: CPT

## 2021-11-24 NOTE — DISCHARGE INSTR - COC
Date of Office Visit: 2021  Encounter Provider: CHRISTIANO Charles  Place of Service: [facility] CARDIOLOGY  Patient Name: Ridge Dowd Jr.  :1966    Chief Complaint   Patient presents with   • Palpitations   :     HPI: Ridge Dowd Jr. is a 55 y.o. male who is a patient of Dr. Do's whom we follow for the management of palpitations/PVCs and coronary artery disease.  In , he was diagnosed with colon cancer and underwent an open transverse to left colectomy with primary anastomosis.  He also underwent chemotherapy.  In 2020, he underwent a coronary calcium score demonstrating total score of 98 with calcium mostly in the LAD.      He was last seen in the office by Dr. Do in 2020 at which time he was doing well with no complaints of angina or heart failure.  His palpitations were infrequent and tolerable on Toprol therapy.  No changes were made to his medical regimen, and he was advised to follow-up in 6 months.   Overall he has been doing well.  He denies any chest pain, edema, dizziness, or syncope.  Despite being vaccinated, he contracted COVID-19 in September.  Since then, he has a little lingering shortness of breath on exertion.  He has infrequent palpitations that are manageable with metoprolol.  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    Past Medical History:   Diagnosis Date   • Cancer (HCC)     colon   • Diabetes mellitus (HCC)    • History of hyperlipidemia     with significant hypertriglyceridemia   • History of obesity    • History of obstructive sleep apnea     Not on CPAP   • Personal history of asthma    • Personal history of renal calculi    • PVC (premature ventricular contraction)     history of PVC       Past Surgical History:   Procedure Laterality Date   • APPENDECTOMY     • CHOLECYSTECTOMY     • COLON RESECTION  2019   • OTHER SURGICAL HISTORY      lithotripsy   • PYLOROMYOTOMY     • TONSILLECTOMY    Continuity of Care Form    Patient Name: Steph Leach   :  10/5/1933  MRN:  [id number]    Admit date:  2019  Discharge date:  2019    Code Status Order: Full Code   Advance Directives:   [address] [facility] Documentation     Date/Time Healthcare Directive Type of Healthcare Directive Copy in [address] Agent's Name Healthcare Agent's Phone Number    [phone]  Yes, patient has an advance directive for healthcare treatment  Living will;Durable power of  for health care  No, copy requested from family  Adult Gil Davila [phone]  --          Admitting Physician:  Rosemary Mccullough MD  PCP: John US    Discharging Nurse: [facility]. Unit/Room#: 1000 [address] Unit Phone Number: [phone]      Emergency Contact:   Extended Emergency Contact Information  Primary Emergency Contact: [facility] Phone: [phone]  Mobile Phone: [phone]  Relation: Child  Secondary Emergency Contact: [address] [place] [address] Phone: [phone]  Relation: Other    Past Surgical History:  Past Surgical History:   Procedure Laterality Date    CYSTOSCOPY INSERTION / REMOVAL STENT / STONE Left 2019    CYSTOSCOPY RETROGRADE PYELOGRAM STENT INSERTION, DIAGNOSTIC CYSTOSCOPY performed by Shahrzad Moore MD at [address] [place] [address] Left 2018    Dual Chamber Medtronic [place] XT DR AMI Kincaid       Immunization History:   Immunization History   Administered Date(s) Administered    Influenza, High Dose (Fluzone 65 yrs and older) 2018    Influenza, Celestia Loron, 3 yrs and older, IM, PF (Fluzone, Afluria) 2018    Pneumococcal Conjugate 13-valent (Corrina Doheny) 2015    Pneumococcal Polysaccharide (Mosihwdzz51) 2011       Active Problems:  Patient Active Problem List   Diagnosis Code    S/P placement of cardiac pacemaker Z95.0    Persistent atrial fibrillation ([address] I48.1       Social History     Socioeconomic History   • Marital status:    Tobacco Use   • Smoking status: Never Smoker   • Smokeless tobacco: Never Used   Substance and Sexual Activity   • Alcohol use: No     Comment: Daily caffeine use   • Drug use: No       Family History   Problem Relation Age of Onset   • Coronary artery disease Father         s/p 2 separate MI's.  Also has cardiomyopathy and has undergone ICD placement.   • Diabetes Father    • No Known Problems Mother        Review of Systems   Constitutional: Negative.   Cardiovascular: Positive for dyspnea on exertion and palpitations. Negative for chest pain, leg swelling, orthopnea, paroxysmal nocturnal dyspnea and syncope.   Respiratory: Negative.    Hematologic/Lymphatic: Negative for bleeding problem.   Musculoskeletal: Negative for falls.   Gastrointestinal: Negative for melena.   Neurological: Negative for dizziness and light-headedness.       Allergies   Allergen Reactions   • Hydrocodone-Acetaminophen Itching   • Oxycodone Itching   • Oxycodone-Acetaminophen Itching   • Chlorhexidine Itching     Itching when chg used to clean skin prior to port access and when chg impregnated dressings used over port site.         Current Outpatient Medications:   •  benzonatate (TESSALON) 200 MG capsule, 1 capsule p.o. every 8 hours as needed cough, Disp: 21 capsule, Rfl: 0  •  chlorthalidone (HYGROTON) 25 MG tablet, Take  by mouth Daily., Disp: , Rfl:   •  dapagliflozin (FARXIGA) 5 MG tablet tablet, Take 5 mg by mouth Daily., Disp: , Rfl:   •  dicyclomine (BENTYL) 20 MG tablet, , Disp: , Rfl:   •  escitalopram (LEXAPRO) 20 MG tablet, Take 20 mg by mouth Daily., Disp: , Rfl:   •  fenofibrate 160 MG tablet, TAKE 1 TABLET BY MOUTH DAILY, Disp: 90 tablet, Rfl: 4  •  fexofenadine-pseudoephedrine (ALLEGRA-D 24) 180-240 MG per 24 hr tablet, Take 1 tablet by mouth Daily., Disp: , Rfl:   •  Insulin Glargine (BASAGLAR KWIKPEN) 100 UNIT/ML injection pen, Inject  under the  "skin into the appropriate area as directed., Disp: , Rfl:   •  meloxicam (MOBIC) 15 MG tablet, Take  by mouth Daily., Disp: , Rfl:   •  metFORMIN (GLUCOPHAGE) 1000 MG tablet, Take 1,000 mg by mouth 2 (Two) Times a Day With Meals., Disp: , Rfl:   •  metoprolol succinate XL (TOPROL XL) 100 MG 24 hr tablet, Take  by mouth Daily., Disp: , Rfl:   •  metoprolol succinate XL (TOPROL-XL) 50 MG 24 hr tablet, TAKE 1 TABLET BY MOUTH IN ADDITION  MG TABLET FOR A TOTAL  MG DAILY, Disp: 90 tablet, Rfl: 1  •  montelukast (SINGULAIR) 10 MG tablet, Take 10 mg by mouth Every Night., Disp: , Rfl:   •  Multiple Vitamins-Minerals (MULTIVITAMIN PO), Take  by mouth., Disp: , Rfl:   •  NOVOLOG FLEXPEN 100 UNIT/ML solution pen-injector sc pen, , Disp: , Rfl:   •  omeprazole (priLOSEC) 40 MG capsule, Take 40 mg by mouth Daily., Disp: , Rfl:   •  ondansetron (ZOFRAN) 4 MG tablet, Take 1 tablet by mouth Every 6 (Six) Hours As Needed for Nausea or Vomiting., Disp: 12 tablet, Rfl: 0  •  pramipexole (MIRAPEX) 0.125 MG tablet, TAKE 1 T PO EVERY DAY AND TAKE 2 TS PO EVERY NIGHT, Disp: , Rfl:   •  Probiotic Product (Align) capsule, Take 2 capsules by mouth 2 (two) times a day., Disp: , Rfl:   •  Semaglutide, 1 MG/DOSE, (OZEMPIC, 1 MG/DOSE,) 2 MG/1.5ML solution pen-injector, 1 (One) Time Per Week., Disp: , Rfl:   •  traZODone (DESYREL) 150 MG tablet, Take 150 mg by mouth Daily., Disp: , Rfl:   •  rosuvastatin (CRESTOR) 20 MG tablet, Take 1 tablet by mouth Daily., Disp: 90 tablet, Rfl: 0      Objective:     Vitals:    11/24/21 0927   BP: 120/70   Pulse: 80   Weight: 106 kg (233 lb)   Height: 177.8 cm (70\")     Body mass index is 33.43 kg/m².    PHYSICAL EXAM:    Neck:      Vascular: No JVD.   Pulmonary:      Effort: Pulmonary effort is normal.      Breath sounds: Normal breath sounds.   Cardiovascular:      Normal rate. Regular rhythm.      Murmurs: There is no murmur.      No gallop. No click. No rub.   Pulses:     Intact distal pulses. " ***    Intake/Output Summary (Last 24 hours) at 2019 1011  Last data filed at 2019 2308  Gross per 24 hour   Intake 10 ml   Output 600 ml   Net -590 ml     I/O last 3 completed shifts: In: 10 [I.V.:10]  Out: 600 [Urine:600]    Safety Concerns: At Risk for Falls    Impairments/Disabilities:      Hearing    Patient's personal belongings (please select all that are sent with patient):  Glasses, Hearing Aides bilateral    RN SIGNATURE:  Electronically signed by Jose Ramirez RN on 19 at 10:35 AM              PHYSICIAN SECTION    Nutrition Therapy:  Current Nutrition Therapy:   - Oral Diet:  General, Low Sodium (3-4gm) and high fiber    Routes of Feeding: Oral  Liquids: No Restrictions  Daily Fluid Restriction: no  Last Modified Barium Swallow with Video (Video Swallowing Test): not done    Treatments at the Time of Hospital Discharge:   Respiratory Treatments: ***  Oxygen Therapy:  {Therapy; copd oxygen:05383}  Ventilator:    {[facility] Vent UEXZ:758261759}    Rehab Therapies: {THERAPEUTIC INTERVENTION:9174653723}  Weight Bearing Status/Restrictions: {[facility] Weight Bearin}  Other Medical Equipment (for information only, NOT a DME order):  {EQUIPMENT:581631381}  Other Treatments: ***    Prognosis: {Prognosis:6162936014}    Condition at Discharge: [address] [place] [address] Patient Condition:153650347}    Rehab Potential (if transferring to Rehab):     Recommended Labs or Other Treatments After Discharge: ***    1. Daily weights and call Dr Kennedy/Nephrology with weight gain >3 lbs. 2. BMP in 1-2 wks with results to be sent to Dr Ailyn Parisi. 3. F/U with Dr Ailyn Parisi in 2-3 wks      Physician Certification: I certify the above information and transfer of Silvia Callejas  is necessary for the continuing treatment of the diagnosis listed and that he requires {Admit to Appropriate Level of Care:44609} for {GREATER/LESS:200525662} 30 days.      Update Admission H&P: {P DME Changes in OKRWW:871185185}    PHYSICIAN           ECG 12 Lead    Date/Time: 11/24/2021 9:30 AM  Performed by: Oneyda Roberson APRN  Authorized by: Oneyda Roberson APRN   Comparison: compared with previous ECG from 11/12/2020  Similar to previous ECG  Rhythm: sinus rhythm  Rate: normal  BPM: 80  T flattening: V5  Other findings: non-specific ST-T wave changes  Comments: Indication: Elevated calcium score              Assessment:       Diagnosis Plan   1. PVC's (premature ventricular contractions)     2. Heart palpitations     3. Agatston coronary artery calcium score less than 100  ECG 12 Lead     Orders Placed This Encounter   Procedures   • ECG 12 Lead     This order was created via procedure documentation     Order Specific Question:   Release to patient     Answer:   Immediate          Plan:       1. PVCs/palpitations.  Stable on metoprolol.      2.   Elevated coronary calcium score.  He denies any symptoms of angina.  I did recommend initiating statin therapy to which he is agreeable.  I will order rosuvastatin 20 mg daily.  He will need a lipid panel and hepatic function panel in 3 months.      He is doing well.  He denies any symptoms of angina or heart failure.  He will follow-up with Dr. Do in 1 year.      As always, it has been a pleasure to participate in your patient's care.      Sincerely,         CHRISTIANO Calderón

## 2021-12-02 ENCOUNTER — TELEPHONE (OUTPATIENT)
Dept: INTERNAL MEDICINE CLINIC | Age: 86
End: 2021-12-02

## 2021-12-02 NOTE — TELEPHONE ENCOUNTER
----- Message from Ulysses Murphy sent at 12/2/2021  9:35 AM EST -----  Subject: Message to Provider    QUESTIONS  Information for Provider? Patient daughter is calling . Asking the PCP to   please call her about her parents. She needs to ask him a very important   question please call her at [phone]  ---------------------------------------------------------------------------  --------------  [address]  What is the best way for the office to contact you? OK to leave message on   voicemail  Preferred Call Back Phone Number? [phone]  ---------------------------------------------------------------------------  --------------  SCRIPT ANSWERS  Relationship to Patient? Other  Representative Name? Brian Ferris  Is the Representative on the appropriate HIPAA document in Epic?  Yes

## 2021-12-02 NOTE — TELEPHONE ENCOUNTER
Patient daughter Margaux Linda  states she needs a letter about her father and mother mental condition: To whom it may concern:    Re: Sunny Raman date of birth 10/5/1933    Pt has dementia and he no longer can manage financial affairs, his judgment is poor and and he is in no position to execute any new legal documents.       Toshia Payne MD

## 2021-12-03 ENCOUNTER — OUTSIDE SERVICES (OUTPATIENT)
Dept: INTERNAL MEDICINE CLINIC | Age: 86
End: 2021-12-03
Payer: MEDICARE

## 2021-12-03 DIAGNOSIS — Z95.0 S/P PLACEMENT OF CARDIAC PACEMAKER: ICD-10-CM

## 2021-12-03 DIAGNOSIS — I48.19 PERSISTENT ATRIAL FIBRILLATION (HCC): Primary | ICD-10-CM

## 2021-12-03 DIAGNOSIS — N18.31 CHRONIC KIDNEY DISEASE (CKD) STAGE G3A/A3, MODERATELY DECREASED GLOMERULAR FILTRATION RATE (GFR) BETWEEN 45-59 ML/MIN/1.73 SQUARE METER AND ALBUMINURIA CREATININE RATIO GREATER THAN 300 MG/G (HCC): ICD-10-CM

## 2021-12-03 DIAGNOSIS — F03.90 DEMENTIA WITHOUT BEHAVIORAL DISTURBANCE, UNSPECIFIED DEMENTIA TYPE: ICD-10-CM

## 2021-12-03 PROCEDURE — 99308 SBSQ NF CARE LOW MDM 20: CPT | Performed by: INTERNAL MEDICINE

## 2021-12-04 NOTE — PROGRESS NOTES
Progress note  ___________________________    Doll Pinky Muller's  is 10/5/1933  SEEN ON 12/3/2021 at [address]  ___________________________    S:   Patient has a history of, atrial fibrillation, chronic kidney disease, pacemaker. Cognitive impairment  No fever no chills. No headaches  No respiratory symptoms. No cough or sputum  Pt is seeing urologist   Patient has abnormal liver function tests. Ultrasound of the right upper quadrant showed gallstones. No dilatation of common bile duct. Ferritin and other iron studies normal.  Immune studies are normal.  Hepatitis AB and C is negative  Patient denies any abdominal pain. No nausea vomiting or diarrhea  No shortness of breath no cough or sputum production no abdominal pain  He is ambulatory. ALLERGIES:  Allergies   Allergen Reactions    Biaxin [Clarithromycin]     Cephalexin     Viagra [Sildenafil Citrate] Nausea Only and Other (See Comments)     Dizziness and BP dropped        PAST MEDICAL HISTORY:    has a past medical history of ACTA2-related familial thoracic aortic aneurysm, Atrial fibrillation. H/o cardioversion. CHF (congestive heart failure) ([address] Essential hypertension, malignant, Hyperlipidemia, Hypertension,, Persistent atrial fibrillation , Sick sinus syndrome s/p pacemaker. Gall stones-asymptomatic. PAST SURGICAL HISTORY:   has a past surgical history that includes Pacemaker insertion (Left, 2018); CYSTOSCOPY INSERTION / REMOVAL STENT / STONE (Left, 2019); and Upper gastrointestinal endoscopy (N/A, 2020). SOCIAL HISTORY:   reports that he has never smoked. He has never used smokeless tobacco. He reports previous alcohol use. He reports that he does not use drugs. Vital signs: As in [address]  Patient was seen taking COVID-19 precautions. Face mask, eye protectors and gloves were used.      Pt is awake and oriented to name.  HEENT : WNL, no icterus  Neck No JVD   Heart: S1 S2   Lungs: breathing normal.  No rales or rhonchi  Abdomen nontender  Extremities: No edema noted. CNS patient appears to be comfortable. Patient is ambulatory. Knows his name but does not know the date etc.      Assessment:  .  Dementia on Exelon  . Liver dysfunction. .  Anemia stable. GI work-up not done per family. Seeing Dr. Radha Snyder  . Chronic kidney disease stage III  . Atrial fibrillation on amiodarone and metoprolol   . Sick sinus syndrome status post pacemaker: 2018  . Hyperlipidemia on atorvastatin  . BPH on finasteride  . Congestive heart failure lasix and spironolactone. .  Aortic regurgitation: Moderate to severe. Echo: 7/1/2020  . Constipation on MiraLAX  . Abdominal aortic aneurysm 3.8 x 3.7 cm  . Bilateral renal calculi nonobstructing/bladder diverticulum containing stones  . Gallstones       Plan:  Falls precautions.    Medications were reviewed  Continue current medications

## 2021-12-28 ENCOUNTER — HOSPITAL ENCOUNTER (OUTPATIENT)
Age: 86
Setting detail: SPECIMEN
Discharge: HOME | End: 2021-12-28
Payer: MEDICARE

## 2021-12-28 LAB
HCT VFR BLD CALC: 40.1 % (ref 42–52)
HEMOGLOBIN: 12.5 GM/DL (ref 13.5–18)
MCH RBC QN AUTO: 28.4 PG (ref 27–31)
MCHC RBC AUTO-ENTMCNC: 31.2 % (ref 32–36)
MCV RBC AUTO: 91.1 FL (ref 78–100)
PDW BLD-RTO: 16.1 % (ref 11.7–14.9)
PLATELET # BLD: 298 K/CU MM (ref 140–440)
PMV BLD AUTO: 9.8 FL (ref 7.5–11.1)
RBC # BLD: 4.4 M/CU MM (ref 4.6–6.2)
T3 FREE: 1.6 PG/ML (ref 2.3–4.2)
T4 FREE: 1.4 NG/DL (ref 0.9–1.8)
TSH HIGH SENSITIVITY: 3.23 UIU/ML (ref 0.27–4.2)
WBC # BLD: 8.7 K/CU MM (ref 4–10.5)

## 2021-12-28 PROCEDURE — 85027 COMPLETE CBC AUTOMATED: CPT

## 2021-12-28 PROCEDURE — 36415 COLL VENOUS BLD VENIPUNCTURE: CPT

## 2021-12-28 PROCEDURE — 84443 ASSAY THYROID STIM HORMONE: CPT

## 2021-12-28 PROCEDURE — 84481 FREE ASSAY (FT-3): CPT

## 2021-12-28 PROCEDURE — 84439 ASSAY OF FREE THYROXINE: CPT

## 2021-12-31 ENCOUNTER — OUTSIDE SERVICES (OUTPATIENT)
Dept: INTERNAL MEDICINE CLINIC | Age: 86
End: 2021-12-31
Payer: MEDICARE

## 2021-12-31 DIAGNOSIS — F03.90 DEMENTIA WITHOUT BEHAVIORAL DISTURBANCE, UNSPECIFIED DEMENTIA TYPE: Primary | ICD-10-CM

## 2021-12-31 DIAGNOSIS — I48.91 ATRIAL FIBRILLATION, UNSPECIFIED TYPE (HCC): ICD-10-CM

## 2021-12-31 DIAGNOSIS — N40.0 BENIGN PROSTATIC HYPERPLASIA WITHOUT LOWER URINARY TRACT SYMPTOMS: ICD-10-CM

## 2021-12-31 DIAGNOSIS — N18.31 CHRONIC KIDNEY DISEASE (CKD) STAGE G3A/A3, MODERATELY DECREASED GLOMERULAR FILTRATION RATE (GFR) BETWEEN 45-59 ML/MIN/1.73 SQUARE METER AND ALBUMINURIA CREATININE RATIO GREATER THAN 300 MG/G (HCC): ICD-10-CM

## 2021-12-31 PROCEDURE — 99308 SBSQ NF CARE LOW MDM 20: CPT | Performed by: INTERNAL MEDICINE

## 2022-01-01 NOTE — PROGRESS NOTES
Progress note  ___________________________    Salbador Muller's  is 10/5/1933  SEEN ON 2021 at [address]  ___________________________    S:     Patient states he is doing fairly well  He has dementia his history is not reliable  He does complain that he has frequent urination and is seeing urologist.    Ultrasound was ordered by urologist.  Results not available. Patient has abnormal liver function tests ALT and AST are elevated. Ultrasound of the right upper quadrant showed gallstones. No dilatation of common bile duct. Ferritin and other iron studies normal.  Immune studies are normal.  Hepatitis AB and C is negative  Patient denies any abdominal pain. No nausea vomiting or diarrhea   No shortness of breath no cough or sputum production no abdominal pain  He is ambulatory. He visits his wife daily  Patient has a history of, atrial fibrillation, chronic kidney disease, pacemaker. Cognitive impairment  No fever no chills. No headaches  No respiratory symptoms. No cough or sputum        ALLERGIES:  Allergies   Allergen Reactions    Biaxin [Clarithromycin]     Cephalexin     Viagra [Sildenafil Citrate] Nausea Only and Other (See Comments)     Dizziness and BP dropped        PAST MEDICAL HISTORY:    has a past medical history of ACTA2-related familial thoracic aortic aneurysm, Atrial fibrillation. H/o cardioversion. CHF (congestive heart failure) ([facility] [address] Essential hypertension, malignant, Hyperlipidemia, Hypertension,, Persistent atrial fibrillation , Sick sinus syndrome s/p pacemaker. Gall stones-asymptomatic. PAST SURGICAL HISTORY:   has a past surgical history that includes Pacemaker insertion (Left, 2018); CYSTOSCOPY INSERTION / REMOVAL STENT / STONE (Left, 2019); and Upper gastrointestinal endoscopy (N/A, 2020). SOCIAL HISTORY:   reports that he has never smoked.  He has never used smokeless tobacco. He reports previous alcohol use. He reports that he does not use drugs. Vital signs: As in [facility]  Patient was seen taking COVID-19 precautions. Face mask, eye protectors and gloves were used. Pt is awake and oriented to name. HEENT : WNL, no icterus  Neck No JVD   Heart: S1 S2   Lungs: breathing normal.  No rales or rhonchi  Abdomen nontender  Extremities: No edema noted. CNS patient appears to be comfortable. Patient is ambulatory. Knows his name but does not know the date etc.      Assessment:  .  Dementia on Exelon  . Liver dysfunction. .  Anemia stable. GI work-up not done per family. Seeing Dr. Kim Kendall  . Chronic kidney disease stage III  . Atrial fibrillation on amiodarone and metoprolol   . Sick sinus syndrome status post pacemaker: 2018  . Hyperlipidemia on atorvastatin  . BPH on finasteride  . Congestive heart failure lasix and spironolactone. .  Aortic regurgitation: Moderate to severe. Echo: 7/1/2020  . Constipation on MiraLAX  . Abdominal aortic aneurysm 3.8 x 3.7 cm  . Bilateral renal calculi nonobstructing/bladder diverticulum containing stones  . Gallstones       Plan:  Patient was referred to gastroenterologist for abnormal liver function test.  Patient has abnormal liver function test.  He has gallstones on latest ultrasound also. His hepatitis profile AB and C are negative, immune studies are normal.  Iron studies are normal.  Patient is taking amiodarone and atorvastatin. We will refer patient to gastroenterologist Dr. Lawrence Cabrera to evaluate for liver dysfunction  We will discontinue atorvastatin for now   Patient's daughter agreed with above  Falls precautions.    Medications were reviewed  Continue current medications

## 2022-01-25 ENCOUNTER — HOSPITAL ENCOUNTER (OUTPATIENT)
Age: 87
Setting detail: SPECIMEN
Discharge: HOME | End: 2022-01-25
Payer: MEDICARE

## 2022-01-25 LAB
ALBUMIN SERPL-MCNC: 3 GM/DL (ref 3.4–5)
ALP BLD-CCNC: 87 IU/L (ref 40–129)
ALT SERPL-CCNC: 27 U/L (ref 10–40)
ANION GAP SERPL CALCULATED.3IONS-SCNC: 15 MMOL/L (ref 4–16)
AST SERPL-CCNC: 33 IU/L (ref 15–37)
BILIRUB SERPL-MCNC: 0.6 MG/DL (ref 0–1)
BUN BLDV-MCNC: 32 MG/DL (ref 6–23)
CALCIUM SERPL-MCNC: 8.7 MG/DL (ref 8.3–10.6)
CHLORIDE BLD-SCNC: 103 MMOL/L (ref 99–110)
CO2: 24 MMOL/L (ref 21–32)
CREAT SERPL-MCNC: 1.6 MG/DL (ref 0.9–1.3)
GFR AFRICAN AMERICAN: 50 ML/MIN/1.73M2
GFR NON-AFRICAN AMERICAN: 41 ML/MIN/1.73M2
GLUCOSE BLD-MCNC: 87 MG/DL (ref 70–99)
HCT VFR BLD CALC: 44.1 % (ref 42–52)
HEMOGLOBIN: 13.2 GM/DL (ref 13.5–18)
MCH RBC QN AUTO: 28.4 PG (ref 27–31)
MCHC RBC AUTO-ENTMCNC: 29.9 % (ref 32–36)
MCV RBC AUTO: 94.8 FL (ref 78–100)
PDW BLD-RTO: 15.9 % (ref 11.7–14.9)
PLATELET # BLD: 292 K/CU MM (ref 140–440)
PMV BLD AUTO: 10.2 FL (ref 7.5–11.1)
POTASSIUM SERPL-SCNC: 4.6 MMOL/L (ref 3.5–5.1)
RBC # BLD: 4.65 M/CU MM (ref 4.6–6.2)
SODIUM BLD-SCNC: 142 MMOL/L (ref 135–145)
TOTAL PROTEIN: 5.8 GM/DL (ref 6.4–8.2)
WBC # BLD: 6.7 K/CU MM (ref 4–10.5)

## 2022-01-25 PROCEDURE — 36415 COLL VENOUS BLD VENIPUNCTURE: CPT

## 2022-01-25 PROCEDURE — 80053 COMPREHEN METABOLIC PANEL: CPT

## 2022-01-25 PROCEDURE — 85027 COMPLETE CBC AUTOMATED: CPT

## 2022-01-26 ENCOUNTER — PROCEDURE VISIT (OUTPATIENT)
Dept: CARDIOLOGY CLINIC | Age: 87
End: 2022-01-26
Payer: MEDICARE

## 2022-01-26 DIAGNOSIS — I44.0 AV BLOCK, 1ST DEGREE: ICD-10-CM

## 2022-01-26 DIAGNOSIS — Z95.0 CARDIAC PACEMAKER IN SITU: Primary | ICD-10-CM

## 2022-01-26 DIAGNOSIS — I49.5 SINUS NODE DYSFUNCTION (HCC): ICD-10-CM

## 2022-01-26 PROCEDURE — 93294 REM INTERROG EVL PM/LDLS PM: CPT | Performed by: NURSE PRACTITIONER

## 2022-01-26 PROCEDURE — 93296 REM INTERROG EVL PM/IDS: CPT | Performed by: NURSE PRACTITIONER

## 2022-02-22 ENCOUNTER — HOSPITAL ENCOUNTER (OUTPATIENT)
Age: 87
Setting detail: SPECIMEN
Discharge: HOME | End: 2022-02-22
Payer: MEDICARE

## 2022-02-22 LAB
HCT VFR BLD CALC: 40.2 % (ref 42–52)
HEMOGLOBIN: 12.4 GM/DL (ref 13.5–18)
MCH RBC QN AUTO: 29 PG (ref 27–31)
MCHC RBC AUTO-ENTMCNC: 30.8 % (ref 32–36)
MCV RBC AUTO: 94.1 FL (ref 78–100)
PDW BLD-RTO: 14.6 % (ref 11.7–14.9)
PLATELET # BLD: 232 K/CU MM (ref 140–440)
PMV BLD AUTO: 10 FL (ref 7.5–11.1)
RBC # BLD: 4.27 M/CU MM (ref 4.6–6.2)
WBC # BLD: 7 K/CU MM (ref 4–10.5)

## 2022-02-22 PROCEDURE — 85027 COMPLETE CBC AUTOMATED: CPT

## 2022-02-22 PROCEDURE — 36415 COLL VENOUS BLD VENIPUNCTURE: CPT

## 2022-03-15 ENCOUNTER — HOSPITAL ENCOUNTER (OUTPATIENT)
Age: 87
Setting detail: SPECIMEN
Discharge: HOME | End: 2022-03-15
Payer: MEDICARE

## 2022-03-15 LAB
ALBUMIN SERPL-MCNC: 3.5 GM/DL (ref 3.4–5)
ALP BLD-CCNC: 89 IU/L (ref 40–128)
ALT SERPL-CCNC: 18 U/L (ref 10–40)
ANION GAP SERPL CALCULATED.3IONS-SCNC: 10 MMOL/L (ref 4–16)
AST SERPL-CCNC: 23 IU/L (ref 15–37)
BILIRUB SERPL-MCNC: 0.5 MG/DL (ref 0–1)
BUN BLDV-MCNC: 27 MG/DL (ref 6–23)
CALCIUM SERPL-MCNC: 9 MG/DL (ref 8.3–10.6)
CHLORIDE BLD-SCNC: 102 MMOL/L (ref 99–110)
CO2: 28 MMOL/L (ref 21–32)
CREAT SERPL-MCNC: 1.6 MG/DL (ref 0.9–1.3)
GFR AFRICAN AMERICAN: 50 ML/MIN/1.73M2
GFR NON-AFRICAN AMERICAN: 41 ML/MIN/1.73M2
GLUCOSE BLD-MCNC: 83 MG/DL (ref 70–99)
MAGNESIUM: 2.1 MG/DL (ref 1.8–2.4)
PHOSPHORUS: 2.8 MG/DL (ref 2.5–4.9)
POTASSIUM SERPL-SCNC: 4.1 MMOL/L (ref 3.5–5.1)
SODIUM BLD-SCNC: 140 MMOL/L (ref 135–145)
TOTAL PROTEIN: 6.2 GM/DL (ref 6.4–8.2)

## 2022-03-15 PROCEDURE — 83735 ASSAY OF MAGNESIUM: CPT

## 2022-03-15 PROCEDURE — 84100 ASSAY OF PHOSPHORUS: CPT

## 2022-03-15 PROCEDURE — 36415 COLL VENOUS BLD VENIPUNCTURE: CPT

## 2022-03-15 PROCEDURE — 80053 COMPREHEN METABOLIC PANEL: CPT

## 2022-03-16 ENCOUNTER — HOSPITAL ENCOUNTER (OUTPATIENT)
Age: 87
Setting detail: SPECIMEN
Discharge: HOME | End: 2022-03-16
Payer: MEDICARE

## 2022-03-16 PROCEDURE — 81001 URINALYSIS AUTO W/SCOPE: CPT

## 2022-03-17 LAB
BACTERIA: ABNORMAL /HPF
BILIRUBIN URINE: NEGATIVE MG/DL
BLOOD, URINE: ABNORMAL
CLARITY: CLEAR
COLOR: YELLOW
GLUCOSE, URINE: NEGATIVE MG/DL
KETONES, URINE: NEGATIVE MG/DL
LEUKOCYTE ESTERASE, URINE: ABNORMAL
NITRITE URINE, QUANTITATIVE: NEGATIVE
PH, URINE: 6.5 (ref 5–8)
PROTEIN UA: 300 MG/DL
RBC URINE: 84 /HPF (ref 0–3)
SPECIFIC GRAVITY UA: 1.02 (ref 1–1.03)
SQUAMOUS EPITHELIAL: 11 /HPF
TRICHOMONAS: ABNORMAL /HPF
UROBILINOGEN, URINE: 0.2 MG/DL (ref 0.2–1)
WBC CLUMP: ABNORMAL /HPF
WBC UA: 3586 /HPF (ref 0–2)

## 2022-03-22 ENCOUNTER — HOSPITAL ENCOUNTER (OUTPATIENT)
Age: 87
Setting detail: SPECIMEN
Discharge: HOME | End: 2022-03-22
Payer: MEDICARE

## 2022-03-22 LAB
HCT VFR BLD CALC: 40.7 % (ref 42–52)
HEMOGLOBIN: 13 GM/DL (ref 13.5–18)
MCH RBC QN AUTO: 29.1 PG (ref 27–31)
MCHC RBC AUTO-ENTMCNC: 31.9 % (ref 32–36)
MCV RBC AUTO: 91.1 FL (ref 78–100)
PDW BLD-RTO: 13.5 % (ref 11.7–14.9)
PLATELET # BLD: 234 K/CU MM (ref 140–440)
PMV BLD AUTO: 10.6 FL (ref 7.5–11.1)
RBC # BLD: 4.47 M/CU MM (ref 4.6–6.2)
WBC # BLD: 7.2 K/CU MM (ref 4–10.5)

## 2022-03-22 PROCEDURE — 36415 COLL VENOUS BLD VENIPUNCTURE: CPT

## 2022-03-22 PROCEDURE — 85027 COMPLETE CBC AUTOMATED: CPT

## 2022-03-23 ENCOUNTER — OFFICE VISIT (OUTPATIENT)
Dept: CARDIOLOGY CLINIC | Age: 87
End: 2022-03-23
Payer: MEDICARE

## 2022-03-23 VITALS
SYSTOLIC BLOOD PRESSURE: 118 MMHG | BODY MASS INDEX: 25.01 KG/M2 | HEIGHT: 68 IN | WEIGHT: 165 LBS | DIASTOLIC BLOOD PRESSURE: 70 MMHG | HEART RATE: 71 BPM

## 2022-03-23 DIAGNOSIS — I38 VHD (VALVULAR HEART DISEASE): ICD-10-CM

## 2022-03-23 DIAGNOSIS — Z95.0 S/P PLACEMENT OF CARDIAC PACEMAKER: ICD-10-CM

## 2022-03-23 DIAGNOSIS — I48.19 PERSISTENT ATRIAL FIBRILLATION (HCC): Primary | ICD-10-CM

## 2022-03-23 DIAGNOSIS — I50.32 CHRONIC DIASTOLIC CONGESTIVE HEART FAILURE (HCC): ICD-10-CM

## 2022-03-23 PROCEDURE — 1123F ACP DISCUSS/DSCN MKR DOCD: CPT | Performed by: INTERNAL MEDICINE

## 2022-03-23 PROCEDURE — G8427 DOCREV CUR MEDS BY ELIG CLIN: HCPCS | Performed by: INTERNAL MEDICINE

## 2022-03-23 PROCEDURE — 1036F TOBACCO NON-USER: CPT | Performed by: INTERNAL MEDICINE

## 2022-03-23 PROCEDURE — 99213 OFFICE O/P EST LOW 20 MIN: CPT | Performed by: INTERNAL MEDICINE

## 2022-03-23 PROCEDURE — 4040F PNEUMOC VAC/ADMIN/RCVD: CPT | Performed by: INTERNAL MEDICINE

## 2022-03-23 PROCEDURE — G8484 FLU IMMUNIZE NO ADMIN: HCPCS | Performed by: INTERNAL MEDICINE

## 2022-03-23 PROCEDURE — G8417 CALC BMI ABV UP PARAM F/U: HCPCS | Performed by: INTERNAL MEDICINE

## 2022-03-23 NOTE — PATIENT INSTRUCTIONS
CORONARY ARTERY DISEASE::None known  HTN:well controlled on current medical regimen, see list above.             - changes in  treatment:   no, on Lopressor   CARDIOMYOPATHY: None known   CONGESTIVE HEART FAILURE:  KNOWN HISTORY. Compensated      VHD: Moderate AI   Left ventricular systolic function is low normal with an ejection fraction   of 50 %. Mild concentric left ventricular hypertrophy. The left atrium is mildly dilated. Dilatation of the aortic root measuring 3.9 cm. Dilatation of the ascending aorta measuring 4.3 cm. Mitral annular calcification is present. Mild aortic stenosis with mean gradient of 11 mmHg. Doppler evaluation reveals moderate aortic, pressure half time of 379 and mild mitral and tricuspid   regurgitation. Right ventricular systolic pressure of 40 mmHg consistent with mild   pulmonary hypertension. No evidence of pericardial effusion. DYSLIPIDEMIA: Patient's profile is at / near [employer] most recent Lab values in Labs section above.   Diabetes mellitis: .no  ARRHYTHMIAS:  Known.                                Patient has H/O Atrial fibrillation. On Amiodarone                                He is rate controlled & not on anticoagulation due to low Hgb.        S/P PPM:  The device is Medtronic pacemaker - Dual Chamber chamber     MRI Compatible : yes   Device interrogation was performed.   Mode: AAIR --- DDDR   Leldon Dasen is normal. Impedence is normal.  Threshold is normal.    There has not been interval changes.    Estimated battery life is 8.5 years    The underlying rhythm is AP,.  97.4 % atrial paced; 78.3 % ventricular paced.     Atrial Arrhythmia : 2.1% burden- no AC due to history of low Hgb   Non sustained VT episodes : No   Sustained VT episodes : No   Patient activity reported 1.2 hrs/day   The patient is pacemaker dependent.                   TESTS ORDERED:None this visit     PREVIOUSLY ORDERED TESTS REVIEWED & DISCUSSED WITH THE PATIENT:     I personally reviewed & interpreted, all previously ordered tests as copied above. Latest Labs are pulled in to the note with dates. Labs, specially in Reference to Lipid profile, Cardiac testing in the form of Echo ( dated: ), stress tests ( dated: ) & other relevant cardiac testing reviewed with patient & recommendations made based on assessment of the results. Discussed role of Cardiac risk factors & effects + treatment of co morbidities with patient & advised accordingly. MEDICATIONS: List of medications patient is currently taking is reviewed in detail with the patient & family member present. Discussed any side effects or problems taking the medication. Recommend Continue present management & medications as listed. AFFIRMATION: I spent at least 20 minutes of time reviewing patient's history, previous & current medical problems & all Labs + testing. This includes chart prep even prior to the vosit. Various goals are discussed and multiple questions answered. Relevant concelling performed. Office follow up in six months. Device check per protocol.

## 2022-03-23 NOTE — PROGRESS NOTES
by mouth daily 30 tablet 0    amiodarone (CORDARONE) 200 MG tablet Take 1 tablet by mouth daily (Patient taking differently: Take 200 mg by mouth nightly ) 30 tablet 3    rivastigmine (EXELON) 4.6 MG/24HR Place 1 patch onto the skin daily       Cholecalciferol (VITAMIN D3) 5000 units TABS Take 5,000 Units by mouth every morning      Omega-3 Fatty Acids (FISH OIL) 1000 MG CAPS Take 1,000 mg by mouth daily       Multiple Vitamins-Minerals (THERAPEUTIC MULTIVITAMIN-MINERALS) tablet Take 1 tablet by mouth daily      atorvastatin (LIPITOR) 20 MG tablet Take 20 mg by mouth nightly        No current facility-administered medications for this visit. Allergies: Biaxin [clarithromycin], Cephalexin, and Viagra [sildenafil citrate]  Review of Systems:    Constitutional: Negative for diaphoresis and fatigue  Respiratory: Negative for shortness of breath  Cardiovascular: Negative for chest pain, dyspnea on exertion, claudication, edema, irregular heartbeat, murmur, palpitations or shortness of breath  Musculoskeletal: Negative for muscle pain, muscular weakness, negative for pain in arm and leg or swelling in foot and leg    Objective:  /70   Pulse 71   Ht 5' 8\" (1.727 m)   Wt 165 lb (74.8 kg)   BMI 25.09 kg/m²   Wt Readings from Last 3 Encounters:   03/23/22 165 lb (74.8 kg)   07/15/21 175 lb 12.8 oz (79.7 kg)   06/24/21 177 lb 6.4 oz (80.5 kg)     Body mass index is 25.09 kg/m². GENERAL - Alert, oriented, pleasant, in no apparent distress. EYES: No jaundice, no conjunctival pallor. Neck - Supple. No jugular venous distention noted. No carotid bruits. Cardiovascular - Normal S1 and S2 with 2-3/6 ED murmur. Extremities - No cyanosis, clubbing, or significant edema. Pulmonary - No respiratory distress. No wheezes or rales.       MEDICAL DECISION MAKING & DATA REVIEW:    Lab Review   Lab Results   Component Value Date    TROPONINT 0.027 08/19/2020    TROPONINT 0.036 08/19/2020     Lab Results Component Value Date    PROBNP 1,672 08/18/2020    PROBNP 1,102 02/14/2020     Lab Results   Component Value Date    INR 1.21 08/19/2020    INR 1.71 09/11/2019     No results found for: LABA1C  Lab Results   Component Value Date    WBC 7.2 03/22/2022    WBC 7.0 02/22/2022    HCT 40.7 (L) 03/22/2022    HCT 40.2 (L) 02/22/2022    MCV 91.1 03/22/2022    MCV 94.1 02/22/2022     03/22/2022     02/22/2022     Lab Results   Component Value Date    CHOL 137 05/24/2019    TRIG 72 05/24/2019    HDL 47 05/24/2019    LDLDIRECT 86 05/24/2019     Lab Results   Component Value Date    ALT 18 03/15/2022    ALT 27 01/25/2022    AST 23 03/15/2022    AST 33 01/25/2022     BMP:    Lab Results   Component Value Date     03/15/2022     01/25/2022    K 4.1 03/15/2022    K 4.6 01/25/2022     03/15/2022     01/25/2022    CO2 28 03/15/2022    CO2 24 01/25/2022    BUN 27 03/15/2022    BUN 32 01/25/2022    CREATININE 1.6 03/15/2022    CREATININE 1.6 01/25/2022     CMP:   Lab Results   Component Value Date     03/15/2022     01/25/2022    K 4.1 03/15/2022    K 4.6 01/25/2022     03/15/2022     01/25/2022    CO2 28 03/15/2022    CO2 24 01/25/2022    BUN 27 03/15/2022    BUN 32 01/25/2022    CREATININE 1.6 03/15/2022    CREATININE 1.6 01/25/2022    PROT 6.2 03/15/2022    PROT 5.8 01/25/2022     Lab Results   Component Value Date    TSHHS 3.230 12/28/2021    TSHHS 0.006 11/23/2021       QUALITY MEASURES REVIEWED:  1.CAD:Patient is taking anti platelet agent:No  Patient does not have Hx of documented CAD  2. DYSLIPIDEMIA: Patient is on cholesterol lowering medication:Yes . 3.Beta-Blocker therapy for CAD, if prior Myocardial Infarction:Yes   4. Counselled regarding smoking cessation. No   Patient does not Smoke. 5.Anticoagulation therapy (for A.Fib) No due to severe Anemia. 6.Discussed weight management strategies.     Assessment & Plan:  Primary / Secondary prevention is the goal by aggressive risk modification, healthy and therapeutic life style changes for cardiovascular risk reduction. CORONARY ARTERY DISEASE::None known  HTN:well controlled on current medical regimen, see list above.             - changes in  treatment:   no, on Lopressor   CARDIOMYOPATHY: None known   CONGESTIVE HEART FAILURE:  KNOWN HISTORY. Compensated      VHD: Moderate AI   Left ventricular systolic function is low normal with an ejection fraction   of 50 %. Mild concentric left ventricular hypertrophy. The left atrium is mildly dilated. Dilatation of the aortic root measuring 3.9 cm. Dilatation of the ascending aorta measuring 4.3 cm. Mitral annular calcification is present. Mild aortic stenosis with mean gradient of 11 mmHg. Doppler evaluation reveals moderate aortic, pressure half time of 379 and mild mitral and tricuspid   regurgitation. Right ventricular systolic pressure of 40 mmHg consistent with mild   pulmonary hypertension. No evidence of pericardial effusion. DYSLIPIDEMIA: Patient's profile is at / near [employer] most recent Lab values in Labs section above.   Diabetes mellitis: .no  ARRHYTHMIAS:  Known.                                Patient has H/O Atrial fibrillation. On Amiodarone                                He is rate controlled & not on anticoagulation due to low Hgb.        S/P PPM:  The device is Medtronic pacemaker - Dual Chamber chamber     MRI Compatible : yes   Device interrogation was performed.   Mode: AAIR --- DDDR   Orien Faria is normal. Impedence is normal.  Threshold is normal.    There has not been interval changes.    Estimated battery life is 8.5 years    The underlying rhythm is AP,.  97.4 % atrial paced; 78.3 % ventricular paced.     Atrial Arrhythmia : 2.1% burden- no AC due to history of low Hgb   Non sustained VT episodes : No   Sustained VT episodes : No   Patient activity reported 1.2 hrs/day   The patient is pacemaker dependent.                   TESTS ORDERED:None this visit     PREVIOUSLY ORDERED TESTS REVIEWED & DISCUSSED WITH THE PATIENT:     I personally reviewed & interpreted, all previously ordered tests as copied above. Latest Labs are pulled in to the note with dates. Labs, specially in Reference to Lipid profile, Cardiac testing in the form of Echo ( dated: ), stress tests ( dated: ) & other relevant cardiac testing reviewed with patient & recommendations made based on assessment of the results. Discussed role of Cardiac risk factors & effects + treatment of co morbidities with patient & advised accordingly. MEDICATIONS: List of medications patient is currently taking is reviewed in detail with the patient & family member present. Discussed any side effects or problems taking the medication. Recommend Continue present management & medications as listed. AFFIRMATION: I spent at least 20 minutes of time reviewing patient's history, previous & current medical problems & all Labs + testing. This includes chart prep even prior to the vosit. Various goals are discussed and multiple questions answered. Relevant concelling performed. Office follow up in six months. Device check per protocol.

## 2022-03-23 NOTE — LETTER
[address]  [address] [id number]  Phone: [phone]  Fax: [phone]    Salomón Lewis MD    March 23, 2022     Ally Reis MD  [address] [id number]    Patient: Lennox Dye   MR Number: [id number]   YOB: 1933   Date of Visit: 3/23/2022       Dear Ally Reis: Thank you for referring Dalton Police to me for evaluation/treatment. Below are the relevant portions of my assessment and plan of care. If you have questions, please do not hesitate to call me. I look forward to following Jina Grimm along with you.     Sincerely,      Salomón Lewis MD

## 2022-03-23 NOTE — LETTER
atorvastatin (LIPITOR) 20 MG tablet Take 20 mg by mouth nightly           No current facility-administered medications for this visit.      Smoke: What:                           How much:     Alcohol: How Much:      Caffeine: Pop:         Tea:            Coffee:                Chocolate:     Exercise:     Labs: Lipids:             CBC:       BMP/CMP:          TSH:              A1C:     Last Visit:  Complaints:  Changes:     Last EKG:     CTA Chest W WO Contrast: 4/2018  1. 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  The   ascending thoracic aorta measures up to 4.8 cm in diameter.  The descending   thoracic aorta measures approximately up to 4.3 cm in diameter.  No thoracic   aortic dissection identified. 2. Small right pleural effusion and partial right lower lobe atelectasis. 3. Minimal ground-glass opacity in the right upper lobe.  Findings suspicious   for mild pneumonia. 4. Cholelithiasis.             STRESS TEST:  NONE     ECHO: 8/2021  Left ventricular systolic function is low normal with an ejection fraction   of 50 %. Mild concentric left ventricular hypertrophy. The left atrium is mildly dilated. Dilatation of the aortic root measuring 3.9 cm. Dilatation of the ascending aorta measuring 4.3 cm. Mitral annular calcification is present. Mild aortic stenosis with mean gradient of 11 mmHg. Doppler evaluation reveals moderate aortic and mild mitral and tricuspid   regurgitation. Right ventricular systolic pressure of 40 mmHg consistent with mild   pulmonary hypertension.    No evidence of pericardial effusion     CAROTID: NONE     MUGA: NONE     LAST PACER CHECK: 1/23/22     CARDIAC CATH: NONE     Amio Protocol:     CHADS: [id number] Score for Atrial Fibrillation Stroke Risk    Risk   Factors   Component Value   C CHF Yes 1   H HTN Yes 1   A2 Age >= 76 Yes,  (80 y.o.) 2   D DM No 0   S2 Prior Stroke/TIA No 0   V Vascular Disease No 0   A Age 74-69 No,  (80 y.o.) 0   Sc Sex male 0     [id number]  Score   4   Score last updated 7/15/21 91:95 AM EDT     Click here for a link to the UpToDate guideline \"Atrial Fibrillation: Anticoagulation therapy to prevent embolization     Disclaimer: Risk Score calculation is dependent on accuracy of patient problem list and past encounter diagnosis

## 2022-03-25 ENCOUNTER — HOSPITAL ENCOUNTER (OUTPATIENT)
Age: 87
Setting detail: SPECIMEN
Discharge: HOME | End: 2022-03-25
Payer: MEDICARE

## 2022-03-25 LAB
MAGNESIUM: 2.6 MG/DL (ref 1.8–2.4)
PHOSPHORUS: 3 MG/DL (ref 2.5–4.9)

## 2022-03-25 PROCEDURE — 83735 ASSAY OF MAGNESIUM: CPT

## 2022-03-25 PROCEDURE — 84100 ASSAY OF PHOSPHORUS: CPT

## 2022-03-25 PROCEDURE — 36415 COLL VENOUS BLD VENIPUNCTURE: CPT

## 2022-04-02 ENCOUNTER — HOSPITAL ENCOUNTER (OUTPATIENT)
Age: 87
Setting detail: SPECIMEN
Discharge: HOME | End: 2022-04-02
Payer: MEDICARE

## 2022-04-02 PROCEDURE — 87088 URINE BACTERIA CULTURE: CPT

## 2022-04-02 PROCEDURE — 87086 URINE CULTURE/COLONY COUNT: CPT

## 2022-04-02 PROCEDURE — 81001 URINALYSIS AUTO W/SCOPE: CPT

## 2022-04-02 PROCEDURE — 87186 SC STD MICRODIL/AGAR DIL: CPT

## 2022-04-04 LAB
BACTERIA: ABNORMAL /HPF
BILIRUBIN URINE: ABNORMAL MG/DL
BLOOD, URINE: ABNORMAL
CLARITY: ABNORMAL
COLOR: YELLOW
GLUCOSE, URINE: NEGATIVE MG/DL
KETONES, URINE: NEGATIVE MG/DL
LEUKOCYTE ESTERASE, URINE: ABNORMAL
NITRITE URINE, QUANTITATIVE: NEGATIVE
PH, URINE: 6 (ref 5–8)
PROTEIN UA: 100 MG/DL
RBC URINE: 63 /HPF (ref 0–3)
SPECIFIC GRAVITY UA: 1.02 (ref 1–1.03)
TRICHOMONAS: ABNORMAL /HPF
UROBILINOGEN, URINE: 0.2 MG/DL (ref 0.2–1)
WBC CLUMP: ABNORMAL /HPF
WBC UA: 4950 /HPF (ref 0–2)

## 2022-04-05 LAB
CULTURE: ABNORMAL
CULTURE: ABNORMAL
Lab: ABNORMAL
SPECIMEN: ABNORMAL

## 2022-04-26 ENCOUNTER — HOSPITAL ENCOUNTER (OUTPATIENT)
Age: 87
Setting detail: SPECIMEN
Discharge: HOME | End: 2022-04-26
Payer: MEDICARE

## 2022-04-26 LAB
ALBUMIN SERPL-MCNC: 3.6 GM/DL (ref 3.4–5)
ALP BLD-CCNC: 88 IU/L (ref 40–128)
ALT SERPL-CCNC: 18 U/L (ref 10–40)
ANION GAP SERPL CALCULATED.3IONS-SCNC: 13 MMOL/L (ref 4–16)
AST SERPL-CCNC: 20 IU/L (ref 15–37)
BILIRUB SERPL-MCNC: 0.5 MG/DL (ref 0–1)
BUN BLDV-MCNC: 29 MG/DL (ref 6–23)
CALCIUM SERPL-MCNC: 8.8 MG/DL (ref 8.3–10.6)
CHLORIDE BLD-SCNC: 101 MMOL/L (ref 99–110)
CO2: 26 MMOL/L (ref 21–32)
CREAT SERPL-MCNC: 1.5 MG/DL (ref 0.9–1.3)
GFR AFRICAN AMERICAN: 53 ML/MIN/1.73M2
GFR NON-AFRICAN AMERICAN: 44 ML/MIN/1.73M2
GLUCOSE BLD-MCNC: 77 MG/DL (ref 70–99)
HCT VFR BLD CALC: 38.6 % (ref 42–52)
HEMOGLOBIN: 11.8 GM/DL (ref 13.5–18)
MCH RBC QN AUTO: 28.4 PG (ref 27–31)
MCHC RBC AUTO-ENTMCNC: 30.6 % (ref 32–36)
MCV RBC AUTO: 93 FL (ref 78–100)
PDW BLD-RTO: 13.9 % (ref 11.7–14.9)
PLATELET # BLD: 261 K/CU MM (ref 140–440)
PMV BLD AUTO: 10.6 FL (ref 7.5–11.1)
POTASSIUM SERPL-SCNC: 4.2 MMOL/L (ref 3.5–5.1)
RBC # BLD: 4.15 M/CU MM (ref 4.6–6.2)
SODIUM BLD-SCNC: 140 MMOL/L (ref 135–145)
TOTAL PROTEIN: 6.3 GM/DL (ref 6.4–8.2)
WBC # BLD: 8.5 K/CU MM (ref 4–10.5)

## 2022-04-26 PROCEDURE — 80053 COMPREHEN METABOLIC PANEL: CPT

## 2022-04-26 PROCEDURE — 85027 COMPLETE CBC AUTOMATED: CPT

## 2022-04-26 PROCEDURE — 36415 COLL VENOUS BLD VENIPUNCTURE: CPT

## 2022-04-28 ENCOUNTER — TELEPHONE (OUTPATIENT)
Dept: CARDIOLOGY CLINIC | Age: 87
End: 2022-04-28

## 2022-04-28 NOTE — TELEPHONE ENCOUNTER
Daughter called to see if he should be set up f/u CHF clinic he was to have made a follow up back in November for some reason it was not set up .  Also Dad has had a few UTI'S and she is asking if he should be on a antibiotic regiment

## 2022-05-02 ENCOUNTER — PROCEDURE VISIT (OUTPATIENT)
Dept: CARDIOLOGY CLINIC | Age: 87
End: 2022-05-02
Payer: MEDICARE

## 2022-05-02 DIAGNOSIS — I44.0 AV BLOCK, 1ST DEGREE: ICD-10-CM

## 2022-05-02 DIAGNOSIS — Z95.0 CARDIAC PACEMAKER IN SITU: Primary | ICD-10-CM

## 2022-05-02 DIAGNOSIS — I49.5 SINUS NODE DYSFUNCTION (HCC): ICD-10-CM

## 2022-05-03 ENCOUNTER — HOSPITAL ENCOUNTER (OUTPATIENT)
Age: 87
Setting detail: SPECIMEN
Discharge: HOME | End: 2022-05-03
Payer: MEDICARE

## 2022-05-03 LAB
BACTERIA: ABNORMAL /HPF
BILIRUBIN URINE: NEGATIVE MG/DL
BLOOD, URINE: ABNORMAL
CLARITY: ABNORMAL
COLOR: YELLOW
GLUCOSE, URINE: NEGATIVE MG/DL
KETONES, URINE: NEGATIVE MG/DL
LEUKOCYTE ESTERASE, URINE: ABNORMAL
MUCUS: ABNORMAL HPF
NITRITE URINE, QUANTITATIVE: POSITIVE
PH, URINE: 6 (ref 5–8)
PROTEIN UA: ABNORMAL MG/DL
RBC URINE: 2690 /HPF (ref 0–3)
SPECIFIC GRAVITY UA: 1.01 (ref 1–1.03)
TRICHOMONAS: ABNORMAL /HPF
UROBILINOGEN, URINE: 0.2 MG/DL (ref 0.2–1)
WBC CLUMP: ABNORMAL /HPF
WBC UA: 709 /HPF (ref 0–2)

## 2022-05-03 PROCEDURE — 87077 CULTURE AEROBIC IDENTIFY: CPT

## 2022-05-03 PROCEDURE — 87086 URINE CULTURE/COLONY COUNT: CPT

## 2022-05-03 PROCEDURE — 87186 SC STD MICRODIL/AGAR DIL: CPT

## 2022-05-03 PROCEDURE — 81001 URINALYSIS AUTO W/SCOPE: CPT

## 2022-05-05 LAB
CULTURE: ABNORMAL
CULTURE: ABNORMAL
Lab: ABNORMAL
SPECIMEN: ABNORMAL

## 2022-05-06 ENCOUNTER — NURSE ONLY (OUTPATIENT)
Dept: CARDIOLOGY CLINIC | Age: 87
End: 2022-05-06

## 2022-05-06 VITALS
OXYGEN SATURATION: 98 % | HEART RATE: 61 BPM | HEIGHT: 68 IN | RESPIRATION RATE: 16 BRPM | DIASTOLIC BLOOD PRESSURE: 70 MMHG | SYSTOLIC BLOOD PRESSURE: 120 MMHG | BODY MASS INDEX: 25.76 KG/M2 | WEIGHT: 170 LBS

## 2022-05-06 DIAGNOSIS — I50.32 CHRONIC DIASTOLIC CONGESTIVE HEART FAILURE (HCC): Primary | ICD-10-CM

## 2022-05-06 NOTE — PROGRESS NOTES
[address]      Visit Date: 5/6/2022  Cardiologist:  Dr. Macario Nieves  Primary Care Physician: Dr. Lalo Reis MD    Luzma Rodriguez is a 80 y.o. male who presents today for:  CC:   1. Chronic diastolic congestive heart failure (HCC)        HPI:   Luzma Rodriguez is a 80 y.o. male who presents to the office for a follow up visit in the heart failure clinic. He is a resident of [facility]. He is by himself today. Chief Complaint   Patient presents with    Follow-up     6 month follw up [facility]      Patient has:  Last hospital admission related to Heart Failure:  05/2019   baseline BNP 1102     baseline weight 170 lbs   Chest Pain: no  Worsening SOB/orthopnea/PND: no  Edema: no  Any extra diuretic use: no  Weight gain: no  Compliant checking home weight: yes  Fatigue: no  Abdominal bloating: no  Appetite: good  Difficulty sleeping: no  CARMEN: no  Cough: no  Compliant checking blood pressure: no  Compliant with medication regimen: yes    Vaccinations:    flu No  pneumonia No  COVID 19 Yes      Device: no       Activity: fair  Can you walk 1-2 blocks or do a moderate amount of house/yard work? Yes    NYHA Class: II   Class I   Patients with no limitation of activities; they suffer no symptoms from ordinary activities. Class II   Patients with slight, mild limitation of activity; they are comfortable with rest or with mild exertion.    Class III   Patients with marked limitation of activity; they are comfortable only at rest.   Class IV   Patients who should be at complete rest, confined to bed or chair; any physical activity brings on discomfort and symptoms occur at rest.    Sodium Restrictions: 2g  Fluid Restrictions: 48-64 oz/day  Sodium and fluid restriction compliance: yes      Past Medical History:   Diagnosis Date    ACTA2-related familial thoracic aortic aneurysm     Arrhythmia     Atrial fibrillation (HCC)     BBBB (bilateral bundle branch block)     Bradycardia     [facility] heart failure[facility] [address]     Essential hypertension, malignant     Fall at home     H/O echocardiogram 07/01/2020    EF 50-55%, Mod-Severe AS, Mild PHTN, Aortic Root 3.9cm.(pt had OV after echo)    History of cardioversion 04/17/2018    History of chest x-ray 02/27/2017    Enlargement of the aorta knob which may represent a thoracic aortic aneurysm. Further evaluation w/ a contrast enhanced CT of the chest recommended. no evidence of acute pulmonary process. 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 Recommend thyroid US.  History of echocardiogram 03/27/2017    TTE EF 55%, LV Normal Size, borderline LVH concentric, Normal LV systolic function, transmittal doppler flow pttern suggest impaired LV relaxation Mild aortic stenosis, mild aortic regurgitation.  History of EKG 02/27/2017    Time 12:03 AM, HR 75, A fib, Axis normal, Intervals normal, P waves normal, St-T Segments: nonspecific ST segment changes to the anterior lateral leads, QRS RBBB,  No significant Q waves, no ectopy. A-fib w/RBBB w/nonspecific ST segment change EKG.  History of EKG 02/27/2017    Time 2:58AM: HR 59, NSR, Axis normal, Intervals normal, P waves normal, ST-T seg: nonspecific ST segment changes, QRS RBBB, no significant Q waves, no ectopy. Sinus bradycardia w/ RBBB nonspecific ST Segment changes EKG    History of Holter monitoring 11/08/2017    monitored 48 hours: Patient noted to have multiple PAC and PVCs. Risk of atrial fibrillation present given previous episode Recommend antiarrhythmic therapy.      History of stress test 03/27/2017    NM Stress test: EF 54%, Abnormal study, evidence of mild ischemia in mild inferior regions. post stress LV is enlarged in size. Post-stress LV function is normal.     Hx of echocardiogram 08/05/2021    of 50 %. Dilatation of the aortic root measuring 3.9 cm. Dilatation of the ascending aorta measuring 4.3 cm.  Hx of transesophageal echocardiography (SIMONE) for monitoring 04/17/2018    EF45-50% mild TR, mild-mod MR, mod-severe AS    Hyperlipemia     Hyperlipidemia     Hypertension     Nonspecific abnormal electrocardiogram (ECG) (EKG)     Persistent atrial fibrillation (HCC)     Sick sinus syndrome (HCC)     Vertigo      Past Surgical History:   Procedure Laterality Date    CYSTOSCOPY INSERTION / REMOVAL STENT / STONE Left 5/6/2019    CYSTOSCOPY RETROGRADE PYELOGRAM STENT INSERTION, DIAGNOSTIC CYSTOSCOPY performed by Esequiel Dale MD at [facility] Left 03/29/2018    Dual Chamber Medtronic Aura XT DR AMI Kincaid    UPPER GASTROINTESTINAL ENDOSCOPY N/A 8/20/2020    EGD DIAGNOSTIC ONLY performed by Duncan Pierre MD at [address] [place] [address] reviewed. No pertinent family history. Social History     Tobacco Use    Smoking status: Never Smoker    Smokeless tobacco: Never Used   Substance Use Topics    Alcohol use: Not Currently     Alcohol/week: 0.0 standard drinks     Current Outpatient Medications   Medication Sig Dispense Refill    Lactobacillus (ACIDOPHILUS/PECTIN PO) Take 1 capsule by mouth daily      Dextromethorphan-guaiFENesin (GUAIFENESIN DM PO) Take 10 mLs by mouth every 4 hours as needed 100-10 mg/5ml      nitrofurantoin, macrocrystal-monohydrate, (MACROBID) 100 MG capsule Take 100 mg by mouth 2 times daily      aspirin 81 MG EC tablet Take 81 mg by mouth daily      pantoprazole (PROTONIX) 40 MG tablet Take 40 mg by mouth daily      furosemide (LASIX) 20 MG tablet Take 1 tablet by mouth daily as needed (lower leg edema) Take 20 mg of oral lasix at evening, for a total of 40 mg of oral lasix at the evening dose  for the next 3 days.  (Patient taking differently: Take 20 mg by mouth 2 times daily ) 30 tablet 0    spironolactone (ALDACTONE) 25 MG tablet Take 25 mg by mouth daily      polyethylene glycol (GLYCOLAX) powder Take 17 g by mouth daily      Polyvinyl Alcohol-Povidone (REFRESH OP) Apply 1 drop to eye as needed For dry eyes      acetaminophen (TYLENOL) 325 MG tablet Take 650 mg by mouth every 4 hours as needed for Pain For pain or temp       metoprolol tartrate (LOPRESSOR) 25 MG tablet Take 0.5 tablets by mouth 2 times daily (Patient taking differently: Take 12.5 mg by mouth 2 times daily Hold if SBP < 110 or HR is < 55) 30 tablet 0    docusate sodium (COLACE, DULCOLAX) 100 MG CAPS Take 100 mg by mouth 2 times daily as needed for Constipation 30 capsule 0    finasteride (PROSCAR) 5 MG tablet Take 1 tablet by mouth daily 30 tablet 0    amiodarone (CORDARONE) 200 MG tablet Take 1 tablet by mouth daily (Patient taking differently: Take 200 mg by mouth nightly ) 30 tablet 3    rivastigmine (EXELON) 4.6 MG/24HR Place 1 patch onto the skin daily       Cholecalciferol (VITAMIN D3) 5000 units TABS Take 5,000 Units by mouth every morning      Omega-3 Fatty Acids (FISH OIL) 1000 MG CAPS Take 1,000 mg by mouth daily       Multiple Vitamins-Minerals (THERAPEUTIC MULTIVITAMIN-MINERALS) tablet Take 1 tablet by mouth daily       No current facility-administered medications for this visit. Allergies   Allergen Reactions    Biaxin [Clarithromycin]     Cephalexin     Viagra [Sildenafil Citrate] Nausea Only and Other (See Comments)     Dizziness and BP dropped       SUBJECTIVE:     Review of Systems   Respiratory: Negative for chest tightness, shortness of breath and wheezing. Cardiovascular: Negative for chest pain, palpitations and leg swelling. Neurological: Negative for dizziness, syncope and light-headedness.        OBJECTIVE:   Today's Vitals:  /70 (Site: Right Upper Arm, Position: Sitting, Cuff Size: Medium Adult)   Pulse 61   Resp 16 Ht 5' 8\" (1.727 m)   Wt 170 lb (77.1 kg)   SpO2 98%   BMI 25.85 kg/m²     Wt Readings from Last 3 Encounters:   05/06/22 170 lb (77.1 kg)   03/24/22 165 lb (74.8 kg)   03/23/22 165 lb (74.8 kg)     BP Readings from Last 3 Encounters:   05/06/22 120/70   03/23/22 118/70   07/15/21 100/60     Pulse Readings from Last 3 Encounters:   05/06/22 61   03/23/22 71   07/15/21 60     Body mass index is 25.85 kg/m². Physical Exam  Eyes:      Extraocular Movements: Extraocular movements intact. Neck:      Vascular: No carotid bruit. Cardiovascular:      Rate and Rhythm: Normal rate and regular rhythm. Pulses: Normal pulses. Heart sounds: Normal heart sounds. Pulmonary:      Effort: Pulmonary effort is normal.      Breath sounds: Normal breath sounds. No wheezing. Abdominal:      General: There is no distension. Palpations: Abdomen is soft. Tenderness: There is no abdominal tenderness. Musculoskeletal:      Right lower leg: No edema. Left lower leg: No edema. Skin:     General: Skin is warm and dry. Capillary Refill: Capillary refill takes less than 2 seconds. Neurological:      General: No focal deficit present. Mental Status: He is alert. Psychiatric:         Behavior: Behavior normal.          6 minute walk test:  Time walked 6 minutes  Distance walked 700 feet  Required Oxygen No    Most recent ECHO: 08-  Left ventricular systolic function is low normal with an ejection fraction   of 50 %. Mild concentric left ventricular hypertrophy. The left atrium is mildly dilated. Dilatation of the aortic root measuring 3.9 cm. Dilatation of the ascending aorta measuring 4.3 cm. Mitral annular calcification is present. Mild aortic stenosis with mean gradient of 11 mmHg. Doppler evaluation reveals moderate aortic and mild mitral and tricuspid   regurgitation. Right ventricular systolic pressure of 40 mmHg consistent with mild   pulmonary hypertension.    No evidence of pericardial effusion. Results reviewed:  BNP:   Lab Results   Component Value Date    PROBNP 1,672 (H) 08/18/2020     CBC:   Lab Results   Component Value Date    WBC 8.5 04/26/2022    RBC 4.15 04/26/2022    HGB 11.8 04/26/2022    HCT 38.6 04/26/2022     04/26/2022     CMP:    Lab Results   Component Value Date     04/26/2022    K 4.2 04/26/2022     04/26/2022    CO2 26 04/26/2022    BUN 29 04/26/2022    CREATININE 1.5 04/26/2022    GFRAA 53 04/26/2022    LABGLOM 44 04/26/2022    GLUCOSE 77 04/26/2022    CALCIUM 8.8 04/26/2022     Hepatic Function Panel:    Lab Results   Component Value Date    ALKPHOS 88 04/26/2022    ALT 18 04/26/2022    AST 20 04/26/2022    PROT 6.3 04/26/2022    BILITOT 0.5 04/26/2022    BILIDIR 0.5 11/16/2021    IBILI 0.6 11/16/2021    LABALBU 3.6 04/26/2022     Magnesium:    Lab Results   Component Value Date    MG 2.6 03/25/2022     PT/INR:    Lab Results   Component Value Date    PROTIME 14.7 08/19/2020    INR 1.21 08/19/2020     Lipids:    Lab Results   Component Value Date    TRIG 72 05/24/2019    HDL 47 05/24/2019    LDLDIRECT 86 05/24/2019       Iron Studies:  No components found for: FE,  TIBC,  FERRITIN    Iron Deficiency Anemia:  No IV Iron Therapy:  No  2017 ACC/AHA HF Guidelines:   intravenous iron replacement in patients with New York Heart Association (NYHA) class II and III HF and iron deficiency (ferritin <100 ng/ml or 100-300 ng/ml if transferrin saturation <20%), to improve functional status and QoL. ASSESSMENT AND PLAN:       1.              . Chronic diastolic congestive heart failure (HCC)  -EF 55%  Euvolemic : doing well. Denies symptoms  Unfortunately he is not sure of his medications - should be on aldactone and metoprolol: lasix as needed  Recommend to continue as directed.    · Weight gain of 3 pounds in 1 day or 5 pounds in 1 week  · Increased shortness of breath  · Shortness of breath while laying down  · Cough  · Chest pain  · Swelling in feet, ankles or legs  · Tenderness or bloating in the abdomen  · Fatigue   · Decreased appetite or nausea   · Confusion                     Patient was instructed to call the [address] for changes in the following symptoms:    Weight gain of 3 pounds in 1 day or 5 pounds in 1 week   Increased shortness of breath   Shortness of breath while laying down   Cough   Chest pain   Swelling in feet, ankles or legs   Tenderness or bloating in the abdomen   Fatigue    Decreased appetite or nausea    Confusion     Will follow as needed    Patient given educational materials - see patient instructions. We discussed the importance of weighing oneself and recording daily. We also discussed the importance of a lowsodium diet, higher sodium foods to avoid and better low sodium food options. Discussed use, benefit, and side effects of prescribed medications. All patient questions answered. Patient verbalizes understanding of plan of care using teach back method, and is agreeable to the treatment plan.      Copy of note to be sent to consulting provider and primary cardiologist   Electronicallysigned by CARI Blanc CNP on 5/6/2022 at 10:58 AM

## 2022-05-06 NOTE — PROGRESS NOTES
CHF CLINICAL STAFF DOCUMENTATION    Have you had any Chest Pain? - No    Do you had any Shortness of Breath - No  If Yes - When none    Have you had any dizziness - No  When do you feel dizzy none   How long does it last .none       Do you have any edema -  No  swelling in none      Are you on fluid restrictions - No    Amount -   Are you on sodium restrictions - No   Amount -     Do you feel fatigued - No    Do you have a cough - No    Lung sounds -    Normal - Yes   Abnormal -     Do you have abdominal bloating - No    How is your appetite - \"good    Do you have difficulty sleeping - No  While laying - urinary frequency at night     Do you have a history of sleep apnea - No    6 min. walk  Pre heart rate 61  Time walked 6 minutes   Distance 700   Post heart rate 88    Have you had Covid - unknown    Have you had Vaccine for Covid - Yes    Have you had Flu Vaccine - Yes    Have you had Pneumonia Vaccine - Yes

## 2022-05-08 ASSESSMENT — ENCOUNTER SYMPTOMS
WHEEZING: 0
SHORTNESS OF BREATH: 0
CHEST TIGHTNESS: 0

## 2022-05-30 PROCEDURE — 93296 REM INTERROG EVL PM/IDS: CPT | Performed by: NURSE PRACTITIONER

## 2022-05-30 PROCEDURE — 93294 REM INTERROG EVL PM/LDLS PM: CPT | Performed by: NURSE PRACTITIONER

## 2022-06-02 ENCOUNTER — HOSPITAL ENCOUNTER (OUTPATIENT)
Age: 87
Setting detail: SPECIMEN
Discharge: HOME | End: 2022-06-02
Payer: MEDICARE

## 2022-06-02 LAB
ANION GAP SERPL CALCULATED.3IONS-SCNC: 9 MMOL/L (ref 4–16)
BUN BLDV-MCNC: 28 MG/DL (ref 6–23)
CALCIUM SERPL-MCNC: 8.5 MG/DL (ref 8.3–10.6)
CHLORIDE BLD-SCNC: 101 MMOL/L (ref 99–110)
CO2: 27 MMOL/L (ref 21–32)
CREAT SERPL-MCNC: 1.4 MG/DL (ref 0.9–1.3)
GFR AFRICAN AMERICAN: 58 ML/MIN/1.73M2
GFR NON-AFRICAN AMERICAN: 48 ML/MIN/1.73M2
GLUCOSE BLD-MCNC: 82 MG/DL (ref 70–99)
HCT VFR BLD CALC: 36.8 % (ref 42–52)
HEMOGLOBIN: 11.8 GM/DL (ref 13.5–18)
MCH RBC QN AUTO: 28.5 PG (ref 27–31)
MCHC RBC AUTO-ENTMCNC: 32.1 % (ref 32–36)
MCV RBC AUTO: 88.9 FL (ref 78–100)
PDW BLD-RTO: 15.6 % (ref 11.7–14.9)
PLATELET # BLD: 254 K/CU MM (ref 140–440)
PMV BLD AUTO: 10.1 FL (ref 7.5–11.1)
POTASSIUM SERPL-SCNC: 4.1 MMOL/L (ref 3.5–5.1)
PRO-BNP: 4125 PG/ML
RBC # BLD: 4.14 M/CU MM (ref 4.6–6.2)
SODIUM BLD-SCNC: 137 MMOL/L (ref 135–145)
WBC # BLD: 7.7 K/CU MM (ref 4–10.5)

## 2022-06-02 PROCEDURE — 83880 ASSAY OF NATRIURETIC PEPTIDE: CPT

## 2022-06-02 PROCEDURE — 85027 COMPLETE CBC AUTOMATED: CPT

## 2022-06-02 PROCEDURE — 80048 BASIC METABOLIC PNL TOTAL CA: CPT

## 2022-06-02 PROCEDURE — 36415 COLL VENOUS BLD VENIPUNCTURE: CPT

## 2022-06-10 ENCOUNTER — HOSPITAL ENCOUNTER (OUTPATIENT)
Age: 87
Setting detail: SPECIMEN
Discharge: HOME | End: 2022-06-10
Payer: MEDICARE

## 2022-06-10 LAB
ANION GAP SERPL CALCULATED.3IONS-SCNC: 11 MMOL/L (ref 4–16)
BUN BLDV-MCNC: 26 MG/DL (ref 6–23)
CALCIUM SERPL-MCNC: 8.9 MG/DL (ref 8.3–10.6)
CHLORIDE BLD-SCNC: 98 MMOL/L (ref 99–110)
CO2: 29 MMOL/L (ref 21–32)
CREAT SERPL-MCNC: 1.4 MG/DL (ref 0.9–1.3)
GFR AFRICAN AMERICAN: 58 ML/MIN/1.73M2
GFR NON-AFRICAN AMERICAN: 48 ML/MIN/1.73M2
GLUCOSE BLD-MCNC: 79 MG/DL (ref 70–99)
POTASSIUM SERPL-SCNC: 4.1 MMOL/L (ref 3.5–5.1)
SODIUM BLD-SCNC: 138 MMOL/L (ref 135–145)

## 2022-06-10 PROCEDURE — 80048 BASIC METABOLIC PNL TOTAL CA: CPT

## 2022-06-10 PROCEDURE — 36415 COLL VENOUS BLD VENIPUNCTURE: CPT

## 2022-06-17 ENCOUNTER — HOSPITAL ENCOUNTER (OUTPATIENT)
Age: 87
Setting detail: SPECIMEN
Discharge: HOME | End: 2022-06-17
Payer: MEDICARE

## 2022-06-17 LAB
ANION GAP SERPL CALCULATED.3IONS-SCNC: 10 MMOL/L (ref 4–16)
BUN BLDV-MCNC: 30 MG/DL (ref 6–23)
CALCIUM SERPL-MCNC: 9.4 MG/DL (ref 8.3–10.6)
CHLORIDE BLD-SCNC: 100 MMOL/L (ref 99–110)
CO2: 30 MMOL/L (ref 21–32)
CREAT SERPL-MCNC: 1.5 MG/DL (ref 0.9–1.3)
GFR AFRICAN AMERICAN: 53 ML/MIN/1.73M2
GFR NON-AFRICAN AMERICAN: 44 ML/MIN/1.73M2
GLUCOSE BLD-MCNC: 84 MG/DL (ref 70–99)
POTASSIUM SERPL-SCNC: 4.4 MMOL/L (ref 3.5–5.1)
SODIUM BLD-SCNC: 140 MMOL/L (ref 135–145)

## 2022-06-17 PROCEDURE — 80048 BASIC METABOLIC PNL TOTAL CA: CPT

## 2022-06-17 PROCEDURE — 36415 COLL VENOUS BLD VENIPUNCTURE: CPT

## 2022-06-21 ENCOUNTER — HOSPITAL ENCOUNTER (OUTPATIENT)
Age: 87
Setting detail: SPECIMEN
Discharge: HOME | End: 2022-06-21
Payer: MEDICARE

## 2022-06-21 LAB
ANION GAP SERPL CALCULATED.3IONS-SCNC: 10 MMOL/L (ref 4–16)
BUN BLDV-MCNC: 32 MG/DL (ref 6–23)
CALCIUM SERPL-MCNC: 9.1 MG/DL (ref 8.3–10.6)
CHLORIDE BLD-SCNC: 101 MMOL/L (ref 99–110)
CO2: 29 MMOL/L (ref 21–32)
CREAT SERPL-MCNC: 1.6 MG/DL (ref 0.9–1.3)
GFR AFRICAN AMERICAN: 50 ML/MIN/1.73M2
GFR NON-AFRICAN AMERICAN: 41 ML/MIN/1.73M2
GLUCOSE BLD-MCNC: 83 MG/DL (ref 70–99)
POTASSIUM SERPL-SCNC: 4.3 MMOL/L (ref 3.5–5.1)
SODIUM BLD-SCNC: 140 MMOL/L (ref 135–145)

## 2022-06-21 PROCEDURE — 36415 COLL VENOUS BLD VENIPUNCTURE: CPT

## 2022-06-21 PROCEDURE — 80048 BASIC METABOLIC PNL TOTAL CA: CPT

## 2022-06-28 ENCOUNTER — HOSPITAL ENCOUNTER (OUTPATIENT)
Age: 87
Setting detail: SPECIMEN
Discharge: HOME | End: 2022-06-28
Payer: MEDICARE

## 2022-06-28 LAB
ANION GAP SERPL CALCULATED.3IONS-SCNC: 10 MMOL/L (ref 4–16)
BUN BLDV-MCNC: 41 MG/DL (ref 6–23)
CALCIUM SERPL-MCNC: 9.1 MG/DL (ref 8.3–10.6)
CHLORIDE BLD-SCNC: 97 MMOL/L (ref 99–110)
CO2: 28 MMOL/L (ref 21–32)
CREAT SERPL-MCNC: 1.7 MG/DL (ref 0.9–1.3)
GFR AFRICAN AMERICAN: 46 ML/MIN/1.73M2
GFR NON-AFRICAN AMERICAN: 38 ML/MIN/1.73M2
GLUCOSE BLD-MCNC: 86 MG/DL (ref 70–99)
POTASSIUM SERPL-SCNC: 4.4 MMOL/L (ref 3.5–5.1)
SODIUM BLD-SCNC: 135 MMOL/L (ref 135–145)

## 2022-06-28 PROCEDURE — 80048 BASIC METABOLIC PNL TOTAL CA: CPT

## 2022-06-28 PROCEDURE — 36415 COLL VENOUS BLD VENIPUNCTURE: CPT

## 2022-07-26 ENCOUNTER — HOSPITAL ENCOUNTER (OUTPATIENT)
Age: 87
Setting detail: SPECIMEN
Discharge: HOME | End: 2022-07-26
Payer: MEDICARE

## 2022-07-26 LAB
ALBUMIN SERPL-MCNC: 3.8 GM/DL (ref 3.4–5)
ALP BLD-CCNC: 88 IU/L (ref 40–128)
ALT SERPL-CCNC: 18 U/L (ref 10–40)
ANION GAP SERPL CALCULATED.3IONS-SCNC: 14 MMOL/L (ref 4–16)
AST SERPL-CCNC: 26 IU/L (ref 15–37)
BILIRUB SERPL-MCNC: 0.6 MG/DL (ref 0–1)
BUN BLDV-MCNC: 40 MG/DL (ref 6–23)
CALCIUM SERPL-MCNC: 9.4 MG/DL (ref 8.3–10.6)
CHLORIDE BLD-SCNC: 99 MMOL/L (ref 99–110)
CO2: 29 MMOL/L (ref 21–32)
CREAT SERPL-MCNC: 1.8 MG/DL (ref 0.9–1.3)
GFR AFRICAN AMERICAN: 43 ML/MIN/1.73M2
GFR NON-AFRICAN AMERICAN: 36 ML/MIN/1.73M2
GLUCOSE BLD-MCNC: 82 MG/DL (ref 70–99)
HCT VFR BLD CALC: 44.4 % (ref 42–52)
HEMOGLOBIN: 13.8 GM/DL (ref 13.5–18)
MCH RBC QN AUTO: 28.7 PG (ref 27–31)
MCHC RBC AUTO-ENTMCNC: 31.1 % (ref 32–36)
MCV RBC AUTO: 92.3 FL (ref 78–100)
PDW BLD-RTO: 15.4 % (ref 11.7–14.9)
PLATELET # BLD: 257 K/CU MM (ref 140–440)
PMV BLD AUTO: 10.6 FL (ref 7.5–11.1)
POTASSIUM SERPL-SCNC: 5.2 MMOL/L (ref 3.5–5.1)
RBC # BLD: 4.81 M/CU MM (ref 4.6–6.2)
SODIUM BLD-SCNC: 142 MMOL/L (ref 135–145)
TOTAL PROTEIN: 6.8 GM/DL (ref 6.4–8.2)
TSH HIGH SENSITIVITY: 5.57 UIU/ML (ref 0.27–4.2)
WBC # BLD: 9 K/CU MM (ref 4–10.5)

## 2022-07-26 PROCEDURE — 36415 COLL VENOUS BLD VENIPUNCTURE: CPT

## 2022-07-26 PROCEDURE — 85027 COMPLETE CBC AUTOMATED: CPT

## 2022-07-26 PROCEDURE — 84443 ASSAY THYROID STIM HORMONE: CPT

## 2022-07-26 PROCEDURE — 80053 COMPREHEN METABOLIC PANEL: CPT

## 2022-07-27 ENCOUNTER — HOSPITAL ENCOUNTER (OUTPATIENT)
Age: 87
Setting detail: SPECIMEN
Discharge: HOME | End: 2022-07-27
Payer: MEDICARE

## 2022-07-27 PROCEDURE — 87086 URINE CULTURE/COLONY COUNT: CPT

## 2022-07-27 PROCEDURE — 87088 URINE BACTERIA CULTURE: CPT

## 2022-07-27 PROCEDURE — 87186 SC STD MICRODIL/AGAR DIL: CPT

## 2022-07-27 PROCEDURE — 81001 URINALYSIS AUTO W/SCOPE: CPT

## 2022-07-28 LAB
BACTERIA: ABNORMAL /HPF
BILIRUBIN URINE: NEGATIVE MG/DL
BLOOD, URINE: ABNORMAL
CLARITY: ABNORMAL
COLOR: YELLOW
GLUCOSE, URINE: NEGATIVE MG/DL
KETONES, URINE: NEGATIVE MG/DL
LEUKOCYTE ESTERASE, URINE: ABNORMAL
MUCUS: ABNORMAL HPF
NITRITE URINE, QUANTITATIVE: NEGATIVE
PH, URINE: 6 (ref 5–8)
PROTEIN UA: ABNORMAL MG/DL
RBC URINE: 8 /HPF (ref 0–3)
SPECIFIC GRAVITY UA: 1.01 (ref 1–1.03)
TRICHOMONAS: ABNORMAL /HPF
UROBILINOGEN, URINE: 0.2 MG/DL (ref 0.2–1)
WBC UA: 158 /HPF (ref 0–2)

## 2022-07-30 LAB
CULTURE: ABNORMAL
CULTURE: ABNORMAL
Lab: ABNORMAL
SPECIMEN: ABNORMAL

## 2022-08-02 ENCOUNTER — HOSPITAL ENCOUNTER (OUTPATIENT)
Age: 87
Setting detail: SPECIMEN
Discharge: HOME | End: 2022-08-02
Payer: MEDICARE

## 2022-08-02 LAB
ANION GAP SERPL CALCULATED.3IONS-SCNC: 13 MMOL/L (ref 4–16)
BUN BLDV-MCNC: 45 MG/DL (ref 6–23)
CALCIUM SERPL-MCNC: 9.3 MG/DL (ref 8.3–10.6)
CHLORIDE BLD-SCNC: 100 MMOL/L (ref 99–110)
CO2: 28 MMOL/L (ref 21–32)
CREAT SERPL-MCNC: 1.9 MG/DL (ref 0.9–1.3)
GFR AFRICAN AMERICAN: 41 ML/MIN/1.73M2
GFR NON-AFRICAN AMERICAN: 34 ML/MIN/1.73M2
GLUCOSE BLD-MCNC: 80 MG/DL (ref 70–99)
POTASSIUM SERPL-SCNC: 4.8 MMOL/L (ref 3.5–5.1)
SODIUM BLD-SCNC: 141 MMOL/L (ref 135–145)
TSH HIGH SENSITIVITY: 3.97 UIU/ML (ref 0.27–4.2)

## 2022-08-02 PROCEDURE — 84443 ASSAY THYROID STIM HORMONE: CPT

## 2022-08-02 PROCEDURE — 80048 BASIC METABOLIC PNL TOTAL CA: CPT

## 2022-08-02 PROCEDURE — 36415 COLL VENOUS BLD VENIPUNCTURE: CPT

## 2022-08-11 ENCOUNTER — TELEPHONE (OUTPATIENT)
Dept: CARDIOLOGY CLINIC | Age: 87
End: 2022-08-11

## 2022-08-11 NOTE — TELEPHONE ENCOUNTER
Called and reminded patient to send carelink transmission. Patients daughter said that she will call nursing home and have them send.

## 2022-08-16 ENCOUNTER — TELEPHONE (OUTPATIENT)
Dept: CARDIOLOGY CLINIC | Age: 87
End: 2022-08-16

## 2022-08-16 NOTE — TELEPHONE ENCOUNTER
Returned call to daughter. She was checking to see if carelink transmission came. Nursing home did get new monitor. Advised her that I did not get yet. She will call the NH and see what is going on.

## 2022-08-17 ENCOUNTER — TELEPHONE (OUTPATIENT)
Dept: CARDIOLOGY CLINIC | Age: 87
End: 2022-08-17

## 2022-08-24 ENCOUNTER — PROCEDURE VISIT (OUTPATIENT)
Dept: CARDIOLOGY CLINIC | Age: 87
End: 2022-08-24
Payer: MEDICARE

## 2022-08-24 VITALS — SYSTOLIC BLOOD PRESSURE: 102 MMHG | DIASTOLIC BLOOD PRESSURE: 58 MMHG | HEART RATE: 68 BPM

## 2022-08-24 DIAGNOSIS — Z95.0 CARDIAC PACEMAKER IN SITU: Primary | ICD-10-CM

## 2022-08-24 PROCEDURE — 93280 PM DEVICE PROGR EVAL DUAL: CPT | Performed by: INTERNAL MEDICINE

## 2022-08-24 NOTE — PROCEDURES
Haylie Pearson  80 y.o., male  10/5/1933    Elieser Reis MD    Chief Complaint   Patient presents with    Procedure     Pacemaker check     Vitals:    08/24/22 1122   BP: (!) 102/58   Pulse: 68     Pacer analysis is reviewed and filed in the pacer chart. Analysis is consistent with normal dual-chamber MRI safe Medtronic pacer function with stable leads and appropriate battery status for the age of the device. Remaining average battery longevity is 7.5 years. Pacer is programmed to DDDR mode lower rate of 60 bpm and 93% pacing in the ventricle and 98% pacing in the atrium. PAF burden is 0.3% since January 5, 2021. She follows up with Dr. Messi Blake her cardiologist and he reported recent note from March of this year that she is not on anticoagulation due to low Hgb    Recommend continued every three month pacer check and follow up office visit as scheduled.     Mary Gudino MD, 8/24/2022 12:08 PM

## 2022-09-29 ENCOUNTER — OFFICE VISIT (OUTPATIENT)
Dept: CARDIOLOGY CLINIC | Age: 87
End: 2022-09-29
Payer: MEDICARE

## 2022-09-29 VITALS
HEIGHT: 67 IN | BODY MASS INDEX: 24.96 KG/M2 | WEIGHT: 159 LBS | DIASTOLIC BLOOD PRESSURE: 60 MMHG | SYSTOLIC BLOOD PRESSURE: 116 MMHG | HEART RATE: 63 BPM

## 2022-09-29 DIAGNOSIS — N18.31 CHRONIC KIDNEY DISEASE (CKD) STAGE G3A/A3, MODERATELY DECREASED GLOMERULAR FILTRATION RATE (GFR) BETWEEN 45-59 ML/MIN/1.73 SQUARE METER AND ALBUMINURIA CREATININE RATIO GREATER THAN 300 MG/G (HCC): ICD-10-CM

## 2022-09-29 DIAGNOSIS — Z95.0 S/P PLACEMENT OF CARDIAC PACEMAKER: ICD-10-CM

## 2022-09-29 DIAGNOSIS — I48.91 ATRIAL FIBRILLATION, UNSPECIFIED TYPE (HCC): Primary | ICD-10-CM

## 2022-09-29 DIAGNOSIS — I50.43 CHF (CONGESTIVE HEART FAILURE), NYHA CLASS I, ACUTE ON CHRONIC, COMBINED (HCC): ICD-10-CM

## 2022-09-29 DIAGNOSIS — I38 VHD (VALVULAR HEART DISEASE): ICD-10-CM

## 2022-09-29 DIAGNOSIS — I71.21 ASCENDING AORTIC ANEURYSM: ICD-10-CM

## 2022-09-29 PROCEDURE — 99213 OFFICE O/P EST LOW 20 MIN: CPT | Performed by: INTERNAL MEDICINE

## 2022-09-29 PROCEDURE — 93000 ELECTROCARDIOGRAM COMPLETE: CPT | Performed by: INTERNAL MEDICINE

## 2022-09-29 PROCEDURE — 1123F ACP DISCUSS/DSCN MKR DOCD: CPT | Performed by: INTERNAL MEDICINE

## 2022-09-29 NOTE — PROGRESS NOTES
OFFICE PROGRESS NOTES      Sean Vega is a 80 y.o. male who has    CHIEF COMPLAINT AS FOLLOWS:  CHEST PAIN: Patient denies any C/O chest pains at this time. SOB: No C/O SOB at this time. LEG EDEMA: No leg edema   PALPITATIONS: Denies any C/O Palpitations   DIZZINESS: No C/O Dizziness   SYNCOPE: None   OTHER/ ADDITIONAL COMPLAINTS:                                     HPI: Patient is here for F/U on his CHF, VHD, & Arrhythmia problems. VHD: Patient has known  aortic valve disease. H  CHF: Patient has known  diastolic (HFpEF). Patient had been managed with medical regimen as stated below. Arrhythmia: Patient has known  PAF. Current Outpatient Medications   Medication Sig Dispense Refill    Lactobacillus (ACIDOPHILUS/PECTIN PO) Take 1 capsule by mouth daily      Dextromethorphan-guaiFENesin (GUAIFENESIN DM PO) Take 10 mLs by mouth every 4 hours as needed 100-10 mg/5ml      nitrofurantoin, macrocrystal-monohydrate, (MACROBID) 100 MG capsule Take 100 mg by mouth 2 times daily      aspirin 81 MG EC tablet Take 81 mg by mouth daily      pantoprazole (PROTONIX) 40 MG tablet Take 40 mg by mouth daily      furosemide (LASIX) 20 MG tablet Take 1 tablet by mouth daily as needed (lower leg edema) Take 20 mg of oral lasix at evening, for a total of 40 mg of oral lasix at the evening dose  for the next 3 days.  (Patient taking differently: Take 20 mg by mouth 2 times daily) 30 tablet 0    spironolactone (ALDACTONE) 25 MG tablet Take 25 mg by mouth daily      polyethylene glycol (GLYCOLAX) powder Take 17 g by mouth daily      Polyvinyl Alcohol-Povidone (REFRESH OP) Apply 1 drop to eye as needed For dry eyes      acetaminophen (TYLENOL) 325 MG tablet Take 650 mg by mouth every 4 hours as needed for Pain For pain or temp       metoprolol tartrate (LOPRESSOR) 25 MG tablet Take 0.5 tablets by mouth 2 times daily (Patient taking differently: Take 12.5 mg by mouth 2 times daily Hold if SBP < 110 or HR is < 55) 30 tablet 0    finasteride (PROSCAR) 5 MG tablet Take 1 tablet by mouth daily 30 tablet 0    amiodarone (CORDARONE) 200 MG tablet Take 1 tablet by mouth daily (Patient taking differently: Take 200 mg by mouth nightly) 30 tablet 3    rivastigmine (EXELON) 4.6 MG/24HR Place 1 patch onto the skin daily       Cholecalciferol (VITAMIN D3) 5000 units TABS Take 5,000 Units by mouth every morning      Omega-3 Fatty Acids (FISH OIL) 1000 MG CAPS Take 1,000 mg by mouth daily       Multiple Vitamins-Minerals (THERAPEUTIC MULTIVITAMIN-MINERALS) tablet Take 1 tablet by mouth daily      docusate sodium (COLACE, DULCOLAX) 100 MG CAPS Take 100 mg by mouth 2 times daily as needed for Constipation (Patient not taking: No sig reported) 30 capsule 0     No current facility-administered medications for this visit. Allergies: Biaxin [clarithromycin], Cephalexin, and Viagra [sildenafil citrate]  Review of Systems:    Constitutional: Negative for diaphoresis and fatigue  Respiratory: Negative for shortness of breath  Cardiovascular: Negative for chest pain, dyspnea on exertion, claudication, edema, irregular heartbeat, murmur, palpitations or shortness of breath  Musculoskeletal: Negative for muscle pain, muscular weakness, negative for pain in arm and leg or swelling in foot and leg    Objective:  /60 (Site: Left Upper Arm, Position: Sitting, Cuff Size: Medium Adult)   Pulse 63   Ht 5' 7\" (1.702 m)   Wt 159 lb (72.1 kg)   BMI 24.90 kg/m²   Wt Readings from Last 3 Encounters:   09/29/22 159 lb (72.1 kg)   05/06/22 170 lb (77.1 kg)   03/24/22 165 lb (74.8 kg)     Body mass index is 24.9 kg/m². GENERAL - Alert, oriented, pleasant, in no apparent distress. EYES: No jaundice, no conjunctival pallor. Neck - Supple. No jugular venous distention noted. No carotid bruits. Cardiovascular - Normal S1 and S2 without obvious murmur or gallop. Extremities - No cyanosis, clubbing, or significant edema.     Pulmonary - No respiratory distress. No wheezes or rales.       MEDICAL DECISION MAKING & DATA REVIEW:    Lab Review   Lab Results   Component Value Date/Time    TROPONINT 0.027 08/19/2020 09:35 AM    TROPONINT 0.036 08/19/2020 01:27 AM     Lab Results   Component Value Date/Time    PROBNP 4,125 06/02/2022 07:31 AM    PROBNP 1,672 08/18/2020 11:52 AM     Lab Results   Component Value Date    INR 1.21 08/19/2020    INR 1.71 09/11/2019     No results found for: LABA1C  Lab Results   Component Value Date    WBC 9.0 07/26/2022    WBC 7.7 06/02/2022    HCT 44.4 07/26/2022    HCT 36.8 (L) 06/02/2022    MCV 92.3 07/26/2022    MCV 88.9 06/02/2022     07/26/2022     06/02/2022     Lab Results   Component Value Date    CHOL 137 05/24/2019    TRIG 72 05/24/2019    HDL 47 05/24/2019    LDLDIRECT 86 05/24/2019     Lab Results   Component Value Date    ALT 18 07/26/2022    ALT 18 04/26/2022    AST 26 07/26/2022    AST 20 04/26/2022     BMP:    Lab Results   Component Value Date/Time     08/02/2022 06:53 AM     07/26/2022 06:57 AM    K 4.8 08/02/2022 06:53 AM    K 5.2 07/26/2022 06:57 AM     08/02/2022 06:53 AM    CL 99 07/26/2022 06:57 AM    CO2 28 08/02/2022 06:53 AM    CO2 29 07/26/2022 06:57 AM    BUN 45 08/02/2022 06:53 AM    BUN 40 07/26/2022 06:57 AM    CREATININE 1.9 08/02/2022 06:53 AM    CREATININE 1.8 07/26/2022 06:57 AM     CMP:   Lab Results   Component Value Date/Time     08/02/2022 06:53 AM     07/26/2022 06:57 AM    K 4.8 08/02/2022 06:53 AM    K 5.2 07/26/2022 06:57 AM     08/02/2022 06:53 AM    CL 99 07/26/2022 06:57 AM    CO2 28 08/02/2022 06:53 AM    CO2 29 07/26/2022 06:57 AM    BUN 45 08/02/2022 06:53 AM    BUN 40 07/26/2022 06:57 AM    CREATININE 1.9 08/02/2022 06:53 AM    CREATININE 1.8 07/26/2022 06:57 AM    PROT 6.8 07/26/2022 06:57 AM    PROT 6.3 04/26/2022 07:09 AM     Lab Results   Component Value Date/Time    [facility] 3.970 08/02/2022 06:53 AM    TSH 5.570 07/26/2022 06:57 AM       QUALITY MEASURES REVIEWED:  1.CAD:Patient is taking anti platelet agent:Yes  Patient does not have Hx of documented CAD  2. DYSLIPIDEMIA: Patient is on cholesterol lowering medication:No   3. Beta-Blocker therapy for CAD, if prior Myocardial Infarction:Yes   4. Counselled regarding smoking cessation. No   Patient does not Smoke. 5.Anticoagulation therapy (for A.Fib) No due to low Hgb  6. Discussed weight management strategies. Assessment & Plan:  Primary / Secondary prevention is the goal by aggressive risk modification, healthy and therapeutic life style changes for cardiovascular risk reduction. CORONARY ARTERY DISEASE:None known. EKG: DDD pacing. HTN:well controlled on current medical regimen, see list above. - changes in  treatment:   no, on Lopressor   CARDIOMYOPATHY: None known   CONGESTIVE HEART FAILURE:  KNOWN HISTORY. Compensated      VHD: Moderate AI  7/2021   Left ventricular systolic function is low normal with an ejection fraction   of 50 %. Mild concentric left ventricular hypertrophy. The left atrium is mildly dilated. Dilatation of the aortic root measuring 3.9 cm. Dilatation of the ascending aorta measuring 4.3 cm. Mitral annular calcification is present. Mild aortic stenosis with mean gradient of 11 mmHg. Doppler evaluation reveals moderate aortic, pressure half time of 379 and mild mitral and tricuspid   regurgitation. Right ventricular systolic pressure of 40 mmHg consistent with mild   pulmonary hypertension. No evidence of pericardial effusion. DYSLIPIDEMIA: Patient's profile is at / near [facility],  takes Lipitor                                                          See most recent Lab values in Labs section above. Diabetes mellitis: .no  ARRHYTHMIAS:  Known. Patient has H/O Atrial fibrillation. On Amiodarone                                He is rate controlled & not on anticoagulation due to low Hgb. [id number] Score for Atrial Fibrillation Stroke Risk   Risk   Factors  Component Value   C CHF Yes 1   H HTN Yes 1   A2 Age >= 76 Yes,  (80 y.o.) 2   D DM No 0   S2 Prior Stroke/TIA No 0   V Vascular Disease No 0   A Age 74-69 No,  (80 y.o.) 0   Sc Sex male 0    [id number]  Score  4   Score last updated 9/29/22 11:02 AM EDT        S/P PPM:  8/2022  Pacer analysis is reviewed and filed in the pacer chart. Analysis is consistent with normal dual-chamber MRI safe Medtronic pacer function with stable leads and appropriate battery status for the age of the device. Remaining average battery longevity is 7.5 years. Pacer is programmed to DDDR mode lower rate of 60 bpm and 93% pacing in the ventricle and 98% pacing in the atrium. PAF burden is 0.3% since January 5, 2021. She follows up with Dr. Rebeca Ibarra her cardiologist and he reported recent note from March of this year that she is not on anticoagulation due to low Hgb     Recommend continued every three month pacer check and follow up office visit as scheduled. TESTS ORDERED: Lipid profile     PREVIOUSLY ORDERED TESTS REVIEWED & DISCUSSED WITH THE PATIENT:     I personally reviewed & interpreted, all previously ordered tests as copied above. Latest Labs are pulled in to the note with dates. Labs, specially in Reference to Lipid profile, Cardiac testing in the form of Echo ( dated: ), stress tests ( dated: ) & other relevant cardiac testing reviewed with patient & recommendations made based on assessment of the results. Discussed role of Cardiac risk factors & effects + treatment of co morbidities with patient & advised accordingly. MEDICATIONS: List of medications patient is currently taking is reviewed in detail with the patient & family member present. Discussed any side effects or problems taking the medication. Recommend Continue present management & medications as listed.      AFFIRMATION: I spent at least 20 minutes of time reviewing patient's history, previous & current medical problems & all Labs + testing. This includes chart prep even prior to the vosit. Various goals are discussed and multiple questions answered. Relevant concelling performed. Office follow up in six months. Device check per protocol.

## 2022-09-29 NOTE — LETTER
[address]  [address] [address]  Phone: [phone]  Fax: [phone]    Ayla Husain MD    September 29, 2022     Anderson Reis MD  [address] [facility]  [place] [facility] [address]    Patient: Ngozi Tabor   MR Number: [id number]   YOB: 1933   Date of Visit: 9/29/2022       Dear Anderson Ivey To: Thank you for referring Navid Wilkinsno to me for evaluation/treatment. Below are the relevant portions of my assessment and plan of care. If you have questions, please do not hesitate to call me. I look forward to following Juan Francisco Medina along with you.     Sincerely,      Ayla Husain MD

## 2022-09-29 NOTE — PROGRESS NOTES
Vein \"LEG PROBLEM Questionnaire\"  Do you have prominent leg veins? Yes   Do you have any skin discoloration? No  Do you have any healed or active sores? No  Do you have swelling of the legs? Yes  Do you have a family history of varicose veins? 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 Have you been fighting overweight problems? No  8. Do you have restless legs? No  9. Do you have any night time cramps? No  10. Do you have any of the following in your legs:         I  11. If Yes - Have they worn compression stockings Yes  12.  If they have worn compression stockings  Years  When Pt was started on Amiodarone:     Test                     Date of last test  EKG                  7/15/2021   []    PFT                      []                                                                         CXR     8/18/2000               []                                                                        THYROID     8/2/2022       []                                                LFT                      []                                          EYE EXAM     June of 2022     []

## 2022-09-29 NOTE — PATIENT INSTRUCTIONS
Please be informed that if you contact our office outside of normal business hours the physician on call cannot help with any scheduling or rescheduling issues, procedure instruction questions or any type of medication issue. We advise you for any urgent/emergency that you go to the nearest emergency room! PLEASE CALL OUR OFFICE DURING NORMAL BUSINESS HOURS    Monday - Friday   8 am to 5 pm    [place] [place] [address]: [phone]    [place]:  [phone]  **It is YOUR responsibilty to bring medication bottles and/or updated medication list to [address] [place] [address]. This will allow us to better serve you and all your healthcare needs**  [facility] Locations - No appointment necessary. Sites open Monday to Friday. Call your preferred location for test preparation, business hours and other information you need. [facility] accepts [employer]. [facility] [address] [facility] [facility], [address] [facility]  Phone: [phone] [facility] [address] [facility]  [address]. [place], [address] [facility]  Phone: [phone]     CORONARY ARTERY DISEASE:None known. EKG: DDD pacing. HTN:well controlled on current medical regimen, see list above. - changes in  treatment:   no, on Lopressor   CARDIOMYOPATHY: None known   CONGESTIVE HEART FAILURE:  KNOWN HISTORY. Compensated      VHD: Moderate AI  7/2021   Left ventricular systolic function is low normal with an ejection fraction   of 50 %. Mild concentric left ventricular hypertrophy. The left atrium is mildly dilated. Dilatation of the aortic root measuring 3.9 cm. Dilatation of the ascending aorta measuring 4.3 cm. Mitral annular calcification is present. Mild aortic stenosis with mean gradient of 11 mmHg. Doppler evaluation reveals moderate aortic, pressure half time of 379 and mild mitral and tricuspid   regurgitation.    Right ventricular systolic pressure of 40 mmHg consistent with mild   pulmonary hypertension. No evidence of pericardial effusion. DYSLIPIDEMIA: Patient's profile is at / near [facility],  takes Lipitor                                                          See most recent Lab values in Labs section above. Diabetes mellitis: .no  ARRHYTHMIAS:  Known. Patient has H/O Atrial fibrillation. On Amiodarone                                He is rate controlled & not on anticoagulation due to low Hgb. [id number] Score for Atrial Fibrillation Stroke Risk   Risk   Factors  Component Value   C CHF Yes 1   H HTN Yes 1   A2 Age >= 76 Yes,  (80 y.o.) 2   D DM No 0   S2 Prior Stroke/TIA No 0   V Vascular Disease No 0   A Age 74-69 No,  (80 y.o.) 0   Sc Sex male 0    [id number]  Score  4   Score last updated 9/29/22 11:02 AM EDT        S/P PPM:  8/2022  Pacer analysis is reviewed and filed in the pacer chart. Analysis is consistent with normal dual-chamber MRI safe Medtronic pacer function with stable leads and appropriate battery status for the age of the device. Remaining average battery longevity is 7.5 years. Pacer is programmed to DDDR mode lower rate of 60 bpm and 93% pacing in the ventricle and 98% pacing in the atrium. PAF burden is 0.3% since January 5, 2021. She follows up with Dr. Dali Vigil her cardiologist and he reported recent note from March of this year that she is not on anticoagulation due to low Hgb     Recommend continued every three month pacer check and follow up office visit as scheduled. TESTS ORDERED: Lipid profile     PREVIOUSLY ORDERED TESTS REVIEWED & DISCUSSED WITH THE PATIENT:     I personally reviewed & interpreted, all previously ordered tests as copied above. Latest Labs are pulled in to the note with dates.    Labs, specially in Reference to Lipid profile, Cardiac testing in the form of Echo ( dated: ), stress tests ( dated: ) & other relevant cardiac testing reviewed with patient & recommendations made based on assessment of the results. Discussed role of Cardiac risk factors & effects + treatment of co morbidities with patient & advised accordingly. MEDICATIONS: List of medications patient is currently taking is reviewed in detail with the patient & family member present. Discussed any side effects or problems taking the medication. Recommend Continue present management & medications as listed. AFFIRMATION: I spent at least 20 minutes of time reviewing patient's history, previous & current medical problems & all Labs + testing. This includes chart prep even prior to the vosit. Various goals are discussed and multiple questions answered. Relevant concelling performed. Office follow up in six months. Device check per protocol.

## 2022-10-25 ENCOUNTER — HOSPITAL ENCOUNTER (OUTPATIENT)
Age: 87
Setting detail: SPECIMEN
Discharge: HOME | End: 2022-10-25
Payer: MEDICARE

## 2022-10-25 LAB
ALBUMIN SERPL-MCNC: 3.8 GM/DL (ref 3.4–5)
ALP BLD-CCNC: 81 IU/L (ref 40–128)
ALT SERPL-CCNC: 24 U/L (ref 10–40)
ANION GAP SERPL CALCULATED.3IONS-SCNC: 11 MMOL/L (ref 4–16)
AST SERPL-CCNC: 26 IU/L (ref 15–37)
BILIRUB SERPL-MCNC: 0.6 MG/DL (ref 0–1)
BUN BLDV-MCNC: 38 MG/DL (ref 6–23)
CALCIUM SERPL-MCNC: 9.1 MG/DL (ref 8.3–10.6)
CHLORIDE BLD-SCNC: 99 MMOL/L (ref 99–110)
CO2: 28 MMOL/L (ref 21–32)
CREAT SERPL-MCNC: 1.7 MG/DL (ref 0.9–1.3)
GFR SERPL CREATININE-BSD FRML MDRD: 38 ML/MIN/1.73M2
GLUCOSE BLD-MCNC: 77 MG/DL (ref 70–99)
HCT VFR BLD CALC: 44.5 % (ref 42–52)
HEMOGLOBIN: 13.6 GM/DL (ref 13.5–18)
MCH RBC QN AUTO: 28.9 PG (ref 27–31)
MCHC RBC AUTO-ENTMCNC: 30.6 % (ref 32–36)
MCV RBC AUTO: 94.7 FL (ref 78–100)
PDW BLD-RTO: 14.2 % (ref 11.7–14.9)
PLATELET # BLD: 327 K/CU MM (ref 140–440)
PMV BLD AUTO: 10 FL (ref 7.5–11.1)
POTASSIUM SERPL-SCNC: 4.8 MMOL/L (ref 3.5–5.1)
RBC # BLD: 4.7 M/CU MM (ref 4.6–6.2)
SODIUM BLD-SCNC: 138 MMOL/L (ref 135–145)
TOTAL PROTEIN: 6.7 GM/DL (ref 6.4–8.2)
WBC # BLD: 8.9 K/CU MM (ref 4–10.5)

## 2022-10-25 PROCEDURE — 80053 COMPREHEN METABOLIC PANEL: CPT

## 2022-10-25 PROCEDURE — 85027 COMPLETE CBC AUTOMATED: CPT

## 2022-10-25 PROCEDURE — 36415 COLL VENOUS BLD VENIPUNCTURE: CPT

## 2022-12-02 ENCOUNTER — PROCEDURE VISIT (OUTPATIENT)
Dept: CARDIOLOGY CLINIC | Age: 87
End: 2022-12-02

## 2022-12-02 DIAGNOSIS — Z95.0 CARDIAC PACEMAKER IN SITU: Primary | ICD-10-CM

## 2022-12-02 DIAGNOSIS — I49.5 SINUS NODE DYSFUNCTION (HCC): ICD-10-CM

## 2022-12-02 DIAGNOSIS — I44.0 AV BLOCK, 1ST DEGREE: ICD-10-CM

## 2022-12-05 ENCOUNTER — HOSPITAL ENCOUNTER (EMERGENCY)
Age: 87
Discharge: HOME | End: 2022-12-05
Attending: EMERGENCY MEDICINE
Payer: MEDICARE

## 2022-12-05 ENCOUNTER — APPOINTMENT (OUTPATIENT)
Dept: GENERAL RADIOLOGY | Age: 87
End: 2022-12-05
Payer: MEDICARE

## 2022-12-05 VITALS
WEIGHT: 155 LBS | TEMPERATURE: 97.5 F | RESPIRATION RATE: 19 BRPM | DIASTOLIC BLOOD PRESSURE: 57 MMHG | OXYGEN SATURATION: 100 % | BODY MASS INDEX: 23.49 KG/M2 | SYSTOLIC BLOOD PRESSURE: 105 MMHG | HEART RATE: 65 BPM | HEIGHT: 68 IN

## 2022-12-05 DIAGNOSIS — N39.0 ACUTE UTI: ICD-10-CM

## 2022-12-05 DIAGNOSIS — R77.8 ELEVATED TROPONIN: ICD-10-CM

## 2022-12-05 DIAGNOSIS — N17.9 AKI (ACUTE KIDNEY INJURY) (HCC): ICD-10-CM

## 2022-12-05 DIAGNOSIS — R07.9 ACUTE CHEST PAIN: Primary | ICD-10-CM

## 2022-12-05 LAB
ALBUMIN SERPL-MCNC: 4 GM/DL (ref 3.4–5)
ALP BLD-CCNC: 84 IU/L (ref 40–129)
ALT SERPL-CCNC: 24 U/L (ref 10–40)
ANION GAP SERPL CALCULATED.3IONS-SCNC: 13 MMOL/L (ref 4–16)
AST SERPL-CCNC: 22 IU/L (ref 15–37)
BACTERIA: ABNORMAL /HPF
BASOPHILS ABSOLUTE: 0 K/CU MM
BASOPHILS RELATIVE PERCENT: 0.3 % (ref 0–1)
BILIRUB SERPL-MCNC: 0.6 MG/DL (ref 0–1)
BILIRUBIN URINE: NEGATIVE MG/DL
BLOOD, URINE: ABNORMAL
BUN BLDV-MCNC: 57 MG/DL (ref 6–23)
CALCIUM SERPL-MCNC: 10.2 MG/DL (ref 8.3–10.6)
CHLORIDE BLD-SCNC: 99 MMOL/L (ref 99–110)
CLARITY: ABNORMAL
CO2: 26 MMOL/L (ref 21–32)
COLOR: YELLOW
CREAT SERPL-MCNC: 2.2 MG/DL (ref 0.9–1.3)
DIFFERENTIAL TYPE: ABNORMAL
EKG ATRIAL RATE: 60 BPM
EKG DIAGNOSIS: NORMAL
EKG P AXIS: 45 DEGREES
EKG P-R INTERVAL: 180 MS
EKG Q-T INTERVAL: 556 MS
EKG QRS DURATION: 232 MS
EKG QTC CALCULATION (BAZETT): 556 MS
EKG R AXIS: -58 DEGREES
EKG T AXIS: 100 DEGREES
EKG VENTRICULAR RATE: 60 BPM
EOSINOPHILS ABSOLUTE: 0.1 K/CU MM
EOSINOPHILS RELATIVE PERCENT: 0.7 % (ref 0–3)
GFR SERPL CREATININE-BSD FRML MDRD: 28 ML/MIN/1.73M2
GLUCOSE BLD-MCNC: 116 MG/DL (ref 70–99)
GLUCOSE, URINE: NEGATIVE MG/DL
HCT VFR BLD CALC: 47.9 % (ref 42–52)
HEMOGLOBIN: 15.1 GM/DL (ref 13.5–18)
IMMATURE NEUTROPHIL %: 0.9 % (ref 0–0.43)
KETONES, URINE: NEGATIVE MG/DL
LEUKOCYTE ESTERASE, URINE: ABNORMAL
LYMPHOCYTES ABSOLUTE: 0.9 K/CU MM
LYMPHOCYTES RELATIVE PERCENT: 8.9 % (ref 24–44)
MCH RBC QN AUTO: 28.7 PG (ref 27–31)
MCHC RBC AUTO-ENTMCNC: 31.5 % (ref 32–36)
MCV RBC AUTO: 90.9 FL (ref 78–100)
MONOCYTES ABSOLUTE: 1 K/CU MM
MONOCYTES RELATIVE PERCENT: 10.4 % (ref 0–4)
NITRITE URINE, QUANTITATIVE: NEGATIVE
NUCLEATED RBC %: 0 %
PDW BLD-RTO: 14.3 % (ref 11.7–14.9)
PH, URINE: 5 (ref 5–8)
PLATELET # BLD: 329 K/CU MM (ref 140–440)
PMV BLD AUTO: 9.7 FL (ref 7.5–11.1)
POTASSIUM SERPL-SCNC: 4.9 MMOL/L (ref 3.5–5.1)
PRO-BNP: 2919 PG/ML
PROTEIN UA: 30 MG/DL
RBC # BLD: 5.27 M/CU MM (ref 4.6–6.2)
RBC URINE: 14 /HPF (ref 0–3)
SEGMENTED NEUTROPHILS ABSOLUTE COUNT: 7.9 K/CU MM
SEGMENTED NEUTROPHILS RELATIVE PERCENT: 78.8 % (ref 36–66)
SODIUM BLD-SCNC: 138 MMOL/L (ref 135–145)
SPECIFIC GRAVITY UA: 1.02 (ref 1–1.03)
TOTAL IMMATURE NEUTOROPHIL: 0.09 K/CU MM
TOTAL NUCLEATED RBC: 0 K/CU MM
TOTAL PROTEIN: 8.1 GM/DL (ref 6.4–8.2)
TRICHOMONAS: ABNORMAL /HPF
TROPONIN T: 0.05 NG/ML
UROBILINOGEN, URINE: 0.2 MG/DL (ref 0.2–1)
WBC # BLD: 10 K/CU MM (ref 4–10.5)
WBC CLUMP: ABNORMAL /HPF
WBC UA: 1866 /HPF (ref 0–2)

## 2022-12-05 PROCEDURE — 6370000000 HC RX 637 (ALT 250 FOR IP): Performed by: EMERGENCY MEDICINE

## 2022-12-05 PROCEDURE — 84484 ASSAY OF TROPONIN QUANT: CPT

## 2022-12-05 PROCEDURE — 99285 EMERGENCY DEPT VISIT HI MDM: CPT

## 2022-12-05 PROCEDURE — 71045 X-RAY EXAM CHEST 1 VIEW: CPT

## 2022-12-05 PROCEDURE — 93005 ELECTROCARDIOGRAM TRACING: CPT | Performed by: EMERGENCY MEDICINE

## 2022-12-05 PROCEDURE — 80053 COMPREHEN METABOLIC PANEL: CPT

## 2022-12-05 PROCEDURE — 93010 ELECTROCARDIOGRAM REPORT: CPT | Performed by: INTERNAL MEDICINE

## 2022-12-05 PROCEDURE — 81001 URINALYSIS AUTO W/SCOPE: CPT

## 2022-12-05 PROCEDURE — 83880 ASSAY OF NATRIURETIC PEPTIDE: CPT

## 2022-12-05 PROCEDURE — 85025 COMPLETE CBC W/AUTO DIFF WBC: CPT

## 2022-12-05 PROCEDURE — 2580000003 HC RX 258: Performed by: EMERGENCY MEDICINE

## 2022-12-05 RX ORDER — SODIUM CHLORIDE 9 MG/ML
INJECTION, SOLUTION INTRAVENOUS CONTINUOUS
Status: DISCONTINUED | OUTPATIENT
Start: 2022-12-05 | End: 2022-12-05 | Stop reason: HOSPADM

## 2022-12-05 RX ORDER — SULFAMETHOXAZOLE AND TRIMETHOPRIM 800; 160 MG/1; MG/1
0.5 TABLET ORAL 2 TIMES DAILY
Qty: 10 TABLET | Refills: 0 | Status: SHIPPED | OUTPATIENT
Start: 2022-12-05 | End: 2022-12-15

## 2022-12-05 RX ORDER — ASPIRIN 81 MG/1
324 TABLET, CHEWABLE ORAL ONCE
Status: COMPLETED | OUTPATIENT
Start: 2022-12-05 | End: 2022-12-05

## 2022-12-05 RX ADMIN — SODIUM CHLORIDE: 9 INJECTION, SOLUTION INTRAVENOUS at 12:09

## 2022-12-05 RX ADMIN — ASPIRIN 324 MG: 81 TABLET, CHEWABLE ORAL at 12:10

## 2022-12-05 NOTE — ED PROVIDER NOTES
Emergency Department Encounter  Location: [address] [place] [address] EMERGENCY DEPARTMENT    Patient: Andi Ding  MRN: [id number]  : 10/5/1933  Date of evaluation: 2022  ED Provider: Deloise Lombard, DO    Chief Complaint:    Shortness of Breath and Chest Pain    [place]:  Andi Ding is a 80 y.o. male that presents to the emergency department with concern for chest discomfort. Patient was in route to the dentist with his daughter when he became acutely short of breath. He was clutching his chest per daughter. Patient states that he feels comfortable now and denies any ongoing chest discomfort or shortness of breath. He is not able to recall what the sensation felt like. Indicates he was diagnosed with COVID mid November and treated with Paxil event. He been doing well until today. Reports has been eating and drinking per usual and had breakfast this morning. No vomiting or diarrhea. ROS:  At least 10 systems reviewed and are acutely negative unless otherwise noted in the HPI. However, this is felt to be limited as patient has poor short-term recall. Past Medical History:   Diagnosis Date    ACTA2-related familial thoracic aortic aneurysm     Arrhythmia     Atrial fibrillation (HCC)     BBBB (bilateral bundle branch block)     Bradycardia     CHF (congestive heart failure) ([facility] [address]     Essential hypertension, malignant     Fall at home     H/O echocardiogram 2020    EF 50-55%, Mod-Severe AS, Mild PHTN, Aortic Root 3.9cm.(pt had OV after echo)    History of cardioversion 2018    History of chest x-ray 2017    Enlargement of the aorta knob which may represent a thoracic aortic aneurysm. Further evaluation w/ a contrast enhanced CT of the chest recommended. no evidence of acute pulmonary process.      History of CT scan 2017    CT Angio Chest: no evidence of pulmonary embolism, ascending thoracic aorta aneurysm measuring 4.8 cm, descending thoracic aoric aneurysm 4.1 cm, bilateral nephrllthiasis, R renal cyst, cholelithiasis, cardiomegaly, low attenuated lesion is noted w/in L lobe of thyroid gland containng Calcification. Recommend thyroid US. History of echocardiogram 03/27/2017    TTE EF 55%, LV Normal Size, borderline LVH concentric, Normal LV systolic function, transmittal doppler flow pttern suggest impaired LV relaxation Mild aortic stenosis, mild aortic regurgitation. History of EKG 02/27/2017    Time 12:03 AM, HR 75, A fib, Axis normal, Intervals normal, P waves normal, St-T Segments: nonspecific ST segment changes to the anterior lateral leads, QRS RBBB,  No significant Q waves, no ectopy. A-fib w/RBBB w/nonspecific ST segment change EKG. History of EKG 02/27/2017    Time 2:58AM: HR 59, NSR, Axis normal, Intervals normal, P waves normal, ST-T seg: nonspecific ST segment changes, QRS RBBB, no significant Q waves, no ectopy. Sinus bradycardia w/ RBBB nonspecific ST Segment changes EKG    History of Holter monitoring 11/08/2017    monitored 48 hours: Patient noted to have multiple PAC and PVCs. Risk of atrial fibrillation present given previous episode Recommend antiarrhythmic therapy. History of stress test 03/27/2017    NM Stress test: EF 54%, Abnormal study, evidence of mild ischemia in mild inferior regions. post stress LV is enlarged in size. Post-stress LV function is normal.     Hx of echocardiogram 08/05/2021    of 50 %. Dilatation of the aortic root measuring 3.9 cm. Dilatation of the ascending aorta measuring 4.3 cm.     Hx of transesophageal echocardiography (SIMONE) for monitoring 04/17/2018    EF45-50% mild TR, mild-mod MR, mod-severe AS    Hyperlipemia     Hyperlipidemia     Hypertension     Nonspecific abnormal electrocardiogram (ECG) (EKG)     Persistent atrial fibrillation (HCC)     Sick sinus syndrome (HCC)     Vertigo      Past Surgical History:   Procedure Laterality Date    CYSTOSCOPY INSERTION / REMOVAL STENT / STONE Left 5/6/2019    CYSTOSCOPY RETROGRADE PYELOGRAM STENT INSERTION, DIAGNOSTIC CYSTOSCOPY performed by Giovanni Mena MD at [address] Left 03/29/2018    Dual Chamber [employer] Aura XT DR AMI Kincaid    UPPER GASTROINTESTINAL ENDOSCOPY N/A 8/20/2020    EGD DIAGNOSTIC ONLY performed by Rox Rosario MD at [place] [id number] reviewed. No pertinent family history.   Social History     Socioeconomic History    Marital status:      Spouse name: Not on file    Number of children: Not on file    Years of education: Not on file    Highest education level: Not on file   Occupational History    Not on file   Tobacco Use    Smoking status: Never    Smokeless tobacco: Never   Vaping Use    Vaping Use: Never used   Substance and Sexual Activity    Alcohol use: Not Currently     Alcohol/week: 0.0 standard drinks    Drug use: No    Sexual activity: Never   Other Topics Concern    Not on file   Social History Narrative    Not on file     Social Determinants of Health     Financial Resource Strain: Not on file   Food Insecurity: Not on file   Transportation Needs: Not on file   Physical Activity: Not on file   Stress: Not on file   Social Connections: Not on file   Intimate Partner Violence: Not on file   Housing Stability: Not on file     Current Facility-Administered Medications   Medication Dose Route Frequency Provider Last Rate Last Admin    aspirin chewable tablet 324 mg  324 mg Oral Once Ben No, DO         Current Outpatient Medications   Medication Sig Dispense Refill    Lactobacillus (ACIDOPHILUS/PECTIN PO) Take 1 capsule by mouth daily      Dextromethorphan-guaiFENesin (GUAIFENESIN DM PO) Take 10 mLs by mouth every 4 hours as needed 100-10 mg/5ml      nitrofurantoin, macrocrystal-monohydrate, (MACROBID) 100 MG capsule Take 100 mg by mouth 2 times daily      aspirin 81 MG EC tablet Take 81 mg by mouth daily      pantoprazole (PROTONIX) 40 MG tablet Take 40 mg by mouth daily furosemide (LASIX) 20 MG tablet Take 1 tablet by mouth daily as needed (lower leg edema) Take 20 mg of oral lasix at evening, for a total of 40 mg of oral lasix at the evening dose  for the next 3 days.  (Patient taking differently: Take 20 mg by mouth 2 times daily) 30 tablet 0    spironolactone (ALDACTONE) 25 MG tablet Take 25 mg by mouth daily      polyethylene glycol (GLYCOLAX) powder Take 17 g by mouth daily      Polyvinyl Alcohol-Povidone (REFRESH OP) Apply 1 drop to eye as needed For dry eyes      acetaminophen (TYLENOL) 325 MG tablet Take 650 mg by mouth every 4 hours as needed for Pain For pain or temp       metoprolol tartrate (LOPRESSOR) 25 MG tablet Take 0.5 tablets by mouth 2 times daily (Patient taking differently: Take 12.5 mg by mouth 2 times daily Hold if SBP < 110 or HR is < 55) 30 tablet 0    docusate sodium (COLACE, DULCOLAX) 100 MG CAPS Take 100 mg by mouth 2 times daily as needed for Constipation (Patient not taking: No sig reported) 30 capsule 0    finasteride (PROSCAR) 5 MG tablet Take 1 tablet by mouth daily 30 tablet 0    amiodarone (CORDARONE) 200 MG tablet Take 1 tablet by mouth daily (Patient taking differently: Take 200 mg by mouth nightly) 30 tablet 3    rivastigmine (EXELON) 4.6 MG/24HR Place 1 patch onto the skin daily       Cholecalciferol (VITAMIN D3) 5000 units TABS Take 5,000 Units by mouth every morning      Omega-3 Fatty Acids (FISH OIL) 1000 MG CAPS Take 1,000 mg by mouth daily       Multiple Vitamins-Minerals (THERAPEUTIC MULTIVITAMIN-MINERALS) tablet Take 1 tablet by mouth daily       Allergies   Allergen Reactions    Biaxin [Clarithromycin]     Cephalexin     Viagra [Sildenafil Citrate] Nausea Only and Other (See Comments)     Dizziness and BP dropped       Nursing Notes Reviewed    Physical Exam:  ED Triage Vitals [12/05/22 1007]   Enc Vitals Group      BP (!) 117/52      Heart Rate 63      Resp 18      Temp 97.5 °F (36.4 °C)      Temp Source Oral      SpO2 99 %      Weight 155 lb (70.3 kg)      Height 5' 8\" (1.727 m)      Head Circumference       Peak Flow       Pain Score       Pain Loc       Pain Edu? Excl. in [address]? GENERAL APPEARANCE: Awake and alert. Cooperative. No acute distress. Comfortable elderly male. HEAD: Normocephalic. Atraumatic. EYES: EOM's grossly intact. Sclera anicteric. ENT: Tolerates saliva. No trismus. NECK: Supple. Trachea midline. CARDIO: RRR. Radial pulse 2+. LUNGS: Respirations unlabored. CTAB. ABDOMEN: Soft. Non-distended. Non-tender. EXTREMITIES: No acute deformities. No LE tenderness/edema/asymmetry. SKIN: Warm and dry. NEUROLOGICAL: No gross facial drooping. Moves all 4 extremities spontaneously. PSYCHIATRIC: Normal mood.      Labs:  Results for orders placed or performed during the hospital encounter of 12/05/22   CBC with Auto Differential   Result Value Ref Range    WBC 10.0 4.0 - 10.5 K/CU MM    RBC 5.27 4.6 - 6.2 M/CU MM    Hemoglobin 15.1 13.5 - 18.0 GM/DL    Hematocrit 47.9 42 - 52 %    MCV 90.9 78 - 100 FL    MCH 28.7 27 - 31 PG    MCHC 31.5 (L) 32.0 - 36.0 %    RDW 14.3 11.7 - 14.9 %    Platelets 111 854 - 462 K/CU MM    MPV 9.7 7.5 - 11.1 FL    Differential Type AUTOMATED DIFFERENTIAL     Segs Relative 78.8 (H) 36 - 66 %    Lymphocytes % 8.9 (L) 24 - 44 %    Monocytes % 10.4 (H) 0 - 4 %    Eosinophils % 0.7 0 - 3 %    Basophils % 0.3 0 - 1 %    Segs Absolute 7.9 K/CU MM    Lymphocytes Absolute 0.9 K/CU MM    Monocytes Absolute 1.0 K/CU MM    Eosinophils Absolute 0.1 K/CU MM    Basophils Absolute 0.0 K/CU MM    Nucleated RBC % 0.0 %    Total Nucleated RBC 0.0 K/CU MM    Total Immature Neutrophil 0.09 K/CU MM    Immature Neutrophil % 0.9 (H) 0 - 0.43 %   Comprehensive Metabolic Panel w/ Reflex to MG   Result Value Ref Range    Sodium 138 135 - 145 MMOL/L    Potassium 4.9 3.5 - 5.1 MMOL/L    Chloride 99 99 - 110 mMol/L    CO2 26 21 - 32 MMOL/L    BUN 57 (H) 6 - 23 MG/DL    Creatinine 2.2 (H) 0.9 - 1.3 MG/DL    Est, Glom Filt Rate 28 (L) >60 mL/min/1.73m2    Glucose 116 (H) 70 - 99 MG/DL    Calcium 10.2 8.3 - 10.6 MG/DL    Albumin 4.0 3.4 - 5.0 GM/DL    Total Protein 8.1 6.4 - 8.2 GM/DL    Total Bilirubin 0.6 0.0 - 1.0 MG/DL    ALT 24 10 - 40 U/L    AST 22 15 - 37 IU/L    Alkaline Phosphatase 84 40 - 129 IU/L    Anion Gap 13 4 - 16   Troponin   Result Value Ref Range    Troponin T 0.053 (H) <0.01 NG/ML   Brain Natriuretic Peptide   Result Value Ref Range    Pro-BNP 2,919 (H) <300 PG/ML   EKG 12 Lead   Result Value Ref Range    Ventricular Rate 60 BPM    Atrial Rate 60 BPM    P-R Interval 180 ms    QRS Duration 232 ms    Q-T Interval 556 ms    QTc Calculation (Bazett) 556 ms    P Axis 45 degrees    R Axis -58 degrees    T Axis 100 degrees    Diagnosis       AV dual-paced rhythm  Abnormal ECG  When compared with ECG of 18-AUG-2020 13:03,  Vent. rate has decreased BY   9 BPM         EKG (if obtained): (All EKG's are interpreted by myself in the absence of a cardiologist)  AV dual paced rhythm @ 60. Stable morphology when compared to prior tracing. Radiographs (if obtained):  [] The following radiograph was interpreted by myself in the absence of a radiologist:  [x] Radiologist's Report reviewed at time of ED visit:  XR CHEST PORTABLE    Result Date: 12/5/2022  EXAMINATION: ONE XRAY VIEW OF THE CHEST 12/5/2022 10:14 am COMPARISON: Chest x-ray 08/18/2020 HISTORY: ORDERING SYSTEM PROVIDED HISTORY:  TECHNOLOGIST PROVIDED HISTORY: Reason for exam:->cp Reason for Exam: chest pain FINDINGS: Left-sided dual-chamber pacemaker is stable in position. The lungs are clear. Costophrenic angles are clear. Cardiac and mediastinal structures are unchanged. Tortuous aorta. Note is made of multiple air-fluid levels within the bowel in the upper abdomen. The bones are intact. 1.  No evidence of acute cardiopulmonary disease. 2.  Air-fluid levels are noted in the bowel in the upper abdomen.   This is a nonspecific finding, but warrants correlation with bowel history. ED Course and MDM:  Patient is comfortable and denies any ongoing symptoms while in the ED. He is given ASA here and remains hemodynamically stable. CBC is nonacute. Chemistries are notable for mild worsening of renal function. UA concerning for infection, which could be contributing to this. He is currently on prophylactic macrobid; will initiate bactrim for this. EKG with paced rhythm. Troponin is elevated at 0.053, potentially in part due to CKD. Patient is high risk for ACS, particularly in light of the above findings. I have recommended admission to the hospital, but Karyna Lindquist declines. His daughter is at bedside; she would like him to be admitted, but is unable to convince hime to stay. The risks (including but not limited to suffering and death) as well as the benefits were explained to the patient. Questions were sought and answered, the patient voiced understanding and accepts these risks. I have encouraged the patient to return to have their evaluation completed as we are glad to do so. I have also instructed Karyna Lindquist on the importance of follow-up and to return for any worsening or worrisome concerns. Karyna Lindquist appears competent to make medical decisions at this time. He is agreeable to follow up at the [place] [address] as soon as possible. Given RX for bactrim at time of discharge. Final Impression:  1. Acute chest pain    2. SABIHA (acute kidney injury) [facility])      DISPOSITION        Patient referred to: No follow-up provider specified.   Discharge medications:  New Prescriptions    No medications on file     (Please note that portions of this note may have been completed with a voice recognition program. Efforts were made to edit the dictations but occasionally words are mis-transcribed.)    Rachel Bello,   12/08/22 9004

## 2022-12-05 NOTE — ED NOTES
Discharge instructions reviewed with patient and family. Pt expressed understanding. Prescriptions x1 provided to family. Pt denies any further questions at this time. Pt&ox4, respiration unlabored. Pt wheeled from dept.           Gianna Yepez RN  12/05/22 [id number]

## 2022-12-06 ENCOUNTER — HOSPITAL ENCOUNTER (OUTPATIENT)
Age: 87
Setting detail: SPECIMEN
Discharge: HOME | End: 2022-12-06
Payer: MEDICARE

## 2022-12-06 LAB
ALBUMIN SERPL-MCNC: 3.3 GM/DL (ref 3.4–5)
ALP BLD-CCNC: 70 IU/L (ref 40–128)
ALT SERPL-CCNC: 20 U/L (ref 10–40)
ANION GAP SERPL CALCULATED.3IONS-SCNC: 14 MMOL/L (ref 4–16)
AST SERPL-CCNC: 21 IU/L (ref 15–37)
BILIRUB SERPL-MCNC: 0.4 MG/DL (ref 0–1)
BUN BLDV-MCNC: 53 MG/DL (ref 6–23)
CALCIUM SERPL-MCNC: 8.8 MG/DL (ref 8.3–10.6)
CHLORIDE BLD-SCNC: 101 MMOL/L (ref 99–110)
CO2: 22 MMOL/L (ref 21–32)
CREAT SERPL-MCNC: 1.8 MG/DL (ref 0.9–1.3)
GFR SERPL CREATININE-BSD FRML MDRD: 36 ML/MIN/1.73M2
GLUCOSE BLD-MCNC: 72 MG/DL (ref 70–99)
HCT VFR BLD CALC: 42.5 % (ref 42–52)
HEMOGLOBIN: 13.4 GM/DL (ref 13.5–18)
MCH RBC QN AUTO: 28.4 PG (ref 27–31)
MCHC RBC AUTO-ENTMCNC: 31.5 % (ref 32–36)
MCV RBC AUTO: 90 FL (ref 78–100)
PDW BLD-RTO: 14.2 % (ref 11.7–14.9)
PLATELET # BLD: 288 K/CU MM (ref 140–440)
PMV BLD AUTO: 10.1 FL (ref 7.5–11.1)
POTASSIUM SERPL-SCNC: 4.6 MMOL/L (ref 3.5–5.1)
RBC # BLD: 4.72 M/CU MM (ref 4.6–6.2)
SODIUM BLD-SCNC: 137 MMOL/L (ref 135–145)
TOTAL CK: 24 IU/L (ref 38–174)
TOTAL PROTEIN: 6.1 GM/DL (ref 6.4–8.2)
TROPONIN T: 0.04 NG/ML
WBC # BLD: 9 K/CU MM (ref 4–10.5)

## 2022-12-06 PROCEDURE — 80053 COMPREHEN METABOLIC PANEL: CPT

## 2022-12-06 PROCEDURE — 84484 ASSAY OF TROPONIN QUANT: CPT

## 2022-12-06 PROCEDURE — 82550 ASSAY OF CK (CPK): CPT

## 2022-12-06 PROCEDURE — 85027 COMPLETE CBC AUTOMATED: CPT

## 2022-12-06 PROCEDURE — 36415 COLL VENOUS BLD VENIPUNCTURE: CPT

## 2022-12-07 ENCOUNTER — OFFICE VISIT (OUTPATIENT)
Dept: CARDIOLOGY CLINIC | Age: 87
End: 2022-12-07
Payer: MEDICARE

## 2022-12-07 VITALS
BODY MASS INDEX: 23.19 KG/M2 | WEIGHT: 153 LBS | HEIGHT: 68 IN | HEART RATE: 72 BPM | DIASTOLIC BLOOD PRESSURE: 60 MMHG | SYSTOLIC BLOOD PRESSURE: 90 MMHG

## 2022-12-07 DIAGNOSIS — I48.19 PERSISTENT ATRIAL FIBRILLATION (HCC): ICD-10-CM

## 2022-12-07 DIAGNOSIS — I50.32 CHRONIC DIASTOLIC CONGESTIVE HEART FAILURE (HCC): ICD-10-CM

## 2022-12-07 DIAGNOSIS — Z95.0 S/P PLACEMENT OF CARDIAC PACEMAKER: ICD-10-CM

## 2022-12-07 DIAGNOSIS — I38 VHD (VALVULAR HEART DISEASE): Primary | ICD-10-CM

## 2022-12-07 PROBLEM — I48.91 ATRIAL FIBRILLATION (HCC): Status: RESOLVED | Noted: 2019-06-14 | Resolved: 2022-12-07

## 2022-12-07 PROCEDURE — G8420 CALC BMI NORM PARAMETERS: HCPCS | Performed by: INTERNAL MEDICINE

## 2022-12-07 PROCEDURE — G8427 DOCREV CUR MEDS BY ELIG CLIN: HCPCS | Performed by: INTERNAL MEDICINE

## 2022-12-07 PROCEDURE — 1123F ACP DISCUSS/DSCN MKR DOCD: CPT | Performed by: INTERNAL MEDICINE

## 2022-12-07 PROCEDURE — G8484 FLU IMMUNIZE NO ADMIN: HCPCS | Performed by: INTERNAL MEDICINE

## 2022-12-07 PROCEDURE — 1036F TOBACCO NON-USER: CPT | Performed by: INTERNAL MEDICINE

## 2022-12-07 PROCEDURE — 99214 OFFICE O/P EST MOD 30 MIN: CPT | Performed by: INTERNAL MEDICINE

## 2022-12-07 RX ORDER — BISACODYL 10 MG
10 SUPPOSITORY, RECTAL RECTAL DAILY
COMMUNITY

## 2022-12-07 RX ORDER — NITROFURANTOIN MACROCRYSTALS 50 MG/1
50 CAPSULE ORAL 4 TIMES DAILY
COMMUNITY

## 2022-12-07 NOTE — LETTER
[facility] [address]  [address] [address]  Phone: [phone]  Fax: [phone]    Oc Sandra MD    December 7, 2022     Enzo Reis MD  [address] [place] [id number]    Patient: Consuelo Gomes   MR Number: [id number]   YOB: 1933   Date of Visit: 12/7/2022       Dear Enzo Ching To: Thank you for referring Korey Brace to me for evaluation/treatment. Below are the relevant portions of my assessment and plan of care. If you have questions, please do not hesitate to call me. I look forward to following Milvia Mckilney along with you.     Sincerely,      Oc Sandra MD

## 2022-12-07 NOTE — PROGRESS NOTES
OFFICE PROGRESS NOTES      Joshua Bass is a 80 y.o. male who has    CHIEF COMPLAINT AS FOLLOWS:  CHEST PAIN: Patient denies any C/O chest pains at this time. SOB: No C/O SOB at this time. LEG EDEMA: No leg edema   PALPITATIONS: Denies any C/O Palpitations   DIZZINESS: No C/O Dizziness   SYNCOPE: None   OTHER/ ADDITIONAL COMPLAINTS: Patient was in ED with C/O chest Pain & SOB, ruled out & CXR did not show any evidence of  acute cardiopulmonary disease. Reportedly had COVID November 19 th. HPI: Patient is here for F/U on his  VHD,  CHF & Arrhythmia problems. VHD: Patient has known aortic valve disease. CHF: Patient has known  diastolic (HFpEF). Patient had been managed with medical regimen as stated below. Arrhythmia: Patient has known  PAF. Junito Sanchez Current Outpatient Medications   Medication Sig Dispense Refill    Nirmatrelvir-Ritonavir (PAXLOVID PO) Take by mouth      bisacodyl (DULCOLAX) 10 MG suppository Place 10 mg rectally daily      nitrofurantoin (MACRODANTIN) 50 MG capsule Take 50 mg by mouth 4 times daily      sulfamethoxazole-trimethoprim (BACTRIM DS) 800-160 MG per tablet Take 0.5 tablets by mouth 2 times daily for 10 days 10 tablet 0    Lactobacillus (ACIDOPHILUS/PECTIN PO) Take 1 capsule by mouth daily      Dextromethorphan-guaiFENesin (GUAIFENESIN DM PO) Take 10 mLs by mouth every 4 hours as needed 100-10 mg/5ml      nitrofurantoin, macrocrystal-monohydrate, (MACROBID) 100 MG capsule Take 100 mg by mouth 2 times daily      aspirin 81 MG EC tablet Take 81 mg by mouth daily      pantoprazole (PROTONIX) 40 MG tablet Take 40 mg by mouth daily      furosemide (LASIX) 20 MG tablet Take 1 tablet by mouth daily as needed (lower leg edema) Take 20 mg of oral lasix at evening, for a total of 40 mg of oral lasix at the evening dose  for the next 3 days.  (Patient taking differently: Take 20 mg by mouth 2 times daily) 30 tablet 0 spironolactone (ALDACTONE) 25 MG tablet Take 25 mg by mouth daily      polyethylene glycol (GLYCOLAX) powder Take 17 g by mouth daily      Polyvinyl Alcohol-Povidone (REFRESH OP) Apply 1 drop to eye as needed For dry eyes      acetaminophen (TYLENOL) 325 MG tablet Take 650 mg by mouth every 4 hours as needed for Pain For pain or temp       metoprolol tartrate (LOPRESSOR) 25 MG tablet Take 0.5 tablets by mouth 2 times daily (Patient taking differently: Take 12.5 mg by mouth 2 times daily Hold if SBP < 110 or HR is < 55) 30 tablet 0    docusate sodium (COLACE, DULCOLAX) 100 MG CAPS Take 100 mg by mouth 2 times daily as needed for Constipation 30 capsule 0    finasteride (PROSCAR) 5 MG tablet Take 1 tablet by mouth daily 30 tablet 0    amiodarone (CORDARONE) 200 MG tablet Take 1 tablet by mouth daily (Patient taking differently: Take 200 mg by mouth nightly) 30 tablet 3    rivastigmine (EXELON) 4.6 MG/24HR Place 1 patch onto the skin daily       Cholecalciferol (VITAMIN D3) 5000 units TABS Take 5,000 Units by mouth every morning      Omega-3 Fatty Acids (FISH OIL) 1000 MG CAPS Take 1,000 mg by mouth daily       Multiple Vitamins-Minerals (THERAPEUTIC MULTIVITAMIN-MINERALS) tablet Take 1 tablet by mouth daily       No current facility-administered medications for this visit.      Allergies: Biaxin [clarithromycin], Cephalexin, and Viagra [sildenafil citrate]  Review of Systems:    Constitutional: Negative for diaphoresis and fatigue  Respiratory: Negative for shortness of breath  Cardiovascular: Negative for chest pain, dyspnea on exertion, claudication, edema, irregular heartbeat, murmur, palpitations or shortness of breath  Musculoskeletal: Negative for muscle pain, muscular weakness, negative for pain in arm and leg or swelling in foot and leg    Objective:  BP 90/60 (Site: Left Upper Arm, Position: Sitting, Cuff Size: Medium Adult)   Pulse 72   Ht 5' 8\" (1.727 m)   Wt 153 lb (69.4 kg)   BMI 23.26 kg/m²   Wt Readings from Last 3 Encounters:   12/07/22 153 lb (69.4 kg)   12/05/22 155 lb (70.3 kg)   09/29/22 159 lb (72.1 kg)     Body mass index is 23.26 kg/m². GENERAL - Alert, oriented, pleasant, in no apparent distress. EYES: No jaundice, no conjunctival pallor. Neck - Supple. No jugular venous distention noted. No carotid bruits. Cardiovascular - Normal S1 and S2 without obvious murmur or gallop. Extremities - No cyanosis, clubbing, or significant edema. Pulmonary - No respiratory distress. No wheezes or rales.       MEDICAL DECISION MAKING & DATA REVIEW:    Lab Review   Lab Results   Component Value Date/Time    TROPONINT 0.044 12/06/2022 05:24 AM    TROPONINT 0.053 12/05/2022 10:28 AM     Lab Results   Component Value Date/Time    PROBNP 2,919 12/05/2022 10:28 AM    PROBNP 4,125 06/02/2022 07:31 AM     Lab Results   Component Value Date    INR 1.21 08/19/2020    INR 1.71 09/11/2019     No results found for: LABA1C  Lab Results   Component Value Date    WBC 9.0 12/06/2022    WBC 10.0 12/05/2022    HCT 42.5 12/06/2022    HCT 47.9 12/05/2022    MCV 90.0 12/06/2022    MCV 90.9 12/05/2022     12/06/2022     12/05/2022     Lab Results   Component Value Date    CHOL 137 05/24/2019    TRIG 72 05/24/2019    HDL 47 05/24/2019    LDLDIRECT 86 05/24/2019     Lab Results   Component Value Date    ALT 20 12/06/2022    ALT 24 12/05/2022    AST 21 12/06/2022    AST 22 12/05/2022     BMP:    Lab Results   Component Value Date/Time     12/06/2022 05:24 AM     12/05/2022 10:28 AM    K 4.6 12/06/2022 05:24 AM    K 4.9 12/05/2022 10:28 AM     12/06/2022 05:24 AM    CL 99 12/05/2022 10:28 AM    CO2 22 12/06/2022 05:24 AM    CO2 26 12/05/2022 10:28 AM    BUN 53 12/06/2022 05:24 AM    BUN 57 12/05/2022 10:28 AM    CREATININE 1.8 12/06/2022 05:24 AM    CREATININE 2.2 12/05/2022 10:28 AM     CMP:   Lab Results   Component Value Date/Time     12/06/2022 05:24 AM     12/05/2022 10:28 AM K 4.6 12/06/2022 05:24 AM    K 4.9 12/05/2022 10:28 AM     12/06/2022 05:24 AM    CL 99 12/05/2022 10:28 AM    CO2 22 12/06/2022 05:24 AM    CO2 26 12/05/2022 10:28 AM    BUN 53 12/06/2022 05:24 AM    BUN 57 12/05/2022 10:28 AM    CREATININE 1.8 12/06/2022 05:24 AM    CREATININE 2.2 12/05/2022 10:28 AM    PROT 6.1 12/06/2022 05:24 AM    PROT 8.1 12/05/2022 10:28 AM     Lab Results   Component Value Date/Time    TSHHS 3.970 08/02/2022 06:53 AM    TSHHS 5.570 07/26/2022 06:57 AM       QUALITY MEASURES REVIEWED:  1.CAD:Patient is taking anti platelet agent:Yes  Patient does not have Hx of documented CAD  2. DYSLIPIDEMIA: Patient is on cholesterol lowering medication:No  3. Beta-Blocker therapy for CAD, if prior Myocardial Infarction:Yes   4. Counselled regarding smoking cessation. No   Patient does not Smoke. 5.Anticoagulation therapy (for A.Fib) No, due to low Hgb   6. Discussed weight management strategies. Assessment & Plan:  Primary / Secondary prevention is the goal by aggressive risk modification, healthy and therapeutic life style changes for cardiovascular risk reduction. CORONARY ARTERY DISEASE::None known. EKG: DDD pacing. HTN:well controlled on current medical regimen, see list above. - changes in  treatment:   no, on Lopressor   CARDIOMYOPATHY: None known   CONGESTIVE HEART FAILURE:  KNOWN HISTORY. Compensated      VHD: Moderate AI  7/2021   Left ventricular systolic function is low normal with an ejection fraction   of 50 %. Mild concentric left ventricular hypertrophy. The left atrium is mildly dilated. Dilatation of the aortic root measuring 3.9 cm. Dilatation of the ascending aorta measuring 4.3 cm. Mitral annular calcification is present. Mild aortic stenosis with mean gradient of 11 mmHg. Doppler evaluation reveals moderate aortic regurgitation, pressure half time of 379 and mild mitral and tricuspid   regurgitation.    Right ventricular systolic pressure of 40 mmHg consistent with mild   pulmonary hypertension. No evidence of pericardial effusion. DYSLIPIDEMIA: Patient's profile is at / near [facility],  takes Lipitor                                                          See most recent Lab values in Labs section above. Diabetes mellitis: .no  ARRHYTHMIAS:  Known. Patient has H/O Atrial fibrillation. On Amiodarone                                He is rate controlled & not on anticoagulation due to low Hgb. 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 Analysis is consistent with normal dual-chamber MRI safe Medtronic pacer function with stable leads and appropriate battery status for the age of the device. Remaining average battery longevity is 7.5 years. Pacer is programmed to DDDR mode lower rate of 60 bpm and 93% pacing in the ventricle and 98% pacing in the atrium. PAF burden is 0.3% since January 5, 2021. She follows up with Dr. Perez Lies her cardiologist and he reported recent note from March of this year that she is not on anticoagulation due to low Hgb     Recommend continued every three month pacer check and follow up office visit as scheduled. TESTS ORDERED:Echo & Lexiscan Cardiolite     PREVIOUSLY ORDERED TESTS REVIEWED & DISCUSSED WITH THE PATIENT:     I personally reviewed & interpreted, all previously ordered tests as copied above. Latest Labs are pulled in to the note with dates.    Labs, specially in Reference to Lipid profile, Cardiac testing in the form of Echo ( dated: ), stress tests ( dated: ) & other relevant cardiac testing reviewed with patient & recommendations made based on assessment of the results. Discussed role of Cardiac risk factors & effects + treatment of co morbidities with patient & advised accordingly. MEDICATIONS: List of medications patient is currently taking is reviewed in detail with the patient & family member present. Discussed any side effects or problems taking the medication. Recommend Continue present management & medications as listed. AFFIRMATION: I reviewed patient's history, previous & current medical problems & all Labs + testing. This includes chart prep even prior to the vosit. Various goals are discussed and multiple questions answered. Relevant concelling performed. Office follow up for test results. Device check per protocol.

## 2022-12-07 NOTE — PROGRESS NOTES
Vein \"LEG PROBLEM Questionnaire\"  Do you have prominent leg veins? Yes   Do you have any skin discoloration? Yes  Do you have any healed or active sores? No  Do you have swelling of the legs? Yes  Do you have a family history of varicose veins? 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 Have you been fighting overweight problems? No  8. Do you have restless legs? No  9. Do you have any night time cramps? No  10. Do you have any of the following in your legs:        Weakness I  11. If Yes - Have they worn compression stockings Yes  12.  If they have worn compression stockings  Years    When Pt was started on Amiodarone:     Test                     Date of last test  EKG                     [x]  12/5/22  PFT                      [x]                                                                         CXR                     [x]      12/5/22                                                                  THYROID            [x] 8/2/22                                      LFT                      [x]                                          EYE EXAM          [x]  4/1/22

## 2022-12-07 NOTE — PATIENT INSTRUCTIONS
Please be informed that if you contact our office outside of normal business hours the physician on call cannot help with any scheduling or rescheduling issues, procedure instruction questions or any type of medication issue. We advise you for any urgent/emergency that you go to the nearest emergency room! PLEASE CALL OUR OFFICE DURING NORMAL BUSINESS HOURS    Monday - Friday   8 am to 5 pm    [place]: [phone]    [place]:  [phone]  **It is YOUR responsibilty to bring medication bottles and/or updated medication list to [address] [place] [address]. This will allow us to better serve you and all your healthcare needs**  [facility] Locations - No appointment necessary. Sites open Monday to Friday. Call your preferred location for test preparation, business hours and other information you need. [employer] accepts [employer]. [facility] [address] [place], [address] [facility]  Phone: [phone] [facility] [address] [facility]  [address]. [facility], [address] [place] [address]  Phone: [phone]     Thank you for allowing us to care for you today! We want to ensure we can follow your treatment plan and we strive to give you the best outcomes and experience possible. If you ever have a life threatening emergency and call 911 - for an ambulance (EMS)   Our providers can only care for you at:   [facility] or [facility]. Even if you have someone take you or you drive yourself we can only care for you in a [facility] facility. Our providers are not setup at the other healthcare locations! CORONARY ARTERY DISEASE::None known. EKG: DDD pacing. HTN:well controlled on current medical regimen, see list above. - changes in  treatment:   no, on Lopressor   CARDIOMYOPATHY: None known   CONGESTIVE HEART FAILURE:  KNOWN HISTORY.  Compensated      VHD: Moderate AI  7/2021   Left ventricular systolic function is low normal with an ejection fraction   of 50 %. Mild concentric left ventricular hypertrophy. The left atrium is mildly dilated. Dilatation of the aortic root measuring 3.9 cm. Dilatation of the ascending aorta measuring 4.3 cm. Mitral annular calcification is present. Mild aortic stenosis with mean gradient of 11 mmHg. Doppler evaluation reveals moderate aortic regurgitation, pressure half time of 379 and mild mitral and tricuspid   regurgitation. Right ventricular systolic pressure of 40 mmHg consistent with mild   pulmonary hypertension. No evidence of pericardial effusion. DYSLIPIDEMIA: Patient's profile is at / near [facility],  takes Lipitor                                                          See most recent Lab values in Labs section above. Diabetes mellitis: .no  ARRHYTHMIAS:  Known. Patient has H/O Atrial fibrillation. On Amiodarone                                He is rate controlled & not on anticoagulation due to low Hgb. 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 Analysis is consistent with normal dual-chamber MRI safe Medtronic pacer function with stable leads and appropriate battery status for the age of the device. Remaining average battery longevity is 7.5 years. Pacer is programmed to DDDR mode lower rate of 60 bpm and 93% pacing in the ventricle and 98% pacing in the atrium. PAF burden is 0.3% since January 5, 2021.   She follows up with Dr. Campos Agarwal her cardiologist and he reported recent note from March of this year that she is not on anticoagulation due to low Hgb Recommend continued every three month pacer check and follow up office visit as scheduled. TESTS ORDERED:Echo & Lexiscan Cardiolite     PREVIOUSLY ORDERED TESTS REVIEWED & DISCUSSED WITH THE PATIENT:     I personally reviewed & interpreted, all previously ordered tests as copied above. Latest Labs are pulled in to the note with dates. Labs, specially in Reference to Lipid profile, Cardiac testing in the form of Echo ( dated: ), stress tests ( dated: ) & other relevant cardiac testing reviewed with patient & recommendations made based on assessment of the results. Discussed role of Cardiac risk factors & effects + treatment of co morbidities with patient & advised accordingly. MEDICATIONS: List of medications patient is currently taking is reviewed in detail with the patient & family member present. Discussed any side effects or problems taking the medication. Recommend Continue present management & medications as listed. AFFIRMATION: I reviewed patient's history, previous & current medical problems & all Labs + testing. This includes chart prep even prior to the vosit. Various goals are discussed and multiple questions answered. Relevant concelling performed. Office follow up for test results. Device check per protocol. Please hold on to these instructions the  will call you within 1-9 business days when we receive authorization from your insurance. Nuclear Stress Test    WHAT TO EXPECT:   You will need to confirm the test or it could be cancelled. This test will take approximately 2 hours: 1 hour in the AM &    1 hour in the PM. You will be given a time by the Technologist after the first part is completed to come back. You will be given a medication, through an IV in the hand, this will safely simulate exercise. This IV is also needed to inject the radioactive isotope unless you are able toe walk the treadmill.    You will receive an injection in the AM & PM before the pictures. Using a special camera, you will have one set of pictures of your heart taken in the AM and a set of pictures in the PM.     PREPARATION FOR TEST:  Eat a light breakfast such as water or juice and toast.  If you are DIABETIC: Eat a normal breakfast with NO CAFFEINE and take your insulin as normal.   AVOID ALL FOODS & DRINKS containing CAFFEINE 12 HOURS PRIOR TO THE TEST: Including coffee, Tea, Mandy and other soft drinks even those labeled  caffeine free or decaffeinated. HOLD THESE MEDICATIONS Persantine & Theophylline (Theodur)  24 hours prior & bring your inhaler with you.    The physician will specify if the following Beta blockers need to be held for 24 hours prior to test:

## 2022-12-07 NOTE — LETTER
2022 12:15 PM    Patient Name: Ana Armstrong  : 10/5/1933  MRN# [id number]    REASON FOR VISIT: f/u [facility]    possble echo  [phone] (Mobile)  Patient Active Problem List    Diagnosis Date Noted    Persistent atrial fibrillation ([facility]     S/P placement of cardiac pacemaker 2018    VHD (valvular heart disease) 07/15/2021    Acute on chronic anemia 2020    Benign prostatic hyperplasia without lower urinary tract symptoms 2020    Dementia without behavioral disturbance ([facility] 2020    Acute cystitis without hematuria 2019    Chronic kidney disease (CKD) stage G3a/A3, moderately decreased glomerular filtration rate (GFR) between 45-59 mL/min/1.73 square meter and albuminuria creatinine ratio greater than 300 mg/g ([facility]) 2019    Atrial fibrillation ([facility] 2019    Kidney stone 2019    Chronic diastolic congestive heart failure ([facility] 2019    CHF (congestive heart failure), NYHA class I, acute on chronic, combined ([facility] 2019    Ascending aortic aneurysm 2018       Allergies: Biaxin [clarithromycin], Cephalexin, and Viagra [sildenafil citrate]      Current Outpatient Medications   Medication Sig Dispense Refill    sulfamethoxazole-trimethoprim (BACTRIM DS) 800-160 MG per tablet Take 0.5 tablets by mouth 2 times daily for 10 days 10 tablet 0    Lactobacillus (ACIDOPHILUS/PECTIN PO) Take 1 capsule by mouth daily      Dextromethorphan-guaiFENesin (GUAIFENESIN DM PO) Take 10 mLs by mouth every 4 hours as needed 100-10 mg/5ml      nitrofurantoin, macrocrystal-monohydrate, (MACROBID) 100 MG capsule Take 100 mg by mouth 2 times daily      aspirin 81 MG EC tablet Take 81 mg by mouth daily      pantoprazole (PROTONIX) 40 MG tablet Take 40 mg by mouth daily      furosemide (LASIX) 20 MG tablet Take 1 tablet by mouth daily as needed (lower leg edema) Take 20 mg of oral lasix at evening, for a total of 40 mg of oral lasix at the evening dose  for the next 3 days. (Patient taking differently: Take 20 mg by mouth 2 times daily) 30 tablet 0    spironolactone (ALDACTONE) 25 MG tablet Take 25 mg by mouth daily      polyethylene glycol (GLYCOLAX) powder Take 17 g by mouth daily      Polyvinyl Alcohol-Povidone (REFRESH OP) Apply 1 drop to eye as needed For dry eyes      acetaminophen (TYLENOL) 325 MG tablet Take 650 mg by mouth every 4 hours as needed for Pain For pain or temp       metoprolol tartrate (LOPRESSOR) 25 MG tablet Take 0.5 tablets by mouth 2 times daily (Patient taking differently: Take 12.5 mg by mouth 2 times daily Hold if SBP < 110 or HR is < 55) 30 tablet 0    docusate sodium (COLACE, DULCOLAX) 100 MG CAPS Take 100 mg by mouth 2 times daily as needed for Constipation (Patient not taking: No sig reported) 30 capsule 0    finasteride (PROSCAR) 5 MG tablet Take 1 tablet by mouth daily 30 tablet 0    amiodarone (CORDARONE) 200 MG tablet Take 1 tablet by mouth daily (Patient taking differently: Take 200 mg by mouth nightly) 30 tablet 3    rivastigmine (EXELON) 4.6 MG/24HR Place 1 patch onto the skin daily       Cholecalciferol (VITAMIN D3) 5000 units TABS Take 5,000 Units by mouth every morning      Omega-3 Fatty Acids (FISH OIL) 1000 MG CAPS Take 1,000 mg by mouth daily       Multiple Vitamins-Minerals (THERAPEUTIC MULTIVITAMIN-MINERALS) tablet Take 1 tablet by mouth daily       No current facility-administered medications for this visit.          Lab Review   Lab Results   Component Value Date/Time    CKTOTAL 24 12/06/2022 05:24 AM    CKTOTAL 35 08/18/2019 06:16 PM    TROPONINT 0.044 12/06/2022 05:24 AM    TROPONINT 0.053 12/05/2022 10:28 AM     BNP:    Lab Results   Component Value Date/Time    PROBNP 2,919 12/05/2022 10:28 AM    PROBNP 4,125 06/02/2022 07:31 AM     PT/INR:    Lab Results   Component Value Date    INR 1.21 08/19/2020    INR 1.71 09/11/2019     No results found for: LABA1C  Lab Results   Component Value Date    WBC 9.0 2022    WBC 10.0 2022    HCT 42.5 2022    HCT 47.9 2022    MCV 90.0 2022    MCV 90.9 2022     2022     2022     Lab Results   Component Value Date    CHOL 137 2019    TRIG 72 2019    HDL 47 2019    LDLDIRECT 86 2019     Lab Results   Component Value Date    ALT 20 2022    ALT 24 2022    AST 21 2022    AST 22 2022     BMP:    Lab Results   Component Value Date/Time     2022 05:24 AM     2022 10:28 AM    K 4.6 2022 05:24 AM    K 4.9 2022 10:28 AM     2022 05:24 AM    CL 99 2022 10:28 AM    CO2 22 2022 05:24 AM    CO2 26 2022 10:28 AM    BUN 53 2022 05:24 AM    BUN 57 2022 10:28 AM    CREATININE 1.8 2022 05:24 AM    CREATININE 2.2 2022 10:28 AM     CMP:   Lab Results   Component Value Date/Time     2022 05:24 AM     2022 10:28 AM    K 4.6 2022 05:24 AM    K 4.9 2022 10:28 AM     2022 05:24 AM    CL 99 2022 10:28 AM    CO2 22 2022 05:24 AM    CO2 26 2022 10:28 AM    BUN 53 2022 05:24 AM    BUN 57 2022 10:28 AM    CREATININE 1.8 2022 05:24 AM    CREATININE 2.2 2022 10:28 AM    PROT 6.1 2022 05:24 AM    PROT 8.1 2022 10:28 AM     TSH:    Lab Results   Component Value Date/Time    TSHHS 3.970 2022 06:53 AM    [facility] 5.570 2022 06:57 AM       Smoke: What:                           How much:    Alcohol:                                      How Much:     Caffeine: Pop:         Tea:            Coffee:                Chocolate:    Exercise:    Last Visit:2022  Complaints:None  Changes:Lipid profile (not been order)    LAST EK2022    VENOUS DOPPLER: NONE    HOLTER/ EVENT MONITOR: 2017    STRESS TEST:  NONE    ECHO: 2021  Left ventricular systolic function is low normal with an ejection fraction   of 50 %.   Mild concentric left ventricular hypertrophy. The left atrium is mildly dilated. Dilatation of the aortic root measuring 3.9 cm. Dilatation of the ascending aorta measuring 4.3 cm. Mitral annular calcification is present. Mild aortic stenosis with mean gradient of 11 mmHg. Doppler evaluation reveals moderate aortic and mild mitral and tricuspid   regurgitation. Right ventricular systolic pressure of 40 mmHg consistent with mild   pulmonary hypertension. No evidence of pericardial effusion. CAROTID: NONE    MUGA: NONE    LAST PACER CHECK: 11/27/2022    CARDIAC CATH: NONE    Amio Protocol:    CHADS: [id number] Score for Atrial Fibrillation Stroke Risk   Risk   Factors  Component Value   C CHF Yes 1   H HTN Yes 1   A2 Age >= 76 Yes,  (80 y.o.) 2   D DM No 0   S2 Prior Stroke/TIA No 0   V Vascular Disease No 0   A Age 74-69 No,  (80 y.o.) 0   Sc Sex male 0    [id number]  Score  4   Score last updated 12/7/22 4:05 AM EST    Click here for a link to the UpToDate guideline \"Atrial Fibrillation: Anticoagulation therapy to prevent embolization    Disclaimer: Risk Score calculation is dependent on accuracy of patient problem list and past encounter diagnosis.

## 2022-12-08 ENCOUNTER — PROCEDURE VISIT (OUTPATIENT)
Dept: CARDIOLOGY CLINIC | Age: 87
End: 2022-12-08
Payer: MEDICARE

## 2022-12-08 DIAGNOSIS — I48.19 PERSISTENT ATRIAL FIBRILLATION (HCC): ICD-10-CM

## 2022-12-08 DIAGNOSIS — Z95.0 S/P PLACEMENT OF CARDIAC PACEMAKER: ICD-10-CM

## 2022-12-08 DIAGNOSIS — I50.32 CHRONIC DIASTOLIC CONGESTIVE HEART FAILURE (HCC): ICD-10-CM

## 2022-12-08 DIAGNOSIS — I38 VHD (VALVULAR HEART DISEASE): ICD-10-CM

## 2022-12-08 PROCEDURE — 93306 TTE W/DOPPLER COMPLETE: CPT | Performed by: INTERNAL MEDICINE

## 2022-12-14 DIAGNOSIS — I38 VHD (VALVULAR HEART DISEASE): ICD-10-CM

## 2022-12-14 DIAGNOSIS — I48.19 PERSISTENT ATRIAL FIBRILLATION (HCC): ICD-10-CM

## 2022-12-14 DIAGNOSIS — I50.43 CHF (CONGESTIVE HEART FAILURE), NYHA CLASS I, ACUTE ON CHRONIC, COMBINED (HCC): ICD-10-CM

## 2022-12-14 DIAGNOSIS — R06.02 SOB (SHORTNESS OF BREATH): ICD-10-CM

## 2022-12-14 DIAGNOSIS — R07.9 CHEST PAIN, UNSPECIFIED TYPE: Primary | ICD-10-CM

## 2022-12-15 ENCOUNTER — TELEPHONE (OUTPATIENT)
Dept: CARDIOLOGY CLINIC | Age: 87
End: 2022-12-15

## 2022-12-15 NOTE — TELEPHONE ENCOUNTER
Echo done on 12/08/2022:     Left ventricular function and size is normal, EF is estimated at 55-60%. Moderate left ventricular hypertrophy. Diastolic dysfunction could not be evaluated due to arrhythmia. No regional wall motion abnormalities were detected. Mildly dilated left atrium. PPM wiring visualized within the right side of the heart. Moderately sclerotic aortic valve with mild aortic stenosis ,mean gradient   of 12mmhg,PRATEEK 1.7cm2. Mitral annular calcification is present. Mild mitral, tricuspid and moderate aortic regurgitation is present. RVSP is 21 mmHg. Dilation of the aortic root (4.0cm)and the ascending aorta(4.6cm). No evidence of pericardial effusion. Patient's daughter picked up, and was given results of patient's echo, reminded of upcoming Nuclear Stress Test and follow up with Dr. Esequiel Eaton. Patient's daughter requested echo results to be mailed to her home, Patient doesn't have MyChart.

## 2022-12-16 ENCOUNTER — HOSPITAL ENCOUNTER (OUTPATIENT)
Age: 87
Setting detail: SPECIMEN
Discharge: HOME | End: 2022-12-16
Payer: MEDICARE

## 2022-12-16 LAB
ANION GAP SERPL CALCULATED.3IONS-SCNC: 11 MMOL/L (ref 4–16)
BUN BLDV-MCNC: 36 MG/DL (ref 6–23)
CALCIUM SERPL-MCNC: 8.9 MG/DL (ref 8.3–10.6)
CHLORIDE BLD-SCNC: 104 MMOL/L (ref 99–110)
CO2: 22 MMOL/L (ref 21–32)
CREAT SERPL-MCNC: 1.9 MG/DL (ref 0.9–1.3)
GFR SERPL CREATININE-BSD FRML MDRD: 33 ML/MIN/1.73M2
GLUCOSE BLD-MCNC: 80 MG/DL (ref 70–99)
POTASSIUM SERPL-SCNC: 5.1 MMOL/L (ref 3.5–5.1)
SODIUM BLD-SCNC: 137 MMOL/L (ref 135–145)

## 2022-12-16 PROCEDURE — 36415 COLL VENOUS BLD VENIPUNCTURE: CPT

## 2022-12-16 PROCEDURE — 80048 BASIC METABOLIC PNL TOTAL CA: CPT

## 2022-12-19 ENCOUNTER — PROCEDURE VISIT (OUTPATIENT)
Dept: CARDIOLOGY CLINIC | Age: 87
End: 2022-12-19

## 2022-12-19 DIAGNOSIS — I38 VHD (VALVULAR HEART DISEASE): ICD-10-CM

## 2022-12-19 DIAGNOSIS — I50.43 CHF (CONGESTIVE HEART FAILURE), NYHA CLASS I, ACUTE ON CHRONIC, COMBINED (HCC): ICD-10-CM

## 2022-12-19 DIAGNOSIS — I48.19 PERSISTENT ATRIAL FIBRILLATION (HCC): ICD-10-CM

## 2022-12-19 DIAGNOSIS — I50.32 CHRONIC DIASTOLIC CONGESTIVE HEART FAILURE (HCC): ICD-10-CM

## 2022-12-19 DIAGNOSIS — Z95.0 S/P PLACEMENT OF CARDIAC PACEMAKER: ICD-10-CM

## 2022-12-19 DIAGNOSIS — R07.9 CHEST PAIN, UNSPECIFIED TYPE: ICD-10-CM

## 2022-12-19 DIAGNOSIS — R06.02 SOB (SHORTNESS OF BREATH): ICD-10-CM

## 2022-12-20 ENCOUNTER — TELEPHONE (OUTPATIENT)
Dept: CARDIOLOGY CLINIC | Age: 87
End: 2022-12-20

## 2022-12-20 ENCOUNTER — HOSPITAL ENCOUNTER (OUTPATIENT)
Age: 87
Setting detail: SPECIMEN
Discharge: HOME | End: 2022-12-20
Payer: MEDICARE

## 2022-12-20 LAB
ANION GAP SERPL CALCULATED.3IONS-SCNC: 12 MMOL/L (ref 4–16)
BUN BLDV-MCNC: 41 MG/DL (ref 6–23)
CALCIUM SERPL-MCNC: 9 MG/DL (ref 8.3–10.6)
CHLORIDE BLD-SCNC: 99 MMOL/L (ref 99–110)
CO2: 22 MMOL/L (ref 21–32)
CREAT SERPL-MCNC: 1.7 MG/DL (ref 0.9–1.3)
GFR SERPL CREATININE-BSD FRML MDRD: 38 ML/MIN/1.73M2
GLUCOSE BLD-MCNC: 75 MG/DL (ref 70–99)
POTASSIUM SERPL-SCNC: 5.7 MMOL/L (ref 3.5–5.1)
SODIUM BLD-SCNC: 133 MMOL/L (ref 135–145)

## 2022-12-20 PROCEDURE — 80048 BASIC METABOLIC PNL TOTAL CA: CPT

## 2022-12-20 PROCEDURE — 36415 COLL VENOUS BLD VENIPUNCTURE: CPT

## 2022-12-20 NOTE — TELEPHONE ENCOUNTER
Abnormal study    Recommendations: Schedule out patient visit to discuss results    Appt 12/29/22 @11:15am    Spoke to [address] [place] [address], regarding results/follow up appt to go over result.

## 2022-12-27 ENCOUNTER — HOSPITAL ENCOUNTER (OUTPATIENT)
Age: 87
Setting detail: SPECIMEN
Discharge: HOME | End: 2022-12-27
Payer: MEDICARE

## 2022-12-27 LAB
ANION GAP SERPL CALCULATED.3IONS-SCNC: 12 MMOL/L (ref 4–16)
BUN BLDV-MCNC: 39 MG/DL (ref 6–23)
CALCIUM SERPL-MCNC: 9.1 MG/DL (ref 8.3–10.6)
CHLORIDE BLD-SCNC: 103 MMOL/L (ref 99–110)
CO2: 24 MMOL/L (ref 21–32)
CREAT SERPL-MCNC: 1.7 MG/DL (ref 0.9–1.3)
GFR SERPL CREATININE-BSD FRML MDRD: 38 ML/MIN/1.73M2
GLUCOSE BLD-MCNC: 67 MG/DL (ref 70–99)
POTASSIUM SERPL-SCNC: 4.7 MMOL/L (ref 3.5–5.1)
SODIUM BLD-SCNC: 139 MMOL/L (ref 135–145)

## 2022-12-27 PROCEDURE — 36415 COLL VENOUS BLD VENIPUNCTURE: CPT

## 2022-12-27 PROCEDURE — 80048 BASIC METABOLIC PNL TOTAL CA: CPT

## 2022-12-29 ENCOUNTER — OFFICE VISIT (OUTPATIENT)
Dept: CARDIOLOGY CLINIC | Age: 87
End: 2022-12-29
Payer: MEDICARE

## 2022-12-29 ENCOUNTER — TELEPHONE (OUTPATIENT)
Dept: CARDIOLOGY CLINIC | Age: 87
End: 2022-12-29

## 2022-12-29 VITALS
HEIGHT: 68 IN | SYSTOLIC BLOOD PRESSURE: 116 MMHG | DIASTOLIC BLOOD PRESSURE: 50 MMHG | WEIGHT: 156 LBS | BODY MASS INDEX: 23.64 KG/M2 | HEART RATE: 72 BPM

## 2022-12-29 DIAGNOSIS — I71.21 ANEURYSM OF ASCENDING AORTA WITHOUT RUPTURE: ICD-10-CM

## 2022-12-29 DIAGNOSIS — I48.19 PERSISTENT ATRIAL FIBRILLATION (HCC): ICD-10-CM

## 2022-12-29 DIAGNOSIS — I50.32 CHRONIC DIASTOLIC CONGESTIVE HEART FAILURE (HCC): Primary | ICD-10-CM

## 2022-12-29 DIAGNOSIS — Z95.0 S/P PLACEMENT OF CARDIAC PACEMAKER: ICD-10-CM

## 2022-12-29 DIAGNOSIS — I38 VHD (VALVULAR HEART DISEASE): ICD-10-CM

## 2022-12-29 PROCEDURE — 1036F TOBACCO NON-USER: CPT | Performed by: INTERNAL MEDICINE

## 2022-12-29 PROCEDURE — G8420 CALC BMI NORM PARAMETERS: HCPCS | Performed by: INTERNAL MEDICINE

## 2022-12-29 PROCEDURE — 99214 OFFICE O/P EST MOD 30 MIN: CPT | Performed by: INTERNAL MEDICINE

## 2022-12-29 PROCEDURE — 1123F ACP DISCUSS/DSCN MKR DOCD: CPT | Performed by: INTERNAL MEDICINE

## 2022-12-29 PROCEDURE — G8484 FLU IMMUNIZE NO ADMIN: HCPCS | Performed by: INTERNAL MEDICINE

## 2022-12-29 PROCEDURE — G8427 DOCREV CUR MEDS BY ELIG CLIN: HCPCS | Performed by: INTERNAL MEDICINE

## 2022-12-29 RX ORDER — ISOSORBIDE MONONITRATE 30 MG/1
30 TABLET, EXTENDED RELEASE ORAL DAILY
Qty: 30 TABLET | Refills: 3 | Status: SHIPPED | OUTPATIENT
Start: 2022-12-29 | End: 2022-12-30 | Stop reason: SDUPTHER

## 2022-12-29 NOTE — PROGRESS NOTES
Vein \"LEG PROBLEM Questionnaire\"  Do you have prominent leg veins? Yes  Do you have any skin discoloration? Yes  Do you have any healed or active sores? No  Do you have swelling of the legs? Yes  Do you have a family history of varicose veins? 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 Have you been fighting overweight problems? No  8. Do you have restless legs? No  9. Do you have any night time cramps? No  10. Do you have any of the following in your legs:         I  11. If Yes - Have they worn compression stockings No  12.  If they have worn compression stockings         When Pt was started on Amiodarone:      Test                     Date of last test  EKG                     [x]  12/5/22  PFT                      [x]                                                                           CXR                     [x]        12/5/22                                                                  THYROID            [x] 8/2/22                                      LFT                      [x]                                            EYE EXAM          [x]    4/1/22

## 2022-12-29 NOTE — TELEPHONE ENCOUNTER
Discussed outcome of appt of Patient to Patient's daughter Dustin Harm), discussed new medications and requested mailed results for the Nuclear Stress test. Patient understood and comprehended outcomes.

## 2022-12-29 NOTE — TELEPHONE ENCOUNTER
Daughter was unable to be here at ov so would like to discuss what dr Ailyn Lacy said.  Please call Soila Pride

## 2022-12-29 NOTE — PATIENT INSTRUCTIONS
Please be informed that if you contact our office outside of normal business hours the physician on call cannot help with any scheduling or rescheduling issues, procedure instruction questions or any type of medication issue. We advise you for any urgent/emergency that you go to the nearest emergency room! PLEASE CALL OUR OFFICE DURING NORMAL BUSINESS HOURS    Monday - Friday   8 am to 5 pm    [place]: [phone]    [place]:  [phone]  **It is YOUR responsibilty to bring medication bottles and/or updated medication list to [address] [place] [address]. This will allow us to better serve you and all your healthcare needs**  [facility] Locations - No appointment necessary. Sites open Monday to Friday. Call your preferred location for test preparation, business hours and other information you need. [employer] accepts [employer]. [place] [facility] [address] [facility]. Lyubov Jamison, [address] [facility]  Phone: [phone] [facility] [address] [facility]  [address]. Zach Leyva, [address] [facility]  Phone: [phone]     Thank you for allowing us to care for you today! We want to ensure we can follow your treatment plan and we strive to give you the best outcomes and experience possible. If you ever have a life threatening emergency and call 911 - for an ambulance (EMS)   Our providers can only care for you at:   [facility] or [facility]. Even if you have someone take you or you drive yourself we can only care for you in a [facility] facility. Our providers are not setup at the other healthcare locations! CORONARY ARTERY DISEASE: None known. EKG: DDD pacing. 12/2022   Abnormal study. Severely reduced inferior wall perfusion with out significant    reversability. Inferior wall hypokinesis noted with mildly reduced LV function. LVEF is 45    %. Clinical co-relation is recommended. Reviewed with the Patient.  He wants conservative management with medications. HTN:well controlled on current medical regimen, see list above. - changes in  treatment:   no, on Lopressor   CARDIOMYOPATHY: None known   CONGESTIVE HEART FAILURE:  KNOWN HISTORY. Compensated      VHD: Moderate AI  12/2022   Left ventricular function and size is normal, EF is estimated at 55-60%. Moderate left ventricular hypertrophy. Diastolic dysfunction could not be evaluated due to arrhythmia. No regional wall motion abnormalities were detected. Mildly dilated left atrium. PPM wiring visualized within the right side of the heart. Moderately sclerotic aortic valve with mild aortic stenosis ,mean gradient   of 12mmhg,PRATEEK 1.7cm2. Mitral annular calcification is present. Mild mitral, tricuspid and moderate aortic regurgitation is present. RVSP is 21 mmHg. Dilation of the aortic root (4.0cm)and the ascending aorta(4.6cm). No evidence of pericardial effusion. DYSLIPIDEMIA: Patient's profile is at / near [facility],  takes Lipitor                                                          See most recent Lab values in Labs section above. Diabetes mellitis: .no  ARRHYTHMIAS:  Known. Patient has H/O Atrial fibrillation. On Amiodarone                                He is rate controlled & not on anticoagulation due to low Hgb. [id number] Score for Atrial Fibrillation Stroke Risk    Risk   Factors   Component Value   C CHF Yes 1   H HTN Yes 1   A2 Age >= 76 Yes,  (80 y.o.) 2   D DM No 0   S2 Prior Stroke/TIA No 0   V Vascular Disease No 0   A Age 74-69 No,  (80 y.o.) 0   Sc Sex male 0     [id number]  Score   4   Score last updated 9/29/22 11:02 AM EDT         S/P PPM:  8/2022  Pacer analysis is reviewed and filed in the pacer chart. Analysis is consistent with normal dual-chamber MRI safe [employer] pacer function with stable leads and appropriate battery status for the age of the device.  Remaining average battery longevity is 7.5 years. Pacer is programmed to DDDR mode lower rate of 60 bpm and 93% pacing in the ventricle and 98% pacing in the atrium. PAF burden is 0.3% since January 5, 2021. She follows up with Dr. Aleksander Kovacs her cardiologist and he reported recent note from March of this year that she is not on anticoagulation due to low Hgb     Recommend continued every three month pacer check and follow up office visit as scheduled. TESTS ORDERED:none this visit     PREVIOUSLY ORDERED TESTS REVIEWED & DISCUSSED WITH THE PATIENT:     I personally reviewed & interpreted, all previously ordered tests as copied above. Latest Labs are pulled in to the note with dates. Labs, specially in Reference to Lipid profile, Cardiac testing in the form of Echo ( dated: ), stress tests ( dated: ) & other relevant cardiac testing reviewed with patient & recommendations made based on assessment of the results. Discussed role of Cardiac risk factors & effects + treatment of co morbidities with patient & advised accordingly. MEDICATIONS: List of medications patient is currently taking is reviewed in detail with the patient. Discussed any side effects or problems taking the medication. Recommend Continue present management & medications as listed. Add Imdur to the regimen    AFFIRMATION: I  reviewed patient's history, previous & current medical problems & all Labs + testing. This includes chart prep even prior to the vosit. Various goals are discussed and multiple questions answered. Relevant concelling performed. Office follow up in 3 months.

## 2022-12-29 NOTE — LETTER
[facility] [address]  [address] [address]  Phone: [phone]  Fax: [phone]    Key Luis MD    December 29, 2022     Ashvin Reis MD  [address] [place] [id number]    Patient: Rock Kern   MR Number: [id number]   YOB: 1933   Date of Visit: 12/29/2022       Dear Ashvin Miller To: Thank you for referring Sandie Mcmanus to me for evaluation/treatment. Below are the relevant portions of my assessment and plan of care. If you have questions, please do not hesitate to call me. I look forward to following Bryce Etienne along with you.     Sincerely,      Key Luis MD

## 2022-12-29 NOTE — PROGRESS NOTES
OFFICE PROGRESS NOTES      Tosha Sal is a 80 y.o. male who has    CHIEF COMPLAINT AS FOLLOWS:  CHEST PAIN:  Patient denies any C/O chest pains at this time. SOB: No C/O SOB at this time. LEG EDEMA: No leg edema   PALPITATIONS: Denies any C/O Palpitations   DIZZINESS: No C/O Dizziness   SYNCOPE: None   OTHER/ ADDITIONAL COMPLAINTS:                                     HPI: Patient is here for F/U on his  VHD, CHF &  Arrhythmia problems. VHD: Patient has known mitral / aortic valve disease. Has had valve repair / surgery in the past.  CHF: Patient has known systolic diastolic (HFpEF). Patient had been managed with medical regimen as stated below. Arrhythmia: Patient has known  PAF. Current Outpatient Medications   Medication Sig Dispense Refill    Nirmatrelvir-Ritonavir (PAXLOVID PO) Take by mouth      bisacodyl (DULCOLAX) 10 MG suppository Place 10 mg rectally daily      nitrofurantoin (MACRODANTIN) 50 MG capsule Take 50 mg by mouth 4 times daily      Lactobacillus (ACIDOPHILUS/PECTIN PO) Take 1 capsule by mouth daily      Dextromethorphan-guaiFENesin (GUAIFENESIN DM PO) Take 10 mLs by mouth every 4 hours as needed 100-10 mg/5ml      nitrofurantoin, macrocrystal-monohydrate, (MACROBID) 100 MG capsule Take 100 mg by mouth 2 times daily      aspirin 81 MG EC tablet Take 81 mg by mouth daily      pantoprazole (PROTONIX) 40 MG tablet Take 40 mg by mouth daily      furosemide (LASIX) 20 MG tablet Take 1 tablet by mouth daily as needed (lower leg edema) Take 20 mg of oral lasix at evening, for a total of 40 mg of oral lasix at the evening dose  for the next 3 days.  (Patient taking differently: Take 20 mg by mouth 2 times daily) 30 tablet 0    spironolactone (ALDACTONE) 25 MG tablet Take 25 mg by mouth daily      polyethylene glycol (GLYCOLAX) powder Take 17 g by mouth daily      Polyvinyl Alcohol-Povidone (REFRESH OP) Apply 1 drop to eye as needed For dry eyes      acetaminophen (TYLENOL) 325 MG tablet Take 650 mg by mouth every 4 hours as needed for Pain For pain or temp       metoprolol tartrate (LOPRESSOR) 25 MG tablet Take 0.5 tablets by mouth 2 times daily (Patient taking differently: Take 12.5 mg by mouth 2 times daily Hold if SBP < 110 or HR is < 55) 30 tablet 0    docusate sodium (COLACE, DULCOLAX) 100 MG CAPS Take 100 mg by mouth 2 times daily as needed for Constipation 30 capsule 0    finasteride (PROSCAR) 5 MG tablet Take 1 tablet by mouth daily 30 tablet 0    amiodarone (CORDARONE) 200 MG tablet Take 1 tablet by mouth daily (Patient taking differently: Take 200 mg by mouth nightly) 30 tablet 3    rivastigmine (EXELON) 4.6 MG/24HR Place 1 patch onto the skin daily       Cholecalciferol (VITAMIN D3) 5000 units TABS Take 5,000 Units by mouth every morning      Omega-3 Fatty Acids (FISH OIL) 1000 MG CAPS Take 1,000 mg by mouth daily       Multiple Vitamins-Minerals (THERAPEUTIC MULTIVITAMIN-MINERALS) tablet Take 1 tablet by mouth daily       No current facility-administered medications for this visit. Allergies: Biaxin [clarithromycin], Cephalexin, and Viagra [sildenafil citrate]  Review of Systems:    Constitutional: Negative for diaphoresis and fatigue  Respiratory: Negative for shortness of breath  Cardiovascular: Negative for chest pain, dyspnea on exertion, claudication, edema, irregular heartbeat, murmur, palpitations or shortness of breath  Musculoskeletal: Negative for muscle pain, muscular weakness, negative for pain in arm and leg or swelling in foot and leg    Objective:  BP (!) 116/50 (Site: Left Upper Arm, Position: Sitting, Cuff Size: Medium Adult)   Pulse 72   Ht 5' 8\" (1.727 m)   Wt 156 lb (70.8 kg)   BMI 23.72 kg/m²   Wt Readings from Last 3 Encounters:   12/29/22 156 lb (70.8 kg)   12/07/22 153 lb (69.4 kg)   12/05/22 155 lb (70.3 kg)     Body mass index is 23.72 kg/m². GENERAL - Alert, oriented, pleasant, in no apparent distress.   EYES: No jaundice, no conjunctival pallor. Neck - Supple. No jugular venous distention noted. No carotid bruits. Cardiovascular - Normal S1 and S2 without obvious murmur or gallop. Extremities - No cyanosis, clubbing, or significant edema. Pulmonary - No respiratory distress. No wheezes or rales.       MEDICAL DECISION MAKING & DATA REVIEW:    Lab Review   Lab Results   Component Value Date/Time    TROPONINT 0.044 12/06/2022 05:24 AM    TROPONINT 0.053 12/05/2022 10:28 AM     Lab Results   Component Value Date/Time    PROBNP 2,919 12/05/2022 10:28 AM    PROBNP 4,125 06/02/2022 07:31 AM     Lab Results   Component Value Date    INR 1.21 08/19/2020    INR 1.71 09/11/2019     No results found for: LABA1C  Lab Results   Component Value Date    WBC 9.0 12/06/2022    WBC 10.0 12/05/2022    HCT 42.5 12/06/2022    HCT 47.9 12/05/2022    MCV 90.0 12/06/2022    MCV 90.9 12/05/2022     12/06/2022     12/05/2022     Lab Results   Component Value Date    CHOL 137 05/24/2019    TRIG 72 05/24/2019    HDL 47 05/24/2019    LDLDIRECT 86 05/24/2019     Lab Results   Component Value Date    ALT 20 12/06/2022    ALT 24 12/05/2022    AST 21 12/06/2022    AST 22 12/05/2022     BMP:    Lab Results   Component Value Date/Time     12/27/2022 08:04 AM     12/20/2022 04:55 AM    K 4.7 12/27/2022 08:04 AM    K 5.7 12/20/2022 04:55 AM     12/27/2022 08:04 AM    CL 99 12/20/2022 04:55 AM    CO2 24 12/27/2022 08:04 AM    CO2 22 12/20/2022 04:55 AM    BUN 39 12/27/2022 08:04 AM    BUN 41 12/20/2022 04:55 AM    CREATININE 1.7 12/27/2022 08:04 AM    CREATININE 1.7 12/20/2022 04:55 AM     CMP:   Lab Results   Component Value Date/Time     12/27/2022 08:04 AM     12/20/2022 04:55 AM    K 4.7 12/27/2022 08:04 AM    K 5.7 12/20/2022 04:55 AM     12/27/2022 08:04 AM    CL 99 12/20/2022 04:55 AM    CO2 24 12/27/2022 08:04 AM    CO2 22 12/20/2022 04:55 AM    BUN 39 12/27/2022 08:04 AM    BUN 41 12/20/2022 04:55 AM CREATININE 1.7 12/27/2022 08:04 AM    CREATININE 1.7 12/20/2022 04:55 AM    PROT 6.1 12/06/2022 05:24 AM    PROT 8.1 12/05/2022 10:28 AM     Lab Results   Component Value Date/Time    TSH 3.970 08/02/2022 06:53 AM    TSHHS 5.570 07/26/2022 06:57 AM       QUALITY MEASURES REVIEWED:  1.CAD:Patient is taking anti platelet agent:Yes  Patient does not have Hx of documented CAD  2. DYSLIPIDEMIA: Patient is on cholesterol lowering medication:No   Patient does not tolerate, secondary to side-effects. 3.Beta-Blocker therapy for CAD, if prior Myocardial Infarction:Yes   Due to side-effect(s) / Bradycardia  4. Counselled regarding smoking cessation. No   Patient does not Smoke. 5.Anticoagulation therapy (for A.Fib) No, due to low Hgb   6. Discussed weight management strategies. Assessment & Plan:  Primary / Secondary prevention is the goal by aggressive risk modification, healthy and therapeutic life style changes for cardiovascular risk reduction. CORONARY ARTERY DISEASE: None known. EKG: DDD pacing. 12/2022   Abnormal study. Severely reduced inferior wall perfusion with out significant    reversability. Inferior wall hypokinesis noted with mildly reduced LV function. LVEF is 45    %. Clinical co-relation is recommended. Reviewed with the Patient. He wants conservative management with medications. HTN:well controlled on current medical regimen, see list above. - changes in  treatment:   no, on Lopressor   CARDIOMYOPATHY: None known   CONGESTIVE HEART FAILURE:  KNOWN HISTORY. Compensated      VHD: Moderate AI  12/2022   Left ventricular function and size is normal, EF is estimated at 55-60%. Moderate left ventricular hypertrophy. Diastolic dysfunction could not be evaluated due to arrhythmia. No regional wall motion abnormalities were detected. Mildly dilated left atrium. PPM wiring visualized within the right side of the heart.    Moderately sclerotic aortic valve with mild aortic stenosis ,mean gradient   of 12mmhg,PRATEEK 1.7cm2. Mitral annular calcification is present. Mild mitral, tricuspid and moderate aortic regurgitation is present. RVSP is 21 mmHg. Dilation of the aortic root (4.0cm)and the ascending aorta(4.6cm). No evidence of pericardial effusion. DYSLIPIDEMIA: Patient's profile is at / near [facility],  takes Lipitor                                                          See most recent Lab values in Labs section above. Diabetes mellitis: .no  ARRHYTHMIAS:  Known. Patient has H/O Atrial fibrillation. On Amiodarone                                He is rate controlled & not on anticoagulation due to low Hgb. [id number] Score for Atrial Fibrillation Stroke Risk    Risk   Factors   Component Value   C CHF Yes 1   H HTN Yes 1   A2 Age >= 76 Yes,  (80 y.o.) 2   D DM No 0   S2 Prior Stroke/TIA No 0   V Vascular Disease No 0   A Age 74-69 No,  (80 y.o.) 0   Sc Sex male 0     [id number]  Score   4   Score last updated 9/29/22 11:02 AM EDT         S/P PPM:  8/2022  Pacer analysis is reviewed and filed in the pacer chart. Analysis is consistent with normal dual-chamber MRI safe [employer] pacer function with stable leads and appropriate battery status for the age of the device. Remaining average battery longevity is 7.5 years. Pacer is programmed to DDDR mode lower rate of 60 bpm and 93% pacing in the ventricle and 98% pacing in the atrium. PAF burden is 0.3% since January 5, 2021. She follows up with Dr. Dior Puri her cardiologist and he reported recent note from March of this year that she is not on anticoagulation due to low Hgb     Recommend continued every three month pacer check and follow up office visit as scheduled. TESTS ORDERED:none this visit     PREVIOUSLY ORDERED TESTS REVIEWED & DISCUSSED WITH THE PATIENT:     I personally reviewed & interpreted, all previously ordered tests as copied above.  Latest Labs are pulled in to the note with dates. Labs, specially in Reference to Lipid profile, Cardiac testing in the form of Echo ( dated: ), stress tests ( dated: ) & other relevant cardiac testing reviewed with patient & recommendations made based on assessment of the results. Discussed role of Cardiac risk factors & effects + treatment of co morbidities with patient & advised accordingly. MEDICATIONS: List of medications patient is currently taking is reviewed in detail with the patient. Discussed any side effects or problems taking the medication. Recommend Continue present management & medications as listed. Add Imdur to the regimen    AFFIRMATION: I  reviewed patient's history, previous & current medical problems & all Labs + testing. This includes chart prep even prior to the vosit. Various goals are discussed and multiple questions answered. Relevant concelling performed. Office follow up in 3 months.

## 2022-12-30 ENCOUNTER — TELEPHONE (OUTPATIENT)
Dept: CARDIOLOGY CLINIC | Age: 87
End: 2022-12-30

## 2022-12-30 RX ORDER — ISOSORBIDE MONONITRATE 30 MG/1
30 TABLET, EXTENDED RELEASE ORAL DAILY
Qty: 90 TABLET | Refills: 3 | Status: SHIPPED | OUTPATIENT
Start: 2022-12-30

## 2023-01-03 ENCOUNTER — HOSPITAL ENCOUNTER (OUTPATIENT)
Age: 88
Setting detail: SPECIMEN
Discharge: HOME | End: 2023-01-03
Payer: MEDICARE

## 2023-01-03 LAB
ANION GAP SERPL CALCULATED.3IONS-SCNC: 10 MMOL/L (ref 4–16)
BUN BLDV-MCNC: 25 MG/DL (ref 6–23)
CALCIUM SERPL-MCNC: 8.5 MG/DL (ref 8.3–10.6)
CHLORIDE BLD-SCNC: 105 MMOL/L (ref 99–110)
CO2: 25 MMOL/L (ref 21–32)
CREAT SERPL-MCNC: 1.4 MG/DL (ref 0.9–1.3)
GFR SERPL CREATININE-BSD FRML MDRD: 48 ML/MIN/1.73M2
GLUCOSE BLD-MCNC: 80 MG/DL (ref 70–99)
POTASSIUM SERPL-SCNC: 3.6 MMOL/L (ref 3.5–5.1)
SODIUM BLD-SCNC: 140 MMOL/L (ref 135–145)

## 2023-01-03 PROCEDURE — 36415 COLL VENOUS BLD VENIPUNCTURE: CPT

## 2023-01-03 PROCEDURE — 80048 BASIC METABOLIC PNL TOTAL CA: CPT

## 2023-01-10 ENCOUNTER — HOSPITAL ENCOUNTER (OUTPATIENT)
Age: 88
Setting detail: SPECIMEN
Discharge: HOME | End: 2023-01-10
Payer: MEDICARE

## 2023-01-10 LAB
ANION GAP SERPL CALCULATED.3IONS-SCNC: 13 MMOL/L (ref 4–16)
BUN BLDV-MCNC: 16 MG/DL (ref 6–23)
CALCIUM SERPL-MCNC: 8.5 MG/DL (ref 8.3–10.6)
CHLORIDE BLD-SCNC: 103 MMOL/L (ref 99–110)
CO2: 26 MMOL/L (ref 21–32)
CREAT SERPL-MCNC: 1.2 MG/DL (ref 0.9–1.3)
GFR SERPL CREATININE-BSD FRML MDRD: 58 ML/MIN/1.73M2
GLUCOSE BLD-MCNC: 70 MG/DL (ref 70–99)
POTASSIUM SERPL-SCNC: 3.9 MMOL/L (ref 3.5–5.1)
SODIUM BLD-SCNC: 142 MMOL/L (ref 135–145)

## 2023-01-10 PROCEDURE — 36415 COLL VENOUS BLD VENIPUNCTURE: CPT

## 2023-01-10 PROCEDURE — 80048 BASIC METABOLIC PNL TOTAL CA: CPT

## 2023-01-17 ENCOUNTER — HOSPITAL ENCOUNTER (OUTPATIENT)
Age: 88
Setting detail: SPECIMEN
Discharge: HOME | End: 2023-01-17
Payer: MEDICARE

## 2023-01-17 LAB
ANION GAP SERPL CALCULATED.3IONS-SCNC: 14 MMOL/L (ref 4–16)
BUN BLDV-MCNC: 16 MG/DL (ref 6–23)
CALCIUM SERPL-MCNC: 8.6 MG/DL (ref 8.3–10.6)
CHLORIDE BLD-SCNC: 100 MMOL/L (ref 99–110)
CO2: 29 MMOL/L (ref 21–32)
CREAT SERPL-MCNC: 1.1 MG/DL (ref 0.9–1.3)
GFR SERPL CREATININE-BSD FRML MDRD: >60 ML/MIN/1.73M2
GLUCOSE BLD-MCNC: 81 MG/DL (ref 70–99)
POTASSIUM SERPL-SCNC: 3.3 MMOL/L (ref 3.5–5.1)
SODIUM BLD-SCNC: 143 MMOL/L (ref 135–145)

## 2023-01-17 PROCEDURE — 36415 COLL VENOUS BLD VENIPUNCTURE: CPT

## 2023-01-17 PROCEDURE — 80048 BASIC METABOLIC PNL TOTAL CA: CPT

## 2023-01-20 ENCOUNTER — APPOINTMENT (OUTPATIENT)
Dept: GENERAL RADIOLOGY | Age: 88
DRG: 280 | End: 2023-01-20
Payer: MEDICARE

## 2023-01-20 ENCOUNTER — HOSPITAL ENCOUNTER (INPATIENT)
Age: 88
LOS: 3 days | Discharge: HOME | DRG: 280 | End: 2023-01-23
Attending: STUDENT IN AN ORGANIZED HEALTH CARE EDUCATION/TRAINING PROGRAM | Admitting: STUDENT IN AN ORGANIZED HEALTH CARE EDUCATION/TRAINING PROGRAM
Payer: MEDICARE

## 2023-01-20 DIAGNOSIS — R13.10 DYSPHAGIA, UNSPECIFIED TYPE: ICD-10-CM

## 2023-01-20 DIAGNOSIS — I50.9 ACUTE CONGESTIVE HEART FAILURE, UNSPECIFIED HEART FAILURE TYPE (HCC): Primary | ICD-10-CM

## 2023-01-20 DIAGNOSIS — N17.9 AKI (ACUTE KIDNEY INJURY) (HCC): ICD-10-CM

## 2023-01-20 DIAGNOSIS — J81.0 ACUTE PULMONARY EDEMA (HCC): ICD-10-CM

## 2023-01-20 DIAGNOSIS — N30.00 ACUTE CYSTITIS WITHOUT HEMATURIA: ICD-10-CM

## 2023-01-20 DIAGNOSIS — I35.0 NONRHEUMATIC AORTIC VALVE STENOSIS: ICD-10-CM

## 2023-01-20 DIAGNOSIS — R09.02 HYPOXIA: ICD-10-CM

## 2023-01-20 PROBLEM — R77.8 TROPONIN I ABOVE REFERENCE RANGE: Status: ACTIVE | Noted: 2023-01-20

## 2023-01-20 PROBLEM — J96.00 ACUTE RESPIRATORY FAILURE (HCC): Status: ACTIVE | Noted: 2023-01-20

## 2023-01-20 PROBLEM — R79.89 TROPONIN I ABOVE REFERENCE RANGE: Status: ACTIVE | Noted: 2023-01-20

## 2023-01-20 LAB
ALBUMIN SERPL-MCNC: 3.5 GM/DL (ref 3.4–5)
ALP BLD-CCNC: 74 IU/L (ref 40–129)
ALT SERPL-CCNC: 27 U/L (ref 10–40)
ANION GAP SERPL CALCULATED.3IONS-SCNC: 12 MMOL/L (ref 4–16)
AST SERPL-CCNC: 32 IU/L (ref 15–37)
BACTERIA: ABNORMAL /HPF
BASOPHILS ABSOLUTE: 0 K/CU MM
BASOPHILS RELATIVE PERCENT: 0.2 % (ref 0–1)
BILIRUB SERPL-MCNC: 0.7 MG/DL (ref 0–1)
BILIRUBIN URINE: NEGATIVE MG/DL
BLOOD, URINE: ABNORMAL
BUN BLDV-MCNC: 16 MG/DL (ref 6–23)
CALCIUM SERPL-MCNC: 9 MG/DL (ref 8.3–10.6)
CHLORIDE BLD-SCNC: 98 MMOL/L (ref 99–110)
CLARITY: ABNORMAL
CO2: 30 MMOL/L (ref 21–32)
COLOR: YELLOW
CREAT SERPL-MCNC: 1.2 MG/DL (ref 0.9–1.3)
DIFFERENTIAL TYPE: ABNORMAL
EKG ATRIAL RATE: 60 BPM
EKG DIAGNOSIS: NORMAL
EKG P AXIS: 31 DEGREES
EKG Q-T INTERVAL: 680 MS
EKG QRS DURATION: 244 MS
EKG QTC CALCULATION (BAZETT): 680 MS
EKG R AXIS: -57 DEGREES
EKG T AXIS: 105 DEGREES
EKG VENTRICULAR RATE: 60 BPM
EOSINOPHILS ABSOLUTE: 0 K/CU MM
EOSINOPHILS RELATIVE PERCENT: 0.1 % (ref 0–3)
GFR SERPL CREATININE-BSD FRML MDRD: 58 ML/MIN/1.73M2
GLUCOSE BLD-MCNC: 122 MG/DL (ref 70–99)
GLUCOSE, URINE: NEGATIVE MG/DL
HCT VFR BLD CALC: 40.8 % (ref 42–52)
HEMOGLOBIN: 12.8 GM/DL (ref 13.5–18)
IMMATURE NEUTROPHIL %: 0.5 % (ref 0–0.43)
KETONES, URINE: NEGATIVE MG/DL
LACTATE: 1.4 MMOL/L (ref 0.5–1.9)
LEUKOCYTE ESTERASE, URINE: ABNORMAL
LYMPHOCYTES ABSOLUTE: 0.5 K/CU MM
LYMPHOCYTES RELATIVE PERCENT: 5.2 % (ref 24–44)
MCH RBC QN AUTO: 28.7 PG (ref 27–31)
MCHC RBC AUTO-ENTMCNC: 31.4 % (ref 32–36)
MCV RBC AUTO: 91.5 FL (ref 78–100)
MONOCYTES ABSOLUTE: 1.2 K/CU MM
MONOCYTES RELATIVE PERCENT: 11.3 % (ref 0–4)
NITRITE URINE, QUANTITATIVE: NEGATIVE
NUCLEATED RBC %: 0 %
PDW BLD-RTO: 14.6 % (ref 11.7–14.9)
PH, URINE: 6 (ref 5–8)
PLATELET # BLD: 260 K/CU MM (ref 140–440)
PMV BLD AUTO: 9.9 FL (ref 7.5–11.1)
POTASSIUM SERPL-SCNC: 3.5 MMOL/L (ref 3.5–5.1)
PRO-BNP: ABNORMAL PG/ML
PROTEIN UA: 30 MG/DL
RBC # BLD: 4.46 M/CU MM (ref 4.6–6.2)
RBC URINE: 113 /HPF (ref 0–3)
SEGMENTED NEUTROPHILS ABSOLUTE COUNT: 8.6 K/CU MM
SEGMENTED NEUTROPHILS RELATIVE PERCENT: 82.7 % (ref 36–66)
SODIUM BLD-SCNC: 140 MMOL/L (ref 135–145)
SPECIFIC GRAVITY UA: 1.01 (ref 1–1.03)
TOTAL IMMATURE NEUTOROPHIL: 0.05 K/CU MM
TOTAL NUCLEATED RBC: 0 K/CU MM
TOTAL PROTEIN: 6.6 GM/DL (ref 6.4–8.2)
TRICHOMONAS: ABNORMAL /HPF
TROPONIN T: 0.09 NG/ML
TROPONIN T: 0.11 NG/ML
TROPONIN T: 0.13 NG/ML
UROBILINOGEN, URINE: 0.2 MG/DL (ref 0.2–1)
WBC # BLD: 10.4 K/CU MM (ref 4–10.5)
WBC CLUMP: ABNORMAL /HPF
WBC UA: 2693 /HPF (ref 0–2)

## 2023-01-20 PROCEDURE — 6370000000 HC RX 637 (ALT 250 FOR IP): Performed by: NURSE PRACTITIONER

## 2023-01-20 PROCEDURE — 83880 ASSAY OF NATRIURETIC PEPTIDE: CPT

## 2023-01-20 PROCEDURE — 93005 ELECTROCARDIOGRAM TRACING: CPT | Performed by: NURSE PRACTITIONER

## 2023-01-20 PROCEDURE — 71045 X-RAY EXAM CHEST 1 VIEW: CPT

## 2023-01-20 PROCEDURE — 6360000002 HC RX W HCPCS: Performed by: NURSE PRACTITIONER

## 2023-01-20 PROCEDURE — 85025 COMPLETE CBC W/AUTO DIFF WBC: CPT

## 2023-01-20 PROCEDURE — 36415 COLL VENOUS BLD VENIPUNCTURE: CPT

## 2023-01-20 PROCEDURE — 87086 URINE CULTURE/COLONY COUNT: CPT

## 2023-01-20 PROCEDURE — 2580000003 HC RX 258: Performed by: NURSE PRACTITIONER

## 2023-01-20 PROCEDURE — 81003 URINALYSIS AUTO W/O SCOPE: CPT

## 2023-01-20 PROCEDURE — 87088 URINE BACTERIA CULTURE: CPT

## 2023-01-20 PROCEDURE — 87186 SC STD MICRODIL/AGAR DIL: CPT

## 2023-01-20 PROCEDURE — 83605 ASSAY OF LACTIC ACID: CPT

## 2023-01-20 PROCEDURE — 99223 1ST HOSP IP/OBS HIGH 75: CPT | Performed by: INTERNAL MEDICINE

## 2023-01-20 PROCEDURE — 80053 COMPREHEN METABOLIC PANEL: CPT

## 2023-01-20 PROCEDURE — 84484 ASSAY OF TROPONIN QUANT: CPT

## 2023-01-20 PROCEDURE — 94761 N-INVAS EAR/PLS OXIMETRY MLT: CPT

## 2023-01-20 PROCEDURE — 93010 ELECTROCARDIOGRAM REPORT: CPT | Performed by: INTERNAL MEDICINE

## 2023-01-20 PROCEDURE — 1200000000 HC SEMI PRIVATE

## 2023-01-20 PROCEDURE — 99285 EMERGENCY DEPT VISIT HI MDM: CPT

## 2023-01-20 PROCEDURE — 2700000000 HC OXYGEN THERAPY PER DAY

## 2023-01-20 PROCEDURE — 51798 US URINE CAPACITY MEASURE: CPT

## 2023-01-20 RX ORDER — ASPIRIN 81 MG/1
81 TABLET ORAL DAILY
Status: DISCONTINUED | OUTPATIENT
Start: 2023-01-21 | End: 2023-01-23 | Stop reason: HOSPADM

## 2023-01-20 RX ORDER — SODIUM CHLORIDE 9 MG/ML
INJECTION, SOLUTION INTRAVENOUS PRN
Status: DISCONTINUED | OUTPATIENT
Start: 2023-01-20 | End: 2023-01-23 | Stop reason: HOSPADM

## 2023-01-20 RX ORDER — POTASSIUM CHLORIDE 20 MEQ/1
20 TABLET, EXTENDED RELEASE ORAL 2 TIMES DAILY
Status: ON HOLD | COMMUNITY
End: 2023-01-23 | Stop reason: HOSPADM

## 2023-01-20 RX ORDER — FUROSEMIDE 10 MG/ML
60 INJECTION INTRAMUSCULAR; INTRAVENOUS 2 TIMES DAILY
Status: DISCONTINUED | OUTPATIENT
Start: 2023-01-20 | End: 2023-01-22

## 2023-01-20 RX ORDER — SODIUM CHLORIDE 0.9 % (FLUSH) 0.9 %
10 SYRINGE (ML) INJECTION PRN
Status: DISCONTINUED | OUTPATIENT
Start: 2023-01-20 | End: 2023-01-23 | Stop reason: HOSPADM

## 2023-01-20 RX ORDER — ACETAMINOPHEN 650 MG/1
650 SUPPOSITORY RECTAL EVERY 6 HOURS PRN
Status: DISCONTINUED | OUTPATIENT
Start: 2023-01-20 | End: 2023-01-23 | Stop reason: HOSPADM

## 2023-01-20 RX ORDER — FINASTERIDE 5 MG/1
5 TABLET, FILM COATED ORAL DAILY
Status: DISCONTINUED | OUTPATIENT
Start: 2023-01-20 | End: 2023-01-23 | Stop reason: HOSPADM

## 2023-01-20 RX ORDER — LACTULOSE 10 G/15ML
60 SOLUTION ORAL DAILY
COMMUNITY

## 2023-01-20 RX ORDER — ASPIRIN 81 MG/1
324 TABLET, CHEWABLE ORAL ONCE
Status: COMPLETED | OUTPATIENT
Start: 2023-01-20 | End: 2023-01-20

## 2023-01-20 RX ORDER — ENOXAPARIN SODIUM 100 MG/ML
40 INJECTION SUBCUTANEOUS DAILY
Status: DISCONTINUED | OUTPATIENT
Start: 2023-01-20 | End: 2023-01-23

## 2023-01-20 RX ORDER — PANTOPRAZOLE SODIUM 40 MG/1
40 TABLET, DELAYED RELEASE ORAL DAILY
Status: DISCONTINUED | OUTPATIENT
Start: 2023-01-20 | End: 2023-01-23 | Stop reason: HOSPADM

## 2023-01-20 RX ORDER — SIMETHICONE 80 MG
80 TABLET,CHEWABLE ORAL 4 TIMES DAILY
COMMUNITY

## 2023-01-20 RX ORDER — FUROSEMIDE 80 MG
80 TABLET ORAL DAILY
Status: ON HOLD | COMMUNITY
End: 2023-01-23 | Stop reason: HOSPADM

## 2023-01-20 RX ORDER — POLYETHYLENE GLYCOL 3350 17 G
2 POWDER IN PACKET (EA) ORAL
Status: DISCONTINUED | OUTPATIENT
Start: 2023-01-20 | End: 2023-01-23 | Stop reason: HOSPADM

## 2023-01-20 RX ORDER — ACETAMINOPHEN 325 MG/1
650 TABLET ORAL EVERY 6 HOURS PRN
Status: DISCONTINUED | OUTPATIENT
Start: 2023-01-20 | End: 2023-01-23 | Stop reason: HOSPADM

## 2023-01-20 RX ORDER — AMIODARONE HYDROCHLORIDE 200 MG/1
200 TABLET ORAL NIGHTLY
Status: DISCONTINUED | OUTPATIENT
Start: 2023-01-20 | End: 2023-01-23 | Stop reason: HOSPADM

## 2023-01-20 RX ORDER — ASPIRIN 81 MG/1
81 TABLET ORAL DAILY
Status: DISCONTINUED | OUTPATIENT
Start: 2023-01-20 | End: 2023-01-20

## 2023-01-20 RX ORDER — FUROSEMIDE 10 MG/ML
60 INJECTION INTRAMUSCULAR; INTRAVENOUS ONCE
Status: COMPLETED | OUTPATIENT
Start: 2023-01-20 | End: 2023-01-20

## 2023-01-20 RX ORDER — ONDANSETRON 4 MG/1
4 TABLET, ORALLY DISINTEGRATING ORAL EVERY 8 HOURS PRN
Status: DISCONTINUED | OUTPATIENT
Start: 2023-01-20 | End: 2023-01-23 | Stop reason: HOSPADM

## 2023-01-20 RX ORDER — SODIUM CHLORIDE 0.9 % (FLUSH) 0.9 %
5-40 SYRINGE (ML) INJECTION EVERY 12 HOURS SCHEDULED
Status: DISCONTINUED | OUTPATIENT
Start: 2023-01-20 | End: 2023-01-23 | Stop reason: HOSPADM

## 2023-01-20 RX ORDER — RIVASTIGMINE 4.6 MG/24H
1 PATCH, EXTENDED RELEASE TRANSDERMAL DAILY
Status: DISCONTINUED | OUTPATIENT
Start: 2023-01-20 | End: 2023-01-23 | Stop reason: HOSPADM

## 2023-01-20 RX ORDER — POLYETHYLENE GLYCOL 3350 17 G/17G
17 POWDER, FOR SOLUTION ORAL DAILY PRN
Status: DISCONTINUED | OUTPATIENT
Start: 2023-01-20 | End: 2023-01-23 | Stop reason: HOSPADM

## 2023-01-20 RX ORDER — ONDANSETRON 2 MG/ML
4 INJECTION INTRAMUSCULAR; INTRAVENOUS EVERY 6 HOURS PRN
Status: DISCONTINUED | OUTPATIENT
Start: 2023-01-20 | End: 2023-01-23 | Stop reason: HOSPADM

## 2023-01-20 RX ADMIN — AMIODARONE HYDROCHLORIDE 200 MG: 200 TABLET ORAL at 20:21

## 2023-01-20 RX ADMIN — FINASTERIDE 5 MG: 5 TABLET, FILM COATED ORAL at 15:31

## 2023-01-20 RX ADMIN — ASPIRIN 324 MG: 81 TABLET, CHEWABLE ORAL at 09:59

## 2023-01-20 RX ADMIN — Medication 10 ML: at 15:33

## 2023-01-20 RX ADMIN — FUROSEMIDE 60 MG: 10 INJECTION, SOLUTION INTRAVENOUS at 19:05

## 2023-01-20 RX ADMIN — Medication 10 ML: at 20:28

## 2023-01-20 RX ADMIN — PANTOPRAZOLE SODIUM 40 MG: 40 TABLET, DELAYED RELEASE ORAL at 15:32

## 2023-01-20 RX ADMIN — METOPROLOL TARTRATE 12.5 MG: 25 TABLET, FILM COATED ORAL at 20:21

## 2023-01-20 RX ADMIN — METOPROLOL TARTRATE 12.5 MG: 25 TABLET, FILM COATED ORAL at 15:33

## 2023-01-20 RX ADMIN — FUROSEMIDE 60 MG: 10 INJECTION, SOLUTION INTRAMUSCULAR; INTRAVENOUS at 09:59

## 2023-01-20 NOTE — ED PROVIDER NOTES
[address] ENCOUNTER        Pt Name: Elliott Calixto  MRN: [id number]  [place] 10/5/1933  Date of evaluation: 1/20/2023  Provider: CARI Aburto - CNP  PCP: Randee Reis MD     I have discussed the care of this patient with my supervising physician Dr. Eulalia Joseph, who is in agreement with evaluation and plan of care. Triage CHIEF COMPLAINT       Chief Complaint   Patient presents with    Urinary Retention         HISTORY OF PRESENT ILLNESS      Chief Complaint: urinary retention    Elliott Calixto is a 80 y.o. male who presents for evaluation by EMS for reported urinary retention. Patient has baseline confusion and is unable to provide history. EMS reports that the daughter requested he be evaluated for concerns of acute urinary retention. I spoke with the daughter Radhika Medeiros who is the [place] and lives in [place] who reports that the patient called her this morning stating that he was having difficulty urinating. She has a history of urinary stones and she was concerned that he may have an obstruction. He also has a history of congestive heart failure and she reports that his PCP at [facility] has been adjusting his diuretics recently and discontinued his Aldactone and increase the Lasix to 80 mg daily. He has also been having hyperkalemia which they have been replacing. He is not typically on oxygen. He does have a baseline of confusion. He is a full code. Urology: Dr. Jordyn Garcia  Nephrology: Dr. Janina Black  Cardiology: Dr. Lino Barclay Notes were all reviewed and agreed with or any disagreements were addressed in the HPI. REVIEW OF SYSTEMS     Pertinent ROS as noted in HPI.       PAST MEDICAL HISTORY     Past Medical History:   Diagnosis Date    ACTA2-related familial thoracic aortic aneurysm     Arrhythmia     Atrial fibrillation (HCC)     BBBB (bilateral bundle branch block)     Bradycardia CHF (congestive heart failure) ([facility] [address]     Essential hypertension, malignant     Fall at home     H/O echocardiogram 07/01/2020    EF 50-55%, Mod-Severe AS, Mild PHTN, Aortic Root 3.9cm.(pt had OV after echo)    History of cardioversion 04/17/2018    History of chest x-ray 02/27/2017    Enlargement of the aorta knob which may represent a thoracic aortic aneurysm. Further evaluation w/ a contrast enhanced CT of the chest recommended. no evidence of acute pulmonary process. History of CT scan 02/28/2017    CT Angio Chest: no evidence of pulmonary embolism, ascending thoracic aorta aneurysm measuring 4.8 cm, descending thoracic aoric aneurysm 4.1 cm, bilateral nephrllthiasis, R renal cyst, cholelithiasis, cardiomegaly, low attenuated lesion is noted w/in L lobe of thyroid gland containng Calcification. Recommend thyroid US. History of echocardiogram 03/27/2017    TTE EF 55%, LV Normal Size, borderline LVH concentric, Normal LV systolic function, transmittal doppler flow pttern suggest impaired LV relaxation Mild aortic stenosis, mild aortic regurgitation. History of EKG 02/27/2017    Time 12:03 AM, HR 75, A fib, Axis normal, Intervals normal, P waves normal, St-T Segments: nonspecific ST segment changes to the anterior lateral leads, QRS RBBB,  No significant Q waves, no ectopy. A-fib w/RBBB w/nonspecific ST segment change EKG. History of EKG 02/27/2017    Time 2:58AM: HR 59, NSR, Axis normal, Intervals normal, P waves normal, ST-T seg: nonspecific ST segment changes, QRS RBBB, no significant Q waves, no ectopy. Sinus bradycardia w/ RBBB nonspecific ST Segment changes EKG    History of Holter monitoring 11/08/2017    monitored 48 hours: Patient noted to have multiple PAC and PVCs. Risk of atrial fibrillation present given previous episode Recommend antiarrhythmic therapy. History of stress test 03/27/2017    NM Stress test: EF 54%, Abnormal study, evidence of mild ischemia in mild inferior regions. post stress LV is enlarged in size. Post-stress LV function is normal.     Hx of echocardiogram 08/05/2021    of 50 %. Dilatation of the aortic root measuring 3.9 cm. Dilatation of the ascending aorta measuring 4.3 cm. Hx of echocardiogram 12/08/2022    Left ventricular function and size is normal, EF is estimated at 55-60%. Moderate left ventricular hypertrophy. No regional wall motion abnormalities were detected. PPM wiring visualized within the right side of the heart. Moderately sclerotic aortic valve with mild aortic stenosis ,mean gradient. of 12mmhg,PRATEEK 1.7cm2. Mitral annular calcification is present.     Hx of transesophageal echocardiography (SIMONE) for monitoring 04/17/2018    EF45-50% mild TR, mild-mod MR, mod-severe AS    Hyperlipemia     Hyperlipidemia     Hypertension     Nonspecific abnormal electrocardiogram (ECG) (EKG)     Persistent atrial fibrillation (HCC)     Sick sinus syndrome ([address]     Vertigo        SURGICAL HISTORY     Past Surgical History:   Procedure Laterality Date    CYSTOSCOPY INSERTION / REMOVAL STENT / STONE Left 5/6/2019    CYSTOSCOPY RETROGRADE PYELOGRAM STENT INSERTION, DIAGNOSTIC CYSTOSCOPY performed by Devin Phillips MD at [address] Left 03/29/2018    Dual Chamber [employer] [facility] XT DR AMI Kincaid    UPPER GASTROINTESTINAL ENDOSCOPY N/A 8/20/2020    EGD DIAGNOSTIC ONLY performed by Brian Perez MD at [address]       Previous Medications    ACETAMINOPHEN (TYLENOL) 325 MG TABLET    Take 650 mg by mouth every 4 hours as needed for Pain For pain or temp     AMIODARONE (CORDARONE) 200 MG TABLET    Take 1 tablet by mouth daily    ASPIRIN 81 MG EC TABLET    Take 81 mg by mouth daily    BISACODYL (DULCOLAX) 10 MG SUPPOSITORY    Place 10 mg rectally daily    CHOLECALCIFEROL (VITAMIN D3) 5000 UNITS TABS    Take 5,000 Units by mouth every morning    DEXTROMETHORPHAN-GUAIFENESIN (GUAIFENESIN DM PO)    Take 10 mLs by mouth every 4 hours as needed 100-10 mg/5ml    DOCUSATE SODIUM (COLACE, DULCOLAX) 100 MG CAPS    Take 100 mg by mouth 2 times daily as needed for Constipation    FINASTERIDE (PROSCAR) 5 MG TABLET    Take 1 tablet by mouth daily    FUROSEMIDE (LASIX) 20 MG TABLET    Take 1 tablet by mouth daily as needed (lower leg edema) Take 20 mg of oral lasix at evening, for a total of 40 mg of oral lasix at the evening dose  for the next 3 days. ISOSORBIDE MONONITRATE (IMDUR) 30 MG EXTENDED RELEASE TABLET    Take 1 tablet by mouth daily    LACTOBACILLUS (ACIDOPHILUS/PECTIN PO)    Take 1 capsule by mouth daily    METOPROLOL TARTRATE (LOPRESSOR) 25 MG TABLET    Take 0.5 tablets by mouth 2 times daily    MULTIPLE VITAMINS-MINERALS (THERAPEUTIC MULTIVITAMIN-MINERALS) TABLET    Take 1 tablet by mouth daily    NIRMATRELVIR-RITONAVIR (PAXLOVID PO)    Take by mouth    NITROFURANTOIN (MACRODANTIN) 50 MG CAPSULE    Take 50 mg by mouth 4 times daily    NITROFURANTOIN, MACROCRYSTAL-MONOHYDRATE, (MACROBID) 100 MG CAPSULE    Take 100 mg by mouth 2 times daily    OMEGA-3 FATTY ACIDS (FISH OIL) 1000 MG CAPS    Take 1,000 mg by mouth daily     PANTOPRAZOLE (PROTONIX) 40 MG TABLET    Take 40 mg by mouth daily    POLYETHYLENE GLYCOL (GLYCOLAX) POWDER    Take 17 g by mouth daily    POLYVINYL ALCOHOL-POVIDONE (REFRESH OP)    Apply 1 drop to eye as needed For dry eyes    RIVASTIGMINE (EXELON) 4.6 MG/24HR    Place 1 patch onto the skin daily     SPIRONOLACTONE (ALDACTONE) 25 MG TABLET    Take 25 mg by mouth daily       ALLERGIES     Biaxin [clarithromycin], Cephalexin, and Viagra [sildenafil citrate]    FAMILYHISTORY     History reviewed. No pertinent family history.      SOCIAL HISTORY       Social History     Socioeconomic History    Marital status:      Spouse name: None    Number of children: None    Years of education: None    Highest education level: None   Tobacco Use    Smoking status: Never    Smokeless tobacco: Never   Vaping Use    Vaping Use: Never used Substance and Sexual Activity    Alcohol use: Not Currently     Comment: Caffiene - Coffee/day    Drug use: No    Sexual activity: Never       SCREENINGS    Hatch Coma Scale  Eye Opening: Spontaneous  Best Verbal Response: Oriented  Best Motor Response: Obeys commands  Kasia Coma Scale Score: 15      PHYSICAL EXAM       ED Triage Vitals [01/20/23 0708]   BP Temp Temp Source Heart Rate Resp SpO2 Height Weight   135/63 97.5 °F (36.4 °C) Oral 68 18 (!) 88 % -- --      Constitutional:  Well developed, Well nourished. No distress. Patient denies pain. Pleasantly confused. Able to report why he is here. HENT:  Normocephalic, Atraumatic. Moist mucus membranes. Neck/Lymphatics: supple, no swollen nodes  Cardiovascular:   RRR,  no murmurs/rubs/gallops. Distal cap refill and pulses intact bilateral upper and lower extremities. Moderate bilateral peripheral edema distal to lower shins    Respiratory:  Nonlabored breathing. Normal breath sounds, faint crackles bilateral bases   abdomen: Bowel sounds normal, Soft, No tenderness, no masses. No distention. Musculoskeletal:  Bilateral upper and lower extremity ROM intact without pain or obvious deficit  Integument:   Warm, Dry, No rashes. Neurologic:  Alert & oriented to person and place, No focal deficits noted.    Psychiatric:  Affect normal, Mood normal.     DIAGNOSTIC RESULTS   LABS:    Labs Reviewed   CBC WITH AUTO DIFFERENTIAL - Abnormal; Notable for the following components:       Result Value    RBC 4.46 (*)     Hemoglobin 12.8 (*)     Hematocrit 40.8 (*)     MCHC 31.4 (*)     Segs Relative 82.7 (*)     Lymphocytes % 5.2 (*)     Monocytes % 11.3 (*)     Immature Neutrophil % 0.5 (*)     All other components within normal limits   COMPREHENSIVE METABOLIC PANEL - Abnormal; Notable for the following components:    Chloride 98 (*)     Est, Glom Filt Rate 58 (*)     Glucose 122 (*)     All other components within normal limits   TROPONIN - Abnormal; Notable for the following components:    Troponin T 0.092 (*)     All other components within normal limits   BRAIN NATRIURETIC PEPTIDE - Abnormal; Notable for the following components:    Pro-BNP 22,975 (*)     All other components within normal limits   LACTIC ACID   URINALYSIS       When ordered, only abnormal lab results are displayed. All other labs were within normal range or not returned as of this dictation. EKG: When ordered, EKG's are interpreted by the Emergency Department Physician in the absence of a cardiologist.  Please see their note for interpretation of EKG. RADIOLOGY:   Non-plain film images such as CT, Ultrasound and MRI are read by the radiologist. Plain radiographic images are visualized and preliminarily interpreted by the  ED Provider with the below findings:    Interpretation perthe Radiologist below, if available at the time of this note:    XR CHEST PORTABLE   Final Result   Bilateral interstitial and alveolar opacities are concerning for edema and/or   multifocal infection. Trace bilateral pleural effusions. XR CHEST PORTABLE    Result Date: 1/20/2023  EXAMINATION: ONE XRAY VIEW OF THE CHEST 1/20/2023 7:47 am COMPARISON: 12/05/2022 HISTORY: ORDERING SYSTEM PROVIDED HISTORY: shortness of breath TECHNOLOGIST PROVIDED HISTORY: Reason for exam:->shortness of breath Reason for Exam: SOB FINDINGS: Similar cardiomegaly and bilateral hilar fullness. Left-sided chest cardiac conduction device is again noted. Scattered bilateral interstitial and alveolar opacities are seen. Trace bilateral pleural effusions are suspected. No discrete pneumothorax. Osseous structures are diffusely demineralized and grossly unchanged. Bilateral interstitial and alveolar opacities are concerning for edema and/or multifocal infection. Trace bilateral pleural effusions.          PROCEDURES   Unless otherwise noted below, none         CRITICAL CARE   CRITICAL CARE NOTE: N/A    CONSULTS:  None      EMERGENCY DEPARTMENT COURSE and MDM:   Vitals:    Vitals:    01/20/23 0708 01/20/23 0712   BP: 135/63    Pulse: 68    Resp: 18    Temp: 97.5 °F (36.4 °C)    TempSrc: Oral    SpO2: (!) 88% 90%       Patient was given thefollowing medications:  Medications   furosemide (LASIX) injection 60 mg (has no administration in time range)   aspirin chewable tablet 324 mg (has no administration in time range)           Sepsis Consideration    Exclusion criteria - the patient is NOT to be included for SEP-1 Core Measure due to: Infection is not suspected      MDM:  Patient presents as above. Emergent etiologies considered. Patient seen and examined. Work-up initiated secondary to presentation, physical exam findings, vital signs and medical chart review. Gail Alves CNP, am the primary clinician of record. In brief, patient presents for evaluation from [facility] where he lives in assisted living for possible urinary retention. His daughter Hollie Gomes provided history. On exam, he was pleasantly confused but denied any pain. He had faint crackles in bilateral bases with mild peripheral edema. There is no abdominal tenderness. Chest x-ray demonstrates trace bilateral pleural effusions as well as patchy interstitial and alveolar opacities concerning for edema versus infection. He has not had a cough or fever consistent with pneumonia. He was placed on oxygen by EMS for an 2L O2 of 80 to 90%. Laboratory studies including a CMP and CBC indicated normal renal function with a potassium of 3.5. Lactate was normal.  His proBNP was nearly 23,000 and he had an elevated troponin of 0.092. His ECG showed an AV paced rhythm with no obvious signs of ACS. Please see attending note for complete interpretation. Discussed findings with daughter. Recommended admission for evaluation and treatment of acute congestive heart failure.   Patient has a history of mild aortic stenosis with normal systolic function on his last echo. He was given a dose of 60 mg of IV Lasix in the department. 9767: Discussed case with Heather Grimes CNP, hospitalist, and updated on ED presentation, imaging, and lab results. Will accept the patient. CLINICAL IMPRESSION      1. Acute congestive heart failure, unspecified heart failure type ([address]    2. Acute pulmonary edema (HCC)    3. Hypoxia    4. Nonrheumatic aortic valve stenosis          DISPOSITION/PLAN   DISPOSITION Decision To Admit 01/20/2023 09:29:50 AM      PATIENT REFERREDTO:  No follow-up provider specified.     DISCHARGE MEDICATIONS:  New Prescriptions    No medications on file       DISCONTINUED MEDICATIONS:  Discontinued Medications    No medications on file              (Please note that portions ofthis note were completed with a voice recognition program.  Efforts were made to edit the dictations but occasionally words are mis-transcribed.)    SmoothCARI Burton CNP (electronically signed)             CARI Longoria CNP  01/20/23 7329

## 2023-01-20 NOTE — ED PROVIDER NOTES
EKG shows paced rhythm at 60. Morphology similar to prior tracing.      Maisie Severe, DO  01/20/23 8311

## 2023-01-20 NOTE — ED NOTES
Medication History  [facility]    Patient Name: Jodie Petty 10/5/1933     Medication history has been completed by: Dami Grimes CPhT    Source(s) of information: Medication list provided by [facility]     Primary Care Physician: Karin Reis MD     Pharmacy:     Allergies as of 01/20/2023 - Fully Reviewed 12/29/2022   Allergen Reaction Noted    Biaxin [clarithromycin]  11/08/2017    Cephalexin  11/08/2017    Viagra [sildenafil citrate] Nausea Only and Other (See Comments) 11/08/2017        Prior to Admission medications    Medication Sig Start Date End Date Taking?  Authorizing Provider   lactulose (CHRONULAC) 10 GM/15ML solution Take 60 mLs by mouth daily   Yes Historical Provider, MD   furosemide (LASIX) 80 MG tablet Take 80 mg by mouth daily   Yes Historical Provider, MD   potassium chloride (KLOR-CON M) 20 MEQ extended release tablet Take 20 mEq by mouth 2 times daily   Yes Historical Provider, MD   simethicone (MYLICON) 80 MG chewable tablet Take 80 mg by mouth 4 times daily   Yes Historical Provider, MD   isosorbide mononitrate (IMDUR) 30 MG extended release tablet Take 1 tablet by mouth daily 12/30/22   Shelle Mortimer, APRN - CNP   bisacodyl (DULCOLAX) 10 MG suppository Place 10 mg rectally daily as needed    Historical Provider, MD   nitrofurantoin (MACRODANTIN) 50 MG capsule Take 50 mg by mouth daily    Historical Provider, MD   Lactobacillus (ACIDOPHILUS/PECTIN PO) Take 1 capsule by mouth daily    Historical Provider, MD   Dextromethorphan-guaiFENesin (GUAIFENESIN DM PO) Take 10 mLs by mouth every 4 hours as needed 100-10 mg/5ml    Historical Provider, MD   aspirin 81 MG EC tablet Take 81 mg by mouth daily    Historical Provider, MD   pantoprazole (PROTONIX) 40 MG tablet Take 40 mg by mouth daily    Historical Provider, MD   Polyvinyl Alcohol-Povidone (REFRESH OP) Apply 1 drop to eye as needed For dry eyes    Historical Provider, MD   acetaminophen (TYLENOL) 325 MG tablet Take 650 mg by mouth every 4 hours as needed for Pain For pain or temp     Historical Provider, MD   metoprolol tartrate (LOPRESSOR) 25 MG tablet Take 12.5 mg by mouth daily Hold if SBP < 110 or HR is < 55 10/4/19   Claudean Hoose, APRN - CNP   finasteride (PROSCAR) 5 MG tablet Take 1 tablet by mouth daily 8/29/19   Romaine Dean DO   amiodarone (CORDARONE) 200 MG tablet Take 200 mg by mouth nightly 5/23/19   CARI Berry CNP   rivastigmine (EXELON) 4.6 MG/24HR Place 1 patch onto the skin daily  3/28/19   Historical Provider, MD   Cholecalciferol (VITAMIN D3) 5000 units TABS Take 5,000 Units by mouth every morning    Historical Provider, MD   Omega-3 Fatty Acids (FISH OIL) 1000 MG CAPS Take 1,000 mg by mouth daily     Historical Provider, MD   Multiple Vitamins-Minerals (THERAPEUTIC MULTIVITAMIN-MINERALS) tablet Take 1 tablet by mouth daily    Historical Provider, MD     Medications added or changed (ex.  new medication, dosage change, interval change, formulation change):  Potassium 20 MEQ BID for therapy 01/19/23-1/22/23 then begin daily per nursing facility  Furosemide dosage change from 20 mg BID to 80 mg daily  Lactulose 60 ml daily (added)  Metoprolol tartrate frequency change from BID to daily (BID ordered)  Simethicone 80 mg chewable 4 times daily    Medications removed from list (include reason, ex. noncompliance, medication cost, therapy complete etc.):   Docusate Not on med list per facility  Polyethylene Not on med list per facility  Spironolactone Not on med list per facility  Paxlovid therapy complete  Macrobid 100 mg therapy complete    Medications requiring reconciliation with provider:    Potassium 20 MEQ BID for therapy 01/19/23-1/22/23 then begin daily per nursing facility  Furosemide dosage change from 20 mg BID to 80 mg daily  Lactulose 60 ml daily (added)  Metoprolol tartrate frequency change from BID to daily (BID ordered)  Simethicone 80 mg chewable 4 times daily    Comments:  Medication list provided by [facility]    To my knowledge the above medication history is accurate as of 1/20/2023 10:31 AM.   Tracie Valdez CPhT   1/20/2023 10:31 AM

## 2023-01-20 NOTE — ED NOTES
Waiting bed     [facility]  [facility]  01/20/23 9707

## 2023-01-20 NOTE — ED NOTES
ED TO INPATIENT SBAR HANDOFF    Patient Name: Lawrence Neumann   :  10/5/1933  80 y.o. MRN:  [id number]  Preferred Name  Gabriel Villela  ED Room #:  ED18/ED-18  Family/Caregiver Present no   Restraints no   Sitter no   Sepsis Risk Score Sepsis Risk Score: 1.29    Situation  Code Status: Prior No additional code details. Allergies: Biaxin [clarithromycin], Cephalexin, and Viagra [sildenafil citrate]  Weight: No data found. Arrived from: Nursing Home  Chief Complaint:   Chief Complaint   Patient presents with    Urinary Retention     Hospital Problem/Diagnosis:  Principal Problem:    Acute respiratory failure ([facility] [address]  Resolved Problems:    * No resolved hospital problems. *    Imaging:   XR CHEST PORTABLE   Final Result   Bilateral interstitial and alveolar opacities are concerning for edema and/or   multifocal infection. Trace bilateral pleural effusions.            Abnormal labs:   Abnormal Labs Reviewed   CBC WITH AUTO DIFFERENTIAL - Abnormal; Notable for the following components:       Result Value    RBC 4.46 (*)     Hemoglobin 12.8 (*)     Hematocrit 40.8 (*)     MCHC 31.4 (*)     Segs Relative 82.7 (*)     Lymphocytes % 5.2 (*)     Monocytes % 11.3 (*)     Immature Neutrophil % 0.5 (*)     All other components within normal limits   COMPREHENSIVE METABOLIC PANEL - Abnormal; Notable for the following components:    Chloride 98 (*)     Est, Glom Filt Rate 58 (*)     Glucose 122 (*)     All other components within normal limits   TROPONIN - Abnormal; Notable for the following components:    Troponin T 0.092 (*)     All other components within normal limits   BRAIN NATRIURETIC PEPTIDE - Abnormal; Notable for the following components:    Pro-BNP 22,975 (*)     All other components within normal limits     Critical values: no     Abnormal Assessment Findings: BNP almost 23,000    Background  History:   Past Medical History:   Diagnosis Date    ACTA2-related familial thoracic aortic aneurysm     Arrhythmia Atrial fibrillation (HCC)     BBBB (bilateral bundle branch block)     Bradycardia     CHF (congestive heart failure) ([facility] [address]     Essential hypertension, malignant     Fall at home     H/O echocardiogram 07/01/2020    EF 50-55%, Mod-Severe AS, Mild PHTN, Aortic Root 3.9cm.(pt had OV after echo)    History of cardioversion 04/17/2018    History of chest x-ray 02/27/2017    Enlargement of the aorta knob which may represent a thoracic aortic aneurysm. Further evaluation w/ a contrast enhanced CT of the chest recommended. no evidence of acute pulmonary process. History of CT scan 02/28/2017    CT Angio Chest: no evidence of pulmonary embolism, ascending thoracic aorta aneurysm measuring 4.8 cm, descending thoracic aoric aneurysm 4.1 cm, bilateral nephrllthiasis, R renal cyst, cholelithiasis, cardiomegaly, low attenuated lesion is noted w/in L lobe of thyroid gland containng Calcification. Recommend thyroid US. History of echocardiogram 03/27/2017    TTE EF 55%, LV Normal Size, borderline LVH concentric, Normal LV systolic function, transmittal doppler flow pttern suggest impaired LV relaxation Mild aortic stenosis, mild aortic regurgitation. History of EKG 02/27/2017    Time 12:03 AM, HR 75, A fib, Axis normal, Intervals normal, P waves normal, St-T Segments: nonspecific ST segment changes to the anterior lateral leads, QRS RBBB,  No significant Q waves, no ectopy. A-fib w/RBBB w/nonspecific ST segment change EKG. History of EKG 02/27/2017    Time 2:58AM: HR 59, NSR, Axis normal, Intervals normal, P waves normal, ST-T seg: nonspecific ST segment changes, QRS RBBB, no significant Q waves, no ectopy. Sinus bradycardia w/ RBBB nonspecific ST Segment changes EKG    History of Holter monitoring 11/08/2017    monitored 48 hours: Patient noted to have multiple PAC and PVCs. Risk of atrial fibrillation present given previous episode Recommend antiarrhythmic therapy.      History of stress test 03/27/2017    NM Stress test: EF 54%, Abnormal study, evidence of mild ischemia in mild inferior regions. post stress LV is enlarged in size. Post-stress LV function is normal.     Hx of echocardiogram 08/05/2021    of 50 %. Dilatation of the aortic root measuring 3.9 cm. Dilatation of the ascending aorta measuring 4.3 cm. Hx of echocardiogram 12/08/2022    Left ventricular function and size is normal, EF is estimated at 55-60%. Moderate left ventricular hypertrophy. No regional wall motion abnormalities were detected. PPM wiring visualized within the right side of the heart. Moderately sclerotic aortic valve with mild aortic stenosis ,mean gradient. of 12mmhg,PRATEEK 1.7cm2. Mitral annular calcification is present. Hx of transesophageal echocardiography (SIMONE) for monitoring 04/17/2018    EF45-50% mild TR, mild-mod MR, mod-severe AS    Hyperlipemia     Hyperlipidemia     Hypertension     Nonspecific abnormal electrocardiogram (ECG) (EKG)     Persistent atrial fibrillation (HCC)     Sick sinus syndrome (HCC)     Vertigo        Assessment    Vitals/MEWS: MEWS Score: 1  Level of Consciousness: Alert (0)   none  FiO2 (%): 2L n/c  O2 Flow Rate: O2 Device: Nasal cannula O2 Flow Rate (L/min): 2 L/min  Cardiac Rhythm: nsr  Pain Assessment: none [] Verbal [] [place] Pleasure Scale  Pain Scale:    Last documented pain score (0-10 scale)    Last documented pain medication administered: oriented and alertconfused Ax  Ox2   Mental Status: confused   NIH Score:    C-SSRS: Risk of Suicide: No Risk  Bedside swallow:    [place] Coma Scale (GCS): [place] Coma Scale  Eye Opening: Spontaneous  Best Verbal Response: Oriented  Best Motor Response: Obeys commands  [place] Coma Scale Score: 15  Active LDA's:   Peripheral IV 01/20/23 Right Antecubital (Active)   Site Assessment Clean, dry & intact 01/20/23 0803   Line Status Blood return noted; Flushed 01/20/23 0803     PO Status: Regular  Pertinent or High Risk Medications/Drips: no   If Yes, please provide details: none  Pending Blood Product Administration: no     You may also review the ED PT Care Timeline found under the Summary Nursing Index tab. Recommendation    Pending orders none  Plan for Discharge (if known):    Additional Comments: none   If any further questions, please call Sending RN at [phone]    Electronically signed by: Electronically signed by Charo Manzanares RN on 1/20/2023 at 9:55 AM      Charo Manzanares RN  01/20/23 5790

## 2023-01-20 NOTE — H&P
V2.0  History and Physical      Name:  Kayleen Aguilar /Age/Sex: 10/5/1933  (80 y.o. male)   MRN & CSN:  [id number] & [id number] Encounter Date/Time: 2023 9:46 AM EST   Location:  ED18/ED-18 PCP: Ruth Reis MD       Hospital Day: 1    Assessment and Plan:   Kayleen Aguilar is a 80 y.o. male with a pmh of congestive heart failure, valvular heart disease, atrial fibrillation, with pacemaker, dementia, BPH who presents with Acute respiratory failure [facility])    Hospital Problems             Last Modified POA    * (Principal) Acute respiratory failure ([facility] [address] 2023 Yes     Acute respiratory failure with hypoxia  Due to acute on chronic diastolic congestive heart failure  -No urine today, leg swelling, hypoxia with O2 sat 88% in room air  -Chest x-ray indicated pulmonary edema versus multifocal pneumonia  -Elevated BNP at 22,000  -Admit to medical surgical floor with telemetry  -IV diuretics Lasix 60 Mg twice daily  -Cardiology consult, appreciate their recommendation  -Echocardiogram in 2022 showed LVEF 55 to 60%  -Defer cardiology to make decision whether repeat echocardiogram    Elevated troponin  -Troponin level 0.092, slightly increased comparing to baseline (0.023-0.053)  -Likely due to demanding  -Serial troponin  -Cardiology consulted    Oliguria  -Reported no urine today, bladder scanner showed 100 ml residual urine in bladder  -close monitor intake and urine output and BMP  -will consider Novak catheter if necessary    CKD stage III  -Creatinine level 1.2, on his baseline  -Keep monitor BMP    Chronic atrial fibrillation  -Continue amiodarone  -Continue beta-blocker  -Is not on AC    History of BPH  History of UTI  -Not able to collect a urine sample in ED  -Still pending UA  -Continue Proscar      Disposition:   Current Living situation: Nursing home  Expected Disposition: Nursing home  Estimated D/C: 2 to 3 days    Diet No diet orders on file   DVT Prophylaxis [x] Lovenox, [] Heparin, [] SCDs, [] Ambulation,  [] Eliquis, [] Xarelto   Code Status Prior   Surrogate Decision Maker/ POA      History from:     electronic medical record    History of Present Illness:     Chief Complaint: No urine  Tierra Woody is a 80 y.o. male from nursing home with pmh of congestive heart failure, atrial fibrillation, valvular heart disease, dementia, BPH, was admitted to hospital for acute on chronic congestive heart failure. Patient called her daughter who is in [place] this morning with a complaining of no urine. Patient was then sent to ER. Patient was found hypoxia with O2 sat 88% in room air. His chest x-ray indicated pulmonary edema VS multifocal pneumonia. Patient does not have a cough. No chills no fever. His creatinine level is normal.  BNP was significantly elevated at 22,000. Troponin was elevated at 0.092. EKG has no ST elevation. Bladder scanner showed residual urine was around 100 mL. Patient could not offer urine sample in ED. Patient denied abdominal pain. No tender in abdomen and back. Patient has 1 pitting edema in bilateral lower extremities. Patient will be admitted to hospital for further evaluation. Per report patient had worsening bilateral leg swelling recently. His PCP in nursing home just increased Lasix to 80 Mg daily. Patient denied dyspnea. Review of Systems: Need 10 Elements   Review of Systems   All other systems reviewed and are negative. Objective:   No intake or output data in the 24 hours ending 01/20/23 1041   Vitals:   Vitals:    01/20/23 0708 01/20/23 0712 01/20/23 0959 01/20/23 1000   BP: 135/63  (!) 142/60 124/72   Pulse: 68   68   Resp: 18   17   Temp: 97.5 °F (36.4 °C)   97.5 °F (36.4 °C)   TempSrc: Oral   Oral   SpO2: (!) 88% 90%         Medications Prior to Admission     Prior to Admission medications    Medication Sig Start Date End Date Taking?  Authorizing Provider   lactulose (CHRONULAC) 10 GM/15ML solution Take 60 mLs by mouth daily   Yes Historical Provider, MD   furosemide (LASIX) 80 MG tablet Take 80 mg by mouth daily   Yes Historical Provider, MD   potassium chloride (KLOR-CON M) 20 MEQ extended release tablet Take 20 mEq by mouth 2 times daily   Yes Historical Provider, MD   simethicone (MYLICON) 80 MG chewable tablet Take 80 mg by mouth 4 times daily   Yes Historical Provider, MD   isosorbide mononitrate (IMDUR) 30 MG extended release tablet Take 1 tablet by mouth daily 22   CARI Meng CNP   bisacodyl (DULCOLAX) 10 MG suppository Place 10 mg rectally daily as needed    Historical Provider, MD   nitrofurantoin (MACRODANTIN) 50 MG capsule Take 50 mg by mouth daily    Historical Provider, MD   Lactobacillus (ACIDOPHILUS/PECTIN PO) Take 1 capsule by mouth daily    Historical Provider, MD   Dextromethorphan-guaiFENesin (GUAIFENESIN DM PO) Take 10 mLs by mouth every 4 hours as needed 100-10 mg/5ml    Historical Provider, MD   aspirin 81 MG EC tablet Take 81 mg by mouth daily    Historical Provider, MD   pantoprazole (PROTONIX) 40 MG tablet Take 40 mg by mouth daily    Historical Provider, MD   Polyvinyl Alcohol-Povidone (REFRESH OP) Apply 1 drop to eye as needed For dry eyes    Historical Provider, MD   acetaminophen (TYLENOL) 325 MG tablet Take 650 mg by mouth every 4 hours as needed for Pain For pain or temp     Historical Provider, MD   metoprolol tartrate (LOPRESSOR) 25 MG tablet Take 0.5 tablets by mouth 2 times daily  Patient taking differently: Take 12.5 mg by mouth daily Hold if SBP < 110 or HR is < 55 10/4/19   CARI Donovan CNP   finasteride (PROSCAR) 5 MG tablet Take 1 tablet by mouth daily 19   Leavy Apgar, DO   amiodarone (CORDARONE) 200 MG tablet Take 1 tablet by mouth daily  Patient taking differently: Take 200 mg by mouth nightly 19   CARI Benjamin CNP   rivastigmine (EXELON) 4.6 MG/24HR Place 1 patch onto the skin daily  3/28/19   Historical Provider, MD Cholecalciferol (VITAMIN D3) 5000 units TABS Take 5,000 Units by mouth every morning    Historical Provider, MD   Omega-3 Fatty Acids (FISH OIL) 1000 MG CAPS Take 1,000 mg by mouth daily     Historical Provider, MD   Multiple Vitamins-Minerals (THERAPEUTIC MULTIVITAMIN-MINERALS) tablet Take 1 tablet by mouth daily    Historical Provider, MD       Physical Exam: Need 8 Elements   Physical Exam  HENT:      Head: Normocephalic. Nose: Nose normal.      Mouth/Throat:      Mouth: Mucous membranes are moist.   Eyes:      Pupils: Pupils are equal, round, and reactive to light. Cardiovascular:      Rate and Rhythm: Normal rate. Heart sounds: Murmur heard. Comments: 3/5 systolic murmur in apical area  Pulmonary:      Effort: Pulmonary effort is normal.   Abdominal:      General: Abdomen is flat. Musculoskeletal:         General: Swelling present. Normal range of motion. Cervical back: Normal range of motion. Comments: 1 PT edema in bilateral lower extremities   Skin:     General: Skin is warm. Capillary Refill: Capillary refill takes less than 2 seconds. Neurological:      General: No focal deficit present. Mental Status: He is alert and oriented to person, place, and time. Psychiatric:         Mood and Affect: Mood normal.          Past Medical History:   PMHx   Past Medical History:   Diagnosis Date    ACTA2-related familial thoracic aortic aneurysm     Arrhythmia     Atrial fibrillation (HCC)     BBBB (bilateral bundle branch block)     Bradycardia     CHF (congestive heart failure) ([facility] [address]     Essential hypertension, malignant     Fall at home     H/O echocardiogram 07/01/2020    EF 50-55%, Mod-Severe AS, Mild PHTN, Aortic Root 3.9cm.(pt had OV after echo)    History of cardioversion 04/17/2018    History of chest x-ray 02/27/2017    Enlargement of the aorta knob which may represent a thoracic aortic aneurysm. Further evaluation w/ a contrast enhanced CT of the chest recommended. no evidence of acute pulmonary process. History of CT scan 02/28/2017    CT Angio Chest: no evidence of pulmonary embolism, ascending thoracic aorta aneurysm measuring 4.8 cm, descending thoracic aoric aneurysm 4.1 cm, bilateral nephrllthiasis, R renal cyst, cholelithiasis, cardiomegaly, low attenuated lesion is noted w/in L lobe of thyroid gland containng Calcification. Recommend thyroid US. History of echocardiogram 03/27/2017    TTE EF 55%, LV Normal Size, borderline LVH concentric, Normal LV systolic function, transmittal doppler flow pttern suggest impaired LV relaxation Mild aortic stenosis, mild aortic regurgitation. History of EKG 02/27/2017    Time 12:03 AM, HR 75, A fib, Axis normal, Intervals normal, P waves normal, St-T Segments: nonspecific ST segment changes to the anterior lateral leads, QRS RBBB,  No significant Q waves, no ectopy. A-fib w/RBBB w/nonspecific ST segment change EKG. History of EKG 02/27/2017    Time 2:58AM: HR 59, NSR, Axis normal, Intervals normal, P waves normal, ST-T seg: nonspecific ST segment changes, QRS RBBB, no significant Q waves, no ectopy. Sinus bradycardia w/ RBBB nonspecific ST Segment changes EKG    History of Holter monitoring 11/08/2017    monitored 48 hours: Patient noted to have multiple PAC and PVCs. Risk of atrial fibrillation present given previous episode Recommend antiarrhythmic therapy. History of stress test 03/27/2017    NM Stress test: EF 54%, Abnormal study, evidence of mild ischemia in mild inferior regions. post stress LV is enlarged in size. Post-stress LV function is normal.     Hx of echocardiogram 08/05/2021    of 50 %. Dilatation of the aortic root measuring 3.9 cm. Dilatation of the ascending aorta measuring 4.3 cm. Hx of echocardiogram 12/08/2022    Left ventricular function and size is normal, EF is estimated at 55-60%. Moderate left ventricular hypertrophy. No regional wall motion abnormalities were detected.  PPM wiring visualized within the right side of the heart. Moderately sclerotic aortic valve with mild aortic stenosis ,mean gradient. of 12mmhg,PRATEEK 1.7cm2. Mitral annular calcification is present. Hx of transesophageal echocardiography (SIMONE) for monitoring 04/17/2018    EF45-50% mild TR, mild-mod MR, mod-severe AS    Hyperlipemia     Hyperlipidemia     Hypertension     Nonspecific abnormal electrocardiogram (ECG) (EKG)     Persistent atrial fibrillation (HCC)     Sick sinus syndrome ([address]     Vertigo      PSHX:  has a past surgical history that includes Pacemaker insertion (Left, 03/29/2018); CYSTOSCOPY INSERTION / REMOVAL STENT / STONE (Left, 5/6/2019); and Upper gastrointestinal endoscopy (N/A, 8/20/2020). Allergies: Allergies   Allergen Reactions    Biaxin [Clarithromycin]     Cephalexin     Viagra [Sildenafil Citrate] Nausea Only and Other (See Comments)     Dizziness and BP dropped     Fam HX: family history is not on file.   Soc HX:   Social History     Socioeconomic History    Marital status:      Spouse name: None    Number of children: None    Years of education: None    Highest education level: None   Tobacco Use    Smoking status: Never    Smokeless tobacco: Never   Vaping Use    Vaping Use: Never used   Substance and Sexual Activity    Alcohol use: Not Currently     Comment: Caffiene - Coffee/day    Drug use: No    Sexual activity: Never       Medications:   Medications:   REM     Infusions: REM  PRN Meds: REM    Labs      CBC:   Recent Labs     01/20/23  0759   WBC 10.4   HGB 12.8*        BMP:    Recent Labs     01/20/23  0759      K 3.5   CL 98*   CO2 30   BUN 16   CREATININE 1.2   GLUCOSE 122*     Hepatic:   Recent Labs     01/20/23  0759   AST 32   ALT 27   BILITOT 0.7   ALKPHOS 74     Lipids:   Lab Results   Component Value Date/Time    CHOL 137 05/24/2019 06:32 AM    HDL 47 05/24/2019 06:32 AM    TRIG 72 05/24/2019 06:32 AM     Hemoglobin A1C: No results found for: LABA1C  TSH: No results found for: TSH  Troponin:   Lab Results   Component Value Date/Time    TROPONINT 0.092 01/20/2023 07:59 AM    TROPONINT 0.044 12/06/2022 05:24 AM    TROPONINT 0.053 12/05/2022 10:28 AM     Lactic Acid: No results for input(s): LACTA in the last 72 hours. BNP:   Recent Labs     01/20/23  0759   PROBNP 22,975*     UA:  Lab Results   Component Value Date/Time    NITRU NEGATIVE 12/05/2022 12:00 PM    COLORU YELLOW 12/05/2022 12:00 PM    [place] 12/05/2022 12:00 PM    RBCUA 14 12/05/2022 12:00 PM    MUCUS FEW 07/27/2022 12:00 PM    TRICHOMONAS NONE SEEN 12/05/2022 12:00 PM    YEAST OCCASIONAL 05/19/2020 12:50 AM    BACTERIA OCCASIONAL 12/05/2022 12:00 PM    CLARITYU CLOUDY 12/05/2022 12:00 PM    SPECGRAV 1.020 12/05/2022 12:00 PM    LEUKOCYTESUR LARGE NUMBER OR AMOUNT OBSERVED 12/05/2022 12:00 PM    UROBILINOGEN 0.2 12/05/2022 12:00 PM    BILIRUBINUR NEGATIVE 12/05/2022 12:00 PM    BLOODU LARGE NUMBER OR AMOUNT OBSERVED 12/05/2022 12:00 PM    [address] NEGATIVE 12/05/2022 12:00 PM     Urine Cultures: No results found for: LABURIN  Blood Cultures: No results found for: BC  No results found for: BLOODCULT2  Organism: No results found for: ORG    Imaging/Diagnostics Last 24 Hours   XR CHEST PORTABLE    Result Date: 1/20/2023  EXAMINATION: ONE XRAY VIEW OF THE CHEST 1/20/2023 7:47 am COMPARISON: 12/05/2022 HISTORY: ORDERING SYSTEM PROVIDED HISTORY: shortness of breath TECHNOLOGIST PROVIDED HISTORY: Reason for exam:->shortness of breath Reason for Exam: SOB FINDINGS: Similar cardiomegaly and bilateral hilar fullness. Left-sided chest cardiac conduction device is again noted. Scattered bilateral interstitial and alveolar opacities are seen. Trace bilateral pleural effusions are suspected. No discrete pneumothorax. Osseous structures are diffusely demineralized and grossly unchanged. Bilateral interstitial and alveolar opacities are concerning for edema and/or multifocal infection.  Trace bilateral pleural effusions.        Personally reviewed Lab Studies, Imaging, and discussed case with dr. Conor Gonzalez    Electronically signed by Joan Castanon CNP on 1/20/2023 at 10:41 AM

## 2023-01-20 NOTE — PROGRESS NOTES
4 Eyes Skin Assessment     NAME:  Renetta Pearson  YOB: 1933  MEDICAL RECORD NUMBER:  [id number]    The patient is being assessed for  Admission    I agree that One RN have performed a thorough Head to Toe Skin Assessment on the patient. ALL assessment sites listed below have been assessed. Areas assessed by both nurses:    Head, Face, Ears, Shoulders, Back, Chest, Arms, Elbows, Hands, Sacrum. Buttock, Coccyx, Ischium, and Legs. Feet and Heels        Does the Patient have a Wound?  Other old scab noted on right outer elbow area        Manpreet Prevention initiated by RN: Yes   Wound Care Orders initiated by RN: No    Pressure Injury (Stage 3,4, Unstageable, DTI, NWPT, and Complex wounds) if present place referral order by RN under : No    New and Established Ostomies, if present place, referral order under : No      Nurse 1 eSignature: Electronically signed by Daniel Shah RN on 1/20/23 at 5:25 PM EST    **SHARE this note so that the co-signing nurse is able to place an eSignature**    Nurse 2 eSignature: Electronically signed by Brandon Thrasher RN on 1/20/23 at 5:49 PM EST

## 2023-01-20 NOTE — ED TRIAGE NOTES
Pt brought to ED via squad. Unsure of actual complaint. Medic advised that it is possible that pt is unable to urinate but also does not need to urinate. Daughter wanted pt brought to ED for eval. Pt is most concerned about the status of his hearing aide working at present time. Pt brought to ED with walker, bilat hearing aides and cellular phone. Looks well, though RA Sp02 remains at 88% - [email or web address] applied and pt advised to breathe through his nose. NAD.

## 2023-01-21 LAB
ANION GAP SERPL CALCULATED.3IONS-SCNC: 17 MMOL/L (ref 4–16)
BUN BLDV-MCNC: 18 MG/DL (ref 6–23)
CALCIUM SERPL-MCNC: 8.3 MG/DL (ref 8.3–10.6)
CHLORIDE BLD-SCNC: 95 MMOL/L (ref 99–110)
CO2: 25 MMOL/L (ref 21–32)
CREAT SERPL-MCNC: 1.3 MG/DL (ref 0.9–1.3)
GFR SERPL CREATININE-BSD FRML MDRD: 53 ML/MIN/1.73M2
GLUCOSE BLD-MCNC: 119 MG/DL (ref 70–99)
MAGNESIUM: 2 MG/DL (ref 1.8–2.4)
POTASSIUM SERPL-SCNC: 3.1 MMOL/L (ref 3.5–5.1)
SODIUM BLD-SCNC: 137 MMOL/L (ref 135–145)
TROPONIN T: 0.12 NG/ML
TSH HIGH SENSITIVITY: 4.4 UIU/ML (ref 0.27–4.2)

## 2023-01-21 PROCEDURE — 84443 ASSAY THYROID STIM HORMONE: CPT

## 2023-01-21 PROCEDURE — 80048 BASIC METABOLIC PNL TOTAL CA: CPT

## 2023-01-21 PROCEDURE — 6370000000 HC RX 637 (ALT 250 FOR IP): Performed by: STUDENT IN AN ORGANIZED HEALTH CARE EDUCATION/TRAINING PROGRAM

## 2023-01-21 PROCEDURE — 84484 ASSAY OF TROPONIN QUANT: CPT

## 2023-01-21 PROCEDURE — 2580000003 HC RX 258: Performed by: STUDENT IN AN ORGANIZED HEALTH CARE EDUCATION/TRAINING PROGRAM

## 2023-01-21 PROCEDURE — 6370000000 HC RX 637 (ALT 250 FOR IP): Performed by: NURSE PRACTITIONER

## 2023-01-21 PROCEDURE — 36415 COLL VENOUS BLD VENIPUNCTURE: CPT

## 2023-01-21 PROCEDURE — 6360000002 HC RX W HCPCS: Performed by: STUDENT IN AN ORGANIZED HEALTH CARE EDUCATION/TRAINING PROGRAM

## 2023-01-21 PROCEDURE — 94761 N-INVAS EAR/PLS OXIMETRY MLT: CPT

## 2023-01-21 PROCEDURE — 83735 ASSAY OF MAGNESIUM: CPT

## 2023-01-21 PROCEDURE — 94664 DEMO&/EVAL PT USE INHALER: CPT

## 2023-01-21 PROCEDURE — 94150 VITAL CAPACITY TEST: CPT

## 2023-01-21 PROCEDURE — 1200000000 HC SEMI PRIVATE

## 2023-01-21 PROCEDURE — APPNB60 APP NON BILLABLE TIME 46-60 MINS: Performed by: NURSE PRACTITIONER

## 2023-01-21 PROCEDURE — 2580000003 HC RX 258: Performed by: NURSE PRACTITIONER

## 2023-01-21 PROCEDURE — 6360000002 HC RX W HCPCS: Performed by: NURSE PRACTITIONER

## 2023-01-21 PROCEDURE — 51798 US URINE CAPACITY MEASURE: CPT

## 2023-01-21 RX ORDER — HYDROXYZINE PAMOATE 25 MG/1
25 CAPSULE ORAL ONCE
Status: COMPLETED | OUTPATIENT
Start: 2023-01-21 | End: 2023-01-21

## 2023-01-21 RX ORDER — TAMSULOSIN HYDROCHLORIDE 0.4 MG/1
0.4 CAPSULE ORAL EVERY EVENING
Status: DISCONTINUED | OUTPATIENT
Start: 2023-01-21 | End: 2023-01-21

## 2023-01-21 RX ORDER — POTASSIUM CHLORIDE 20 MEQ/1
40 TABLET, EXTENDED RELEASE ORAL EVERY 4 HOURS
Status: COMPLETED | OUTPATIENT
Start: 2023-01-21 | End: 2023-01-21

## 2023-01-21 RX ADMIN — FUROSEMIDE 60 MG: 10 INJECTION, SOLUTION INTRAVENOUS at 09:18

## 2023-01-21 RX ADMIN — PANTOPRAZOLE SODIUM 40 MG: 40 TABLET, DELAYED RELEASE ORAL at 09:18

## 2023-01-21 RX ADMIN — ASPIRIN 81 MG: 81 TABLET, COATED ORAL at 09:18

## 2023-01-21 RX ADMIN — POTASSIUM CHLORIDE 40 MEQ: 1500 TABLET, EXTENDED RELEASE ORAL at 13:26

## 2023-01-21 RX ADMIN — ENOXAPARIN SODIUM 40 MG: 100 INJECTION SUBCUTANEOUS at 09:19

## 2023-01-21 RX ADMIN — FUROSEMIDE 60 MG: 10 INJECTION, SOLUTION INTRAVENOUS at 18:12

## 2023-01-21 RX ADMIN — Medication 10 ML: at 20:32

## 2023-01-21 RX ADMIN — POTASSIUM CHLORIDE 40 MEQ: 1500 TABLET, EXTENDED RELEASE ORAL at 09:18

## 2023-01-21 RX ADMIN — FINASTERIDE 5 MG: 5 TABLET, FILM COATED ORAL at 09:18

## 2023-01-21 RX ADMIN — Medication 10 ML: at 09:32

## 2023-01-21 RX ADMIN — AMIODARONE HYDROCHLORIDE 200 MG: 200 TABLET ORAL at 20:30

## 2023-01-21 RX ADMIN — CEFTRIAXONE SODIUM 1000 MG: 1 INJECTION, POWDER, FOR SOLUTION INTRAMUSCULAR; INTRAVENOUS at 11:08

## 2023-01-21 RX ADMIN — METOPROLOL TARTRATE 12.5 MG: 25 TABLET, FILM COATED ORAL at 20:30

## 2023-01-21 RX ADMIN — ACETAMINOPHEN 650 MG: 325 TABLET ORAL at 05:02

## 2023-01-21 RX ADMIN — HYDROXYZINE PAMOATE 25 MG: 25 CAPSULE ORAL at 05:01

## 2023-01-21 RX ADMIN — METOPROLOL TARTRATE 12.5 MG: 25 TABLET, FILM COATED ORAL at 09:18

## 2023-01-21 ASSESSMENT — PAIN SCALES - WONG BAKER
WONGBAKER_NUMERICALRESPONSE: 2
WONGBAKER_NUMERICALRESPONSE: 0

## 2023-01-21 ASSESSMENT — PAIN DESCRIPTION - LOCATION: LOCATION: ABDOMEN

## 2023-01-21 ASSESSMENT — PAIN SCALES - GENERAL
PAINLEVEL_OUTOF10: 3
PAINLEVEL_OUTOF10: 8
PAINLEVEL_OUTOF10: 0

## 2023-01-21 ASSESSMENT — PAIN - FUNCTIONAL ASSESSMENT: PAIN_FUNCTIONAL_ASSESSMENT: ACTIVITIES ARE NOT PREVENTED

## 2023-01-21 ASSESSMENT — PAIN DESCRIPTION - DESCRIPTORS: DESCRIPTORS: ACHING

## 2023-01-21 NOTE — PROGRESS NOTES
Patient has been confused. Nurse reports that he has been confused. He has not had arrhythmia or hypotension overnight. He is VPaced. Plan:   Elevated troponin: in presence of renal dysfunction troponin elevation slight increase correlates with slight increase from creatine. PAF: continue amiodarone 200 mg daily. Not on anticoagulation due to unknown cause of anemia in 2020. Given his confusion I do not think AC should be started right now. K 3.1 this morning. Replacement ordered. BMP in morning. Recommend keeping potassium 4-4.5 and magnesium 2-2.2 in patients with atrial fibrillation.

## 2023-01-21 NOTE — PROGRESS NOTES
Patient up to bathroom every 5 minutes with 50 cc void at a time and other times unable to void. Patient climbing out of bed states going home to wife. Patient redirected states ok and then repeats.

## 2023-01-21 NOTE — PROGRESS NOTES
V2.0  [facility] Hospitalist Progress Note      Name:  Jia Pnito /Age/Sex: 10/5/1933  (80 y.o. male)   MRN & CSN:  [id number] & [id number] Encounter Date/Time: 2023 11:02 AM EST    Location:  [address] [place] [address]-A PCP: Shai Reis MD       Hospital Day: 2      Subjective:     Chief Complaint: Urinary Retention     patient required sitter at bedside due to confusion. Patient has been going to the bathroom very frequently overnight with minimal amount of urine. Total urine output past 24 hours 1500s. Patient seen and examined at bedside. Pt has no new complaints or concerns. Per sitter, SOB noted on ambulation. Otherwise, pt denies lightheadedness, dizziness, fever, night sweats, chills, chest pain, cough, dyspnea, palpitations, abd pain, nausea, vomiting, diarrhea, dysuria. Assessment and Plan:   Acute respiratory failure with hypoxia - improved now on room air  Due to acute on chronic diastolic congestive heart failure  Urine output 1550 cc past 24h. -IV diuretics Lasix 60 Mg twice daily; cardiology managing  -Cardiology consulted, appreciate recs       Elevated troponin   Likely demand type II. No chest pain. Now trending downward. Slightly increased comparing to baseline (0.023-0.053)  -check troponin once  -Cardiology following     UTI. Symptoms of urinary frequency, urgency  UA showed negative nitire but large LE, WBC 2693, many bacteria. Hx of recurrent UTI. Prior culture E. Coli   - start rocephin  - follow up urine culture  - monitor output; post void bladder scan     CKD stage III  Cr level 1.2-1.3, on his baseline  - monitor     Chronic atrial fibrillation  Rate controlled  -Continue amiodarone  -Continue beta-blocker  -Is not on AC due to recurrent falls     History of BPH  -Not able to collect a urine sample in ED  -Continue Proscar      Diet ADULT DIET;  Regular; Low Fat/Low Chol/High Fiber/AXEL   DVT Prophylaxis [x] Lovenox, []  Heparin, [] SCDs, [] Ambulation,  [] Eliquis, [] Xarelto  [] Coumadin   Code Status Full Code   Disposition From: nursing home  Expected Disposition: nursing home  Estimated Date of Discharge: 1-2 days  Patient requires continued admission due to CHF excerebration   Surrogate Decision Maker/ POA      Review of Systems:    Review of Systems    See above    Objective: Intake/Output Summary (Last 24 hours) at 1/21/2023 1109  Last data filed at 1/21/2023 0600  Gross per 24 hour   Intake 240 ml   Output 1550 ml   Net -1310 ml        Vitals:   Vitals:    01/21/23 0915   BP: 129/64   Pulse: 74   Resp: 18   Temp: 98 °F (36.7 °C)   SpO2: 95%       Physical Exam:     General: NAD  Eyes: EOMI  ENT: neck supple  Cardiovascular: Regular rate, murmur  Respiratory: Clear to auscultation  Gastrointestinal: Soft, non tender  Genitourinary: no suprapubic tenderness  Musculoskeletal: 1+ edema, improved  Skin: warm, dry  Neuro: Alert. Psych: Mood appropriate.      Medications:   Medications:    potassium chloride  40 mEq Oral Q4H    cefTRIAXone (ROCEPHIN) IV  1,000 mg IntraVENous Q24H    amiodarone  200 mg Oral Nightly    finasteride  5 mg Oral Daily    metoprolol tartrate  12.5 mg Oral BID    pantoprazole  40 mg Oral Daily    rivastigmine  1 patch TransDERmal Daily    sodium chloride flush  5-40 mL IntraVENous 2 times per day    enoxaparin  40 mg SubCUTAneous Daily    furosemide  60 mg IntraVENous BID    aspirin  81 mg Oral Daily      Infusions:    sodium chloride       PRN Meds: sodium chloride flush, 10 mL, PRN  sodium chloride, , PRN  ondansetron, 4 mg, Q8H PRN   Or  ondansetron, 4 mg, Q6H PRN  polyethylene glycol, 17 g, Daily PRN  nicotine polacrilex, 2 mg, Q1H PRN  acetaminophen, 650 mg, Q6H PRN   Or  acetaminophen, 650 mg, Q6H PRN      Labs      Recent Results (from the past 24 hour(s))   Troponin    Collection Time: 01/20/23  2:44 PM   Result Value Ref Range    Troponin T 0.114 (HH) <0.01 NG/ML   Urinalysis with Reflex to Culture    Collection Time: 01/20/23  4:55 PM Specimen: Urine   Result Value Ref Range    Color, UA YELLOW YELLOW    Clarity, UA CLOUDY (A) CLEAR    Glucose, Urine NEGATIVE NEGATIVE MG/DL    Bilirubin Urine NEGATIVE NEGATIVE MG/DL    Ketones, Urine NEGATIVE NEGATIVE MG/DL    Specific Gravity, UA 1.015 1.001 - 1.035    Blood, Urine LARGE NUMBER OR AMOUNT OBSERVED (A) NEGATIVE    pH, Urine 6.0 5.0 - 8.0    Protein, UA 30 (A) NEGATIVE MG/DL    Urobilinogen, Urine 0.2 0.2 - 1.0 MG/DL    Nitrite Urine, Quantitative NEGATIVE NEGATIVE    Leukocyte Esterase, Urine LARGE NUMBER OR AMOUNT OBSERVED (A) NEGATIVE    RBC,  (H) 0 - 3 /HPF    WBC, UA 2693 (H) 0 - 2 /HPF    WBC Clumps, UA MANY /HPF    Bacteria, UA MANY (A) NEGATIVE /HPF    Trichomonas, UA NONE SEEN NONE SEEN /HPF   Troponin    Collection Time: 01/20/23  5:29 PM   Result Value Ref Range    Troponin T 0.126 (HH) <0.01 NG/ML   Basic Metabolic Panel w/ Reflex to MG    Collection Time: 01/21/23  3:29 AM   Result Value Ref Range    Sodium 137 135 - 145 MMOL/L    Potassium 3.1 (L) 3.5 - 5.1 MMOL/L    Chloride 95 (L) 99 - 110 mMol/L    CO2 25 21 - 32 MMOL/L    Anion Gap 17 (H) 4 - 16    BUN 18 6 - 23 MG/DL    Creatinine 1.3 0.9 - 1.3 MG/DL    Est, Glom Filt Rate 53 (L) >60 mL/min/1.73m2    Glucose 119 (H) 70 - 99 MG/DL    Calcium 8.3 8.3 - 10.6 MG/DL   Magnesium    Collection Time: 01/21/23  3:29 AM   Result Value Ref Range    Magnesium 2.0 1.8 - 2.4 mg/dl   TSH    Collection Time: 01/21/23  3:29 AM   Result Value Ref Range    TSH, High Sensitivity 4.400 (H) 0.270 - 4.20 uIu/ml   Troponin    Collection Time: 01/21/23  8:56 AM   Result Value Ref Range    Troponin T 0.119 (HH) <0.01 NG/ML        Imaging/Diagnostics Last 24 Hours   XR CHEST PORTABLE    Result Date: 1/20/2023  EXAMINATION: ONE XRAY VIEW OF THE CHEST 1/20/2023 7:47 am COMPARISON: 12/05/2022 HISTORY: ORDERING SYSTEM PROVIDED HISTORY: shortness of breath TECHNOLOGIST PROVIDED HISTORY: Reason for exam:->shortness of breath Reason for Exam: SOB FINDINGS: Similar cardiomegaly and bilateral hilar fullness. Left-sided chest cardiac conduction device is again noted. Scattered bilateral interstitial and alveolar opacities are seen. Trace bilateral pleural effusions are suspected. No discrete pneumothorax. Osseous structures are diffusely demineralized and grossly unchanged. Bilateral interstitial and alveolar opacities are concerning for edema and/or multifocal infection. Trace bilateral pleural effusions.        Electronically signed by Claudeen Brill, MD on 1/21/2023 at 11:09 AM

## 2023-01-21 NOTE — PROGRESS NOTES
Pt POA asking this RN to have KUB xray done. This RN PS Dr. Corrina Dickinson for second time.  Replied saying no indication.

## 2023-01-21 NOTE — PROGRESS NOTES
2040  This nurse told by nolvia RN pt does not tolerate monet s well as told by daughter PAUL. Debra NP notified and updated. External monet placed on pt and in bed quiet. Awaiting response.

## 2023-01-21 NOTE — CONSULTS
CARDIOLOGY CONSULT NOTE   Reason for consultation:  TRoponin elevation /SOB    Referring physician:  Velasquez Reza MD     Primary care physician: Pieter Schirmer To, MD      Dear  Dr. Velasquez Reza MD   Thanks for the consult. Chief Complaints :  Chief Complaint   Patient presents with    Urinary Retention        History of present illness:Yohan is a 80 y. o.year old who presents with [facility] complaining of decreased urination at home although he is quite confused and very poor historian he keeps telling asking me to find out \"when his staff will be returned\"  According to charts and records it appears that he was noted to have reduced urine output and since came in for evaluation his lives in an assisted living facility. During work-up in the emergency department noted to have abnormal troponin level is elevated BNP level and abnormal chest x-ray concerning for decompensated heart failure. Denying any chest pain  He is known to cardiology service and sees Dr. Leavy Lesches test last month showed inferior defect infarct?   He has history of congestive heart failure and has had several admissions for CHF with preserved EF  With history of pacemaker atrial fibrillation not on anticoagulation but on chronic amiodarone therapy he also has mild to moderate aortic and mitral valve regurgitation  He was noted to be hypoxic with saturation in 80s    Past medical history:    has a past medical history of ACTA2-related familial thoracic aortic aneurysm, Arrhythmia, Atrial fibrillation (HCC), BBBB (bilateral bundle branch block), Bradycardia, CHF (congestive heart failure) ([address] Essential hypertension, malignant, Fall at home, H/O echocardiogram, History of cardioversion, History of chest x-ray, History of CT scan, History of echocardiogram, History of EKG, History of EKG, History of Holter monitoring, History of stress test, Hx of echocardiogram, Hx of echocardiogram, Hx of transesophageal echocardiography (SIMONE) for monitoring, Hyperlipemia, Hyperlipidemia, Hypertension, Nonspecific abnormal electrocardiogram (ECG) (EKG), Persistent atrial fibrillation ([address] Sick sinus syndrome ([address] and Vertigo. 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); CYSTOSCOPY INSERTION / REMOVAL STENT / STONE (Left, 5/6/2019); and Upper gastrointestinal endoscopy (N/A, 8/20/2020). Social History:   reports that he has never smoked. He has never used smokeless tobacco. He reports that he does not currently use alcohol. He reports that he does not use drugs.   Family history:   no family history of CAD, STROKE of DM at early age    Allergies   Allergen Reactions    Biaxin [Clarithromycin]     Cephalexin     Viagra [Sildenafil Citrate] Nausea Only and Other (See Comments)     Dizziness and BP dropped       amiodarone (CORDARONE) tablet 200 mg, Nightly  finasteride (PROSCAR) tablet 5 mg, Daily  metoprolol tartrate (LOPRESSOR) tablet 12.5 mg, BID  pantoprazole (PROTONIX) tablet 40 mg, Daily  rivastigmine (EXELON) 4.6 MG/24HR 1 patch, Daily  sodium chloride flush 0.9 % injection 5-40 mL, 2 times per day  sodium chloride flush 0.9 % injection 10 mL, PRN  0.9 % sodium chloride infusion, PRN  enoxaparin (LOVENOX) injection 40 mg, Daily  ondansetron (ZOFRAN-ODT) disintegrating tablet 4 mg, Q8H PRN   Or  ondansetron (ZOFRAN) injection 4 mg, Q6H PRN  polyethylene glycol (GLYCOLAX) packet 17 g, Daily PRN  nicotine polacrilex (COMMIT) lozenge 2 mg, Q1H PRN  acetaminophen (TYLENOL) tablet 650 mg, Q6H PRN   Or  acetaminophen (TYLENOL) suppository 650 mg, Q6H PRN  furosemide (LASIX) injection 60 mg, BID  [START ON 1/21/2023] aspirin EC tablet 81 mg, Daily      Current Facility-Administered Medications   Medication Dose Route Frequency Provider Last Rate Last Admin    amiodarone (CORDARONE) tablet 200 mg  200 mg Oral Nightly Dimple CARI Escalera CNP   200 mg at 01/20/23 2021    finasteride (PROSCAR) tablet 5 mg  5 mg Oral Daily Berkey Tibbie, APRN - CNP   5 mg at 01/20/23 1531    metoprolol tartrate (LOPRESSOR) tablet 12.5 mg  12.5 mg Oral BID Berkeyda Farber, APRN - CNP   12.5 mg at 01/20/23 2021    pantoprazole (PROTONIX) tablet 40 mg  40 mg Oral Daily Zenda Farber, APRN - CNP   40 mg at 01/20/23 1532    rivastigmine (EXELON) 4.6 MG/24HR 1 patch  1 patch TransDERmal Daily Zenda Farber, CARI - CNP   1 patch at 01/20/23 1535    sodium chloride flush 0.9 % injection 5-40 mL  5-40 mL IntraVENous 2 times per day Berkey Farber, APRN - CNP   10 mL at 01/20/23 2028    sodium chloride flush 0.9 % injection 10 mL  10 mL IntraVENous PRN Berkey Farber, APRN - CNP        0.9 % sodium chloride infusion   IntraVENous PRN Berkey Farber, APRN - CNP        enoxaparin (LOVENOX) injection 40 mg  40 mg SubCUTAneous Daily Berkeyda Farber, APRN - VANCE        ondansetron (ZOFRAN-ODT) disintegrating tablet 4 mg  4 mg Oral Q8H PRN Berkey Farber, CARI - VANCE        Or    ondansetron (ZOFRAN) injection 4 mg  4 mg IntraVENous Q6H PRN Berkey Farber, APRN - VANCE        polyethylene glycol (GLYCOLAX) packet 17 g  17 g Oral Daily PRN Berkeyda Farber, APRN - CNP        nicotine polacrilex (COMMIT) lozenge 2 mg  2 mg Oral Q1H PRN Berkey Farber, APRN - CNP        acetaminophen (TYLENOL) tablet 650 mg  650 mg Oral Q6H PRN Berkey Farber, APRN - CNP        Or    acetaminophen (TYLENOL) suppository 650 mg  650 mg Rectal Q6H PRN Berkey Farber, APRN - CNP        furosemide (LASIX) injection 60 mg  60 mg IntraVENous BID Berkey Farber, APRN - CNP   60 mg at 01/20/23 1905    [START ON 1/21/2023] aspirin EC tablet 81 mg  81 mg Oral Daily Berkey Farber, APRN - CNP         Review of Systems:   Constitutional: No Fever or Weight Loss   Eyes: No Decreased Vision  ENT: No Headaches, Hearing Loss or Vertigo  Cardiovascular: As per HPI  Respiratory: As per HPI  Gastrointestinal: No abdominal pain, appetite loss, blood in stools, constipation, diarrhea or heartburn  Genitourinary: No dysuria, trouble voiding, or hematuria  Musculoskeletal:  No gait disturbance, weakness or joint complaints  Integumentary: No rash or pruritis  Neurological: No TIA or stroke symptoms  Psychiatric: No anxiety or depression  Endocrine: No malaise, fatigue or temperature intolerance  Hematologic/Lymphatic: No bleeding problems, blood clots or swollen lymph nodes  Allergic/Immunologic: No nasal congestion or hives  All systems negative except as marked. Physical Examination:    Vitals:    01/20/23 1141 01/20/23 1533 01/20/23 1905 01/20/23 2021   BP: 139/74 (!) 134/57 (!) 140/74 130/60   Pulse: 69 60  60   Resp: 18 23 18   Temp: 97.4 °F (36.3 °C) 97.6 °F (36.4 °C)  98.1 °F (36.7 °C)   TempSrc: Oral Oral  Oral   SpO2: 91% 96%  95%       General Appearance:  No distress, conversant  Pleasantly confused    Constitutional:  Well developed, Well nourished, No acute distress, Non-toxic appearance. HENT:  Normocephalic, Atraumatic, Bilateral external ears normal, Oropharynx moist, No oral exudates, Nose normal. Neck- Normal range of motion, No tenderness, Supple, No stridor,no apical-carotid delay  Lymphatics : no palpable lymph nodes  Eyes:  PERRL, EOMI, Conjunctiva normal, No discharge. Respiratory:  Normal breath sounds, No respiratory distress, No wheezing, No chest tenderness. ,no use of accessory muscles, crackles Present  Cardiovascular: (PMI) apex non displaced,no lifts no thrills, ankle swelling Present , 1+, s1 and s2 audible,Murmur. Absent , JVD not noted    Abdomen /GI:  Bowel sounds normal, Soft, No tenderness, No masses, No gross visceromegaly   :  No costovertebral angle tenderness   Musculoskeletal:  No edema, no tenderness, no deformities.  Back- no tenderness  Integument:  Well hydrated, no rash   Lymphatic:  No lymphadenopathy noted   Neurologic:  Alert & oriented x 3, CN 2-12 normal, normal motor function, normal sensory function, no focal deficits noted           Medical decision making and Data review:    Lab Review   Recent Labs     01/20/23  0759   WBC 10.4   HGB 12.8*   HCT 40.8*   PLT 260      Recent Labs     01/20/23  0759      K 3.5   CL 98*   CO2 30   BUN 16   CREATININE 1.2     Recent Labs     01/20/23  0759   AST 32   ALT 27   BILITOT 0.7   ALKPHOS 74     Recent Labs     01/20/23  0759 01/20/23  1444 01/20/23  1729   TROPONINT 0.092* 0.114* 0.126*       Recent Labs     01/20/23  0759   PROBNP 22,975*     Lab Results   Component Value Date    INR 1.21 08/19/2020    PROTIME 14.7 (H) 08/19/2020       EKG: (reviewed by myself)    ECHO:(reviewed by myself)    Chest Xray:(reviewed by myself)  XR CHEST PORTABLE    Result Date: 1/20/2023  EXAMINATION: ONE XRAY VIEW OF THE CHEST 1/20/2023 7:47 am COMPARISON: 12/05/2022 HISTORY: ORDERING SYSTEM PROVIDED HISTORY: shortness of breath TECHNOLOGIST PROVIDED HISTORY: Reason for exam:->shortness of breath Reason for Exam: SOB FINDINGS: Similar cardiomegaly and bilateral hilar fullness. Left-sided chest cardiac conduction device is again noted. Scattered bilateral interstitial and alveolar opacities are seen. Trace bilateral pleural effusions are suspected. No discrete pneumothorax. Osseous structures are diffusely demineralized and grossly unchanged. Bilateral interstitial and alveolar opacities are concerning for edema and/or multifocal infection. Trace bilateral pleural effusions. All labs, medications and tests reviewed by myself including data  from outside source , patient and available family . Continue all other medications of all above medical condition listed as is. Impression:  Principal Problem:    Acute respiratory failure (HCC)  Active Problems:    Persistent atrial fibrillation (HCC)    Troponin I above reference range    Chronic diastolic congestive heart failure (HCC)    Dementia without behavioral disturbance (HCC)  Resolved Problems:    * No resolved hospital problems. *      Assessment: 80 y. o.year old with PMH of  has a past medical history of ACTA2-related familial thoracic aortic aneurysm, Arrhythmia, Atrial fibrillation ([address] BBBB (bilateral bundle branch block), Bradycardia, CHF (congestive heart failure) ([address] Essential hypertension, malignant, Fall at home, H/O echocardiogram, History of cardioversion, History of chest x-ray, History of CT scan, History of echocardiogram, History of EKG, History of EKG, History of Holter monitoring, History of stress test, Hx of echocardiogram, Hx of echocardiogram, Hx of transesophageal echocardiography (SIMONE) for monitoring, Hyperlipemia, Hyperlipidemia, Hypertension, Nonspecific abnormal electrocardiogram (ECG) (EKG), Persistent atrial fibrillation ([address] Sick sinus syndrome ([address] and Vertigo. Plan and Recommendations:    Elevated Troponin : Check serial troponin if they are not rising we will continue medical management for now. If troponin are rising , patient may need further invasive / non invasive work up . Continue Aspirin   CHF: Heart failure with preserved EF acute Diastolic decompensated heart failure. Appears to be volume overloaded . Agree with diuresis. We can try IV Lasix 60 mg BID. Depending on response we can titrate diuretics accordingly. Strict Is and Os and Daily weights. chf nurse consult  HTN: stable, continue present medications   Atrial Fibrillation: Currently on amiodarone check TSH also not on anticoagulation due to fall risk? Anemia  history? DVT prophylaxis if no contraindication  6. Dyslipidemia: continue statins           Thank you  much for consult and giving us the opportunity in contributing in the care of this patient. Please feel free to call me for any questions.        Akua Scott MD, 1/20/2023 9:13 PM

## 2023-01-21 NOTE — PROGRESS NOTES
Spoke with POA, was updated of patient having history of UTI in December along with kidney stones. POA requesting the urology be consulted if need to do a monet.

## 2023-01-21 NOTE — PLAN OF CARE
Problem: Discharge Planning  Goal: Discharge to home or other facility with appropriate resources  Outcome: Progressing     Problem: Chronic Conditions and Co-morbidities  Goal: Patient's chronic conditions and co-morbidity symptoms are monitored and maintained or improved  Outcome: Progressing     Problem: Confusion  Goal: Confusion, delirium, dementia, or psychosis is improved or at baseline  Description: INTERVENTIONS:  1. Assess for possible contributors to thought disturbance, including medications, impaired vision or hearing, underlying metabolic abnormalities, dehydration, psychiatric diagnoses, and notify attending LIP  2. [place] high risk fall precautions, as indicated  3. Provide frequent short contacts to provide reality reorientation, refocusing and direction  4. Decrease environmental stimuli, including noise as appropriate  5. Monitor and intervene to maintain adequate nutrition, hydration, elimination, sleep and activity  6. If unable to ensure safety without constant attention obtain sitter and review sitter guidelines with assigned personnel  7. Initiate Psychosocial CNS and Spiritual Care consult, as indicated  Outcome: Progressing     Problem: Skin/Tissue Integrity  Goal: Absence of new skin breakdown  Description: 1. Monitor for areas of redness and/or skin breakdown  2. Assess vascular access sites hourly  3. Every 4-6 hours minimum:  Change oxygen saturation probe site  4. Every 4-6 hours:  If on nasal continuous positive airway pressure, respiratory therapy assess nares and determine need for appliance change or resting period.   Outcome: Progressing

## 2023-01-21 NOTE — PLAN OF CARE
Problem: Discharge Planning  Goal: Discharge to home or other facility with appropriate resources  Outcome: Progressing     Problem: Chronic Conditions and Co-morbidities  Goal: Patient's chronic conditions and co-morbidity symptoms are monitored and maintained or improved  Outcome: Progressing     Problem: Confusion  Goal: Confusion, delirium, dementia, or psychosis is improved or at baseline  Description: INTERVENTIONS:  1. Assess for possible contributors to thought disturbance, including medications, impaired vision or hearing, underlying metabolic abnormalities, dehydration, psychiatric diagnoses, and notify attending LIP  2. [place] high risk fall precautions, as indicated  3. Provide frequent short contacts to provide reality reorientation, refocusing and direction  4. Decrease environmental stimuli, including noise as appropriate  5. Monitor and intervene to maintain adequate nutrition, hydration, elimination, sleep and activity  6. If unable to ensure safety without constant attention obtain sitter and review sitter guidelines with assigned personnel  7.  Initiate Psychosocial CNS and Spiritual Care consult, as indicated  Outcome: Progressing

## 2023-01-21 NOTE — PROGRESS NOTES
Cardiology Progress Note     Admit Date:  1/20/2023    Consult reason/ Seen today for :       Subjective and  Overnight Events : Pleasantly confused troponin trending down now denies any chest pain  Overnight requiring one-to-one sitter having reduced urine output    Chief complain on admission : 80 y. o.year old who is admitted for  Chief Complaint   Patient presents with    Urinary Retention      Assessment / Plan:  Elevated Troponin : Continue conservative management continue Aspirin   CHF: Heart failure with preserved EF acute Diastolic decompensated heart failure. Appears to be volume overloaded . Agree with diuresis. We can try IV Lasix 60 mg BID. Depending on response we can titrate diuretics accordingly. Strict Is and Os and Daily weights. chf nurse consult  HTN: stable, continue present medications   Atrial Fibrillation: Currently on amiodarone check TSH also not on anticoagulation due to fall risk? Anemia  history? DVT prophylaxis if no contraindication  6. Dyslipidemia: continue statins     Past medical history:    has a past medical history of ACTA2-related familial thoracic aortic aneurysm, Arrhythmia, Atrial fibrillation (HCC), BBBB (bilateral bundle branch block), Bradycardia, CHF (congestive heart failure) ([address] Essential hypertension, malignant, Fall at home, H/O echocardiogram, History of cardioversion, History of chest x-ray, History of CT scan, History of echocardiogram, History of EKG, History of EKG, History of Holter monitoring, History of stress test, Hx of echocardiogram, Hx of echocardiogram, Hx of transesophageal echocardiography (SIMONE) for monitoring, Hyperlipemia, Hyperlipidemia, Hypertension, Nonspecific abnormal electrocardiogram (ECG) (EKG), Persistent atrial fibrillation ([address] Sick sinus syndrome ([address] and Vertigo.   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); CYSTOSCOPY INSERTION / REMOVAL STENT / STONE (Left, 5/6/2019); and Upper gastrointestinal endoscopy (N/A, 8/20/2020). Social History:   reports that he has never smoked. He has never used smokeless tobacco. He reports that he does not currently use alcohol. He reports that he does not use drugs. Family history:  family history is not on file. Allergies   Allergen Reactions    Biaxin [Clarithromycin]     Cephalexin     Viagra [Sildenafil Citrate] Nausea Only and Other (See Comments)     Dizziness and BP dropped       Review of Systems:    All 14 systems were reviewed and are negative  Except for the positive findings  which as documented     /88   Pulse 64   Temp 98 °F (36.7 °C) (Oral)   Resp 19   Wt 159 lb 6.3 oz (72.3 kg)   SpO2 94%   BMI 24.24 kg/m²     Intake/Output Summary (Last 24 hours) at 1/21/2023 1528  Last data filed at 1/21/2023 1305  Gross per 24 hour   Intake 480 ml   Output 1550 ml   Net -1070 ml     Physical Exam:  Constitutional:  Well developed, Well nourished, No acute distress, Non-toxic appearance. HENT:  Normocephalic, Atraumatic, Bilateral external ears normal, Oropharynx moist, No oral exudates, Nose normal. Neck- Normal range of motion, No tenderness, Supple, No stridor. Eyes:  PERRL, EOMI, Conjunctiva normal, No discharge. Respiratory:  Normal breath sounds, No respiratory distress, No wheezing, No chest tenderness. Cardiovascular:  Normal heart rate, Normal rhythm, No murmurs, No rubs, No gallops, JVP not elevated  Abdomen/GI:  Bowel sounds normal, Soft, No tenderness, No masses, No pulsatile masses. Musculoskeletal:  Intact distal pulses, No edema, No tenderness, No cyanosis, No clubbing. Good range of motion in all major joints. No tenderness to palpation or major deformities noted. Back- No tenderness. Integument:  Warm, Dry, No erythema, No rash. Lymphatic:  No lymphadenopathy noted.    Neurologic:  Alert & oriented x 3, Normal motor function, Normal sensory function, No focal deficits noted. Psychiatric:  Affect  and  Mood :no change    Medications:    cefTRIAXone (ROCEPHIN) IV  1,000 mg IntraVENous Q24H    amiodarone  200 mg Oral Nightly    finasteride  5 mg Oral Daily    metoprolol tartrate  12.5 mg Oral BID    pantoprazole  40 mg Oral Daily    rivastigmine  1 patch TransDERmal Daily    sodium chloride flush  5-40 mL IntraVENous 2 times per day    enoxaparin  40 mg SubCUTAneous Daily    furosemide  60 mg IntraVENous BID    aspirin  81 mg Oral Daily      sodium chloride       sodium chloride flush, sodium chloride, ondansetron **OR** ondansetron, polyethylene glycol, nicotine polacrilex, acetaminophen **OR** acetaminophen    Lab Data:  CBC:   Recent Labs     01/20/23  0759   WBC 10.4   HGB 12.8*   HCT 40.8*   MCV 91.5        BMP:   Recent Labs     01/20/23  0759 01/21/23  0329    137   K 3.5 3.1*   CL 98* 95*   CO2 30 25   BUN 16 18   CREATININE 1.2 1.3     PT/INR: No results for input(s): PROTIME, INR in the last 72 hours. BNP:    Recent Labs     01/20/23  0759   PROBNP 22,975*     TROPONIN:   Recent Labs     01/20/23  1444 01/20/23  1729 01/21/23  0856   TROPONINT 0.114* 0.126* 0.119*        ECHO :   echocardiogram    Assessment:  80 y. o.year old who is admitted for  Chief Complaint   Patient presents with    Urinary Retention    , active issues as noted below:  Impression:  Principal Problem:    Acute respiratory failure ([facility]  Active Problems:    Persistent atrial fibrillation (HCC)    Troponin I above reference range    Chronic diastolic congestive heart failure ([address]    Dementia without behavioral disturbance ([facility]  Resolved Problems:    * No resolved hospital problems. *            All labs, medications and tests reviewed by myself , continue all other medications of all above medical condition listed as is except for changes mentioned above.     Thank you very much for consult , please call with questions.     Tona Torres MD, MD 1/21/2023 3:28 PM

## 2023-01-22 ENCOUNTER — APPOINTMENT (OUTPATIENT)
Dept: ULTRASOUND IMAGING | Age: 88
DRG: 280 | End: 2023-01-22
Payer: MEDICARE

## 2023-01-22 ENCOUNTER — APPOINTMENT (OUTPATIENT)
Dept: GENERAL RADIOLOGY | Age: 88
DRG: 280 | End: 2023-01-22
Payer: MEDICARE

## 2023-01-22 LAB
ANION GAP SERPL CALCULATED.3IONS-SCNC: 13 MMOL/L (ref 4–16)
BASOPHILS ABSOLUTE: 0 K/CU MM
BASOPHILS RELATIVE PERCENT: 0.1 % (ref 0–1)
BUN BLDV-MCNC: 31 MG/DL (ref 6–23)
CALCIUM SERPL-MCNC: 9.5 MG/DL (ref 8.3–10.6)
CHLORIDE BLD-SCNC: 99 MMOL/L (ref 99–110)
CO2: 31 MMOL/L (ref 21–32)
CREAT SERPL-MCNC: 1.6 MG/DL (ref 0.9–1.3)
DIFFERENTIAL TYPE: ABNORMAL
EOSINOPHILS ABSOLUTE: 0 K/CU MM
EOSINOPHILS RELATIVE PERCENT: 0.1 % (ref 0–3)
GFR SERPL CREATININE-BSD FRML MDRD: 41 ML/MIN/1.73M2
GLUCOSE BLD-MCNC: 127 MG/DL (ref 70–99)
HCT VFR BLD CALC: 40.8 % (ref 42–52)
HEMOGLOBIN: 12.6 GM/DL (ref 13.5–18)
IMMATURE NEUTROPHIL %: 0.4 % (ref 0–0.43)
LYMPHOCYTES ABSOLUTE: 1 K/CU MM
LYMPHOCYTES RELATIVE PERCENT: 6.7 % (ref 24–44)
MAGNESIUM: 2.2 MG/DL (ref 1.8–2.4)
MCH RBC QN AUTO: 29 PG (ref 27–31)
MCHC RBC AUTO-ENTMCNC: 30.9 % (ref 32–36)
MCV RBC AUTO: 94 FL (ref 78–100)
MONOCYTES ABSOLUTE: 1.6 K/CU MM
MONOCYTES RELATIVE PERCENT: 11.1 % (ref 0–4)
NUCLEATED RBC %: 0 %
PDW BLD-RTO: 15 % (ref 11.7–14.9)
PLATELET # BLD: 308 K/CU MM (ref 140–440)
PMV BLD AUTO: 10.6 FL (ref 7.5–11.1)
POTASSIUM SERPL-SCNC: 3.6 MMOL/L (ref 3.5–5.1)
PROCALCITONIN: 0.18
RBC # BLD: 4.34 M/CU MM (ref 4.6–6.2)
SEGMENTED NEUTROPHILS ABSOLUTE COUNT: 11.7 K/CU MM
SEGMENTED NEUTROPHILS RELATIVE PERCENT: 81.6 % (ref 36–66)
SODIUM BLD-SCNC: 143 MMOL/L (ref 135–145)
TOTAL IMMATURE NEUTOROPHIL: 0.06 K/CU MM
TOTAL NUCLEATED RBC: 0 K/CU MM
WBC # BLD: 14.3 K/CU MM (ref 4–10.5)

## 2023-01-22 PROCEDURE — 99233 SBSQ HOSP IP/OBS HIGH 50: CPT | Performed by: INTERNAL MEDICINE

## 2023-01-22 PROCEDURE — 84145 PROCALCITONIN (PCT): CPT

## 2023-01-22 PROCEDURE — 6370000000 HC RX 637 (ALT 250 FOR IP): Performed by: STUDENT IN AN ORGANIZED HEALTH CARE EDUCATION/TRAINING PROGRAM

## 2023-01-22 PROCEDURE — APPNB60 APP NON BILLABLE TIME 46-60 MINS: Performed by: NURSE PRACTITIONER

## 2023-01-22 PROCEDURE — 85025 COMPLETE CBC W/AUTO DIFF WBC: CPT

## 2023-01-22 PROCEDURE — 71045 X-RAY EXAM CHEST 1 VIEW: CPT

## 2023-01-22 PROCEDURE — 2580000003 HC RX 258: Performed by: NURSE PRACTITIONER

## 2023-01-22 PROCEDURE — 76775 US EXAM ABDO BACK WALL LIM: CPT

## 2023-01-22 PROCEDURE — 6360000002 HC RX W HCPCS: Performed by: NURSE PRACTITIONER

## 2023-01-22 PROCEDURE — 2580000003 HC RX 258: Performed by: STUDENT IN AN ORGANIZED HEALTH CARE EDUCATION/TRAINING PROGRAM

## 2023-01-22 PROCEDURE — 36415 COLL VENOUS BLD VENIPUNCTURE: CPT

## 2023-01-22 PROCEDURE — 80048 BASIC METABOLIC PNL TOTAL CA: CPT

## 2023-01-22 PROCEDURE — 83735 ASSAY OF MAGNESIUM: CPT

## 2023-01-22 PROCEDURE — 51798 US URINE CAPACITY MEASURE: CPT

## 2023-01-22 PROCEDURE — 1200000000 HC SEMI PRIVATE

## 2023-01-22 PROCEDURE — 6360000002 HC RX W HCPCS: Performed by: STUDENT IN AN ORGANIZED HEALTH CARE EDUCATION/TRAINING PROGRAM

## 2023-01-22 PROCEDURE — 6370000000 HC RX 637 (ALT 250 FOR IP): Performed by: NURSE PRACTITIONER

## 2023-01-22 RX ORDER — LANOLIN ALCOHOL/MO/W.PET/CERES
3 CREAM (GRAM) TOPICAL NIGHTLY PRN
Status: DISCONTINUED | OUTPATIENT
Start: 2023-01-22 | End: 2023-01-23 | Stop reason: HOSPADM

## 2023-01-22 RX ORDER — POTASSIUM CHLORIDE 20 MEQ/1
20 TABLET, EXTENDED RELEASE ORAL ONCE
Status: COMPLETED | OUTPATIENT
Start: 2023-01-22 | End: 2023-01-22

## 2023-01-22 RX ORDER — SPIRONOLACTONE 25 MG/1
25 TABLET ORAL DAILY
Status: DISCONTINUED | OUTPATIENT
Start: 2023-01-23 | End: 2023-01-23 | Stop reason: HOSPADM

## 2023-01-22 RX ORDER — FUROSEMIDE 10 MG/ML
20 INJECTION INTRAMUSCULAR; INTRAVENOUS 2 TIMES DAILY
Status: DISCONTINUED | OUTPATIENT
Start: 2023-01-23 | End: 2023-01-23

## 2023-01-22 RX ADMIN — ENOXAPARIN SODIUM 40 MG: 100 INJECTION SUBCUTANEOUS at 09:26

## 2023-01-22 RX ADMIN — ASPIRIN 81 MG: 81 TABLET, COATED ORAL at 09:26

## 2023-01-22 RX ADMIN — ACETAMINOPHEN 650 MG: 325 TABLET ORAL at 21:19

## 2023-01-22 RX ADMIN — FUROSEMIDE 60 MG: 10 INJECTION, SOLUTION INTRAVENOUS at 09:26

## 2023-01-22 RX ADMIN — FINASTERIDE 5 MG: 5 TABLET, FILM COATED ORAL at 09:26

## 2023-01-22 RX ADMIN — Medication 3 MG: at 21:19

## 2023-01-22 RX ADMIN — METOPROLOL TARTRATE 12.5 MG: 25 TABLET, FILM COATED ORAL at 09:26

## 2023-01-22 RX ADMIN — Medication 5 ML: at 21:19

## 2023-01-22 RX ADMIN — CEFTRIAXONE SODIUM 1000 MG: 1 INJECTION, POWDER, FOR SOLUTION INTRAMUSCULAR; INTRAVENOUS at 09:33

## 2023-01-22 RX ADMIN — Medication 10 ML: at 09:28

## 2023-01-22 RX ADMIN — PANTOPRAZOLE SODIUM 40 MG: 40 TABLET, DELAYED RELEASE ORAL at 09:25

## 2023-01-22 RX ADMIN — METOPROLOL TARTRATE 12.5 MG: 25 TABLET, FILM COATED ORAL at 21:19

## 2023-01-22 RX ADMIN — POTASSIUM CHLORIDE 20 MEQ: 1500 TABLET, EXTENDED RELEASE ORAL at 14:09

## 2023-01-22 RX ADMIN — AMIODARONE HYDROCHLORIDE 200 MG: 200 TABLET ORAL at 21:19

## 2023-01-22 NOTE — PLAN OF CARE
Problem: Discharge Planning  Goal: Discharge to home or other facility with appropriate resources  Outcome: Progressing     Problem: Chronic Conditions and Co-morbidities  Goal: Patient's chronic conditions and co-morbidity symptoms are monitored and maintained or improved  Outcome: Progressing     Problem: Confusion  Goal: Confusion, delirium, dementia, or psychosis is improved or at baseline  Description: INTERVENTIONS:  1. Assess for possible contributors to thought disturbance, including medications, impaired vision or hearing, underlying metabolic abnormalities, dehydration, psychiatric diagnoses, and notify attending LIP  2. [place] high risk fall precautions, as indicated  3. Provide frequent short contacts to provide reality reorientation, refocusing and direction  4. Decrease environmental stimuli, including noise as appropriate  5. Monitor and intervene to maintain adequate nutrition, hydration, elimination, sleep and activity  6. If unable to ensure safety without constant attention obtain sitter and review sitter guidelines with assigned personnel  7. Initiate Psychosocial CNS and Spiritual Care consult, as indicated  Outcome: Progressing     Problem: Skin/Tissue Integrity  Goal: Absence of new skin breakdown  Description: 1. Monitor for areas of redness and/or skin breakdown  2. Assess vascular access sites hourly  3. Every 4-6 hours minimum:  Change oxygen saturation probe site  4. Every 4-6 hours:  If on nasal continuous positive airway pressure, respiratory therapy assess nares and determine need for appliance change or resting period.   Outcome: Progressing

## 2023-01-22 NOTE — CONSULTS
Department of Urology   Consult Note  [facility] 1 2 3 4 5      Date: 2023   Patient: Lawrence Neumann   : 10/5/1933   DOA: 2023   MRN: [id number]   ROOM#: [id number]     Reason for Consult:  urinary frequency  Requesting Practitioner:  [facility]    CHIEF COMPLAINT:  SOB    History Obtained From:  family member - daughter by phone, electronic medical record    HISTORY OF PRESENT ILLNESS:                The patient is a 80 y.o. male with significant past medical history of renal stones/recurrent uti's who presented with acute respiratory failure to [facility] 23 and was admitted to  for diuresis. H/o a fib. H/o BPH (Proscar). Last seen in office by Dr. Elizabet Delatorre 23 at which time cystoscopy with SHA was suggested w/o f/u appt kept. Chronically on Lasix 80 mg po daily. Past Medical History:        Diagnosis Date    ACTA2-related familial thoracic aortic aneurysm     Arrhythmia     Atrial fibrillation (HCC)     BBBB (bilateral bundle branch block)     Bradycardia     CHF (congestive heart failure) ([facility]     Essential hypertension, malignant     Fall at home     H/O echocardiogram 2020    EF 50-55%, Mod-Severe AS, Mild PHTN, Aortic Root 3.9cm.(pt had OV after echo)    History of cardioversion 2018    History of chest x-ray 2017    Enlargement of the aorta knob which may represent a thoracic aortic aneurysm. Further evaluation w/ a contrast enhanced CT of the chest recommended. no evidence of acute pulmonary process. History of CT scan 2017    CT Angio Chest: no evidence of pulmonary embolism, ascending thoracic aorta aneurysm measuring 4.8 cm, descending thoracic aoric aneurysm 4.1 cm, bilateral nephrllthiasis, R renal cyst, cholelithiasis, cardiomegaly, low attenuated lesion is noted w/in L lobe of thyroid gland containng Calcification. Recommend thyroid US.      History of echocardiogram 2017    TTE EF 55%, LV Normal Size, borderline LVH concentric, Normal LV systolic function, transmittal doppler flow pttern suggest impaired LV relaxation Mild aortic stenosis, mild aortic regurgitation. History of EKG 02/27/2017    Time 12:03 AM, HR 75, A fib, Axis normal, Intervals normal, P waves normal, St-T Segments: nonspecific ST segment changes to the anterior lateral leads, QRS RBBB,  No significant Q waves, no ectopy. A-fib w/RBBB w/nonspecific ST segment change EKG. History of EKG 02/27/2017    Time 2:58AM: HR 59, NSR, Axis normal, Intervals normal, P waves normal, ST-T seg: nonspecific ST segment changes, QRS RBBB, no significant Q waves, no ectopy. Sinus bradycardia w/ RBBB nonspecific ST Segment changes EKG    History of Holter monitoring 11/08/2017    monitored 48 hours: Patient noted to have multiple PAC and PVCs. Risk of atrial fibrillation present given previous episode Recommend antiarrhythmic therapy. History of stress test 03/27/2017    NM Stress test: EF 54%, Abnormal study, evidence of mild ischemia in mild inferior regions. post stress LV is enlarged in size. Post-stress LV function is normal.     Hx of echocardiogram 08/05/2021    of 50 %. Dilatation of the aortic root measuring 3.9 cm. Dilatation of the ascending aorta measuring 4.3 cm. Hx of echocardiogram 12/08/2022    Left ventricular function and size is normal, EF is estimated at 55-60%. Moderate left ventricular hypertrophy. No regional wall motion abnormalities were detected. PPM wiring visualized within the right side of the heart. Moderately sclerotic aortic valve with mild aortic stenosis ,mean gradient. of 12mmhg,PRATEEK 1.7cm2. Mitral annular calcification is present.     Hx of transesophageal echocardiography (SIMONE) for monitoring 04/17/2018    EF45-50% mild TR, mild-mod MR, mod-severe AS    Hyperlipemia     Hyperlipidemia     Hypertension     Nonspecific abnormal electrocardiogram (ECG) (EKG)     Persistent atrial fibrillation (HCC)     Sick sinus syndrome (HCC)     Vertigo      Past Surgical History:        Procedure Laterality Date    CYSTOSCOPY INSERTION / REMOVAL STENT / STONE Left 5/6/2019    CYSTOSCOPY RETROGRADE PYELOGRAM STENT INSERTION, DIAGNOSTIC CYSTOSCOPY performed by Pollo Alcaraz MD at [address] [facility] Left 03/29/2018    Dual Chamber [employer] [place] XT DR AMI Kincaid    UPPER GASTROINTESTINAL ENDOSCOPY N/A 8/20/2020    EGD DIAGNOSTIC ONLY performed by Rudy Casiano MD at [facility] ENDOSCOPY     Current Medications:   Current Facility-Administered Medications: melatonin tablet 3 mg, 3 mg, Oral, Nightly PRN  [START ON 1/23/2023] spironolactone (ALDACTONE) tablet 25 mg, 25 mg, Oral, Daily  [START ON 1/23/2023] furosemide (LASIX) injection 20 mg, 20 mg, IntraVENous, BID  cefTRIAXone (ROCEPHIN) 1,000 mg in dextrose 5 % 50 mL IVPB mini-bag, 1,000 mg, IntraVENous, Q24H  amiodarone (CORDARONE) tablet 200 mg, 200 mg, Oral, Nightly  finasteride (PROSCAR) tablet 5 mg, 5 mg, Oral, Daily  metoprolol tartrate (LOPRESSOR) tablet 12.5 mg, 12.5 mg, Oral, BID  pantoprazole (PROTONIX) tablet 40 mg, 40 mg, Oral, Daily  rivastigmine (EXELON) 4.6 MG/24HR 1 patch, 1 patch, TransDERmal, Daily  sodium chloride flush 0.9 % injection 5-40 mL, 5-40 mL, IntraVENous, 2 times per day  sodium chloride flush 0.9 % injection 10 mL, 10 mL, IntraVENous, PRN  0.9 % sodium chloride infusion, , IntraVENous, PRN  enoxaparin (LOVENOX) injection 40 mg, 40 mg, SubCUTAneous, Daily  ondansetron (ZOFRAN-ODT) disintegrating tablet 4 mg, 4 mg, Oral, Q8H PRN **OR** ondansetron (ZOFRAN) injection 4 mg, 4 mg, IntraVENous, Q6H PRN  polyethylene glycol (GLYCOLAX) packet 17 g, 17 g, Oral, Daily PRN  nicotine polacrilex (COMMIT) lozenge 2 mg, 2 mg, Oral, Q1H PRN  acetaminophen (TYLENOL) tablet 650 mg, 650 mg, Oral, Q6H PRN **OR** acetaminophen (TYLENOL) suppository 650 mg, 650 mg, Rectal, Q6H PRN  aspirin EC tablet 81 mg, 81 mg, Oral, Daily    Allergies:  Biaxin [clarithromycin], Cephalexin, and Viagra [sildenafil citrate]    Social History:   TOBACCO:   reports that he has never smoked. He has never used smokeless tobacco.  ETOH:   reports that he does not currently use alcohol. DRUGS:   reports no history of drug use. Family History:     History reviewed. No pertinent family history. REVIEW OF SYSTEMS:    Review of systems not obtained due to patient factors - mental status    PHYSICAL EXAM:    VITALS:  BP (!) 126/51   Pulse 63   Temp 98 °F (36.7 °C) (Oral)   Resp 17   Wt 160 lb 11.5 oz (72.9 kg)   SpO2 94%   BMI 24.44 kg/m²     TEMPERATURE:  Current - Temp: 98 °F (36.7 °C); Max - Temp  Av.6 °F (36.4 °C)  Min: 97.1 °F (36.2 °C)  Max: 98 °F (36.7 °C)  24HR BLOOD PRESSURE RANGE:  Systolic (11ZXX), EXK:918 , Min:126 , YIS:972   ; Diastolic (69DZP), AAA:59, Min:51, Max:65        CONSTITUTIONAL:  awake, alert, cooperative, no apparent distress, and appears stated age  EYES:  Lids and lashes normal, pupils equal, round  NECK:  supple, symmetrical, trachea midline and skin normal  BACK:  Symmetric, no curvature, spinous processes are non-tender on palpation, paraspinous muscles are non-tender on palpation, no costal vertebral tenderness  LUNGS:  No increased work of breathing  ABDOMEN:  No scars, normal bowel sounds, soft, non-distended, non-tender, no masses palpated, no hepatosplenomegally.   GENITAL/URINARY:  phallus meatus non-circumcised, right and left testis normal, right and left epididymis and vas deferens normal, scrotal skin normal    Data:    WBC:    Lab Results   Component Value Date/Time    WBC 10.4 2023 07:59 AM     Hemoglobin/Hematocrit:    Lab Results   Component Value Date/Time    HGB 12.8 2023 07:59 AM    HCT 40.8 2023 07:59 AM     BMP:    Lab Results   Component Value Date/Time     2023 03:49 AM    K 3.6 2023 03:49 AM    CL 99 2023 03:49 AM    CO2 31 2023 03:49 AM    BUN 31 2023 03:49 AM    LABALBU 3.5 2023 07:59 AM    CREATININE 1.6 01/22/2023 03:49 AM    CALCIUM 9.5 01/22/2023 03:49 AM    GFRAA 41 08/02/2022 06:53 AM    LABGLOM 41 01/22/2023 03:49 AM     PT/INR:    Lab Results   Component Value Date/Time    PROTIME 14.7 08/19/2020 01:27 AM    INR 1.21 08/19/2020 01:27 AM       PSA 0.4 8/19    Imaging:    US RETROPERITONEAL LIMITED    Result Date: 1/22/2023  EXAMINATION: ULTRASOUND OF THE KIDNEYS 1/22/2023 12:28 pm COMPARISON: None HISTORY: ORDERING SYSTEM PROVIDED HISTORY: hx of kidney stones, last U/S showed 1.1 cm kidney stones, need to rule out worsening of kidney stones, and hydronephrosis TECHNOLOGIST PROVIDED HISTORY: Reason for exam:->hx of kidney stones, last U/S showed 1.1 cm kidney stones, need to rule out worsening of kidney stones, and hydronephrosis FINDINGS: The right kidney measures 11 cm in length and the left kidney measures 10.2 cm in length. Simple cystic lesion is identified within the right kidney measuring 7.3 cm. No further follow-up is required. Benign 4.8 cm cyst is identified within the left kidney. Bilateral renal cortical atrophy is appreciated. Small left renal calculus is noted. There is no evidence for hydronephrosis. Cholelithiasis is appreciated. Stable small left renal calculus. No evidence for hydronephrosis. Cholelithiasis. Benign right and left renal cyst.       Impression: 81 yo male on Lasix with h/o renal stones (non-obs stone on SHA today) and recurrent UTI's admitted 1/20/23 with acute respiratory failure. Urine cx 1/20/23 with E coli UTI (sens pending). Currently afebrile on IV Rocephin with good UOP.  cc today by bladder scan. Recommendation: increased urinary frequency could be associated with infections, confusion, Lasix & combo of all of these. Address UTI. Avoid anti-cholinergics in light of increased risk of confusion. Outpatient cystoscopy. Seeing Dr. Fercho Vaughn early next month per the pt's daughter. Scheduled to see Dr. Chuy George Tuesday.   Try to avoid catheter placement (at risk for pulling it out traumatically)    Will follow, thanks. PAUL Harding (daughter) [phone] contacted by phone. Patient seen and examined, chart reviewed.      Devin Phillips MD     Electronically signed at 1/22/2023

## 2023-01-22 NOTE — PLAN OF CARE
Problem: Discharge Planning  Goal: Discharge to home or other facility with appropriate resources  1/21/2023 2033 by Herbert Jones RN  Outcome: Progressing  1/21/2023 1042 by Maryjane Thomas RN  Outcome: Progressing     Problem: Chronic Conditions and Co-morbidities  Goal: Patient's chronic conditions and co-morbidity symptoms are monitored and maintained or improved  1/21/2023 2033 by Herbert Jones RN  Outcome: Progressing  1/21/2023 1042 by Maryjane Thomas RN  Outcome: Progressing     Problem: Confusion  Goal: Confusion, delirium, dementia, or psychosis is improved or at baseline  Description: INTERVENTIONS:  1. Assess for possible contributors to thought disturbance, including medications, impaired vision or hearing, underlying metabolic abnormalities, dehydration, psychiatric diagnoses, and notify attending LIP  2. [place] high risk fall precautions, as indicated  3. Provide frequent short contacts to provide reality reorientation, refocusing and direction  4. Decrease environmental stimuli, including noise as appropriate  5. Monitor and intervene to maintain adequate nutrition, hydration, elimination, sleep and activity  6. If unable to ensure safety without constant attention obtain sitter and review sitter guidelines with assigned personnel  7. Initiate Psychosocial CNS and Spiritual Care consult, as indicated  1/21/2023 2033 by Herbert Jones RN  Outcome: Progressing  1/21/2023 1042 by Maryjane Thomas RN  Outcome: Progressing     Problem: Skin/Tissue Integrity  Goal: Absence of new skin breakdown  Description: 1. Monitor for areas of redness and/or skin breakdown  2. Assess vascular access sites hourly  3. Every 4-6 hours minimum:  Change oxygen saturation probe site  4. Every 4-6 hours:  If on nasal continuous positive airway pressure, respiratory therapy assess nares and determine need for appliance change or resting period.   1/21/2023 2033 by Herbert Jones RN  Outcome: Progressing  1/21/2023 1042 by Wood Zaldivar, RN  Outcome: Progressing

## 2023-01-22 NOTE — PROGRESS NOTES
Patient is confused. Staff report that he will not sleep. He closes his eyes for a few min <5 then he is anxious to try to get back to his wife at [facility] dementia unit. His speech is off and he falls a asleep during the conversation and then wakes up. Plan:   Elevated troponin: in presence of renal dysfunction troponin elevation slight increase correlates with slight increase from creatine. PAF: continue amiodarone 200 mg daily. Not on anticoagulation due to unknown cause of anemia in 2020. Given his confusion I do not think AC should be started right now. K 3.6 this morning. Replacement ordered. BMP in morning. Recommend keeping potassium 4-4.5 and magnesium 2-2.2 in patients with atrial fibrillation. TSH is 4.4-1/21/2023  CHF: appears improved. Reduce lasix to 20 mg IVP BID and restart aldactone 25 mg daily. Creatine bump is due to diuresis and potassium is depleting from loop diuretic. Continue metoprolol 12.5 mg BID.

## 2023-01-22 NOTE — PROGRESS NOTES
V2.0  [facility] [place]      Name:  Mery Stahl /Age/Sex: 10/5/1933  (80 y.o. male)   MRN & CSN:  [id number] & [id number] Encounter Date/Time: 2023 11:02 AM EST    Location:  [address] [place] [address]-A PCP: Shelli Reis MD       Hospital Day: 3      Subjective:     Chief Complaint: Urinary Retention      Patient has been still going to the bathroom very frequently throughout yesterday. Pt not getting any sleep. Urine output not recorded on Epic. Patient seen and examined at bedside. Pt has no new complaints or concerns. Called daughter POA and gave her update. Apparently, pt has been struggling with urinary frequency and urgency for sometime. He follows with urology. Flomax was tried in the past but stopped due to hypotension and dizziness. Pt was being considered for a urologic procedure outpatient to help with his urinary symptoms. Pt had renal U/S in 2022 which showed 1.1 cm right kidney stone and cysts in both kidneys. Otherwise, pt denies lightheadedness, dizziness, fever, night sweats, chills, chest pain, cough, dyspnea, palpitations, abd pain, nausea, vomiting, diarrhea, dysuria. Assessment and Plan:   Acute respiratory failure with hypoxia - improved now on room air  Due to acute on chronic diastolic congestive heart failure  Urine output not recorded past 24h. -IV diuretics Lasix 60 Mg twice daily; hold for now, will discuss with cardiology   -Cardiology consulted, appreciate recs       Elevated troponin, hx of CAD  Likely demand type II. No chest pain. Now trending downward. Slightly increased comparing to baseline (0.023-0.053)  Stress test 2022 showed severely reduced inferior wall perfusion, hypokinesis  - per cardiology, continue medical management for now ASA, statin  -Cardiology following     UTI. Symptoms of urinary frequency, urgency although chronic per daughter  UA showed negative nitire but large LE, WBC 2693, many bacteria. Hx of recurrent UTI.  Prior culture E. Coli   - continue rocephin  - follow up urine culture  - monitor output; post void bladder scan     acute metabolic encephalopathy. Could be 2/2 hospital delirium, lack of sleep, infection?  - will try melatonin QHS to help with sleep  - Redirect/reortient/reassure frequently. - Sleep hygiene with limited daytime sleeping.   - Avoid narcotics, benzodiazepines and anticholinergic medications. SABIHA on CKD stage III  Cr 1.6 today, baseline 1.2-1.3. Could be 2/2 diuretics but BP has been stable. Hx of kidney stones, U/S 9/2022 showed 1.2 cm right kidney stone  - hold lasix for now as above  - will get renal U/S  - avoid nephrotoxins     Chronic atrial fibrillation  Rate controlled  -Continue amiodarone  -Continue beta-blocker  -Is not on AC due to recurrent falls, confusion     History of [address] urology. Not on flomax due to hx of confusion, dizziness and hypotension.   -Continue Proscar      Diet ADULT DIET; Regular; Low Fat/Low Chol/High Fiber/AXEL   DVT Prophylaxis [x] Lovenox, []  Heparin, [] SCDs, [] Ambulation,  [] Eliquis, [] Xarelto  [] Coumadin   Code Status Full Code   Disposition From: nursing home  Expected Disposition: nursing home  Estimated Date of Discharge: today vs tomorrow  Patient requires continued admission due to CHF excerebration, SABIHA   Surrogate Decision Maker/ POA      Review of Systems:    Review of Systems    See above    Objective:      Intake/Output Summary (Last 24 hours) at 1/22/2023 1101  Last data filed at 1/22/2023 0203  Gross per 24 hour   Intake 480 ml   Output 100 ml   Net 380 ml        Vitals:   Vitals:    01/22/23 0915   BP: (!) 130/51   Pulse: 62   Resp: 16   Temp: 97.6 °F (36.4 °C)   SpO2: 95%       Physical Exam:     General: NAD  Eyes: EOMI  ENT: neck supple  Cardiovascular: Regular rate, murmur  Respiratory: Clear to auscultation  Gastrointestinal: Soft, non tender  Genitourinary: no suprapubic tenderness  Musculoskeletal: 1+ edema, much improved  Skin: warm, dry  Neuro: Alert. Psych: Mood appropriate. Medications:   Medications:    potassium chloride  20 mEq Oral Once    cefTRIAXone (ROCEPHIN) IV  1,000 mg IntraVENous Q24H    amiodarone  200 mg Oral Nightly    finasteride  5 mg Oral Daily    metoprolol tartrate  12.5 mg Oral BID    pantoprazole  40 mg Oral Daily    rivastigmine  1 patch TransDERmal Daily    sodium chloride flush  5-40 mL IntraVENous 2 times per day    enoxaparin  40 mg SubCUTAneous Daily    [Held by provider] furosemide  60 mg IntraVENous BID    aspirin  81 mg Oral Daily      Infusions:    sodium chloride       PRN Meds: melatonin, 3 mg, Nightly PRN  sodium chloride flush, 10 mL, PRN  sodium chloride, , PRN  ondansetron, 4 mg, Q8H PRN   Or  ondansetron, 4 mg, Q6H PRN  polyethylene glycol, 17 g, Daily PRN  nicotine polacrilex, 2 mg, Q1H PRN  acetaminophen, 650 mg, Q6H PRN   Or  acetaminophen, 650 mg, Q6H PRN      Labs      Recent Results (from the past 24 hour(s))   Basic Metabolic Panel w/ Reflex to MG    Collection Time: 01/22/23  3:49 AM   Result Value Ref Range    Sodium 143 135 - 145 MMOL/L    Potassium 3.6 3.5 - 5.1 MMOL/L    Chloride 99 99 - 110 mMol/L    CO2 31 21 - 32 MMOL/L    Anion Gap 13 4 - 16    BUN 31 (H) 6 - 23 MG/DL    Creatinine 1.6 (H) 0.9 - 1.3 MG/DL    Est, Glom Filt Rate 41 (L) >60 mL/min/1.73m2    Glucose 127 (H) 70 - 99 MG/DL    Calcium 9.5 8.3 - 10.6 MG/DL        Imaging/Diagnostics Last 24 Hours   XR CHEST PORTABLE    Result Date: 1/20/2023  EXAMINATION: ONE XRAY VIEW OF THE CHEST 1/20/2023 7:47 am COMPARISON: 12/05/2022 HISTORY: ORDERING SYSTEM PROVIDED HISTORY: shortness of breath TECHNOLOGIST PROVIDED HISTORY: Reason for exam:->shortness of breath Reason for Exam: SOB FINDINGS: Similar cardiomegaly and bilateral hilar fullness. Left-sided chest cardiac conduction device is again noted. Scattered bilateral interstitial and alveolar opacities are seen. Trace bilateral pleural effusions are suspected.   No discrete pneumothorax. Osseous structures are diffusely demineralized and grossly unchanged. Bilateral interstitial and alveolar opacities are concerning for edema and/or multifocal infection. Trace bilateral pleural effusions.        Electronically signed by Delroy Bravo MD on 1/22/2023 at 11:01 AM

## 2023-01-23 VITALS
WEIGHT: 160.72 LBS | RESPIRATION RATE: 20 BRPM | TEMPERATURE: 97.6 F | BODY MASS INDEX: 24.44 KG/M2 | SYSTOLIC BLOOD PRESSURE: 122 MMHG | DIASTOLIC BLOOD PRESSURE: 67 MMHG | HEART RATE: 71 BPM | OXYGEN SATURATION: 95 %

## 2023-01-23 LAB
ANION GAP SERPL CALCULATED.3IONS-SCNC: 16 MMOL/L (ref 4–16)
BASOPHILS ABSOLUTE: 0 K/CU MM
BASOPHILS RELATIVE PERCENT: 0.1 % (ref 0–1)
BUN BLDV-MCNC: 43 MG/DL (ref 6–23)
CALCIUM SERPL-MCNC: 9.4 MG/DL (ref 8.3–10.6)
CHLORIDE BLD-SCNC: 98 MMOL/L (ref 99–110)
CO2: 28 MMOL/L (ref 21–32)
CREAT SERPL-MCNC: 1.8 MG/DL (ref 0.9–1.3)
CULTURE: ABNORMAL
CULTURE: ABNORMAL
DIFFERENTIAL TYPE: ABNORMAL
EOSINOPHILS ABSOLUTE: 0 K/CU MM
EOSINOPHILS RELATIVE PERCENT: 0.1 % (ref 0–3)
GFR SERPL CREATININE-BSD FRML MDRD: 36 ML/MIN/1.73M2
GLUCOSE BLD-MCNC: 99 MG/DL (ref 70–99)
HCT VFR BLD CALC: 41 % (ref 42–52)
HEMOGLOBIN: 12.6 GM/DL (ref 13.5–18)
IMMATURE NEUTROPHIL %: 0.8 % (ref 0–0.43)
LYMPHOCYTES ABSOLUTE: 1.1 K/CU MM
LYMPHOCYTES RELATIVE PERCENT: 7.4 % (ref 24–44)
Lab: ABNORMAL
MCH RBC QN AUTO: 28.6 PG (ref 27–31)
MCHC RBC AUTO-ENTMCNC: 30.7 % (ref 32–36)
MCV RBC AUTO: 93.2 FL (ref 78–100)
MONOCYTES ABSOLUTE: 1.7 K/CU MM
MONOCYTES RELATIVE PERCENT: 10.8 % (ref 0–4)
PDW BLD-RTO: 15.2 % (ref 11.7–14.9)
PLATELET # BLD: 327 K/CU MM (ref 140–440)
PMV BLD AUTO: 10.6 FL (ref 7.5–11.1)
POTASSIUM SERPL-SCNC: 3.7 MMOL/L (ref 3.5–5.1)
RBC # BLD: 4.4 M/CU MM (ref 4.6–6.2)
SARS-COV-2, NAAT: NOT DETECTED
SEGMENTED NEUTROPHILS ABSOLUTE COUNT: 12.4 K/CU MM
SEGMENTED NEUTROPHILS RELATIVE PERCENT: 80.8 % (ref 36–66)
SODIUM BLD-SCNC: 142 MMOL/L (ref 135–145)
SOURCE: NORMAL
SPECIMEN: ABNORMAL
TOTAL IMMATURE NEUTOROPHIL: 0.12 K/CU MM
WBC # BLD: 15.4 K/CU MM (ref 4–10.5)

## 2023-01-23 PROCEDURE — 99233 SBSQ HOSP IP/OBS HIGH 50: CPT | Performed by: INTERNAL MEDICINE

## 2023-01-23 PROCEDURE — 94150 VITAL CAPACITY TEST: CPT

## 2023-01-23 PROCEDURE — 51798 US URINE CAPACITY MEASURE: CPT

## 2023-01-23 PROCEDURE — 2580000003 HC RX 258: Performed by: NURSE PRACTITIONER

## 2023-01-23 PROCEDURE — 6370000000 HC RX 637 (ALT 250 FOR IP): Performed by: NURSE PRACTITIONER

## 2023-01-23 PROCEDURE — 85025 COMPLETE CBC W/AUTO DIFF WBC: CPT

## 2023-01-23 PROCEDURE — 92610 EVALUATE SWALLOWING FUNCTION: CPT

## 2023-01-23 PROCEDURE — 87635 SARS-COV-2 COVID-19 AMP PRB: CPT

## 2023-01-23 PROCEDURE — 6360000002 HC RX W HCPCS: Performed by: STUDENT IN AN ORGANIZED HEALTH CARE EDUCATION/TRAINING PROGRAM

## 2023-01-23 PROCEDURE — 6360000002 HC RX W HCPCS: Performed by: NURSE PRACTITIONER

## 2023-01-23 PROCEDURE — 2580000003 HC RX 258: Performed by: STUDENT IN AN ORGANIZED HEALTH CARE EDUCATION/TRAINING PROGRAM

## 2023-01-23 PROCEDURE — 6370000000 HC RX 637 (ALT 250 FOR IP): Performed by: STUDENT IN AN ORGANIZED HEALTH CARE EDUCATION/TRAINING PROGRAM

## 2023-01-23 PROCEDURE — 94761 N-INVAS EAR/PLS OXIMETRY MLT: CPT

## 2023-01-23 PROCEDURE — 80048 BASIC METABOLIC PNL TOTAL CA: CPT

## 2023-01-23 RX ORDER — FUROSEMIDE 40 MG/1
40 TABLET ORAL DAILY
Status: DISCONTINUED | OUTPATIENT
Start: 2023-01-24 | End: 2023-01-23

## 2023-01-23 RX ORDER — FUROSEMIDE 40 MG/1
40 TABLET ORAL 2 TIMES DAILY
Status: DISCONTINUED | OUTPATIENT
Start: 2023-01-23 | End: 2023-01-23 | Stop reason: HOSPADM

## 2023-01-23 RX ORDER — FUROSEMIDE 40 MG/1
40 TABLET ORAL 2 TIMES DAILY
Qty: 60 TABLET | Refills: 3 | Status: SHIPPED | OUTPATIENT
Start: 2023-01-23 | End: 2023-02-06

## 2023-01-23 RX ORDER — ENOXAPARIN SODIUM 100 MG/ML
30 INJECTION SUBCUTANEOUS DAILY
Status: DISCONTINUED | OUTPATIENT
Start: 2023-01-24 | End: 2023-01-23 | Stop reason: HOSPADM

## 2023-01-23 RX ORDER — CEFDINIR 300 MG/1
300 CAPSULE ORAL DAILY
Qty: 7 CAPSULE | Refills: 0 | Status: SHIPPED | OUTPATIENT
Start: 2023-01-23 | End: 2023-01-30

## 2023-01-23 RX ORDER — LANOLIN ALCOHOL/MO/W.PET/CERES
3 CREAM (GRAM) TOPICAL NIGHTLY PRN
Qty: 30 TABLET | Refills: 3 | Status: SHIPPED | OUTPATIENT
Start: 2023-01-23 | End: 2023-02-22

## 2023-01-23 RX ORDER — CEFDINIR 300 MG/1
300 CAPSULE ORAL 2 TIMES DAILY
Qty: 14 CAPSULE | Refills: 0 | Status: SHIPPED | OUTPATIENT
Start: 2023-01-23 | End: 2023-01-23 | Stop reason: SDUPTHER

## 2023-01-23 RX ADMIN — Medication 10 ML: at 08:24

## 2023-01-23 RX ADMIN — FINASTERIDE 5 MG: 5 TABLET, FILM COATED ORAL at 08:21

## 2023-01-23 RX ADMIN — ENOXAPARIN SODIUM 40 MG: 100 INJECTION SUBCUTANEOUS at 08:22

## 2023-01-23 RX ADMIN — ASPIRIN 81 MG: 81 TABLET, COATED ORAL at 08:20

## 2023-01-23 RX ADMIN — FUROSEMIDE 20 MG: 10 INJECTION, SOLUTION INTRAMUSCULAR; INTRAVENOUS at 08:21

## 2023-01-23 RX ADMIN — METOPROLOL TARTRATE 12.5 MG: 25 TABLET, FILM COATED ORAL at 08:20

## 2023-01-23 RX ADMIN — CEFTRIAXONE SODIUM 1000 MG: 1 INJECTION, POWDER, FOR SOLUTION INTRAMUSCULAR; INTRAVENOUS at 11:25

## 2023-01-23 RX ADMIN — PANTOPRAZOLE SODIUM 40 MG: 40 TABLET, DELAYED RELEASE ORAL at 08:20

## 2023-01-23 RX ADMIN — SPIRONOLACTONE 25 MG: 25 TABLET ORAL at 08:21

## 2023-01-23 RX ADMIN — FUROSEMIDE 40 MG: 40 TABLET ORAL at 17:21

## 2023-01-23 NOTE — PROGRESS NOTES
[facility]   Progress Note  [facility] 0 1 2      Date: 2023   Patient: Jia Pinto   : 10/5/1933   DOA: 2023   MRN: [id number]   ROOM#: [id number]     Admit Date: 2023     Collaborating Urologist on Call at time of admission: Dr. Heather Abdul  CC: SOB  Reason for Consult:  urinary frequency    Subjective:     Pain: none, no nausea and vomiting,   Bowel Movement/Flatus:   Yes  Voiding:  easily,     Pt resting in bedside couch, states he is feeling well but tired.     Objective:    Vitals:    /67   Pulse 71   Temp 97.6 °F (36.4 °C) (Axillary)   Resp 20   Wt 160 lb 11.5 oz (72.9 kg)   SpO2 95%   BMI 24.44 kg/m²    Temp  Av.8 °F (36.6 °C)  Min: 97.6 °F (36.4 °C)  Max: 98 °F (36.7 °C)     Intake/Output Summary (Last 24 hours) at 2023 1012  Last data filed at 2023 0859  Gross per 24 hour   Intake 480 ml   Output --   Net 480 ml       Physical Exam:   General appearance: alert, appears stated age, cooperative, fatigued, no distress, and confused  Head: Normocephalic, without obvious abnormality, atraumatic  Back:  No CVA tenderness  Abdomen:  Soft, non-tender, non-distended    Labs:   WBC:    Lab Results   Component Value Date/Time    WBC 15.4 2023 01:01 AM      Hemoglobin/Hematocrit:    Lab Results   Component Value Date/Time    HGB 12.6 2023 01:01 AM    HCT 41.0 2023 01:01 AM      BMP:   Lab Results   Component Value Date/Time     2023 01:01 AM    K 3.7 2023 01:01 AM    CL 98 2023 01:01 AM    CO2 28 2023 01:01 AM    BUN 43 2023 01:01 AM    LABALBU 3.5 2023 07:59 AM    CREATININE 1.8 2023 01:01 AM    CALCIUM 9.4 2023 01:01 AM    GFRAA 41 2022 06:53 AM    LABGLOM 36 2023 01:01 AM      PT/INR:    Lab Results   Component Value Date/Time    PROTIME 14.7 2020 01:27 AM    INR 1.21 2020 01:27 AM      PTT:    Lab Results   Component Value Date/Time    APTT 35.9 08/19/2020 01:27 AM     Urine Culture:  Susceptibility  Escherichia coli (2)    Antibiotic Interpretation Microscan Method Status    ampicillin Sensitive 8 BACTERIAL SUSCEPTIBILITY PANEL NING Final    ampicillin-sulbactam Sensitive <=2 BACTERIAL SUSCEPTIBILITY PANEL NING Final    ceFAZolin Sensitive <=4 BACTERIAL SUSCEPTIBILITY PANEL NING Final    ceFAZolin   BACTERIAL SUSCEPTIBILITY PANEL NING Final     (NOTE)   NOTE: Cefazolin should only be used for uncomplicated UTI        for E.coli or Klebsiella pneumoniae. cefepime Sensitive <=0.12 BACTERIAL SUSCEPTIBILITY PANEL NING Final    ciprofloxacin Resistant >=4 BACTERIAL SUSCEPTIBILITY PANEL NING Final    ertapenem Sensitive <=0.12 BACTERIAL SUSCEPTIBILITY PANEL NING Final    gentamicin Sensitive <=1 BACTERIAL SUSCEPTIBILITY PANEL NING Final    levofloxacin Resistant >=8 BACTERIAL SUSCEPTIBILITY PANEL NING Final    piperacillin-tazobactam Sensitive <=4 BACTERIAL SUSCEPTIBILITY PANEL NING Final    trimethoprim-sulfamethoxazole Resistant 80 BACTERIAL SUSCEPTIBILITY PANEL NING Final    nitrofurantoin Intermediate 64 BACTERIAL SUSCEPTIBILITY PANEL NING Final        Imaging:   XR CHEST PORTABLE    Result Date: 1/22/2023  EXAMINATION: ONE XRAY VIEW OF THE CHEST 1/22/2023 11:27 am COMPARISON: 01/20/2023 HISTORY: ORDERING SYSTEM PROVIDED HISTORY: evaluate volume status, SOB TECHNOLOGIST PROVIDED HISTORY: Reason for exam:->evaluate volume status, SOB Reason for Exam: evaluate volume status, SOB FINDINGS: Stable pacing device left chest.  Poor inspiration. Cardiomegaly. Aortic calcifications. Prominent pulmonary vasculature and hazy opacities in the lower lungs may represent edema. No large effusion. No pneumothorax. Cardiomegaly with pulmonary vascular congestion and possible pulmonary edema.      XR CHEST PORTABLE    Result Date: 1/20/2023  EXAMINATION: ONE XRAY VIEW OF THE CHEST 1/20/2023 7:47 am COMPARISON: 12/05/2022 HISTORY: ORDERING SYSTEM PROVIDED HISTORY: shortness of breath TECHNOLOGIST PROVIDED HISTORY: Reason for exam:->shortness of breath Reason for Exam: SOB FINDINGS: Similar cardiomegaly and bilateral hilar fullness. Left-sided chest cardiac conduction device is again noted. Scattered bilateral interstitial and alveolar opacities are seen. Trace bilateral pleural effusions are suspected. No discrete pneumothorax. Osseous structures are diffusely demineralized and grossly unchanged. Bilateral interstitial and alveolar opacities are concerning for edema and/or multifocal infection. Trace bilateral pleural effusions. US RETROPERITONEAL LIMITED    Result Date: 1/22/2023  EXAMINATION: ULTRASOUND OF THE KIDNEYS 1/22/2023 12:28 pm COMPARISON: None HISTORY: ORDERING SYSTEM PROVIDED HISTORY: hx of kidney stones, last U/S showed 1.1 cm kidney stones, need to rule out worsening of kidney stones, and hydronephrosis TECHNOLOGIST PROVIDED HISTORY: Reason for exam:->hx of kidney stones, last U/S showed 1.1 cm kidney stones, need to rule out worsening of kidney stones, and hydronephrosis FINDINGS: The right kidney measures 11 cm in length and the left kidney measures 10.2 cm in length. Simple cystic lesion is identified within the right kidney measuring 7.3 cm. No further follow-up is required. Benign 4.8 cm cyst is identified within the left kidney. Bilateral renal cortical atrophy is appreciated. Small left renal calculus is noted. There is no evidence for hydronephrosis. Cholelithiasis is appreciated. Stable small left renal calculus. No evidence for hydronephrosis. Cholelithiasis. Benign right and left renal cyst.       Assessment & Plan:      Cy Ramos is a 80 y.o. male admitted 1/20/2023 for acute respiratory failure. Last seen in office by Dr. Katlyn Reeder 9/28/23 at which time cystoscopy with SHA was suggested w/o f/u appt kept. PSA 0.4 8/19.     1) BPH w Urinary Frequency: Increased urinary frequency could be associated with infections, confusion, Lasix & combo of all of these. PVR 32cc today  On Proscar  Avoid anti-cholinergics in light of increased risk of confusion. Try to avoid catheter placement (at risk for pulling it out traumatically)  Recommend outpatient cystoscopy for further evaluation. 2) Complicated UTI: Urine cx +E.coli >100K CFU/ml, resistant to Cipro and Bactrim    WBC 15.4, afebrile   On IV Rocephin    Will follow. Patient seen and examined, chart reviewed.      Electronically signed by Elie Santana PA-C on 1/23/2023 at 10:12 AM

## 2023-01-23 NOTE — CARE COORDINATION
KINGW spoke with pt dght and she would like pt to return to [address] Vince is wanting pt to return today. LSW informed her that since pt has a sitter. Pt has to be sitter free for at least 24 hours prior to pt returning tot he SNF. Dght stated she feel pt will do better once he returns to this AL. [facility] stated she has tried a few times to get a hold of someone at [facility] to talk with them able to returning. KINGW called Ingrid at [facility] and left her a message. Elizabet Li return call and stated that pt does have to be 24 hour sitter free before they will take pt back. Dght stated she spoke with RN at the AL at [facility] and she suggested dght talk with Elizabet Li regarding pt going to the TCU. LSW informed ht that pt will need to qualify for TCU and will need PT/OT to evals. This LSW was told pt was walking the unit today. KINGW explained to [facility] on how a pt qualifies for SNF and will need precert. Dght is wanting pt to return to the AL  Dght asked if she hires someone to stay with pt until he is doing better,can pt return to his AL. KINGW called Ingrid with [facility] and left her a message asking if this may be possible. LSW has spoke with Charge RN and pt is not able to be sitter free at this time. Waiting on return call from Elizabet Li with [facility].

## 2023-01-23 NOTE — CONSULTS
RENAL DOSE ADJUSTMENT MADE PER P/T PROTOCOL    PREVIOUS ORDER:  Enoxaparin 40mg subq daily    Estimated Creatinine Clearance: 27 mL/min (A) (based on SCr of 1.8 mg/dL (H)). Recent Labs     01/21/23  0329 01/22/23  0349 01/22/23 2027 01/23/23  0101   BUN 18 31*  --  43*   CREATININE 1.3 1.6*  --  1.8*   PLT  --   --    < > 327    < > = values in this interval not displayed. NEW RENALLY ADJUSTED ORDER:  ENOXAPARIN 30MG SUBQ DAILY    [facility].  Daphnie Durham [place]  1/23/2023 11:00 AM

## 2023-01-23 NOTE — PROGRESS NOTES
V2.0  [facility] [place]      Name:  Quincy Ritchie /Age/Sex: 10/5/1933  (80 y.o. male)   MRN & CSN:  [id number] & [id number] Encounter Date/Time: 2023 11:02 AM EST    Location:  [address] [place] [address]-A PCP: Geovanna Reis MD       Hospital Day: 4      Subjective:     Chief Complaint: Urinary Retention  Seen by urology. No further intervention needed. Patient still has frequent urination; appears to be chronic. Discussed with cardiology patient is medically stable for discharge. Patient is requiring sitter which will prevent the patient from being safe for discharge.  reached out to family to have a family member sitting with patient. Patient seen and examined at bedside. Pt has no new complaints or concerns. He denies any shortness of breath or cough. He still complains of his frequent urination in the sense that he has not emptied his bladder. Otherwise, pt denies lightheadedness, dizziness, fever, night sweats, chills, chest pain, cough, dyspnea, palpitations, abd pain, nausea, vomiting, diarrhea, dysuria. Assessment and Plan:   Acute respiratory failure with hypoxia - improved now on room air  Due to acute on chronic diastolic congestive heart failure  Urine output not recorded past 24h. -will change to 40 mg lasix BID (same as home dose)  -Cardiology consulted, appreciate recs    UTI. Symptoms of urinary frequency, urgency although chronic per daughter  UA showed negative nitire but large LE, WBC 2693, many bacteria. Hx of recurrent UTI. Prior culture E. Coli   - continue rocephin; will discharge on cefdinir  - monitor output; post void bladder scan     Elevated troponin, hx of CAD  Likely demand type II. No chest pain. Now trending downward.  Slightly increased comparing to baseline (0.023-0.053)  Stress test 2022 showed severely reduced inferior wall perfusion, hypokinesis  - per cardiology, continue medical management for now ASA, statin  -Cardiology following        acute metabolic encephalopathy - improving. Could be 2/2 hospital delirium, lack of sleep, infection? Today pt is awake, conversing, sleepy, but hasn't had sleep for 3 days per staff. Pt remains confused trying to leave. - will try melatonin QHS to help with sleep  - Redirect/reortient/reassure frequently. - Sleep hygiene with limited daytime sleeping.   - Avoid narcotics, benzodiazepines and anticholinergic medications. SABIHA on CKD stage III  Cr 1.8 today, baseline 1.2-1.3. Likely be 2/2 diuretics; BP has been stable. Hx of kidney stones, U/S 9/2022 showed 1.2 cm right kidney stone; repeat kidney U/S 1/22/2023 no change, no hydronephrosis  - lasix as above  - holding aldactone  - avoid nephrotoxins     Chronic atrial fibrillation  Rate controlled  -Continue amiodarone  -Continue beta-blocker  -Is not on AC due to recurrent falls, confusion     History of [address] urology. Not on flomax due to hx of confusion, dizziness and hypotension. Urology consulted; advised against placing monet. No sign of urinary retention  -Continue Proscar  - bladder scan post void    Diet ADULT DIET; Regular; Low Fat/Low Chol/High Fiber/AXEL   DVT Prophylaxis [x] Lovenox, []  Heparin, [] SCDs, [] Ambulation,  [] Eliquis, [] Xarelto  [] Coumadin   Code Status Full Code   Disposition From: nursing home  Expected Disposition: TBD  Estimated Date of Discharge: tomorrow  Patient requires continued admission due to pt has been having sitter; need to be sitter free for 24h   Surrogate Decision Maker/ POA      Review of Systems:    Review of Systems    See above    Objective:      Intake/Output Summary (Last 24 hours) at 1/23/2023 1221  Last data filed at 1/23/2023 0859  Gross per 24 hour   Intake 480 ml   Output --   Net 480 ml        Vitals:   Vitals:    01/23/23 0800   BP: 122/67   Pulse: 71   Resp: 20   Temp: 97.6 °F (36.4 °C)   SpO2: 95%       Physical Exam:     General: NAD  Eyes: EOMI  ENT: neck supple  Cardiovascular: Regular rate, murmur  Respiratory: Clear to auscultation  Gastrointestinal: Soft, non tender  Genitourinary: no suprapubic tenderness  Musculoskeletal: no edema  Skin: warm, dry  Neuro: Alert. Psych: Mood appropriate.      Medications:   Medications:    [START ON 1/24/2023] enoxaparin  30 mg SubCUTAneous Daily    furosemide  40 mg Oral BID    [Held by provider] spironolactone  25 mg Oral Daily    cefTRIAXone (ROCEPHIN) IV  1,000 mg IntraVENous Q24H    amiodarone  200 mg Oral Nightly    finasteride  5 mg Oral Daily    metoprolol tartrate  12.5 mg Oral BID    pantoprazole  40 mg Oral Daily    rivastigmine  1 patch TransDERmal Daily    sodium chloride flush  5-40 mL IntraVENous 2 times per day    aspirin  81 mg Oral Daily      Infusions:    sodium chloride       PRN Meds: melatonin, 3 mg, Nightly PRN  sodium chloride flush, 10 mL, PRN  sodium chloride, , PRN  ondansetron, 4 mg, Q8H PRN   Or  ondansetron, 4 mg, Q6H PRN  polyethylene glycol, 17 g, Daily PRN  nicotine polacrilex, 2 mg, Q1H PRN  acetaminophen, 650 mg, Q6H PRN   Or  acetaminophen, 650 mg, Q6H PRN      Labs      Recent Results (from the past 24 hour(s))   CBC with Auto Differential    Collection Time: 01/22/23  8:27 PM   Result Value Ref Range    WBC 14.3 (H) 4.0 - 10.5 K/CU MM    RBC 4.34 (L) 4.6 - 6.2 M/CU MM    Hemoglobin 12.6 (L) 13.5 - 18.0 GM/DL    Hematocrit 40.8 (L) 42 - 52 %    MCV 94.0 78 - 100 FL    MCH 29.0 27 - 31 PG    MCHC 30.9 (L) 32.0 - 36.0 %    RDW 15.0 (H) 11.7 - 14.9 %    Platelets 554 651 - 165 K/CU MM    MPV 10.6 7.5 - 11.1 FL    Differential Type AUTOMATED DIFFERENTIAL     Segs Relative 81.6 (H) 36 - 66 %    Lymphocytes % 6.7 (L) 24 - 44 %    Monocytes % 11.1 (H) 0 - 4 %    Eosinophils % 0.1 0 - 3 %    Basophils % 0.1 0 - 1 %    Segs Absolute 11.7 K/CU MM    Lymphocytes Absolute 1.0 K/CU MM    Monocytes Absolute 1.6 K/CU MM    Eosinophils Absolute 0.0 K/CU MM    Basophils Absolute 0.0 K/CU MM    Nucleated RBC % 0.0 %    Total Nucleated RBC 0.0 K/CU MM    Total Immature Neutrophil 0.06 K/CU MM    Immature Neutrophil % 0.4 0 - 0.43 %   Basic Metabolic Panel w/ Reflex to MG    Collection Time: 01/23/23  1:01 AM   Result Value Ref Range    Sodium 142 135 - 145 MMOL/L    Potassium 3.7 3.5 - 5.1 MMOL/L    Chloride 98 (L) 99 - 110 mMol/L    CO2 28 21 - 32 MMOL/L    Anion Gap 16 4 - 16    BUN 43 (H) 6 - 23 MG/DL    Creatinine 1.8 (H) 0.9 - 1.3 MG/DL    Est, Glom Filt Rate 36 (L) >60 mL/min/1.73m2    Glucose 99 70 - 99 MG/DL    Calcium 9.4 8.3 - 10.6 MG/DL   CBC with Auto Differential    Collection Time: 01/23/23  1:01 AM   Result Value Ref Range    WBC 15.4 (H) 4.0 - 10.5 K/CU MM    RBC 4.40 (L) 4.6 - 6.2 M/CU MM    Hemoglobin 12.6 (L) 13.5 - 18.0 GM/DL    Hematocrit 41.0 (L) 42 - 52 %    MCV 93.2 78 - 100 FL    MCH 28.6 27 - 31 PG    MCHC 30.7 (L) 32.0 - 36.0 %    RDW 15.2 (H) 11.7 - 14.9 %    Platelets 479 183 - 012 K/CU MM    MPV 10.6 7.5 - 11.1 FL    Immature Neutrophil % 0.8 (H) 0 - 0.43 %    Segs Relative 80.8 (H) 36 - 66 %    Eosinophils % 0.1 0 - 3 %    Basophils % 0.1 0 - 1 %    Lymphocytes % 7.4 (L) 24 - 44 %    Monocytes % 10.8 (H) 0 - 4 %    Total Immature Neutrophil 0.12 K/CU MM    Segs Absolute 12.4 K/CU MM    Eosinophils Absolute 0.0 K/CU MM    Basophils Absolute 0.0 K/CU MM    Lymphocytes Absolute 1.1 K/CU MM    Monocytes Absolute 1.7 K/CU MM    Differential Type AUTOMATED DIFFERENTIAL         Imaging/Diagnostics Last 24 Hours   XR CHEST PORTABLE    Result Date: 1/20/2023  EXAMINATION: ONE XRAY VIEW OF THE CHEST 1/20/2023 7:47 am COMPARISON: 12/05/2022 HISTORY: ORDERING SYSTEM PROVIDED HISTORY: shortness of breath TECHNOLOGIST PROVIDED HISTORY: Reason for exam:->shortness of breath Reason for Exam: SOB FINDINGS: Similar cardiomegaly and bilateral hilar fullness. Left-sided chest cardiac conduction device is again noted. Scattered bilateral interstitial and alveolar opacities are seen.   Trace bilateral pleural effusions are suspected. No discrete pneumothorax. Osseous structures are diffusely demineralized and grossly unchanged. Bilateral interstitial and alveolar opacities are concerning for edema and/or multifocal infection. Trace bilateral pleural effusions.        Electronically signed by Kizzy Escalona MD on 1/23/2023 at 12:21 PM

## 2023-01-23 NOTE — PLAN OF CARE
Problem: Discharge Planning  Goal: Discharge to home or other facility with appropriate resources  Outcome: Progressing     Problem: Chronic Conditions and Co-morbidities  Goal: Patient's chronic conditions and co-morbidity symptoms are monitored and maintained or improved  Outcome: Progressing     Problem: Confusion  Goal: Confusion, delirium, dementia, or psychosis is improved or at baseline  Description: INTERVENTIONS:  1. Assess for possible contributors to thought disturbance, including medications, impaired vision or hearing, underlying metabolic abnormalities, dehydration, psychiatric diagnoses, and notify attending LIP  2. [place] high risk fall precautions, as indicated  3. Provide frequent short contacts to provide reality reorientation, refocusing and direction  4. Decrease environmental stimuli, including noise as appropriate  5. Monitor and intervene to maintain adequate nutrition, hydration, elimination, sleep and activity  6. If unable to ensure safety without constant attention obtain sitter and review sitter guidelines with assigned personnel  7. Initiate Psychosocial CNS and Spiritual Care consult, as indicated  Outcome: Progressing     Problem: Skin/Tissue Integrity  Goal: Absence of new skin breakdown  Description: 1. Monitor for areas of redness and/or skin breakdown  2. Assess vascular access sites hourly  3. Every 4-6 hours minimum:  Change oxygen saturation probe site  4. Every 4-6 hours:  If on nasal continuous positive airway pressure, respiratory therapy assess nares and determine need for appliance change or resting period.   Outcome: Progressing

## 2023-01-23 NOTE — PROGRESS NOTES
Speech Language Pathology  Facility/Department: [facility] 3E   CLINICAL BEDSIDE SWALLOW EVALUATION    NAME: Yanet Farr  : 10/5/1933  MRN: [id number]    IMPRESSIONS AND RECOMMENDATIONS: Yanet Farr was referred for a bedside swallow evaluation following admission to [facility] with acute respiratory failure, acute on chronic CHF, UTI. Medical hx includes CHF, afib, VHD, dementia. Pt has been seen by SLP during prior admission in 2019, was recommended soft diet/thin liquids at that time. Pt seen for evaluation seated upright in bed, alert, cooperative given cues. Oral mechanism examination reveals reduced lingual coordination, intact symmetry. Pt presented with PO trials of thin liquids via cup/straw, nectar thick liquids via cup/straw, pudding, and regular solids. Oral stage is mild-moderately impaired, characterized by intact labial seal, slow/repetitive mastication with piecemeal deglutition and suspected premature pharyngeal entry. Suspect pharyngeal and/or esophageal dysphagia with repetitive swallows per bolus and briefly delayed belching followed by coughing with trials of all consistencies. Recommend continued current diet with general aspiration and reflux precautions. Pt would benefit from further evaluation with modified barium swallow study when able - may follow-up as outpatient if discharge is planned for today. SLP will follow. ADMISSION DATE: 2023  ADMITTING DIAGNOSIS: has S/P placement of cardiac pacemaker; Persistent atrial fibrillation ([address] Ascending aortic aneurysm; CHF (congestive heart failure), NYHA class I, acute on chronic, combined ([address] Chronic diastolic congestive heart failure ([address] Chronic kidney disease (CKD) stage G3a/A3, moderately decreased glomerular filtration rate (GFR) between 45-59 mL/min/1.73 square meter and albuminuria creatinine ratio greater than 300 mg/g ([address] Kidney stone;  Acute cystitis without hematuria; Dementia without behavioral disturbance ([address] Acute on chronic anemia; Benign prostatic hyperplasia without lower urinary tract symptoms; VHD (valvular heart disease); Acute respiratory failure ([address] and Troponin I above reference range on their problem list.  ONSET DATE: this admission    Recent Chest Xray/CT of Chest: see chart    Date of Eval: 1/23/2023  Evaluating Therapist: MARCELINO Rodriguez    Current Diet level:  Current Diet : Regular      Primary Complaint  Patient Complaint: does not clearly state, indicates poor appetite    Pain:  Pain Assessment  Pain Assessment: None - Denies Pain  Pain Level: 0  Charles-Baker Pain Rating: No hurt  Patient's Stated Pain Goal: 2  Pain Location: Abdomen  Pain Descriptors: Aching  Functional Pain Assessment: Activities are not prevented  Non-Pharmaceutical Pain Intervention(s): Emotional support  Response to Pain Intervention: Pain improved but above pain goal  Side Effects: No reported side effects    Reason for Referral  Jodie Petty was referred for a bedside swallow evaluation to assess the efficiency of his swallow function, identify signs and symptoms of aspiration and make recommendations regarding safe dietary consistencies, effective compensatory strategies, and safe eating environment. Impression  Dysphagia Diagnosis: Concerns for esophageal stage dysphagia;Mild to moderate oral stage dysphagia  Dysphagia Outcome Severity Scale: Level 4: Mild moderate dysphagia- Intermittent supervision/cueing. One - two diet consistencies restricted     Treatment Plan  Requires SLP Intervention: Yes  Duration of Treatment: LOS          Recommended Diet and Intervention        Recommended Form of Meds: Meds in puree  Recommendations: Modified barium swallow study  Therapeutic Interventions: Other (comment); Patient/Family education (MBSS to determine additional needs)    Compensatory Swallowing Strategies  Compensatory Swallowing Strategies : Upright as possible for all oral intake;Eat/Feed slowly; Alternate solids and liquids; Remain upright for 30-45 minutes after meals;Small bites/sips    Treatment/Goals  Short-term Goals  Timeframe for Short-term Goals: length of admission  Goal 1: Pt will participate in MBSS to further evaluate dysphagia and determine POC  Goal 2: Pt/caregivers will indicate understanding of education/recommendations    General  Chart Reviewed: Yes  Behavior/Cognition: Alert;Confused; Requires cueing  Respiratory Status: Room air  O2 Device: None (Room air)  Communication Observation:  (cognitive communication deficits)  Follows Directions: Simple  Dentition: Adequate  Patient Positioning: Upright in bed  Prior Dysphagia History: yes; previously seen by SLP 2019  Consistencies Administered: Regular;Pureed; Thin - cup; Thin - straw;Mildly Thick - cup;Mildly Thick - straw           Vision/Hearing       Oral Motor Deficits  Oral/Motor  Oral Hygiene: Moist;Clean    Oral Phase Dysfunction  Oral Phase  Oral Phase: Exceptions  Oral Phase  Oral Phase - Comment: impaired     Indicators of Pharyngeal Phase Dysfunction   Pharyngeal Phase   Pharyngeal Phase: suspect impairment    Prognosis       Education  Patient Education: recommendations/plan  Patient Education Response: No evidence of learning  Safety Devices in place: Yes  Type of devices: Left in bed       Therapy Time  SLP Individual Minutes  Time In: 1458  Time Out: [facility]  Minutes: [address] [facility]-SLP  1/23/2023 3:46 PM

## 2023-01-23 NOTE — DISCHARGE INSTRUCTIONS
- please schedule an appointment to see your PCP  - please schedule an appointment to see your cardiologist, nephrologist and urologist  - please schedule an appointment for modified barium swallow study  - please go to lab in one week for blood work  - please take medications as prescribed

## 2023-01-23 NOTE — PROGRESS NOTES
Speech Language Pathology  [facility] [address] [place]  10/5/1933  [id number]      Attempted to see Sera Adrienne for a bedside swallowing evaluation 01/23/23. Deferred assessment- pt was sleeping and pt sitter reported pt has not been sleeping well. SLP will re-attempt as schedule allows.      Gerson Arita [address], CCC-SLP, 1/23/2023

## 2023-01-23 NOTE — PLAN OF CARE
Problem: Discharge Planning  Goal: Discharge to home or other facility with appropriate resources  1/23/2023 1458 by Gume Sepulveda RN  Outcome: Completed  1/23/2023 0322 by Kem Lackey RN  Outcome: Progressing     Problem: Chronic Conditions and Co-morbidities  Goal: Patient's chronic conditions and co-morbidity symptoms are monitored and maintained or improved  1/23/2023 1458 by Gume Sepulveda RN  Outcome: Completed  1/23/2023 0322 by Kem Lackey RN  Outcome: Progressing     Problem: Confusion  Goal: Confusion, delirium, dementia, or psychosis is improved or at baseline  Description: INTERVENTIONS:  1. Assess for possible contributors to thought disturbance, including medications, impaired vision or hearing, underlying metabolic abnormalities, dehydration, psychiatric diagnoses, and notify attending LIP  2. [place] high risk fall precautions, as indicated  3. Provide frequent short contacts to provide reality reorientation, refocusing and direction  4. Decrease environmental stimuli, including noise as appropriate  5. Monitor and intervene to maintain adequate nutrition, hydration, elimination, sleep and activity  6. If unable to ensure safety without constant attention obtain sitter and review sitter guidelines with assigned personnel  7. Initiate Psychosocial CNS and Spiritual Care consult, as indicated  1/23/2023 1458 by Guem Sepulveda RN  Outcome: Completed  1/23/2023 0322 by Kem Lackey RN  Outcome: Progressing     Problem: Skin/Tissue Integrity  Goal: Absence of new skin breakdown  Description: 1. Monitor for areas of redness and/or skin breakdown  2. Assess vascular access sites hourly  3. Every 4-6 hours minimum:  Change oxygen saturation probe site  4. Every 4-6 hours:  If on nasal continuous positive airway pressure, respiratory therapy assess nares and determine need for appliance change or resting period.   1/23/2023 1458 by Gume Sepulveda RN  Outcome: Completed  1/23/2023 0322 by Miguel Mehta, RN  Outcome: Progressing

## 2023-01-23 NOTE — PROGRESS NOTES
Cardiology Progress Note     Admit Date:  1/20/2023    Consult reason/ Seen today for :       Subjective and  Overnight Events : He continues to walk less confused but still somewhat not at baseline    Chief complain on admission : 80 y. o.year old who is admitted for  Chief Complaint   Patient presents with    Urinary Retention      Assessment / Plan:  Elevated Troponin : Continue conservative management continue Aspirin   CHF: Heart failure with preserved EF acute Diastolic decompensated heart failure. Change Lasix to 40 twice daily at discharge  Renal function gradually worsening avoid Aldactone at this time  HTN: stable, continue present medications   Atrial Fibrillation: Currently on amiodarone , also not on anticoagulation due to fall risk? Anemia  history? TSH slightly abnormal secondary to amiodarone repeat labs in few weeks  DVT prophylaxis if no contraindication  6. Dyslipidemia: continue statins     Past medical history:    has a past medical history of ACTA2-related familial thoracic aortic aneurysm, Arrhythmia, Atrial fibrillation (HCC), BBBB (bilateral bundle branch block), Bradycardia, CHF (congestive heart failure) ([address] Essential hypertension, malignant, Fall at home, H/O echocardiogram, History of cardioversion, History of chest x-ray, History of CT scan, History of echocardiogram, History of EKG, History of EKG, History of Holter monitoring, History of stress test, Hx of echocardiogram, Hx of echocardiogram, Hx of transesophageal echocardiography (SIMONE) for monitoring, Hyperlipemia, Hyperlipidemia, Hypertension, Nonspecific abnormal electrocardiogram (ECG) (EKG), Persistent atrial fibrillation ([address] Sick sinus syndrome ([address] and Vertigo. 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);  CYSTOSCOPY INSERTION / REMOVAL STENT / STONE (Left, 5/6/2019); and Upper gastrointestinal endoscopy (N/A, 8/20/2020). Social History:   reports that he has never smoked. He has never used smokeless tobacco. He reports that he does not currently use alcohol. He reports that he does not use drugs. Family history:  family history is not on file. Allergies   Allergen Reactions    Biaxin [Clarithromycin]     Cephalexin     Viagra [Sildenafil Citrate] Nausea Only and Other (See Comments)     Dizziness and BP dropped       Review of Systems:    All 14 systems were reviewed and are negative  Except for the positive findings  which as documented     /67   Pulse 71   Temp 97.6 °F (36.4 °C) (Axillary)   Resp 20   Wt 160 lb 11.5 oz (72.9 kg)   SpO2 95%   BMI 24.44 kg/m²     Intake/Output Summary (Last 24 hours) at 1/23/2023 1218  Last data filed at 1/23/2023 0859  Gross per 24 hour   Intake 480 ml   Output --   Net 480 ml     Physical Exam:  Constitutional:  Well developed, Well nourished, No acute distress, Non-toxic appearance. HENT:  Normocephalic, Atraumatic, Bilateral external ears normal, Oropharynx moist, No oral exudates, Nose normal. Neck- Normal range of motion, No tenderness, Supple, No stridor. Eyes:  PERRL, EOMI, Conjunctiva normal, No discharge. Respiratory:  Normal breath sounds, No respiratory distress, No wheezing, No chest tenderness. Cardiovascular:  Normal heart rate, Normal rhythm, No murmurs, No rubs, No gallops, JVP not elevated  Abdomen/GI:  Bowel sounds normal, Soft, No tenderness, No masses, No pulsatile masses. Musculoskeletal:  Intact distal pulses, No edema, No tenderness, No cyanosis, No clubbing. Good range of motion in all major joints. No tenderness to palpation or major deformities noted. Back- No tenderness. Integument:  Warm, Dry, No erythema, No rash. Lymphatic:  No lymphadenopathy noted. Neurologic:  Alert & oriented x 3, Normal motor function, Normal sensory function, No focal deficits noted.    Psychiatric:  Affect  and Mood :no change    Medications:    [START ON 1/24/2023] enoxaparin  30 mg SubCUTAneous Daily    furosemide  40 mg Oral BID    [Held by provider] spironolactone  25 mg Oral Daily    cefTRIAXone (ROCEPHIN) IV  1,000 mg IntraVENous Q24H    amiodarone  200 mg Oral Nightly    finasteride  5 mg Oral Daily    metoprolol tartrate  12.5 mg Oral BID    pantoprazole  40 mg Oral Daily    rivastigmine  1 patch TransDERmal Daily    sodium chloride flush  5-40 mL IntraVENous 2 times per day    aspirin  81 mg Oral Daily      sodium chloride       melatonin, sodium chloride flush, sodium chloride, ondansetron **OR** ondansetron, polyethylene glycol, nicotine polacrilex, acetaminophen **OR** acetaminophen    Lab Data:  CBC:   Recent Labs     01/22/23  2027 01/23/23  0101   WBC 14.3* 15.4*   HGB 12.6* 12.6*   HCT 40.8* 41.0*   MCV 94.0 93.2    327     BMP:   Recent Labs     01/21/23  0329 01/22/23  0349 01/23/23  0101    143 142   K 3.1* 3.6 3.7   CL 95* 99 98*   CO2 25 31 28   BUN 18 31* 43*   CREATININE 1.3 1.6* 1.8*     PT/INR: No results for input(s): PROTIME, INR in the last 72 hours. BNP:    No results for input(s): PROBNP in the last 72 hours. TROPONIN:   Recent Labs     01/20/23  1444 01/20/23  1729 01/21/23  0856   TROPONINT 0.114* 0.126* 0.119*        ECHO :   echocardiogram    Assessment:  80 y. o.year old who is admitted for  Chief Complaint   Patient presents with    Urinary Retention    , active issues as noted below:  Impression:  Principal Problem:    Acute respiratory failure ([facility]  Active Problems:    Persistent atrial fibrillation (HCC)    Troponin I above reference range    Chronic diastolic congestive heart failure ([facility]    Dementia without behavioral disturbance ([facility]  Resolved Problems:    * No resolved hospital problems.  *            All labs, medications and tests reviewed by myself , continue all other medications of all above medical condition listed as is except for changes mentioned above.    Thank you very much for consult , please call with questions.     Momo Looney MD, MD 1/23/2023 12:18 PM

## 2023-01-23 NOTE — CARE COORDINATION
KINGW has not heard back firm Ingrid. LSW called again and no answer. LSW called Carolina with  [facility] and asked her the questions if dght pay for a private sitter can they take pt back. Carolina stated she will talk with her administrations and get back with this LSW.

## 2023-01-24 ENCOUNTER — HOSPITAL ENCOUNTER (OUTPATIENT)
Age: 88
Setting detail: SPECIMEN
Discharge: HOME | End: 2023-01-24
Payer: MEDICARE

## 2023-01-24 LAB
ALBUMIN SERPL-MCNC: 3.5 GM/DL (ref 3.4–5)
ALP BLD-CCNC: 72 IU/L (ref 40–129)
ALT SERPL-CCNC: 34 U/L (ref 10–40)
ANION GAP SERPL CALCULATED.3IONS-SCNC: 14 MMOL/L (ref 4–16)
AST SERPL-CCNC: 38 IU/L (ref 15–37)
BILIRUB SERPL-MCNC: 0.6 MG/DL (ref 0–1)
BUN BLDV-MCNC: 57 MG/DL (ref 6–23)
CALCIUM SERPL-MCNC: 8.9 MG/DL (ref 8.3–10.6)
CHLORIDE BLD-SCNC: 101 MMOL/L (ref 99–110)
CO2: 30 MMOL/L (ref 21–32)
CREAT SERPL-MCNC: 1.9 MG/DL (ref 0.9–1.3)
GFR SERPL CREATININE-BSD FRML MDRD: 33 ML/MIN/1.73M2
GLUCOSE BLD-MCNC: 80 MG/DL (ref 70–99)
HCT VFR BLD CALC: 40.5 % (ref 42–52)
HEMOGLOBIN: 12 GM/DL (ref 13.5–18)
MCH RBC QN AUTO: 28.9 PG (ref 27–31)
MCHC RBC AUTO-ENTMCNC: 29.6 % (ref 32–36)
MCV RBC AUTO: 97.6 FL (ref 78–100)
PDW BLD-RTO: 15.5 % (ref 11.7–14.9)
PLATELET # BLD: 279 K/CU MM (ref 140–440)
PMV BLD AUTO: 11.3 FL (ref 7.5–11.1)
POTASSIUM SERPL-SCNC: 3.5 MMOL/L (ref 3.5–5.1)
RBC # BLD: 4.15 M/CU MM (ref 4.6–6.2)
SODIUM BLD-SCNC: 145 MMOL/L (ref 135–145)
TOTAL PROTEIN: 5.9 GM/DL (ref 6.4–8.2)
TSH HIGH SENSITIVITY: 2.88 UIU/ML (ref 0.27–4.2)
WBC # BLD: 12.5 K/CU MM (ref 4–10.5)

## 2023-01-24 PROCEDURE — 85027 COMPLETE CBC AUTOMATED: CPT

## 2023-01-24 PROCEDURE — 80053 COMPREHEN METABOLIC PANEL: CPT

## 2023-01-24 PROCEDURE — 36415 COLL VENOUS BLD VENIPUNCTURE: CPT

## 2023-01-24 PROCEDURE — 84443 ASSAY THYROID STIM HORMONE: CPT

## 2023-01-24 NOTE — PROGRESS NOTES
Physician Progress Note      PATIENT:               Kimmy Connolly  CSN #:                  [id number]  :                       10/5/1933  ADMIT DATE:       2023 7:24 AM  DISCH DATE:        2023 6:50 PM  RESPONDING  PROVIDER #:        Kizzy Escalona MD          QUERY TEXT:    Hospitalists,    Pt admitted with CHF exacerbation, UTI and  noted to have SIRS. If possible,   please document in the progress notes and discharge summary if you are   evaluating and /or treating any of the following:]    The medical record reflects the following:  Risk Factors: UTI  Clinical Indicators:  RR> 20 up to 26, HR > 90 up to 107, WBC 14, resp failure   with O2 sats in 80's, CHF exacerbation, metabolic encephalopathy  Treatment: labs, imaging, IV atb, IVF, observation    Thank you,  Jessica Gates RN [facility]  [phone]  Options provided:  -- Sepsis, present on admission  -- Sepsis, present on admission, now resolved  -- Sepsis, developed following admission  -- Sepsis was ruled out  -- Other - I will add my own diagnosis  -- Disagree - Not applicable / Not valid  -- Disagree - Clinically unable to determine / Unknown  -- Refer to Clinical Documentation Reviewer    PROVIDER RESPONSE TEXT:    After further study, sepsis was ruled out for this patient.     Query created by: Katie Torrez on 2023 3:03 PM      Electronically signed by:  Kizzy Escalona MD 2023 12:14 AM

## 2023-01-25 NOTE — DISCHARGE SUMMARY
Discharge Summary    Name:  Trish Bianchi /Age/Sex: 10/5/1933  (80 y.o. male)   MRN & CSN:  [id number] & [id number] Admission Date/Time: 2023  7:24 AM   Attending:  No att. providers found Discharging Physician: Sharmaine Crowley MD     Hospital Course:   Trish Bianchi is a 80 y.o.  male  who presents with Acute respiratory failure ([address]    HPI  \"Yohan Sena is a 80 y.o. male from nursing home with pmh of congestive heart failure, atrial fibrillation, valvular heart disease, dementia, BPH, was admitted to hospital for acute on chronic congestive heart failure. Patient called her daughter who is in [place] this morning with a complaining of no urine. Patient was then sent to ER. Patient was found hypoxia with O2 sat 88% in room air. His chest x-ray indicated pulmonary edema VS multifocal pneumonia. Patient does not have a cough. No chills no fever. His creatinine level is normal.  BNP was significantly elevated at 22,000. Troponin was elevated at 0.092. EKG has no ST elevation. Bladder scanner showed residual urine was around 100 mL. Patient could not offer urine sample in ED. Patient denied abdominal pain. No tender in abdomen and back. Patient has 1 pitting edema in bilateral lower extremities. Patient will be admitted to hospital for further evaluation. Per report patient had worsening bilateral leg swelling recently. His PCP in nursing home just increased Lasix to 80 Mg daily. Patient denied dyspnea. \"       The following problems have been addressed during this hospitalization. Acute respiratory failure with hypoxia - improved now on room air  Due to acute on chronic diastolic congestive heart failure  Received lasix IV transitioned to home oral dose PO per cardiology recs  Pt had good urinary output  O2 transitioned to room air  Denies SOB on day of discharge  Cardiology following, pt to follow up outpatient     UTI.  Symptoms of urinary frequency, urgency although chronic per daughter  UA showed negative nitire but large LE, WBC 2693, many bacteria. Hx of recurrent UTI. Prior culture E. Coli   - received rocephin while in hospital; discharged on cefdinir  - monitor output; post void bladder scan     Elevated troponin, hx of CAD  Likely demand type II. No chest pain. Now trending downward. Slightly increased comparing to baseline (0.023-0.053)  Stress test 12/2022 showed severely reduced inferior wall perfusion, hypokinesis  - per cardiology, continue medical management for now ASA, statin  -Cardiology was following        acute metabolic encephalopathy - improving. Could be 2/2 hospital delirium, lack of sleep, infection? On day of discharge, pt was awake, conversing, slightly sleepy but per sitter but hasn't had sleep for 3 days per staff. Pt remains confused trying to leave. Sitter was in room throughout stay. -started on melatonin QHS to help with sleep  - Redirect/reortient/reassure frequently. - Sleep hygiene with limited daytime sleeping.   - Avoid narcotics, benzodiazepines and anticholinergic medications. SABIHA on CKD stage III  Cr 1.8 , baseline 1.2-1.3. Likely be 2/2 diuretics; BP has been stable. Hx of kidney stones, U/S 9/2022 showed 1.2 cm right kidney stone; repeat kidney U/S 1/22/2023 no change, no hydronephrosis  - lasix as above  - holding aldactone originally started by cardiology  - avoid nephrotoxins     Chronic atrial fibrillation  Rate controlled  -Continue amiodarone  -Continue beta-blocker  -Is not on AC due to recurrent falls, confusion     History of [address] urology. Not on flomax due to hx of confusion, dizziness and hypotension. Urology consulted; advised against placing monet.   No sign of urinary retention  -Continue Proscar  - bladder scan post void      Physical Exam  Vitals:   Vitals:    01/23/23 0800   BP: 122/67   Pulse: 71   Resp: 20   Temp: 97.6 °F (36.4 °C)   SpO2: 95%       General: NAD  Eyes: EOMI  ENT: neck supple  Cardiovascular: Regular rate, murmur  Respiratory: Clear to auscultation  Gastrointestinal: Soft, non tender  Genitourinary: no suprapubic tenderness  Musculoskeletal: No edema  Skin: warm, dry  Neuro: Alert. Psych: Mood appropriate. The patient expressed appropriate understanding of and agreement with the discharge recommendations, medications, and plan. Consults this admission:  IP CONSULT TO CARDIOLOGY  IP CONSULT TO CARDIOLOGY  IP CONSULT TO UROLOGY      Discharge Instruction:   Handoff to PCP:     Follow up appointments: cardiology, urology, nephrology    Primary care physician: Eugene Reis MD      Diet:  cardiac diet   Activity: activity as tolerated  Disposition: Discharged to:   [x]Home (assisted living), []C, []SNF, []Acute Rehab, []Hospice   Condition on discharge: Stable    Discharge Medications:        Medication List        START taking these medications      cefdinir 300 MG capsule  Commonly known as: OMNICEF  Take 1 capsule by mouth daily for 7 days     melatonin 3 MG Tabs tablet  Take 1 tablet by mouth nightly as needed (sleep)            CHANGE how you take these medications      amiodarone 200 MG tablet  Commonly known as: CORDARONE  Take 1 tablet by mouth daily  What changed: when to take this     furosemide 40 MG tablet  Commonly known as: LASIX  Take 1 tablet by mouth in the morning and 1 tablet in the evening.   What changed:   medication strength  how much to take  when to take this            CONTINUE taking these medications      acetaminophen 325 MG tablet  Commonly known as: TYLENOL     ACIDOPHILUS/PECTIN PO     aspirin 81 MG EC tablet     bisacodyl 10 MG suppository  Commonly known as: DULCOLAX     finasteride 5 MG tablet  Commonly known as: PROSCAR  Take 1 tablet by mouth daily     fish oil 1000 MG capsule     GUAIFENESIN DM PO     lactulose 10 GM/15ML solution  Commonly known as: CHRONULAC     pantoprazole 40 MG tablet  Commonly known as: PROTONIX     REFRESH OP rivastigmine 4.6 MG/24HR  Commonly known as: EXELON     simethicone 80 MG chewable tablet  Commonly known as: MYLICON     therapeutic multivitamin-minerals tablet     Vitamin D3 125 MCG (5000 UT) Tabs            STOP taking these medications      isosorbide mononitrate 30 MG extended release tablet  Commonly known as: IMDUR     nitrofurantoin (macrocrystal-monohydrate) 100 MG capsule  Commonly known as: MACROBID     nitrofurantoin 50 MG capsule  Commonly known as: MACRODANTIN     PAXLOVID PO     potassium chloride 20 MEQ extended release tablet  Commonly known as: KLOR-CON M            ASK your doctor about these medications      metoprolol tartrate 25 MG tablet  Commonly known as: LOPRESSOR  Take 0.5 tablets by mouth 2 times daily               Where to Get Your Medications        These medications were sent to [facility] [address], [address] [place] [address] [address] [phone] Graeme Riggins [phone]  [address] [facility] [address]      Phone: [phone]   cefdinir 300 MG capsule  furosemide 40 MG tablet  melatonin 3 MG Tabs tablet         Objective Findings at Discharge:       BMP/CBC  Recent Labs     01/22/23 2027 01/23/23  0101 01/24/23  0708   NA  --  142 145   K  --  3.7 3.5   CL  --  98* 101   CO2  --  28 30   BUN  --  43* 57*   CREATININE  --  1.8* 1.9*   WBC 14.3* 15.4* 12.5*   HCT 40.8* 41.0* 40.5*    327 279       IMAGING:      Additional Information: Patient seen and examined day of discharge.  For more information regarding patient's care please contact [facility] records [phone]    Discharge Time of 35 minutes    Electronically signed by Glen Fraser MD on 1/25/2023 at 6:15 PM

## 2023-01-27 ENCOUNTER — HOSPITAL ENCOUNTER (OUTPATIENT)
Age: 88
Setting detail: SPECIMEN
Discharge: HOME | End: 2023-01-27
Payer: MEDICARE

## 2023-01-27 LAB
ANION GAP SERPL CALCULATED.3IONS-SCNC: 5 MMOL/L (ref 4–16)
BUN BLDV-MCNC: 36 MG/DL (ref 6–23)
CALCIUM SERPL-MCNC: 9 MG/DL (ref 8.3–10.6)
CHLORIDE BLD-SCNC: 104 MMOL/L (ref 99–110)
CO2: 37 MMOL/L (ref 21–32)
CREAT SERPL-MCNC: 1.3 MG/DL (ref 0.9–1.3)
GFR SERPL CREATININE-BSD FRML MDRD: 53 ML/MIN/1.73M2
GLUCOSE BLD-MCNC: 96 MG/DL (ref 70–99)
HCT VFR BLD CALC: 43.4 % (ref 42–52)
HEMOGLOBIN: 13.5 GM/DL (ref 13.5–18)
MCH RBC QN AUTO: 28.8 PG (ref 27–31)
MCHC RBC AUTO-ENTMCNC: 31.1 % (ref 32–36)
MCV RBC AUTO: 92.5 FL (ref 78–100)
PDW BLD-RTO: 15 % (ref 11.7–14.9)
PLATELET # BLD: 299 K/CU MM (ref 140–440)
PMV BLD AUTO: 10.9 FL (ref 7.5–11.1)
POTASSIUM SERPL-SCNC: 3.2 MMOL/L (ref 3.5–5.1)
RBC # BLD: 4.69 M/CU MM (ref 4.6–6.2)
SODIUM BLD-SCNC: 146 MMOL/L (ref 135–145)
WBC # BLD: 12.2 K/CU MM (ref 4–10.5)

## 2023-01-27 PROCEDURE — 36415 COLL VENOUS BLD VENIPUNCTURE: CPT

## 2023-01-27 PROCEDURE — 80048 BASIC METABOLIC PNL TOTAL CA: CPT

## 2023-01-27 PROCEDURE — 85027 COMPLETE CBC AUTOMATED: CPT

## 2023-01-31 ENCOUNTER — HOSPITAL ENCOUNTER (OUTPATIENT)
Age: 88
Setting detail: SPECIMEN
Discharge: HOME | End: 2023-01-31
Payer: MEDICARE

## 2023-01-31 LAB
ALBUMIN SERPL-MCNC: 3 GM/DL (ref 3.4–5)
ALP BLD-CCNC: 65 IU/L (ref 40–128)
ALT SERPL-CCNC: 33 U/L (ref 10–40)
ANION GAP SERPL CALCULATED.3IONS-SCNC: 8 MMOL/L (ref 4–16)
AST SERPL-CCNC: 38 IU/L (ref 15–37)
BILIRUB SERPL-MCNC: 0.4 MG/DL (ref 0–1)
BUN BLDV-MCNC: 24 MG/DL (ref 6–23)
CALCIUM SERPL-MCNC: 8.3 MG/DL (ref 8.3–10.6)
CHLORIDE BLD-SCNC: 102 MMOL/L (ref 99–110)
CO2: 32 MMOL/L (ref 21–32)
CREAT SERPL-MCNC: 1.3 MG/DL (ref 0.9–1.3)
GFR SERPL CREATININE-BSD FRML MDRD: 53 ML/MIN/1.73M2
GLUCOSE BLD-MCNC: 84 MG/DL (ref 70–99)
MAGNESIUM: 2.3 MG/DL (ref 1.8–2.4)
PHOSPHORUS: 2.2 MG/DL (ref 2.5–4.9)
POTASSIUM SERPL-SCNC: 3.4 MMOL/L (ref 3.5–5.1)
SODIUM BLD-SCNC: 142 MMOL/L (ref 135–145)
TOTAL PROTEIN: 4.9 GM/DL (ref 6.4–8.2)

## 2023-01-31 PROCEDURE — 80053 COMPREHEN METABOLIC PANEL: CPT

## 2023-01-31 PROCEDURE — 84100 ASSAY OF PHOSPHORUS: CPT

## 2023-01-31 PROCEDURE — 83735 ASSAY OF MAGNESIUM: CPT

## 2023-01-31 PROCEDURE — 36415 COLL VENOUS BLD VENIPUNCTURE: CPT

## 2023-02-01 ENCOUNTER — HOSPITAL ENCOUNTER (OUTPATIENT)
Age: 88
Setting detail: SPECIMEN
Discharge: HOME | End: 2023-02-01
Payer: MEDICARE

## 2023-02-01 PROCEDURE — 82570 ASSAY OF URINE CREATININE: CPT

## 2023-02-02 LAB
REASON FOR REJECTION: NORMAL
REJECTED TEST: NORMAL

## 2023-02-03 ENCOUNTER — HOSPITAL ENCOUNTER (OUTPATIENT)
Age: 88
Setting detail: SPECIMEN
Discharge: HOME | End: 2023-02-03
Payer: MEDICARE

## 2023-02-03 ENCOUNTER — TELEPHONE (OUTPATIENT)
Dept: CARDIOLOGY CLINIC | Age: 88
End: 2023-02-03

## 2023-02-03 LAB — MAGNESIUM: 2.3 MG/DL (ref 1.8–2.4)

## 2023-02-03 PROCEDURE — 87186 SC STD MICRODIL/AGAR DIL: CPT

## 2023-02-03 PROCEDURE — 87077 CULTURE AEROBIC IDENTIFY: CPT

## 2023-02-03 PROCEDURE — 81001 URINALYSIS AUTO W/SCOPE: CPT

## 2023-02-03 PROCEDURE — 87086 URINE CULTURE/COLONY COUNT: CPT

## 2023-02-03 PROCEDURE — 36415 COLL VENOUS BLD VENIPUNCTURE: CPT

## 2023-02-03 PROCEDURE — 83735 ASSAY OF MAGNESIUM: CPT

## 2023-02-03 NOTE — TELEPHONE ENCOUNTER
Elmer Cat with [facility] called patient has significant  swelling with his feet , he is up 4 lbs   Past 2 days he is on 40 mg lasix twice a day   Daughter asking that they call for direction from our office

## 2023-02-03 NOTE — TELEPHONE ENCOUNTER
Discussed the information below with Sindy Willard CNP. (Dr. Glendy Hannah out of office at the time.)  Patient has appt. In [facility] on 2/9. Today's B/P 117/69 and Heart rate 67. Patient gets BMP on Tuesdays. Potassium 3.2 on 1/27/23  Per verbal order of PINKY Ricketts CNP patient is to have repeat BMP before 2/9 office visit. Lasix increased from 40 mg BID to 80 mg BID. Potassium increased from 20 meq daily to 20 meq BID. Relayed to above information to Sawyer Connell at [facility]. Voiced understanding.

## 2023-02-04 ENCOUNTER — HOSPITAL ENCOUNTER (OUTPATIENT)
Age: 88
Setting detail: SPECIMEN
End: 2023-02-04
Payer: MEDICARE

## 2023-02-04 LAB
BACTERIA: NEGATIVE /HPF
BILIRUBIN URINE: NEGATIVE MG/DL
BLOOD, URINE: ABNORMAL
CALCIUM OXALATE CRYSTALS: ABNORMAL /HPF
CLARITY: ABNORMAL
COLOR: YELLOW
GLUCOSE, URINE: NEGATIVE MG/DL
KETONES, URINE: NEGATIVE MG/DL
LEUKOCYTE ESTERASE, URINE: ABNORMAL
NITRITE URINE, QUANTITATIVE: NEGATIVE
PH, URINE: 7 (ref 5–8)
PROTEIN UA: 30 MG/DL
RBC URINE: 27 /HPF (ref 0–3)
SPECIFIC GRAVITY UA: 1.01 (ref 1–1.03)
SQUAMOUS EPITHELIAL: <1 /HPF
TRICHOMONAS: ABNORMAL /HPF
UROBILINOGEN, URINE: 1 MG/DL (ref 0.2–1)
WBC CLUMP: ABNORMAL /HPF
WBC UA: 350 /HPF (ref 0–2)

## 2023-02-05 LAB
CULTURE: ABNORMAL
Lab: ABNORMAL
SPECIMEN: ABNORMAL

## 2023-02-07 ENCOUNTER — HOSPITAL ENCOUNTER (OUTPATIENT)
Dept: SPEECH THERAPY | Age: 88
Setting detail: THERAPIES SERIES
Discharge: HOME | End: 2023-02-07
Payer: MEDICARE

## 2023-02-07 ENCOUNTER — HOSPITAL ENCOUNTER (OUTPATIENT)
Dept: GENERAL RADIOLOGY | Age: 88
Discharge: HOME | End: 2023-02-07
Payer: MEDICARE

## 2023-02-07 ENCOUNTER — HOSPITAL ENCOUNTER (OUTPATIENT)
Age: 88
Setting detail: SPECIMEN
Discharge: HOME | End: 2023-02-07
Payer: MEDICARE

## 2023-02-07 DIAGNOSIS — R13.10 PROBLEMS WITH SWALLOWING AND MASTICATION: Primary | ICD-10-CM

## 2023-02-07 DIAGNOSIS — R13.10 PROBLEMS WITH SWALLOWING AND MASTICATION: ICD-10-CM

## 2023-02-07 LAB
ALBUMIN SERPL-MCNC: 3.5 GM/DL (ref 3.4–5)
ALP BLD-CCNC: 74 IU/L (ref 40–128)
ALT SERPL-CCNC: 33 U/L (ref 10–40)
ANION GAP SERPL CALCULATED.3IONS-SCNC: 7 MMOL/L (ref 4–16)
AST SERPL-CCNC: 35 IU/L (ref 15–37)
BILIRUB SERPL-MCNC: 0.6 MG/DL (ref 0–1)
BUN SERPL-MCNC: 22 MG/DL (ref 6–23)
CALCIUM SERPL-MCNC: 9.2 MG/DL (ref 8.3–10.6)
CHLORIDE BLD-SCNC: 103 MMOL/L (ref 99–110)
CO2: 37 MMOL/L (ref 21–32)
CREAT SERPL-MCNC: 1.4 MG/DL (ref 0.9–1.3)
GFR SERPL CREATININE-BSD FRML MDRD: 48 ML/MIN/1.73M2
GLUCOSE SERPL-MCNC: 80 MG/DL (ref 70–99)
MAGNESIUM: 2.4 MG/DL (ref 1.8–2.4)
POTASSIUM SERPL-SCNC: 3.2 MMOL/L (ref 3.5–5.1)
SODIUM BLD-SCNC: 147 MMOL/L (ref 135–145)
TOTAL PROTEIN: 5.6 GM/DL (ref 6.4–8.2)

## 2023-02-07 PROCEDURE — 83735 ASSAY OF MAGNESIUM: CPT

## 2023-02-07 PROCEDURE — 74230 X-RAY XM SWLNG FUNCJ C+: CPT

## 2023-02-07 PROCEDURE — 80053 COMPREHEN METABOLIC PANEL: CPT

## 2023-02-07 PROCEDURE — 92526 ORAL FUNCTION THERAPY: CPT | Performed by: SPEECH-LANGUAGE PATHOLOGIST

## 2023-02-07 PROCEDURE — 92611 MOTION FLUOROSCOPY/SWALLOW: CPT | Performed by: SPEECH-LANGUAGE PATHOLOGIST

## 2023-02-07 PROCEDURE — 36415 COLL VENOUS BLD VENIPUNCTURE: CPT

## 2023-02-07 NOTE — PROCEDURES
INSTRUMENTAL SWALLOW REPORT  MODIFIED BARIUM SWALLOW    Thank you for referring Jeremy Blue    10/5/1933 for a modified barium swallow study. Please see attached results and contact me at your convenience if you have any questions or concerns. Avtar Stone, SLP, MA, CCC-SLP  Speech/Language Pathologist  [facility]  Department of [address] [facility] Pathology  [phone]  2023  10:38 AM      NAME: Jeremy Blue   : 10/5/1933  MRN: [id number]       Date of Eval: 2023     Ordering Physician: Dr. Martinez Lung  Radiologist: Dr. Kesha Madrigal     Referring Diagnosis(es): Referring Diagnosis: R13.10    Impressions:  Jeremy Blue was seen for an OP MBSS. OP study was recommended by speech therapy on recent hospital admission. Pt was accompanied by his dtr who is unaware of any current dysphagia. Pt reports he does have difficulty swallowing but was unable to provide any further detail. Pt was seated at 90 degrees upright and filmed in the lateral view. Dr. Kesha Madrigal was in attendance and operated fluoroscopy. The oral phase of the swallow was [facility] with mildly prolonged but adequate mastication for regular solids. Bolus acceptance, formation, a-p transit and clearance were WFL. Mild to moderate pharyngoesophageal dysphagia characterized by delayed swallowing initiation, reduced laryngeal elevation, hyoid excursion, epiglottic inversion, tongue base retraction, and reduced UES relaxation. Deficits resulted in penetration of material from the pyriforms to the vocal folds and trace aspiration below the folds for thins by straw and rapid continuous cup sips. Pt demonstrated appropriate sensitivity and produced a spontaneous cough response. Severe residue in the valleculae for pureed and regular solids which did not clear with repetitive swallow, rumination and \"re-swallow. \"  A chin-tuck posture and liquid bolus were effective for clearing.   Esophageal sweep completed for further screening with noted CP bar (noted at the UES throughout the study), tertiary contractions and backflow from the distal esophagus. 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 Recommend:  Soft and bite-size vs Regular/Thins by single CUP sips. Chin-tuck vs alternating solids and liquids (chin-tuck not effective for reducing deep penetration/aspiration with liquids). Aspiration precautions. Speech pathology for diet tolerance monitoring and therapeutic exercises (no effortful swallows due to reduced UES opening that may result in diverticulum). May consider referral to GI for further w/u of esophagus if appropriate, depending upon pt's goals of care. Past Medical History:  has a past medical history of ACTA2-related familial thoracic aortic aneurysm, Arrhythmia, Atrial fibrillation (HCC), BBBB (bilateral bundle branch block), Bradycardia, CHF (congestive heart failure) ([address] Essential hypertension, malignant, Fall at home, H/O echocardiogram, History of cardioversion, History of chest x-ray, History of CT scan, History of echocardiogram, History of EKG, History of EKG, History of Holter monitoring, History of stress test, Hx of echocardiogram, Hx of echocardiogram, Hx of transesophageal echocardiography (SIMONE) for monitoring, Hyperlipemia, Hyperlipidemia, Hypertension, Nonspecific abnormal electrocardiogram (ECG) (EKG), Persistent atrial fibrillation ([address] Sick sinus syndrome ([address] and Vertigo. Past Surgical History:  has a past surgical history that includes Pacemaker insertion (Left, 03/29/2018); CYSTOSCOPY INSERTION / REMOVAL STENT / STONE (Left, 5/6/2019); and Upper gastrointestinal endoscopy (N/A, 8/20/2020).     Date of Prior Study: n/a  Type of Study: Initial MBS  Results of Prior Study: n/a    Recent CXR/CT of Chest: see chart for recent CXR 1/22/23    Patient Complaints/Reason for Referral:  Lawrence Blair was referred for a MBS to assess the efficiency of his/her swallow function, assess for aspiration, and to make recommendations regarding safe dietary consistencies, effective compensatory strategies, and safe eating environment. Patient complaints: \"its hard to swallow\"    Onset of problem:  not reported    Behavior/Cognition/Vision/Hearing:  Behavior/Cognition: Cooperative; Alert;Pleasant mood  Vision: Impaired  Hearing: Exceptions to [facility]  Hearing Exceptions: Bilateral hearing aid        Treatment Dx and ICD 10: R13.10  Patient Position: Lateral and    Consistencies Administered: Regular;Pureed;Mildly Thick straw; Thin straw; Thin teaspoon; Thin cup  Dysphagia Outcome Severity Scale: Level 4: Mild moderate dysphagia- Intermittent supervision/cueing. One - two diet consistencies restricted  Penetration-Aspiration Scale (PAS): 5 - Material enters the airway, contacts the vocal folds, and is not ejected into the airway    Recommended Diet:  Soft and Bite-size/Thins with by cup     Safe Swallow Protocol:  Compensatory Swallowing Strategies : No straws;Upright as possible for all oral intake; Chin tuck; Alternate solids and liquids    Recommendations/Treatment  Requires SLP Intervention: Yes    Recommended Exercises:   Therapeutic Interventions: Diet tolerance monitoring;Pharyngeal exercises; Patient/Family education; Laryngeal exercises    Education: Images and recommendations were reviewed with pt and dtr following this exam.   Patient Education: results and recommendations completed at length with dtr and pt  Patient Education Response: Needs reinforcement (dtr demonstrated understanding)    Prognosis  Prognosis for safe diet advancement: good  Barriers/Prognosis: multi-factorial dysphagia  Duration/Frequency of Treatment  Duration of Treatment: at the discretion of the treating therapist  Frequency of Treatment: at the discretion of the treating therapist  Safety Devices  Safety Devices in place: Yes  Type of devices:  All fall risk precautions in place  Restraints Initially in Place: No    Goals:    Long Term:   Timeframe for Long-term Goals: Pt will maintain nutritional requirements by mouth without pumonary compromise. Short Term:  Goal 1: Pt will tolerate Soft and bite size diet/Thin liquids by cup without clinical evidence for aspiration 100%  Goal 2: Pt will use chin-tuck vs liquid bolus to clear pharyngeal residue independently or with min cues 100%  Goal 3: Pt will use aspiration precautions for all PO intake 100%  Goal 4: Pt will complete therapeutic exercises to increase pharyngeal clearance and to target airway protection. Goal 5:  Pt and caregiver will demonstrate understanding of results of study and recommendations 100%  Partially Met, pt lacks understanding, dtr demonstrates understanding and assisted with reinforcing with pt.      Esophageal Phase  Esophageal Screen: Impaired  Upper Esophageal Screen- Major Contributing Deficits  Major Contributing Deficits: Reduced Cricopharyngeal Opening    Pain      Therapy Time:   Individual Concurrent Group Co-treatment   Time In  0930         Time Out  1030         Minutes  [address], 2/7/2023, 10:38 AM

## 2023-02-09 ENCOUNTER — OFFICE VISIT (OUTPATIENT)
Dept: CARDIOLOGY CLINIC | Age: 88
End: 2023-02-09
Payer: MEDICARE

## 2023-02-09 VITALS
BODY MASS INDEX: 23.49 KG/M2 | DIASTOLIC BLOOD PRESSURE: 48 MMHG | WEIGHT: 155 LBS | SYSTOLIC BLOOD PRESSURE: 104 MMHG | HEIGHT: 68 IN | RESPIRATION RATE: 16 BRPM | OXYGEN SATURATION: 94 % | HEART RATE: 67 BPM

## 2023-02-09 DIAGNOSIS — I50.32 CHRONIC DIASTOLIC CONGESTIVE HEART FAILURE (HCC): Primary | ICD-10-CM

## 2023-02-09 PROCEDURE — G8427 DOCREV CUR MEDS BY ELIG CLIN: HCPCS | Performed by: NURSE PRACTITIONER

## 2023-02-09 PROCEDURE — 1123F ACP DISCUSS/DSCN MKR DOCD: CPT | Performed by: NURSE PRACTITIONER

## 2023-02-09 PROCEDURE — 99213 OFFICE O/P EST LOW 20 MIN: CPT | Performed by: NURSE PRACTITIONER

## 2023-02-09 PROCEDURE — G8484 FLU IMMUNIZE NO ADMIN: HCPCS | Performed by: NURSE PRACTITIONER

## 2023-02-09 PROCEDURE — G8420 CALC BMI NORM PARAMETERS: HCPCS | Performed by: NURSE PRACTITIONER

## 2023-02-09 PROCEDURE — 1036F TOBACCO NON-USER: CPT | Performed by: NURSE PRACTITIONER

## 2023-02-09 PROCEDURE — 1111F DSCHRG MED/CURRENT MED MERGE: CPT | Performed by: NURSE PRACTITIONER

## 2023-02-09 RX ORDER — TORSEMIDE 20 MG/1
40 TABLET ORAL 2 TIMES DAILY
COMMUNITY

## 2023-02-09 RX ORDER — SPIRONOLACTONE 25 MG/1
25 TABLET ORAL DAILY
COMMUNITY

## 2023-02-09 RX ORDER — SULFAMETHOXAZOLE AND TRIMETHOPRIM 800; 160 MG/1; MG/1
1 TABLET ORAL 2 TIMES DAILY
COMMUNITY

## 2023-02-09 ASSESSMENT — ENCOUNTER SYMPTOMS
WHEEZING: 0
CHEST TIGHTNESS: 0
SHORTNESS OF BREATH: 0

## 2023-02-09 NOTE — PROGRESS NOTES
CHF CLINICAL STAFF DOCUMENTATION    Heart failure education reviewed with patient and post visit instructions added to AVS.     Have you had any Chest Pain? - No    Do you had any Shortness of Breath - Yes  If Yes - When on exertion    Have you had any dizziness - No  When do you feel dizzy none   How long does it last .none  none     Do you have any edema -  Yes  swelling in feet ankles legs      Are you on fluid restrictions -  appetite fair     Amount - 64 oz   Are you on sodium restrictions -  Eats at [employer]. Losing weight   Amount - 2 gm    Do you feel fatigued - Yes    Do you have a cough - no    Lung sounds -    Normal - yes    Abnormal -     Do you have abdominal bloating - no     How is your appetite - fair    Do you have difficulty sleeping - Yes  Able to lie flat? - Yes    Do you have a history of sleep apnea - No  CPAP no    6 min.  Walk:  deferred   Pre heart rate   Time walked  6 minutes   Distance    Post heart rate        Have you had Flu Vaccine - Yes    Have you had Pneumonia Vaccine - Yes

## 2023-02-09 NOTE — PROGRESS NOTES
[address]      Visit Date: 2/9/2023  Cardiologist:  Dr. Heavenly Montoya  Primary Care Physician: Dr. Ari Reis MD    Darren Bang is a 80 y.o. male who presents today for:  CC:   1. Chronic diastolic congestive heart failure (HCC)        HPI:   Darren Bang is a 80 y.o. male who presents to the office for a follow up visit in the heart failure clinic. Patient was recently admitted to the hospital for acute decompensated heart failure. He had SABIHA with UTI. Patient has valvular heart disease and known atrial fibrillation. He is a resident of [facility]. Chief Complaint   Patient presents with    Congestive Heart Failure     Hospital recently  and 6 month f/u HF     Patient has:  Last hospital admission related to Heart Failure: 1/20/23   baseline BNP 1102     baseline weight 155 lbs   Chest Pain: no  Worsening SOB/orthopnea/PND: yes  Edema: yes  Any extra diuretic use: no  Weight gain: no  Compliant checking home weight: yes  Fatigue: no  Abdominal bloating: no  Appetite: fair  Difficulty sleeping: no  CARMEN: no  Cough: no  Compliant checking blood pressure: no  Compliant with medication regimen: yes    Vaccinations:    flu No  pneumonia No  COVID 19 Yes      Device: no       Activity: fair  Can you walk 1-2 blocks or do a moderate amount of house/yard work? Yes    NYHA Class: II   Class I   Patients with no limitation of activities; they suffer no symptoms from ordinary activities. Class II   Patients with slight, mild limitation of activity; they are comfortable with rest or with mild exertion.    Class III   Patients with marked limitation of activity; they are comfortable only at rest.   Class IV   Patients who should be at complete rest, confined to bed or chair; any physical activity brings on discomfort and symptoms occur at rest.    Sodium Restrictions: 2g  Fluid Restrictions: 48-64 oz/day  Sodium and fluid restriction compliance: yes      Past Medical History: Diagnosis Date    ACTA2-related familial thoracic aortic aneurysm     Arrhythmia     Atrial fibrillation (HCC)     BBBB (bilateral bundle branch block)     Bradycardia     CHF (congestive heart failure) ([address]     Essential hypertension, malignant     Fall at home     H/O echocardiogram 07/01/2020    EF 50-55%, Mod-Severe AS, Mild PHTN, Aortic Root 3.9cm.(pt had OV after echo)    History of cardioversion 04/17/2018    History of chest x-ray 02/27/2017    Enlargement of the aorta knob which may represent a thoracic aortic aneurysm. Further evaluation w/ a contrast enhanced CT of the chest recommended. no evidence of acute pulmonary process. History of CT scan 02/28/2017    CT Angio Chest: no evidence of pulmonary embolism, ascending thoracic aorta aneurysm measuring 4.8 cm, descending thoracic aoric aneurysm 4.1 cm, bilateral nephrllthiasis, R renal cyst, cholelithiasis, cardiomegaly, low attenuated lesion is noted w/in L lobe of thyroid gland containng Calcification. Recommend thyroid US. History of echocardiogram 03/27/2017    TTE EF 55%, LV Normal Size, borderline LVH concentric, Normal LV systolic function, transmittal doppler flow pttern suggest impaired LV relaxation Mild aortic stenosis, mild aortic regurgitation. History of EKG 02/27/2017    Time 12:03 AM, HR 75, A fib, Axis normal, Intervals normal, P waves normal, St-T Segments: nonspecific ST segment changes to the anterior lateral leads, QRS RBBB,  No significant Q waves, no ectopy. A-fib w/RBBB w/nonspecific ST segment change EKG. History of EKG 02/27/2017    Time 2:58AM: HR 59, NSR, Axis normal, Intervals normal, P waves normal, ST-T seg: nonspecific ST segment changes, QRS RBBB, no significant Q waves, no ectopy. Sinus bradycardia w/ RBBB nonspecific ST Segment changes EKG    History of Holter monitoring 11/08/2017    monitored 48 hours: Patient noted to have multiple PAC and PVCs.  Risk of atrial fibrillation present given previous episode Recommend antiarrhythmic therapy. History of stress test 03/27/2017    NM Stress test: EF 54%, Abnormal study, evidence of mild ischemia in mild inferior regions. post stress LV is enlarged in size. Post-stress LV function is normal.     Hx of echocardiogram 08/05/2021    of 50 %. Dilatation of the aortic root measuring 3.9 cm. Dilatation of the ascending aorta measuring 4.3 cm. Hx of echocardiogram 12/08/2022    Left ventricular function and size is normal, EF is estimated at 55-60%. Moderate left ventricular hypertrophy. No regional wall motion abnormalities were detected. PPM wiring visualized within the right side of the heart. Moderately sclerotic aortic valve with mild aortic stenosis ,mean gradient. of 12mmhg,PRATEEK 1.7cm2. Mitral annular calcification is present. Hx of transesophageal echocardiography (SIMONE) for monitoring 04/17/2018    EF45-50% mild TR, mild-mod MR, mod-severe AS    Hyperlipemia     Hyperlipidemia     Hypertension     Nonspecific abnormal electrocardiogram (ECG) (EKG)     Persistent atrial fibrillation (HCC)     Sick sinus syndrome ([address]     Vertigo      Past Surgical History:   Procedure Laterality Date    CYSTOSCOPY INSERTION / REMOVAL STENT / STONE Left 5/6/2019    CYSTOSCOPY RETROGRADE PYELOGRAM STENT INSERTION, DIAGNOSTIC CYSTOSCOPY performed by Mariely Hansen MD at [address] [place] [address] Left 03/29/2018    Dual Chamber [employer] Aura XT DR AMI Kincaid    UPPER GASTROINTESTINAL ENDOSCOPY N/A 8/20/2020    EGD DIAGNOSTIC ONLY performed by Beena Reed MD at [place] [id number] reviewed. No pertinent family history.   Social History     Tobacco Use    Smoking status: Never    Smokeless tobacco: Never   Substance Use Topics    Alcohol use: Not Currently     Comment: Caffiene - Coffee/day     Current Outpatient Medications   Medication Sig Dispense Refill    torsemide (DEMADEX) 20 MG tablet Take 40 mg by mouth in the morning and at bedtime      spironolactone (ALDACTONE) 25 MG tablet Take 25 mg by mouth daily      sulfamethoxazole-trimethoprim (BACTRIM DS;SEPTRA DS) 800-160 MG per tablet Take 1 tablet by mouth 2 times daily For 14 days      potassium chloride (KLOR-CON M) 20 MEQ extended release tablet Take 20 mEq by mouth 2 times daily To be discontinued 3 days after starting Spironolactone      melatonin 3 MG TABS tablet Take 1 tablet by mouth nightly as needed (sleep) 30 tablet 3    lactulose (CHRONULAC) 10 GM/15ML solution Take 60 mLs by mouth daily      simethicone (MYLICON) 80 MG chewable tablet Take 80 mg by mouth 4 times daily      bisacodyl (DULCOLAX) 10 MG suppository Place 10 mg rectally daily as needed for Constipation      Lactobacillus (ACIDOPHILUS/PECTIN PO) Take 1 capsule by mouth daily      Dextromethorphan-guaiFENesin (GUAIFENESIN DM PO) Take 10 mLs by mouth every 4 hours as needed (cough) 100-10 mg/5ml      aspirin 81 MG EC tablet Take 81 mg by mouth daily      pantoprazole (PROTONIX) 40 MG tablet Take 40 mg by mouth daily      acetaminophen (TYLENOL) 325 MG tablet Take 650 mg by mouth every 4 hours as needed for Pain For pain or temp       metoprolol tartrate (LOPRESSOR) 25 MG tablet Take 0.5 tablets by mouth 2 times daily (Patient taking differently: Take 12.5 mg by mouth daily Hold if SBP < 110 or HR is < 55) 30 tablet 0    finasteride (PROSCAR) 5 MG tablet Take 1 tablet by mouth daily 30 tablet 0    amiodarone (CORDARONE) 200 MG tablet Take 1 tablet by mouth daily (Patient taking differently: Take 200 mg by mouth nightly) 30 tablet 3    rivastigmine (EXELON) 4.6 MG/24HR Place 1 patch onto the skin daily       Cholecalciferol (VITAMIN D3) 5000 units TABS Take 5,000 Units by mouth every morning      Omega-3 Fatty Acids (FISH OIL) 1000 MG CAPS Take 1,000 mg by mouth daily       Multiple Vitamins-Minerals (THERAPEUTIC MULTIVITAMIN-MINERALS) tablet Take 1 tablet by mouth daily      Polyvinyl Alcohol-Povidone (REFRESH OP) Apply 1 drop to eye as needed For dry eyes       No current facility-administered medications for this visit. Allergies   Allergen Reactions    Biaxin [Clarithromycin]     Cephalexin     Viagra [Sildenafil Citrate] Nausea Only and Other (See Comments)     Dizziness and BP dropped       SUBJECTIVE:     Review of Systems   Respiratory:  Negative for chest tightness, shortness of breath and wheezing. Cardiovascular:  Negative for chest pain, palpitations and leg swelling. Neurological:  Negative for dizziness, syncope and light-headedness. OBJECTIVE:   Today's Vitals:  BP (!) 104/48 (Site: Left Upper Arm, Position: Sitting, Cuff Size: Medium Adult)   Pulse 67   Resp 16   Ht 5' 8\" (1.727 m)   Wt 155 lb (70.3 kg)   SpO2 94%   BMI 23.57 kg/m²     Wt Readings from Last 3 Encounters:   02/09/23 155 lb (70.3 kg)   02/06/23 158 lb 3.2 oz (71.8 kg)   01/22/23 160 lb 11.5 oz (72.9 kg)     BP Readings from Last 3 Encounters:   02/09/23 (!) 104/48   02/06/23 (!) 117/58   01/23/23 122/67     Pulse Readings from Last 3 Encounters:   02/09/23 67   02/06/23 60   01/23/23 71     Body mass index is 23.57 kg/m². Physical Exam  Eyes:      Extraocular Movements: Extraocular movements intact. Neck:      Vascular: No carotid bruit. Cardiovascular:      Rate and Rhythm: Normal rate and regular rhythm. Pulses: Normal pulses. Heart sounds: Normal heart sounds. Pulmonary:      Effort: Pulmonary effort is normal.      Breath sounds: Normal breath sounds. No wheezing. Abdominal:      General: There is no distension. Palpations: Abdomen is soft. Tenderness: There is no abdominal tenderness. Musculoskeletal:      Right lower leg: Edema present. Left lower leg: Edema present. Skin:     General: Skin is warm and dry. Capillary Refill: Capillary refill takes less than 2 seconds. Neurological:      General: No focal deficit present. Mental Status: He is alert.    Psychiatric:         Behavior: Behavior normal. 6 minute walk test:  Time walked 6 minutes  Distance walked 700 feet  Required Oxygen No    Most recent ECHO:12/8/22  Left ventricular function and size is normal, EF is estimated at 55-60%. Moderate left ventricular hypertrophy. Diastolic dysfunction could not be evaluated due to arrhythmia. No regional wall motion abnormalities were detected. Mildly dilated left atrium. PPM wiring visualized within the right side of the heart. Moderately sclerotic aortic valve with mild aortic stenosis ,mean gradient   of 12mmhg,PRATEEK 1.7cm2. Mitral annular calcification is present. Mild mitral, tricuspid and moderate aortic regurgitation is present. RVSP is 21 mmHg. Dilation of the aortic root (4.0cm)and the ascending aorta(4.6cm). No evidence of pericardial effusion.     Results reviewed:  BNP:   Lab Results   Component Value Date    PROBNP 22,975 (H) 01/20/2023     CBC:   Lab Results   Component Value Date/Time    WBC 12.2 01/27/2023 05:15 AM    RBC 4.69 01/27/2023 05:15 AM    HGB 13.5 01/31/2023 12:00 AM    HCT 43.4 01/27/2023 05:15 AM     01/27/2023 05:15 AM     CMP:    Lab Results   Component Value Date/Time     02/07/2023 07:15 AM    K 3.2 02/07/2023 07:15 AM     02/07/2023 07:15 AM    CO2 37 02/07/2023 07:15 AM    BUN 22 02/07/2023 07:15 AM    CREATININE 1.4 02/07/2023 07:15 AM    GFRAA 41 08/02/2022 06:53 AM    LABGLOM 48 02/07/2023 07:15 AM    GLUCOSE 80 02/07/2023 07:15 AM    CALCIUM 9.2 02/07/2023 07:15 AM     Hepatic Function Panel:    Lab Results   Component Value Date/Time    ALKPHOS 74 02/07/2023 07:15 AM    ALT 33 02/07/2023 07:15 AM    AST 35 02/07/2023 07:15 AM    PROT 5.6 02/07/2023 07:15 AM    BILITOT 0.6 02/07/2023 07:15 AM    BILIDIR 0.5 11/16/2021 08:36 AM    IBILI 0.6 11/16/2021 08:36 AM    LABALBU 3.5 02/07/2023 07:15 AM     Magnesium:    Lab Results   Component Value Date/Time    MG 2.4 02/07/2023 07:15 AM     PT/INR:    Lab Results   Component Value Date/Time PROTIME 14.7 08/19/2020 01:27 AM    INR 1.21 08/19/2020 01:27 AM     Lipids:    Lab Results   Component Value Date/Time    TRIG 72 05/24/2019 06:32 AM    HDL 47 05/24/2019 06:32 AM    LDLDIRECT 86 05/24/2019 06:32 AM       Iron Studies:  No components found for: FE,  TIBC,  FERRITIN    Iron Deficiency Anemia:  No IV Iron Therapy:  No  2017 ACC/AHA HF Guidelines:   intravenous iron replacement in patients with New York Heart Association (NYHA) class II and III HF and iron deficiency (ferritin <100 ng/ml or 100-300 ng/ml if transferrin saturation <20%), to improve functional status and QoL. ASSESSMENT AND PLAN:       1.              . Chronic diastolic congestive heart failure (HCC)  -EF 55%  -Chest had a weight gain of 4 pounds and Lasix was adjusted but then patient had follow-up with nephrology. Patient is now down 5 lbs since hospitalization. Dr. Fouzia Gaytan recently changed Lasix to torsemide 40 BID and added spironolactone 25 mg daily. Patient was to stop potassium supplement after 3 days. Continue with current medications. Recommend to continue as directed. Weight gain of 3 pounds in 1 day or 5 pounds in 1 week  Increased shortness of breath  Shortness of breath while laying down  Cough  Chest pain  Swelling in feet, ankles or legs  Tenderness or bloating in the abdomen  Fatigue   Decreased appetite or nausea   Confusion           Patient was instructed to call the Heart Failure Clinic for changes in the following symptoms:   Weight gain of 3 pounds in 1 day or 5 pounds in 1 week  Increased shortness of breath  Shortness of breath while laying down  Cough  Chest pain  Swelling in feet, ankles or legs  Tenderness or bloating in the abdomen  Fatigue   Decreased appetite or nausea   Confusion         Patient given educational materials - see patient instructions. We discussed the importance of weighing oneself and recording daily.  We also discussed the importance of a lowsodium diet, higher sodium foods to avoid and better low sodium food options. Discussed use, benefit, and side effects of prescribed medications. All patient questions answered. Patient verbalizes understanding of plan of care using teach back method, and is agreeable to the treatment plan. Copy of note to be sent to consulting provider and primary cardiologist   Electronicallysigned by Chidi Parisi.  CARI Davis CNP on 2/9/2023 at 2:10 PM

## 2023-02-14 ENCOUNTER — HOSPITAL ENCOUNTER (OUTPATIENT)
Age: 88
Setting detail: SPECIMEN
Discharge: HOME | End: 2023-02-14
Payer: MEDICARE

## 2023-02-14 LAB
ANION GAP SERPL CALCULATED.3IONS-SCNC: 7 MMOL/L (ref 4–16)
BUN SERPL-MCNC: 32 MG/DL (ref 6–23)
CALCIUM SERPL-MCNC: 8.9 MG/DL (ref 8.3–10.6)
CHLORIDE BLD-SCNC: 96 MMOL/L (ref 99–110)
CO2: 32 MMOL/L (ref 21–32)
CREAT SERPL-MCNC: 2.3 MG/DL (ref 0.9–1.3)
GFR SERPL CREATININE-BSD FRML MDRD: 26 ML/MIN/1.73M2
GLUCOSE SERPL-MCNC: 77 MG/DL (ref 70–99)
POTASSIUM SERPL-SCNC: 4.8 MMOL/L (ref 3.5–5.1)
SODIUM BLD-SCNC: 135 MMOL/L (ref 135–145)

## 2023-02-14 PROCEDURE — 36415 COLL VENOUS BLD VENIPUNCTURE: CPT

## 2023-02-14 PROCEDURE — 80048 BASIC METABOLIC PNL TOTAL CA: CPT

## 2023-02-21 ENCOUNTER — HOSPITAL ENCOUNTER (OUTPATIENT)
Age: 88
Setting detail: SPECIMEN
Discharge: HOME | End: 2023-02-21
Payer: MEDICARE

## 2023-02-21 LAB
ANION GAP SERPL CALCULATED.3IONS-SCNC: 11 MMOL/L (ref 4–16)
BUN SERPL-MCNC: 37 MG/DL (ref 6–23)
CALCIUM SERPL-MCNC: 8.7 MG/DL (ref 8.3–10.6)
CHLORIDE BLD-SCNC: 100 MMOL/L (ref 99–110)
CO2: 29 MMOL/L (ref 21–32)
CREAT SERPL-MCNC: 1.8 MG/DL (ref 0.9–1.3)
GFR SERPL CREATININE-BSD FRML MDRD: 36 ML/MIN/1.73M2
GLUCOSE SERPL-MCNC: 73 MG/DL (ref 70–99)
POTASSIUM SERPL-SCNC: 4.1 MMOL/L (ref 3.5–5.1)
SODIUM BLD-SCNC: 140 MMOL/L (ref 135–145)

## 2023-02-21 PROCEDURE — 80048 BASIC METABOLIC PNL TOTAL CA: CPT

## 2023-02-21 PROCEDURE — 36415 COLL VENOUS BLD VENIPUNCTURE: CPT

## 2023-02-28 ENCOUNTER — HOSPITAL ENCOUNTER (OUTPATIENT)
Age: 88
Setting detail: SPECIMEN
Discharge: HOME | End: 2023-02-28
Payer: MEDICARE

## 2023-02-28 LAB
ANION GAP SERPL CALCULATED.3IONS-SCNC: 11 MMOL/L (ref 4–16)
BUN SERPL-MCNC: 33 MG/DL (ref 6–23)
CALCIUM SERPL-MCNC: 8.9 MG/DL (ref 8.3–10.6)
CHLORIDE BLD-SCNC: 99 MMOL/L (ref 99–110)
CO2: 31 MMOL/L (ref 21–32)
CREAT SERPL-MCNC: 1.6 MG/DL (ref 0.9–1.3)
GFR SERPL CREATININE-BSD FRML MDRD: 41 ML/MIN/1.73M2
GLUCOSE SERPL-MCNC: 86 MG/DL (ref 70–99)
POTASSIUM SERPL-SCNC: 3.8 MMOL/L (ref 3.5–5.1)
SODIUM BLD-SCNC: 141 MMOL/L (ref 135–145)

## 2023-02-28 PROCEDURE — 36415 COLL VENOUS BLD VENIPUNCTURE: CPT

## 2023-02-28 PROCEDURE — 80048 BASIC METABOLIC PNL TOTAL CA: CPT

## 2023-03-06 ENCOUNTER — PROCEDURE VISIT (OUTPATIENT)
Dept: CARDIOLOGY CLINIC | Age: 88
End: 2023-03-06

## 2023-03-06 DIAGNOSIS — I49.5 SINUS NODE DYSFUNCTION (HCC): ICD-10-CM

## 2023-03-06 DIAGNOSIS — Z95.0 CARDIAC PACEMAKER IN SITU: Primary | ICD-10-CM

## 2023-03-06 DIAGNOSIS — I44.0 AV BLOCK, 1ST DEGREE: ICD-10-CM

## 2023-03-07 ENCOUNTER — HOSPITAL ENCOUNTER (OUTPATIENT)
Age: 88
Setting detail: SPECIMEN
Discharge: HOME | End: 2023-03-07
Payer: MEDICARE

## 2023-03-07 LAB
ANION GAP SERPL CALCULATED.3IONS-SCNC: 14 MMOL/L (ref 4–16)
BUN SERPL-MCNC: 35 MG/DL (ref 6–23)
CALCIUM SERPL-MCNC: 8.9 MG/DL (ref 8.3–10.6)
CHLORIDE BLD-SCNC: 98 MMOL/L (ref 99–110)
CO2: 28 MMOL/L (ref 21–32)
CREAT SERPL-MCNC: 1.7 MG/DL (ref 0.9–1.3)
GFR SERPL CREATININE-BSD FRML MDRD: 38 ML/MIN/1.73M2
GLUCOSE SERPL-MCNC: 70 MG/DL (ref 70–99)
POTASSIUM SERPL-SCNC: 4.1 MMOL/L (ref 3.5–5.1)
SODIUM BLD-SCNC: 140 MMOL/L (ref 135–145)

## 2023-03-07 PROCEDURE — 80048 BASIC METABOLIC PNL TOTAL CA: CPT

## 2023-03-07 PROCEDURE — 36415 COLL VENOUS BLD VENIPUNCTURE: CPT

## 2023-03-14 ENCOUNTER — HOSPITAL ENCOUNTER (OUTPATIENT)
Age: 88
Setting detail: SPECIMEN
Discharge: HOME | End: 2023-03-14
Payer: MEDICARE

## 2023-03-14 LAB
ANION GAP SERPL CALCULATED.3IONS-SCNC: 9 MMOL/L (ref 4–16)
BUN SERPL-MCNC: 30 MG/DL (ref 6–23)
CALCIUM SERPL-MCNC: 8.8 MG/DL (ref 8.3–10.6)
CHLORIDE BLD-SCNC: 99 MMOL/L (ref 99–110)
CO2: 31 MMOL/L (ref 21–32)
CREAT SERPL-MCNC: 1.6 MG/DL (ref 0.9–1.3)
GFR SERPL CREATININE-BSD FRML MDRD: 41 ML/MIN/1.73M2
GLUCOSE SERPL-MCNC: 75 MG/DL (ref 70–99)
POTASSIUM SERPL-SCNC: 3.7 MMOL/L (ref 3.5–5.1)
SODIUM BLD-SCNC: 139 MMOL/L (ref 135–145)

## 2023-03-14 PROCEDURE — 36415 COLL VENOUS BLD VENIPUNCTURE: CPT

## 2023-03-14 PROCEDURE — 80048 BASIC METABOLIC PNL TOTAL CA: CPT

## 2023-03-16 ENCOUNTER — HOSPITAL ENCOUNTER (OUTPATIENT)
Age: 88
Setting detail: SPECIMEN
Discharge: HOME | End: 2023-03-16
Payer: MEDICARE

## 2023-03-16 PROCEDURE — 87804 INFLUENZA ASSAY W/OPTIC: CPT

## 2023-03-16 PROCEDURE — U0005 INFEC AGEN DETEC AMPLI PROBE: HCPCS

## 2023-03-16 PROCEDURE — U0003 INFECTIOUS AGENT DETECTION BY NUCLEIC ACID (DNA OR RNA); SEVERE ACUTE RESPIRATORY SYNDROME CORONAVIRUS 2 (SARS-COV-2) (CORONAVIRUS DISEASE [COVID-19]), AMPLIFIED PROBE TECHNIQUE, MAKING USE OF HIGH THROUGHPUT TECHNOLOGIES AS DESCRIBED BY CMS-2020-01-R: HCPCS

## 2023-03-17 LAB
RAPID INFLUENZA  B AGN: NEGATIVE
RAPID INFLUENZA A AGN: NEGATIVE
SARS-COV-2 RNA RESP QL NAA+PROBE: NOT DETECTED
SOURCE: NORMAL

## 2023-03-21 ENCOUNTER — HOSPITAL ENCOUNTER (OUTPATIENT)
Age: 88
Setting detail: SPECIMEN
Discharge: HOME | End: 2023-03-21
Payer: MEDICARE

## 2023-03-21 LAB
ANION GAP SERPL CALCULATED.3IONS-SCNC: 9 MMOL/L (ref 4–16)
BUN SERPL-MCNC: 34 MG/DL (ref 6–23)
CALCIUM SERPL-MCNC: 8.9 MG/DL (ref 8.3–10.6)
CHLORIDE BLD-SCNC: 99 MMOL/L (ref 99–110)
CO2: 32 MMOL/L (ref 21–32)
CREAT SERPL-MCNC: 1.6 MG/DL (ref 0.9–1.3)
CULTURE: NORMAL
GFR SERPL CREATININE-BSD FRML MDRD: 41 ML/MIN/1.73M2
GLUCOSE SERPL-MCNC: 85 MG/DL (ref 70–99)
Lab: NORMAL
POTASSIUM SERPL-SCNC: 3.8 MMOL/L (ref 3.5–5.1)
SODIUM BLD-SCNC: 140 MMOL/L (ref 135–145)
SPECIMEN: NORMAL
STREP A DIRECT SCREEN: NEGATIVE

## 2023-03-21 PROCEDURE — 80048 BASIC METABOLIC PNL TOTAL CA: CPT

## 2023-03-21 PROCEDURE — 36415 COLL VENOUS BLD VENIPUNCTURE: CPT

## 2023-03-22 ENCOUNTER — OFFICE VISIT (OUTPATIENT)
Dept: CARDIOLOGY CLINIC | Age: 88
End: 2023-03-22
Payer: MEDICARE

## 2023-03-22 VITALS
HEIGHT: 68 IN | WEIGHT: 158 LBS | DIASTOLIC BLOOD PRESSURE: 44 MMHG | SYSTOLIC BLOOD PRESSURE: 104 MMHG | BODY MASS INDEX: 23.95 KG/M2 | HEART RATE: 65 BPM

## 2023-03-22 DIAGNOSIS — Z95.0 S/P PLACEMENT OF CARDIAC PACEMAKER: ICD-10-CM

## 2023-03-22 DIAGNOSIS — I71.21 ANEURYSM OF ASCENDING AORTA WITHOUT RUPTURE (HCC): ICD-10-CM

## 2023-03-22 DIAGNOSIS — I48.19 PERSISTENT ATRIAL FIBRILLATION (HCC): Primary | ICD-10-CM

## 2023-03-22 DIAGNOSIS — I50.32 CHRONIC DIASTOLIC CONGESTIVE HEART FAILURE (HCC): ICD-10-CM

## 2023-03-22 DIAGNOSIS — I38 VHD (VALVULAR HEART DISEASE): ICD-10-CM

## 2023-03-22 PROCEDURE — 1123F ACP DISCUSS/DSCN MKR DOCD: CPT | Performed by: INTERNAL MEDICINE

## 2023-03-22 PROCEDURE — 93000 ELECTROCARDIOGRAM COMPLETE: CPT | Performed by: INTERNAL MEDICINE

## 2023-03-22 PROCEDURE — G8484 FLU IMMUNIZE NO ADMIN: HCPCS | Performed by: INTERNAL MEDICINE

## 2023-03-22 PROCEDURE — 99213 OFFICE O/P EST LOW 20 MIN: CPT | Performed by: INTERNAL MEDICINE

## 2023-03-22 PROCEDURE — 1036F TOBACCO NON-USER: CPT | Performed by: INTERNAL MEDICINE

## 2023-03-22 PROCEDURE — G8420 CALC BMI NORM PARAMETERS: HCPCS | Performed by: INTERNAL MEDICINE

## 2023-03-22 PROCEDURE — G8427 DOCREV CUR MEDS BY ELIG CLIN: HCPCS | Performed by: INTERNAL MEDICINE

## 2023-03-22 NOTE — PROGRESS NOTES
Vein \"LEG PROBLEM Questionnaire\"  Do you have prominent leg veins? Yes  Do you have any skin discoloration? Yes  Do you have any healed or active sores? No  Do you have swelling of the legs? Yes  Do you have a family history of varicose veins? 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 Have you been fighting overweight problems? No  8. Do you have restless legs? No  9. Do you have any night time cramps? No  10. Do you have any of the following in your legs:         I  11. If Yes - Have they worn compression stockings No  12.  If they have worn compression stockings      When Pt was started on Amiodarone:      Test                     Date of last test  EKG                     [x]  12/5/22  PFT                      [x]                                                                           CXR                     [x]        12/5/22                                                                  THYROID            [x] 8/2/22                                      LFT                      [x]                                            EYE EXAM          [x]    4/1/22
06:42 AM    BUN 30 03/14/2023 07:22 AM    CREATININE 1.6 03/21/2023 06:42 AM    CREATININE 1.6 03/14/2023 07:22 AM    PROT 5.6 02/07/2023 07:15 AM    PROT 4.9 01/31/2023 05:33 AM     Lab Results   Component Value Date/Time    TSH 2.880 01/24/2023 07:08 AM    TSHHS 4.400 01/21/2023 03:29 AM       QUALITY MEASURES REVIEWED:  1.CAD:Patient is taking anti platelet agent:Yes  Patient does not have Hx of documented CAD  2. DYSLIPIDEMIA: Patient is on cholesterol lowering medication:No  3. Beta-Blocker therapy for CAD, if prior Myocardial Infarction:Yes  4. Counselled regarding smoking cessation. Yes   Patient does not Smoke. 5.Anticoagulation therapy (for A.Fib) No, due to low Hgb          6. Discussed weight management strategies. Assessment & Plan:  Primary / Secondary prevention is the goal by aggressive risk modification, healthy and therapeutic life style changes for cardiovascular risk reduction. CORONARY ARTERY DISEASE:None known. EKG: DDD pacing. 12/2022   Abnormal study. Severely reduced inferior wall perfusion with out significant    reversability. Inferior wall hypokinesis noted with mildly reduced LV function. LVEF is 45    %. Clinical co-relation is recommended. Above results were Reviewed with the Patient. He wants conservative management with medications. HTN:well controlled on current medical regimen, see list above. - changes in  treatment:   no, on Lopressor   CARDIOMYOPATHY: None known   CONGESTIVE HEART FAILURE:  KNOWN HISTORY. Compensated      VHD: Moderate AI  12/2022   Left ventricular function and size is normal, EF is estimated at 55-60%. Moderate left ventricular hypertrophy. Diastolic dysfunction could not be evaluated due to arrhythmia. No regional wall motion abnormalities were detected. Mildly dilated left atrium. PPM wiring visualized within the right side of the heart.    Moderately sclerotic aortic valve with mild aortic stenosis ,mean

## 2023-03-22 NOTE — LETTER
March 22, 2023      MD Avani Solis [facility] Code [id number]      Patient: Lawrence Blair   MR Number: [id number]   YOB: 1933   Date of Visit: 3/22/2023       Dear Zee Reis: Thank you for referring Tamia Cade to me for evaluation/treatment. Below are the relevant portions of my assessment and plan of care. If you have questions, please do not hesitate to call me. I look forward to following Aylin Hanley along with you.     Sincerely,        Jo Ann Martinez MD

## 2023-03-22 NOTE — LETTER
Date: 3/21/2023                       Time:945  Patient Name: Sadaf Romero  : 10/5/1933  MRN# [id number]    REASON FOR VISIT: 3 MO f/u with Spouse (Taking Imdur)  Phone [phone]  Patient Active Problem List    Diagnosis Date Noted    Persistent atrial fibrillation ([facility]     S/P placement of cardiac pacemaker 2018    Acute respiratory failure ([facility] 2023    Troponin I above reference range 2023    VHD (valvular heart disease) 07/15/2021    Acute on chronic anemia 2020    Benign prostatic hyperplasia without lower urinary tract symptoms 2020    Dementia without behavioral disturbance ([facility] 2020    Acute cystitis without hematuria 2019    Chronic kidney disease (CKD) stage G3a/A3, moderately decreased glomerular filtration rate (GFR) between 45-59 mL/min/1.73 square meter and albuminuria creatinine ratio greater than 300 mg/g ([facility]) 2019    Kidney stone 2019    Chronic diastolic congestive heart failure ([facility] 2019    CHF (congestive heart failure), NYHA class I, acute on chronic, combined ([facility] 2019    Ascending aortic aneurysm 2018       Allergies: Biaxin [clarithromycin], Cephalexin, and Viagra [sildenafil citrate]      Current Outpatient Medications   Medication Sig Dispense Refill    torsemide (DEMADEX) 20 MG tablet Take 40 mg by mouth in the morning and at bedtime      spironolactone (ALDACTONE) 25 MG tablet Take 25 mg by mouth daily      sulfamethoxazole-trimethoprim (BACTRIM DS;SEPTRA DS) 800-160 MG per tablet Take 1 tablet by mouth 2 times daily For 14 days      potassium chloride (KLOR-CON M) 20 MEQ extended release tablet Take 20 mEq by mouth 2 times daily To be discontinued 3 days after starting Spironolactone      melatonin 3 MG TABS tablet Take 1 tablet by mouth nightly as needed (sleep) 30 tablet 3    lactulose (CHRONULAC) 10 GM/15ML solution Take 60 mLs by mouth daily      simethicone (MYLICON) 80 MG

## 2023-03-28 ENCOUNTER — HOSPITAL ENCOUNTER (OUTPATIENT)
Age: 88
Setting detail: SPECIMEN
Discharge: HOME | End: 2023-03-28

## 2023-03-31 ENCOUNTER — HOSPITAL ENCOUNTER (OUTPATIENT)
Age: 88
Setting detail: SPECIMEN
Discharge: HOME | End: 2023-03-31
Payer: MEDICARE

## 2023-03-31 LAB
ANION GAP SERPL CALCULATED.3IONS-SCNC: 8 MMOL/L (ref 4–16)
BUN SERPL-MCNC: 38 MG/DL (ref 6–23)
CALCIUM SERPL-MCNC: 8.7 MG/DL (ref 8.3–10.6)
CHLORIDE BLD-SCNC: 101 MMOL/L (ref 99–110)
CO2: 30 MMOL/L (ref 21–32)
CREAT SERPL-MCNC: 1.9 MG/DL (ref 0.9–1.3)
GFR SERPL CREATININE-BSD FRML MDRD: 33 ML/MIN/1.73M2
GLUCOSE SERPL-MCNC: 74 MG/DL (ref 70–99)
POTASSIUM SERPL-SCNC: 4.7 MMOL/L (ref 3.5–5.1)
SODIUM BLD-SCNC: 139 MMOL/L (ref 135–145)

## 2023-03-31 PROCEDURE — 36415 COLL VENOUS BLD VENIPUNCTURE: CPT

## 2023-03-31 PROCEDURE — 80048 BASIC METABOLIC PNL TOTAL CA: CPT

## 2023-04-04 ENCOUNTER — HOSPITAL ENCOUNTER (OUTPATIENT)
Age: 88
Setting detail: SPECIMEN
Discharge: HOME | End: 2023-04-04
Payer: MEDICARE

## 2023-04-04 LAB
ANION GAP SERPL CALCULATED.3IONS-SCNC: 12 MMOL/L (ref 4–16)
BUN SERPL-MCNC: 42 MG/DL (ref 6–23)
CALCIUM SERPL-MCNC: 8.8 MG/DL (ref 8.3–10.6)
CHLORIDE BLD-SCNC: 98 MMOL/L (ref 99–110)
CO2: 29 MMOL/L (ref 21–32)
CREAT SERPL-MCNC: 1.9 MG/DL (ref 0.9–1.3)
GFR SERPL CREATININE-BSD FRML MDRD: 33 ML/MIN/1.73M2
GLUCOSE SERPL-MCNC: 88 MG/DL (ref 70–99)
POTASSIUM SERPL-SCNC: 3.5 MMOL/L (ref 3.5–5.1)
SODIUM BLD-SCNC: 139 MMOL/L (ref 135–145)

## 2023-04-04 PROCEDURE — 80048 BASIC METABOLIC PNL TOTAL CA: CPT

## 2023-04-04 PROCEDURE — 36415 COLL VENOUS BLD VENIPUNCTURE: CPT

## 2023-04-25 ENCOUNTER — HOSPITAL ENCOUNTER (OUTPATIENT)
Age: 88
Setting detail: SPECIMEN
Discharge: HOME | End: 2023-04-25
Payer: MEDICARE

## 2023-04-25 LAB
ALBUMIN SERPL-MCNC: 3.8 GM/DL (ref 3.4–5)
ALP BLD-CCNC: 78 IU/L (ref 40–128)
ALT SERPL-CCNC: 22 U/L (ref 10–40)
ANION GAP SERPL CALCULATED.3IONS-SCNC: 13 MMOL/L (ref 4–16)
AST SERPL-CCNC: 27 IU/L (ref 15–37)
BILIRUB SERPL-MCNC: 0.3 MG/DL (ref 0–1)
BUN SERPL-MCNC: 43 MG/DL (ref 6–23)
CALCIUM SERPL-MCNC: 9 MG/DL (ref 8.3–10.6)
CHLORIDE BLD-SCNC: 98 MMOL/L (ref 99–110)
CO2: 26 MMOL/L (ref 21–32)
CREAT SERPL-MCNC: 2.2 MG/DL (ref 0.9–1.3)
GFR SERPL CREATININE-BSD FRML MDRD: 28 ML/MIN/1.73M2
GLUCOSE SERPL-MCNC: 79 MG/DL (ref 70–99)
HCT VFR BLD CALC: 37.7 % (ref 42–52)
HEMOGLOBIN: 12 GM/DL (ref 13.5–18)
MCH RBC QN AUTO: 28.6 PG (ref 27–31)
MCHC RBC AUTO-ENTMCNC: 31.8 % (ref 32–36)
MCV RBC AUTO: 89.8 FL (ref 78–100)
PDW BLD-RTO: 14.8 % (ref 11.7–14.9)
PLATELET # BLD: 241 K/CU MM (ref 140–440)
PMV BLD AUTO: 10.5 FL (ref 7.5–11.1)
POTASSIUM SERPL-SCNC: 4.8 MMOL/L (ref 3.5–5.1)
RBC # BLD: 4.2 M/CU MM (ref 4.6–6.2)
SODIUM BLD-SCNC: 137 MMOL/L (ref 135–145)
TOTAL PROTEIN: 6.3 GM/DL (ref 6.4–8.2)
WBC # BLD: 7.8 K/CU MM (ref 4–10.5)

## 2023-04-25 PROCEDURE — 80053 COMPREHEN METABOLIC PANEL: CPT

## 2023-04-25 PROCEDURE — 36415 COLL VENOUS BLD VENIPUNCTURE: CPT

## 2023-04-25 PROCEDURE — 85027 COMPLETE CBC AUTOMATED: CPT

## 2023-04-27 ENCOUNTER — OFFICE VISIT (OUTPATIENT)
Dept: CARDIOLOGY CLINIC | Age: 88
End: 2023-04-27
Payer: MEDICARE

## 2023-04-27 VITALS
HEART RATE: 68 BPM | SYSTOLIC BLOOD PRESSURE: 98 MMHG | WEIGHT: 157 LBS | BODY MASS INDEX: 23.79 KG/M2 | HEIGHT: 68 IN | DIASTOLIC BLOOD PRESSURE: 60 MMHG

## 2023-04-27 DIAGNOSIS — I50.32 CHRONIC DIASTOLIC CONGESTIVE HEART FAILURE (HCC): Primary | ICD-10-CM

## 2023-04-27 PROBLEM — I50.43 CHF (CONGESTIVE HEART FAILURE), NYHA CLASS I, ACUTE ON CHRONIC, COMBINED (HCC): Status: RESOLVED | Noted: 2019-05-23 | Resolved: 2023-04-27

## 2023-04-27 PROCEDURE — G8427 DOCREV CUR MEDS BY ELIG CLIN: HCPCS | Performed by: NURSE PRACTITIONER

## 2023-04-27 PROCEDURE — G8420 CALC BMI NORM PARAMETERS: HCPCS | Performed by: NURSE PRACTITIONER

## 2023-04-27 PROCEDURE — 99213 OFFICE O/P EST LOW 20 MIN: CPT | Performed by: NURSE PRACTITIONER

## 2023-04-27 PROCEDURE — 1036F TOBACCO NON-USER: CPT | Performed by: NURSE PRACTITIONER

## 2023-04-27 PROCEDURE — 1123F ACP DISCUSS/DSCN MKR DOCD: CPT | Performed by: NURSE PRACTITIONER

## 2023-04-27 RX ORDER — GENTAMICIN SULFATE 1 MG/G
OINTMENT TOPICAL
COMMUNITY
Start: 2023-04-22

## 2023-04-27 RX ORDER — ALFUZOSIN HYDROCHLORIDE 10 MG/1
TABLET, EXTENDED RELEASE ORAL
COMMUNITY
Start: 2023-04-04

## 2023-04-27 ASSESSMENT — ENCOUNTER SYMPTOMS
CHEST TIGHTNESS: 0
SHORTNESS OF BREATH: 0
WHEEZING: 0

## 2023-04-27 NOTE — PROGRESS NOTES
Atrial Fibrillation CHADSVASC2 Score Stroke Risk:   80 y.o. > 76 - 2    male Male - 0   CHF HX: Yes - 1   HTN HX: Yes - 1   Stroke/TIA/Thromboembolism No - 0   Vascular Disease HX: No - 0   Diabetes Mellitus No - 0   CHADSVASC 2 Score 4      Annual Stroke Risk 4.8%- moderate-high      When Pt was started on Amiodarone:     Test                     Date of last test  EKG                     [x]  3/22/23  PFT                      [x] unknown                                                                        CXR                     [x] 1/22/23                                                                       THYROID            [x]12/28/21                                                LFT                      [x] 4/25/23                                          EYE EXAM          [x] unknown
07/01/2020    EF 50-55%, Mod-Severe AS, Mild PHTN, Aortic Root 3.9cm.(pt had OV after echo)    History of cardioversion 04/17/2018    History of chest x-ray 02/27/2017    Enlargement of the aorta knob which may represent a thoracic aortic aneurysm. Further evaluation w/ a contrast enhanced CT of the chest recommended. no evidence of acute pulmonary process. History of CT scan 02/28/2017    CT Angio Chest: no evidence of pulmonary embolism, ascending thoracic aorta aneurysm measuring 4.8 cm, descending thoracic aoric aneurysm 4.1 cm, bilateral nephrllthiasis, R renal cyst, cholelithiasis, cardiomegaly, low attenuated lesion is noted w/in L lobe of thyroid gland containng Calcification. Recommend thyroid US. History of echocardiogram 03/27/2017    TTE EF 55%, LV Normal Size, borderline LVH concentric, Normal LV systolic function, transmittal doppler flow pttern suggest impaired LV relaxation Mild aortic stenosis, mild aortic regurgitation. History of EKG 02/27/2017    Time 12:03 AM, HR 75, A fib, Axis normal, Intervals normal, P waves normal, St-T Segments: nonspecific ST segment changes to the anterior lateral leads, QRS RBBB,  No significant Q waves, no ectopy. A-fib w/RBBB w/nonspecific ST segment change EKG. History of EKG 02/27/2017    Time 2:58AM: HR 59, NSR, Axis normal, Intervals normal, P waves normal, ST-T seg: nonspecific ST segment changes, QRS RBBB, no significant Q waves, no ectopy. Sinus bradycardia w/ RBBB nonspecific ST Segment changes EKG    History of Holter monitoring 11/08/2017    monitored 48 hours: Patient noted to have multiple PAC and PVCs. Risk of atrial fibrillation present given previous episode Recommend antiarrhythmic therapy. History of stress test 03/27/2017    NM Stress test: EF 54%, Abnormal study, evidence of mild ischemia in mild inferior regions. post stress LV is enlarged in size.  Post-stress LV function is normal.     Hx of echocardiogram 08/05/2021    of 50

## 2023-05-17 ENCOUNTER — HOSPITAL ENCOUNTER (OUTPATIENT)
Age: 88
Setting detail: SPECIMEN
Discharge: HOME | End: 2023-05-17
Payer: MEDICARE

## 2023-05-17 PROCEDURE — 87186 SC STD MICRODIL/AGAR DIL: CPT

## 2023-05-17 PROCEDURE — 81001 URINALYSIS AUTO W/SCOPE: CPT

## 2023-05-17 PROCEDURE — 87086 URINE CULTURE/COLONY COUNT: CPT

## 2023-05-17 PROCEDURE — 87088 URINE BACTERIA CULTURE: CPT

## 2023-05-18 LAB
BACTERIA: ABNORMAL /HPF
BILIRUBIN URINE: ABNORMAL MG/DL
BLOOD, URINE: ABNORMAL
CLARITY: ABNORMAL
COLOR: ABNORMAL
GLUCOSE, URINE: NEGATIVE MG/DL
KETONES, URINE: ABNORMAL MG/DL
LEUKOCYTE ESTERASE, URINE: ABNORMAL
NITRITE URINE, QUANTITATIVE: POSITIVE
PH, URINE: 5 (ref 5–8)
PROTEIN UA: >300 MG/DL
RBC URINE: 2464 /HPF (ref 0–3)
SPECIFIC GRAVITY UA: 1.01 (ref 1–1.03)
TRICHOMONAS: ABNORMAL /HPF
UROBILINOGEN, URINE: 0.2 MG/DL (ref 0.2–1)
WBC CLUMP: ABNORMAL /HPF
WBC UA: 7597 /HPF (ref 0–2)

## 2023-05-19 ENCOUNTER — HOSPITAL ENCOUNTER (OUTPATIENT)
Age: 88
Setting detail: SPECIMEN
Discharge: HOME | End: 2023-05-19
Payer: MEDICARE

## 2023-05-19 LAB
ANION GAP SERPL CALCULATED.3IONS-SCNC: 10 MMOL/L (ref 4–16)
BUN SERPL-MCNC: 45 MG/DL (ref 6–23)
CALCIUM SERPL-MCNC: 8.7 MG/DL (ref 8.3–10.6)
CHLORIDE BLD-SCNC: 100 MMOL/L (ref 99–110)
CO2: 31 MMOL/L (ref 21–32)
CREAT SERPL-MCNC: 1.9 MG/DL (ref 0.9–1.3)
CULTURE: ABNORMAL
CULTURE: ABNORMAL
GFR SERPL CREATININE-BSD FRML MDRD: 33 ML/MIN/1.73M2
GLUCOSE SERPL-MCNC: 81 MG/DL (ref 70–99)
HCT VFR BLD CALC: 38.7 % (ref 42–52)
HEMOGLOBIN: 11.7 GM/DL (ref 13.5–18)
Lab: ABNORMAL
MCH RBC QN AUTO: 27.8 PG (ref 27–31)
MCHC RBC AUTO-ENTMCNC: 30.2 % (ref 32–36)
MCV RBC AUTO: 91.9 FL (ref 78–100)
PDW BLD-RTO: 15.2 % (ref 11.7–14.9)
PLATELET # BLD: 233 K/CU MM (ref 140–440)
PMV BLD AUTO: 10.7 FL (ref 7.5–11.1)
POTASSIUM SERPL-SCNC: 3.9 MMOL/L (ref 3.5–5.1)
RBC # BLD: 4.21 M/CU MM (ref 4.6–6.2)
SODIUM BLD-SCNC: 141 MMOL/L (ref 135–145)
SPECIMEN: ABNORMAL
WBC # BLD: 6.1 K/CU MM (ref 4–10.5)

## 2023-05-19 PROCEDURE — 36415 COLL VENOUS BLD VENIPUNCTURE: CPT

## 2023-05-19 PROCEDURE — 80048 BASIC METABOLIC PNL TOTAL CA: CPT

## 2023-05-19 PROCEDURE — 85027 COMPLETE CBC AUTOMATED: CPT

## 2023-05-23 ENCOUNTER — HOSPITAL ENCOUNTER (OUTPATIENT)
Age: 88
Setting detail: SPECIMEN
Discharge: HOME | End: 2023-05-23
Payer: MEDICARE

## 2023-05-23 LAB
ALBUMIN SERPL-MCNC: 3.8 GM/DL (ref 3.4–5)
ALP BLD-CCNC: 77 IU/L (ref 40–128)
ALT SERPL-CCNC: 20 U/L (ref 10–40)
ANION GAP SERPL CALCULATED.3IONS-SCNC: 12 MMOL/L (ref 4–16)
AST SERPL-CCNC: 22 IU/L (ref 15–37)
BILIRUB SERPL-MCNC: 0.6 MG/DL (ref 0–1)
BUN SERPL-MCNC: 38 MG/DL (ref 6–23)
CALCIUM SERPL-MCNC: 8.5 MG/DL (ref 8.3–10.6)
CHLORIDE BLD-SCNC: 98 MMOL/L (ref 99–110)
CO2: 29 MMOL/L (ref 21–32)
CREAT SERPL-MCNC: 1.8 MG/DL (ref 0.9–1.3)
GFR SERPL CREATININE-BSD FRML MDRD: 36 ML/MIN/1.73M2
GLUCOSE SERPL-MCNC: 73 MG/DL (ref 70–99)
POTASSIUM SERPL-SCNC: 4 MMOL/L (ref 3.5–5.1)
SODIUM BLD-SCNC: 139 MMOL/L (ref 135–145)
TOTAL PROTEIN: 6.1 GM/DL (ref 6.4–8.2)

## 2023-05-23 PROCEDURE — 80053 COMPREHEN METABOLIC PANEL: CPT

## 2023-05-23 PROCEDURE — 36415 COLL VENOUS BLD VENIPUNCTURE: CPT

## 2023-05-26 ENCOUNTER — HOSPITAL ENCOUNTER (OUTPATIENT)
Age: 88
Setting detail: SPECIMEN
Discharge: HOME | End: 2023-05-26
Payer: MEDICARE

## 2023-05-26 PROCEDURE — 82270 OCCULT BLOOD FECES: CPT

## 2023-05-27 ENCOUNTER — HOSPITAL ENCOUNTER (OUTPATIENT)
Age: 88
Setting detail: SPECIMEN
Discharge: HOME | End: 2023-05-27

## 2023-05-28 LAB
HEMOCCULT SP1 STL QL: NEGATIVE
OCCULT BLOOD 2: NEGATIVE
OCCULT BLOOD 3: NEGATIVE

## 2023-06-06 ENCOUNTER — HOSPITAL ENCOUNTER (OUTPATIENT)
Age: 88
Setting detail: SPECIMEN
Discharge: HOME | End: 2023-06-06
Payer: MEDICARE

## 2023-06-06 PROCEDURE — 87088 URINE BACTERIA CULTURE: CPT

## 2023-06-06 PROCEDURE — 81001 URINALYSIS AUTO W/SCOPE: CPT

## 2023-06-06 PROCEDURE — 87186 SC STD MICRODIL/AGAR DIL: CPT

## 2023-06-06 PROCEDURE — 87086 URINE CULTURE/COLONY COUNT: CPT

## 2023-06-07 LAB
BACTERIA: ABNORMAL /HPF
BILIRUBIN URINE: NEGATIVE MG/DL
BLOOD, URINE: ABNORMAL
CLARITY: CLEAR
COLOR: YELLOW
COMMENT UA: ABNORMAL
GLUCOSE, URINE: NEGATIVE MG/DL
KETONES, URINE: NEGATIVE MG/DL
LEUKOCYTE ESTERASE, URINE: ABNORMAL
NITRITE URINE, QUANTITATIVE: NEGATIVE
PH, URINE: 6 (ref 5–8)
PROTEIN UA: 30 MG/DL
RBC URINE: 48 /HPF (ref 0–3)
SPECIFIC GRAVITY UA: 1.01 (ref 1–1.03)
UROBILINOGEN, URINE: 0.2 MG/DL (ref 0.2–1)
WBC UA: 528 /HPF (ref 0–2)

## 2023-06-08 LAB
CULTURE: ABNORMAL
CULTURE: ABNORMAL
Lab: ABNORMAL
SPECIMEN: ABNORMAL

## 2023-06-21 ENCOUNTER — HOSPITAL ENCOUNTER (OUTPATIENT)
Age: 88
Setting detail: SPECIMEN
Discharge: HOME | End: 2023-06-21

## 2023-06-21 LAB
ANION GAP SERPL CALCULATED.3IONS-SCNC: 12 MMOL/L (ref 4–16)
BUN SERPL-MCNC: 41 MG/DL (ref 6–23)
CALCIUM SERPL-MCNC: 8.8 MG/DL (ref 8.3–10.6)
CHLORIDE BLD-SCNC: 97 MMOL/L (ref 99–110)
CO2: 28 MMOL/L (ref 21–32)
CREAT SERPL-MCNC: 2 MG/DL (ref 0.9–1.3)
GFR SERPL CREATININE-BSD FRML MDRD: 31 ML/MIN/1.73M2
GLUCOSE SERPL-MCNC: 101 MG/DL (ref 70–99)
HCT VFR BLD CALC: 37.8 % (ref 42–52)
HEMOGLOBIN: 11.7 GM/DL (ref 13.5–18)
MCH RBC QN AUTO: 28.3 PG (ref 27–31)
MCHC RBC AUTO-ENTMCNC: 31 % (ref 32–36)
MCV RBC AUTO: 91.3 FL (ref 78–100)
PDW BLD-RTO: 15.2 % (ref 11.7–14.9)
PLATELET # BLD: 250 K/CU MM (ref 140–440)
PMV BLD AUTO: 10 FL (ref 7.5–11.1)
POTASSIUM SERPL-SCNC: 4 MMOL/L (ref 3.5–5.1)
RBC # BLD: 4.14 M/CU MM (ref 4.6–6.2)
SODIUM BLD-SCNC: 137 MMOL/L (ref 135–145)
WBC # BLD: 8.9 K/CU MM (ref 4–10.5)

## 2023-06-21 PROCEDURE — 9900360100 HC STAT COLLECTION FEE SNF

## 2023-06-21 PROCEDURE — 85027 COMPLETE CBC AUTOMATED: CPT

## 2023-06-21 PROCEDURE — 80048 BASIC METABOLIC PNL TOTAL CA: CPT

## 2023-06-21 PROCEDURE — 36415 COLL VENOUS BLD VENIPUNCTURE: CPT

## 2023-06-27 ENCOUNTER — HOSPITAL ENCOUNTER (OUTPATIENT)
Age: 88
Setting detail: SPECIMEN
Discharge: HOME | End: 2023-06-27
Payer: MEDICARE

## 2023-06-27 LAB
ANION GAP SERPL CALCULATED.3IONS-SCNC: 15 MMOL/L (ref 4–16)
BUN SERPL-MCNC: 36 MG/DL (ref 6–23)
CALCIUM SERPL-MCNC: 8.5 MG/DL (ref 8.3–10.6)
CHLORIDE BLD-SCNC: 101 MMOL/L (ref 99–110)
CO2: 26 MMOL/L (ref 21–32)
CREAT SERPL-MCNC: 1.7 MG/DL (ref 0.9–1.3)
GFR SERPL CREATININE-BSD FRML MDRD: 38 ML/MIN/1.73M2
GLUCOSE SERPL-MCNC: 71 MG/DL (ref 70–99)
POTASSIUM SERPL-SCNC: 3.9 MMOL/L (ref 3.5–5.1)
SODIUM BLD-SCNC: 142 MMOL/L (ref 135–145)

## 2023-06-27 PROCEDURE — 36415 COLL VENOUS BLD VENIPUNCTURE: CPT

## 2023-06-27 PROCEDURE — 80048 BASIC METABOLIC PNL TOTAL CA: CPT

## 2023-08-08 ENCOUNTER — APPOINTMENT (OUTPATIENT)
Dept: CT IMAGING | Age: 88
End: 2023-08-08
Payer: MEDICARE

## 2023-08-08 ENCOUNTER — HOSPITAL ENCOUNTER (EMERGENCY)
Age: 88
Discharge: HOME | End: 2023-08-08
Payer: MEDICARE

## 2023-08-08 VITALS
OXYGEN SATURATION: 95 % | BODY MASS INDEX: 23.49 KG/M2 | RESPIRATION RATE: 15 BRPM | HEIGHT: 68 IN | WEIGHT: 155 LBS | DIASTOLIC BLOOD PRESSURE: 58 MMHG | TEMPERATURE: 98 F | HEART RATE: 84 BPM | SYSTOLIC BLOOD PRESSURE: 102 MMHG

## 2023-08-08 DIAGNOSIS — S22.41XA CLOSED FRACTURE OF MULTIPLE RIBS OF RIGHT SIDE, INITIAL ENCOUNTER: ICD-10-CM

## 2023-08-08 DIAGNOSIS — S22.080A COMPRESSION FRACTURE OF T12 VERTEBRA, INITIAL ENCOUNTER (HCC): ICD-10-CM

## 2023-08-08 DIAGNOSIS — N30.00 ACUTE CYSTITIS WITHOUT HEMATURIA: Primary | ICD-10-CM

## 2023-08-08 LAB
ALBUMIN SERPL-MCNC: 3.6 GM/DL (ref 3.4–5)
ALP BLD-CCNC: 93 IU/L (ref 40–129)
ALT SERPL-CCNC: 20 U/L (ref 10–40)
ANION GAP SERPL CALCULATED.3IONS-SCNC: 9 MMOL/L (ref 4–16)
AST SERPL-CCNC: 21 IU/L (ref 15–37)
BACTERIA: ABNORMAL /HPF
BASOPHILS ABSOLUTE: 0 K/CU MM
BASOPHILS RELATIVE PERCENT: 0.3 % (ref 0–1)
BILIRUB SERPL-MCNC: 0.4 MG/DL (ref 0–1)
BILIRUBIN URINE: NEGATIVE MG/DL
BLOOD, URINE: ABNORMAL
BUN SERPL-MCNC: 37 MG/DL (ref 6–23)
CALCIUM SERPL-MCNC: 9.3 MG/DL (ref 8.3–10.6)
CHLORIDE BLD-SCNC: 98 MMOL/L (ref 99–110)
CLARITY: CLEAR
CO2: 29 MMOL/L (ref 21–32)
COLOR: YELLOW
CREAT SERPL-MCNC: 1.8 MG/DL (ref 0.9–1.3)
DIFFERENTIAL TYPE: ABNORMAL
EOSINOPHILS ABSOLUTE: 0.1 K/CU MM
EOSINOPHILS RELATIVE PERCENT: 1.2 % (ref 0–3)
GFR SERPL CREATININE-BSD FRML MDRD: 36 ML/MIN/1.73M2
GLUCOSE SERPL-MCNC: 94 MG/DL (ref 70–99)
GLUCOSE, URINE: NEGATIVE MG/DL
HCT VFR BLD CALC: 39.7 % (ref 42–52)
HEMOGLOBIN: 12.1 GM/DL (ref 13.5–18)
IMMATURE NEUTROPHIL %: 0.6 % (ref 0–0.43)
KETONES, URINE: NEGATIVE MG/DL
LACTIC ACID, SEPSIS: 0.9 MMOL/L (ref 0.5–1.9)
LEUKOCYTE ESTERASE, URINE: ABNORMAL
LIPASE: 31 IU/L (ref 13–60)
LYMPHOCYTES ABSOLUTE: 1.2 K/CU MM
LYMPHOCYTES RELATIVE PERCENT: 13.1 % (ref 24–44)
MCH RBC QN AUTO: 28.5 PG (ref 27–31)
MCHC RBC AUTO-ENTMCNC: 30.5 % (ref 32–36)
MCV RBC AUTO: 93.4 FL (ref 78–100)
MONOCYTES ABSOLUTE: 0.9 K/CU MM
MONOCYTES RELATIVE PERCENT: 10.2 % (ref 0–4)
MUCUS: ABNORMAL HPF
NITRITE URINE, QUANTITATIVE: NEGATIVE
NUCLEATED RBC %: 0 %
PDW BLD-RTO: 14.6 % (ref 11.7–14.9)
PH, URINE: 6 (ref 5–8)
PLATELET # BLD: 242 K/CU MM (ref 140–440)
PMV BLD AUTO: 9.8 FL (ref 7.5–11.1)
POTASSIUM SERPL-SCNC: 3.9 MMOL/L (ref 3.5–5.1)
PROTEIN UA: 100 MG/DL
RBC # BLD: 4.25 M/CU MM (ref 4.6–6.2)
RBC URINE: 55 /HPF (ref 0–3)
SEGMENTED NEUTROPHILS ABSOLUTE COUNT: 6.8 K/CU MM
SEGMENTED NEUTROPHILS RELATIVE PERCENT: 74.6 % (ref 36–66)
SODIUM BLD-SCNC: 136 MMOL/L (ref 135–145)
SPECIFIC GRAVITY UA: 1.01 (ref 1–1.03)
TOTAL IMMATURE NEUTOROPHIL: 0.05 K/CU MM
TOTAL NUCLEATED RBC: 0 K/CU MM
TOTAL PROTEIN: 6.9 GM/DL (ref 6.4–8.2)
TRICHOMONAS: ABNORMAL /HPF
UROBILINOGEN, URINE: 0.2 MG/DL (ref 0.2–1)
WBC # BLD: 9.1 K/CU MM (ref 4–10.5)
WBC UA: 1259 /HPF (ref 0–2)

## 2023-08-08 PROCEDURE — 81001 URINALYSIS AUTO W/SCOPE: CPT

## 2023-08-08 PROCEDURE — 83690 ASSAY OF LIPASE: CPT

## 2023-08-08 PROCEDURE — 83605 ASSAY OF LACTIC ACID: CPT

## 2023-08-08 PROCEDURE — 85025 COMPLETE CBC W/AUTO DIFF WBC: CPT

## 2023-08-08 PROCEDURE — 99284 EMERGENCY DEPT VISIT MOD MDM: CPT

## 2023-08-08 PROCEDURE — 80053 COMPREHEN METABOLIC PANEL: CPT

## 2023-08-08 PROCEDURE — 71250 CT THORAX DX C-: CPT

## 2023-08-08 PROCEDURE — 51798 US URINE CAPACITY MEASURE: CPT

## 2023-08-08 PROCEDURE — 87186 SC STD MICRODIL/AGAR DIL: CPT

## 2023-08-08 PROCEDURE — 87088 URINE BACTERIA CULTURE: CPT

## 2023-08-08 PROCEDURE — 87086 URINE CULTURE/COLONY COUNT: CPT

## 2023-08-08 PROCEDURE — 74176 CT ABD & PELVIS W/O CONTRAST: CPT

## 2023-08-08 RX ORDER — SULFAMETHOXAZOLE AND TRIMETHOPRIM 800; 160 MG/1; MG/1
1 TABLET ORAL 2 TIMES DAILY
Qty: 20 TABLET | Refills: 0 | Status: SHIPPED | OUTPATIENT
Start: 2023-08-08 | End: 2023-08-18

## 2023-08-08 ASSESSMENT — PAIN DESCRIPTION - LOCATION: LOCATION: BACK

## 2023-08-08 ASSESSMENT — PAIN SCALES - GENERAL: PAINLEVEL_OUTOF10: 8

## 2023-08-09 NOTE — DISCHARGE INSTRUCTIONS
Continue tylenol for pain. Do not walk outside of the facility garden or the inside of the facility due to fall risk. Do not allow to walk outside of facility grounds or into woods.

## 2023-08-09 NOTE — ED NOTES
Patient discharging home, AVS reviewed with no questions at this time. Patient instrcuted to follow up per discharge instructions and to return for worsening symptoms. Respirations equal and unlabored, skin PWD.        Anton Mirza RN  08/08/23 2022

## 2023-08-09 NOTE — ED PROVIDER NOTES
findings, vital signs and medical chart review. Sepsis Consideration:   Exclusion criteria - the patient is NOT to be included for SEP-1 Core Measure due to:  2+ SIRS criteria are not met     History from : Patient and Family daughter    Limitations to history : None    Patient was given the following medications:  Medications - No data to display    Imaging Interpretation: Not applicable    Telemetry: Not Applicable    ECG Interpretation: N/A    Chronic conditions affecting care: Atrial fibrillation, aortic aneurysm, CHF, CKD, dementia    Discussion with Other Profesionals : Consultant Dr. Fabiana Porras, urology    Social Determinants : None    Records Reviewed : Outpatient Notes reviewed outpatient nephrology note from yesterday with Dr. Sarai Drake and patient was complaining of constipation which was suspected to be contributing to his urinary retention causing nocturia. Patient was also complaining of right-sided abdominal pain. It was recommended that he start Amitiza daily along with a laxative and stimulant to see if he could get a good bowel movement to resolve his urinary symptoms. In brief, patient presented today for worsening right-sided abdominal pain after recent fall a few days ago. Patient did have some mild swelling over the right abdomen as well as significant tenderness over the right anterolateral lower ribs. There was no ecchymosis or crepitus noted. Patient was not having any radicular symptoms or any other neurologic symptoms. Given his tenderness I was concerned about possibility of rib fractures as well as possible intra-abdominal injuries. Basic labs including a CMP and CBC demonstrated chronic stable renal dysfunction and stable mild chronic anemia. Lactate, lipase were normal.  Urine specimen was obtained which does show a large amount of blood and a large amount of leukocytes with many bacteria and 1259 WBC. Urine will be sent for culture.   Reviewed recent previous cultures which were positive for E. coli. A CT of the chest noted acute nondisplaced fractures at lateral right ribs 70 and 9 as well as an acute mild compression fracture of the superior endplate of the anterior aspect of T12 vertebral body without retropulsion and no pneumothorax. There was also incidental finding of dilated ascending thoracic aorta. CT of the abdomen and pelvis also noted a small calculus in the midpole the right kidney with no hydronephrosis, cholelithiasis without acute cholecystitis and intra-abdominal aortic aneurysm. There were no traumatic findings. Patient and daughter were updated on these findings. We did discuss the possibility of admission; however, patient and daughter were adamantly against admission due to severe delirium that occurred during his last admission. He is in an assisted living facility and does have access to 24-hour resources. Discussed pain management. Consulted with urology  regarding urinary retention, postvoid residual was approximately 250 mL. Elected not to place Novak catheter out of concern that it would be difficult to remove in the future. Agreed with treating urinary infection and he will follow the patient in the office in the next several days. Will discharge home on Bactrim given culture results. Encourage follow-up with PCP, urology, and orthopedic spine specialist in the next few days for further evaluation. Patient and daughter verbalized understanding and feel comfortable discharge at this time. I am the Primary Clinician of Record. CLINICAL IMPRESSION      1. Acute cystitis without hematuria    2. Closed fracture of multiple ribs of right side, initial encounter    3.  Compression fracture of T12 vertebra, initial encounter [facility])          DISPOSITION/PLAN   DISPOSITION Decision To Discharge 08/08/2023 08:07:20 PM      PATIENT REFERREDTO:  MD Shakira Reina  [phone]      2-3 days    Petar Emanuel

## 2023-08-10 LAB
CULTURE: ABNORMAL
CULTURE: ABNORMAL
Lab: ABNORMAL
SPECIMEN: ABNORMAL

## 2023-08-10 NOTE — PROGRESS NOTES
Pharmacy Note  ED Culture Follow-up    Cassidy Pringle is a 80 y.o. male. Allergies: Biaxin [clarithromycin], Cephalexin, and Viagra [sildenafil citrate]     Current antimicrobials:   Reviewed patient's urine culture - culture positive for Escherichia coli. Patient was discharged on Bactrim, and culture is sensitive to prescribed medication. Antibiotic prescribed at discharge is appropriate - no changes made to antibiotic regimen. Please call with any questions.  Glendy Melendrez, [address] [place] [address], PharmD 2:03 PM 8/10/2023

## 2023-09-05 ENCOUNTER — APPOINTMENT (OUTPATIENT)
Dept: CT IMAGING | Age: 88
DRG: 871 | End: 2023-09-05
Payer: MEDICARE

## 2023-09-05 ENCOUNTER — APPOINTMENT (OUTPATIENT)
Dept: GENERAL RADIOLOGY | Age: 88
DRG: 871 | End: 2023-09-05
Payer: MEDICARE

## 2023-09-05 ENCOUNTER — HOSPITAL ENCOUNTER (EMERGENCY)
Age: 88
Discharge: HOME | DRG: 871 | End: 2023-09-05
Payer: MEDICARE

## 2023-09-05 VITALS
BODY MASS INDEX: 23.49 KG/M2 | WEIGHT: 155 LBS | OXYGEN SATURATION: 99 % | HEIGHT: 68 IN | RESPIRATION RATE: 20 BRPM | TEMPERATURE: 98 F | HEART RATE: 61 BPM | DIASTOLIC BLOOD PRESSURE: 62 MMHG | SYSTOLIC BLOOD PRESSURE: 121 MMHG
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
## 2023-09-05 DIAGNOSIS — R29.6 FREQUENT FALLS: Primary | ICD-10-CM

## 2023-09-05 DIAGNOSIS — S22.088D OTHER CLOSED FRACTURE OF TWELFTH THORACIC VERTEBRA WITH ROUTINE HEALING, SUBSEQUENT ENCOUNTER: ICD-10-CM

## 2023-09-05 LAB
ALBUMIN SERPL-MCNC: 4 GM/DL (ref 3.4–5)
ALP BLD-CCNC: 130 IU/L (ref 40–129)
ALT SERPL-CCNC: 17 U/L (ref 10–40)
ANION GAP SERPL CALCULATED.3IONS-SCNC: 11 MMOL/L (ref 4–16)
AST SERPL-CCNC: 24 IU/L (ref 15–37)
BASOPHILS ABSOLUTE: 0 K/CU MM
BASOPHILS RELATIVE PERCENT: 0.1 % (ref 0–1)
BILIRUB SERPL-MCNC: 0.7 MG/DL (ref 0–1)
BUN SERPL-MCNC: 36 MG/DL (ref 6–23)
CALCIUM SERPL-MCNC: 9.1 MG/DL (ref 8.3–10.6)
CHLORIDE BLD-SCNC: 99 MMOL/L (ref 99–110)
CO2: 28 MMOL/L (ref 21–32)
CREAT SERPL-MCNC: 1.9 MG/DL (ref 0.9–1.3)
DIFFERENTIAL TYPE: ABNORMAL
EOSINOPHILS ABSOLUTE: 0 K/CU MM
EOSINOPHILS RELATIVE PERCENT: 0.2 % (ref 0–3)
GFR SERPL CREATININE-BSD FRML MDRD: 33 ML/MIN/1.73M2
GLUCOSE SERPL-MCNC: 125 MG/DL (ref 70–99)
HCT VFR BLD CALC: 39.7 % (ref 42–52)
HEMOGLOBIN: 12.3 GM/DL (ref 13.5–18)
IMMATURE NEUTROPHIL %: 0.5 % (ref 0–0.43)
LYMPHOCYTES ABSOLUTE: 0.6 K/CU MM
LYMPHOCYTES RELATIVE PERCENT: 5.7 % (ref 24–44)
MCH RBC QN AUTO: 28.7 PG (ref 27–31)
MCHC RBC AUTO-ENTMCNC: 31 % (ref 32–36)
MCV RBC AUTO: 92.8 FL (ref 78–100)
MONOCYTES ABSOLUTE: 1.1 K/CU MM
MONOCYTES RELATIVE PERCENT: 10.3 % (ref 0–4)
NUCLEATED RBC %: 0 %
PDW BLD-RTO: 15.2 % (ref 11.7–14.9)
PLATELET # BLD: 226 K/CU MM (ref 140–440)
PMV BLD AUTO: 9.2 FL (ref 7.5–11.1)
POTASSIUM SERPL-SCNC: 4.1 MMOL/L (ref 3.5–5.1)
RBC # BLD: 4.28 M/CU MM (ref 4.6–6.2)
SEGMENTED NEUTROPHILS ABSOLUTE COUNT: 9 K/CU MM
SEGMENTED NEUTROPHILS RELATIVE PERCENT: 83.2 % (ref 36–66)
SODIUM BLD-SCNC: 138 MMOL/L (ref 135–145)
TOTAL IMMATURE NEUTOROPHIL: 0.05 K/CU MM
TOTAL NUCLEATED RBC: 0 K/CU MM
TOTAL PROTEIN: 7.1 GM/DL (ref 6.4–8.2)
WBC # BLD: 10.9 K/CU MM (ref 4–10.5)

## 2023-09-05 PROCEDURE — 72131 CT LUMBAR SPINE W/O DYE: CPT

## 2023-09-05 PROCEDURE — 80053 COMPREHEN METABOLIC PANEL: CPT

## 2023-09-05 PROCEDURE — 73502 X-RAY EXAM HIP UNI 2-3 VIEWS: CPT

## 2023-09-05 PROCEDURE — 96374 THER/PROPH/DIAG INJ IV PUSH: CPT

## 2023-09-05 PROCEDURE — 99284 EMERGENCY DEPT VISIT MOD MDM: CPT

## 2023-09-05 PROCEDURE — 6360000002 HC RX W HCPCS: Performed by: PHYSICIAN ASSISTANT

## 2023-09-05 PROCEDURE — 96375 TX/PRO/DX INJ NEW DRUG ADDON: CPT

## 2023-09-05 PROCEDURE — 85025 COMPLETE CBC W/AUTO DIFF WBC: CPT

## 2023-09-05 RX ORDER — MORPHINE SULFATE 4 MG/ML
4 INJECTION, SOLUTION INTRAMUSCULAR; INTRAVENOUS EVERY 30 MIN PRN
Status: DISCONTINUED | OUTPATIENT
Start: 2023-09-05 | End: 2023-09-05 | Stop reason: HOSPADM

## 2023-09-05 RX ORDER — CALCITONIN SALMON 200 [IU]/.09ML
1 SPRAY, METERED NASAL DAILY
COMMUNITY

## 2023-09-05 RX ORDER — ONDANSETRON 2 MG/ML
4 INJECTION INTRAMUSCULAR; INTRAVENOUS EVERY 30 MIN PRN
Status: DISCONTINUED | OUTPATIENT
Start: 2023-09-05 | End: 2023-09-05 | Stop reason: HOSPADM

## 2023-09-05 RX ORDER — HYDROCODONE BITARTRATE AND ACETAMINOPHEN 5; 325 MG/1; MG/1
1 TABLET ORAL EVERY 6 HOURS PRN
Qty: 10 TABLET | Refills: 0 | Status: SHIPPED | OUTPATIENT
Start: 2023-09-05 | End: 2023-09-08

## 2023-09-05 RX ADMIN — MORPHINE SULFATE 4 MG: 4 INJECTION, SOLUTION INTRAMUSCULAR; INTRAVENOUS at 08:33

## 2023-09-05 RX ADMIN — ONDANSETRON 4 MG: 2 INJECTION INTRAMUSCULAR; INTRAVENOUS at 08:32

## 2023-09-05 ASSESSMENT — PAIN DESCRIPTION - FREQUENCY: FREQUENCY: CONTINUOUS

## 2023-09-05 ASSESSMENT — PAIN DESCRIPTION - PAIN TYPE: TYPE: ACUTE PAIN

## 2023-09-05 ASSESSMENT — PAIN SCALES - GENERAL
PAINLEVEL_OUTOF10: 7
PAINLEVEL_OUTOF10: 2
PAINLEVEL_OUTOF10: 5

## 2023-09-05 ASSESSMENT — PAIN - FUNCTIONAL ASSESSMENT: PAIN_FUNCTIONAL_ASSESSMENT: 0-10

## 2023-09-05 ASSESSMENT — PAIN DESCRIPTION - DESCRIPTORS: DESCRIPTORS: OTHER (COMMENT)

## 2023-09-05 ASSESSMENT — PAIN DESCRIPTION - ORIENTATION: ORIENTATION: RIGHT;LEFT;LOWER

## 2023-09-05 ASSESSMENT — PAIN DESCRIPTION - LOCATION: LOCATION: BACK;ABDOMEN

## 2023-09-05 ASSESSMENT — PAIN DESCRIPTION - ONSET: ONSET: SUDDEN

## 2023-09-05 NOTE — ED PROVIDER NOTES
[facility] ENCOUNTER        Pt Name: Tomas Sam  MRN: [id number]  [facility] 10/5/1933  Date of evaluation: 9/5/2023  Provider: Radha Cannon PA-C  PCP: Kimo Reis MD  Note Started: 8:09 AM EDT 9/5/23      WING. I have evaluated this patient. CHIEF COMPLAINT       No chief complaint on file. HISTORY OF PRESENT ILLNESS: 1 or more Elements     History From: The daughter and the patient    Limitations to history : Hard of hearing    Social Determinants Significantly Affecting Health : None    Chief Complaint: Fall and back pain and hip pain    Tomas Sam is a 80 y.o. male who presents to the emergency department after he got out of bed this morning at about 4:00, and was trying to go to the bathroom and fell. The history comes from the daughter, she called in and gave much of the history. She thinks that she was he was trying to go to the bathroom, she received a call from the nursing home. The nursing report said that he has a bruise to his lower back. He has had frequent falls including on 29 July, 7 August, and 20 August.  He recently had a CT which noted a fracture to T12, as well as multiple rib fractures. He did recently see Dr. Alexander Reardon on the 29th and received an x-ray. He denies any chest pain, he denies any upper back pain, he denies any abdominal pain. He does admit to some pain down to his right hip, denies any other leg pain. His daughter is Yris Preston phone [phone]    Nursing Notes were all reviewed and agreed with or any disagreements were addressed in the HPI. REVIEW OF SYSTEMS :      Review of Systems    Positives and Pertinent negatives as per HPI.      SURGICAL HISTORY     Past Surgical History:   Procedure Laterality Date    CYSTOSCOPY INSERTION / REMOVAL STENT / STONE Left 5/6/2019    CYSTOSCOPY RETROGRADE PYELOGRAM STENT INSERTION, DIAGNOSTIC CYSTOSCOPY performed by Arie Ledesma (11/08/2017), History of stress test (03/27/2017), echocardiogram (08/05/2021), echocardiogram (12/08/2022), transesophageal echocardiography (SIMONE) for monitoring (04/17/2018), Hyperlipemia, Hyperlipidemia, Hypertension, Nonspecific abnormal electrocardiogram (ECG) (EKG), Persistent atrial fibrillation (HCC), Sick sinus syndrome ([address]), and Vertigo. CONSULTS: (Who and What was discussed)  None          Records Reviewed (External and Source)     CC/HPI Summary, DDx, ED Course, and Reassessment: Patient presents to the emergency department with frequent falls, and a chronic 12 vertebral fracture. There was no evidence of acute fractures today. Using shared decision making with his family, we elected to discharge the patient back to the nursing home. Patient and the family stated they would feel much better about that versus admission . I am the Primary Clinician of Record. FINAL IMPRESSION      1. Frequent falls    2. Fall on same level from slipping, tripping or stumbling, initial encounter    3. Other closed fracture of twelfth thoracic vertebra with routine healing, subsequent encounter          DISPOSITION/PLAN     DISPOSITION Decision To Discharge 09/05/2023 11:24:08 AM      PATIENT REFERRED TO:  Christiano Reis MD  [address] [place] [address]  [phone]    Call   For a recheck in 2-7 days      DISCHARGE MEDICATIONS:  Discharge Medication List as of 9/5/2023 11:28 AM        START taking these medications    Details   HYDROcodone-acetaminophen (NORCO) 5-325 MG per tablet Take 1 tablet by mouth every 6 hours as needed for Pain for up to 3 days.  Max Daily Amount: 4 tablets, Disp-10 tablet, R-0Normal             DISCONTINUED MEDICATIONS:  Discharge Medication List as of 9/5/2023 11:28 AM                 (Please note that portions of this note were completed with a voice recognition program.  Efforts were made to edit the dictations but occasionally words are mis-transcribed.)    Anselmo Alexander

## 2023-09-05 NOTE — ED NOTES
Medication History  [facility]    Patient Name: Magno Morley 10/5/1933     Medication history has been completed by: Priscila Trujillo CPhT    Source(s) of information: Medication list provided by Chaim Partida     Primary Care Physician: Junaid Reis MD     Pharmacy:     Allergies as of 09/05/2023 - Fully Reviewed 09/05/2023   Allergen Reaction Noted    Biaxin [clarithromycin]  11/08/2017    Cephalexin  11/08/2017    Viagra [sildenafil citrate] Nausea Only and Other (See Comments) 11/08/2017        Prior to Admission medications    Medication Sig Start Date End Date Taking?  Authorizing Provider   calcitonin (MIACALCIN) 200 UNIT/ACT nasal spray 1 spray by Nasal route daily   Yes Historical Provider, MD   Sodium Phosphates (FLEET ENEMA RE) Place rectally as needed Sodium Phosphates    Historical Provider, MD   nitrofurantoin (MACRODANTIN) 50 MG capsule Take 1 capsule by mouth daily    Historical Provider, MD   Multiple Vitamins-Minerals (EYE-VITES PO) Take 1 tablet by mouth nightly    Historical Provider, MD   alfuzosin (UROXATRAL) 10 MG extended release tablet Take 1 tablet by mouth daily 4/4/23   Historical Provider, MD   torsemide (DEMADEX) 20 MG tablet Take 1 tablet by mouth three times a week 09/05/23 Receives on Mon/Wed/Fri per nursing facility    Historical Provider, MD   spironolactone (ALDACTONE) 25 MG tablet Take 1 tablet by mouth daily    Historical Provider, MD   melatonin 3 MG TABS tablet Take 1 tablet by mouth nightly as needed (sleep) 1/23/23   Krishna Dillard MD   lactulose (CHRONULAC) 10 GM/15ML solution Take 45 mLs by mouth 2 times daily    Historical Provider, MD   simethicone (MYLICON) 80 MG chewable tablet Take 1 tablet by mouth 4 times daily    Historical Provider, MD   bisacodyl (DULCOLAX) 10 MG suppository Place 1 suppository rectally daily as needed for Constipation    Historical Provider, MD   Lactobacillus (ACIDOPHILUS/PECTIN PO) Take 1 capsule by mouth daily MARY.   Karina Montalvo CPhT   9/5/2023 9:30 AM

## 2023-09-05 NOTE — ED NOTES
Pt ambulated with walker to bathroom and returned to room without difficulty, gait steady provider notified     Vani Dickens RN  09/05/23 7839

## 2023-09-06 ENCOUNTER — APPOINTMENT (OUTPATIENT)
Dept: CT IMAGING | Age: 88
DRG: 871 | End: 2023-09-06
Payer: MEDICARE

## 2023-09-06 ENCOUNTER — APPOINTMENT (OUTPATIENT)
Dept: GENERAL RADIOLOGY | Age: 88
DRG: 871 | End: 2023-09-06
Payer: MEDICARE

## 2023-09-06 ENCOUNTER — HOSPITAL ENCOUNTER (INPATIENT)
Age: 88
LOS: 4 days | DRG: 871 | End: 2023-09-11
Attending: STUDENT IN AN ORGANIZED HEALTH CARE EDUCATION/TRAINING PROGRAM | Admitting: STUDENT IN AN ORGANIZED HEALTH CARE EDUCATION/TRAINING PROGRAM
Payer: MEDICARE

## 2023-09-06 DIAGNOSIS — N17.9 AKI (ACUTE KIDNEY INJURY) (HCC): ICD-10-CM

## 2023-09-06 DIAGNOSIS — S22.088K: ICD-10-CM

## 2023-09-06 DIAGNOSIS — R41.0 CONFUSION: Primary | ICD-10-CM

## 2023-09-06 DIAGNOSIS — N39.0 URINARY TRACT INFECTION WITHOUT HEMATURIA, SITE UNSPECIFIED: ICD-10-CM

## 2023-09-06 LAB
ALBUMIN SERPL-MCNC: 3.8 GM/DL (ref 3.4–5)
ALP BLD-CCNC: 119 IU/L (ref 40–129)
ALT SERPL-CCNC: 26 U/L (ref 10–40)
ANION GAP SERPL CALCULATED.3IONS-SCNC: 12 MMOL/L (ref 4–16)
AST SERPL-CCNC: 26 IU/L (ref 15–37)
BACTERIA: ABNORMAL /HPF
BASOPHILS ABSOLUTE: 0 K/CU MM
BASOPHILS RELATIVE PERCENT: 0.1 % (ref 0–1)
BILIRUB SERPL-MCNC: 0.6 MG/DL (ref 0–1)
BILIRUBIN URINE: NEGATIVE MG/DL
BLOOD, URINE: ABNORMAL
BUN SERPL-MCNC: 54 MG/DL (ref 6–23)
CALCIUM SERPL-MCNC: 9.3 MG/DL (ref 8.3–10.6)
CHLORIDE BLD-SCNC: 98 MMOL/L (ref 99–110)
CLARITY: ABNORMAL
CO2: 25 MMOL/L (ref 21–32)
COLOR: YELLOW
CREAT SERPL-MCNC: 2.6 MG/DL (ref 0.9–1.3)
DIFFERENTIAL TYPE: ABNORMAL
EOSINOPHILS ABSOLUTE: 0.1 K/CU MM
EOSINOPHILS RELATIVE PERCENT: 0.6 % (ref 0–3)
GFR SERPL CREATININE-BSD FRML MDRD: 23 ML/MIN/1.73M2
GLUCOSE SERPL-MCNC: 132 MG/DL (ref 70–99)
GLUCOSE, URINE: NEGATIVE MG/DL
HCT VFR BLD CALC: 34.8 % (ref 42–52)
HEMOGLOBIN: 10.9 GM/DL (ref 13.5–18)
IMMATURE NEUTROPHIL %: 0.7 % (ref 0–0.43)
KETONES, URINE: NEGATIVE MG/DL
LACTATE: 2.1 MMOL/L (ref 0.5–1.9)
LEUKOCYTE ESTERASE, URINE: ABNORMAL
LYMPHOCYTES ABSOLUTE: 1 K/CU MM
LYMPHOCYTES RELATIVE PERCENT: 9.4 % (ref 24–44)
MAGNESIUM: 2.4 MG/DL (ref 1.8–2.4)
MCH RBC QN AUTO: 29.2 PG (ref 27–31)
MCHC RBC AUTO-ENTMCNC: 31.3 % (ref 32–36)
MCV RBC AUTO: 93.3 FL (ref 78–100)
MONOCYTES ABSOLUTE: 1.6 K/CU MM
MONOCYTES RELATIVE PERCENT: 14.7 % (ref 0–4)
NITRITE URINE, QUANTITATIVE: NEGATIVE
NUCLEATED RBC %: 0 %
PDW BLD-RTO: 15.4 % (ref 11.7–14.9)
PH, URINE: 6 (ref 5–8)
PLATELET # BLD: 241 K/CU MM (ref 140–440)
PMV BLD AUTO: 10.6 FL (ref 7.5–11.1)
POTASSIUM SERPL-SCNC: 4.6 MMOL/L (ref 3.5–5.1)
PROTEIN UA: 30 MG/DL
RBC # BLD: 3.73 M/CU MM (ref 4.6–6.2)
RBC URINE: 80 /HPF (ref 0–3)
SEGMENTED NEUTROPHILS ABSOLUTE COUNT: 8.1 K/CU MM
SEGMENTED NEUTROPHILS RELATIVE PERCENT: 74.5 % (ref 36–66)
SODIUM BLD-SCNC: 135 MMOL/L (ref 135–145)
SPECIFIC GRAVITY UA: 1.02 (ref 1–1.03)
TOTAL IMMATURE NEUTOROPHIL: 0.08 K/CU MM
TOTAL NUCLEATED RBC: 0 K/CU MM
TOTAL PROTEIN: 6.6 GM/DL (ref 6.4–8.2)
TRICHOMONAS: ABNORMAL /HPF
TROPONIN T: 0.11 NG/ML
UROBILINOGEN, URINE: 0.2 MG/DL (ref 0.2–1)
WBC # BLD: 10.9 K/CU MM (ref 4–10.5)
WBC CLUMP: ABNORMAL /HPF
WBC UA: 1306 /HPF (ref 0–2)

## 2023-09-06 PROCEDURE — 80053 COMPREHEN METABOLIC PANEL: CPT

## 2023-09-06 PROCEDURE — 74176 CT ABD & PELVIS W/O CONTRAST: CPT

## 2023-09-06 PROCEDURE — 2580000003 HC RX 258: Performed by: PHYSICIAN ASSISTANT

## 2023-09-06 PROCEDURE — 6360000002 HC RX W HCPCS: Performed by: PHYSICIAN ASSISTANT

## 2023-09-06 PROCEDURE — 85025 COMPLETE CBC W/AUTO DIFF WBC: CPT

## 2023-09-06 PROCEDURE — 87088 URINE BACTERIA CULTURE: CPT

## 2023-09-06 PROCEDURE — 70450 CT HEAD/BRAIN W/O DYE: CPT

## 2023-09-06 PROCEDURE — 96375 TX/PRO/DX INJ NEW DRUG ADDON: CPT

## 2023-09-06 PROCEDURE — 83605 ASSAY OF LACTIC ACID: CPT

## 2023-09-06 PROCEDURE — 81001 URINALYSIS AUTO W/SCOPE: CPT

## 2023-09-06 PROCEDURE — 93005 ELECTROCARDIOGRAM TRACING: CPT | Performed by: EMERGENCY MEDICINE

## 2023-09-06 PROCEDURE — 96376 TX/PRO/DX INJ SAME DRUG ADON: CPT

## 2023-09-06 PROCEDURE — 83735 ASSAY OF MAGNESIUM: CPT

## 2023-09-06 PROCEDURE — 96365 THER/PROPH/DIAG IV INF INIT: CPT

## 2023-09-06 PROCEDURE — 71046 X-RAY EXAM CHEST 2 VIEWS: CPT

## 2023-09-06 PROCEDURE — 87086 URINE CULTURE/COLONY COUNT: CPT

## 2023-09-06 PROCEDURE — 84484 ASSAY OF TROPONIN QUANT: CPT

## 2023-09-06 PROCEDURE — 51702 INSERT TEMP BLADDER CATH: CPT

## 2023-09-06 PROCEDURE — 87186 SC STD MICRODIL/AGAR DIL: CPT

## 2023-09-06 PROCEDURE — 73502 X-RAY EXAM HIP UNI 2-3 VIEWS: CPT

## 2023-09-06 PROCEDURE — 99285 EMERGENCY DEPT VISIT HI MDM: CPT

## 2023-09-06 RX ORDER — MORPHINE SULFATE 2 MG/ML
2 INJECTION, SOLUTION INTRAMUSCULAR; INTRAVENOUS EVERY 30 MIN PRN
Status: DISCONTINUED | OUTPATIENT
Start: 2023-09-06 | End: 2023-09-07 | Stop reason: ALTCHOICE

## 2023-09-06 RX ORDER — ONDANSETRON 2 MG/ML
4 INJECTION INTRAMUSCULAR; INTRAVENOUS EVERY 6 HOURS PRN
Status: DISCONTINUED | OUTPATIENT
Start: 2023-09-06 | End: 2023-09-07 | Stop reason: SDUPTHER

## 2023-09-06 RX ORDER — 0.9 % SODIUM CHLORIDE 0.9 %
1000 INTRAVENOUS SOLUTION INTRAVENOUS ONCE
Status: COMPLETED | OUTPATIENT
Start: 2023-09-06 | End: 2023-09-07

## 2023-09-06 RX ADMIN — ONDANSETRON 4 MG: 2 INJECTION INTRAMUSCULAR; INTRAVENOUS at 21:34

## 2023-09-06 RX ADMIN — SODIUM CHLORIDE 1000 ML: 9 INJECTION, SOLUTION INTRAVENOUS at 20:55

## 2023-09-06 RX ADMIN — MORPHINE SULFATE 2 MG: 2 INJECTION, SOLUTION INTRAMUSCULAR; INTRAVENOUS at 21:34

## 2023-09-07 ENCOUNTER — APPOINTMENT (OUTPATIENT)
Dept: MRI IMAGING | Age: 88
DRG: 871 | End: 2023-09-07
Payer: MEDICARE

## 2023-09-07 PROBLEM — G93.40 ACUTE ENCEPHALOPATHY: Status: ACTIVE | Noted: 2023-09-07

## 2023-09-07 LAB
ALBUMIN SERPL-MCNC: 3.6 GM/DL (ref 3.4–5)
ALP BLD-CCNC: 110 IU/L (ref 40–128)
ALT SERPL-CCNC: 23 U/L (ref 10–40)
ANION GAP SERPL CALCULATED.3IONS-SCNC: 14 MMOL/L (ref 4–16)
AST SERPL-CCNC: 22 IU/L (ref 15–37)
BASOPHILS ABSOLUTE: 0 K/CU MM
BASOPHILS RELATIVE PERCENT: 0.1 % (ref 0–1)
BILIRUB SERPL-MCNC: 0.6 MG/DL (ref 0–1)
BUN SERPL-MCNC: 44 MG/DL (ref 6–23)
CALCIUM SERPL-MCNC: 8.8 MG/DL (ref 8.3–10.6)
CHLORIDE BLD-SCNC: 103 MMOL/L (ref 99–110)
CO2: 23 MMOL/L (ref 21–32)
CREAT SERPL-MCNC: 2 MG/DL (ref 0.9–1.3)
DIFFERENTIAL TYPE: ABNORMAL
EKG ATRIAL RATE: 75 BPM
EKG DIAGNOSIS: NORMAL
EKG P AXIS: 29 DEGREES
EKG P-R INTERVAL: 172 MS
EKG Q-T INTERVAL: 522 MS
EKG QRS DURATION: 196 MS
EKG QTC CALCULATION (BAZETT): 551 MS
EKG R AXIS: -46 DEGREES
EKG T AXIS: 111 DEGREES
EKG VENTRICULAR RATE: 67 BPM
EOSINOPHILS ABSOLUTE: 0 K/CU MM
EOSINOPHILS RELATIVE PERCENT: 0 % (ref 0–3)
GFR SERPL CREATININE-BSD FRML MDRD: 31 ML/MIN/1.73M2
GLUCOSE SERPL-MCNC: 125 MG/DL (ref 70–99)
HCT VFR BLD CALC: 35.5 % (ref 42–52)
HEMOGLOBIN: 10.7 GM/DL (ref 13.5–18)
IMMATURE NEUTROPHIL %: 0.7 % (ref 0–0.43)
INR BLD: 1.2 INDEX
LACTATE: 1 MMOL/L (ref 0.5–1.9)
LYMPHOCYTES ABSOLUTE: 0.6 K/CU MM
LYMPHOCYTES RELATIVE PERCENT: 5.3 % (ref 24–44)
MCH RBC QN AUTO: 28.9 PG (ref 27–31)
MCHC RBC AUTO-ENTMCNC: 30.1 % (ref 32–36)
MCV RBC AUTO: 95.9 FL (ref 78–100)
MONOCYTES ABSOLUTE: 1.2 K/CU MM
MONOCYTES RELATIVE PERCENT: 10.9 % (ref 0–4)
NUCLEATED RBC %: 0 %
PDW BLD-RTO: 15.4 % (ref 11.7–14.9)
PLATELET # BLD: 181 K/CU MM (ref 140–440)
PMV BLD AUTO: 9.7 FL (ref 7.5–11.1)
POTASSIUM SERPL-SCNC: 4.1 MMOL/L (ref 3.5–5.1)
PROTHROMBIN TIME: 15.8 SECONDS (ref 11.7–14.5)
RBC # BLD: 3.7 M/CU MM (ref 4.6–6.2)
SEGMENTED NEUTROPHILS ABSOLUTE COUNT: 8.8 K/CU MM
SEGMENTED NEUTROPHILS RELATIVE PERCENT: 83 % (ref 36–66)
SODIUM BLD-SCNC: 140 MMOL/L (ref 135–145)
TOTAL IMMATURE NEUTOROPHIL: 0.07 K/CU MM
TOTAL NUCLEATED RBC: 0 K/CU MM
TOTAL PROTEIN: 5.9 GM/DL (ref 6.4–8.2)
TROPONIN T: 0.07 NG/ML
WBC # BLD: 10.6 K/CU MM (ref 4–10.5)

## 2023-09-07 PROCEDURE — 72148 MRI LUMBAR SPINE W/O DYE: CPT

## 2023-09-07 PROCEDURE — 6360000002 HC RX W HCPCS: Performed by: STUDENT IN AN ORGANIZED HEALTH CARE EDUCATION/TRAINING PROGRAM

## 2023-09-07 PROCEDURE — 85610 PROTHROMBIN TIME: CPT

## 2023-09-07 PROCEDURE — 83605 ASSAY OF LACTIC ACID: CPT

## 2023-09-07 PROCEDURE — 85025 COMPLETE CBC W/AUTO DIFF WBC: CPT

## 2023-09-07 PROCEDURE — 2700000000 HC OXYGEN THERAPY PER DAY

## 2023-09-07 PROCEDURE — 93010 ELECTROCARDIOGRAM REPORT: CPT | Performed by: INTERNAL MEDICINE

## 2023-09-07 PROCEDURE — 2580000003 HC RX 258: Performed by: NURSE PRACTITIONER

## 2023-09-07 PROCEDURE — 94761 N-INVAS EAR/PLS OXIMETRY MLT: CPT

## 2023-09-07 PROCEDURE — 2580000003 HC RX 258: Performed by: PHYSICIAN ASSISTANT

## 2023-09-07 PROCEDURE — 99222 1ST HOSP IP/OBS MODERATE 55: CPT | Performed by: PHYSICIAN ASSISTANT

## 2023-09-07 PROCEDURE — 2580000003 HC RX 258: Performed by: STUDENT IN AN ORGANIZED HEALTH CARE EDUCATION/TRAINING PROGRAM

## 2023-09-07 PROCEDURE — 6370000000 HC RX 637 (ALT 250 FOR IP): Performed by: STUDENT IN AN ORGANIZED HEALTH CARE EDUCATION/TRAINING PROGRAM

## 2023-09-07 PROCEDURE — 36415 COLL VENOUS BLD VENIPUNCTURE: CPT

## 2023-09-07 PROCEDURE — 6360000002 HC RX W HCPCS: Performed by: PHYSICIAN ASSISTANT

## 2023-09-07 PROCEDURE — 51702 INSERT TEMP BLADDER CATH: CPT

## 2023-09-07 PROCEDURE — 6360000002 HC RX W HCPCS: Performed by: NURSE PRACTITIONER

## 2023-09-07 PROCEDURE — 80053 COMPREHEN METABOLIC PANEL: CPT

## 2023-09-07 PROCEDURE — 72146 MRI CHEST SPINE W/O DYE: CPT

## 2023-09-07 PROCEDURE — 1200000000 HC SEMI PRIVATE

## 2023-09-07 PROCEDURE — 84484 ASSAY OF TROPONIN QUANT: CPT

## 2023-09-07 RX ORDER — POTASSIUM CHLORIDE 20 MEQ/1
40 TABLET, EXTENDED RELEASE ORAL PRN
Status: DISCONTINUED | OUTPATIENT
Start: 2023-09-07 | End: 2023-09-11 | Stop reason: HOSPADM

## 2023-09-07 RX ORDER — AMIODARONE HYDROCHLORIDE 200 MG/1
200 TABLET ORAL NIGHTLY
Status: DISCONTINUED | OUTPATIENT
Start: 2023-09-07 | End: 2023-09-11 | Stop reason: HOSPADM

## 2023-09-07 RX ORDER — ENOXAPARIN SODIUM 100 MG/ML
30 INJECTION SUBCUTANEOUS DAILY
Status: DISCONTINUED | OUTPATIENT
Start: 2023-09-07 | End: 2023-09-09

## 2023-09-07 RX ORDER — FINASTERIDE 5 MG/1
5 TABLET, FILM COATED ORAL DAILY
Status: DISCONTINUED | OUTPATIENT
Start: 2023-09-07 | End: 2023-09-11 | Stop reason: HOSPADM

## 2023-09-07 RX ORDER — ONDANSETRON 2 MG/ML
4 INJECTION INTRAMUSCULAR; INTRAVENOUS EVERY 6 HOURS PRN
Status: DISCONTINUED | OUTPATIENT
Start: 2023-09-07 | End: 2023-09-11 | Stop reason: HOSPADM

## 2023-09-07 RX ORDER — POTASSIUM CHLORIDE 7.45 MG/ML
10 INJECTION INTRAVENOUS PRN
Status: DISCONTINUED | OUTPATIENT
Start: 2023-09-07 | End: 2023-09-11 | Stop reason: HOSPADM

## 2023-09-07 RX ORDER — SPIRONOLACTONE 50 MG/1
25 TABLET, FILM COATED ORAL DAILY
Status: DISCONTINUED | OUTPATIENT
Start: 2023-09-07 | End: 2023-09-11 | Stop reason: HOSPADM

## 2023-09-07 RX ORDER — POTASSIUM CHLORIDE 1.5 G/1.58G
40 POWDER, FOR SOLUTION ORAL PRN
Status: DISCONTINUED | OUTPATIENT
Start: 2023-09-07 | End: 2023-09-11 | Stop reason: HOSPADM

## 2023-09-07 RX ORDER — MAGNESIUM SULFATE IN WATER 40 MG/ML
2000 INJECTION, SOLUTION INTRAVENOUS PRN
Status: DISCONTINUED | OUTPATIENT
Start: 2023-09-07 | End: 2023-09-11 | Stop reason: HOSPADM

## 2023-09-07 RX ORDER — ASPIRIN 81 MG/1
81 TABLET, CHEWABLE ORAL DAILY
Status: DISCONTINUED | OUTPATIENT
Start: 2023-09-07 | End: 2023-09-11 | Stop reason: HOSPADM

## 2023-09-07 RX ORDER — TORSEMIDE 20 MG/1
20 TABLET ORAL
Status: DISCONTINUED | OUTPATIENT
Start: 2023-09-07 | End: 2023-09-11 | Stop reason: HOSPADM

## 2023-09-07 RX ORDER — TAMSULOSIN HYDROCHLORIDE 0.4 MG/1
0.4 CAPSULE ORAL DAILY
Status: DISCONTINUED | OUTPATIENT
Start: 2023-09-07 | End: 2023-09-11 | Stop reason: HOSPADM

## 2023-09-07 RX ORDER — ONDANSETRON 4 MG/1
4 TABLET, ORALLY DISINTEGRATING ORAL EVERY 8 HOURS PRN
Status: DISCONTINUED | OUTPATIENT
Start: 2023-09-07 | End: 2023-09-11 | Stop reason: HOSPADM

## 2023-09-07 RX ORDER — ACETAMINOPHEN 325 MG/1
650 TABLET ORAL EVERY 6 HOURS PRN
Status: DISCONTINUED | OUTPATIENT
Start: 2023-09-07 | End: 2023-09-11 | Stop reason: HOSPADM

## 2023-09-07 RX ORDER — ACETAMINOPHEN 650 MG/1
650 SUPPOSITORY RECTAL EVERY 6 HOURS PRN
Status: DISCONTINUED | OUTPATIENT
Start: 2023-09-07 | End: 2023-09-11 | Stop reason: HOSPADM

## 2023-09-07 RX ORDER — POLYETHYLENE GLYCOL 3350 17 G/17G
17 POWDER, FOR SOLUTION ORAL DAILY PRN
Status: DISCONTINUED | OUTPATIENT
Start: 2023-09-07 | End: 2023-09-11 | Stop reason: HOSPADM

## 2023-09-07 RX ORDER — SODIUM CHLORIDE, SODIUM LACTATE, POTASSIUM CHLORIDE, CALCIUM CHLORIDE 600; 310; 30; 20 MG/100ML; MG/100ML; MG/100ML; MG/100ML
INJECTION, SOLUTION INTRAVENOUS CONTINUOUS
Status: DISPENSED | OUTPATIENT
Start: 2023-09-07 | End: 2023-09-08

## 2023-09-07 RX ORDER — SODIUM CHLORIDE 0.9 % (FLUSH) 0.9 %
5-40 SYRINGE (ML) INJECTION PRN
Status: DISCONTINUED | OUTPATIENT
Start: 2023-09-07 | End: 2023-09-11 | Stop reason: HOSPADM

## 2023-09-07 RX ORDER — SODIUM CHLORIDE 0.9 % (FLUSH) 0.9 %
5-40 SYRINGE (ML) INJECTION EVERY 12 HOURS SCHEDULED
Status: DISCONTINUED | OUTPATIENT
Start: 2023-09-07 | End: 2023-09-11 | Stop reason: HOSPADM

## 2023-09-07 RX ORDER — SODIUM CHLORIDE 9 MG/ML
INJECTION, SOLUTION INTRAVENOUS PRN
Status: DISCONTINUED | OUTPATIENT
Start: 2023-09-07 | End: 2023-09-11 | Stop reason: HOSPADM

## 2023-09-07 RX ORDER — SODIUM CHLORIDE, SODIUM LACTATE, POTASSIUM CHLORIDE, CALCIUM CHLORIDE 600; 310; 30; 20 MG/100ML; MG/100ML; MG/100ML; MG/100ML
INJECTION, SOLUTION INTRAVENOUS CONTINUOUS
Status: DISPENSED | OUTPATIENT
Start: 2023-09-07 | End: 2023-09-07

## 2023-09-07 RX ADMIN — HYDROMORPHONE HYDROCHLORIDE 0.25 MG: 1 INJECTION, SOLUTION INTRAMUSCULAR; INTRAVENOUS; SUBCUTANEOUS at 06:49

## 2023-09-07 RX ADMIN — HYDROMORPHONE HYDROCHLORIDE 0.5 MG: 1 INJECTION, SOLUTION INTRAMUSCULAR; INTRAVENOUS; SUBCUTANEOUS at 15:13

## 2023-09-07 RX ADMIN — CEFTRIAXONE SODIUM 1000 MG: 1 INJECTION, POWDER, FOR SOLUTION INTRAMUSCULAR; INTRAVENOUS at 00:08

## 2023-09-07 RX ADMIN — SODIUM CHLORIDE, PRESERVATIVE FREE 10 ML: 5 INJECTION INTRAVENOUS at 06:50

## 2023-09-07 RX ADMIN — HYDROMORPHONE HYDROCHLORIDE 0.5 MG: 1 INJECTION, SOLUTION INTRAMUSCULAR; INTRAVENOUS; SUBCUTANEOUS at 23:00

## 2023-09-07 RX ADMIN — MORPHINE SULFATE 2 MG: 2 INJECTION, SOLUTION INTRAMUSCULAR; INTRAVENOUS at 00:05

## 2023-09-07 RX ADMIN — SODIUM CHLORIDE, POTASSIUM CHLORIDE, SODIUM LACTATE AND CALCIUM CHLORIDE: 600; 310; 30; 20 INJECTION, SOLUTION INTRAVENOUS at 01:46

## 2023-09-07 RX ADMIN — HYDROMORPHONE HYDROCHLORIDE 0.25 MG: 1 INJECTION, SOLUTION INTRAMUSCULAR; INTRAVENOUS; SUBCUTANEOUS at 03:15

## 2023-09-07 RX ADMIN — AMIODARONE HYDROCHLORIDE 150 MG: 50 INJECTION, SOLUTION INTRAVENOUS at 23:16

## 2023-09-07 RX ADMIN — SODIUM CHLORIDE, POTASSIUM CHLORIDE, SODIUM LACTATE AND CALCIUM CHLORIDE: 600; 310; 30; 20 INJECTION, SOLUTION INTRAVENOUS at 23:02

## 2023-09-07 RX ADMIN — CEFTRIAXONE SODIUM 1000 MG: 1 INJECTION, POWDER, FOR SOLUTION INTRAMUSCULAR; INTRAVENOUS at 21:28

## 2023-09-07 RX ADMIN — HYDROMORPHONE HYDROCHLORIDE 0.5 MG: 1 INJECTION, SOLUTION INTRAMUSCULAR; INTRAVENOUS; SUBCUTANEOUS at 19:13

## 2023-09-07 RX ADMIN — HYDROMORPHONE HYDROCHLORIDE 0.5 MG: 1 INJECTION, SOLUTION INTRAMUSCULAR; INTRAVENOUS; SUBCUTANEOUS at 11:31

## 2023-09-07 RX ADMIN — SODIUM CHLORIDE, PRESERVATIVE FREE 5 ML: 5 INJECTION INTRAVENOUS at 21:28

## 2023-09-07 ASSESSMENT — PAIN DESCRIPTION - LOCATION
LOCATION: BACK
LOCATION: BACK
LOCATION: ABDOMEN
LOCATION: BACK

## 2023-09-07 ASSESSMENT — PAIN DESCRIPTION - FREQUENCY: FREQUENCY: CONTINUOUS

## 2023-09-07 ASSESSMENT — PAIN DESCRIPTION - DESCRIPTORS
DESCRIPTORS: PATIENT UNABLE TO DESCRIBE
DESCRIPTORS: OTHER (COMMENT)
DESCRIPTORS: PATIENT UNABLE TO DESCRIBE
DESCRIPTORS: OTHER (COMMENT)
DESCRIPTORS: ACHING

## 2023-09-07 ASSESSMENT — PAIN DESCRIPTION - PAIN TYPE
TYPE: ACUTE PAIN
TYPE: ACUTE PAIN

## 2023-09-07 ASSESSMENT — PAIN DESCRIPTION - ORIENTATION
ORIENTATION: MID
ORIENTATION: RIGHT
ORIENTATION: RIGHT;UPPER
ORIENTATION: POSTERIOR;LOWER
ORIENTATION: LOWER
ORIENTATION: LOWER
ORIENTATION: LOWER;RIGHT
ORIENTATION: RIGHT

## 2023-09-07 ASSESSMENT — PAIN SCALES - GENERAL
PAINLEVEL_OUTOF10: 3
PAINLEVEL_OUTOF10: 10

## 2023-09-07 ASSESSMENT — PAIN SCALES - WONG BAKER: WONGBAKER_NUMERICALRESPONSE: 2

## 2023-09-07 NOTE — CARE COORDINATION
LSW attempted to talk with pt regarding discharge plans. Pt was not in room at time of visit. CM will follow up later.

## 2023-09-07 NOTE — ED NOTES
Bridgette walker made aware of Lactic of 2.1 and troponin 0.107     Ramo Mcmanus RN  09/06/23 2051
Dr Alexx Rodriguez returned call
Paged Dr Marina Lynn
failure) ([address])     Essential hypertension, malignant     Fall at home     H/O echocardiogram 07/01/2020    EF 50-55%, Mod-Severe AS, Mild PHTN, Aortic Root 3.9cm.(pt had OV after echo)    History of cardioversion 04/17/2018    History of chest x-ray 02/27/2017    Enlargement of the aorta knob which may represent a thoracic aortic aneurysm. Further evaluation w/ a contrast enhanced CT of the chest recommended. no evidence of acute pulmonary process. History of CT scan 02/28/2017    CT Angio Chest: no evidence of pulmonary embolism, ascending thoracic aorta aneurysm measuring 4.8 cm, descending thoracic aoric aneurysm 4.1 cm, bilateral nephrllthiasis, R renal cyst, cholelithiasis, cardiomegaly, low attenuated lesion is noted w/in L lobe of thyroid gland containng Calcification. Recommend thyroid US. History of echocardiogram 03/27/2017    TTE EF 55%, LV Normal Size, borderline LVH concentric, Normal LV systolic function, transmittal doppler flow pttern suggest impaired LV relaxation Mild aortic stenosis, mild aortic regurgitation. History of EKG 02/27/2017    Time 12:03 AM, HR 75, A fib, Axis normal, Intervals normal, P waves normal, St-T Segments: nonspecific ST segment changes to the anterior lateral leads, QRS RBBB,  No significant Q waves, no ectopy. A-fib w/RBBB w/nonspecific ST segment change EKG. History of EKG 02/27/2017    Time 2:58AM: HR 59, NSR, Axis normal, Intervals normal, P waves normal, ST-T seg: nonspecific ST segment changes, QRS RBBB, no significant Q waves, no ectopy. Sinus bradycardia w/ RBBB nonspecific ST Segment changes EKG    History of Holter monitoring 11/08/2017    monitored 48 hours: Patient noted to have multiple PAC and PVCs. Risk of atrial fibrillation present given previous episode Recommend antiarrhythmic therapy. History of stress test 03/27/2017    NM Stress test: EF 54%, Abnormal study, evidence of mild ischemia in mild inferior regions.  post stress LV is

## 2023-09-07 NOTE — H&P
History and Physical      Name:  Sammi Mora /Age/Sex: 10/5/1933  (80 y.o. male)   MRN & CSN:  [id number] & [id number] Encounter Date/Time: 2023 12:37 AM EDT   Location:   PCP: Jad Reis MD       Hospital Day: 2    Assessment and Plan:     Patient is a 80 y.o. male  who presented with Fall and AMS       #Acute encephalopathy  -Patient's son states that his father is more confused than normal and has worsened following pain medication  -likely cause is 2/2 to UTI worsened by morphine use in the setting of CKD    -Treat UTI  -Avoid morphine for pain control, Dilaudid PRN     #Severe Sepsis with organ dysfunction 2/2 UTI   -Patient presents due to suprapubic tenderness and urinary frequency/urgency  -On admit afebrile, hemodynamically stable, WBC of 10.9 and lactic acid of 2.1  -UA showed LE, 1306 WBC and 80 RBC with many bacteria   -organ dysfunction includes SABIHA and encephalopathy  - IV fluids and Rocephin in the ED    -Continue Rocephin  -Follow-up urine and blood cultures  -Continue Novak    #Acute encephalopathy  -Patient's son states that his father is more confused than normal and has worsened following pain medication  -Secondary to UTI worsened by morphine use in the setting of CKD    -Treat UTI  -Avoid morphine for pain control, Dilaudid PRN     #Lactic acidosis   -LA 2.1 on admit s/p 1L NS  Repeat 1.0; 2/2 sepsis    # Non-MI troponin elevation  - Denied any typical CP.  - Initial Tn mildly elevated 0.107 (chronically elevated) repeat 0.065. ECG without acute ischemic changes. - Likely secondary to CKD     -Continue to monitor     # T12 fracture   -Patient initially presented from nursing home on 2023 following a fall at that time CT of lumbar spine showed horizontal type fracture involving T12 vertebrae   -Patient presents once again due to worsening pain and recurrent falls   -CT of abdomen pelvis showed unstable Chance type horizontal cleavage plane fracture of T12.

## 2023-09-07 NOTE — ED PROVIDER NOTES
12 lead EKG per my interpretation:  Paced EKG  Rate of 67  100% capture  There are no acute ST segment changes    Prior EKG to compare with was available and is the same  3/22/23    (All EKG's are interpreted by myself in the absence of a cardiologist)        Tre Levin DO  09/06/23 5806
History:   Diagnosis Date    ACTA2-related familial thoracic aortic aneurysm     Arrhythmia     Atrial fibrillation (HCC)     BBBB (bilateral bundle branch block)     Bradycardia     CHF (congestive heart failure) ([address])     Essential hypertension, malignant     Fall at home     H/O echocardiogram 07/01/2020    EF 50-55%, Mod-Severe AS, Mild PHTN, Aortic Root 3.9cm.(pt had OV after echo)    History of cardioversion 04/17/2018    History of chest x-ray 02/27/2017    Enlargement of the aorta knob which may represent a thoracic aortic aneurysm. Further evaluation w/ a contrast enhanced CT of the chest recommended. no evidence of acute pulmonary process. History of CT scan 02/28/2017    CT Angio Chest: no evidence of pulmonary embolism, ascending thoracic aorta aneurysm measuring 4.8 cm, descending thoracic aoric aneurysm 4.1 cm, bilateral nephrllthiasis, R renal cyst, cholelithiasis, cardiomegaly, low attenuated lesion is noted w/in L lobe of thyroid gland containng Calcification. Recommend thyroid US. History of echocardiogram 03/27/2017    TTE EF 55%, LV Normal Size, borderline LVH concentric, Normal LV systolic function, transmittal doppler flow pttern suggest impaired LV relaxation Mild aortic stenosis, mild aortic regurgitation. History of EKG 02/27/2017    Time 12:03 AM, HR 75, A fib, Axis normal, Intervals normal, P waves normal, St-T Segments: nonspecific ST segment changes to the anterior lateral leads, QRS RBBB,  No significant Q waves, no ectopy. A-fib w/RBBB w/nonspecific ST segment change EKG. History of EKG 02/27/2017    Time 2:58AM: HR 59, NSR, Axis normal, Intervals normal, P waves normal, ST-T seg: nonspecific ST segment changes, QRS RBBB, no significant Q waves, no ectopy. Sinus bradycardia w/ RBBB nonspecific ST Segment changes EKG    History of Holter monitoring 11/08/2017    monitored 48 hours: Patient noted to have multiple PAC and PVCs.  Risk of atrial fibrillation present given

## 2023-09-08 PROBLEM — E44.0 MODERATE MALNUTRITION (HCC): Chronic | Status: ACTIVE | Noted: 2023-09-08

## 2023-09-08 PROBLEM — R41.0 CONFUSION: Status: ACTIVE | Noted: 2023-09-08

## 2023-09-08 LAB
ALBUMIN SERPL-MCNC: 3.6 GM/DL (ref 3.4–5)
ALP BLD-CCNC: 105 IU/L (ref 40–128)
ALT SERPL-CCNC: 25 U/L (ref 10–40)
ANION GAP SERPL CALCULATED.3IONS-SCNC: 12 MMOL/L (ref 4–16)
AST SERPL-CCNC: 28 IU/L (ref 15–37)
BASOPHILS ABSOLUTE: 0 K/CU MM
BASOPHILS RELATIVE PERCENT: 0.1 % (ref 0–1)
BILIRUB SERPL-MCNC: 0.7 MG/DL (ref 0–1)
BUN SERPL-MCNC: 29 MG/DL (ref 6–23)
CALCIUM SERPL-MCNC: 9.2 MG/DL (ref 8.3–10.6)
CHLORIDE BLD-SCNC: 107 MMOL/L (ref 99–110)
CO2: 26 MMOL/L (ref 21–32)
CREAT SERPL-MCNC: 1.3 MG/DL (ref 0.9–1.3)
CULTURE: ABNORMAL
CULTURE: ABNORMAL
DIFFERENTIAL TYPE: ABNORMAL
EOSINOPHILS ABSOLUTE: 0 K/CU MM
EOSINOPHILS RELATIVE PERCENT: 0.3 % (ref 0–3)
GFR SERPL CREATININE-BSD FRML MDRD: 53 ML/MIN/1.73M2
GLUCOSE SERPL-MCNC: 95 MG/DL (ref 70–99)
HCT VFR BLD CALC: 36.3 % (ref 42–52)
HEMOGLOBIN: 10.9 GM/DL (ref 13.5–18)
IMMATURE NEUTROPHIL %: 0.9 % (ref 0–0.43)
LYMPHOCYTES ABSOLUTE: 0.9 K/CU MM
LYMPHOCYTES RELATIVE PERCENT: 6.5 % (ref 24–44)
Lab: ABNORMAL
MCH RBC QN AUTO: 28.8 PG (ref 27–31)
MCHC RBC AUTO-ENTMCNC: 30 % (ref 32–36)
MCV RBC AUTO: 95.8 FL (ref 78–100)
MONOCYTES ABSOLUTE: 1.6 K/CU MM
MONOCYTES RELATIVE PERCENT: 11.1 % (ref 0–4)
NUCLEATED RBC %: 0 %
PDW BLD-RTO: 15.6 % (ref 11.7–14.9)
PLATELET # BLD: 231 K/CU MM (ref 140–440)
PMV BLD AUTO: 9.5 FL (ref 7.5–11.1)
POTASSIUM SERPL-SCNC: 4.1 MMOL/L (ref 3.5–5.1)
RBC # BLD: 3.79 M/CU MM (ref 4.6–6.2)
SEGMENTED NEUTROPHILS ABSOLUTE COUNT: 11.5 K/CU MM
SEGMENTED NEUTROPHILS RELATIVE PERCENT: 81.1 % (ref 36–66)
SODIUM BLD-SCNC: 145 MMOL/L (ref 135–145)
SPECIMEN: ABNORMAL
TOTAL IMMATURE NEUTOROPHIL: 0.13 K/CU MM
TOTAL NUCLEATED RBC: 0 K/CU MM
TOTAL PROTEIN: 6 GM/DL (ref 6.4–8.2)
WBC # BLD: 14.2 K/CU MM (ref 4–10.5)

## 2023-09-08 PROCEDURE — 2580000003 HC RX 258: Performed by: NURSE PRACTITIONER

## 2023-09-08 PROCEDURE — 1200000000 HC SEMI PRIVATE

## 2023-09-08 PROCEDURE — 6360000002 HC RX W HCPCS: Performed by: STUDENT IN AN ORGANIZED HEALTH CARE EDUCATION/TRAINING PROGRAM

## 2023-09-08 PROCEDURE — 99222 1ST HOSP IP/OBS MODERATE 55: CPT | Performed by: INTERNAL MEDICINE

## 2023-09-08 PROCEDURE — 99232 SBSQ HOSP IP/OBS MODERATE 35: CPT | Performed by: PHYSICIAN ASSISTANT

## 2023-09-08 PROCEDURE — 85025 COMPLETE CBC W/AUTO DIFF WBC: CPT

## 2023-09-08 PROCEDURE — 80053 COMPREHEN METABOLIC PANEL: CPT

## 2023-09-08 PROCEDURE — 94761 N-INVAS EAR/PLS OXIMETRY MLT: CPT

## 2023-09-08 PROCEDURE — 2580000003 HC RX 258: Performed by: STUDENT IN AN ORGANIZED HEALTH CARE EDUCATION/TRAINING PROGRAM

## 2023-09-08 PROCEDURE — 92610 EVALUATE SWALLOWING FUNCTION: CPT

## 2023-09-08 PROCEDURE — 2700000000 HC OXYGEN THERAPY PER DAY

## 2023-09-08 PROCEDURE — 36415 COLL VENOUS BLD VENIPUNCTURE: CPT

## 2023-09-08 RX ORDER — ZIPRASIDONE MESYLATE 20 MG/ML
10 INJECTION, POWDER, LYOPHILIZED, FOR SOLUTION INTRAMUSCULAR ONCE
Status: DISCONTINUED | OUTPATIENT
Start: 2023-09-08 | End: 2023-09-09

## 2023-09-08 RX ADMIN — HYDROMORPHONE HYDROCHLORIDE 0.5 MG: 1 INJECTION, SOLUTION INTRAMUSCULAR; INTRAVENOUS; SUBCUTANEOUS at 18:35

## 2023-09-08 RX ADMIN — HYDROMORPHONE HYDROCHLORIDE 0.5 MG: 1 INJECTION, SOLUTION INTRAMUSCULAR; INTRAVENOUS; SUBCUTANEOUS at 21:34

## 2023-09-08 RX ADMIN — HYDROMORPHONE HYDROCHLORIDE 0.5 MG: 1 INJECTION, SOLUTION INTRAMUSCULAR; INTRAVENOUS; SUBCUTANEOUS at 11:31

## 2023-09-08 RX ADMIN — SODIUM CHLORIDE, POTASSIUM CHLORIDE, SODIUM LACTATE AND CALCIUM CHLORIDE: 600; 310; 30; 20 INJECTION, SOLUTION INTRAVENOUS at 21:36

## 2023-09-08 RX ADMIN — SODIUM CHLORIDE, PRESERVATIVE FREE 10 ML: 5 INJECTION INTRAVENOUS at 21:35

## 2023-09-08 RX ADMIN — HYDROMORPHONE HYDROCHLORIDE 0.5 MG: 1 INJECTION, SOLUTION INTRAMUSCULAR; INTRAVENOUS; SUBCUTANEOUS at 02:12

## 2023-09-08 RX ADMIN — HYDROMORPHONE HYDROCHLORIDE 0.5 MG: 1 INJECTION, SOLUTION INTRAMUSCULAR; INTRAVENOUS; SUBCUTANEOUS at 05:17

## 2023-09-08 RX ADMIN — CEFTRIAXONE SODIUM 1000 MG: 1 INJECTION, POWDER, FOR SOLUTION INTRAMUSCULAR; INTRAVENOUS at 21:41

## 2023-09-08 RX ADMIN — HYDROMORPHONE HYDROCHLORIDE 0.5 MG: 1 INJECTION, SOLUTION INTRAMUSCULAR; INTRAVENOUS; SUBCUTANEOUS at 08:33

## 2023-09-08 RX ADMIN — ENOXAPARIN SODIUM 30 MG: 100 INJECTION SUBCUTANEOUS at 11:32

## 2023-09-08 RX ADMIN — HYDROMORPHONE HYDROCHLORIDE 0.5 MG: 1 INJECTION, SOLUTION INTRAMUSCULAR; INTRAVENOUS; SUBCUTANEOUS at 14:49

## 2023-09-08 ASSESSMENT — PAIN DESCRIPTION - LOCATION
LOCATION: ABDOMEN
LOCATION: BACK;ABDOMEN
LOCATION: ABDOMEN
LOCATION: BACK

## 2023-09-08 ASSESSMENT — PAIN DESCRIPTION - DESCRIPTORS
DESCRIPTORS: ACHING
DESCRIPTORS: ACHING;SHOOTING
DESCRIPTORS: ACHING;DISCOMFORT
DESCRIPTORS: ACHING
DESCRIPTORS: ACHING

## 2023-09-08 ASSESSMENT — PAIN SCALES - GENERAL
PAINLEVEL_OUTOF10: 0
PAINLEVEL_OUTOF10: 8
PAINLEVEL_OUTOF10: 2
PAINLEVEL_OUTOF10: 7
PAINLEVEL_OUTOF10: 0
PAINLEVEL_OUTOF10: 6

## 2023-09-08 ASSESSMENT — PAIN DESCRIPTION - ORIENTATION
ORIENTATION: MID
ORIENTATION: MID;LOWER
ORIENTATION: RIGHT;LOWER
ORIENTATION: MID
ORIENTATION: RIGHT;LOWER

## 2023-09-08 ASSESSMENT — PAIN - FUNCTIONAL ASSESSMENT: PAIN_FUNCTIONAL_ASSESSMENT: ACTIVITIES ARE NOT PREVENTED

## 2023-09-08 ASSESSMENT — PAIN SCALES - WONG BAKER: WONGBAKER_NUMERICALRESPONSE: 2

## 2023-09-08 NOTE — CARE COORDINATION
LSW spoke with pt diony Hammonds who is the pt's POA regarding discharge plans. Pt lives at [address] [facility]. Pt will likely need to go to the skilled unit for Rehab at [facility]. Pt is in lot of pain. Possible surgery next week. Pt will need PT/OT evals. CM following. 09/08/23 5614   Service Assessment   Patient Orientation Unable to Assess   Cognition Alert   History Provided By Child/Family   Primary Caregiver Self   Support Systems Children;Spouse/Significant Other   Patient's Healthcare Decision Maker is: Legal Next of Kin   PCP Verified by CM Yes   Prior Functional Level Independent in ADLs/IADLs   Current Functional Level Assistance with the following:;Bathing;Dressing; Toileting;Feeding;Cooking;Housework; Shopping;Mobility   Can patient return to prior living arrangement No   Ability to make needs known: Unable   Family able to assist with home care needs: No   Would you like for me to discuss the discharge plan with any other family members/significant others, and if so, who? Yes   Discharge Planning   Patient expects to be discharged to: Skilled nursing facility   Condition of Participation: Discharge Planning   The Patient and/or Patient Representative was provided with a Choice of Provider? Patient Representative   Name of the Patient Representative who was provided with the Choice of Provider and agrees with the Discharge Plan? Pt's diony/LUCIA Hammonds   The Patient and/Or Patient Representative agree with the Discharge Plan? Yes   Freedom of Choice list was provided with basic dialogue that supports the patient's individualized plan of care/goals, treatment preferences, and shares the quality data associated with the providers?   Yes

## 2023-09-09 ENCOUNTER — APPOINTMENT (OUTPATIENT)
Dept: GENERAL RADIOLOGY | Age: 88
DRG: 871 | End: 2023-09-09
Payer: MEDICARE

## 2023-09-09 ENCOUNTER — APPOINTMENT (OUTPATIENT)
Dept: GENERAL RADIOLOGY | Age: 88
DRG: 871 | End: 2023-09-09
Attending: STUDENT IN AN ORGANIZED HEALTH CARE EDUCATION/TRAINING PROGRAM
Payer: MEDICARE

## 2023-09-09 LAB
ALBUMIN SERPL-MCNC: 3.8 GM/DL (ref 3.4–5)
ALP BLD-CCNC: 111 IU/L (ref 40–129)
ALT SERPL-CCNC: 40 U/L (ref 10–40)
ANION GAP SERPL CALCULATED.3IONS-SCNC: 12 MMOL/L (ref 4–16)
AST SERPL-CCNC: 144 IU/L (ref 15–37)
BASOPHILS ABSOLUTE: 0 K/CU MM
BASOPHILS RELATIVE PERCENT: 0.1 % (ref 0–1)
BILIRUB SERPL-MCNC: 1.1 MG/DL (ref 0–1)
BUN SERPL-MCNC: 38 MG/DL (ref 6–23)
CALCIUM SERPL-MCNC: 9.7 MG/DL (ref 8.3–10.6)
CHLORIDE BLD-SCNC: 110 MMOL/L (ref 99–110)
CO2: 27 MMOL/L (ref 21–32)
CREAT SERPL-MCNC: 1.3 MG/DL (ref 0.9–1.3)
DIFFERENTIAL TYPE: ABNORMAL
EOSINOPHILS ABSOLUTE: 0 K/CU MM
EOSINOPHILS RELATIVE PERCENT: 0 % (ref 0–3)
GFR SERPL CREATININE-BSD FRML MDRD: 53 ML/MIN/1.73M2
GLUCOSE BLD-MCNC: 99 MG/DL (ref 70–99)
GLUCOSE SERPL-MCNC: 101 MG/DL (ref 70–99)
HCT VFR BLD CALC: 39.5 % (ref 42–52)
HEMOGLOBIN: 12.1 GM/DL (ref 13.5–18)
IMMATURE NEUTROPHIL %: 0.9 % (ref 0–0.43)
LYMPHOCYTES ABSOLUTE: 0.6 K/CU MM
LYMPHOCYTES RELATIVE PERCENT: 3.2 % (ref 24–44)
MCH RBC QN AUTO: 29.3 PG (ref 27–31)
MCHC RBC AUTO-ENTMCNC: 30.6 % (ref 32–36)
MCV RBC AUTO: 95.6 FL (ref 78–100)
MONOCYTES ABSOLUTE: 2.1 K/CU MM
MONOCYTES RELATIVE PERCENT: 11.1 % (ref 0–4)
NUCLEATED RBC %: 0 %
PDW BLD-RTO: 15.9 % (ref 11.7–14.9)
PLATELET # BLD: 294 K/CU MM (ref 140–440)
PMV BLD AUTO: 9.8 FL (ref 7.5–11.1)
POTASSIUM SERPL-SCNC: 4.4 MMOL/L (ref 3.5–5.1)
RBC # BLD: 4.13 M/CU MM (ref 4.6–6.2)
SEGMENTED NEUTROPHILS ABSOLUTE COUNT: 15.9 K/CU MM
SEGMENTED NEUTROPHILS RELATIVE PERCENT: 84.7 % (ref 36–66)
SODIUM BLD-SCNC: 149 MMOL/L (ref 135–145)
TOTAL IMMATURE NEUTOROPHIL: 0.17 K/CU MM
TOTAL NUCLEATED RBC: 0 K/CU MM
TOTAL PROTEIN: 6.4 GM/DL (ref 6.4–8.2)
WBC # BLD: 18.8 K/CU MM (ref 4–10.5)

## 2023-09-09 PROCEDURE — 97110 THERAPEUTIC EXERCISES: CPT

## 2023-09-09 PROCEDURE — 74018 RADEX ABDOMEN 1 VIEW: CPT

## 2023-09-09 PROCEDURE — 80053 COMPREHEN METABOLIC PANEL: CPT

## 2023-09-09 PROCEDURE — 85025 COMPLETE CBC W/AUTO DIFF WBC: CPT

## 2023-09-09 PROCEDURE — 36415 COLL VENOUS BLD VENIPUNCTURE: CPT

## 2023-09-09 PROCEDURE — 87040 BLOOD CULTURE FOR BACTERIA: CPT

## 2023-09-09 PROCEDURE — 6360000002 HC RX W HCPCS: Performed by: STUDENT IN AN ORGANIZED HEALTH CARE EDUCATION/TRAINING PROGRAM

## 2023-09-09 PROCEDURE — 94761 N-INVAS EAR/PLS OXIMETRY MLT: CPT

## 2023-09-09 PROCEDURE — 2700000000 HC OXYGEN THERAPY PER DAY

## 2023-09-09 PROCEDURE — 97167 OT EVAL HIGH COMPLEX 60 MIN: CPT

## 2023-09-09 PROCEDURE — 97530 THERAPEUTIC ACTIVITIES: CPT

## 2023-09-09 PROCEDURE — 92526 ORAL FUNCTION THERAPY: CPT | Performed by: SPEECH-LANGUAGE PATHOLOGIST

## 2023-09-09 PROCEDURE — 97163 PT EVAL HIGH COMPLEX 45 MIN: CPT

## 2023-09-09 PROCEDURE — 71045 X-RAY EXAM CHEST 1 VIEW: CPT

## 2023-09-09 PROCEDURE — 82962 GLUCOSE BLOOD TEST: CPT

## 2023-09-09 PROCEDURE — 2580000003 HC RX 258: Performed by: STUDENT IN AN ORGANIZED HEALTH CARE EDUCATION/TRAINING PROGRAM

## 2023-09-09 PROCEDURE — 1200000000 HC SEMI PRIVATE

## 2023-09-09 PROCEDURE — 2580000003 HC RX 258: Performed by: NURSE PRACTITIONER

## 2023-09-09 RX ORDER — ENOXAPARIN SODIUM 100 MG/ML
40 INJECTION SUBCUTANEOUS DAILY
Status: DISCONTINUED | OUTPATIENT
Start: 2023-09-10 | End: 2023-09-11 | Stop reason: HOSPADM

## 2023-09-09 RX ORDER — SODIUM CHLORIDE, SODIUM LACTATE, POTASSIUM CHLORIDE, CALCIUM CHLORIDE 600; 310; 30; 20 MG/100ML; MG/100ML; MG/100ML; MG/100ML
INJECTION, SOLUTION INTRAVENOUS CONTINUOUS
Status: DISCONTINUED | OUTPATIENT
Start: 2023-09-09 | End: 2023-09-10

## 2023-09-09 RX ADMIN — ENOXAPARIN SODIUM 30 MG: 100 INJECTION SUBCUTANEOUS at 10:14

## 2023-09-09 RX ADMIN — SODIUM CHLORIDE 25 ML: 9 INJECTION, SOLUTION INTRAVENOUS at 12:44

## 2023-09-09 RX ADMIN — HYDROMORPHONE HYDROCHLORIDE 0.5 MG: 1 INJECTION, SOLUTION INTRAMUSCULAR; INTRAVENOUS; SUBCUTANEOUS at 06:57

## 2023-09-09 RX ADMIN — CEFEPIME 1000 MG: 1 INJECTION, POWDER, FOR SOLUTION INTRAMUSCULAR; INTRAVENOUS at 12:46

## 2023-09-09 RX ADMIN — HYDROMORPHONE HYDROCHLORIDE 0.5 MG: 1 INJECTION, SOLUTION INTRAMUSCULAR; INTRAVENOUS; SUBCUTANEOUS at 01:00

## 2023-09-09 RX ADMIN — HYDROMORPHONE HYDROCHLORIDE 0.5 MG: 1 INJECTION, SOLUTION INTRAMUSCULAR; INTRAVENOUS; SUBCUTANEOUS at 10:08

## 2023-09-09 RX ADMIN — SODIUM CHLORIDE, PRESERVATIVE FREE 10 ML: 5 INJECTION INTRAVENOUS at 22:20

## 2023-09-09 RX ADMIN — HYDROMORPHONE HYDROCHLORIDE 0.5 MG: 1 INJECTION, SOLUTION INTRAMUSCULAR; INTRAVENOUS; SUBCUTANEOUS at 13:14

## 2023-09-09 RX ADMIN — CEFEPIME 1000 MG: 1 INJECTION, POWDER, FOR SOLUTION INTRAMUSCULAR; INTRAVENOUS at 22:14

## 2023-09-09 RX ADMIN — HYDROMORPHONE HYDROCHLORIDE 0.5 MG: 1 INJECTION, SOLUTION INTRAMUSCULAR; INTRAVENOUS; SUBCUTANEOUS at 22:52

## 2023-09-09 RX ADMIN — HYDROMORPHONE HYDROCHLORIDE 0.5 MG: 1 INJECTION, SOLUTION INTRAMUSCULAR; INTRAVENOUS; SUBCUTANEOUS at 16:24

## 2023-09-09 RX ADMIN — SODIUM CHLORIDE, POTASSIUM CHLORIDE, SODIUM LACTATE AND CALCIUM CHLORIDE: 600; 310; 30; 20 INJECTION, SOLUTION INTRAVENOUS at 21:29

## 2023-09-09 RX ADMIN — SODIUM CHLORIDE, PRESERVATIVE FREE 10 ML: 5 INJECTION INTRAVENOUS at 13:14

## 2023-09-09 RX ADMIN — HYDROMORPHONE HYDROCHLORIDE 0.5 MG: 1 INJECTION, SOLUTION INTRAMUSCULAR; INTRAVENOUS; SUBCUTANEOUS at 19:57

## 2023-09-09 RX ADMIN — HYDROMORPHONE HYDROCHLORIDE 0.5 MG: 1 INJECTION, SOLUTION INTRAMUSCULAR; INTRAVENOUS; SUBCUTANEOUS at 03:54

## 2023-09-09 ASSESSMENT — PAIN SCALES - WONG BAKER
WONGBAKER_NUMERICALRESPONSE: 0
WONGBAKER_NUMERICALRESPONSE: 6

## 2023-09-09 ASSESSMENT — PAIN DESCRIPTION - DESCRIPTORS
DESCRIPTORS: SHOOTING;SPASM
DESCRIPTORS: ACHING
DESCRIPTORS: ACHING
DESCRIPTORS: ACHING;SPASM;SHOOTING
DESCRIPTORS: ACHING;SHOOTING
DESCRIPTORS: ACHING

## 2023-09-09 ASSESSMENT — PAIN DESCRIPTION - LOCATION
LOCATION: BACK
LOCATION: BACK;RIB CAGE
LOCATION: BACK
LOCATION: BACK
LOCATION: BACK;RIB CAGE
LOCATION: BACK
LOCATION: BACK
LOCATION: CHEST
LOCATION: BACK

## 2023-09-09 ASSESSMENT — PAIN SCALES - GENERAL
PAINLEVEL_OUTOF10: 6
PAINLEVEL_OUTOF10: 10
PAINLEVEL_OUTOF10: 8
PAINLEVEL_OUTOF10: 6
PAINLEVEL_OUTOF10: 8
PAINLEVEL_OUTOF10: 6
PAINLEVEL_OUTOF10: 10
PAINLEVEL_OUTOF10: 8
PAINLEVEL_OUTOF10: 8
PAINLEVEL_OUTOF10: 0
PAINLEVEL_OUTOF10: 8
PAINLEVEL_OUTOF10: 5

## 2023-09-09 ASSESSMENT — PAIN - FUNCTIONAL ASSESSMENT
PAIN_FUNCTIONAL_ASSESSMENT: ACTIVITIES ARE NOT PREVENTED

## 2023-09-09 ASSESSMENT — PAIN DESCRIPTION - PAIN TYPE: TYPE: ACUTE PAIN

## 2023-09-09 ASSESSMENT — PAIN DESCRIPTION - ORIENTATION
ORIENTATION: POSTERIOR;LOWER;MID
ORIENTATION: LOWER;MID
ORIENTATION: POSTERIOR;LOWER
ORIENTATION: OTHER (COMMENT)
ORIENTATION: MID;LOWER

## 2023-09-09 ASSESSMENT — PAIN DESCRIPTION - FREQUENCY: FREQUENCY: CONTINUOUS

## 2023-09-09 NOTE — PLAN OF CARE
[facility] regarding repeat chest xray results: Enteric tube tip in the region of the distal esophagus/GE junction. Recommend advancement. This RN called back and requested how far to advance NG tube and radiologist recommended to advance 10cm. RN discussed with hospitalist, who stated okay to proceed with radiologist recommendation and repeat xray to confirm.

## 2023-09-09 NOTE — PLAN OF CARE
Received call from [facility] reporting the results of the xray, showing no NG tube present. She also provided the phone number for the hospitalist to call and be connected with the radiologist.This RN immediately perfectserved the xray results and the phone number to Dr. Rosy Matute. Physician noted.

## 2023-09-09 NOTE — PLAN OF CARE
After NGT was advanced from 65 to 75cm, the chest xray results were: 1. Enteric tube is unchanged in position when compared with prior radiograph a tip again present in the region of the GE junction. Recommend advancement by approximately 10 cm. This RN notified Dr. Zachery Wynn. He ordered to remove NG tube and re-insert new NG tube, followed by a stat portable xray to confirm NGT placement. NG tube was removed by Mary Leyva and pt tolerated well. NGT tip intact. A new 14 fr NG tube was placed by Pat Javier RN in right nares, at 65 cm. Pt tolerated well. SaO2 95% on 2L NC. Abd XRAY performed at bedside by radiology Kellie Jiang. Results pending at this time.

## 2023-09-10 ENCOUNTER — APPOINTMENT (OUTPATIENT)
Dept: GENERAL RADIOLOGY | Age: 88
DRG: 871 | End: 2023-09-10
Payer: MEDICARE

## 2023-09-10 PROBLEM — A41.9 SEVERE SEPSIS (HCC): Status: ACTIVE | Noted: 2023-09-10

## 2023-09-10 PROBLEM — R65.20 SEVERE SEPSIS (HCC): Status: ACTIVE | Noted: 2023-09-10

## 2023-09-10 LAB
ANION GAP SERPL CALCULATED.3IONS-SCNC: 17 MMOL/L (ref 4–16)
BACTERIA: NEGATIVE /HPF
BASE EXCESS MIXED: 2.8 (ref 0–1.2)
BASE EXCESS: 0 (ref 0–3.3)
BASOPHILS ABSOLUTE: 0 K/CU MM
BASOPHILS RELATIVE PERCENT: 0.1 % (ref 0–1)
BILIRUBIN URINE: ABNORMAL MG/DL
BLOOD, URINE: ABNORMAL
BUN SERPL-MCNC: 46 MG/DL (ref 6–23)
CALCIUM SERPL-MCNC: 9.4 MG/DL (ref 8.3–10.6)
CARBON MONOXIDE, BLOOD: 2.2 % (ref 0–5)
CARBON MONOXIDE, BLOOD: 3 % (ref 0–5)
CHLORIDE BLD-SCNC: 116 MMOL/L (ref 99–110)
CLARITY: ABNORMAL
CO2 CONTENT: 26.8 MMOL/L (ref 19–24)
CO2 CONTENT: 28.4 MMOL/L (ref 19–24)
CO2: 21 MMOL/L (ref 21–32)
COLOR: YELLOW
COMMENT: ABNORMAL
COMMENT: ABNORMAL
CREAT SERPL-MCNC: 1.5 MG/DL (ref 0.9–1.3)
CREATININE URINE: 140 MG/DL (ref 39–259)
DIFFERENTIAL TYPE: ABNORMAL
EOSINOPHILS ABSOLUTE: 0 K/CU MM
EOSINOPHILS RELATIVE PERCENT: 0 % (ref 0–3)
GFR SERPL CREATININE-BSD FRML MDRD: 44 ML/MIN/1.73M2
GLUCOSE BLD-MCNC: 110 MG/DL (ref 70–99)
GLUCOSE BLD-MCNC: 162 MG/DL (ref 70–99)
GLUCOSE BLD-MCNC: 175 MG/DL (ref 70–99)
GLUCOSE SERPL-MCNC: 114 MG/DL (ref 70–99)
GLUCOSE, URINE: NEGATIVE MG/DL
HCO3 ARTERIAL: 25.4 MMOL/L (ref 18–23)
HCO3 ARTERIAL: 27.2 MMOL/L (ref 18–23)
HCT VFR BLD CALC: 40.3 % (ref 42–52)
HEMOGLOBIN: 12.2 GM/DL (ref 13.5–18)
IMMATURE NEUTROPHIL %: 1 % (ref 0–0.43)
KETONES, URINE: ABNORMAL MG/DL
LEUKOCYTE ESTERASE, URINE: ABNORMAL
LYMPHOCYTES ABSOLUTE: 0.5 K/CU MM
LYMPHOCYTES RELATIVE PERCENT: 2.3 % (ref 24–44)
MCH RBC QN AUTO: 29.3 PG (ref 27–31)
MCHC RBC AUTO-ENTMCNC: 30.3 % (ref 32–36)
MCV RBC AUTO: 96.9 FL (ref 78–100)
METHEMOGLOBIN ARTERIAL: 1 %
METHEMOGLOBIN ARTERIAL: 1.3 %
MONOCYTES ABSOLUTE: 2.3 K/CU MM
MONOCYTES RELATIVE PERCENT: 10.2 % (ref 0–4)
MRSA, DNA, NASAL: NEGATIVE
MUCUS: ABNORMAL HPF
NITRITE URINE, QUANTITATIVE: POSITIVE
NUCLEATED RBC %: 0 %
O2 SATURATION: 86.8 % (ref 96–97)
O2 SATURATION: 89.6 % (ref 96–97)
PCO2 ARTERIAL: 40 MMHG (ref 32–45)
PCO2 ARTERIAL: 45 MMHG (ref 32–45)
PDW BLD-RTO: 15.9 % (ref 11.7–14.9)
PH BLOOD: 7.36 (ref 7.34–7.45)
PH BLOOD: 7.44 (ref 7.34–7.45)
PH, URINE: 5 (ref 5–8)
PLATELET # BLD: 289 K/CU MM (ref 140–440)
PMV BLD AUTO: 9.8 FL (ref 7.5–11.1)
PO2 ARTERIAL: 54 MMHG (ref 75–100)
PO2 ARTERIAL: 61 MMHG (ref 75–100)
POTASSIUM SERPL-SCNC: 4.4 MMOL/L (ref 3.5–5.1)
PROCALCITONIN SERPL-MCNC: 0.35 NG/ML
PROT/CREAT RATIO, UR: 1.4
PROTEIN UA: >300 MG/DL
RBC # BLD: 4.16 M/CU MM (ref 4.6–6.2)
RBC URINE: 3212 /HPF (ref 0–3)
SEGMENTED NEUTROPHILS ABSOLUTE COUNT: 19.1 K/CU MM
SEGMENTED NEUTROPHILS RELATIVE PERCENT: 86.4 % (ref 36–66)
SODIUM BLD-SCNC: 154 MMOL/L (ref 135–145)
SODIUM URINE: 14 MMOL/L (ref 35–167)
SPECIFIC GRAVITY UA: 1.02 (ref 1–1.03)
SPECIMEN DESCRIPTION: NORMAL
TOTAL IMMATURE NEUTOROPHIL: 0.23 K/CU MM
TOTAL NUCLEATED RBC: 0 K/CU MM
TRICHOMONAS: ABNORMAL /HPF
URINE TOTAL PROTEIN: 193.5 MG/DL
UROBILINOGEN, URINE: 1 MG/DL (ref 0.2–1)
WBC # BLD: 22.1 K/CU MM (ref 4–10.5)
WBC CLUMP: ABNORMAL /HPF
WBC UA: ABNORMAL /HPF (ref 0–2)

## 2023-09-10 PROCEDURE — 37799 UNLISTED PX VASCULAR SURGERY: CPT

## 2023-09-10 PROCEDURE — 6370000000 HC RX 637 (ALT 250 FOR IP): Performed by: STUDENT IN AN ORGANIZED HEALTH CARE EDUCATION/TRAINING PROGRAM

## 2023-09-10 PROCEDURE — 80048 BASIC METABOLIC PNL TOTAL CA: CPT

## 2023-09-10 PROCEDURE — 82803 BLOOD GASES ANY COMBINATION: CPT

## 2023-09-10 PROCEDURE — 2580000003 HC RX 258: Performed by: STUDENT IN AN ORGANIZED HEALTH CARE EDUCATION/TRAINING PROGRAM

## 2023-09-10 PROCEDURE — 87641 MR-STAPH DNA AMP PROBE: CPT

## 2023-09-10 PROCEDURE — 94761 N-INVAS EAR/PLS OXIMETRY MLT: CPT

## 2023-09-10 PROCEDURE — 82570 ASSAY OF URINE CREATININE: CPT

## 2023-09-10 PROCEDURE — 6360000002 HC RX W HCPCS: Performed by: STUDENT IN AN ORGANIZED HEALTH CARE EDUCATION/TRAINING PROGRAM

## 2023-09-10 PROCEDURE — 2580000003 HC RX 258: Performed by: INTERNAL MEDICINE

## 2023-09-10 PROCEDURE — 81001 URINALYSIS AUTO W/SCOPE: CPT

## 2023-09-10 PROCEDURE — 36415 COLL VENOUS BLD VENIPUNCTURE: CPT

## 2023-09-10 PROCEDURE — 71045 X-RAY EXAM CHEST 1 VIEW: CPT

## 2023-09-10 PROCEDURE — 84145 PROCALCITONIN (PCT): CPT

## 2023-09-10 PROCEDURE — 94640 AIRWAY INHALATION TREATMENT: CPT

## 2023-09-10 PROCEDURE — 84300 ASSAY OF URINE SODIUM: CPT

## 2023-09-10 PROCEDURE — 2700000000 HC OXYGEN THERAPY PER DAY

## 2023-09-10 PROCEDURE — 2060000000 HC ICU INTERMEDIATE R&B

## 2023-09-10 PROCEDURE — 82962 GLUCOSE BLOOD TEST: CPT

## 2023-09-10 PROCEDURE — 36600 WITHDRAWAL OF ARTERIAL BLOOD: CPT

## 2023-09-10 PROCEDURE — 84156 ASSAY OF PROTEIN URINE: CPT

## 2023-09-10 PROCEDURE — 85025 COMPLETE CBC W/AUTO DIFF WBC: CPT

## 2023-09-10 RX ORDER — IPRATROPIUM BROMIDE AND ALBUTEROL SULFATE 2.5; .5 MG/3ML; MG/3ML
1 SOLUTION RESPIRATORY (INHALATION) ONCE
Status: COMPLETED | OUTPATIENT
Start: 2023-09-10 | End: 2023-09-10

## 2023-09-10 RX ORDER — LIDOCAINE 4 G/G
2 PATCH TOPICAL DAILY
Status: DISCONTINUED | OUTPATIENT
Start: 2023-09-10 | End: 2023-09-11 | Stop reason: HOSPADM

## 2023-09-10 RX ORDER — IPRATROPIUM BROMIDE AND ALBUTEROL SULFATE 2.5; .5 MG/3ML; MG/3ML
1 SOLUTION RESPIRATORY (INHALATION)
Status: DISCONTINUED | OUTPATIENT
Start: 2023-09-10 | End: 2023-09-11 | Stop reason: HOSPADM

## 2023-09-10 RX ORDER — LIDOCAINE 4 G/G
2 PATCH TOPICAL DAILY
Status: DISCONTINUED | OUTPATIENT
Start: 2023-09-11 | End: 2023-09-10

## 2023-09-10 RX ORDER — MORPHINE SULFATE 2 MG/ML
2 INJECTION, SOLUTION INTRAMUSCULAR; INTRAVENOUS
Status: DISCONTINUED | OUTPATIENT
Start: 2023-09-10 | End: 2023-09-11 | Stop reason: HOSPADM

## 2023-09-10 RX ORDER — DEXTROSE MONOHYDRATE 50 MG/ML
INJECTION, SOLUTION INTRAVENOUS CONTINUOUS
Status: DISCONTINUED | OUTPATIENT
Start: 2023-09-10 | End: 2023-09-11 | Stop reason: HOSPADM

## 2023-09-10 RX ORDER — LIDOCAINE 4 G/G
1 PATCH TOPICAL DAILY
Status: DISCONTINUED | OUTPATIENT
Start: 2023-09-10 | End: 2023-09-10

## 2023-09-10 RX ADMIN — HYDROMORPHONE HYDROCHLORIDE 0.5 MG: 1 INJECTION, SOLUTION INTRAMUSCULAR; INTRAVENOUS; SUBCUTANEOUS at 05:05

## 2023-09-10 RX ADMIN — SODIUM CHLORIDE: 9 INJECTION, SOLUTION INTRAVENOUS at 10:34

## 2023-09-10 RX ADMIN — ACETAMINOPHEN 650 MG: 650 SUPPOSITORY RECTAL at 16:11

## 2023-09-10 RX ADMIN — IPRATROPIUM BROMIDE AND ALBUTEROL SULFATE 1 DOSE: 2.5; .5 SOLUTION RESPIRATORY (INHALATION) at 10:31

## 2023-09-10 RX ADMIN — HYDROMORPHONE HYDROCHLORIDE 0.5 MG: 1 INJECTION, SOLUTION INTRAMUSCULAR; INTRAVENOUS; SUBCUTANEOUS at 01:52

## 2023-09-10 RX ADMIN — DEXTROSE MONOHYDRATE: 50 INJECTION, SOLUTION INTRAVENOUS at 19:49

## 2023-09-10 RX ADMIN — HYDROMORPHONE HYDROCHLORIDE 0.5 MG: 1 INJECTION, SOLUTION INTRAMUSCULAR; INTRAVENOUS; SUBCUTANEOUS at 08:43

## 2023-09-10 RX ADMIN — METOPROLOL TARTRATE 12.5 MG: 25 TABLET, FILM COATED ORAL at 22:54

## 2023-09-10 RX ADMIN — IPRATROPIUM BROMIDE AND ALBUTEROL SULFATE 1 DOSE: .5; 2.5 SOLUTION RESPIRATORY (INHALATION) at 20:06

## 2023-09-10 RX ADMIN — VANCOMYCIN HYDROCHLORIDE 1750 MG: 500 INJECTION, POWDER, LYOPHILIZED, FOR SOLUTION INTRAVENOUS at 15:11

## 2023-09-10 RX ADMIN — DEXTROSE MONOHYDRATE: 50 INJECTION, SOLUTION INTRAVENOUS at 10:33

## 2023-09-10 RX ADMIN — ACETAMINOPHEN 650 MG: 650 SUPPOSITORY RECTAL at 02:01

## 2023-09-10 RX ADMIN — CEFEPIME 1000 MG: 1 INJECTION, POWDER, FOR SOLUTION INTRAMUSCULAR; INTRAVENOUS at 22:53

## 2023-09-10 RX ADMIN — SODIUM CHLORIDE, PRESERVATIVE FREE 5 ML: 5 INJECTION INTRAVENOUS at 23:03

## 2023-09-10 RX ADMIN — AMIODARONE HYDROCHLORIDE 200 MG: 200 TABLET ORAL at 22:54

## 2023-09-10 RX ADMIN — CEFEPIME 1000 MG: 1 INJECTION, POWDER, FOR SOLUTION INTRAMUSCULAR; INTRAVENOUS at 10:35

## 2023-09-10 RX ADMIN — SODIUM CHLORIDE, PRESERVATIVE FREE 10 ML: 5 INJECTION INTRAVENOUS at 08:45

## 2023-09-10 RX ADMIN — ENOXAPARIN SODIUM 40 MG: 100 INJECTION SUBCUTANEOUS at 08:47

## 2023-09-10 RX ADMIN — MORPHINE SULFATE 2 MG: 2 INJECTION, SOLUTION INTRAMUSCULAR; INTRAVENOUS at 18:49

## 2023-09-10 ASSESSMENT — PAIN SCALES - WONG BAKER
WONGBAKER_NUMERICALRESPONSE: 2
WONGBAKER_NUMERICALRESPONSE: 2
WONGBAKER_NUMERICALRESPONSE: 6

## 2023-09-10 ASSESSMENT — PAIN DESCRIPTION - DESCRIPTORS: DESCRIPTORS: ACHING

## 2023-09-10 ASSESSMENT — PAIN DESCRIPTION - LOCATION
LOCATION: BACK
LOCATION: CHEST
LOCATION: CHEST

## 2023-09-10 ASSESSMENT — PAIN SCALES - GENERAL
PAINLEVEL_OUTOF10: 7
PAINLEVEL_OUTOF10: 0
PAINLEVEL_OUTOF10: 10
PAINLEVEL_OUTOF10: 1
PAINLEVEL_OUTOF10: 10

## 2023-09-10 ASSESSMENT — PAIN - FUNCTIONAL ASSESSMENT: PAIN_FUNCTIONAL_ASSESSMENT: PREVENTS OR INTERFERES WITH ALL ACTIVE AND SOME PASSIVE ACTIVITIES

## 2023-09-10 ASSESSMENT — PAIN DESCRIPTION - PAIN TYPE: TYPE: ACUTE PAIN

## 2023-09-10 ASSESSMENT — PAIN DESCRIPTION - ORIENTATION: ORIENTATION: LEFT;RIGHT

## 2023-09-10 NOTE — PLAN OF CARE
SaO2 dropped suddenly to 76% on 2L NC while physician was evaluating at bedside. Breathing labored, lungs diminished left greater than right. In setting of low grade fever over night, decreased monet output, increased respiratory secretions, and labored breathing. Hospitalist, respiratory therapy and nursing at bedside. Daughter (POA) updated at bedside. Chest XRAY, blood cultures, MRSA culture, antibiotics, bladder scan (0 cc), breathing treatment performed at bedside.

## 2023-09-10 NOTE — PLAN OF CARE
Per hospitalist: OK to start using NGT for tube feeds pending the dietician's formula recommendation. Supervisor Mayank notified, will find out if dietary is here today for tube feed rec.

## 2023-09-10 NOTE — PLAN OF CARE
Problem: Discharge Planning  Goal: Discharge to home or other facility with appropriate resources  9/10/2023 1146 by [place] RN  Outcome: Progressing  9/10/2023 0127 by Dave Martino RN  Outcome: Progressing     Problem: Skin/Tissue Integrity  Goal: Absence of new skin breakdown  Description: 1. Monitor for areas of redness and/or skin breakdown  2. Assess vascular access sites hourly  3. Every 4-6 hours minimum:  Change oxygen saturation probe site  4. Every 4-6 hours:  If on nasal continuous positive airway pressure, respiratory therapy assess nares and determine need for appliance change or resting period.   9/10/2023 1146 by [place] RN  Outcome: Progressing  9/10/2023 0127 by Dave Martino RN  Outcome: Progressing     Problem: Safety - Adult  Goal: Free from fall injury  9/10/2023 1146 by [place] RN  Outcome: Progressing  9/10/2023 0127 by Dave Martino RN  Outcome: Progressing     Problem: ABCDS Injury Assessment  Goal: Absence of physical injury  9/10/2023 1146 by [place] RN  Outcome: Progressing  9/10/2023 0127 by Dave Martino RN  Outcome: Progressing     Problem: Chronic Conditions and Co-morbidities  Goal: Patient's chronic conditions and co-morbidity symptoms are monitored and maintained or improved  9/10/2023 1146 by [place] RN  Outcome: Progressing  9/10/2023 0127 by Dave Martino RN  Outcome: Progressing     Problem: Pain  Goal: Verbalizes/displays adequate comfort level or baseline comfort level  9/10/2023 1146 by [place] RN  Outcome: Progressing  9/10/2023 0127 by Dave Martino RN  Outcome: Progressing     Problem: Nutrition Deficit:  Goal: Optimize nutritional status  9/10/2023 1146 by [place] RN  Outcome: Progressing  9/10/2023 0127 by Dave Martino RN  Outcome: Progressing

## 2023-09-10 NOTE — PLAN OF CARE
Problem: Discharge Planning  Goal: Discharge to home or other facility with appropriate resources  9/10/2023 0127 by Maximino Grey RN  Outcome: Progressing  9/9/2023 1210 by Lynn Rivers RN  Outcome: Progressing     Problem: Skin/Tissue Integrity  Goal: Absence of new skin breakdown  Description: 1. Monitor for areas of redness and/or skin breakdown  2. Assess vascular access sites hourly  3. Every 4-6 hours minimum:  Change oxygen saturation probe site  4. Every 4-6 hours:  If on nasal continuous positive airway pressure, respiratory therapy assess nares and determine need for appliance change or resting period.   9/10/2023 0127 by Maximino Grey RN  Outcome: Progressing  9/9/2023 1210 by Lynn Rivers RN  Outcome: Progressing     Problem: Safety - Adult  Goal: Free from fall injury  9/10/2023 0127 by Maximino Grey RN  Outcome: Progressing  9/9/2023 1210 by Lynn Rivers RN  Outcome: Progressing     Problem: ABCDS Injury Assessment  Goal: Absence of physical injury  9/10/2023 0127 by Maximino Grey RN  Outcome: Progressing  9/9/2023 1210 by Lynn Rivers RN  Outcome: Progressing     Problem: Chronic Conditions and Co-morbidities  Goal: Patient's chronic conditions and co-morbidity symptoms are monitored and maintained or improved  9/10/2023 0127 by Maximino Grey RN  Outcome: Progressing  9/9/2023 1210 by Lynn Rivers RN  Outcome: Progressing     Problem: Pain  Goal: Verbalizes/displays adequate comfort level or baseline comfort level  9/10/2023 0127 by Maximino Grey RN  Outcome: Progressing  9/9/2023 1210 by Lynn Rivers RN  Outcome: Progressing     Problem: Nutrition Deficit:  Goal: Optimize nutritional status  9/10/2023 0127 by Maximino Grey RN  Outcome: Progressing  9/9/2023 1210 by Lynn Rivers RN  Outcome: Progressing

## 2023-09-10 NOTE — SIGNIFICANT EVENT
Resource RN came to bedside around 1004, Berto Wets already present at bedside when this RN arrived. Pt is respiratory distress with SPO2 in the 70's-80's and only responsive to pain. Daughter Mayda Castorena at bedside. Pt was on 5L NC , RT Ally placed pt on 10L HFNC which was was ineffective d/t pt mouth breathing. RT applied venturi mask which was also ineffective for oxygenation. Applied 15L Non-rebeather mask. Saturations improved however pt continued to have shallow tachy respirations. STAT Cxray and ABG obtained.  and Priscila to bedside for eval for ICU tx. This RN and  discussed code status with potential for intubation [facility] pts dtr/MOA Mayda Castorena. Decision to place pt on Heated HF Vapotherm at this time and tx to ICU step down. No step down beds available, pt assigned to 2119 as a stepdown pt. Transferred pt on 15L NRB without issue around . Pt transitioned to Vapotherm 40L /100% FIO2 by RT.

## 2023-09-10 NOTE — PLAN OF CARE
Transferred to ICU 2119 with all pt belongings. Report given to [facility]. Daughter visiting at bedside.

## 2023-09-11 VITALS
RESPIRATION RATE: 21 BRPM | WEIGHT: 159.1 LBS | TEMPERATURE: 97.7 F | HEART RATE: 60 BPM | SYSTOLIC BLOOD PRESSURE: 101 MMHG | BODY MASS INDEX: 24.11 KG/M2 | HEIGHT: 68 IN | OXYGEN SATURATION: 97 % | DIASTOLIC BLOOD PRESSURE: 42 MMHG

## 2023-09-11 LAB — GLUCOSE BLD-MCNC: 153 MG/DL (ref 70–99)

## 2023-09-11 PROCEDURE — 2580000003 HC RX 258: Performed by: INTERNAL MEDICINE

## 2023-09-11 PROCEDURE — 94761 N-INVAS EAR/PLS OXIMETRY MLT: CPT

## 2023-09-11 PROCEDURE — 82962 GLUCOSE BLOOD TEST: CPT

## 2023-09-11 PROCEDURE — 83735 ASSAY OF MAGNESIUM: CPT

## 2023-09-11 PROCEDURE — 80053 COMPREHEN METABOLIC PANEL: CPT

## 2023-09-11 PROCEDURE — 99223 1ST HOSP IP/OBS HIGH 75: CPT | Performed by: INTERNAL MEDICINE

## 2023-09-11 PROCEDURE — 94640 AIRWAY INHALATION TREATMENT: CPT

## 2023-09-11 PROCEDURE — 2580000003 HC RX 258: Performed by: STUDENT IN AN ORGANIZED HEALTH CARE EDUCATION/TRAINING PROGRAM

## 2023-09-11 PROCEDURE — 2700000000 HC OXYGEN THERAPY PER DAY

## 2023-09-11 PROCEDURE — 83880 ASSAY OF NATRIURETIC PEPTIDE: CPT

## 2023-09-11 PROCEDURE — 99232 SBSQ HOSP IP/OBS MODERATE 35: CPT | Performed by: PHYSICIAN ASSISTANT

## 2023-09-11 PROCEDURE — 85025 COMPLETE CBC W/AUTO DIFF WBC: CPT

## 2023-09-11 PROCEDURE — 80202 ASSAY OF VANCOMYCIN: CPT

## 2023-09-11 PROCEDURE — 6370000000 HC RX 637 (ALT 250 FOR IP): Performed by: STUDENT IN AN ORGANIZED HEALTH CARE EDUCATION/TRAINING PROGRAM

## 2023-09-11 PROCEDURE — 93308 TTE F-UP OR LMTD: CPT

## 2023-09-11 PROCEDURE — 6360000002 HC RX W HCPCS: Performed by: STUDENT IN AN ORGANIZED HEALTH CARE EDUCATION/TRAINING PROGRAM

## 2023-09-11 PROCEDURE — 84100 ASSAY OF PHOSPHORUS: CPT

## 2023-09-11 RX ADMIN — DEXTROSE MONOHYDRATE: 50 INJECTION, SOLUTION INTRAVENOUS at 05:13

## 2023-09-11 RX ADMIN — ENOXAPARIN SODIUM 40 MG: 100 INJECTION SUBCUTANEOUS at 08:53

## 2023-09-11 RX ADMIN — IPRATROPIUM BROMIDE AND ALBUTEROL SULFATE 1 DOSE: .5; 2.5 SOLUTION RESPIRATORY (INHALATION) at 11:39

## 2023-09-11 RX ADMIN — SODIUM CHLORIDE, PRESERVATIVE FREE 10 ML: 5 INJECTION INTRAVENOUS at 08:53

## 2023-09-11 RX ADMIN — METOPROLOL TARTRATE 12.5 MG: 25 TABLET, FILM COATED ORAL at 08:52

## 2023-09-11 RX ADMIN — CEFEPIME 1000 MG: 1 INJECTION, POWDER, FOR SOLUTION INTRAMUSCULAR; INTRAVENOUS at 10:55

## 2023-09-11 RX ADMIN — FINASTERIDE 5 MG: 5 TABLET, FILM COATED ORAL at 08:52

## 2023-09-11 RX ADMIN — HYDROMORPHONE HYDROCHLORIDE 0.5 MG: 1 INJECTION, SOLUTION INTRAMUSCULAR; INTRAVENOUS; SUBCUTANEOUS at 00:20

## 2023-09-11 RX ADMIN — IPRATROPIUM BROMIDE AND ALBUTEROL SULFATE 1 DOSE: .5; 2.5 SOLUTION RESPIRATORY (INHALATION) at 08:02

## 2023-09-11 RX ADMIN — MORPHINE SULFATE 2 MG: 2 INJECTION, SOLUTION INTRAMUSCULAR; INTRAVENOUS at 10:50

## 2023-09-11 ASSESSMENT — PAIN DESCRIPTION - DESCRIPTORS: DESCRIPTORS: DISCOMFORT

## 2023-09-11 ASSESSMENT — PAIN DESCRIPTION - LOCATION: LOCATION: BACK

## 2023-09-11 ASSESSMENT — PAIN SCALES - WONG BAKER: WONGBAKER_NUMERICALRESPONSE: 0

## 2023-09-11 ASSESSMENT — PAIN DESCRIPTION - ORIENTATION: ORIENTATION: MID;LOWER

## 2023-09-11 NOTE — ACP (ADVANCE CARE PLANNING)
Re-rounded on pt about 1500. Noted pt's saturations stable however his breathing was labored and he appeared to be in pain. Prn rectal tylenol given @1611 for fever. More extensive code status conversation held with Pts daughter/MPOA [place] [employer] explained current code status and and alternative options. Spoke with Dyana YO at [phone] regarding pts status with them,updated dtr. [place] [employer] expressed concern for pt to have CPR if her dads heart were to stop and that she would like to consider his options and re-discuss code status later. [place] [employer] went to dinner and then returned and call this RN back to bedside. Questions answered to her satisfaction and decision was made to change pts code status to be able to place BiPAP if vapotherm is no longer effective however no intubation, no cpr, and no shocks but with the option to use medications to maintain Vital signs. Kalli Nichols discussed code status with dtr via phone and confirmed code status change to limited. New order for IV morphine PRN. Pt scored 10 on NVPS , prn morphine administered. Temp noted to be 103.3, cooling blanket applied with primary RN. Dtr expressed gratitude for care and support. Updated son Celeste Mims and asked that he relay update to his other 2 brothers Clarissa Aguilar and Josiah Gabriel. Celeste Mims also informed this RN that he has pts cell,  , and walker. Provided family with security code.

## 2023-09-11 NOTE — SIGNIFICANT EVENT
RN IRINEO kimble as patient maxed out of Vapotherm went to bedside discussed with patient's daughter and changed the patient's CODE STATUS to DNR CC and made him comfort care. Went to bed side as on telemetry patient asystole, examined patient does not have a heart rate, no respiration, pupils dilated and fixed not reactive to light pronounced dead at 12:55 PM on 9/11. And informed about patient's death to patient's daughter who is at bedside and also called patient's son discharge and informed about the incident and also the son who came in to the patient's room about the patient's death.

## 2023-09-11 NOTE — FLOWSHEET NOTE
Patient hypotensive, oxygen demand increasing. Per neurosx, wanted to make full ICU. Patients current code states Limited Code with meds only. Intensivist declined to follow. PS  message to hospitalist, Dr. Nell Doss and charge RN into room to discuss overall goals/code status as patient is declining.

## 2023-09-11 NOTE — DISCHARGE SUMMARY
right hip pain Reason for Exam: Fall; complaining of right hip pain FINDINGS: Mild right hip degenerative changes. The pubic symphysis is intact. No fracture or dislocation identified. No suspicious lytic or blastic osseous lesion. Atherosclerotic vascular calcifications are seen. No acute osseous abnormality identified. CT HEAD WO CONTRAST    Result Date: 9/6/2023  EXAMINATION: CT OF THE HEAD WITHOUT CONTRAST  9/6/2023 7:20 pm TECHNIQUE: CT of the head was performed without the administration of intravenous contrast. Automated exposure control, iterative reconstruction, and/or weight based adjustment of the mA/kV was utilized to reduce the radiation dose to as low as reasonably achievable. COMPARISON: CT scan 08/18/2020 HISTORY: ORDERING SYSTEM PROVIDED HISTORY: ams, fall yesterday TECHNOLOGIST PROVIDED HISTORY: Has a \"code stroke\" or \"stroke alert\" been called? ->No Reason for exam:->ams, fall yesterday Decision Support Exception - unselect if not a suspected or confirmed emergency medical condition->Emergency Medical Condition (MA) Reason for Exam: ams, fall yesterday FINDINGS: BRAIN/VENTRICLES: There is no acute intracranial hemorrhage, mass effect or midline shift. No abnormal extra-axial fluid collection. The gray-white differentiation is maintained without evidence of an acute infarct. There is prominence of the ventricles and sulci due to global parenchymal volume loss. There are nonspecific areas of hypoattenuation within the periventricular and subcortical white matter, which likely represent chronic microvascular ischemic change. ORBITS: The visualized portion of the orbits demonstrate no acute abnormality. SINUSES: The visualized paranasal sinuses and mastoid air cells demonstrate no acute abnormality. SOFT TISSUES/SKULL: No acute abnormality of the visualized skull or soft tissues. No acute intracranial abnormality.      CT LUMBAR SPINE WO CONTRAST    Result Date: 9/5/2023  EXAMINATION:

## 2023-09-11 NOTE — FLOWSHEET NOTE
Notified Colonel Mclean in [facility] 's office/[phone] of patients death. Colonel Mclean will call this RN back once determination is made. Ivy returned call from Colonel Mclean, body okay to be released,  not following.

## 2023-09-12 PROBLEM — J18.9 PNEUMONIA OF BOTH LOWER LOBES DUE TO INFECTIOUS ORGANISM: Status: ACTIVE | Noted: 2023-09-12

## 2023-09-12 PROBLEM — N39.0 URINARY TRACT INFECTION WITHOUT HEMATURIA: Status: ACTIVE | Noted: 2023-09-12

## 2023-09-12 PROBLEM — N17.9 AKI (ACUTE KIDNEY INJURY) (HCC): Status: ACTIVE | Noted: 2023-09-12

## 2023-09-14 LAB
CULTURE: NORMAL
CULTURE: NORMAL
Lab: NORMAL
Lab: NORMAL
SPECIMEN: NORMAL
SPECIMEN: NORMAL

## (undated) DEVICE — MARKER,SKIN,WI/RULER AND LABELS: Brand: MEDLINE

## (undated) DEVICE — SET IRRIG L94IN ID0.281IN W/ 4.5IN DST FLX CONN 2 LD ON OFF

## (undated) DEVICE — GLOVE SURG SZ 65 L12IN FNGR THK87MIL WHT LTX FREE

## (undated) DEVICE — GUIDEWIRE ENDOSCP L150CM DIA0.035IN TIP 3CM PTFE NIT

## (undated) DEVICE — GOWN,SIRUS,POLYRNF,BRTHSLV,XLN/XL,20/CS: Brand: MEDLINE

## (undated) DEVICE — TUBING, SUCTION, 9/32" X 20', STRAIGHT: Brand: MEDLINE INDUSTRIES, INC.

## (undated) DEVICE — JELLY,LUBE,STERILE,FLIP TOP,TUBE,2-OZ: Brand: MEDLINE

## (undated) DEVICE — YANKAUER,BULB TIP,W/O VENT,RIGID,STERILE: Brand: MEDLINE

## (undated) DEVICE — Z DISCONTINUED (USE MFG CAT MVABO)  TUBING GAS SAMPLING STD 6.5 FT FEMALE CONN SMRT CAPNOLINE

## (undated) DEVICE — SOLUTION IV IRRIG WATER 1000ML POUR BRL 2F7114

## (undated) DEVICE — CATHETER URET 5FR L70CM TIP 8FR OPN END CONE TIP INJ HUB

## (undated) DEVICE — STERILE POLYISOPRENE POWDER-FREE SURGICAL GLOVES: Brand: PROTEXIS

## (undated) DEVICE — GLOVE SURG SZ 7 L12IN FNGR THK87MIL WHT LTX FREE

## (undated) DEVICE — BAG DRNGE W 8MM ADPT AND SUCT HOSE CYSTO UROLOGICAL FOR

## (undated) DEVICE — ANESTHESIA CIRCUIT ADULT-LF: Brand: MEDLINE INDUSTRIES, INC.

## (undated) DEVICE — 20 ML SYRINGE LUER-LOCK TIP: Brand: MONOJECT

## (undated) DEVICE — TOWEL,OR,DSP,ST,BLUE,STD,6/PK,12PK/CS: Brand: MEDLINE

## (undated) DEVICE — Z INACTIVE USE 2635503 SOLUTION IRRIG 3000ML ST H2O USP UROMATIC PLAS CONT

## (undated) DEVICE — TUBING, SUCTION, 9/32" X 10', STRAIGHT: Brand: MEDLINE

## (undated) DEVICE — MANIFOLD CART ULT HI FLOW W 3 PRT FOR SUCT

## (undated) DEVICE — TRAY PREP DRY W/ PREM GLV 2 APPL 6 SPNG 2 UNDPD 1 OVERWRAP

## (undated) DEVICE — MAT FLOOR ULTRA ABS 28X48IN

## (undated) DEVICE — OPEN-END FLEXI-TIP URETERAL CATHETER: Brand: FLEXI-TIP

## (undated) DEVICE — SEAL ENDOSCP INSTR 7FR BX SELF SEAL

## (undated) DEVICE — CYSTO PACK: Brand: MEDLINE INDUSTRIES, INC.

## (undated) DEVICE — LINER SUCT CANSTR 1500CC SEMI RIG W/ POR HYDROPHOBIC SHUT